# Patient Record
Sex: FEMALE | Race: WHITE | NOT HISPANIC OR LATINO | Employment: OTHER | URBAN - NONMETROPOLITAN AREA
[De-identification: names, ages, dates, MRNs, and addresses within clinical notes are randomized per-mention and may not be internally consistent; named-entity substitution may affect disease eponyms.]

---

## 2017-01-02 ENCOUNTER — HISTORY (OUTPATIENT)
Dept: INTERNAL MEDICINE | Facility: OTHER | Age: 42
End: 2017-01-02

## 2017-01-02 ENCOUNTER — COMMUNICATION - GICH (OUTPATIENT)
Dept: INTERNAL MEDICINE | Facility: OTHER | Age: 42
End: 2017-01-02

## 2017-01-02 ENCOUNTER — OFFICE VISIT - GICH (OUTPATIENT)
Dept: INTERNAL MEDICINE | Facility: OTHER | Age: 42
End: 2017-01-02

## 2017-01-02 DIAGNOSIS — M79.671 PAIN OF RIGHT FOOT: ICD-10-CM

## 2017-01-02 DIAGNOSIS — G62.9 POLYNEUROPATHY: ICD-10-CM

## 2017-01-02 DIAGNOSIS — F34.1 DYSTHYMIC DISORDER: ICD-10-CM

## 2017-01-02 DIAGNOSIS — E53.8 DEFICIENCY OF OTHER SPECIFIED B GROUP VITAMINS (CODE): ICD-10-CM

## 2017-01-02 DIAGNOSIS — M79.672 PAIN OF LEFT FOOT: ICD-10-CM

## 2017-01-02 DIAGNOSIS — R20.2 PARESTHESIA OF SKIN: ICD-10-CM

## 2017-01-02 ASSESSMENT — PATIENT HEALTH QUESTIONNAIRE - PHQ9: SUM OF ALL RESPONSES TO PHQ QUESTIONS 1-9: 2

## 2017-01-05 ENCOUNTER — AMBULATORY - GICH (OUTPATIENT)
Dept: SCHEDULING | Facility: OTHER | Age: 42
End: 2017-01-05

## 2017-01-09 ENCOUNTER — AMBULATORY - GICH (OUTPATIENT)
Dept: INTERNAL MEDICINE | Facility: OTHER | Age: 42
End: 2017-01-09

## 2017-01-09 DIAGNOSIS — F60.3 BORDERLINE PERSONALITY DISORDER (H): ICD-10-CM

## 2017-01-09 DIAGNOSIS — F31.60 BIPOLAR DISORDER, CURRENT EPISODE MIXED (H): ICD-10-CM

## 2017-01-11 ENCOUNTER — HISTORY (OUTPATIENT)
Dept: OBGYN | Facility: OTHER | Age: 42
End: 2017-01-11

## 2017-01-11 ENCOUNTER — OFFICE VISIT - GICH (OUTPATIENT)
Dept: OBGYN | Facility: OTHER | Age: 42
End: 2017-01-11

## 2017-01-11 DIAGNOSIS — Z01.419 ENCOUNTER FOR GYNECOLOGICAL EXAMINATION WITHOUT ABNORMAL FINDING: ICD-10-CM

## 2017-01-11 DIAGNOSIS — N87.9 DYSPLASIA OF CERVIX UTERI: ICD-10-CM

## 2017-01-11 DIAGNOSIS — N92.6 IRREGULAR MENSTRUATION: ICD-10-CM

## 2017-01-11 LAB
ABSOLUTE BASOPHILS - HISTORICAL: 0.1 THOU/CU MM
ABSOLUTE EOSINOPHILS - HISTORICAL: 0.1 THOU/CU MM
ABSOLUTE LYMPHOCYTES - HISTORICAL: 2.5 THOU/CU MM (ref 0.9–2.9)
ABSOLUTE MONOCYTES - HISTORICAL: 0.7 THOU/CU MM
ABSOLUTE NEUTROPHILS - HISTORICAL: 5.5 THOU/CU MM (ref 1.7–7)
BASOPHILS # BLD AUTO: 0.8 %
EOSINOPHIL NFR BLD AUTO: 1.5 %
ERYTHROCYTE [DISTWIDTH] IN BLOOD BY AUTOMATED COUNT: 12.7 % (ref 11.5–15.5)
FSH - HISTORICAL: 8.9 MIU/ML
HCT VFR BLD AUTO: 41.9 % (ref 33–51)
HEMOGLOBIN: 13.8 G/DL (ref 12–16)
LYMPHOCYTES NFR BLD AUTO: 28.6 % (ref 20–44)
MCH RBC QN AUTO: 30.2 PG (ref 26–34)
MCHC RBC AUTO-ENTMCNC: 32.9 G/DL (ref 32–36)
MCV RBC AUTO: 92 FL (ref 80–100)
MONOCYTES NFR BLD AUTO: 7.4 %
NEUTROPHILS NFR BLD AUTO: 61.7 % (ref 42–72)
PLATELET # BLD AUTO: 317 THOU/CU MM (ref 140–440)
PMV BLD: 8.3 FL (ref 6.5–11)
RED BLOOD COUNT - HISTORICAL: 4.56 MIL/CU MM (ref 4–5.2)
WHITE BLOOD COUNT - HISTORICAL: 8.9 THOU/CU MM (ref 4.5–11)

## 2017-01-19 ENCOUNTER — HOSPITAL ENCOUNTER (OUTPATIENT)
Dept: RADIOLOGY | Facility: OTHER | Age: 42
End: 2017-01-19
Attending: OBSTETRICS & GYNECOLOGY

## 2017-01-19 DIAGNOSIS — N92.6 IRREGULAR MENSTRUATION: ICD-10-CM

## 2017-01-23 LAB — HPV RESULTS - HISTORICAL: NEGATIVE

## 2017-01-24 ENCOUNTER — OFFICE VISIT - GICH (OUTPATIENT)
Dept: OBGYN | Facility: OTHER | Age: 42
End: 2017-01-24

## 2017-01-24 ENCOUNTER — HISTORY (OUTPATIENT)
Dept: OBGYN | Facility: OTHER | Age: 42
End: 2017-01-24

## 2017-01-24 ENCOUNTER — AMBULATORY - GICH (OUTPATIENT)
Dept: OBGYN | Facility: OTHER | Age: 42
End: 2017-01-24

## 2017-01-24 DIAGNOSIS — N92.1 EXCESSIVE AND FREQUENT MENSTRUATION WITH IRREGULAR CYCLE: ICD-10-CM

## 2017-01-24 DIAGNOSIS — Z01.818 ENCOUNTER FOR OTHER PREPROCEDURAL EXAMINATION: ICD-10-CM

## 2017-02-16 ENCOUNTER — AMBULATORY - GICH (OUTPATIENT)
Dept: INTERNAL MEDICINE | Facility: OTHER | Age: 42
End: 2017-02-16

## 2017-02-16 ENCOUNTER — HISTORY (OUTPATIENT)
Dept: INTERNAL MEDICINE | Facility: OTHER | Age: 42
End: 2017-02-16

## 2017-02-16 DIAGNOSIS — E55.9 VITAMIN D DEFICIENCY: ICD-10-CM

## 2017-02-16 DIAGNOSIS — J45.40 MODERATE PERSISTENT ASTHMA, UNCOMPLICATED: ICD-10-CM

## 2017-02-16 DIAGNOSIS — E53.8 DEFICIENCY OF OTHER SPECIFIED B GROUP VITAMINS (CODE): ICD-10-CM

## 2017-02-16 DIAGNOSIS — I83.11 VARICOSE VEINS OF RIGHT LOWER EXTREMITY WITH INFLAMMATION: ICD-10-CM

## 2017-02-16 DIAGNOSIS — I83.12 VARICOSE VEINS OF LEFT LOWER EXTREMITY WITH INFLAMMATION: ICD-10-CM

## 2017-02-16 DIAGNOSIS — G62.9 POLYNEUROPATHY: ICD-10-CM

## 2017-02-16 DIAGNOSIS — F31.60 BIPOLAR DISORDER, CURRENT EPISODE MIXED (H): ICD-10-CM

## 2017-02-16 DIAGNOSIS — Z01.818 ENCOUNTER FOR OTHER PREPROCEDURAL EXAMINATION: ICD-10-CM

## 2017-02-16 DIAGNOSIS — N92.1 EXCESSIVE AND FREQUENT MENSTRUATION WITH IRREGULAR CYCLE: ICD-10-CM

## 2017-02-16 LAB
ANION GAP - HISTORICAL: 9 (ref 5–18)
BUN SERPL-MCNC: 11 MG/DL (ref 7–25)
BUN/CREAT RATIO - HISTORICAL: 11
CALCIUM SERPL-MCNC: 9.9 MG/DL (ref 8.6–10.3)
CHLORIDE SERPLBLD-SCNC: 104 MMOL/L (ref 98–107)
CO2 SERPL-SCNC: 24 MMOL/L (ref 21–31)
CREAT SERPL-MCNC: 1 MG/DL (ref 0.7–1.3)
GFR IF NOT AFRICAN AMERICAN - HISTORICAL: >60 ML/MIN/1.73M2
GLUCOSE SERPL-MCNC: 116 MG/DL (ref 70–105)
POTASSIUM SERPL-SCNC: 4.1 MMOL/L (ref 3.5–5.1)
SODIUM SERPL-SCNC: 137 MMOL/L (ref 133–143)
VIT B12 SERPL-MCNC: 232 PG/ML (ref 180–914)
VITAMIN D TOTAL - HISTORICAL: 25.2 NG/ML

## 2017-02-16 ASSESSMENT — PATIENT HEALTH QUESTIONNAIRE - PHQ9: SUM OF ALL RESPONSES TO PHQ QUESTIONS 1-9: 0

## 2017-02-23 ENCOUNTER — SURGERY (OUTPATIENT)
Dept: SURGERY | Facility: OTHER | Age: 42
End: 2017-02-23

## 2017-02-23 ENCOUNTER — HISTORY (OUTPATIENT)
Dept: SURGERY | Facility: OTHER | Age: 42
End: 2017-02-23

## 2017-02-23 ENCOUNTER — HOSPITAL ENCOUNTER (OUTPATIENT)
Dept: SURGERY | Facility: OTHER | Age: 42
Discharge: HOME | End: 2017-02-23
Attending: OBSTETRICS & GYNECOLOGY | Admitting: OBSTETRICS & GYNECOLOGY

## 2017-02-23 DIAGNOSIS — N92.1 EXCESSIVE AND FREQUENT MENSTRUATION WITH IRREGULAR CYCLE: ICD-10-CM

## 2017-03-01 ENCOUNTER — AMBULATORY - GICH (OUTPATIENT)
Dept: INTERNAL MEDICINE | Facility: OTHER | Age: 42
End: 2017-03-01

## 2017-03-01 DIAGNOSIS — F60.3 BORDERLINE PERSONALITY DISORDER (H): ICD-10-CM

## 2017-03-01 DIAGNOSIS — F31.60 BIPOLAR DISORDER, CURRENT EPISODE MIXED (H): ICD-10-CM

## 2017-03-06 ENCOUNTER — AMBULATORY - GICH (OUTPATIENT)
Dept: SCHEDULING | Facility: OTHER | Age: 42
End: 2017-03-06

## 2017-04-20 ENCOUNTER — HOSPITAL ENCOUNTER (OUTPATIENT)
Dept: RADIOLOGY | Facility: OTHER | Age: 42
End: 2017-04-20
Attending: INTERNAL MEDICINE

## 2017-04-20 ENCOUNTER — OFFICE VISIT - GICH (OUTPATIENT)
Dept: INTERNAL MEDICINE | Facility: OTHER | Age: 42
End: 2017-04-20

## 2017-04-20 ENCOUNTER — AMBULATORY - GICH (OUTPATIENT)
Dept: INTERNAL MEDICINE | Facility: OTHER | Age: 42
End: 2017-04-20

## 2017-04-20 DIAGNOSIS — I89.0 LYMPHEDEMA, NOT ELSEWHERE CLASSIFIED: ICD-10-CM

## 2017-04-20 DIAGNOSIS — G62.9 POLYNEUROPATHY: ICD-10-CM

## 2017-04-20 DIAGNOSIS — M25.561 PAIN IN RIGHT KNEE: ICD-10-CM

## 2017-04-20 DIAGNOSIS — E53.8 DEFICIENCY OF OTHER SPECIFIED B GROUP VITAMINS (CODE): ICD-10-CM

## 2017-04-20 DIAGNOSIS — E55.9 VITAMIN D DEFICIENCY: ICD-10-CM

## 2017-04-20 DIAGNOSIS — G89.29 OTHER CHRONIC PAIN: ICD-10-CM

## 2017-04-20 DIAGNOSIS — M25.562 PAIN IN LEFT KNEE: ICD-10-CM

## 2017-04-20 DIAGNOSIS — M23.8X1 OTHER INTERNAL DERANGEMENTS OF RIGHT KNEE: ICD-10-CM

## 2017-04-20 DIAGNOSIS — E66.01 MORBID (SEVERE) OBESITY DUE TO EXCESS CALORIES (H): ICD-10-CM

## 2017-04-20 ASSESSMENT — ANXIETY QUESTIONNAIRES
7. FEELING AFRAID AS IF SOMETHING AWFUL MIGHT HAPPEN: NOT AT ALL
1. FEELING NERVOUS, ANXIOUS, OR ON EDGE: NOT AT ALL
GAD7 TOTAL SCORE: 0
5. BEING SO RESTLESS THAT IT IS HARD TO SIT STILL: NOT AT ALL
6. BECOMING EASILY ANNOYED OR IRRITABLE: NOT AT ALL
3. WORRYING TOO MUCH ABOUT DIFFERENT THINGS: NOT AT ALL
4. TROUBLE RELAXING: NOT AT ALL
2. NOT BEING ABLE TO STOP OR CONTROL WORRYING: NOT AT ALL

## 2017-04-20 ASSESSMENT — PATIENT HEALTH QUESTIONNAIRE - PHQ9: SUM OF ALL RESPONSES TO PHQ QUESTIONS 1-9: 0

## 2017-04-24 ENCOUNTER — AMBULATORY - GICH (OUTPATIENT)
Dept: INTERNAL MEDICINE | Facility: OTHER | Age: 42
End: 2017-04-24

## 2017-04-24 DIAGNOSIS — M25.561 PAIN IN RIGHT KNEE: ICD-10-CM

## 2017-04-24 DIAGNOSIS — M25.562 PAIN IN LEFT KNEE: ICD-10-CM

## 2017-04-24 DIAGNOSIS — M23.8X1 OTHER INTERNAL DERANGEMENTS OF RIGHT KNEE: ICD-10-CM

## 2017-04-24 DIAGNOSIS — G89.29 OTHER CHRONIC PAIN: ICD-10-CM

## 2017-05-03 ENCOUNTER — HISTORY (OUTPATIENT)
Dept: ORTHOPEDICS | Facility: OTHER | Age: 42
End: 2017-05-03

## 2017-05-03 ENCOUNTER — OFFICE VISIT - GICH (OUTPATIENT)
Dept: ORTHOPEDICS | Facility: OTHER | Age: 42
End: 2017-05-03

## 2017-05-03 DIAGNOSIS — M25.562 PAIN IN LEFT KNEE: ICD-10-CM

## 2017-05-03 DIAGNOSIS — M17.0 PRIMARY OSTEOARTHRITIS OF BOTH KNEES: ICD-10-CM

## 2017-05-03 DIAGNOSIS — M25.561 PAIN IN RIGHT KNEE: ICD-10-CM

## 2017-05-03 DIAGNOSIS — M23.8X1 OTHER INTERNAL DERANGEMENTS OF RIGHT KNEE: ICD-10-CM

## 2017-05-03 DIAGNOSIS — E66.01 MORBID (SEVERE) OBESITY DUE TO EXCESS CALORIES (H): ICD-10-CM

## 2017-05-03 DIAGNOSIS — G89.29 OTHER CHRONIC PAIN: ICD-10-CM

## 2017-05-05 ENCOUNTER — COMMUNICATION - GICH (OUTPATIENT)
Dept: INTERNAL MEDICINE | Facility: OTHER | Age: 42
End: 2017-05-05

## 2017-05-05 ENCOUNTER — OFFICE VISIT - GICH (OUTPATIENT)
Dept: INTERNAL MEDICINE | Facility: OTHER | Age: 42
End: 2017-05-05

## 2017-05-05 ENCOUNTER — HISTORY (OUTPATIENT)
Dept: INTERNAL MEDICINE | Facility: OTHER | Age: 42
End: 2017-05-05

## 2017-05-05 ENCOUNTER — AMBULATORY - GICH (OUTPATIENT)
Dept: SCHEDULING | Facility: OTHER | Age: 42
End: 2017-05-05

## 2017-05-05 DIAGNOSIS — H66.001 ACUTE SUPPURATIVE OTITIS MEDIA OF RIGHT EAR WITHOUT SPONTANEOUS RUPTURE OF TYMPANIC MEMBRANE: ICD-10-CM

## 2017-05-05 DIAGNOSIS — F31.60 BIPOLAR DISORDER, CURRENT EPISODE MIXED (H): ICD-10-CM

## 2017-05-05 DIAGNOSIS — F60.3 BORDERLINE PERSONALITY DISORDER (H): ICD-10-CM

## 2017-05-05 DIAGNOSIS — H92.01 OTALGIA OF RIGHT EAR: ICD-10-CM

## 2017-05-05 ASSESSMENT — PATIENT HEALTH QUESTIONNAIRE - PHQ9: SUM OF ALL RESPONSES TO PHQ QUESTIONS 1-9: 0

## 2017-05-11 ENCOUNTER — HISTORY (OUTPATIENT)
Dept: FAMILY MEDICINE | Facility: OTHER | Age: 42
End: 2017-05-11

## 2017-05-11 ENCOUNTER — OFFICE VISIT - GICH (OUTPATIENT)
Dept: FAMILY MEDICINE | Facility: OTHER | Age: 42
End: 2017-05-11

## 2017-05-11 DIAGNOSIS — G89.29 OTHER CHRONIC PAIN: ICD-10-CM

## 2017-05-11 DIAGNOSIS — M25.562 PAIN IN LEFT KNEE: ICD-10-CM

## 2017-05-11 DIAGNOSIS — Z20.2 CONTACT WITH AND (SUSPECTED) EXPOSURE TO INFECTIONS WITH A PREDOMINANTLY SEXUAL MODE OF TRANSMISSION: ICD-10-CM

## 2017-05-11 DIAGNOSIS — M25.561 PAIN IN RIGHT KNEE: ICD-10-CM

## 2017-05-12 LAB
HEPATITIS C ANTIBODY CATEGORY - HISTORICAL: NORMAL
HIV-1/HIV-2 ANTIBODY CATEGORY - HISTORICAL: NORMAL

## 2017-06-05 ENCOUNTER — OFFICE VISIT - GICH (OUTPATIENT)
Dept: FAMILY MEDICINE | Facility: OTHER | Age: 42
End: 2017-06-05

## 2017-06-05 ENCOUNTER — HISTORY (OUTPATIENT)
Dept: FAMILY MEDICINE | Facility: OTHER | Age: 42
End: 2017-06-05

## 2017-06-05 DIAGNOSIS — R30.0 DYSURIA: ICD-10-CM

## 2017-06-05 DIAGNOSIS — N30.00 ACUTE CYSTITIS WITHOUT HEMATURIA: ICD-10-CM

## 2017-06-05 LAB
BACTERIA URINE: ABNORMAL BACTERIA/HPF
BILIRUB UR QL: NEGATIVE
CLARITY, URINE: CLEAR CLARITY
COLOR UR: YELLOW COLOR
EPITHELIAL CELLS: ABNORMAL EPI/HPF
GLUCOSE URINE: NEGATIVE MG/DL
KETONES UR QL: NEGATIVE MG/DL
LEUKOCYTE ESTERASE URINE: ABNORMAL
NITRITE UR QL STRIP: NEGATIVE
OCCULT BLOOD,URINE - HISTORICAL: ABNORMAL
PH UR: 5.5 [PH]
PROTEIN QUALITATIVE,URINE - HISTORICAL: NEGATIVE MG/DL
RBC - HISTORICAL: ABNORMAL /HPF
SP GR UR STRIP: >=1.03
UROBILINOGEN,QUALITATIVE - HISTORICAL: NORMAL EU/DL
WBC - HISTORICAL: ABNORMAL /HPF

## 2017-06-08 ENCOUNTER — HISTORY (OUTPATIENT)
Dept: FAMILY MEDICINE | Facility: OTHER | Age: 42
End: 2017-06-08

## 2017-06-08 ENCOUNTER — OFFICE VISIT - GICH (OUTPATIENT)
Dept: FAMILY MEDICINE | Facility: OTHER | Age: 42
End: 2017-06-08

## 2017-06-08 DIAGNOSIS — I87.2 VENOUS INSUFFICIENCY (CHRONIC) (PERIPHERAL): ICD-10-CM

## 2017-06-14 ENCOUNTER — COMMUNICATION - GICH (OUTPATIENT)
Dept: INTERNAL MEDICINE | Facility: OTHER | Age: 42
End: 2017-06-14

## 2017-06-14 DIAGNOSIS — E53.8 DEFICIENCY OF OTHER SPECIFIED B GROUP VITAMINS (CODE): ICD-10-CM

## 2017-06-30 ENCOUNTER — COMMUNICATION - GICH (OUTPATIENT)
Dept: INTERNAL MEDICINE | Facility: OTHER | Age: 42
End: 2017-06-30

## 2017-06-30 DIAGNOSIS — F60.3 BORDERLINE PERSONALITY DISORDER (H): ICD-10-CM

## 2017-06-30 DIAGNOSIS — F31.0 BIPOLAR DISORDER, CURRENT EPISODE HYPOMANIC (H): ICD-10-CM

## 2017-07-04 ENCOUNTER — AMBULATORY - GICH (OUTPATIENT)
Dept: SCHEDULING | Facility: OTHER | Age: 42
End: 2017-07-04

## 2017-07-19 ENCOUNTER — COMMUNICATION - GICH (OUTPATIENT)
Dept: INTERNAL MEDICINE | Facility: OTHER | Age: 42
End: 2017-07-19

## 2017-07-19 DIAGNOSIS — E53.8 DEFICIENCY OF OTHER SPECIFIED B GROUP VITAMINS (CODE): ICD-10-CM

## 2017-07-27 ENCOUNTER — HISTORY (OUTPATIENT)
Dept: INTERNAL MEDICINE | Facility: OTHER | Age: 42
End: 2017-07-27

## 2017-07-27 ENCOUNTER — OFFICE VISIT - GICH (OUTPATIENT)
Dept: INTERNAL MEDICINE | Facility: OTHER | Age: 42
End: 2017-07-27

## 2017-07-27 DIAGNOSIS — E53.8 DEFICIENCY OF OTHER SPECIFIED B GROUP VITAMINS (CODE): ICD-10-CM

## 2017-07-27 DIAGNOSIS — E66.01 MORBID (SEVERE) OBESITY DUE TO EXCESS CALORIES (H): ICD-10-CM

## 2017-07-27 DIAGNOSIS — E55.9 VITAMIN D DEFICIENCY: ICD-10-CM

## 2017-07-27 ASSESSMENT — ANXIETY QUESTIONNAIRES
4. TROUBLE RELAXING: NOT AT ALL
GAD7 TOTAL SCORE: 0
1. FEELING NERVOUS, ANXIOUS, OR ON EDGE: NOT AT ALL
3. WORRYING TOO MUCH ABOUT DIFFERENT THINGS: NOT AT ALL
5. BEING SO RESTLESS THAT IT IS HARD TO SIT STILL: NOT AT ALL
6. BECOMING EASILY ANNOYED OR IRRITABLE: NOT AT ALL
2. NOT BEING ABLE TO STOP OR CONTROL WORRYING: NOT AT ALL
7. FEELING AFRAID AS IF SOMETHING AWFUL MIGHT HAPPEN: NOT AT ALL

## 2017-07-27 ASSESSMENT — PATIENT HEALTH QUESTIONNAIRE - PHQ9: SUM OF ALL RESPONSES TO PHQ QUESTIONS 1-9: 0

## 2017-08-27 ENCOUNTER — HISTORY (OUTPATIENT)
Dept: EMERGENCY MEDICINE | Facility: OTHER | Age: 42
End: 2017-08-27

## 2017-09-02 ENCOUNTER — AMBULATORY - GICH (OUTPATIENT)
Dept: SCHEDULING | Facility: OTHER | Age: 42
End: 2017-09-02

## 2017-09-08 ENCOUNTER — COMMUNICATION - GICH (OUTPATIENT)
Dept: INTERNAL MEDICINE | Facility: OTHER | Age: 42
End: 2017-09-08

## 2017-09-08 DIAGNOSIS — F31.0 BIPOLAR DISORDER, CURRENT EPISODE HYPOMANIC (H): ICD-10-CM

## 2017-09-08 DIAGNOSIS — F60.3 BORDERLINE PERSONALITY DISORDER (H): ICD-10-CM

## 2017-10-06 ENCOUNTER — AMBULATORY - GICH (OUTPATIENT)
Dept: SCHEDULING | Facility: OTHER | Age: 42
End: 2017-10-06

## 2017-10-11 ENCOUNTER — HISTORY (OUTPATIENT)
Dept: EMERGENCY MEDICINE | Facility: OTHER | Age: 42
End: 2017-10-11

## 2017-10-12 ENCOUNTER — COMMUNICATION - GICH (OUTPATIENT)
Dept: INTERNAL MEDICINE | Facility: OTHER | Age: 42
End: 2017-10-12

## 2017-10-13 ENCOUNTER — COMMUNICATION - GICH (OUTPATIENT)
Dept: INTERNAL MEDICINE | Facility: OTHER | Age: 42
End: 2017-10-13

## 2017-10-26 ENCOUNTER — AMBULATORY - GICH (OUTPATIENT)
Dept: SCHEDULING | Facility: OTHER | Age: 42
End: 2017-10-26

## 2017-10-27 ENCOUNTER — HISTORY (OUTPATIENT)
Dept: INTERNAL MEDICINE | Facility: OTHER | Age: 42
End: 2017-10-27

## 2017-10-27 ENCOUNTER — OFFICE VISIT - GICH (OUTPATIENT)
Dept: INTERNAL MEDICINE | Facility: OTHER | Age: 42
End: 2017-10-27

## 2017-10-27 DIAGNOSIS — E66.01 MORBID (SEVERE) OBESITY DUE TO EXCESS CALORIES (H): ICD-10-CM

## 2017-10-27 DIAGNOSIS — I89.0 LYMPHEDEMA, NOT ELSEWHERE CLASSIFIED: ICD-10-CM

## 2017-10-27 DIAGNOSIS — F31.0 BIPOLAR DISORDER, CURRENT EPISODE HYPOMANIC (H): ICD-10-CM

## 2017-10-27 DIAGNOSIS — Z23 ENCOUNTER FOR IMMUNIZATION: ICD-10-CM

## 2017-10-27 DIAGNOSIS — F60.3 BORDERLINE PERSONALITY DISORDER (H): ICD-10-CM

## 2017-10-27 DIAGNOSIS — Z12.31 ENCOUNTER FOR SCREENING MAMMOGRAM FOR MALIGNANT NEOPLASM OF BREAST: ICD-10-CM

## 2017-10-27 DIAGNOSIS — F34.0 CYCLOTHYMIC DISORDER: ICD-10-CM

## 2017-10-27 DIAGNOSIS — E53.8 DEFICIENCY OF OTHER SPECIFIED B GROUP VITAMINS (CODE): ICD-10-CM

## 2017-10-27 ASSESSMENT — ANXIETY QUESTIONNAIRES
1. FEELING NERVOUS, ANXIOUS, OR ON EDGE: NOT AT ALL
4. TROUBLE RELAXING: NOT AT ALL
3. WORRYING TOO MUCH ABOUT DIFFERENT THINGS: SEVERAL DAYS
6. BECOMING EASILY ANNOYED OR IRRITABLE: NOT AT ALL
5. BEING SO RESTLESS THAT IT IS HARD TO SIT STILL: NOT AT ALL
GAD7 TOTAL SCORE: 2
7. FEELING AFRAID AS IF SOMETHING AWFUL MIGHT HAPPEN: NOT AT ALL
2. NOT BEING ABLE TO STOP OR CONTROL WORRYING: SEVERAL DAYS

## 2017-10-27 ASSESSMENT — PATIENT HEALTH QUESTIONNAIRE - PHQ9: SUM OF ALL RESPONSES TO PHQ QUESTIONS 1-9: 1

## 2017-11-01 ENCOUNTER — AMBULATORY - GICH (OUTPATIENT)
Dept: SCHEDULING | Facility: OTHER | Age: 42
End: 2017-11-01

## 2017-11-03 ENCOUNTER — COMMUNICATION - GICH (OUTPATIENT)
Dept: INTERNAL MEDICINE | Facility: OTHER | Age: 42
End: 2017-11-03

## 2017-11-03 DIAGNOSIS — F31.0 BIPOLAR DISORDER, CURRENT EPISODE HYPOMANIC (H): ICD-10-CM

## 2017-11-21 ENCOUNTER — HOSPITAL ENCOUNTER (OUTPATIENT)
Dept: PHYSICAL THERAPY | Facility: OTHER | Age: 42
Setting detail: THERAPIES SERIES
End: 2017-11-21
Attending: INTERNAL MEDICINE

## 2017-11-21 ENCOUNTER — AMBULATORY - GICH (OUTPATIENT)
Dept: PHYSICAL THERAPY | Facility: OTHER | Age: 42
End: 2017-11-21

## 2017-11-21 DIAGNOSIS — I89.0 LYMPHEDEMA, NOT ELSEWHERE CLASSIFIED: ICD-10-CM

## 2017-11-28 ENCOUNTER — COMMUNICATION - GICH (OUTPATIENT)
Dept: INTERNAL MEDICINE | Facility: OTHER | Age: 42
End: 2017-11-28

## 2017-12-05 ENCOUNTER — HOSPITAL ENCOUNTER (OUTPATIENT)
Dept: PHYSICAL THERAPY | Facility: OTHER | Age: 42
Setting detail: THERAPIES SERIES
End: 2017-12-05
Attending: INTERNAL MEDICINE

## 2017-12-06 ENCOUNTER — COMMUNICATION - GICH (OUTPATIENT)
Dept: INTERNAL MEDICINE | Facility: OTHER | Age: 42
End: 2017-12-06

## 2017-12-06 DIAGNOSIS — J45.40 MODERATE PERSISTENT ASTHMA, UNCOMPLICATED: ICD-10-CM

## 2017-12-14 ENCOUNTER — HISTORY (OUTPATIENT)
Dept: INTERNAL MEDICINE | Facility: OTHER | Age: 42
End: 2017-12-14

## 2017-12-14 ENCOUNTER — OFFICE VISIT - GICH (OUTPATIENT)
Dept: INTERNAL MEDICINE | Facility: OTHER | Age: 42
End: 2017-12-14

## 2017-12-14 DIAGNOSIS — M79.671 PAIN OF RIGHT FOOT: ICD-10-CM

## 2017-12-14 DIAGNOSIS — I89.0 LYMPHEDEMA, NOT ELSEWHERE CLASSIFIED: ICD-10-CM

## 2017-12-14 DIAGNOSIS — R20.2 PARESTHESIA OF SKIN: ICD-10-CM

## 2017-12-14 DIAGNOSIS — G62.9 POLYNEUROPATHY: ICD-10-CM

## 2017-12-14 DIAGNOSIS — I87.2 VENOUS INSUFFICIENCY (CHRONIC) (PERIPHERAL): ICD-10-CM

## 2017-12-14 DIAGNOSIS — M79.672 PAIN OF LEFT FOOT: ICD-10-CM

## 2017-12-14 DIAGNOSIS — E66.01 MORBID (SEVERE) OBESITY DUE TO EXCESS CALORIES (H): ICD-10-CM

## 2017-12-14 DIAGNOSIS — E53.8 DEFICIENCY OF OTHER SPECIFIED B GROUP VITAMINS (CODE): ICD-10-CM

## 2017-12-14 DIAGNOSIS — J45.40 MODERATE PERSISTENT ASTHMA, UNCOMPLICATED: ICD-10-CM

## 2017-12-14 DIAGNOSIS — R23.2 FLUSHING: ICD-10-CM

## 2017-12-14 ASSESSMENT — ANXIETY QUESTIONNAIRES
1. FEELING NERVOUS, ANXIOUS, OR ON EDGE: NOT AT ALL
2. NOT BEING ABLE TO STOP OR CONTROL WORRYING: NOT AT ALL
5. BEING SO RESTLESS THAT IT IS HARD TO SIT STILL: NOT AT ALL
3. WORRYING TOO MUCH ABOUT DIFFERENT THINGS: NOT AT ALL
6. BECOMING EASILY ANNOYED OR IRRITABLE: NOT AT ALL
4. TROUBLE RELAXING: NOT AT ALL
GAD7 TOTAL SCORE: 0
7. FEELING AFRAID AS IF SOMETHING AWFUL MIGHT HAPPEN: NOT AT ALL

## 2017-12-19 ENCOUNTER — OFFICE VISIT - GICH (OUTPATIENT)
Dept: OBGYN | Facility: OTHER | Age: 42
End: 2017-12-19

## 2017-12-19 ENCOUNTER — HISTORY (OUTPATIENT)
Dept: OBGYN | Facility: OTHER | Age: 42
End: 2017-12-19

## 2017-12-19 DIAGNOSIS — R23.2 FLUSHING: ICD-10-CM

## 2017-12-19 DIAGNOSIS — N91.2 AMENORRHEA: ICD-10-CM

## 2017-12-19 DIAGNOSIS — N95.1 FEMALE CLIMACTERIC STATE: ICD-10-CM

## 2017-12-19 LAB
FSH - HISTORICAL: 4.9 MIU/ML
TSH - HISTORICAL: 3.06 UIU/ML (ref 0.34–5.6)

## 2017-12-27 NOTE — PROGRESS NOTES
Patient Information     Patient Name MRN Sex Rosi Mendez [id number] Female 1975      Progress Notes by Lucio Curiel MD at 10/27/2017  1:40 PM     Author:  Lucio Curiel MD Service:  (none) Author Type:  Physician     Filed:  10/27/2017  4:55 PM Encounter Date:  10/27/2017 Status:  Signed     :  Lucio Curiel MD (Physician)            Nursing Notes:   Isa Wilcox  10/27/2017  1:45 PM  Signed  Patient is here today for a 3 month follow up. She has a few things she would like discuss today. Her  is also with today. She would like her flu shot today.  Isa Wilcox LPN.......................... 10/27/2017  1:41 PM    Rosi Baires presents to clinic today for:   Chief Complaint     Patient presents with       Follow Up      3 month     HPI: Ms. Baires is a 42 y.o. female who presents today for evaluation of above.     (F31.0) Bipolar affective disorder, current episode hypomanic (HC)  (primary encounter diagnosis)  (F34.0) Affective personality disorder  (Z23) Needs flu shot  (F60.3) Borderline personality disorder - Following with [facility] - Chiquis LA[facility]  (E66.01,  Z68.43) Morbid obesity with BMI of 50.0-59.9, adult (HC)  (E53.8) Vitamin B12 deficiency  (Z12.31) Screening mammogram, encounter for  (I89.0) Lymphedema     Patient has chronic mental health issues.  She follows closely with her psychiatrist.  She is on disability due to mental health problems.  She has not worked for many years.    Flu shot due today, orders placed.    Obesity, discussed need for reduced oral caloric intake.  Regular exercise.  Reduce carbohydrate intake.  Information printed and reviewed.    Vitamin B12 deficiency, taking B12 shots, has been quite helpful for her.  Really has helped her energy levels, she feels like her mood is stable and improved.    Mammogram due, orders placed.  Patient also is due for female health screening exams.  Orders  placed.    Lymphedema, has had chronic leg swelling problems.  Recommend lymphedema treatment with physical therapy.  Orders placed.    Ms. Mahan Body mass index is 54.16 kg/(m^2). This is out of the normal range for a 42 y.o. Normal range for ages 18+ is between 18.5 and 24.9. To lose weight we reviewed risks and benefits of appropriate options such as diet, exercise, and medications. Patient's strategy will be  self-directed nutrition plan and self-directed exercise program   BP Readings from Last 1 Encounters:10/27/17 : 122/78  Ms. Mahan blood pressure is out of the normal range for adults. Per JNC-8 guidelines normal adult blood pressure is < 120/80, pre-hypertensive is between 120/80 and 139/89, and hypertension is 140/90 or greater. Risks of hypertension were discussed. Patient's strategy will be weight loss, increased activity and reduced salt intake    Functional Capacity: about 4 METS.   Patient reports no current symptoms of fevers, chills, nausea/vomiting.   No cough. No shortness of breath.   No change in bowel/bladder habits. No melena, hematochezia. No Hematuria.   No rashes. No palpitations.  No orthopnea/paroxysmal nocturnal dyspnea   No vision or hearing issues.   No bruising.     AGNIESZKA:  AGNIESZKA-7 ANXIETY SCREENING 7/27/2017 10/27/2017   AGNIESZKA date (doc flow) 7/27/2017 10/27/2017   Nervous, anxious 0 0   Cannot stop worrying 0 1   Worry about different things 0 1   Cannot relax 0 0   Feeling restless 0 0   Easily annoyed/irritated 0 0   Afraid of awful event 0 0   Score 0 2   Severity none none   Some recent data might be hidden         PHQ9:  PHQ Depression Screening 7/27/2017 10/27/2017   Date of PHQ exam (doc flow) 7/27/2017 10/27/2017   1. Lack of interest/pleasure 0 - Not at all 0 - Not at all   2. Feeling down/depressed 0 - Not at all 0 - Not at all   PHQ-2 TOTAL SCORE 0 0   3. Trouble sleeping 0 - Not at all 0 - Not at all   4. Decreased energy 0 - Not at all 0 - Not at all   5. Appetite  change 0 - Not at all 1 - Several days   6. Feelings of failure 0 - Not at all 0 - Not at all   7. Trouble concentrating 0 - Not at all 0 - Not at all   8. Activity level 0 - Not at all 0 - Not at all   9. Hurting yourself 0 - Not at all 0 - Not at all   PHQ-9 TOTAL SCORE 0 1   PHQ-9 Severity Level none none   Functional Impairment not difficult at all not difficult at all   Some recent data might be hidden          I have personally reviewed the past medical history, past surgical history, medications, allergies, family and social history as listed below, on 10/27/2017.    Patient Active Problem List       Diagnosis  Date Noted     DEPRESSION/ANXIETY       Priority: High      Bilateral chronic knee pain  04/20/2017     Crepitus of joint of right knee  04/20/2017     Preop examination - Hysterectomy 2/23/2017 - Dr. Fatima  02/16/2017     Menorrhagia with irregular cycle  02/16/2017     Bipolar disorder, current episode mixed, unspecified (HC)  01/09/2017     Peripheral polyneuropathy (HC)  12/18/2016     Elevated random blood glucose level  12/18/2016     Vitamin D deficiency  12/18/2016     Vitamin B12 deficiency  12/18/2016     Bilateral foot pain  12/18/2016     Lymphedema of both lower extremities  11/22/2016     Venous stasis dermatitis of both lower extremities  08/17/2016     Alcohol dependence (HC)  11/28/2015     Cannabis dependence in remission (HC)  11/28/2015     Anxiety, generalized  11/28/2015     Morbid obesity with BMI of 50.0-59.9, adult (HC)  03/23/2015     Moderate persistent asthma  08/19/2013     Overview:   AAP completed on 08/19/13  Triggers: pollen, fumes, exercise, URI, warm weather. Peak flow today is 250. Symptomatic: moderate  Overview:   AAP completed on 08/19/13  Triggers: pollen, fumes, exercise, URI, warm weather. Peak flow today is 250. Symptomatic: moderate        Urinary incontinence  03/15/2013     Closed dislocation of tarsal joint  02/04/2011     GERD       EDEMA LEG        ALLERGIC RHINITIS, SEASONAL       BIPOLAR AFFECTIVE DISORDER       AFFECTIVE PERSONALITY DISORDER       SUBSTANCE ABUSE       Atopic rhinitis  10/02/2003     Overview:   [facility] - Seasonal Allergies        Borderline personality disorder - Following with [facility] - Chiquis LAWRENCE  2003     Overview:   [facility].        Disorder of female genital organ  2001     Overview:   DUB, dysmenorrhea        Past Medical History:     Diagnosis  Date     Asthma      Depression      Edema      Encounter for removal and reinsertion of IUD 05    IUD placement, Removed      History of pregnancy     G3, P2-0-1-2 with history of spontaneous       LSIL (low grade squamous intraepithelial lesion) on Pap smear      Past Surgical History:      Procedure  Laterality Date     LEEP PROCEDURE      Underwent a loop       LEG/ANKLE SURGERY      fracture, repair with screws       TUBAL LIGATION      tubal ligation       Current Outpatient Prescriptions       Medication  Sig Dispense Refill     albuterol HFA 90 mcg/actuation inhaler Inhale 1-2 Puffs by mouth every 4 hours if needed. 1 Inhaler 11     ARIPiprazole (ABILIFY) 15 mg tablet Take 7 mg by mouth once daily.       busPIRone (BUSPAR) 10 mg tablet Take 10 mg by mouth 2 times daily.       cholecalciferol (VITAMIN D3) 5,000 unit capsule Take 1 capsule by mouth once daily. For Vitamin D Deficiency - Dose Increase 2017 100 capsule 3     clotrimazole (LOTRIMIN) 1 % cream Apply  topically to affected area(s) 2 times daily. 1 Tube 0     cyanocobalamin (VITAMIN B12) 1,000 mcg/mL injection Inject 1 mL intramuscular every 2 weeks. - Dose Increase Frequency 2017 4 mL 11     gabapentin (NEURONTIN) 100 mg capsule Take 1-2 capsules by mouth 3 times daily. as needed for burning/shooting neuropathy pain 180 capsule 11     hydrOXYzine pamoate (VISTARIL) 50 mg capsule Take 1 capsule in AM and 2 capsule in PM - Managed per Psychiatry  2     ibuprofen  "(ADVIL; MOTRIN) 200 mg tablet Take 3 tablets by mouth every 6 hours if needed for Pain. 20 tablet 1     lamoTRIgine (LAMICTAL) 150 mg tablet Take 150 mg by mouth 2 times daily.       MISCELLANEOUS MEDICAL SUPPLY (GRADUATED COMPRESSION STOCKINGS) For personal use. Length: thigh Strength: 16-20 mmHg.  Please measure patient in Pharmacy. 1 Packet 0     montelukast (SINGULAIR) 10 mg tablet Take 1 tablet by mouth at bedtime. 90 tablet 3     oxybutynin XL (DITROPAN XL) 5 mg CR tablet TAKE 1 TABLET BY MOUTH DAILY (LOT [id number]) 28 tablet 6     potassium chloride (KLOR-CON M20) 20 mEq Extended-Release tablet Take 20 mEq by mouth.       SAFETY-ELBERT TB SYR 1CC/25GX5/8 1 mL 25 gauge x 5/8\" syrg SAFETY GLIDE-FOR B-12 SHOTS 3 Syringe 3     Syringe with Needle, Disp, 1 mL 25 gauge x 5/8\" Safety glide - for B12 Shots 1 box 0     vilazodone (VIIBRYD) 40 mg tablet Take 40 mg by mouth once daily with a meal.       vitamin B complex (B-COMPLEX) tablet Take 1 tablet by mouth once daily. -- for Low B12 - Start 2/16/2017 100 tablet 3     Walker Rolling Walker for home use.  2 wheels 1 Device 0     Allergies      Allergen   Reactions     Clonazepam  Other - Describe In Comment Field     Causes Violence and aggresssion      Hydrocodone-Acetaminophen  Seizures     Can take Percocet without difficulty.      Lorazepam  Other - Describe In Comment Field     Causes Violence and aggresssion      Zoloft [Sertraline]  Other - Describe In Comment Field     Caused suicidally       Bupropion  Other - Describe In Comment Field     Caused Major Depression      Divalproex Sodium  *Unknown - Pt Doesn't Remember     Lithium  *Unknown - Pt Doesn't Remember     Risperidone Analogues  *Unknown - Pt Doesn't Remember     Sulfa (Sulfonamide Antibiotics)  *Unknown - Pt Doesn't Remember     Family History       Problem   Relation Age of Onset     No Known Problems  Mother      Asthma  Father      + Leg edema, knee, hip replacement. + Lymphedema       Osteoporosis  " "Brother      No Known Problems  Brother      Other  Paternal Grandmother      Lymphedema       Family Status     Relation  Status     Mother Alive     Father Alive     Brother Alive     Brother Alive     Paternal Grandmother      Social History     Social History        Marital status:       Spouse name: N/A     Number of children:  N/A     Years of education:  N/A     Social History Main Topics         Smoking status:   Former Smoker     Packs/day:  1.00     Years:  3.00     Types:  Cigarettes     Quit date:  2003     Smokeless tobacco:   Never Used     Alcohol use   No      Comment: History of abuse - sober as of 2010.       Drug use:   No      Comment: History of use      Sexual activity:   No     Other Topics  Concern     Not on file      Social History Narrative     No longer in adult foster care lived with Charley Godwin.      .     2 sons - age 12 and 7 - as of 2016.     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      Pertinent ROS was performed and was negative as noted in HPI above.     EXAM:   Vitals:     10/27/17 1343   BP: 122/78   Pulse: 80   Weight: (!) 156.9 kg (345 lb 12.8 oz)     BP Readings from Last 3 Encounters:    10/27/17 122/78   10/11/17 135/83   17 130/69     Wt Readings from Last 3 Encounters:    10/27/17 (!) 156.9 kg (345 lb 12.8 oz)   10/11/17 131.5 kg (290 lb)   17 (!) 149.7 kg (330 lb)     Estimated body mass index is 54.16 kg/(m^2) as calculated from the following:    Height as of 10/11/17: 1.702 m (5' 7\").    Weight as of this encounter: 156.9 kg (345 lb 12.8 oz).     EXAM:  Constitutional: well groomed / good hygiene, casual dress  ENT: Normocephalic, Atraumatic   Nose/Mouth: Oral pharynx without erythema or exudates, Nose is patent bilaterally, no rhinorrhea and Dental hygeine adequate   Eyes:  Extraocular muscles intact, Sclera non-icteric, Conjunctiva without erythema  Lymphatic Exam: Non-palpable nodes in neck, " clavicular regions  Pulmonary: Lungs are clear to auscultation bilaterally, without wheezes or crackles  Cardiovascular Exam: regular rate and rhythm, 3+ edema present / lymphedema noted.   Gastrointestinal Exam: Obese  Soft, non-tender, non-distended, positive bowel sounds  Integument: No abnormal rashes, sores, or ulcerations noted  Neurologic Exam: CN 3-12 grossly intact   Musculoskeletal Exam: + lymphedema noted of bilateral legs  Moves upper and lower extremities symmetrically, No focal weakness  Gait and station appear grossly normal  Psychiatric Exam: Awake and Alert, Affect and mood appropriate  Speech is fluent, Thought process is normal    INVESTIGATIONS:  Results for orders placed or performed during the hospital encounter of 08/27/17      COMP METABOLIC PANEL      Result  Value Ref Range    SODIUM 141 133 - 143 mmol/L    POTASSIUM 4.3 3.5 - 5.1 mmol/L    CHLORIDE 104 98 - 107 mmol/L    CO2,TOTAL 25 21 - 31 mmol/L    ANION GAP 12 5 - 18                    GLUCOSE 99 70 - 105 mg/dL    CALCIUM 9.3 8.6 - 10.3 mg/dL    BUN 11 7 - 25 mg/dL    CREATININE 0.95 0.70 - 1.30 mg/dL    BUN/CREAT RATIO           12                    GFR if African American >60 >60 ml/min/1.73m2    GFR if not African American >60 >60 ml/min/1.73m2    ALBUMIN 3.8 3.5 - 5.7 g/dL    PROTEIN,TOTAL 7.1 6.4 - 8.9 g/dL    GLOBULIN                  3.3 2.0 - 3.7 g/dL    A/G RATIO 1.2 1.0 - 2.0                    BILIRUBIN,TOTAL 0.8 0.3 - 1.0 mg/dL    ALK PHOSPHATASE 47 34 - 104 IU/L    ALT (SGPT) 16 7 - 52 IU/L    AST (SGOT) 18 13 - 39 IU/L   D-DIMER,QUANTITATIVE      Result  Value Ref Range    D-DIMER, QUANTITATIVE  301 (H) >199 - 230 ng/mL   CBC WITH AUTO DIFFERENTIAL      Result  Value Ref Range    WHITE BLOOD COUNT         7.5 4.5 - 11.0 thou/cu mm    RED BLOOD COUNT           4.27 4.00 - 5.20 mil/cu mm    HEMOGLOBIN                12.9 12.0 - 16.0 g/dL    HEMATOCRIT                39.1 33.0 - 51.0 %    MCV                       92 80 -  100 fL    MCH                       30.2 26.0 - 34.0 pg    MCHC                      33.0 32.0 - 36.0 g/dL    RDW                       12.8 11.5 - 15.5 %    PLATELET COUNT            205 140 - 440 thou/cu mm    MPV                       10.1 6.5 - 11.0 fL    NEUTROPHILS               59.1 42.0 - 72.0 %    LYMPHOCYTES               28.1 20.0 - 44.0 %    MONOCYTES                 10.0 <12.0 %    EOSINOPHILS               2.1 <8.0 %    BASOPHILS                 0.4 <3.0 %    IMMATURE GRANULOCYTES(METAS,MYELOS,PROS) 0.3 %    ABSOLUTE NEUTROPHILS      4.4 1.7 - 7.0thou/cu mm    ABSOLUTE LYMPHOCYTES      2.1 0.9 - 2.9 thou/cu mm    ABSOLUTE MONOCYTES        0.8 <0.9 thou/cu mm    ABSOLUTE EOSINOPHILS      0.2 <0.5 thou/cu mm    ABSOLUTE BASOPHILS        0.0 <0.3 thou/cu mm    ABSOLUTE IMMATURE GRANULOCYTES(METAS,MYELOS,PROS) 0.0 <=0.3 thou/cu mm       ASSESSMENT AND PLAN:  Rosi was seen today for follow up.    Diagnoses and all orders for this visit:    Bipolar affective disorder, current episode hypomanic (HC)    Affective personality disorder    Needs flu shot  -     FLU VACCINE => 3 YRS PF QUADRIVALENT IIV4 IM  -     [place] ADMIN VACC INITIAL SEASONAL AFFILIATE ONLY    Borderline personality disorder - Following with [facility] - Chiquis LAWRENCE    Morbid obesity with BMI of 50.0-59.9, adult (HC)    Vitamin B12 deficiency    Screening mammogram, encounter for  -     XR MAMMO BILAT SCREENING; Future    Lymphedema  -     PT LYMPHEDEMA TREATMENT; Future    lab results and schedule of future lab studies reviewed with patient, reviewed diet, exercise and weight control, recommended sodium restriction, cardiovascular risk and specific lipid/LDL goals reviewed    -- Expected clinical course discussed   -- Medications and their side effects discussed    Rosi is also recommended to eat a heart-healthy diet, do regular aerobic exercises, maintain a desirable body weight, and avoid tobacco products. These recommendations  "are from the American Heart Association (AHA) which stresses the importance of lifestyle changes to lower cardiovascular disease risk.     Return in about 6 months (around 4/27/2018) for -- Follow-up B12.    Patient Instructions   Glad the B12 shots have been helping.     Flu shot today.    Schedule appointment with one of the Nurse Practitioners --- for your female health screening exams.     Schedule mammogram at your convenience.    Physical therapy referral sent  - they will call with date/time of appointment.    -- Start lymphedema treatment      Your Body mass index (BMI) is:  Estimated body mass index is 54.16 kg/(m^2) as calculated from the following:    Height as of 10/11/17: 1.702 m (5' 7\").    Weight as of this encounter: 156.9 kg (345 lb 12.8 oz).   (BMI ranges: Normal 18.5 - 25, Overweight 25 - 30, Obesity greater than 30)     Facts about losing weight:   -- Overweight and Obesity increase your risk for developing diabetes, high blood pressure and stroke, and shorten your life.   -- 90% of weight loss comes from dietary changes, only 10% from exercise   -- For every 1 pound of of weight loss -- this is equal to about 8 to 10 pounds of weight off of your knees.   -- there are about 3500 calories in 1 pound of body weight.     -- Goal Caloric intake -- 1200 to 1500 daily.       --> Get out and walk for 10 to 15 minutes after each meal -- this can significantly lower the spike in blood sugar after eating.       What have successful people done to lose large amounts of weight, and keep it off?   -- Used both diet and exercise to lose weight   -- Ate a healthy breakfast every day   -- Exercised an hour per day   -- Watched less than 10 hours of TV per week   -- Weighed themselves weekly   -- Drink a large glass of water 10-15 minutes before your meals.   -- Use smaller dinner plates and don't go back for seconds.     What should I do?   -- Work on 5-10% weight loss   -- Focus on a few healthy dietary changes "   -- Eat more fresh fruits and vegetables, and fewer carbohydrates (http://myplate.gov/)   -- Exercise every day   -- Weigh yourself once a week    Weight Management   Weight Watchers     Meets Mondays 9:30, 11:45, or 5:30    Justin James, [address], [place], [place]     Contact Sabrina [phone] [email or web address]  Weight Watchers [email or web address]    -- Consider My Fitness Pal on your smart phone  [email or web address]      I would recommend a focus on weight loss and increased exercise with a goal of 30 minutes of exercise at least 5 times per week in order to help prevent worsening of your blood sugar control.     Keep working to try obtain/maintain healthy weight, weight loss, healthy diet and regular exercise.        -- Try to avoid Carbohydrates as much as possible -- breads, pasta, baked goods, cakes.   These are turned to sugar in one metabolic conversion, cause insulin secretion and increased fat deposition / weight gain.          Return in approximately 6 month(s), or sooner as needed for follow-up with Dr. Curiel.  -- Follow-up B12    Clinic : [phone]  Appointment line: [phone]      Lucio Curiel MD

## 2017-12-28 NOTE — TELEPHONE ENCOUNTER
Patient Information     Patient Name MRN Sex Rosi Mendez [id number] Female 1975      Telephone Encounter by Chika Archer at 2017  1:41 PM     Author:  Chika Archer Service:  (none) Author Type:  (none)     Filed:  2017  1:45 PM Encounter Date:  2017 Status:  Signed     :  Chika Archer            Patient says she would like to get the B12 shot at home every 3 weeks.  She has a nurse that comes every week from [facility] and she told her she could give it to her.  Chika Archer LPN ....................  2017   1:45 PM

## 2017-12-28 NOTE — TELEPHONE ENCOUNTER
Patient Information     Patient Name MRN Sex Rosi Mendez [id number] Female 1975      Telephone Encounter by Lucio Curiel MD at 2017 11:18 AM     Author:  Lucio Curiel MD Service:  (none) Author Type:  Physician     Filed:  2017 11:19 AM Encounter Date:  2017 Status:  Signed     :  Lucio Curiel MD (Physician)            Needs appointment -- see if available for this afternoon today    Lucio Curiel MD

## 2017-12-28 NOTE — TELEPHONE ENCOUNTER
Patient Information     Patient Name MRN Sex Rosi Mendez [id number] Female 1975      Telephone Encounter by Anjali Loja at 2017 11:09 AM     Author:  Anjali Loja Service:  (none) Author Type:  (none)     Filed:  2017 11:10 AM Encounter Date:  2017 Status:  Signed     :  Anjali Loja            Pt states she made an appointment for this already.  Anjali Loja

## 2017-12-28 NOTE — PROGRESS NOTES
Patient Information     Patient Name MRN Sex Rosi Mendez [id number] Female 1975      Progress Notes by Romelia Brandt NP at 2017 11:00 AM     Author:  Romelia Brandt NP Service:  (none) Author Type:  PHYS- Nurse Practitioner     Filed:  2017  1:37 PM Encounter Date:  2017 Status:  Signed     :  Romelia Brandt NP (PHYS- Nurse Practitioner)            Nursing Notes:   Yareli Vallejo  2017 11:38 AM  Signed  Patient presents to clinic with concerns about possible infection on right lower leg. There is no open wound.  Yareli VallejoLPN ....................  2017   11:02 AM    SUBJECTIVE:    Rosi Baires is a 42 y.o. female who presents for leg concern    HPI  Rosi Baires is here thinking her leg has an infection. She noted discoloration that has changed some over the last week. She has not used any otc on it.   Rash noted the last week. No fevers chills. No itching, no pain.   Hx of cellulitis in the past.     Current Outpatient Prescriptions on File Prior to Visit       Medication  Sig Dispense Refill     albuterol HFA 90 mcg/actuation inhaler Inhale 1-2 Puffs by mouth every 4 hours if needed. 1 Inhaler 11     ARIPiprazole (ABILIFY) 15 mg tablet Take 7 mg by mouth once daily.       busPIRone (BUSPAR) 10 mg tablet Take 10 mg by mouth 2 times daily.       cholecalciferol (VITAMIN D3) 5,000 unit capsule Take 1 capsule by mouth once daily. For Vitamin D Deficiency - Dose Increase 2017 100 capsule 3     cyanocobalamin (VITAMIN B12) 1,000 mcg/mL injection Inject 1 mL intramuscular every 3 weeks. 1000 mcg 15     gabapentin (NEURONTIN) 100 mg capsule Take 1-2 capsules by mouth 3 times daily. as needed for burning/shooting neuropathy pain 180 capsule 11     hydrOXYzine pamoate (VISTARIL) 50 mg capsule Take 1 capsule in AM and 2 capsule in PM - Managed per Psychiatry  2     ibuprofen (ADVIL; MOTRIN) 200 mg tablet Take 3 tablets by mouth every 6 hours  "if needed for Pain. 20 tablet 1     lamoTRIgine (LAMICTAL) 150 mg tablet Take 150 mg by mouth 2 times daily.       MISCELLANEOUS MEDICAL SUPPLY (GRADUATED COMPRESSION STOCKINGS) For personal use. Length: thigh Strength: 16-20 mmHg.  Please measure patient in Pharmacy. 1 Packet 0     montelukast (SINGULAIR) 10 mg tablet Take 1 tablet by mouth at bedtime. 90 tablet 3     nitrofurantoin macrocrystals/monohydrate (MACROBID) 100 mg capsule Take 1 capsule by mouth 2 times daily for 5 days. 10 capsule 0     oxybutynin XL (DITROPAN XL) 5 mg CR tablet TAKE 1 TABLET BY MOUTH DAILY (LOT [id number]) 28 tablet 6     potassium chloride (KLOR-CON M20) 20 mEq Extended-Release tablet Take 20 mEq by mouth.       vilazodone (VIIBRYD) 40 mg tablet Take 40 mg by mouth once daily with a meal.       vitamin B complex (B-COMPLEX) tablet Take 1 tablet by mouth once daily. -- for Low B12 - Start 2/16/2017 100 tablet 3     Walker Rolling Walker for home use.  2 wheels 1 Device 0     No current facility-administered medications on file prior to visit.        REVIEW OF SYSTEMS:  ROS    OBJECTIVE:  /78  Pulse 92  Temp 98  F (36.7  C) (Tympanic)  Resp 20  Ht 1.702 m (5' 7\")  Wt (!) 144.2 kg (317 lb 12.8 oz)  BMI 49.77 kg/m2    EXAM:   Physical Exam   Constitutional: She is well-developed, well-nourished, and in no distress.   HENT:   Head: Normocephalic and atraumatic.   Eyes: Conjunctivae are normal.   Cardiovascular: Normal rate.    Pulmonary/Chest: Effort normal. No respiratory distress.   Skin: Skin is warm and dry.   RT lower leg she has a skin discoloration on the lower anterior leg. Not red, not warm, no pain. No weeping, or open areas.   Nursing note and vitals reviewed.      ASSESSMENT/PLAN:    ICD-10-CM    1. Venous stasis dermatitis of both lower extremities I87.2         Plan:  Reassurance provided that this is not an infection. Appears to be d/t chronic venous stasis. F/U if worsens. I explained my diagnostic considerations " and recommendations to the patient, who voiced understanding and agreement with the treatment plan. All questions were answered. We discussed potential side effects of any prescribed or recommended therapies, as well as expectations for response to treatments. She was advised to contact our office if there is no improvement or worsening of conditions or symptoms.  If s/s worsen or persist, patient will either come back or follow up with PCP.         MARILYN FARRIS NP ....................  6/8/2017   1:37 PM

## 2017-12-28 NOTE — TELEPHONE ENCOUNTER
Patient Information     Patient Name MRN Sex Rosi Mendez [id number] Female 1975      Telephone Encounter by Zoya Jordan at 10/12/2017 11:58 AM     Author:  Zoya Jordan Service:  (none) Author Type:  (none)     Filed:  10/12/2017 11:59 AM Encounter Date:  10/12/2017 Status:  Signed     :  Zoya Jordan            Please call patient regarding work in for ER follow up.

## 2017-12-28 NOTE — TELEPHONE ENCOUNTER
Patient Information     Patient Name MRN Rosi Gimenez [id number] Female 1975      Telephone Encounter by Anjali Loja at 2017  8:37 AM     Author:  Anjali Loja Service:  (none) Author Type:  (none)     Filed:  2017  8:37 AM Encounter Date:  2017 Status:  Signed     :  Anjali Loja            Please advise.

## 2017-12-28 NOTE — TELEPHONE ENCOUNTER
Patient Information     Patient Name MRRosi Mancilla [id number] Female 1975      Telephone Encounter by Corinne Martinez RN at 2017  9:19 AM     Author:  Corinne Martinez RN Service:  (none) Author Type:  NURS- Registered Nurse     Filed:  2017  9:21 AM Encounter Date:  2017 Status:  Signed     :  Corinne Martinez RN (NURS- Registered Nurse)              Office visit in the past 12 months.    Last visit with ROYCE MONTERROSO was on: 2017 in [facility]  Next visit with ROYCE MONTERROSO is on: 2017 in [facility]  Next visit with Internal Medicine is on: 2017 in [facility]    Max refill for 12 months from last office visit.  Always add ICD-9 code.    B12 injections ordered for 1 yr supply to [facility] in . Syringes re-order. Prescription refilled per RN Medication Refill Policy.................... CORINNE MARTINEZ RN ....................  2017   9:20 AM

## 2017-12-28 NOTE — TELEPHONE ENCOUNTER
Patient Information     Patient Name MRN Rosi Gimenez [id number] Female 1975      Telephone Encounter by Deirdre Bhatti at 2017  9:47 AM     Author:  Deirdre Bhatti Service:  (none) Author Type:  (none)     Filed:  2017  9:48 AM Encounter Date:  2017 Status:  Signed     :  Deirdre Bhatti            Left message to call back  ...................Deirdre Bhatti LPN  2017   9:47 AM

## 2017-12-28 NOTE — TELEPHONE ENCOUNTER
Patient Information     Patient Name MRN Sex Rosi Mendez [id number] Female 1975      Telephone Encounter by Lucio Curiel MD at 10/13/2017 11:11 AM     Author:  Lucio Curiel MD Service:  (none) Author Type:  Physician     Filed:  10/13/2017 11:11 AM Encounter Date:  10/13/2017 Status:  Signed     :  Lucio Curiel MD (Physician)            Could take Keflex ---- just 250 mg --- 4 times daily (instead of the 500 mg per dose).       Lucio Curiel MD

## 2017-12-28 NOTE — PROGRESS NOTES
Patient Information     Patient Name MRN Rosi Gimenez [id number] Female 1975      Progress Notes by Lucio Curiel MD at 2017  2:40 PM     Author:  Lucio Curiel MD Service:  (none) Author Type:  Physician     Filed:  2017  2:19 AM Encounter Date:  2017 Status:  Signed     :  Lucio Curiel MD (Physician)            Nursing Notes:   Deirdre Bhatti  2017  3:01 PM  Signed  Patient presents to the clinic for discussion for weight loss.    Deirdre Bhatti LPN        2017 2:45 PM    Rosi Baires presents to clinic today for:   Chief Complaint     Patient presents with       Consult      discussion of weight loss     HPI: Ms. Baires is a 42 y.o. female who presents today for evaluation of above.     (E66.01,  Z68.43) Morbid obesity with BMI of 50.0-59.9, adult (HC)  (primary encounter diagnosis)  (E53.8) Vitamin B12 deficiency  (E55.9) Vitamin D deficiency     Patient presents with some focus questions today.  She had a lot of questions about obesity, obesity treatment, surgical options.  She would like a referral.  We talked at length today about the typical protocol preoperatively as well as some often typical things that may be a problem more long-term such as chronic B12 shots, iron deficiency, etc.  She would like a referral.  Orders placed.    B12 deficiency, elected to increase the frequency of her injections up to twice monthly.  Has follow B12 shots very helpful. New prescription sent to pharmacy.    Vitamin D deficiency, has not been taking her oral vitamin D replacement lately.  Advised that she needs to get this restarted.  Prescription sent to pharmacy.    Ms. Baires's Body mass index is 51.69 kg/(m^2). This is out of the normal range for a 42 y.o. Normal range for ages 18+ is between 18.5 and 24.9. To lose weight we reviewed risks and benefits of appropriate options such as diet, exercise, and medications. Patient's strategy will be   formal nutrition program and formal exercise program   BP Readings from Last 1 Encounters:07/27/17 : 134/72  Ms. Raos blood pressure is out of the normal range for adults. Per JNC-8 guidelines normal adult blood pressure is < 120/80, pre-hypertensive is between 120/80 and 139/89, and hypertension is 140/90 or greater. Risks of hypertension were discussed. Patient's strategy will be reduced salt intake    Functional Capacity: > or about 4 METS.   Reports that she can climb a flight of stairs without any chest pain/heaviness or shortness of breath.   Patient reports no current symptoms of fevers, chills, nausea/vomiting.   No cough. No shortness of breath.   No change in bowel/bladder habits. No melena, hematochezia. No Hematuria.   No rashes. No palpitations.  No orthopnea/paroxysmal nocturnal dyspnea   No vision or hearing issues.   + chronic mood issues.   No bruising.     AGNIESZKA:  AGNIESZKA-7 ANXIETY SCREENING 4/20/2017 7/27/2017   AGNIESZKA date (doc flow) 4/20/2017 7/27/2017   Nervous, anxious 0 0   Cannot stop worrying 0 0   Worry about different things 0 0   Cannot relax 0 0   Feeling restless 0 0   Easily annoyed/irritated 0 0   Afraid of awful event 0 0   Score 0 0   Severity none none   Some recent data might be hidden         PHQ9:  PHQ Depression Screening 5/11/2017 7/27/2017   Date of PHQ exam (doc flow) 5/11/2017 7/27/2017   1. Lack of interest/pleasure 0 - Not at all 0 - Not at all   2. Feeling down/depressed 0 - Not at all 0 - Not at all   PHQ-2 TOTAL SCORE 0 0   3. Trouble sleeping - 0 - Not at all   4. Decreased energy - 0 - Not at all   5. Appetite change - 0 - Not at all   6. Feelings of failure - 0 - Not at all   7. Trouble concentrating - 0 - Not at all   8. Activity level - 0 - Not at all   9. Hurting yourself - 0 - Not at all   PHQ-9 TOTAL SCORE - 0   PHQ-9 Severity Level - none   Functional Impairment - not difficult at all   Some recent data might be hidden          I have personally reviewed the  past medical history, past surgical history, medications, allergies, family and social history as listed below, on 7/27/2017.    Patient Active Problem List       Diagnosis  Date Noted     DEPRESSION/ANXIETY       Priority: High      Bilateral chronic knee pain  04/20/2017     Crepitus of joint of right knee  04/20/2017     Preop examination - Hysterectomy 2/23/2017 - Dr. Fatima  02/16/2017     Menorrhagia with irregular cycle  02/16/2017     Bipolar disorder, current episode mixed, unspecified (HC)  01/09/2017     Peripheral polyneuropathy (HC)  12/18/2016     Elevated random blood glucose level  12/18/2016     Vitamin D deficiency  12/18/2016     Vitamin B12 deficiency  12/18/2016     Bilateral foot pain  12/18/2016     Lymphedema of both lower extremities  11/22/2016     Venous stasis dermatitis of both lower extremities  08/17/2016     Alcohol dependence (HC)  11/28/2015     Cannabis dependence in remission (HC)  11/28/2015     Anxiety, generalized  11/28/2015     Morbid obesity with BMI of 50.0-59.9, adult (HC)  03/23/2015     Moderate persistent asthma  08/19/2013     Overview:   AAP completed on 08/19/13  Triggers: pollen, fumes, exercise, URI, warm weather. Peak flow today is 250. Symptomatic: moderate  Overview:   AAP completed on 08/19/13  Triggers: pollen, fumes, exercise, URI, warm weather. Peak flow today is 250. Symptomatic: moderate        Urinary incontinence  03/15/2013     Closed dislocation of tarsal joint  02/04/2011     GERD       EDEMA LEG       ALLERGIC RHINITIS, SEASONAL       BIPOLAR AFFECTIVE DISORDER       AFFECTIVE PERSONALITY DISORDER       SUBSTANCE ABUSE       Atopic rhinitis  10/02/2003     Overview:   [facility] - Seasonal Allergies        Borderline personality disorder - Following with [facility] Miguelina LAWRENCE  07/07/2003     Overview:   [facility].        Disorder of female genital organ  07/05/2001     Overview:   DUB, dysmenorrhea        Past Medical History:      Diagnosis  Date     Asthma      Depression      Edema      Encounter for removal and reinsertion of IUD 05    IUD placement, Removed      History of pregnancy     G3, P2-0-1-2 with history of spontaneous       LSIL (low grade squamous intraepithelial lesion) on Pap smear      Past Surgical History:      Procedure  Laterality Date     LEEP PROCEDURE      Underwent a loop       LEG/ANKLE SURGERY      fracture, repair with screws       TUBAL LIGATION      tubal ligation       Current Outpatient Prescriptions       Medication  Sig Dispense Refill     albuterol HFA 90 mcg/actuation inhaler Inhale 1-2 Puffs by mouth every 4 hours if needed. 1 Inhaler 11     ARIPiprazole (ABILIFY) 15 mg tablet Take 7 mg by mouth once daily.       busPIRone (BUSPAR) 10 mg tablet Take 10 mg by mouth 2 times daily.       cholecalciferol (VITAMIN D3) 5,000 unit capsule Take 1 capsule by mouth once daily. For Vitamin D Deficiency - Dose Increase 2017 100 capsule 3     cyanocobalamin (VITAMIN B12) 1,000 mcg/mL injection Inject 1 mL intramuscular every 2 weeks. - Dose Increase Frequency 2017 4 mL 11     gabapentin (NEURONTIN) 100 mg capsule Take 1-2 capsules by mouth 3 times daily. as needed for burning/shooting neuropathy pain 180 capsule 11     hydrOXYzine pamoate (VISTARIL) 50 mg capsule Take 1 capsule in AM and 2 capsule in PM - Managed per Psychiatry  2     ibuprofen (ADVIL; MOTRIN) 200 mg tablet Take 3 tablets by mouth every 6 hours if needed for Pain. 20 tablet 1     lamoTRIgine (LAMICTAL) 150 mg tablet Take 150 mg by mouth 2 times daily.       MISCELLANEOUS MEDICAL SUPPLY (GRADUATED COMPRESSION STOCKINGS) For personal use. Length: thigh Strength: 16-20 mmHg.  Please measure patient in Pharmacy. 1 Packet 0     montelukast (SINGULAIR) 10 mg tablet Take 1 tablet by mouth at bedtime. 90 tablet 3     oxybutynin XL (DITROPAN XL) 5 mg CR tablet TAKE 1 TABLET BY MOUTH DAILY (LOT [id number]) 28 tablet 6      "potassium chloride (KLOR-CON M20) 20 mEq Extended-Release tablet Take 20 mEq by mouth.       SAFETY-ELBERT TB SYR 1CC/25GX5/8 1 mL 25 gauge x 5/8\" syrg SAFETY GLIDE-FOR B-12 SHOTS 3 Syringe 3     Syringe with Needle, Disp, 1 mL 25 gauge x 5/8\" Safety glide - for B12 Shots 1 box 0     vilazodone (VIIBRYD) 40 mg tablet Take 40 mg by mouth once daily with a meal.       vitamin B complex (B-COMPLEX) tablet Take 1 tablet by mouth once daily. -- for Low B12 - Start 2017 100 tablet 3     Walker Rolling Walker for home use.  2 wheels 1 Device 0     Allergies      Allergen   Reactions     Clonazepam  Other - Describe In Comment Field     Causes Violence and aggresssion      Hydrocodone-Acetaminophen  Seizures     Can take Percocet without difficulty.      Lorazepam  Other - Describe In Comment Field     Causes Violence and aggresssion      Zoloft [Sertraline]  Other - Describe In Comment Field     Caused suicidally       Bupropion  Other - Describe In Comment Field     Caused Major Depression      Divalproex Sodium  *Unknown - Pt Doesn't Remember     Lithium  *Unknown - Pt Doesn't Remember     Risperidone Analogues  *Unknown - Pt Doesn't Remember     Sulfa (Sulfonamide Antibiotics)  *Unknown - Pt Doesn't Remember     Family History       Problem   Relation Age of Onset     No Known Problems  Mother      Asthma  Father      + Leg edema, knee, hip replacement. + Lymphedema       Osteoporosis  Brother      No Known Problems  Brother      Other  Paternal Grandmother      Lymphedema       Family Status     Relation  Status     Mother Alive     Father Alive     Brother Alive     Brother Alive     Paternal Grandmother      Social History     Social History        Marital status:       Spouse name: N/A     Number of children:  N/A     Years of education:  N/A     Social History Main Topics         Smoking status:   Former Smoker     Packs/day:  1.00     Years:  3.00     Types:  Cigarettes     Quit date:  2003 " "    Smokeless tobacco:   Never Used     Alcohol use   No      Comment: History of abuse - sober as of July 2010.       Drug use:   No      Comment: History of use      Sexual activity:   No     Other Topics  Concern     Not on file      Social History Narrative     No longer in adult foster care lived with Charley Godwin.      .     2 sons - age 12 and 7 - as of 11/2016.     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      Pertinent ROS was performed and was negative as noted in HPI above.     EXAM:   Vitals:     07/27/17 1448   BP: 134/72   Pulse: 88   Weight: (!) 149.7 kg (330 lb)     BP Readings from Last 3 Encounters:    07/27/17 134/72   06/08/17 128/78   06/05/17 128/68     Wt Readings from Last 3 Encounters:    07/27/17 (!) 149.7 kg (330 lb)   06/08/17 (!) 144.2 kg (317 lb 12.8 oz)   06/05/17 (!) 142.7 kg (314 lb 9.6 oz)     Estimated body mass index is 51.69 kg/(m^2) as calculated from the following:    Height as of 6/8/17: 1.702 m (5' 7\").    Weight as of this encounter: 149.7 kg (330 lb).     EXAM:  Constitutional: Pleasant, alert, appropriate appearance for age. No acute distress  Lymphatic Exam: Non-palpable nodes in neck, clavicular regions  Pulmonary: Lungs are clear to auscultation bilaterally, without wheezes or crackles  Cardiovascular Exam: regular rate and rhythm, no pedal edema   Gastrointestinal Exam: morbid obesity.   Integument: No abnormal rashes, sores, or ulcerations noted  Neurologic Exam: CN 3-12 grossly intact   Musculoskeletal Exam: Moves upper and lower extremities symmetrically, No focal weakness  Gait and station appear grossly normal  Psychiatric Exam: Awake and Alert, Affect and mood appropriate  Speech is fluent, Thought process is normal    INVESTIGATIONS:  HEMOGLOBIN A1C MONITORING (POCT) (% NGSP)    Date Value   03/15/2013 4.9      TSH (uIU/mL)    Date Value   11/09/2016 1.93        Results for orders placed or performed in visit on 06/05/17    "   URINALYSIS W REFLEX MICROSCOPIC IF POSITIVE      Result  Value Ref Range    COLOR                     Yellow Yellow Color    CLARITY                   Clear Clear Clarity    SPECIFIC GRAVITY,URINE    >=1.030 (A) 1.010, 1.015, 1.020, 1.025                    PH,URINE                  5.5 6.0, 7.0, 8.0, 5.5, 6.5, 7.5, 8.5                    UROBILINOGEN,QUALITATIVE  Normal Normal EU/dl    PROTEIN, URINE Negative Negative mg/dL    GLUCOSE, URINE Negative Negative mg/dL    KETONES,URINE             Negative Negative mg/dL    BILIRUBIN,URINE           Negative Negative                    OCCULT BLOOD,URINE        Trace (A) Negative                    NITRITE                   Negative Negative                    LEUKOCYTE ESTERASE        Small (A) Negative                   URINALYSIS MICROSCOPIC      Result  Value Ref Range    RBC 0-2 0-2, None Seen /HPF    WBC 11-25 (A) 0-2, 3-5, None Seen /HPF    BACTERIA                  Many (A) None Seen, Rare, Occasional, Few Bacteria/HPF    EPITHELIAL CELLS          Many (A) None Seen, Few Epi/HPF       ASSESSMENT AND PLAN:  Rosi was seen today for consult.    Diagnoses and all orders for this visit:    Morbid obesity with BMI of 50.0-59.9, adult (HC)  -     AMB CONSULT TO BARIATRIC PROGRAM-AFFILIATE ONLY; Future    Vitamin B12 deficiency  -     cyanocobalamin (VITAMIN B12) 1,000 mcg/mL injection; Inject 1 mL intramuscular every 2 weeks. - Dose Increase Frequency 7/27/2017    Vitamin D deficiency  -     cholecalciferol (VITAMIN D3) 5,000 unit capsule; Take 1 capsule by mouth once daily. For Vitamin D Deficiency - Dose Increase 2/16/2017      lab results and schedule of future lab studies reviewed with patient, reviewed diet, exercise and weight control    -- Expected clinical course discussed   -- Medications and their side effects discussed    Rosi is also recommended to eat a heart-healthy diet, do regular aerobic exercises, maintain a desirable body weight, and avoid  tobacco products. These recommendations are from the American Heart Association (AHA) which stresses the importance of lifestyle changes to lower cardiovascular disease risk.     Return for -- follow-up B12, Obesity.    Patient Instructions   1. Morbid obesity with BMI of 50.0-59.9, adult (HC)  - AMB CONSULT TO BARIATRIC PROGRAM  - they will call with date/time of appointment.      Gastric Bypass surgery referral sent  - they will call with date/time of appointment.        2. Vitamin B12 deficiency    Dose Increase / frequency increase:    - cyanocobalamin (VITAMIN B12) 1,000 mcg/mL injection; Inject 1 mL intramuscular every 2 weeks. - Dose Increase Frequency 7/27/2017  Dispense: 4 mL; Refill: 11    3. Vitamin D deficiency  -- refilled medications. Restart.     Return in approximately 3 month(s), or sooner as needed for follow-up with Dr. Curiel.  -- follow-up B12, Obesity    Clinic : [phone]  Appointment line: [phone]        Lucio Curiel MD

## 2017-12-28 NOTE — PROGRESS NOTES
Patient Information     Patient Name MRN Sex Rosi Mendez [id number] Female 1975      Progress Notes by Romelia Brandt NP at 2017  3:15 PM     Author:  Romelia Brandt NP Service:  (none) Author Type:  PHYS- Nurse Practitioner     Filed:  2017  4:29 PM Encounter Date:  2017 Status:  Signed     :  Romelia Brandt NP (PHYS- Nurse Practitioner)            Nursing Notes:   Yareli Vallejo  2017  3:24 PM  Signed  Patient presents to clinic with burning and painful urination.  Yareli VallejoLPN ....................  2017   3:24 PM    SUBJECTIVE:    Rosi Baires is a 42 y.o. female who presents for Dysuria since yesterday    Dysuria    This is a new problem. The current episode started yesterday. The problem occurs every urination. The problem has been unchanged. The quality of the pain is described as aching and burning. The pain is at a severity of 4/10. There has been no fever. She is sexually active. There is no history of pyelonephritis. Associated symptoms include frequency and urgency. Pertinent negatives include no chills, discharge, flank pain, hematuria, hesitancy, nausea, possible pregnancy, sweats or vomiting. She has tried increased fluids for the symptoms. The treatment provided mild relief. There is no history of catheterization, kidney stones, recurrent UTIs, a single kidney, urinary stasis or a urological procedure.       Current Outpatient Prescriptions on File Prior to Visit       Medication  Sig Dispense Refill     albuterol HFA 90 mcg/actuation inhaler Inhale 1-2 Puffs by mouth every 4 hours if needed. 1 Inhaler 11     ARIPiprazole (ABILIFY) 15 mg tablet Take 7 mg by mouth once daily.       busPIRone (BUSPAR) 10 mg tablet Take 10 mg by mouth 2 times daily.       cholecalciferol (VITAMIN D3) 5,000 unit capsule Take 1 capsule by mouth once daily. For Vitamin D Deficiency - Dose Increase 2017 100 capsule 3     cyanocobalamin (VITAMIN  B12) 1,000 mcg/mL injection Inject 1 mL intramuscular every 3 weeks. 1000 mcg 15     gabapentin (NEURONTIN) 100 mg capsule Take 1-2 capsules by mouth 3 times daily. as needed for burning/shooting neuropathy pain 180 capsule 11     hydrOXYzine pamoate (VISTARIL) 50 mg capsule Take 1 capsule in AM and 2 capsule in PM - Managed per Psychiatry  2     ibuprofen (ADVIL; MOTRIN) 200 mg tablet Take 3 tablets by mouth every 6 hours if needed for Pain. 20 tablet 1     lamoTRIgine (LAMICTAL) 150 mg tablet Take 150 mg by mouth 2 times daily.       MISCELLANEOUS MEDICAL SUPPLY (GRADUATED COMPRESSION STOCKINGS) For personal use. Length: thigh Strength: 16-20 mmHg.  Please measure patient in Pharmacy. 1 Packet 0     montelukast (SINGULAIR) 10 mg tablet Take 1 tablet by mouth at bedtime. 90 tablet 3     naproxen (NAPROSYN) 500 mg tablet Take 1 tablet by mouth 2 times daily with meals. Take for 5-7 days 15 tablet 0     oxybutynin XL (DITROPAN XL) 5 mg CR tablet TAKE 1 TABLET BY MOUTH DAILY (LOT [id number]) 28 tablet 6     oxyCODONE-acetaminophen, 5-325 mg, (PERCOCET) 5-325 mg per tablet Take 1-2 tablets by mouth every 4 hours if needed  for Pain Max acetaminophen dose: 4000mg in 24 hrs. 10 tablet 0     potassium chloride (KLOR-CON M20) 20 mEq Extended-Release tablet Take 20 mEq by mouth.       vilazodone (VIIBRYD) 40 mg tablet Take 40 mg by mouth once daily with a meal.       vitamin B complex (B-COMPLEX) tablet Take 1 tablet by mouth once daily. -- for Low B12 - Start 2/16/2017 100 tablet 3     Walker Rolling Walker for home use.  2 wheels 1 Device 0     No current facility-administered medications on file prior to visit.        REVIEW OF SYSTEMS:  Review of Systems   Constitutional: Negative for chills.   Gastrointestinal: Negative for nausea and vomiting.   Genitourinary: Positive for dysuria, frequency and urgency. Negative for flank pain, hematuria and hesitancy.       OBJECTIVE:  /68  Pulse 88  Temp 98.9  F (37.2  C)  "(Tympanic)  Resp 20  Ht 1.702 m (5' 7\")  Wt (!) 142.7 kg (314 lb 9.6 oz)  BMI 49.27 kg/m2    EXAM:   Physical Exam   Constitutional: She is well-developed, well-nourished, and in no distress.   HENT:   Head: Normocephalic and atraumatic.   Eyes: Conjunctivae are normal.   Cardiovascular: Normal rate.    Pulmonary/Chest: Effort normal. No respiratory distress.   Abdominal: Soft. Bowel sounds are normal. There is no hepatosplenomegaly. There is tenderness in the suprapubic area. There is no rigidity, no rebound, no guarding and no CVA tenderness.   Nursing note and vitals reviewed.    Results for orders placed or performed in visit on 06/05/17      URINALYSIS W REFLEX MICROSCOPIC IF POSITIVE      Result  Value Ref Range    COLOR                     Yellow Yellow Color    CLARITY                   Clear Clear Clarity    SPECIFIC GRAVITY,URINE    >=1.030 (A) 1.010, 1.015, 1.020, 1.025                    PH,URINE                  5.5 6.0, 7.0, 8.0, 5.5, 6.5, 7.5, 8.5                    UROBILINOGEN,QUALITATIVE  Normal Normal EU/dl    PROTEIN, URINE Negative Negative mg/dL    GLUCOSE, URINE Negative Negative mg/dL    KETONES,URINE             Negative Negative mg/dL    BILIRUBIN,URINE           Negative Negative                    OCCULT BLOOD,URINE        Trace (A) Negative                    NITRITE                   Negative Negative                    LEUKOCYTE ESTERASE        Small (A) Negative                   URINALYSIS MICROSCOPIC      Result  Value Ref Range    RBC 0-2 0-2, None Seen /HPF    WBC 11-25 (A) 0-2, 3-5, None Seen /HPF    BACTERIA                  Many (A) None Seen, Rare, Occasional, Few Bacteria/HPF    EPITHELIAL CELLS          Many (A) None Seen, Few Epi/HPF       ASSESSMENT/PLAN:    ICD-10-CM    1. Burning with urination R30.0 URINALYSIS W REFLEX MICROSCOPIC IF POSITIVE      URINALYSIS W REFLEX MICROSCOPIC IF POSITIVE      URINALYSIS MICROSCOPIC      URINALYSIS MICROSCOPIC   2. Acute cystitis " without hematuria N30.00 nitrofurantoin macrocrystals/monohydrate (MACROBID) 100 mg capsule        Plan:  Completed labs at today's visit U/A.  I personally reviewed the labs with the patient/parent at the visit. Abnormalities include Contaminated sample, concentrated, + wbcs, many epi, many bacteria    Based on hx and exam will treat for a presumptive UTI. U/A is contaminated and unable to ctx. She is to f/u with pcp if abx does not take care of the s/s.     MARILYN FARRIS NP ....................  6/5/2017   4:27 PM

## 2017-12-28 NOTE — TELEPHONE ENCOUNTER
Patient Information     Patient Name MRN Rosi Gimenez [id number] Female 1975      Telephone Encounter by Deirdre Bhatti at 10/13/2017 10:29 AM     Author:  Deirdre Bhatti Service:  (none) Author Type:  (none)     Filed:  10/13/2017 10:31 AM Encounter Date:  10/13/2017 Status:  Signed     :  Deirdre Bhatti            Patient is currently take Keflex QID and Lotrimin over the past 2 days.  Patient reports taking Keflex with meals but is still getting an upset stomach and diarrhea.  Patient is wondering if there is anything she can do or if it is possible to reduce antibiotic intake?  Patient indicates that cellulitis is improving at this time.      Deirdre Bhatti LPN        10/13/2017 10:31 AM

## 2017-12-28 NOTE — PATIENT INSTRUCTIONS
Patient Information     Patient Name MRN Rosi Gimenez [id number] Female 1975      Patient Instructions by Lucio Curiel MD at 2017  2:40 PM     Author:  Lucio Curiel MD Service:  (none) Author Type:  Physician     Filed:  2017  3:10 PM Encounter Date:  2017 Status:  Signed     :  Lucio Curiel MD (Physician)            1. Morbid obesity with BMI of 50.0-59.9, adult (HC)  - AMB CONSULT TO BARIATRIC PROGRAM  - they will call with date/time of appointment.      Gastric Bypass surgery referral sent  - they will call with date/time of appointment.        2. Vitamin B12 deficiency    Dose Increase / frequency increase:    - cyanocobalamin (VITAMIN B12) 1,000 mcg/mL injection; Inject 1 mL intramuscular every 2 weeks. - Dose Increase Frequency 2017  Dispense: 4 mL; Refill: 11    3. Vitamin D deficiency  -- refilled medications. Restart.     Return in approximately 3 month(s), or sooner as needed for follow-up with Dr. Curiel.  -- follow-up B12, Obesity    Clinic : [phone]  Appointment line: [phone]

## 2017-12-28 NOTE — TELEPHONE ENCOUNTER
Patient Information     Patient Name MRN Sex Rosi Mendez [id number] Female 1975      Telephone Encounter by Olivier Coleman at 10/13/2017 10:15 AM     Author:  Olivier Coleman Service:  (none) Author Type:  (none)     Filed:  10/13/2017 10:16 AM Encounter Date:  10/13/2017 Status:  Signed     :  Olivier Coleman            DJS-Pt called and stated that she thinks she needs to reduce her prescription. Didn't want to make an appt. Until talking to nurse. Please call pt and advise.  Thank you,  Olivier Coleman ....................  10/13/2017   10:16 AM

## 2017-12-28 NOTE — TELEPHONE ENCOUNTER
Patient Information     Patient Name MRN Rosi Gimenez [id number] Female 1975      Telephone Encounter by Jodi Stahl at 10/13/2017 11:33 AM     Author:  Jodi Stahl Service:  (none) Author Type:  (none)     Filed:  10/13/2017 11:35 AM Encounter Date:  10/13/2017 Status:  Signed     :  Jodi Stahl            After birth date was verified, patient given the change in her medication directions.  She understands this means that she will be taking 1 capsule 4 times a day.  Jodi Stahl CMA (AAMA)................ 10/13/2017 11:35 AM

## 2017-12-28 NOTE — TELEPHONE ENCOUNTER
Patient Information     Patient Name MRN Sex     Rosi Baires [id number] Female 1975      Telephone Encounter by Renée Llamas at 10/12/2017  1:03 PM     Author:  Renée Llamas Service:  (none) Author Type:  (none)     Filed:  10/12/2017  1:06 PM Encounter Date:  10/12/2017 Status:  Signed     :  Renée Llamas            Patient calling for a sooner follow up with Dr. Curiel for ED follow up external yeast infection with cellulitis.  Per Dr. Curiel,  She can keep her appointment for 10/27 and take her keflex and get some monistat over the counter cream to use as directed.  Patient is ok with this.  Will call if things get worse.  Renée Llamas LPN..........10/12/2017  1:06 PM

## 2017-12-28 NOTE — TELEPHONE ENCOUNTER
Patient Information     Patient Name MRN Rosi Gimenez [id number] Female 1975      Telephone Encounter by Deirdre Bhatti at 2017  5:03 PM     Author:  Deirdre Bhatti Service:  (none) Author Type:  (none)     Filed:  2017  5:03 PM Encounter Date:  2017 Status:  Signed     :  Deirdre Bhatti            Left message to call back  ...................Deirdre Bhatti LPN  2017   5:03 PM

## 2017-12-29 NOTE — PATIENT INSTRUCTIONS
Patient Information     Patient Name MRN Rosi Gimenez [id number] Female 1975      Patient Instructions by Romelia Brandt NP at 2017  3:15 PM     Author:  Romelia Brandt NP Service:  (none) Author Type:  PHYS- Nurse Practitioner     Filed:  2017  3:55 PM Encounter Date:  2017 Status:  Signed     :  Romelia Brandt NP (PHYS- Nurse Practitioner)            Bladder Infection   ________________________________________________________________________  KEY POINTS    A bladder infection, also called cystitis, is a type of urinary tract infection. Your urinary tract includes your kidneys, ureters, bladder, and urethra.    Your healthcare provider will likely prescribe an antibiotic medicine and medicine to help relieve burning and discomfort.    Follow the full course of treatment prescribed by your healthcare provider. If you are taking an antibiotic medicine, take all of it as prescribed, even if your symptoms are gone.  ________________________________________________________________________  What is a bladder infection?  A bladder infection, also called cystitis, is a type of urinary tract infection. The urinary tract includes your:    Kidneys, which make urine    Ureters, which are the tubes that carry urine from the kidneys to the bladder    Bladder, which stores urine    Urethra, which is the tube that drains urine from the bladder and out of the body  What is the cause?  Bladder infections are usually caused by bacteria. Normally there should be no bacteria in your urinary tract. Bacteria that cause infections in the urinary tract often spread from the rectum or vagina to the urethra and up into the bladder.  Bladder infections are more common in women because the urethra is short. The short urethra makes it easier for bacteria from the rectum or the genital area to reach the bladder. This can happen during sex. Young women often have bladder infections when they  have just started having sex. In older women, irritation and dryness of the vagina after menopause may increase the risk for bladder infections.  Bacteria may grow in the bladder if the flow of urine is blocked. For example, when a woman is pregnant, pressure from the baby can cause this problem. In men, an enlarged prostate may cause a blockage. Stones in the kidney or bladder can also cause blockage and infections.  If you have recently had a urinary catheter (for example, during surgery) or if you need to use a catheter every day, you are more likely to get bladder infections.  What are the symptoms?  The symptoms range from mild to severe. They may include:    Urinating more often    Feeling an urgent need to urinate or feeling that your bladder is always full    Pain or burning when you urinate    Pain in your lower belly, low back, or your side    Urine that smells bad    Urine that looks cloudy, reddish, or bloody    Leaking of urine  Fever and chills are more common with kidney infections, but can also happen with bladder infections.  How is it diagnosed?  Your healthcare provider will ask about your symptoms and medical history and examine you. You may have urine and blood tests.    If you have many bladder infections, you may need to have additional tests to see if there is a problem in the kidneys or urinary tract:    An intravenous pyelogram (IVP), which is a series of X-rays taken after your healthcare provider injects dye into your blood vessels to look for blockages in your kidneys and urinary tract    An ultrasound, which uses sound waves to show pictures of the kidneys and urinary tract  Men may have tests after just one infection because bladder infections are less common in men and may mean there are other problems.  How is it treated?  Your healthcare provider will likely prescribe an antibiotic and medicine to help relieve burning and discomfort. Prompt treatment of a bladder infection with  antibiotics usually relieves the symptoms in 1 to 2 days. If your infection has been causing symptoms for several days before treatment or if you have a fever, it may take longer to feel better.  It s important to get prompt treatment for bladder infections. If the infection is not treated, it can damage your kidneys and make you very sick. If the infection spreads to your blood, it can be life-threatening. If you are very sick, you may need to be in the hospital and get antibiotic medicine IV.  How can I take care of myself?    Drink plenty of water each day to flush your bladder and urinary tract unless your healthcare provider has told you to limit how much liquid you drink.    If you have a fever, ask your healthcare provider if you should take an NSAID or acetaminophen. Read the label and take as directed. Unless recommended by your healthcare provider, you should not take these medicines for more than 10 days.    Nonsteroidal anti-inflammatory medicines (NSAIDs), such as ibuprofen, naproxen, and aspirin, may cause stomach bleeding and other problems. These risks increase with age.    Acetaminophen may cause liver damage or other problems. Unless recommended by your provider, don't take more than 3000 milligrams (mg) in 24 hours. To make sure you don t take too much, check other medicines you take to see if they also contain acetaminophen. Ask your provider if you need to avoid drinking alcohol while taking this medicine.    Follow the full course of treatment prescribed by your healthcare provider. If you were prescribed an antibiotic medicine, take the antibiotics for as long as your healthcare provider prescribes, even if you feel better. If you stop taking the medicine too soon, you may not kill all of the bacteria and you may get sick again. If you have side effects from your medicine, talk to your healthcare provider.    Ask your provider:    How and when you will get your test results    How long it will  take to recover    If there are activities you should avoid and when you can return to your normal activities    How to take care of yourself at home    What symptoms or problems you should watch for and what to do if you have them    Make sure you know when you should come back for a checkup.  How can I help prevent bladder infection?  You may help prevent bladder infection if you:    Drink enough liquids to keep urine light yellow in color.    Drink a glass of cranberry juice each day. The juice should be real cranberry juice, not a cranberry-flavored drink.    Don t wait to go to the bathroom if you feel the need to urinate.    Practice safe sex:    Ask your healthcare provider which type of condom, diaphragm, or other birth control is right for you.    Urinate soon after sex.    Keep your genital area clean. If you want to have vaginal sex after anal sex, both partners should wash their genitals first.    Empty your bladder completely when you urinate.    Don t wear a wet bathing suit for long periods of time.  Also, if you are a woman:    If you often have bladder infections, keep a journal to see if they are related to sex. If they tend to happen after sex, your provider may prescribe medicine for you to take to help prevent infection.    Don t use irritating cosmetics or chemicals in your genital area. This includes, for example, strong soaps, feminine hygiene sprays, douches, scented tampons, sanitary napkins, or panty liners.    Keep your vaginal area clean. Wiping from front to back after using the toilet may help prevent infections. Use mild, unscented soap to wash your genital area gently each time you bathe or shower.    Wear underwear that is all cotton or has a cotton crotch. Pantyhose should also have a cotton crotch. Cotton absorbs moisture better than nylon. Change underwear and pantyhose every day.    During pregnancy, tell your healthcare provider if you have had urinary tract problems in the  past and let your provider know if you are having symptoms.    If you have reached menopause and are not taking estrogen, prescription estrogen vaginal cream may help prevent bladder infections.

## 2017-12-29 NOTE — PATIENT INSTRUCTIONS
"Patient Information     Patient Name MRRosi Mancilla [id number] Female 1975      Patient Instructions by Lucio Curiel MD at 10/27/2017  1:40 PM     Author:  Lucio Curiel MD  Service:  (none) Author Type:  Physician     Filed:  10/27/2017  1:56 PM  Encounter Date:  10/27/2017 Status:  Addendum     :  Lucio Curiel MD (Physician)        Related Notes: Original Note by Lucio Curiel MD (Physician) filed at 10/27/2017  1:56 PM            Glad the B12 shots have been helping.     Flu shot today.    Schedule appointment with one of the Nurse Practitioners --- for your female health screening exams.     Schedule mammogram at your convenience.    Physical therapy referral sent  - they will call with date/time of appointment.    -- Start lymphedema treatment      Your Body mass index (BMI) is:  Estimated body mass index is 54.16 kg/(m^2) as calculated from the following:    Height as of 10/11/17: 1.702 m (5' 7\").    Weight as of this encounter: 156.9 kg (345 lb 12.8 oz).   (BMI ranges: Normal 18.5 - 25, Overweight 25 - 30, Obesity greater than 30)     Facts about losing weight:   -- Overweight and Obesity increase your risk for developing diabetes, high blood pressure and stroke, and shorten your life.   -- 90% of weight loss comes from dietary changes, only 10% from exercise   -- For every 1 pound of of weight loss -- this is equal to about 8 to 10 pounds of weight off of your knees.   -- there are about 3500 calories in 1 pound of body weight.     -- Goal Caloric intake -- 1200 to 1500 daily.       --> Get out and walk for 10 to 15 minutes after each meal -- this can significantly lower the spike in blood sugar after eating.       What have successful people done to lose large amounts of weight, and keep it off?   -- Used both diet and exercise to lose weight   -- Ate a healthy breakfast every day   -- Exercised an hour per day   -- Watched less than 10 hours of TV per week "   -- Weighed themselves weekly   -- Drink a large glass of water 10-15 minutes before your meals.   -- Use smaller dinner plates and don't go back for seconds.     What should I do?   -- Work on 5-10% weight loss   -- Focus on a few healthy dietary changes   -- Eat more fresh fruits and vegetables, and fewer carbohydrates (http://myplate.gov/)   -- Exercise every day   -- Weigh yourself once a week    Weight Management   Weight Watchers     Meets Mondays 9:30, 11:45, or 5:30    Justin James, [address], [place], [place]     Contact Sabrina [phone] [email or web address]  Weight Watchers [email or web address]    -- Consider My Fitness Pal on your smart phone  [email or web address]      I would recommend a focus on weight loss and increased exercise with a goal of 30 minutes of exercise at least 5 times per week in order to help prevent worsening of your blood sugar control.     Keep working to try obtain/maintain healthy weight, weight loss, healthy diet and regular exercise.        -- Try to avoid Carbohydrates as much as possible -- breads, pasta, baked goods, cakes.   These are turned to sugar in one metabolic conversion, cause insulin secretion and increased fat deposition / weight gain.          Return in approximately 6 month(s), or sooner as needed for follow-up with Dr. Curiel.  -- Follow-up B12    Clinic : [phone]  Appointment line: [phone]

## 2017-12-30 NOTE — NURSING NOTE
Patient Information     Patient Name MRN Rosi Gimenez [id number] Female 1975      Nursing Note by Deirdre Bhatti at 2017  2:40 PM     Author:  Deirdre Bhatti Service:  (none) Author Type:  (none)     Filed:  2017  3:01 PM Encounter Date:  2017 Status:  Signed     :  Deirdre Bhatti            Patient presents to the clinic for discussion for weight loss.    Deirdre Bhatti LPN        2017 2:45 PM

## 2017-12-30 NOTE — NURSING NOTE
Patient Information     Patient Name MRN Sex Rosi Mendez [id number] Female 1975      Nursing Note by Yareli Vallejo at 2017  3:15 PM     Author:  Yareli Vallejo Service:  (none) Author Type:  (none)     Filed:  2017  3:24 PM Encounter Date:  2017 Status:  Signed     :  Yareli Vallejo            Patient presents to clinic with burning and painful urination.  Yareli Mayfield ....................  2017   3:24 PM

## 2017-12-30 NOTE — INITIAL ASSESSMENTS
Patient Information     Patient Name MRN Sex     Rosi Baires [id number] Female 1975      Initial Assessments by Joanie Jean Baptiste PT at 2017  9:16 AM     Author:  Joanie Jean Baptiste PT Service:  (none) Author Type:  PT- Physical Therapist     Filed:  2017  1:04 PM Date of Service:  2017  9:16 AM Status:  Signed     :  Joanie Jean Baptiste PT (PT- Physical Therapist)            [facility]  Outpatient PT - Initial Evaluation  Lymphedema Lower Extremity Eval        Date of Service: 2017     Patient Name: Rosi Baires   YOB: 1975   Referring MD/Provider: Lucio Curiel MD  Diagnosis: lymphedema  Treatment Diagnosis: bilateral lower extremity edema, lower extremity pain  Insurance: Medicare/[facility]  Start of Service: 17  Certification Dates: Start of Service: 17   Medicare/[place] Re-Cert Due: 01/15/18    Living Situations:  Independent in Living Situation     Preadmission Functional Mobility: Independent  Precautions:  none  Cognition:  Oriented to Person, Place, and Time.     Were cultural / age or other special adaptations needed? No      Patient is a vulnerable adult: No      Patient is aware of diagnosis: Yes      Risks and benefits explained: Yes    Subjective      History of Onset: at least 10 years ago, always have had larger legs. Worsening with swelling, discomfort with touch. Ha sa history of cellulitis infections mostly in right leg, one time in left leg after foot fracture and surgery. Tries to lose weight, not successful. Is taking 2 diuretics. HCTZ and lasix.      Initial Pain Ratin = Moderate Pain, (Aggravating, Grueling, Upsetting, Frustrating) / Location:  Bilateral legs  Acceptable Level of Pain:   2 = Mild Pain, (Bothersome, Annoying, Irritating, Nagging)  Aggrevating Factors: pain increases with touch. Leg swelling always present.  Easing Factors: none  Functional Limitations: standing in one position,  balance issues- no recent falls.    Patient is free of the following contraindications to complete decongestive therapy:  Acute infection-not on antibiotics  Uncompensated congestive heart failure  Active malignancy-non palliative  Acute DVT prior to medical management  Obstructive Peripheral Arterial Disease    Current Medications:  Reviewed (see chart)    Drug Allergies:  Reviewed (see chart)  ?   Latex Allergy:  none    Significant PMHX:    Past Medical History:     Diagnosis  Date     Asthma      Depression      Edema      Encounter for removal and reinsertion of IUD 05    IUD placement, Removed      History of pregnancy     G3, P2-0-1-2 with history of spontaneous       LSIL (low grade squamous intraepithelial lesion) on Pap smear      Past Surgical History:      Procedure  Laterality Date     LEEP PROCEDURE      Underwent a loop       LEG/ANKLE SURGERY      fracture, repair with screws       TUBAL LIGATION      tubal ligation         Previous Lymphedema Therapy: no prior PT. [facility] tried to teach her some sort of wrap in the past, but it would not stay in place.   No prior garments  Patient's goal; reduced swelling and soreness with legs    EXAMINATION  Items left blank indicate that the test was inappropriate or not meaningful at the time of evaluation and therefore not performed.    Fall Risk Screening:   History of a fall in the last year. Found to be due to neuropathy related to her edema. Takes gabapentin and this helps. No recent falls.    Test and Measures  Posture:  Normal  Location and Description of Edema:   Bilateral lower extrmieites, iliac creat to ankles, right greater then left  Edema (Tone & Texture):       Soft, non pitting    Observation:  Appearance of Scar:     right lower leg- scarring from cellutlits infection, left foot scars from foot surgery due to fracture    Skin Integrity:      Condition:  Intact      Skin Temperature:  Warm    Sensation:  General  Sensation Intact    Stemmer's Sign:  Positive (not able to pinch skin on 2nd digit). left and right    Capillary Refill:  normal     Pulses:    Location Symmetrical Diminished Absent   Femoral x     Dorsal Pedal x       Measurements Performed:    Circumferential Measurements:  In supine  Left Right   11/21/2017     MTP 23.7 cm  23.7    5cm+ 24.4  24.3    ankle 31.6    32.9    30cm distal SPB 61.2  61.9    20cm distal SPB 67.6  70.2    10cm distal SPB 71.7  74.2    SPB 79.7  75.6    10cm+ SPB 82.2  84.0   Total 442.1cm 446.8 cm       Today's Intervention:   Discussion regarding diagnosis and long term management potential. Patient given written information regarding lipedema.     Compression wraps applied to lower leg only to test patient's tolerance to compression and effect on edema. Ankle to knee. materials used- tg size K, artiflex, comprilan. Eucerin lotion applied prior to wrap. Patient instructed to keep wraps on until next morning unless she has discomfort; then she is to remove them.       ASSESSMENT      Therapist Assessment / Clinical Impression:  Patient presents with long term chronic and worsening edema, from iliac crest to ankles, bilateral. 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 Treatment approach will be the same and consist of Complete Decongestive Therapy involving manual lymph drainage, compression wraps, skin care, remedial exercise and home management with goals of reducing edema and leg discomfort and infection risk.     Functional Impairment(s): bilateral lower extremity edema:  pain and swelling with activity      Prior Function:   Not Limited    Physical Impairment(s):       MUSCULOSKELETAL:  Edema, Obesity and Pain    G codes and Modifier based on patient's presentation and clinical judgement:     Current Primary G Code and  Modifier:    Per the Patient's intake and/or assessment the Primary G Code is: Other PT/OT Primary .   The Patient's Impairment, Limitation or Restriction Modifier would be best described as: CK - 40% - 60% Impairment.     Goal Primary G Code and Modifier:    The Patient's G Code Goal would be: Other PT/OT Primary    The Patient's Impairment, Limitation or Restriction Modifier goal would be best described as: CJ - 20% - 40% Impairment.         Goals:  Patient goal (time reference required): reduce edema and leg pain .  Long term goal:Lipo- Lymphedema is to remain stable for 12 months following the completion of treatment.      Functional goals: Patient is to be independent in their Home Exercise Program in 16 weeks.  Decrease volume excess by 15 percent to improve cosmesis, fit of clothing and/or shoes, and to reduce the risk of infection in 16 weeks.  Patient or caregiver will be independent with self bandaging in 8 weeks.  Patient or caregiver will be independent with donning and doffing compression garments in 16.  Patient or caregiver will be independent with self manual lymph drainage in 16 weeks.         Patient participated in goal selection and understand(s) the plan of care: Yes   Patient Potential for Achieving Desired Outcome:  Excellent    Response to Intervention: understanding of plan of care and diagnosis, tolerated wraps    PLAN    Treatment Plan / Targeted Outcomes:     Frequency:   16 visits     Duration of Treatment: 8 weeks     Planned Interventions:  Possible physical therapy interventions include but are not limited to:   x Therapeutic Exercise (ROM & strengthening)  Gait Training   x Manual Therapy  Medical Equipment    Self Care/Home Management Training  Vasopneumatic Device   x Therapeutic Activity x Neuro re-education     Plan for next visit:  MLD, compression wraps, skin care    Student or PTA has been instructed in and demonstrates skills necessary to carry out above stated  treatment plan: Yes    Thank you for your referral to [facility].  Please call with any questions, concerns or comments.  [phone]    The signature, of the referring medical provider, on this document indicates certification of the above prescribed plan of care and is medically necessary.

## 2017-12-30 NOTE — NURSING NOTE
Patient Information     Patient Name MRN Sex Rosi Mendez [id number] Female 1975      Nursing Note by Isa Wilcox at 10/27/2017  1:40 PM     Author:  Isa Wilcox Service:  (none) Author Type:  (none)     Filed:  10/27/2017  1:45 PM Encounter Date:  10/27/2017 Status:  Signed     :  Isa Wilcox            Patient is here today for a 3 month follow up. She has a few things she would like discuss today. Her  is also with today. She would like her flu shot today.  Isa Wilcox LPN.......................... 10/27/2017  1:41 PM

## 2017-12-30 NOTE — NURSING NOTE
Patient Information     Patient Name MRN Sex Rosi Mendez [id number] Female 1975      Nursing Note by Yareli Vallejo at 2017 11:00 AM     Author:  Yareli Vallejo Service:  (none) Author Type:  (none)     Filed:  2017 11:38 AM Encounter Date:  2017 Status:  Signed     :  Yareli Vallejo            Patient presents to clinic with concerns about possible infection on right lower leg. There is no open wound.  Yareli VallejoLPN ....................  2017   11:02 AM

## 2017-12-31 ENCOUNTER — AMBULATORY - GICH (OUTPATIENT)
Dept: SCHEDULING | Facility: OTHER | Age: 42
End: 2017-12-31

## 2018-01-02 ENCOUNTER — COMMUNICATION - GICH (OUTPATIENT)
Dept: INTERNAL MEDICINE | Facility: OTHER | Age: 43
End: 2018-01-02

## 2018-01-02 DIAGNOSIS — F31.0 BIPOLAR DISORDER, CURRENT EPISODE HYPOMANIC (H): ICD-10-CM

## 2018-01-02 NOTE — TELEPHONE ENCOUNTER
Patient Information     Patient Name MRN Rosi Gimenez [id number] Female 1975      Telephone Encounter by Fani Hawthorne at 2017  7:39 AM     Author:  Fani Hawthorne Service:  (none) Author Type:  (none)     Filed:  2017  7:39 AM Encounter Date:  2017 Status:  Signed     :  Fani Hawthorne            Patient requesting same day appointment for refill on gabapentin.    Fani Hawthorne ....................  2017   7:39 AM

## 2018-01-02 NOTE — PATIENT INSTRUCTIONS
Patient Information     Patient Name MRN Sex Rosi Mendez [id number] Female 1975      Patient Instructions by Lucio Curiel MD at 2017 11:30 AM     Author:  Lucio Curiel MD  Service:  (none) Author Type:  Physician     Filed:  2017 12:15 PM  Encounter Date:  2017 Status:  Addendum     :  Lucio Curiel MD (Physician)        Related Notes: Original Note by Lucio Curiel MD (Physician) filed at 2017 12:14 PM            Glad your relationship issues are getting worked out - hopefully the separation will help improve your mental health stability.     -- Follow with Jeff Wren regularly for med management.     Medication list updated.     Increase Neurontin for your burning foot pain...    - gabapentin (NEURONTIN) 100 mg capsule; Take 1-2 capsules by mouth 3 times daily. as needed for burning/shooting neuropathy pain      -- up to 6 capsule daily.     Return as scheduled on 2017 -- or sooner as needed for follow-up with Dr. Curiel.    Clinic : [phone]  Appointment line: [phone]

## 2018-01-02 NOTE — NURSING NOTE
Patient Information     Patient Name MRN Rosi Gimenez [id number] Female 1975      Nursing Note by Deirdre Bhatti at 2017 11:30 AM     Author:  Deirdre Bhatti Service:  (none) Author Type:  (none)     Filed:  2017 12:09 PM Encounter Date:  2017 Status:  Signed     :  Deirdre Bhatti            Patient presents to the clinic for medication refill of Gabapentin, patient reports that Gabapentin has been effective for neuropathy pain.    Deirdre Bhatti LPN        2017 11:39 AM

## 2018-01-02 NOTE — TELEPHONE ENCOUNTER
Patient Information     Patient Name MRN Rosi Gimenez [id number] Female 1975      Telephone Encounter by Fani Hawthorne at 2017  7:36 AM     Author:  Fani Hawthorne Service:  (none) Author Type:  (none)     Filed:  2017  7:38 AM Encounter Date:  2017 Status:  Signed     :  Fani Hawthorne            Patient requesting same day appointment for refill on gabapentin.    Fani Hawthorne ....................  2017   7:38 AM

## 2018-01-02 NOTE — PROGRESS NOTES
Patient Information     Patient Name MRN Rosi Gimenez [id number] Female 1975      Progress Notes by Lucio Curiel MD at 2017 11:30 AM     Author:  Lucio Curiel MD Service:  (none) Author Type:  Physician     Filed:  2017 12:35 PM Encounter Date:  2017 Status:  Signed     :  Lucio Curiel MD (Physician)            Nursing Notes:   Deirdre Bhatti  2017 12:09 PM  Signed  Patient presents to the clinic for medication refill of Gabapentin, patient reports that Gabapentin has been effective for neuropathy pain.    Deirdre Bhatti LPN        2017 11:39 AM    Rosi Baires presents to clinic today for:   Chief Complaint    Patient presents with      Medication Management     HPI: Ms. Baires is a 41 y.o. female who presents today for evaluation of above.     (G62.9) Peripheral polyneuropathy (HC)  (primary encounter diagnosis)  (R20.2) Pins and needles sensation  (M79.671,  M79.672) Bilateral foot pain  (E53.8) Vitamin B12 deficiency  (F34.1) DEPRESSION/ANXIETY     Peripheral neuropathy with bilateral burning foot syndrome.  Doing well with gabapentin.  Reports that most the time 100 mg takes away majority of the burning and pain.  Has never tried taking 2 capsules at once.  Does report sometimes the burning pain does not go away all the way and she is wondering about other options.    We discussed dosing options.  She typically only takes 2 or 3 capsules daily.  Advised she could take up to 6 capsules daily.    Prescription printed.  Dosing instructions change.    Patient was recently emergency room with suicidal thoughts.  She has history of bipolar disorder.  She follows closely with Jeff Wren.  He recently adjusted her medication rather than as needed hydroxyzine she is not taking 3 capsules daily on a scheduled basis and does report this is helped substantially.  She also has had some relationship issues and she is just ended that relationship today.   States that she started feeling a lot better about things.  - Reports the burning foot pain will sometimes get up to 5 or 6 out of 10.  Fairly localized in her feet.  Left and right are fairly symmetric.  Being on her feet too long does seem to worsen the pain.  Gabapentin has helped.      Ms. Raos Body mass index is 48.26 kg/(m^2). This is out of the normal range for a 41 y.o. Normal range for ages 18-64 is between 18.5 and 24.9; normal range for ages 65+ is 23-30. To lose weight we reviewed risks and benefits of appropriate options such as diet, exercise, and medications. Patient's strategy will be  none; patient is not ready to act   BP Readings from Last 1 Encounters:01/02/17 : 130/76  Ms. Mahan blood pressure is out of the normal range for adults. Per JNC-8 guidelines normal adult blood pressure is < 120/80, pre-hypertensive is between 120/80 and 139/89, and hypertension is 140/90 or greater. Risks of hypertension were discussed. Patient's strategy will be weight loss, increased activity and reduced salt intake    Functional Capacity: > 4 METS.   Reports that she can climb a flight of stairs without any chest pain/heaviness or shortness of breath.   Patient reports no current symptoms of fevers, chills, nausea/vomiting.   No cough. No shortness of breath.   No change in bowel/bladder habits. No melena, hematochezia. No Hematuria.   No rashes. No palpitations.  No orthopnea/paroxysmal nocturnal dyspnea   No vision or hearing issues.   + Anxiety issues have improved with medication changes.  No bruising.     AGNIESZKA:  AGNIESZKA-7 ANXIETY SCREENING 9/28/2016   AGNIESZKA date (doc flow) 9/28/2016   Nervous, anxious 0   Cannot stop worrying 0   Worry about different things 0   Cannot relax 0   Feeling restless 0   Easily annoyed/irritated 0   Afraid of awful event 0   Score 0   Severity none       PHQ9:  PHQ Depression Screening 11/22/2016 1/2/2017   Date of PHQ exam (doc flow) 11/22/2016 1/2/2017   1. Lack of  interest/pleasure 0 - Not at all 0 - Not at all   2. Feeling down/depressed 0 - Not at all 0 - Not at all   PHQ-2 TOTAL SCORE 0 0   3. Trouble sleeping - 1 - Several days   4. Decreased energy - 0 - Not at all   5. Appetite change - 1 - Several days   6. Feelings of failure - 0 - Not at all   7. Trouble concentrating - 0 - Not at all   8. Activity level - 0 - Not at all   9. Hurting yourself - 0 - Not at all   PHQ-9 TOTAL SCORE - 2   PHQ-9 Severity Level - none   Functional Impairment - somewhat difficult        I have personally reviewed the past medical history, past surgical history, medications, allergies, family and social history as listed below, on 1/2/2017.    Patient Active Problem List       Diagnosis  Date Noted     DEPRESSION/ANXIETY       Priority: High      Peripheral polyneuropathy (HC)  12/18/2016     Elevated random blood glucose level  12/18/2016     Vitamin D deficiency  12/18/2016     Vitamin B12 deficiency  12/18/2016     Bilateral foot pain  12/18/2016     Lymphedema of both lower extremities  11/22/2016     Venous stasis dermatitis of both lower extremities  08/17/2016     Alcohol dependence (HC)  11/28/2015     Cannabis dependence in remission (HC)  11/28/2015     Anxiety, generalized  11/28/2015     Body mass index (BMI) of 45.0-49.9 in adult (HC)  03/23/2015     Moderate persistent asthma  08/19/2013     Overview:   AAP completed on 08/19/13  Triggers: pollen, fumes, exercise, URI, warm weather. Peak flow today is 250. Symptomatic: moderate  Overview:   AAP completed on 08/19/13  Triggers: pollen, fumes, exercise, URI, warm weather. Peak flow today is 250. Symptomatic: moderate        Urinary incontinence  03/15/2013     Closed dislocation of tarsal joint  02/04/2011     GERD       EDEMA LEG       ALLERGIC RHINITIS, SEASONAL       BIPOLAR AFFECTIVE DISORDER       AFFECTIVE PERSONALITY DISORDER       SUBSTANCE ABUSE       OBESITY       Atopic rhinitis  10/02/2003     Overview:   [facility] -  Seasonal Allergies        Borderline personality disorder - Following with [facility] - Chiquis LAWRENCE  2003     Overview:   [facility].        Disorder of female genital organ  2001     Overview:   DUB, dysmenorrhea        Past Medical History      Diagnosis   Date     Asthma       Depression       Edema       Encounter for removal and reinsertion of IUD  05     IUD placement, Removed      History of pregnancy       G3, P2-0-1-2 with history of spontaneous       LSIL (low grade squamous intraepithelial lesion) on Pap smear       Past Surgical History       Procedure   Laterality Date     Leep procedure        Underwent a loop       Tubal ligation        tubal ligation       Leg/ankle surgery        fracture, repair with screws       Current Outpatient Prescriptions       Medication  Sig Dispense Refill     albuterol HFA 90 mcg/actuation inhaler Inhale 1-2 Puffs by mouth every 4 hours if needed. 1 Inhaler 11     ARIPiprazole (ABILIFY) 15 mg tablet Take 7 mg by mouth once daily.       busPIRone (BUSPAR) 10 mg tablet Take 10 mg by mouth 2 times daily.       cholecalciferol (VITAMIN D) 1,000 unit tablet TAKE 1 TABLET BY MOUTH JENNY Y       clindamycin (CLEOCIN) 150 mg capsule 3 tabs every 8 hours x 7 days       gabapentin (NEURONTIN) 100 mg capsule Take 1-2 capsules by mouth 3 times daily. as needed for burning/shooting neuropathy pain 180 capsule 11     hydrOXYzine pamoate (VISTARIL) 50 mg capsule Take 1 capsule in AM and 2 capsule in PM - Managed per Psychiatry  2     ibuprofen (ADVIL; MOTRIN) 600 mg tablet Take 1 tablet by mouth 4 times daily if needed. Maximum of 3200 mg in 24 hours. 30 tablet 1     lamoTRIgine (LAMICTAL) 150 mg tablet Take 150 mg by mouth 2 times daily.       MISCELLANEOUS MEDICAL SUPPLY (GRADUATED COMPRESSION STOCKINGS) For personal use. Length: thigh Strength: 16-20 mmHg.  Please measure patient in Pharmacy. 1 Packet 0     montelukast (SINGULAIR)  10 mg tablet Take 1 tablet by mouth at bedtime. 90 tablet 3     mupirocin 2% topical (BACTROBAN OINTMENT) ointment Apply  topically to affected area(s) 3 times daily. 60 g 0     oxybutynin XL (DITROPAN XL) 5 mg CR tablet TAKE 1 TABLET BY MOUTH DAILY (LOT [id number]) 28 tablet 6     potassium chloride (KLOR-CON M20) 20 mEq Extended-Release tablet Take 20 mEq by mouth.       triamterene-hydrochlorothiazide, 37.5-25 mg, (DYAZIDE) 37.5-25 mg capsule Take 1 capsule by mouth every morning. -- Start when you run out of regular HCTZ. Dont refill HCTZ 90 capsule 3     vilazodone (VIIBRYD) 40 mg tablet Take 40 mg by mouth once daily with a meal.       Walker Rolling Walker for home use.  2 wheels 1 Device 0     Allergies      Allergen   Reactions     Clonazepam  Other - Describe In Comment Field     Causes Violence and aggresssion      Hydrocodone-Acetaminophen  Seizures     Lorazepam  Other - Describe In Comment Field     Causes Violence and aggresssion      Zoloft [Sertraline]  Other - Describe In Comment Field     Caused suicidally       Bupropion  *Unknown     Caused Major Depression      Divalproex Sodium  *Unknown     Lithium  *Unknown     Risperidone Analogues  *Unknown     Sulfa (Sulfonamide Antibiotics)  *Unknown     Family History       Problem   Relation Age of Onset     No Known Problems  Mother      Asthma  Father      + Leg edema, knee, hip replacement. + Lymphedema       Osteoporosis  Brother      No Known Problems  Brother      Other  Paternal Grandmother      Lymphedema       Family Status     Relation  Status     Mother Alive     Father Alive     Brother Alive     Brother Alive     Paternal Grandmother      Social History     Social History        Marital status:       Spouse name: N/A     Number of children:  N/A     Years of education:  N/A     Social History Main Topics         Smoking status:   Former Smoker     Packs/day:  1.00     Years:  3.00     Types:  Cigarettes     Quit date:   "1/1/2003     Smokeless tobacco:   Never Used     Alcohol use   No      Comment: History of abuse - sober as of July 2010.       Drug use:   No      Comment: History of use      Sexual activity:   No     Other Topics  Concern     Not on file      Social History Narrative     No longer in adult foster care lived with Charley Godwin.      .     2 sons - age 12 and 7 - as of 11/2016.     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      Pertinent ROS was performed and was negative as noted in HPI above.     EXAM:   Vitals:     01/02/17 1141   BP: 130/76   Pulse: 88   Temp: 98  F (36.7  C)   TempSrc: Tympanic   Weight: (!) 139.8 kg (308 lb 2 oz)   Height: 1.702 m (5' 7\")     BP Readings from Last 3 Encounters:    01/02/17 130/76   12/18/16 123/67   12/10/16 132/71     Wt Readings from Last 3 Encounters:    01/02/17 (!) 139.8 kg (308 lb 2 oz)   12/18/16 136.1 kg (300 lb)   12/10/16 136.1 kg (300 lb)     Estimated body mass index is 48.26 kg/(m^2) as calculated from the following:    Height as of this encounter: 1.702 m (5' 7\").    Weight as of this encounter: 139.8 kg (308 lb 2 oz).     EXAM:  Constitutional: morbidly obese, no apparent distress.  BMI is 48.  Lymphatic Exam: Non-palpable nodes in neck, clavicular regions  Pulmonary: Lungs are clear to auscultation bilaterally, without wheezes or crackles  Cardiovascular Exam: regular rate and rhythm  Gastrointestinal Exam: Obese  Integument: No abnormal rashes, sores, or ulcerations noted  Neurologic Exam: CN 3-12 grossly intact   Musculoskeletal Exam: Gait and station appear grossly normal  Psychiatric Exam: Pleasant, alert, oriented, Awake and Alert, Affect and mood appropriate, Speech is fluent, Thought process is normal -- reports she is doing well today.    INVESTIGATIONS:  Results for orders placed or performed during the hospital encounter of 12/25/16      Comprehensive Metabolic Panel      Result  Value Ref Range    SODIUM 134 133 - 143 " mmol/L    POTASSIUM 3.8 3.5 - 5.1 mmol/L    CHLORIDE 107 98 - 107 mmol/L    CO2,TOTAL 26 21 - 31 mmol/L    ANION GAP 1 (L) 5 - 18                    GLUCOSE 95 70 - 105 mg/dL    CALCIUM 9.5 8.6 - 10.3 mg/dL    BUN 12 7 - 25 mg/dL    CREATININE 0.91 0.70 - 1.30 mg/dL    BUN/CREAT RATIO           13                    GFR if African American >60 >60 ml/min/1.73m2    GFR if not African American >60 >60 ml/min/1.73m2    ALBUMIN 4.2 3.5 - 5.7 g/dL    PROTEIN,TOTAL 7.6 6.4 - 8.9 g/dL    GLOBULIN                  3.4 2.0 - 3.7 g/dL    A/G RATIO 1.2 1.0 - 2.0                    BILIRUBIN,TOTAL 0.7 0.3 - 1.0 mg/dL    ALK PHOSPHATASE 52 34 - 104 IU/L    ALT (SGPT) 20 7 - 52 IU/L    AST (SGOT) 23 13 - 39 IU/L   Acetaminophen      Result  Value Ref Range    ACETAMINOPHEN <0.2 (L) 10.0 - 30.0 ug/mL   Ethanol Serum or Plasma      Result  Value Ref Range    ETHANOL                   <0.010 <0.010 g/dL   Salicylate      Result  Value Ref Range    SALICYLATE <0.2 (L) 15.0 - 30.0 mg/dL   Magnesium      Result  Value Ref Range    MAGNESIUM 2.1 1.9 - 2.7 mg/dL   Drug Abuse Screen Rapid Urine In House ([facility])      Result  Value Ref Range    THC METABOLITES,QUAL      Not Detected Not Detected    PCP,QUAL                  Not Detected Not Detected    COCAINE,QUAL              Not Detected Not Detected    METHAMPHETAMINE, QUALITATIVE Not Detected Not Detected    OPIATES,QUAL              Not Detected Not Detected    AMPHETAMINE, QUALITATIVE Not Detected Not Detected    BENZODIAZEPINES,QUAL      Not Detected Not Detected    TRICYCLICS,QUAL           Not Detected Not Detected    METHADONE, QUALITATIVE Not Detected Not Detected    BARBITURATES,QUAL         Not Detected Not Detected    OXYCODONE, QUALITATIVE Not Detected Not Detected    BUPRENORPHINE, QUALITATIVE Not Detected Not Detected    PROPOXYPHENE,QUAL Not Detected Not Detected   Urinalysis with Reflex Microscopic if Positive      Result  Value Ref Range    COLOR                      Yellow Yellow Color    CLARITY                   Clear Clear Clarity    SPECIFIC GRAVITY,URINE    1.010 1.010, 1.015, 1.020, 1.025                    PH,URINE                  6.0 6.0, 7.0, 8.0, 5.5, 6.5, 7.5, 8.5                    UROBILINOGEN,QUALITATIVE  Normal Normal EU/dl    PROTEIN, URINE Negative Negative mg/dL    GLUCOSE, URINE Negative Negative mg/dL    KETONES,URINE             Negative Negative mg/dL    BILIRUBIN,URINE           Negative Negative                    OCCULT BLOOD,URINE        Negative Negative                    NITRITE                   Negative Negative                    LEUKOCYTE ESTERASE        Negative Negative                   CBC WITH AUTO DIFFERENTIAL      Result  Value Ref Range    WHITE BLOOD COUNT         7.6 4.5 - 11.0 thou/cu mm    RED BLOOD COUNT           4.19 4.00 - 5.20 mil/cu mm    HEMOGLOBIN                12.6 12.0 - 16.0 g/dL    HEMATOCRIT                37.5 33.0 - 51.0 %    MCV                       89 80 - 100 fL    MCH                       30.1 26.0 - 34.0 pg    MCHC                      33.6 32.0 - 36.0 g/dL    RDW                       12.5 11.5 - 15.5 %    PLATELET COUNT            262 140 - 440 thou/cu mm    MPV                       7.6 6.5 - 11.0 fL    NEUTROPHILS               64.8 42.0 - 72.0 %    LYMPHOCYTES               24.4 20.0 - 44.0 %    MONOCYTES                 8.3 <12.0 %    EOSINOPHILS               1.7 <8.0 %    BASOPHILS                 0.7 <3.0 %    ABSOLUTE NEUTROPHILS      4.9 1.7 - 7.0 thou/cu mm    ABSOLUTE LYMPHOCYTES      1.9 0.9 - 2.9 thou/cu mm    ABSOLUTE MONOCYTES        0.6 <0.9 thou/cu mm    ABSOLUTE EOSINOPHILS      0.1 <0.5 thou/cu mm    ABSOLUTE BASOPHILS        0.1 <0.3 thou/cu mm       ASSESSMENT AND PLAN:  Rosi was seen today for medication management.    Diagnoses and all orders for this visit:    Peripheral polyneuropathy (HC)  -     gabapentin (NEURONTIN) 100 mg capsule; Take 1-2 capsules by mouth 3 times daily. as  needed for burning/shooting neuropathy pain    Pins and needles sensation  -     gabapentin (NEURONTIN) 100 mg capsule; Take 1-2 capsules by mouth 3 times daily. as needed for burning/shooting neuropathy pain    Bilateral foot pain  -     gabapentin (NEURONTIN) 100 mg capsule; Take 1-2 capsules by mouth 3 times daily. as needed for burning/shooting neuropathy pain    Vitamin B12 deficiency  -     gabapentin (NEURONTIN) 100 mg capsule; Take 1-2 capsules by mouth 3 times daily. as needed for burning/shooting neuropathy pain    DEPRESSION/ANXIETY    lab results and schedule of future lab studies reviewed with patient, reviewed diet, exercise and weight control    -- Expected clinical course discussed   -- Medications and their side effects discussed    The ASCVD Risk score (Shar DC Jr., et al., 2013) failed to calculate for the following reasons:    The 2013 ASCVD risk score is only valid for ages 40 to 79    Rosi is also recommended to eat a heart-healthy diet, do regular aerobic exercises, maintain a desirable body weight, and avoid tobacco products. These recommendations are from the American Heart Association (AHA) which stresses the importance of lifestyle changes to lower cardiovascular disease risk.     Return in about 7 weeks (around 2/22/2017).    Patient Instructions   Glad your relationship issues are getting worked out - hopefully the separation will help improve your mental health stability.     -- Follow with Jeff Wren regularly for med management.     Medication list updated.     Increase Neurontin for your burning foot pain...    - gabapentin (NEURONTIN) 100 mg capsule; Take 1-2 capsules by mouth 3 times daily. as needed for burning/shooting neuropathy pain      -- up to 6 capsule daily.     Return as scheduled on 2/22/2017 -- or sooner as needed for follow-up with Dr. Curiel.    Clinic : [phone]  Appointment line: [phone]     Lucio Curiel MD

## 2018-01-02 NOTE — TELEPHONE ENCOUNTER
Patient Information     Patient Name MRN Rosi Gimenez [id number] Female 1975      Telephone Encounter by Deirdre Bhatti at 2017  8:58 AM     Author:  Deirdre Bhatti Service:  (none) Author Type:  (none)     Filed:  2017  9:00 AM Encounter Date:  2017 Status:  Signed     :  Deirdre Bhatti            Patient placed with Lucio Curiel MD today at 1130.    Deirdre Bhatti LPN        2017 8:59 AM

## 2018-01-02 NOTE — NURSING NOTE
Patient Information     Patient Name MRN Sex Rosi Mendez [id number] Female 1975      Nursing Note by Berenice Moreira at 2017 10:30 AM     Author:  Berenice Moreira Service:  (none) Author Type:  (none)     Filed:  2017 10:38 AM Encounter Date:  2017 Status:  Signed     :  Berenice Moreira            Patient presents today for a consult. Patient is having irregular periods.  Berenice Moreira LPN...............2017   10:34 AM

## 2018-01-02 NOTE — TELEPHONE ENCOUNTER
Patient Information     Patient Name MRN Rosi Gimenez [id number] Female 1975      Telephone Encounter by Deirdre Bhatti at 2017  8:53 AM     Author:  Deirdre Bhatti Service:  (none) Author Type:  (none)     Filed:  2017  8:54 AM Encounter Date:  2017 Status:  Signed     :  Deirdre Bhatti            Left message to call back  ...................Deirdre Bhatti LPN  2017   8:53 AM

## 2018-01-03 NOTE — OR NURSING
Patient Information     Patient Name MRN Sex Rosi Mendez [id number] Female 1975      OR Nursing by Jewell Davila RN at 2017  9:05 AM     Author:  Jewell Davila RN Service:  (none) Author Type:  NURS- Registered Nurse     Filed:  2017  9:07 AM Date of Service:  2017  9:05 AM Status:  Signed     :  Jewell Davila RN (NURS- Registered Nurse)            Hands off report received from Carolyn Bruce RN before transfer to Operating Room.

## 2018-01-03 NOTE — NURSING NOTE
Patient Information     Patient Name MRN Sex Rosi Mendez [id number] Female 1975      Nursing Note by Berenice Moreira at 2017 10:00 AM     Author:  Berenice Moreira Service:  (none) Author Type:  (none)     Filed:  2017 10:19 AM Encounter Date:  2017 Status:  Signed     :  Berenice Moreira            Patient presents today for a follow up to go over results.  Berenice Moreira LPN...............2017   10:19 AM

## 2018-01-03 NOTE — OR ANESTHESIA
Patient Information     Patient Name MRN Sex     Rosi Baires [id number] Female 1975      OR Anesthesia by Nenita Spann MD at 2017 10:10 AM     Author:  Nenita Spann MD Service:  (none) Author Type:  PHYS- Anesthesiologist     Filed:  2017 10:11 AM Date of Service:  2017 10:10 AM Status:  Signed     :  Nenita Spann MD (PHYS- Anesthesiologist)            Anesthesia Post Operative Care Note    Name: Rosi Baires  MRN:   [id number]  :    1975       Procedure Done:  See Surgeon Note   Case Cancelled for Anesthetic Reason:  No     Post Op Considerations:  Other       Other Recommendations:  Some post-operative pruritus treated with benadryl IV in the PACU.    Anesthesia Technique    Anesthetic Type:  General     Airway Management:  LMA     Oral Trauma:  No    Intraoperative Course   Hemodynamics:  Stable    Ventilation Normal:  Yes Lung Sounds:  Normal      PACU Course      Nondepolarizer Used: No    Reintubation:  No   Hemodynamics:  Stable      Hydration: Euvolemic   Temperature:  36.1 - 38.3      Mental Status:  Awake, alert, follows commands   Pain Management:  Adequate   Regional Block:  No   Anesthesia Complications:  None      Vital Signs:  Temp: 97.4  F (36.3  C)  Pulse: 66  BP: 122/76  Resp: 18  SpO2: 100 %    O2 Device: Room Air    NON-VERBAL PAIN SCALE  Face: No particular expression or smile  Activity (Movement): Lying quietly, normal position  Guarding: Lying quietly  Physiologic I (Vital Signs): Stable vital signs/no change 4 hrs  Physiologic II: Warm, dry, skin  Total Score: 0    Level of Nausea: None        Active Lines:  Patient Lines/Drains/Airways Status    Active Line     Name: Placement date: Placement time: Site: Days:    PERIPHERAL VAD Right Hand 20 17   0827   Hand   less than 1                Intake & Output:  Date  17 - 17 0659(Not Admitted)    17 - 17 0659      Shift  4454-7451  6752-6358 2533-3175 24 Hour Total 8392-3010 9838-8940 2804-6138 24 Hour Total   I  N  T  A  K  E   Shift Total           O  U  T  P  U  T   Urine     35   35      + I/O +   Straight Cath Urine     35   35    Shift Total     35   35   NET      -35   -35   Weight (kg)  133.4 133.4 133.4 133.4 133.4 133.4 133.4 133.4         Labs:  No results for input(s): JS0TJDZSNQL, ROM4OQWUEULM, PHARTERIAL, RHC2ETGAMJSF, Q3LHPVHETMBB in the last 24 hours.    No results for input(s): MAGNESIUM in the last 24 hours.    No results for input(s): GLUCOSEMETER in the last 720 hours.        FLASH VASQUEZ MD ....................  2/23/2017   10:10 AM

## 2018-01-03 NOTE — INTERVAL H&P NOTE
Patient Information     Patient Name MRN Sex     Rosi Baires [id number] Female 1975      Interval H&P Note by Michael Fatima MD at 2017  8:35 AM     Author:  Michael Fatima MD Service:  (none) Author Type:  Physician     Filed:  2017  8:36 AM Date of Service:  2017  8:35 AM Status:  Signed     :  Michael Fatima MD (Physician)            I have seen and examined the patient and there are no changes to her history and physical exam.    Michael Fatima MD        Source Note     Author:  Lucio Curiel MD Service:  (none) Author Type:  Physician    Filed:  2017  4:52 PM Date of Service:  2017  1:30 PM Status:  Signed    :  Lucio Curiel MD (Physician)              Nursing Notes:   Deirdre Bhatti  2017  1:30 PM  Signed  This patient presents today for a Preoperative exam for this procedure: Ablation   Date of Surgery: 2017   Surgeon:  Dr. Fatima  Facility:  [facility]        2017 1:13 PM        Rosi Baires presents to clinic today for:   Chief Complaint    Patient presents with      Preoperative Exam     Referring Provider: Dr. Fatima   HPI: Ms. Baires is a 42 y.o. female who presents today for Consultative evaluation requested by Dr. Fatima for preoperative cardiopulmonary clearance.     (Z01.818) Preop examination - Hysterectomy 2017 - Dr. Fatima  (primary encounter diagnosis)  (N92.1) Menorrhagia with irregular cycle  (G62.9) Peripheral polyneuropathy (HC)  (F31.60) Bipolar disorder, current episode mixed, unspecified  (J45.40) Moderate persistent asthma without complication  (I83.11,  I83.12) Venous stasis dermatitis of both lower extremities  (Z68.42) Body mass index (BMI) of 45.0-49.9 in adult (HC)  (E53.8) Vitamin B12 deficiency  (E55.9) Vitamin D deficiency     Patient has been having excessively heavy, irregular menstrual cycles.  She has had tubal ligation in the past.  Due to your uterine  issues, hysterectomy was advised.    Patient reports her peripheral neuropathy has improved with gabapentin.  -- Chart lists vitamin D and B12 deficiency, check levels today.  She is on vitamin D replacement.  Is not currently taking vitamin B replacement.  -- Trial of B12 shot today.  This may further help her neuropathy symptoms.    Patient has bipolar disorder, recent episode was mixed.  She follows with psychiatry closely.  She is on Lamictal and Abilify and BuSpar.    Asthma, doing well.  Uses albuterol intermittently.  It was quite cold out earlier this winter she did have some issues and needed to use her albuterol more often however at this point she is doing well.    Venous stasis dermatitis, doing much better with her triamterene-Hydrochlorothiazide (HCTZ).     - Patient has history of low potassium levels, Recheck electrolytes today.  + Leg swelling is down significantly.    Patient is morbidly obese.  BMI is greater than 45.    + walking more regularly, eating less, eating better - has lost about 8 lbs in past 2.5 weeks.     Ms. Raos Body mass index is 47.14 kg/(m^2). This is out of the normal range for a 42 y.o. Normal range for ages 18-64 is between 18.5 and 24.9; normal range for ages 65+ is 23-30. To lose weight we reviewed risks and benefits of appropriate options such as diet, exercise, and medications. Patient's strategy will be  self-directed nutrition plan and self-directed exercise program   BP Readings from Last 1 Encounters:02/16/17 : 132/76  Ms. Mahan blood pressure is out of the normal range for adults. Per JNC-8 guidelines normal adult blood pressure is < 120/80, pre-hypertensive is between 120/80 and 139/89, and hypertension is 140/90 or greater. Risks of hypertension were discussed. Patient's strategy will be weight loss, increased activity and reduced salt intake    Functional Capacity: > 4 METS.   Reports that she can climb a flight of stairs without any chest pain/heaviness  or shortness of breath.   Patient reports no current symptoms of fevers, chills, nausea/vomiting.   No cough. No shortness of breath.   No change in bowel/bladder habits. No melena, hematochezia. No Hematuria.   No rashes. No palpitations.  No orthopnea/paroxysmal nocturnal dyspnea   No vision or hearing issues.   No significant mood issues   No bruising.     I have personally reviewed the past medical history, past surgical history, medications, allergies, family and social history as listed below, on 2/16/2017.    Patient Active Problem List       Diagnosis  Date Noted     DEPRESSION/ANXIETY       Priority: High      Preop examination - Hysterectomy 2/23/2017 - Dr. Fatima  02/16/2017     Menorrhagia with irregular cycle  02/16/2017     Bipolar disorder, current episode mixed, unspecified  01/09/2017     Peripheral polyneuropathy (HC)  12/18/2016     Elevated random blood glucose level  12/18/2016     Vitamin D deficiency  12/18/2016     Vitamin B12 deficiency  12/18/2016     Bilateral foot pain  12/18/2016     Lymphedema of both lower extremities  11/22/2016     Venous stasis dermatitis of both lower extremities  08/17/2016     Alcohol dependence (HC)  11/28/2015     Cannabis dependence in remission (HC)  11/28/2015     Anxiety, generalized  11/28/2015     Body mass index (BMI) of 45.0-49.9 in adult (HC)  03/23/2015     Moderate persistent asthma  08/19/2013     Overview:   AAP completed on 08/19/13  Triggers: pollen, fumes, exercise, URI, warm weather. Peak flow today is 250. Symptomatic: moderate  Overview:   AAP completed on 08/19/13  Triggers: pollen, fumes, exercise, URI, warm weather. Peak flow today is 250. Symptomatic: moderate        Urinary incontinence  03/15/2013     Closed dislocation of tarsal joint  02/04/2011     GERD       EDEMA LEG       ALLERGIC RHINITIS, SEASONAL       BIPOLAR AFFECTIVE DISORDER       AFFECTIVE PERSONALITY DISORDER       SUBSTANCE ABUSE       OBESITY       Atopic rhinitis   10/02/2003     Overview:   [facility] - Seasonal Allergies        Borderline personality disorder - Following with [facility] - Chiquis LAWRENCE  2003     Overview:   [facility].        Disorder of female genital organ  2001     Overview:   DUB, dysmenorrhea        Past Medical History      Diagnosis   Date     Asthma       Depression       Edema       Encounter for removal and reinsertion of IUD  05     IUD placement, Removed      History of pregnancy       G3, P2-0-1-2 with history of spontaneous       LSIL (low grade squamous intraepithelial lesion) on Pap smear       Past Surgical History       Procedure   Laterality Date     Leep procedure        Underwent a loop       Tubal ligation        tubal ligation       Leg/ankle surgery        fracture, repair with screws       Current Outpatient Prescriptions       Medication  Sig Dispense Refill     albuterol HFA 90 mcg/actuation inhaler Inhale 1-2 Puffs by mouth every 4 hours if needed. 1 Inhaler 11     ARIPiprazole (ABILIFY) 15 mg tablet Take 7 mg by mouth once daily.       busPIRone (BUSPAR) 10 mg tablet Take 10 mg by mouth 2 times daily.       cholecalciferol (VITAMIN D3) 5,000 unit capsule Take 1 capsule by mouth once daily. For Vitamin D Deficiency - Dose Increase 2017 100 capsule 3     gabapentin (NEURONTIN) 100 mg capsule Take 1-2 capsules by mouth 3 times daily. as needed for burning/shooting neuropathy pain 180 capsule 11     hydrOXYzine pamoate (VISTARIL) 50 mg capsule Take 1 capsule in AM and 2 capsule in PM - Managed per Psychiatry  2     lamoTRIgine (LAMICTAL) 150 mg tablet Take 150 mg by mouth 2 times daily.       MISCELLANEOUS MEDICAL SUPPLY (GRADUATED COMPRESSION STOCKINGS) For personal use. Length: thigh Strength: 16-20 mmHg.  Please measure patient in Pharmacy. 1 Packet 0     montelukast (SINGULAIR) 10 mg tablet Take 1 tablet by mouth at bedtime. 90 tablet 3     mupirocin 2% topical (BACTROBAN OINTMENT)  ointment Apply  topically to affected area(s) 3 times daily. 60 g 0     oxybutynin XL (DITROPAN XL) 5 mg CR tablet TAKE 1 TABLET BY MOUTH DAILY (LOT [id number]) 28 tablet 6     potassium chloride (KLOR-CON M20) 20 mEq Extended-Release tablet Take 20 mEq by mouth.       triamterene-hydrochlorothiazide, 37.5-25 mg, (DYAZIDE) 37.5-25 mg capsule Take 1 capsule by mouth every morning. -- Start when you run out of regular HCTZ. Dont refill HCTZ 90 capsule 3     vilazodone (VIIBRYD) 40 mg tablet Take 40 mg by mouth once daily with a meal.       vitamin B complex (B-COMPLEX) tablet Take 1 tablet by mouth once daily. -- for Low B12 - Start 2/16/2017 100 tablet 3     Walker Rolling Walker for home use.  2 wheels 1 Device 0     Recent use of: no recent use of aspirin (ASA), NSAIDS or steroids     Fever/Chills or other infectious symptoms in past month: no  >10lb weight loss in past two months: no    1. Clinic Code Status:Full Code  2. Date Discussed: 2/16/2017   3. Documentation:Discussed with patient who is competent to make decision    Hx of blood transfusions:   (NO)   Tetanus status:    Immunization History     Administered  Date(s) Administered     Hepatitis B (Adult) 09/09/1997, 10/07/1997, 09/30/2006     Influenza Virus, Unspecified 10/15/2009, 10/04/2012, 11/25/2013, 10/04/2016     MMR, Unspecified 09/09/1997     Pneumococcal Poly,23-Valent (Pneumovax) 01/09/2007, 03/12/2010     Pneumococcal conj 13-Valent (Prevnar 13) 01/09/2007     Tdap 09/09/1997, 08/16/2007, 05/15/2013, 01/05/2016     Tuberculin (PPD) 03/12/2010     Varicella Vaccine 09/09/1997, 10/07/1997      History of VRE/MRSA:  (NO)   Latex allergy  no   Allergies      Allergen   Reactions     Clonazepam  Other - Describe In Comment Field     Causes Violence and aggresssion      Hydrocodone-Acetaminophen  Seizures     Lorazepam  Other - Describe In Comment Field     Causes Violence and aggresssion      Zoloft [Sertraline]  Other - Describe In Comment Field      Caused suicidally       Bupropion  *Unknown     Caused Major Depression      Divalproex Sodium  *Unknown     Lithium  *Unknown     Risperidone Analogues  *Unknown     Sulfa (Sulfonamide Antibiotics)  *Unknown      Family History       Problem   Relation Age of Onset     No Known Problems  Mother      Asthma  Father      + Leg edema, knee, hip replacement. + Lymphedema       Osteoporosis  Brother      No Known Problems  Brother      Other  Paternal Grandmother      Lymphedema       Family Status     Relation  Status     Mother Alive     Father Alive     Brother Alive     Brother Alive     Paternal Grandmother      Denies family hx of bleeding tendencies, anesthesia complications, or other problems with surgery.     Social History     Social History        Marital status:       Spouse name: N/A     Number of children:  N/A     Years of education:  N/A     Social History Main Topics         Smoking status:   Former Smoker     Packs/day:  1.00     Years:  3.00     Types:  Cigarettes     Quit date:  2003     Smokeless tobacco:   Never Used     Alcohol use   No      Comment: History of abuse - sober as of 2010.       Drug use:   No      Comment: History of use      Sexual activity:   No     Other Topics  Concern     Not on file      Social History Narrative     No longer in adult foster care lived with Charley Godwin.      .     2 sons - age 12 and 7 - as of 2016.     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        Complete ROS was performed and was negative unless otherwise noted in HPI above.    Surgical:  Patient denies previous complications from prior surgeries including but not limited to prolonged bleeding, anesthesia complications, dysrhythmias, surgical wound infections, or prolonged hospital stay.     + Nausea and vomiting after getting Tubal Ligation.    EXAM:   Vitals:     17 1315   BP: 132/76   Pulse: 92   Temp: 96.8  F (36  C)   TempSrc: Tympanic  "  SpO2: 98%   Weight: 133.5 kg (294 lb 4 oz)   Height: 1.683 m (5' 6.25\")     BP Readings from Last 3 Encounters:    17 132/76   17 124/80   17 124/80     Wt Readings from Last 3 Encounters:    17 133.5 kg (294 lb 4 oz)   17 (!) 137 kg (302 lb)   17 (!) 137 kg (302 lb)     Estimated body mass index is 47.14 kg/(m^2) as calculated from the following:    Height as of this encounter: 1.683 m (5' 6.25\").    Weight as of this encounter: 133.5 kg (294 lb 4 oz).     Mallampati Airway Classification:  Class 2: Visibility of hard and soft palate, upper portion of tonsils and uvula  EXAM:  Constitutional: Pleasant, alert, appropriate appearance for age. No acute distress  ENT: Normocephalic, Atraumatic, Thyroid without nodules or tenderness   Nose/Mouth: Oral pharynx without erythema or exudates, Mucous membranes are moist, Nose is patent bilaterally, no rhinorrhea and Dental hygeine adequate   Eyes:  Extraocular muscles intact, Sclera non-icteric, Conjunctiva without erythema  Lymphatic Exam: Non-palpable nodes in neck, clavicular regions  Pulmonary: Lungs are clear to auscultation bilaterally, without wheezes or crackles  Cardiovascular Exam: S1, S2 normal, regular rate and rhythm, brisk carotid upstroke without bruits, peripheral pulses very brisk, no pedal edema, no murmur, click, rub or gallop appreciated  Gastrointestinal Exam: Obese  Soft, non-tender, non-distended, positive bowel sounds  Integument: No abnormal rashes, sores, or ulcerations noted  Neurologic Exam: CN 3-12 grossly intact   Musculoskeletal Exam: Moves upper and lower extremities symmetrically, No focal weakness  Gait and station appear grossly normal  Psychiatric Exam: Awake and Alert, Affect and mood appropriate  Speech is fluent, Thought process is normal    INVESTIGATIONS;  CXR:  not indicated     EK2016 -Personally reviewed - normal sinus rhythm with a rate of 92.  Compared to previous 2015, " no significant change.  Pre-op urine for pregnancy (for 12 yrs and older or menstruating): not applicable -- s/p Tubal ligation.     LABS:     BMP is pending.     Results for orders placed or performed in visit on 01/11/17      FSH      Result  Value Ref Range    FSH 8.9 mIU/mL   CBC WITH AUTO DIFFERENTIAL      Result  Value Ref Range    WHITE BLOOD COUNT         8.9 4.5 - 11.0 thou/cu mm    RED BLOOD COUNT           4.56 4.00 - 5.20 mil/cu mm    HEMOGLOBIN                13.8 12.0 - 16.0 g/dL    HEMATOCRIT                41.9 33.0 - 51.0 %    MCV                       92 80 - 100 fL    MCH                       30.2 26.0 - 34.0 pg    MCHC                      32.9 32.0 - 36.0 g/dL    RDW                       12.7 11.5 - 15.5 %    PLATELET COUNT            317 140 - 440 thou/cu mm    MPV                       8.3 6.5 - 11.0 fL    NEUTROPHILS               61.7 42.0 - 72.0 %    LYMPHOCYTES               28.6 20.0 - 44.0 %    MONOCYTES                 7.4 <12.0 %    EOSINOPHILS               1.5 <8.0 %    BASOPHILS                 0.8 <3.0 %    ABSOLUTE NEUTROPHILS      5.5 1.7 - 7.0 thou/cu mm    ABSOLUTE LYMPHOCYTES      2.5 0.9 - 2.9 thou/cu mm    ABSOLUTE MONOCYTES        0.7 <0.9 thou/cu mm    ABSOLUTE EOSINOPHILS      0.1 <0.5 thou/cu mm    ABSOLUTE BASOPHILS        0.1 <0.3 thou/cu mm   HPV HIGH RISK      Result  Value Ref Range    HPV RESULTS Negative Negative   GYN THIN PREP PAP SCREEN IMAGED      Result  Value Ref Range    CYTOLOGY          ASSESSMENT AND PLAN:    1.  Preoperative history and physical   Rosi was seen today for preoperative exam.    Diagnoses and all orders for this visit:    Preop examination - Hysterectomy 2/23/2017 - Dr. Fatima    Menorrhagia with irregular cycle    Peripheral polyneuropathy (HC)    Bipolar disorder, current episode mixed, unspecified    Moderate persistent asthma without complication    Venous stasis dermatitis of both lower extremities  -     BASIC METABOLIC PANEL;  Future  -     BASIC METABOLIC PANEL    Body mass index (BMI) of 45.0-49.9 in adult (HC)    Vitamin B12 deficiency  -     VITAMIN B12; Future  -     cyanocobalamin 1,000 mcg injection (VITAMIN B12); Inject 1 mL intramuscular one time.  -     VITAMIN B12  -     vitamin B complex (B-COMPLEX) tablet; Take 1 tablet by mouth once daily. -- for Low B12 - Start 2/16/2017    Vitamin D deficiency  -     VITAMIN D 25 (DEFICIENCY); Future  -     VITAMIN D 25 (DEFICIENCY)  -     cholecalciferol (VITAMIN D3) 5,000 unit capsule; Take 1 capsule by mouth once daily. For Vitamin D Deficiency - Dose Increase 2/16/2017        Revised Cardiac Risk Index   1. History of ischemic heart disease   2. History of Systolic congestive heart failure (EF less than or equal to 40%)    3. History of cerebrovascular disease (stroke or transient ischemic attack)   4. History of diabetes requiring preoperative insulin use   5. Chronic kidney disease (creatinine > 2 mg/dL)   6. Undergoing suprainguinal vascular, intraperitoneal, or intrathoracic surgery     Risk for cardiac death, nonfatal myocardial infarction, and nonfatal cardiac arrest:   0 predictors = 0.4%   1 predictor = 0.9%  2 predictors = 6.6%  ?3 predictors = >11%       Preoperative asseessment (as of 2/16/2017)      INTERMEDIATE RISK - risk factors including: HTN     Recommendations as follow:    - Proceed with surgery: As Scheduled.     For above listed surgery and anesthesia:     - Patient is moderate  risk for perioperative complications and IS medically optimized at this time.    ASA Classification:  Class 2 - MILD SYSTEMIC DISEASE, NO ACUTE PROBLEMS, NO FUNCTIONAL LIMITATIONS.    Is BMI over 40 or A1c over 7.5% -- No  HEMOGLOBIN A1C MONITORING (POCT) (% NGSP)    Date Value   03/15/2013 4.9        Other:  Proceed without further diagnostic evaluation.    Preoperative Evaluation: Obstructive Sleep Apnea screening (STOP_BANG)    S: Snore -  Do you snore loudly? (louder than talking or  "loud enough to be heard through closed doors)(NO)  T: Tired - Do you often feel tired, fatigued, or sleepy during the daytime?(NO)  O: Observed - Has anyone ever observed you stop breathing during your sleep?(NO)  P: Pressure - Do you have or are you being treated for high blood pressure?(YES)  B: BMI - BMI greater than 35kg/m2?(YES)  A: Age - Age over 50 years old?(NO)  N: Neck - Neck circumference greater than 40 cm?(NO)  G: Gender - Gender: Male?(NO)    Total number of \"YES\" responses: ____2____    Scoring: Low risk of TAWNYA 0-2  At Risk of TAWNYA: >3 High Risk of TAWNYA: 5-8     lab results and schedule of future lab studies reviewed with patient, reviewed diet, exercise and weight control, recommended sodium restriction, cardiovascular risk and specific lipid/LDL goals reviewed    The ASCVD Risk score (Beaumontendy VANCE Jr., et al., 2013) failed to calculate for the following reasons:    Cannot find a previous HDL lab    Cannot find a previous total cholesterol lab    Rosi is also recommended to eat a heart-healthy diet, do regular aerobic exercises, maintain a desirable body weight, and avoid tobacco products. These recommendations are from the American Heart Association (AHA) which stresses the importance of lifestyle changes to lower cardiovascular disease risk.       Thank you for allowing me to participate in the care of your patient.   A copy of this evaluation report is provided to referring provider.     Return in about 3 months (around 5/16/2017) for -- f/u B12 and fatigue. .  Patient Instructions   Plan for surgery as scheduled with Dr. Fatima on 2/23/2017.    Lab today.      Patient was reminded of being NPO (without food) after midnight the night before surgery.   -- Hold aspirin, Advil/ibuprofen, Aleve/naproxen, Vitamin E for 7 days before surgery   -- Hold vitamins and herbal remedies for 7 days before surgery     -- Okay to use Tylenol (Acetaminophen)    Okay to take other usual medications with a sip of water " on the day of the procedure and resume their medications after the procedure.   Contact the surgery Center for specific instructions prior to procedure.    PRE OP RECOMMENDATIONS:  Patient is on chronic pain medications (NO)   Patient is on anti-platelet/anticoagulation (NO)   Other medications that need adjustment perioperatively (NO)     Other:    Patient was advised to call our office and the surgical services with any change in condition or new symptoms if they were to develop between today and their surgical date.  Especially any cardiopulmonary symptoms or symptoms concerning for an infection.          Vitamin B12 deficiency:    Vitamin B12 deficiency can cause numerous symptoms.  Fatigue and malaise, low energy levels, low blood counts or anemia, poor mood or depression, neuropathy or pins and needles/burning sensation of the hands and feet.    -- trial of B12 shot today.    -- If B12 shot improves your energy and your blood counts, we can do B12 shots every 3-4 weeks up to every 2 or 3 months if needed.    -- Consider B12 tablet or B-complex tablet -- once daily.     -- Some people are able to take and absorb oral B12 or B complex tablets.   -- Some people are NOT able to absorb oral B12 very well.    If you decide to proceed with oral B12 replacement, but your B12 levels do not improve, you likely will need to use B12 shots instead.     Return in approximately 3 month(s), or sooner as needed for follow-up with Dr. Curiel.    Clinic : [phone]  Appointment line: [phone]    -- B12 level came back rather low.  Start B complex tablet 1 by mouth daily.  Prescription sent to pharmacy.    -- Vitamin D level came back rather low.  Start vitamin D3 5000 units daily.  Prescription sent to pharmacy.      Lucio Curiel MD

## 2018-01-03 NOTE — OR PREOP
Patient Information     Patient Name MRN Rosi Gimenez [id number] Female 1975      OR PreOp by Willi Bruce, RN at 2017  8:36 AM     Author:  Willi Bruce, RN Service:  (none) Author Type:  NURS- Registered Nurse     Filed:  2017  8:36 AM Date of Service:  2017  8:36 AM Status:  Signed     :  Willi Bruce RN (NURS- Registered Nurse)            Handoff report given to ROSY Davila RN upon patient transfer to OR.

## 2018-01-03 NOTE — OR ANESTHESIA
Patient Information     Patient Name MRN Sex     Rosi Baires [id number] Female 1975      OR Anesthesia by Nenita Spann MD at 2017 11:05 AM     Author:  Nenita Spann MD  Service:  (none) Author Type:  PHYS- Anesthesiologist     Filed:  2017  8:10 AM  Date of Service:  2017 11:05 AM Status:  Addendum     :  Nenita Spann MD (PHYS- Anesthesiologist)        Related Notes: Original Note by Nenita Spann MD (PHYS- Anesthesiologist) filed at 2017  8:09 AM                                                           ANESTHESIA ASSESSMENT    Date: 17 Time: 11:05 AM      Patient:  Rosi Baires    Procedure(s) (LRB):  Hysteroscopy Dilation & Curettage & Endometrial Ablation & Polypectomy (N/A)    Past Medical History      Diagnosis   Date     Asthma       Depression       Edema       Encounter for removal and reinsertion of IUD  05     IUD placement, Removed      History of pregnancy       G3, P2-0-1-2 with history of spontaneous       LSIL (low grade squamous intraepithelial lesion) on Pap smear         Past Surgical History       Procedure   Laterality Date     Leep procedure        Underwent a loop       Tubal ligation        tubal ligation       Leg/ankle surgery        fracture, repair with screws         Family History       Problem   Relation Age of Onset     No Known Problems  Mother      Asthma  Father      + Leg edema, knee, hip replacement. + Lymphedema       Osteoporosis  Brother      No Known Problems  Brother      Other  Paternal Grandmother      Lymphedema         Patient Active Problem List     Diagnosis  Code     GERD K21.9     EDEMA LEG R60.9     ALLERGIC RHINITIS, SEASONAL J30.1     BIPOLAR AFFECTIVE DISORDER F31.9     AFFECTIVE PERSONALITY DISORDER F34.0     SUBSTANCE ABUSE F19.10     OBESITY E66.9     DEPRESSION/ANXIETY F34.1     Urinary incontinence R32     Moderate persistent asthma J45.40     Closed  dislocation of tarsal joint S93.316A     Borderline personality disorder - Following with [facility] - Chiquis LAWRENCE F60.3     Venous stasis dermatitis of both lower extremities I83.11, I83.12     Alcohol dependence (HC) F10.20     Atopic rhinitis J30.9     Body mass index (BMI) of 45.0-49.9 in adult (HC) Z68.42     Cannabis dependence in remission (HC) F12.21     Anxiety, generalized F41.1     Disorder of female genital organ N94.9     Lymphedema of both lower extremities I89.0     Peripheral polyneuropathy (HC) G62.9     Elevated random blood glucose level R73.09     Vitamin D deficiency E55.9     Vitamin B12 deficiency E53.8     Bilateral foot pain M79.671, M79.672     Bipolar disorder, current episode mixed, unspecified F31.60     Preop examination - Hysterectomy 2/23/2017 - Dr. Fatima Z01.818     Menorrhagia with irregular cycle N92.1       No prescriptions prior to admission.       Allergies:  Allergies      Allergen   Reactions     Clonazepam  Other - Describe In Comment Field     Causes Violence and aggresssion      Hydrocodone-Acetaminophen  Seizures     Can take Percocet without difficulty.      Lorazepam  Other - Describe In Comment Field     Causes Violence and aggresssion      Zoloft [Sertraline]  Other - Describe In Comment Field     Caused suicidally       Bupropion  *Unknown     Caused Major Depression      Divalproex Sodium  *Unknown     Lithium  *Unknown     Risperidone Analogues  *Unknown     Sulfa (Sulfonamide Antibiotics)  *Unknown       Review of Systems:  GERD: No  Chest pain: No  Shortness of breath: No  Recent fever: No  Poor exercise tolerance: No  Bleeding tendency: No (Easy bruising.)  Pregnant: No  Anesthesia Complications: PONV (Nauseated after PPTL; will use scopolamine patch.)      History     Smoking Status       Former Smoker      Packs/day: 1.00     Years: 3.00     Types: Cigarettes     Quit date: 1/1/2003   Smokeless Tobacco       Never Used      Social History     Social  "History        Marital status:       Spouse name: N/A     Number of children:  N/A     Years of education:  N/A     Social History Main Topics         Smoking status:   Former Smoker     Packs/day:  1.00     Years:  3.00     Types:  Cigarettes     Quit date:  1/1/2003     Smokeless tobacco:   Never Used     Alcohol use   No      Comment: History of abuse - sober as of July 2010.       Drug use:   No      Comment: History of use      Sexual activity:   No     Other Topics  Concern     Not on file      Social History Narrative     No longer in adult foster care lived with Charley Godwin.      .     2 sons - age 12 and 7 - as of 11/2016.     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        Physical Examination:  Visit Vitals       Ht 1.683 m (5' 6.25\")     Wt 133.4 kg (294 lb)     BMI 47.1 kg/m2    Body mass index is 47.1 kg/(m^2). Body surface area is 2.5 meters squared.  Dental Condition: Fair (Left upper central chipped; scab on left lower lip from cold sore.)     Mallampati Score (Airway): I  Cardiovascular: Abnormal  (Tachycardic; lots of anxiety today.)  Pulmonary: Normal  Other: Abnormal  (BMI = 47.1.)    Recent Labs in [facility]:    No results for input(s): SODIUM, POTASSIUM, CHLORIDE, [id number], ANIONGAP, BUN, CREATININE, BUNCREARATIO, CALCIUM, GLUCOSE, GLUCOSEMETER, KETONES, MAGNESIUM, WBC, HGB, HCT, PLT, ABORH, RHTYPE, PREGURINE, BHCGQL, HCGBETAQUANT, INR in the last 72 hours.          Assessment/Plan:  ASA Class: II  Risk of dental injury discussed: Yes  NPO status confirmed: Yes  Anesthetic Plan:  General (LMA okay.)  No midazolam due to problems with benzodiazepines - okay to use vistaril or benadryl for sedation. Will use scopolamine patch, multi-modal analgesia & propofol infusion for nausea prevention.  Risk/Benefit/Alt discussed: Yes  Questions answered: Yes  Emergency Case?: No  Labs/ECG/Radiology Reviewed?: Yes      H&P Reviewed.  Patient Examined.      Provider " Electronic Signature:  FLASH VASQUEZ MD

## 2018-01-03 NOTE — OR NURSING
Patient Information     Patient Name MRN Sex Rosi Mendez [id number] Female 1975      OR Nursing by Jewell Davila RN at 2017  9:13 AM     Author:  Jewell Davila RN Service:  (none) Author Type:  NURS- Registered Nurse     Filed:  2017  9:13 AM Date of Service:  2017  9:13 AM Status:  Signed     :  Jewell Davila RN (NURS- Registered Nurse)            Faustino Clear Machine Used

## 2018-01-03 NOTE — NURSING NOTE
Patient Information     Patient Name MRN Sex     Rosi Baires [id number] Female 1975      Nursing Note by Berenice Moreira at 2017  2:00 PM     Author:  Beernice Moreira Service:  (none) Author Type:  (none)     Filed:  2017  2:17 PM Encounter Date:  2017 Status:  Signed     :  Berenice Moreira            Patient presents today for a hysteroscopy.  [place] Protocol    A. Pre-procedure verification complete yes  1-relevant information / documentation available, reviewed and properly matched to the patient; 2-consent accurate and complete, 3-equipment and supplies available    B. Site marking complete Yes  Site marked if not in continuous attendance with patient    C. TIME OUT completed yes  Time Out was conducted just prior to starting procedure to verify the eight required elements: 1-patient identity, 2-consent accurate and complete, 3-position, 4-correct side/site marked (if applicable), 5-procedure, 6-relevant images / results properly labeled and displayed (if applicable), 7-antibiotics / irrigation fluids (if applicable), 8-safety precautions.      Berenice Moreira LPN...............2017   2:17 PM

## 2018-01-03 NOTE — PROGRESS NOTES
Patient Information     Patient Name MRN Sex Rosi Mendez [id number] Female 1975      Progress Notes by Michael Fatima MD at 2017 10:00 AM     Author:  Michael Fatima MD Service:  (none) Author Type:  Physician     Filed:  2017  8:23 AM Encounter Date:  2017 Status:  Signed     :  Michael Fatima MD (Physician)            See hysteroscopy note later this same day.  Discussed the procedure this am.  All questions answered.

## 2018-01-03 NOTE — PATIENT INSTRUCTIONS
Patient Information     Patient Name MRN Rosi Gimenez [id number] Female 1975      Patient Instructions by Lucio Curiel MD at 2017  1:30 PM     Author:  Lucio Curiel MD  Service:  (none) Author Type:  Physician     Filed:  2017  1:40 PM  Encounter Date:  2017 Status:  Addendum     :  Lucio Curiel MD (Physician)        Related Notes: Original Note by Lucio Curiel MD (Physician) filed at 2017  1:37 PM            Plan for surgery as scheduled with Dr. Fatima on 2017.    Lab today.      Patient was reminded of being NPO (without food) after midnight the night before surgery.   -- Hold aspirin, Advil/ibuprofen, Aleve/naproxen, Vitamin E for 7 days before surgery   -- Hold vitamins and herbal remedies for 7 days before surgery     -- Okay to use Tylenol (Acetaminophen)    Okay to take other usual medications with a sip of water on the day of the procedure and resume their medications after the procedure.   Contact the surgery Center for specific instructions prior to procedure.    PRE OP RECOMMENDATIONS:  Patient is on chronic pain medications (NO)   Patient is on anti-platelet/anticoagulation (NO)   Other medications that need adjustment perioperatively (NO)     Other:    Patient was advised to call our office and the surgical services with any change in condition or new symptoms if they were to develop between today and their surgical date.  Especially any cardiopulmonary symptoms or symptoms concerning for an infection.          Vitamin B12 deficiency:    Vitamin B12 deficiency can cause numerous symptoms.  Fatigue and malaise, low energy levels, low blood counts or anemia, poor mood or depression, neuropathy or pins and needles/burning sensation of the hands and feet.    -- trial of B12 shot today.    -- If B12 shot improves your energy and your blood counts, we can do B12 shots every 3-4 weeks up to every 2 or 3 months if needed.    -- Consider  B12 tablet or B-complex tablet -- once daily.     -- Some people are able to take and absorb oral B12 or B complex tablets.   -- Some people are NOT able to absorb oral B12 very well.    If you decide to proceed with oral B12 replacement, but your B12 levels do not improve, you likely will need to use B12 shots instead.     Return in approximately 3 month(s), or sooner as needed for follow-up with Dr. Curiel.    Clinic : [phone]  Appointment line: [phone]

## 2018-01-03 NOTE — H&P
Patient Information     Patient Name MRN Sex     Rosi Baires [id number] Female 1975      H&P (View-Only) by Lucio Curiel MD at 2017  1:30 PM     Author:  Lucio Curiel MD Service:  (none) Author Type:  Physician     Filed:  2017  4:52 PM Date of Service:  2017  1:30 PM Status:  Signed     :  Lucio Curiel MD (Physician)            Nursing Notes:   Deirdre Bhatti  2017  1:30 PM  Signed  This patient presents today for a Preoperative exam for this procedure: Ablation   Date of Surgery: 2017   Surgeon:  Dr. Fatima  Fac[facility]        2017 1:13 PM        Rosi Baires presents to clinic today for:   Chief Complaint    Patient presents with      Preoperative Exam     Referring Provider: Dr. Fatima   HPI: Ms. Baires is a 42 y.o. female who presents today for Consultative evaluation requested by Dr. Fatima for preoperative cardiopulmonary clearance.     (Z01.818) Preop examination - Hysterectomy 2017 - Dr. Fatima  (primary encounter diagnosis)  (N92.1) Menorrhagia with irregular cycle  (G62.9) Peripheral polyneuropathy (HC)  (F31.60) Bipolar disorder, current episode mixed, unspecified  (J45.40) Moderate persistent asthma without complication  (I83.11,  I83.12) Venous stasis dermatitis of both lower extremities  (Z68.42) Body mass index (BMI) of 45.0-49.9 in adult (HC)  (E53.8) Vitamin B12 deficiency  (E55.9) Vitamin D deficiency     Patient has been having excessively heavy, irregular menstrual cycles.  She has had tubal ligation in the past.  Due to your uterine issues, hysterectomy was advised.    Patient reports her peripheral neuropathy has improved with gabapentin.  -- Chart lists vitamin D and B12 deficiency, check levels today.  She is on vitamin D replacement.  Is not currently taking vitamin B replacement.  -- Trial of B12 shot today.  This may further help her neuropathy symptoms.    Patient has bipolar  disorder, recent episode was mixed.  She follows with psychiatry closely.  She is on Lamictal and Abilify and BuSpar.    Asthma, doing well.  Uses albuterol intermittently.  It was quite cold out earlier this winter she did have some issues and needed to use her albuterol more often however at this point she is doing well.    Venous stasis dermatitis, doing much better with her triamterene-Hydrochlorothiazide (HCTZ).     - Patient has history of low potassium levels, Recheck electrolytes today.  + Leg swelling is down significantly.    Patient is morbidly obese.  BMI is greater than 45.    + walking more regularly, eating less, eating better - has lost about 8 lbs in past 2.5 weeks.     Ms. Baires's Body mass index is 47.14 kg/(m^2). This is out of the normal range for a 42 y.o. Normal range for ages 18-64 is between 18.5 and 24.9; normal range for ages 65+ is 23-30. To lose weight we reviewed risks and benefits of appropriate options such as diet, exercise, and medications. Patient's strategy will be  self-directed nutrition plan and self-directed exercise program   BP Readings from Last 1 Encounters:02/16/17 : 132/76  Ms. Mahan blood pressure is out of the normal range for adults. Per JNC-8 guidelines normal adult blood pressure is < 120/80, pre-hypertensive is between 120/80 and 139/89, and hypertension is 140/90 or greater. Risks of hypertension were discussed. Patient's strategy will be weight loss, increased activity and reduced salt intake    Functional Capacity: > 4 METS.   Reports that she can climb a flight of stairs without any chest pain/heaviness or shortness of breath.   Patient reports no current symptoms of fevers, chills, nausea/vomiting.   No cough. No shortness of breath.   No change in bowel/bladder habits. No melena, hematochezia. No Hematuria.   No rashes. No palpitations.  No orthopnea/paroxysmal nocturnal dyspnea   No vision or hearing issues.   No significant mood issues   No  bruising.     I have personally reviewed the past medical history, past surgical history, medications, allergies, family and social history as listed below, on 2/16/2017.    Patient Active Problem List       Diagnosis  Date Noted     DEPRESSION/ANXIETY       Priority: High      Preop examination - Hysterectomy 2/23/2017 - Dr. Fatima  02/16/2017     Menorrhagia with irregular cycle  02/16/2017     Bipolar disorder, current episode mixed, unspecified  01/09/2017     Peripheral polyneuropathy (HC)  12/18/2016     Elevated random blood glucose level  12/18/2016     Vitamin D deficiency  12/18/2016     Vitamin B12 deficiency  12/18/2016     Bilateral foot pain  12/18/2016     Lymphedema of both lower extremities  11/22/2016     Venous stasis dermatitis of both lower extremities  08/17/2016     Alcohol dependence (HC)  11/28/2015     Cannabis dependence in remission (HC)  11/28/2015     Anxiety, generalized  11/28/2015     Body mass index (BMI) of 45.0-49.9 in adult (HC)  03/23/2015     Moderate persistent asthma  08/19/2013     Overview:   AAP completed on 08/19/13  Triggers: pollen, fumes, exercise, URI, warm weather. Peak flow today is 250. Symptomatic: moderate  Overview:   AAP completed on 08/19/13  Triggers: pollen, fumes, exercise, URI, warm weather. Peak flow today is 250. Symptomatic: moderate        Urinary incontinence  03/15/2013     Closed dislocation of tarsal joint  02/04/2011     GERD       EDEMA LEG       ALLERGIC RHINITIS, SEASONAL       BIPOLAR AFFECTIVE DISORDER       AFFECTIVE PERSONALITY DISORDER       SUBSTANCE ABUSE       OBESITY       Atopic rhinitis  10/02/2003     Overview:   [facility] - Seasonal Allergies        Borderline personality disorder - Following with [facility] - Chiquis LAWRENCE  07/07/2003     Overview:   [facility].        Disorder of female genital organ  07/05/2001     Overview:   DUB, dysmenorrhea        Past Medical History      Diagnosis   Date     Asthma        Depression       Edema       Encounter for removal and reinsertion of IUD  05     IUD placement, Removed      History of pregnancy       G3, P2-0-1-2 with history of spontaneous       LSIL (low grade squamous intraepithelial lesion) on Pap smear       Past Surgical History       Procedure   Laterality Date     Leep procedure        Underwent a loop       Tubal ligation        tubal ligation       Leg/ankle surgery        fracture, repair with screws       Current Outpatient Prescriptions       Medication  Sig Dispense Refill     albuterol HFA 90 mcg/actuation inhaler Inhale 1-2 Puffs by mouth every 4 hours if needed. 1 Inhaler 11     ARIPiprazole (ABILIFY) 15 mg tablet Take 7 mg by mouth once daily.       busPIRone (BUSPAR) 10 mg tablet Take 10 mg by mouth 2 times daily.       cholecalciferol (VITAMIN D3) 5,000 unit capsule Take 1 capsule by mouth once daily. For Vitamin D Deficiency - Dose Increase 2017 100 capsule 3     gabapentin (NEURONTIN) 100 mg capsule Take 1-2 capsules by mouth 3 times daily. as needed for burning/shooting neuropathy pain 180 capsule 11     hydrOXYzine pamoate (VISTARIL) 50 mg capsule Take 1 capsule in AM and 2 capsule in PM - Managed per Psychiatry  2     lamoTRIgine (LAMICTAL) 150 mg tablet Take 150 mg by mouth 2 times daily.       MISCELLANEOUS MEDICAL SUPPLY (GRADUATED COMPRESSION STOCKINGS) For personal use. Length: thigh Strength: 16-20 mmHg.  Please measure patient in Pharmacy. 1 Packet 0     montelukast (SINGULAIR) 10 mg tablet Take 1 tablet by mouth at bedtime. 90 tablet 3     mupirocin 2% topical (BACTROBAN OINTMENT) ointment Apply  topically to affected area(s) 3 times daily. 60 g 0     oxybutynin XL (DITROPAN XL) 5 mg CR tablet TAKE 1 TABLET BY MOUTH DAILY (LOT [id number]) 28 tablet 6     potassium chloride (KLOR-CON M20) 20 mEq Extended-Release tablet Take 20 mEq by mouth.       triamterene-hydrochlorothiazide, 37.5-25 mg, (DYAZIDE) 37.5-25 mg capsule  Take 1 capsule by mouth every morning. -- Start when you run out of regular HCTZ. Dont refill HCTZ 90 capsule 3     vilazodone (VIIBRYD) 40 mg tablet Take 40 mg by mouth once daily with a meal.       vitamin B complex (B-COMPLEX) tablet Take 1 tablet by mouth once daily. -- for Low B12 - Start 2/16/2017 100 tablet 3     Walker Rolling Walker for home use.  2 wheels 1 Device 0     Recent use of: no recent use of aspirin (ASA), NSAIDS or steroids     Fever/Chills or other infectious symptoms in past month: no  >10lb weight loss in past two months: no    1. Clinic Code Status:Full Code  2. Date Discussed: 2/16/2017   3. Documentation:Discussed with patient who is competent to make decision    Hx of blood transfusions:   (NO)   Tetanus status:    Immunization History     Administered  Date(s) Administered     Hepatitis B (Adult) 09/09/1997, 10/07/1997, 09/30/2006     Influenza Virus, Unspecified 10/15/2009, 10/04/2012, 11/25/2013, 10/04/2016     MMR, Unspecified 09/09/1997     Pneumococcal Poly,23-Valent (Pneumovax) 01/09/2007, 03/12/2010     Pneumococcal conj 13-Valent (Prevnar 13) 01/09/2007     Tdap 09/09/1997, 08/16/2007, 05/15/2013, 01/05/2016     Tuberculin (PPD) 03/12/2010     Varicella Vaccine 09/09/1997, 10/07/1997      History of VRE/MRSA:  (NO)   Latex allergy  no   Allergies      Allergen   Reactions     Clonazepam  Other - Describe In Comment Field     Causes Violence and aggresssion      Hydrocodone-Acetaminophen  Seizures     Lorazepam  Other - Describe In Comment Field     Causes Violence and aggresssion      Zoloft [Sertraline]  Other - Describe In Comment Field     Caused suicidally       Bupropion  *Unknown     Caused Major Depression      Divalproex Sodium  *Unknown     Lithium  *Unknown     Risperidone Analogues  *Unknown     Sulfa (Sulfonamide Antibiotics)  *Unknown      Family History       Problem   Relation Age of Onset     No Known Problems  Mother      Asthma  Father      + Leg edema, knee,  "hip replacement. + Lymphedema       Osteoporosis  Brother      No Known Problems  Brother      Other  Paternal Grandmother      Lymphedema       Family Status     Relation  Status     Mother Alive     Father Alive     Brother Alive     Brother Alive     Paternal Grandmother      Denies family hx of bleeding tendencies, anesthesia complications, or other problems with surgery.     Social History     Social History        Marital status:       Spouse name: N/A     Number of children:  N/A     Years of education:  N/A     Social History Main Topics         Smoking status:   Former Smoker     Packs/day:  1.00     Years:  3.00     Types:  Cigarettes     Quit date:  2003     Smokeless tobacco:   Never Used     Alcohol use   No      Comment: History of abuse - sober as of 2010.       Drug use:   No      Comment: History of use      Sexual activity:   No     Other Topics  Concern     Not on file      Social History Narrative     No longer in adult foster care lived with Charley Godwin.      .     2 sons - age 12 and 7 - as of 2016.     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        Complete ROS was performed and was negative unless otherwise noted in HPI above.    Surgical:  Patient denies previous complications from prior surgeries including but not limited to prolonged bleeding, anesthesia complications, dysrhythmias, surgical wound infections, or prolonged hospital stay.     + Nausea and vomiting after getting Tubal Ligation.    EXAM:   Vitals:     17 1315   BP: 132/76   Pulse: 92   Temp: 96.8  F (36  C)   TempSrc: Tympanic   SpO2: 98%   Weight: 133.5 kg (294 lb 4 oz)   Height: 1.683 m (5' 6.25\")     BP Readings from Last 3 Encounters:    17 132/76   17 124/80   17 124/80     Wt Readings from Last 3 Encounters:    17 133.5 kg (294 lb 4 oz)   17 (!) 137 kg (302 lb)   17 (!) 137 kg (302 lb)     Estimated body mass index is " "47.14 kg/(m^2) as calculated from the following:    Height as of this encounter: 1.683 m (5' 6.25\").    Weight as of this encounter: 133.5 kg (294 lb 4 oz).     Mallampati Airway Classification:  Class 2: Visibility of hard and soft palate, upper portion of tonsils and uvula  EXAM:  Constitutional: Pleasant, alert, appropriate appearance for age. No acute distress  ENT: Normocephalic, Atraumatic, Thyroid without nodules or tenderness   Nose/Mouth: Oral pharynx without erythema or exudates, Mucous membranes are moist, Nose is patent bilaterally, no rhinorrhea and Dental hygeine adequate   Eyes:  Extraocular muscles intact, Sclera non-icteric, Conjunctiva without erythema  Lymphatic Exam: Non-palpable nodes in neck, clavicular regions  Pulmonary: Lungs are clear to auscultation bilaterally, without wheezes or crackles  Cardiovascular Exam: S1, S2 normal, regular rate and rhythm, brisk carotid upstroke without bruits, peripheral pulses very brisk, no pedal edema, no murmur, click, rub or gallop appreciated  Gastrointestinal Exam: Obese  Soft, non-tender, non-distended, positive bowel sounds  Integument: No abnormal rashes, sores, or ulcerations noted  Neurologic Exam: CN 3-12 grossly intact   Musculoskeletal Exam: Moves upper and lower extremities symmetrically, No focal weakness  Gait and station appear grossly normal  Psychiatric Exam: Awake and Alert, Affect and mood appropriate  Speech is fluent, Thought process is normal    INVESTIGATIONS;  CXR:  not indicated     EK2016 -Personally reviewed - normal sinus rhythm with a rate of 92.  Compared to previous 2015, no significant change.  Pre-op urine for pregnancy (for 12 yrs and older or menstruating): not applicable -- s/p Tubal ligation.     LABS:     BMP is pending.     Results for orders placed or performed in visit on 17      FSH      Result  Value Ref Range    FSH 8.9 mIU/mL   CBC WITH AUTO DIFFERENTIAL      Result  Value Ref Range    " WHITE BLOOD COUNT         8.9 4.5 - 11.0 thou/cu mm    RED BLOOD COUNT           4.56 4.00 - 5.20 mil/cu mm    HEMOGLOBIN                13.8 12.0 - 16.0 g/dL    HEMATOCRIT                41.9 33.0 - 51.0 %    MCV                       92 80 - 100 fL    MCH                       30.2 26.0 - 34.0 pg    MCHC                      32.9 32.0 - 36.0 g/dL    RDW                       12.7 11.5 - 15.5 %    PLATELET COUNT            317 140 - 440 thou/cu mm    MPV                       8.3 6.5 - 11.0 fL    NEUTROPHILS               61.7 42.0 - 72.0 %    LYMPHOCYTES               28.6 20.0 - 44.0 %    MONOCYTES                 7.4 <12.0 %    EOSINOPHILS               1.5 <8.0 %    BASOPHILS                 0.8 <3.0 %    ABSOLUTE NEUTROPHILS      5.5 1.7 - 7.0 thou/cu mm    ABSOLUTE LYMPHOCYTES      2.5 0.9 - 2.9 thou/cu mm    ABSOLUTE MONOCYTES        0.7 <0.9 thou/cu mm    ABSOLUTE EOSINOPHILS      0.1 <0.5 thou/cu mm    ABSOLUTE BASOPHILS        0.1 <0.3 thou/cu mm   HPV HIGH RISK      Result  Value Ref Range    HPV RESULTS Negative Negative   GYN THIN PREP PAP SCREEN IMAGED      Result  Value Ref Range    CYTOLOGY          ASSESSMENT AND PLAN:    1.  Preoperative history and physical   Rosi was seen today for preoperative exam.    Diagnoses and all orders for this visit:    Preop examination - Hysterectomy 2/23/2017 - Dr. Fatima    Menorrhagia with irregular cycle    Peripheral polyneuropathy (HC)    Bipolar disorder, current episode mixed, unspecified    Moderate persistent asthma without complication    Venous stasis dermatitis of both lower extremities  -     BASIC METABOLIC PANEL; Future  -     BASIC METABOLIC PANEL    Body mass index (BMI) of 45.0-49.9 in adult (HC)    Vitamin B12 deficiency  -     VITAMIN B12; Future  -     cyanocobalamin 1,000 mcg injection (VITAMIN B12); Inject 1 mL intramuscular one time.  -     VITAMIN B12  -     vitamin B complex (B-COMPLEX) tablet; Take 1 tablet by mouth once daily. -- for  Low B12 - Start 2/16/2017    Vitamin D deficiency  -     VITAMIN D 25 (DEFICIENCY); Future  -     VITAMIN D 25 (DEFICIENCY)  -     cholecalciferol (VITAMIN D3) 5,000 unit capsule; Take 1 capsule by mouth once daily. For Vitamin D Deficiency - Dose Increase 2/16/2017        Revised Cardiac Risk Index   1. History of ischemic heart disease   2. History of Systolic congestive heart failure (EF less than or equal to 40%)    3. History of cerebrovascular disease (stroke or transient ischemic attack)   4. History of diabetes requiring preoperative insulin use   5. Chronic kidney disease (creatinine > 2 mg/dL)   6. Undergoing suprainguinal vascular, intraperitoneal, or intrathoracic surgery     Risk for cardiac death, nonfatal myocardial infarction, and nonfatal cardiac arrest:   0 predictors = 0.4%   1 predictor = 0.9%  2 predictors = 6.6%  ?3 predictors = >11%       Preoperative asseessment (as of 2/16/2017)      INTERMEDIATE RISK - risk factors including: HTN     Recommendations as follow:    - Proceed with surgery: As Scheduled.     For above listed surgery and anesthesia:     - Patient is moderate  risk for perioperative complications and IS medically optimized at this time.    ASA Classification:  Class 2 - MILD SYSTEMIC DISEASE, NO ACUTE PROBLEMS, NO FUNCTIONAL LIMITATIONS.    Is BMI over 40 or A1c over 7.5% -- No  HEMOGLOBIN A1C MONITORING (POCT) (% NGSP)    Date Value   03/15/2013 4.9        Other:  Proceed without further diagnostic evaluation.    Preoperative Evaluation: Obstructive Sleep Apnea screening (STOP_BANG)    S: Snore -  Do you snore loudly? (louder than talking or loud enough to be heard through closed doors)(NO)  T: Tired - Do you often feel tired, fatigued, or sleepy during the daytime?(NO)  O: Observed - Has anyone ever observed you stop breathing during your sleep?(NO)  P: Pressure - Do you have or are you being treated for high blood pressure?(YES)  B: BMI - BMI greater than 35kg/m2?(YES)  A: Age  "- Age over 50 years old?(NO)  N: Neck - Neck circumference greater than 40 cm?(NO)  G: Gender - Gender: Male?(NO)    Total number of \"YES\" responses: ____2____    Scoring: Low risk of TAWNYA 0-2  At Risk of TAWNYA: >3 High Risk of TAWNYA: 5-8     lab results and schedule of future lab studies reviewed with patient, reviewed diet, exercise and weight control, recommended sodium restriction, cardiovascular risk and specific lipid/LDL goals reviewed    The ASCVD Risk score (Shar PINKY Marie, et al., 2013) failed to calculate for the following reasons:    Cannot find a previous HDL lab    Cannot find a previous total cholesterol lab    Rosi is also recommended to eat a heart-healthy diet, do regular aerobic exercises, maintain a desirable body weight, and avoid tobacco products. These recommendations are from the American Heart Association (AHA) which stresses the importance of lifestyle changes to lower cardiovascular disease risk.       Thank you for allowing me to participate in the care of your patient.   A copy of this evaluation report is provided to referring provider.     Return in about 3 months (around 5/16/2017) for -- f/u B12 and fatigue. .  Patient Instructions   Plan for surgery as scheduled with Dr. Fatima on 2/23/2017.    Lab today.      Patient was reminded of being NPO (without food) after midnight the night before surgery.   -- Hold aspirin, Advil/ibuprofen, Aleve/naproxen, Vitamin E for 7 days before surgery   -- Hold vitamins and herbal remedies for 7 days before surgery     -- Okay to use Tylenol (Acetaminophen)    Okay to take other usual medications with a sip of water on the day of the procedure and resume their medications after the procedure.   Contact the surgery Center for specific instructions prior to procedure.    PRE OP RECOMMENDATIONS:  Patient is on chronic pain medications (NO)   Patient is on anti-platelet/anticoagulation (NO)   Other medications that need adjustment perioperatively (NO) "     Other:    Patient was advised to call our office and the surgical services with any change in condition or new symptoms if they were to develop between today and their surgical date.  Especially any cardiopulmonary symptoms or symptoms concerning for an infection.          Vitamin B12 deficiency:    Vitamin B12 deficiency can cause numerous symptoms.  Fatigue and malaise, low energy levels, low blood counts or anemia, poor mood or depression, neuropathy or pins and needles/burning sensation of the hands and feet.    -- trial of B12 shot today.    -- If B12 shot improves your energy and your blood counts, we can do B12 shots every 3-4 weeks up to every 2 or 3 months if needed.    -- Consider B12 tablet or B-complex tablet -- once daily.     -- Some people are able to take and absorb oral B12 or B complex tablets.   -- Some people are NOT able to absorb oral B12 very well.    If you decide to proceed with oral B12 replacement, but your B12 levels do not improve, you likely will need to use B12 shots instead.     Return in approximately 3 month(s), or sooner as needed for follow-up with Dr. Curiel.    Clinic : [phone]  Appointment line: [phone]    -- B12 level came back rather low.  Start B complex tablet 1 by mouth daily.  Prescription sent to pharmacy.    -- Vitamin D level came back rather low.  Start vitamin D3 5000 units daily.  Prescription sent to pharmacy.      Lucio Curiel MD

## 2018-01-03 NOTE — H&P
Patient Information     Patient Name MRN Sex Rosi Mendez [id number] Female 1975      H&P by Lucio Curiel MD at 2017  1:30 PM     Author:  Lucio Curiel MD  Service:  (none) Author Type:  Physician     Filed:  2017  4:52 PM  Encounter Date:  2017 Status:  Addendum     :  Lucio Curiel MD (Physician)        Related Notes: Original Note by Lucio Curiel MD (Physician) filed at 2017  2:04 PM            Nursing Notes:   Deirdre Bhatti  2017  1:30 PM  Signed  This patient presents today for a Preoperative exam for this procedure: Ablation   Date of Surgery: 2017   Surgeon:  Dr. Fatima  Facility:  [facility]        2017 1:13 PM        Rosi Baires presents to clinic today for:   Chief Complaint    Patient presents with      Preoperative Exam     Referring Provider: Dr. Fatima   HPI: Ms. Baires is a 42 y.o. female who presents today for Consultative evaluation requested by Dr. Fatima for preoperative cardiopulmonary clearance.     (Z01.818) Preop examination - Hysterectomy 2017 - Dr. Fatima  (primary encounter diagnosis)  (N92.1) Menorrhagia with irregular cycle  (G62.9) Peripheral polyneuropathy (HC)  (F31.60) Bipolar disorder, current episode mixed, unspecified  (J45.40) Moderate persistent asthma without complication  (I83.11,  I83.12) Venous stasis dermatitis of both lower extremities  (Z68.42) Body mass index (BMI) of 45.0-49.9 in adult (HC)  (E53.8) Vitamin B12 deficiency  (E55.9) Vitamin D deficiency     Patient has been having excessively heavy, irregular menstrual cycles.  She has had tubal ligation in the past.  Due to your uterine issues, hysterectomy was advised.    Patient reports her peripheral neuropathy has improved with gabapentin.  -- Chart lists vitamin D and B12 deficiency, check levels today.  She is on vitamin D replacement.  Is not currently taking vitamin B replacement.  -- Trial of B12  shot today.  This may further help her neuropathy symptoms.    Patient has bipolar disorder, recent episode was mixed.  She follows with psychiatry closely.  She is on Lamictal and Abilify and BuSpar.    Asthma, doing well.  Uses albuterol intermittently.  It was quite cold out earlier this winter she did have some issues and needed to use her albuterol more often however at this point she is doing well.    Venous stasis dermatitis, doing much better with her triamterene-Hydrochlorothiazide (HCTZ).     - Patient has history of low potassium levels, Recheck electrolytes today.  + Leg swelling is down significantly.    Patient is morbidly obese.  BMI is greater than 45.    + walking more regularly, eating less, eating better - has lost about 8 lbs in past 2.5 weeks.     Ms. Baires's Body mass index is 47.14 kg/(m^2). This is out of the normal range for a 42 y.o. Normal range for ages 18-64 is between 18.5 and 24.9; normal range for ages 65+ is 23-30. To lose weight we reviewed risks and benefits of appropriate options such as diet, exercise, and medications. Patient's strategy will be  self-directed nutrition plan and self-directed exercise program   BP Readings from Last 1 Encounters:02/16/17 : 132/76  Ms. Mahan blood pressure is out of the normal range for adults. Per JNC-8 guidelines normal adult blood pressure is < 120/80, pre-hypertensive is between 120/80 and 139/89, and hypertension is 140/90 or greater. Risks of hypertension were discussed. Patient's strategy will be weight loss, increased activity and reduced salt intake    Functional Capacity: > 4 METS.   Reports that she can climb a flight of stairs without any chest pain/heaviness or shortness of breath.   Patient reports no current symptoms of fevers, chills, nausea/vomiting.   No cough. No shortness of breath.   No change in bowel/bladder habits. No melena, hematochezia. No Hematuria.   No rashes. No palpitations.  No orthopnea/paroxysmal  nocturnal dyspnea   No vision or hearing issues.   No significant mood issues   No bruising.     I have personally reviewed the past medical history, past surgical history, medications, allergies, family and social history as listed below, on 2/16/2017.    Patient Active Problem List       Diagnosis  Date Noted     DEPRESSION/ANXIETY       Priority: High      Preop examination - Hysterectomy 2/23/2017 - Dr. Fatima  02/16/2017     Menorrhagia with irregular cycle  02/16/2017     Bipolar disorder, current episode mixed, unspecified  01/09/2017     Peripheral polyneuropathy (HC)  12/18/2016     Elevated random blood glucose level  12/18/2016     Vitamin D deficiency  12/18/2016     Vitamin B12 deficiency  12/18/2016     Bilateral foot pain  12/18/2016     Lymphedema of both lower extremities  11/22/2016     Venous stasis dermatitis of both lower extremities  08/17/2016     Alcohol dependence (HC)  11/28/2015     Cannabis dependence in remission (HC)  11/28/2015     Anxiety, generalized  11/28/2015     Body mass index (BMI) of 45.0-49.9 in adult (HC)  03/23/2015     Moderate persistent asthma  08/19/2013     Overview:   AAP completed on 08/19/13  Triggers: pollen, fumes, exercise, URI, warm weather. Peak flow today is 250. Symptomatic: moderate  Overview:   AAP completed on 08/19/13  Triggers: pollen, fumes, exercise, URI, warm weather. Peak flow today is 250. Symptomatic: moderate        Urinary incontinence  03/15/2013     Closed dislocation of tarsal joint  02/04/2011     GERD       EDEMA LEG       ALLERGIC RHINITIS, SEASONAL       BIPOLAR AFFECTIVE DISORDER       AFFECTIVE PERSONALITY DISORDER       SUBSTANCE ABUSE       OBESITY       Atopic rhinitis  10/02/2003     Overview:   [facility] - Seasonal Allergies        Borderline personality disorder - Following with [facility] Miguelina LAWRENCE  07/07/2003     Overview:   [facility].        Disorder of female genital organ  07/05/2001     Overview:   VENANCIO  dysmenorrhea        Past Medical History      Diagnosis   Date     Asthma       Depression       Edema       Encounter for removal and reinsertion of IUD  05     IUD placement, Removed      History of pregnancy       G3, P2-0-1-2 with history of spontaneous       LSIL (low grade squamous intraepithelial lesion) on Pap smear       Past Surgical History       Procedure   Laterality Date     Leep procedure        Underwent a loop       Tubal ligation        tubal ligation       Leg/ankle surgery        fracture, repair with screws       Current Outpatient Prescriptions       Medication  Sig Dispense Refill     albuterol HFA 90 mcg/actuation inhaler Inhale 1-2 Puffs by mouth every 4 hours if needed. 1 Inhaler 11     ARIPiprazole (ABILIFY) 15 mg tablet Take 7 mg by mouth once daily.       busPIRone (BUSPAR) 10 mg tablet Take 10 mg by mouth 2 times daily.       cholecalciferol (VITAMIN D3) 5,000 unit capsule Take 1 capsule by mouth once daily. For Vitamin D Deficiency - Dose Increase 2017 100 capsule 3     gabapentin (NEURONTIN) 100 mg capsule Take 1-2 capsules by mouth 3 times daily. as needed for burning/shooting neuropathy pain 180 capsule 11     hydrOXYzine pamoate (VISTARIL) 50 mg capsule Take 1 capsule in AM and 2 capsule in PM - Managed per Psychiatry  2     lamoTRIgine (LAMICTAL) 150 mg tablet Take 150 mg by mouth 2 times daily.       MISCELLANEOUS MEDICAL SUPPLY (GRADUATED COMPRESSION STOCKINGS) For personal use. Length: thigh Strength: 16-20 mmHg.  Please measure patient in Pharmacy. 1 Packet 0     montelukast (SINGULAIR) 10 mg tablet Take 1 tablet by mouth at bedtime. 90 tablet 3     mupirocin 2% topical (BACTROBAN OINTMENT) ointment Apply  topically to affected area(s) 3 times daily. 60 g 0     oxybutynin XL (DITROPAN XL) 5 mg CR tablet TAKE 1 TABLET BY MOUTH DAILY (LOT [id number]) 28 tablet 6     potassium chloride (KLOR-CON M20) 20 mEq Extended-Release tablet Take 20 mEq by mouth.        triamterene-hydrochlorothiazide, 37.5-25 mg, (DYAZIDE) 37.5-25 mg capsule Take 1 capsule by mouth every morning. -- Start when you run out of regular HCTZ. Dont refill HCTZ 90 capsule 3     vilazodone (VIIBRYD) 40 mg tablet Take 40 mg by mouth once daily with a meal.       vitamin B complex (B-COMPLEX) tablet Take 1 tablet by mouth once daily. -- for Low B12 - Start 2/16/2017 100 tablet 3     Walker Rolling Walker for home use.  2 wheels 1 Device 0     Recent use of: no recent use of aspirin (ASA), NSAIDS or steroids     Fever/Chills or other infectious symptoms in past month: no  >10lb weight loss in past two months: no    1. Clinic Code Status:Full Code  2. Date Discussed: 2/16/2017   3. Documentation:Discussed with patient who is competent to make decision    Hx of blood transfusions:   (NO)   Tetanus status:    Immunization History     Administered  Date(s) Administered     Hepatitis B (Adult) 09/09/1997, 10/07/1997, 09/30/2006     Influenza Virus, Unspecified 10/15/2009, 10/04/2012, 11/25/2013, 10/04/2016     MMR, Unspecified 09/09/1997     Pneumococcal Poly,23-Valent (Pneumovax) 01/09/2007, 03/12/2010     Pneumococcal conj 13-Valent (Prevnar 13) 01/09/2007     Tdap 09/09/1997, 08/16/2007, 05/15/2013, 01/05/2016     Tuberculin (PPD) 03/12/2010     Varicella Vaccine 09/09/1997, 10/07/1997      History of VRE/MRSA:  (NO)   Latex allergy  no   Allergies      Allergen   Reactions     Clonazepam  Other - Describe In Comment Field     Causes Violence and aggresssion      Hydrocodone-Acetaminophen  Seizures     Lorazepam  Other - Describe In Comment Field     Causes Violence and aggresssion      Zoloft [Sertraline]  Other - Describe In Comment Field     Caused suicidally       Bupropion  *Unknown     Caused Major Depression      Divalproex Sodium  *Unknown     Lithium  *Unknown     Risperidone Analogues  *Unknown     Sulfa (Sulfonamide Antibiotics)  *Unknown      Family History       Problem   Relation Age of  "Onset     No Known Problems  Mother      Asthma  Father      + Leg edema, knee, hip replacement. + Lymphedema       Osteoporosis  Brother      No Known Problems  Brother      Other  Paternal Grandmother      Lymphedema       Family Status     Relation  Status     Mother Alive     Father Alive     Brother Alive     Brother Alive     Paternal Grandmother      Denies family hx of bleeding tendencies, anesthesia complications, or other problems with surgery.     Social History     Social History        Marital status:       Spouse name: N/A     Number of children:  N/A     Years of education:  N/A     Social History Main Topics         Smoking status:   Former Smoker     Packs/day:  1.00     Years:  3.00     Types:  Cigarettes     Quit date:  2003     Smokeless tobacco:   Never Used     Alcohol use   No      Comment: History of abuse - sober as of 2010.       Drug use:   No      Comment: History of use      Sexual activity:   No     Other Topics  Concern     Not on file      Social History Narrative     No longer in adult foster care lived with Charley Godwin.      .     2 sons - age 12 and 7 - as of 2016.     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        Complete ROS was performed and was negative unless otherwise noted in HPI above.    Surgical:  Patient denies previous complications from prior surgeries including but not limited to prolonged bleeding, anesthesia complications, dysrhythmias, surgical wound infections, or prolonged hospital stay.     + Nausea and vomiting after getting Tubal Ligation.    EXAM:   Vitals:     17 1315   BP: 132/76   Pulse: 92   Temp: 96.8  F (36  C)   TempSrc: Tympanic   SpO2: 98%   Weight: 133.5 kg (294 lb 4 oz)   Height: 1.683 m (5' 6.25\")     BP Readings from Last 3 Encounters:    17 132/76   17 124/80   17 124/80     Wt Readings from Last 3 Encounters:    17 133.5 kg (294 lb 4 oz)   17 (!) " "137 kg (302 lb)   17 (!) 137 kg (302 lb)     Estimated body mass index is 47.14 kg/(m^2) as calculated from the following:    Height as of this encounter: 1.683 m (5' 6.25\").    Weight as of this encounter: 133.5 kg (294 lb 4 oz).     Mallampati Airway Classification:  Class 2: Visibility of hard and soft palate, upper portion of tonsils and uvula  EXAM:  Constitutional: Pleasant, alert, appropriate appearance for age. No acute distress  ENT: Normocephalic, Atraumatic, Thyroid without nodules or tenderness   Nose/Mouth: Oral pharynx without erythema or exudates, Mucous membranes are moist, Nose is patent bilaterally, no rhinorrhea and Dental hygeine adequate   Eyes:  Extraocular muscles intact, Sclera non-icteric, Conjunctiva without erythema  Lymphatic Exam: Non-palpable nodes in neck, clavicular regions  Pulmonary: Lungs are clear to auscultation bilaterally, without wheezes or crackles  Cardiovascular Exam: S1, S2 normal, regular rate and rhythm, brisk carotid upstroke without bruits, peripheral pulses very brisk, no pedal edema, no murmur, click, rub or gallop appreciated  Gastrointestinal Exam: Obese  Soft, non-tender, non-distended, positive bowel sounds  Integument: No abnormal rashes, sores, or ulcerations noted  Neurologic Exam: CN 3-12 grossly intact   Musculoskeletal Exam: Moves upper and lower extremities symmetrically, No focal weakness  Gait and station appear grossly normal  Psychiatric Exam: Awake and Alert, Affect and mood appropriate  Speech is fluent, Thought process is normal    INVESTIGATIONS;  CXR:  not indicated     EK2016 -Personally reviewed - normal sinus rhythm with a rate of 92.  Compared to previous 2015, no significant change.  Pre-op urine for pregnancy (for 12 yrs and older or menstruating): not applicable -- s/p Tubal ligation.     LABS:     BMP is pending.     Results for orders placed or performed in visit on 17      FSH      Result  Value Ref Range "    FSH 8.9 mIU/mL   CBC WITH AUTO DIFFERENTIAL      Result  Value Ref Range    WHITE BLOOD COUNT         8.9 4.5 - 11.0 thou/cu mm    RED BLOOD COUNT           4.56 4.00 - 5.20 mil/cu mm    HEMOGLOBIN                13.8 12.0 - 16.0 g/dL    HEMATOCRIT                41.9 33.0 - 51.0 %    MCV                       92 80 - 100 fL    MCH                       30.2 26.0 - 34.0 pg    MCHC                      32.9 32.0 - 36.0 g/dL    RDW                       12.7 11.5 - 15.5 %    PLATELET COUNT            317 140 - 440 thou/cu mm    MPV                       8.3 6.5 - 11.0 fL    NEUTROPHILS               61.7 42.0 - 72.0 %    LYMPHOCYTES               28.6 20.0 - 44.0 %    MONOCYTES                 7.4 <12.0 %    EOSINOPHILS               1.5 <8.0 %    BASOPHILS                 0.8 <3.0 %    ABSOLUTE NEUTROPHILS      5.5 1.7 - 7.0 thou/cu mm    ABSOLUTE LYMPHOCYTES      2.5 0.9 - 2.9 thou/cu mm    ABSOLUTE MONOCYTES        0.7 <0.9 thou/cu mm    ABSOLUTE EOSINOPHILS      0.1 <0.5 thou/cu mm    ABSOLUTE BASOPHILS        0.1 <0.3 thou/cu mm   HPV HIGH RISK      Result  Value Ref Range    HPV RESULTS Negative Negative   GYN THIN PREP PAP SCREEN IMAGED      Result  Value Ref Range    CYTOLOGY          ASSESSMENT AND PLAN:    1.  Preoperative history and physical   Rosi was seen today for preoperative exam.    Diagnoses and all orders for this visit:    Preop examination - Hysterectomy 2/23/2017 - Dr. Fatima    Menorrhagia with irregular cycle    Peripheral polyneuropathy (HC)    Bipolar disorder, current episode mixed, unspecified    Moderate persistent asthma without complication    Venous stasis dermatitis of both lower extremities  -     BASIC METABOLIC PANEL; Future  -     BASIC METABOLIC PANEL    Body mass index (BMI) of 45.0-49.9 in adult (HC)    Vitamin B12 deficiency  -     VITAMIN B12; Future  -     cyanocobalamin 1,000 mcg injection (VITAMIN B12); Inject 1 mL intramuscular one time.  -     VITAMIN B12  -      vitamin B complex (B-COMPLEX) tablet; Take 1 tablet by mouth once daily. -- for Low B12 - Start 2/16/2017    Vitamin D deficiency  -     VITAMIN D 25 (DEFICIENCY); Future  -     VITAMIN D 25 (DEFICIENCY)  -     cholecalciferol (VITAMIN D3) 5,000 unit capsule; Take 1 capsule by mouth once daily. For Vitamin D Deficiency - Dose Increase 2/16/2017        Revised Cardiac Risk Index   1. History of ischemic heart disease   2. History of Systolic congestive heart failure (EF less than or equal to 40%)    3. History of cerebrovascular disease (stroke or transient ischemic attack)   4. History of diabetes requiring preoperative insulin use   5. Chronic kidney disease (creatinine > 2 mg/dL)   6. Undergoing suprainguinal vascular, intraperitoneal, or intrathoracic surgery     Risk for cardiac death, nonfatal myocardial infarction, and nonfatal cardiac arrest:   0 predictors = 0.4%   1 predictor = 0.9%  2 predictors = 6.6%  ?3 predictors = >11%       Preoperative asseessment (as of 2/16/2017)      INTERMEDIATE RISK - risk factors including: HTN     Recommendations as follow:    - Proceed with surgery: As Scheduled.     For above listed surgery and anesthesia:     - Patient is moderate  risk for perioperative complications and IS medically optimized at this time.    ASA Classification:  Class 2 - MILD SYSTEMIC DISEASE, NO ACUTE PROBLEMS, NO FUNCTIONAL LIMITATIONS.    Is BMI over 40 or A1c over 7.5% -- No  HEMOGLOBIN A1C MONITORING (POCT) (% NGSP)    Date Value   03/15/2013 4.9        Other:  Proceed without further diagnostic evaluation.    Preoperative Evaluation: Obstructive Sleep Apnea screening (STOP_BANG)    S: Snore -  Do you snore loudly? (louder than talking or loud enough to be heard through closed doors)(NO)  T: Tired - Do you often feel tired, fatigued, or sleepy during the daytime?(NO)  O: Observed - Has anyone ever observed you stop breathing during your sleep?(NO)  P: Pressure - Do you have or are you being  "treated for high blood pressure?(YES)  B: BMI - BMI greater than 35kg/m2?(YES)  A: Age - Age over 50 years old?(NO)  N: Neck - Neck circumference greater than 40 cm?(NO)  G: Gender - Gender: Male?(NO)    Total number of \"YES\" responses: ____2____    Scoring: Low risk of TAWNYA 0-2  At Risk of TAWNYA: >3 High Risk of TAWNYA: 5-8     lab results and schedule of future lab studies reviewed with patient, reviewed diet, exercise and weight control, recommended sodium restriction, cardiovascular risk and specific lipid/LDL goals reviewed    The ASCVD Risk score (Shar PINKY Jr., et al., 2013) failed to calculate for the following reasons:    Cannot find a previous HDL lab    Cannot find a previous total cholesterol lab    Rosi is also recommended to eat a heart-healthy diet, do regular aerobic exercises, maintain a desirable body weight, and avoid tobacco products. These recommendations are from the American Heart Association (AHA) which stresses the importance of lifestyle changes to lower cardiovascular disease risk.       Thank you for allowing me to participate in the care of your patient.   A copy of this evaluation report is provided to referring provider.     Return in about 3 months (around 5/16/2017) for -- f/u B12 and fatigue. .  Patient Instructions   Plan for surgery as scheduled with Dr. Fatima on 2/23/2017.    Lab today.      Patient was reminded of being NPO (without food) after midnight the night before surgery.   -- Hold aspirin, Advil/ibuprofen, Aleve/naproxen, Vitamin E for 7 days before surgery   -- Hold vitamins and herbal remedies for 7 days before surgery     -- Okay to use Tylenol (Acetaminophen)    Okay to take other usual medications with a sip of water on the day of the procedure and resume their medications after the procedure.   Contact the surgery Center for specific instructions prior to procedure.    PRE OP RECOMMENDATIONS:  Patient is on chronic pain medications (NO)   Patient is on " anti-platelet/anticoagulation (NO)   Other medications that need adjustment perioperatively (NO)     Other:    Patient was advised to call our office and the surgical services with any change in condition or new symptoms if they were to develop between today and their surgical date.  Especially any cardiopulmonary symptoms or symptoms concerning for an infection.          Vitamin B12 deficiency:    Vitamin B12 deficiency can cause numerous symptoms.  Fatigue and malaise, low energy levels, low blood counts or anemia, poor mood or depression, neuropathy or pins and needles/burning sensation of the hands and feet.    -- trial of B12 shot today.    -- If B12 shot improves your energy and your blood counts, we can do B12 shots every 3-4 weeks up to every 2 or 3 months if needed.    -- Consider B12 tablet or B-complex tablet -- once daily.     -- Some people are able to take and absorb oral B12 or B complex tablets.   -- Some people are NOT able to absorb oral B12 very well.    If you decide to proceed with oral B12 replacement, but your B12 levels do not improve, you likely will need to use B12 shots instead.     Return in approximately 3 month(s), or sooner as needed for follow-up with Dr. Curiel.    Clinic : [phone]  Appointment line: [phone]    -- B12 level came back rather low.  Start B complex tablet 1 by mouth daily.  Prescription sent to pharmacy.    -- Vitamin D level came back rather low.  Start vitamin D3 5000 units daily.  Prescription sent to pharmacy.      Lucio Curiel MD

## 2018-01-03 NOTE — ADDENDUM NOTE
Patient Information     Patient Name MRN Sex Rosi Mendez [id number] Female 1975      Addendum Note by Royce Monterroso MD at 2017  4:54 PM     Author:  Royce Monterroso MD Service:  (none) Author Type:  Physician     Filed:  2017  4:54 PM Encounter Date:  2017 Status:  Signed     :  Royce Monterroso MD (Physician)       Addended by: ROYCE MONTERROSO on: 2017 04:54 PM        Modules accepted: Orders

## 2018-01-03 NOTE — PROGRESS NOTES
Patient Information     Patient Name MRN Sex     Rosi Baires [id number] Female 1975      Progress Notes by Suzanna Gatica R.T. ([facility]) at 2017 10:22 AM     Author:  [facility] Service:  (none) Author Type:  [employer] - Registered Radiologic Technologist     Filed:  2017 10:22 AM Date of Service:  2017 10:22 AM Status:  Signed     :  Suzanna Gatica R.T. ([facility]) ([facility] - Registered Radiologic Technologist)            Falls Risk Criteria:    Age 65 and older or under age 4        Sensory deficits    Poor vision    Use of ambulatory aides    Impaired judgment    Unable to walk independently    Meets High Risk criteria for falls:  no

## 2018-01-03 NOTE — OR ANESTHESIA
Patient Information     Patient Name MRN Sex     Rosi Baires [id number] Female 1975      OR Anesthesia by Vy Colby RN at 2017 10:07 AM     Author:  Vy Colby RN Service:  (none) Author Type:  NURS- Registered Nurse     Filed:  2017 10:08 AM Date of Service:  2017 10:07 AM Status:  Signed     :  Vy Colby RN (NURS- Registered Nurse)            PACU Respiratory Event Documentation     1) Episodes of Apnea greater than or equal to 10 seconds: no  2) Bradypnea - less than 8 breaths per minute: no    3) Pain score on 0 to 10 scale: 5/10    4) Pain-sedation mismatch (yes or no): no    5) Repeated 02 desaturation less than 90% (yes or no): no  Anesthesia notified? (yes or no): no  Any of the above events occuring repeatedly in separate 30 minute intervals may be considered recurrent PACU respiratory events.    PACU Transfer Note    Rosi Baires transferred to dsu via cart.  Equipment used for transport:  None.  Accompanied by:  Registered Nurse voided SCDS removed due to itching  Patient stable and meets phase 1 discharge criteria for transport from PACU.

## 2018-01-03 NOTE — PROGRESS NOTES
Patient Information     Patient Name MRN Sex Rosi Mendez [id number] Female 1975      Progress Notes by Michael Fatima MD at 2017 10:30 AM     Author:  Michael Fatima MD Service:  (none) Author Type:  Physician     Filed:  2017 11:19 AM Encounter Date:  2017 Status:  Signed     :  Michael Fatima MD (Physician)            SUBJECTIVE:    Rosi Baires is a 41 y.o. female who presents for consultation & treatment of her menstrual irregularities at the request of Ms Erendira Vanessa.    Gyn Exam   The patient's primary symptoms include vaginal bleeding. The patient's pertinent negatives include no genital itching, genital lesions, genital odor, genital rash, missed menses or pelvic pain. This is a new problem. The current episode started more than 1 month ago. The problem occurs intermittently. The problem has been gradually worsening. The patient is experiencing no pain. Associated symptoms include frequency and urgency. Pertinent negatives include no chills, constipation, diarrhea, dysuria, fever, flank pain, hematuria, nausea, painful intercourse, rash or vomiting. The vaginal discharge was normal. The vaginal bleeding is heavier than menses. She has not been passing clots. She has not been passing tissue. She has tried nothing for the symptoms. She is not sexually active. No, her partner does not have an STD. She uses tubal ligation and vasectomy for contraception. Her menstrual history has been irregular. Her past medical history is significant for a gynecological surgery, menorrhagia, metrorrhagia, miscarriage and ovarian cysts. There is no history of an abdominal surgery, a  section, an ectopic pregnancy, endometriosis, herpes simplex, PID or an STD.       Allergies      Allergen   Reactions     Clonazepam  Other - Describe In Comment Field     Causes Violence and aggresssion      Hydrocodone-Acetaminophen  Seizures     Lorazepam  Other - Describe In  Comment Field     Causes Violence and aggresssion      Zoloft [Sertraline]  Other - Describe In Comment Field     Caused suicidally       Bupropion  *Unknown     Caused Major Depression      Divalproex Sodium  *Unknown     Lithium  *Unknown     Risperidone Analogues  *Unknown     Sulfa (Sulfonamide Antibiotics)  *Unknown   ,   Family History       Problem   Relation Age of Onset     No Known Problems  Mother      Asthma  Father      + Leg edema, knee, hip replacement. + Lymphedema       Osteoporosis  Brother      No Known Problems  Brother      Other  Paternal Grandmother      Lymphedema     ,   Current Outpatient Prescriptions:      albuterol HFA 90 mcg/actuation inhaler, Inhale 1-2 Puffs by mouth every 4 hours if needed., Disp: 1 Inhaler, Rfl: 11     ARIPiprazole (ABILIFY) 15 mg tablet, Take 7 mg by mouth once daily., Disp: , Rfl:      busPIRone (BUSPAR) 10 mg tablet, Take 10 mg by mouth 2 times daily., Disp: , Rfl:      cholecalciferol (VITAMIN D) 1,000 unit tablet, TAKE 1 TABLET BY MOUTH JENNY Y, Disp: , Rfl:      clindamycin (CLEOCIN) 150 mg capsule, 3 tabs every 8 hours x 7 days, Disp: , Rfl:      gabapentin (NEURONTIN) 100 mg capsule, Take 1-2 capsules by mouth 3 times daily. as needed for burning/shooting neuropathy pain, Disp: 180 capsule, Rfl: 11     hydrOXYzine pamoate (VISTARIL) 50 mg capsule, Take 1 capsule in AM and 2 capsule in PM - Managed per Psychiatry, Disp: , Rfl: 2     ibuprofen (ADVIL; MOTRIN) 600 mg tablet, Take 1 tablet by mouth 4 times daily if needed. Maximum of 3200 mg in 24 hours., Disp: 30 tablet, Rfl: 1     lamoTRIgine (LAMICTAL) 150 mg tablet, Take 150 mg by mouth 2 times daily., Disp: , Rfl:      MISCELLANEOUS MEDICAL SUPPLY (GRADUATED COMPRESSION STOCKINGS), For personal use. Length: thigh Strength: 16-20 mmHg.  Please measure patient in Pharmacy., Disp: 1 Packet, Rfl: 0     montelukast (SINGULAIR) 10 mg tablet, Take 1 tablet by mouth at bedtime., Disp: 90 tablet, Rfl: 3     mupirocin  2% topical (BACTROBAN OINTMENT) ointment, Apply  topically to affected area(s) 3 times daily., Disp: 60 g, Rfl: 0     oxybutynin XL (DITROPAN XL) 5 mg CR tablet, TAKE 1 TABLET BY MOUTH DAILY (LOT [id number]), Disp: 28 tablet, Rfl: 6     potassium chloride (KLOR-CON M20) 20 mEq Extended-Release tablet, Take 20 mEq by mouth., Disp: , Rfl:      triamterene-hydrochlorothiazide, 37.5-25 mg, (DYAZIDE) 37.5-25 mg capsule, Take 1 capsule by mouth every morning. -- Start when you run out of regular HCTZ. Dont refill HCTZ, Disp: 90 capsule, Rfl: 3     vilazodone (VIIBRYD) 40 mg tablet, Take 40 mg by mouth once daily with a meal., Disp: , Rfl:      Walker, Rolling Walker for home use.  2 wheels, Disp: 1 Device, Rfl: 0  Medications have been reviewed by me and are current to the best of my knowledge and ability.,   Past Medical History      Diagnosis   Date     Asthma       Depression       Edema       Encounter for removal and reinsertion of IUD  05     IUD placement, Removed      History of pregnancy       G3, P2-0-1-2 with history of spontaneous       LSIL (low grade squamous intraepithelial lesion) on Pap smear     ,   Patient Active Problem List       Diagnosis  Date Noted     Bipolar disorder, current episode mixed, unspecified  2017     Peripheral polyneuropathy (HC)  2016     Elevated random blood glucose level  2016     Vitamin D deficiency  2016     Vitamin B12 deficiency  2016     Bilateral foot pain  2016     Lymphedema of both lower extremities  2016     Venous stasis dermatitis of both lower extremities  2016     Alcohol dependence (HC)  2015     Cannabis dependence in remission (HC)  2015     Anxiety, generalized  2015     Body mass index (BMI) of 45.0-49.9 in adult (HC)  2015     Moderate persistent asthma  2013     Overview:   AAP completed on 13  Triggers: pollen, fumes, exercise, URI, warm weather. Peak flow today  "is 250. Symptomatic: moderate  Overview:   AAP completed on 08/19/13  Triggers: pollen, fumes, exercise, URI, warm weather. Peak flow today is 250. Symptomatic: moderate        Urinary incontinence  03/15/2013     DEPRESSION/ANXIETY       Closed dislocation of tarsal joint  02/04/2011     GERD       EDEMA LEG       ALLERGIC RHINITIS, SEASONAL       BIPOLAR AFFECTIVE DISORDER       AFFECTIVE PERSONALITY DISORDER       SUBSTANCE ABUSE       OBESITY       Atopic rhinitis  10/02/2003     Overview:   [facility] - Seasonal Allergies        Borderline personality disorder - Following with [facility]  07/07/2003     Overview:   [facility].        Disorder of female genital organ  07/05/2001     Overview:   DUB, dysmenorrhea      ,   Past Surgical History       Procedure   Laterality Date     Leep procedure   07/04     Underwent a loop       Tubal ligation   2009     tubal ligation       Leg/ankle surgery        fracture, repair with screws      and   Social History       Substance Use Topics         Smoking status:   Former Smoker     Packs/day:  1.00     Years:  3.00     Types:  Cigarettes     Quit date:  1/1/2003     Smokeless tobacco:   Never Used     Alcohol use   No      Comment: History of abuse - sober as of July 2010.         REVIEW OF SYSTEMS:  Review of Systems   Constitutional: Negative for chills and fever.   Respiratory: Negative for cough and wheezing.    Cardiovascular: Negative for chest pain, palpitations and orthopnea.   Gastrointestinal: Negative for constipation, diarrhea, nausea and vomiting.   Genitourinary: Positive for frequency, menorrhagia and urgency. Negative for dysuria, flank pain, hematuria, missed menses and pelvic pain.   Skin: Negative for itching and rash.   Neurological: Positive for tingling. Negative for dizziness and tremors.       OBJECTIVE:  /80  Ht 1.702 m (5' 7\")  Wt (!) 137.1 kg (302 lb 4 oz)  LMP 12/30/2016 (Exact Date)  Breastfeeding? No  BMI 47.34 " kg/m2    EXAM:   Physical Exam   Constitutional: She is oriented to person, place, and time and well-developed, well-nourished, and in no distress.   HENT:   Head: Normocephalic and atraumatic.   Eyes: Pupils are equal, round, and reactive to light.   Abdominal: Soft. She exhibits no distension. There is no tenderness.   Genitourinary: Vagina normal, uterus normal and cervix normal.   Genitourinary Comments: Bimanual exam deferred.  U/S pending      The speculum was then placed and the cervix was washed with Betadine solution. The uterus was sounded to 9 cm and then the endometrial cannula was inserted. Curettage was performed in all areas. There was a moderate amount of tissue obtained. A single pass was completed to ensure all areas were adequately sampled.         Neurological: She is alert and oriented to person, place, and time.   Skin: Skin is warm and dry.   Psychiatric: Mood, memory, affect and judgment normal.       ASSESSMENT/PLAN:  1. Menorrhagia  2. Dysmenorrhea     Plan:  Rosi will f/u 1 week after her pelvic u/s and review her results and plan care at that time.

## 2018-01-03 NOTE — PROGRESS NOTES
Patient Information     Patient Name MRN Sex Rosi Mendez [id number] Female 1975      Progress Notes by Michael Fatima MD at 2017  2:00 PM     Author:  Michael Fatima MD Service:  (none) Author Type:  Physician     Filed:  2017  8:20 AM Encounter Date:  2017 Status:  Signed     :  Michael Fatima MD (Physician)            Rosi presents for office hysteroscopy due to menorrhagia which has failed hormonal management.    REVIEW OF SYSTEMS:  Full review of systems negative other than those issues mentioned in HPI above.    Past Medical History      Diagnosis   Date     Asthma       Depression       Edema       Encounter for removal and reinsertion of IUD  05     IUD placement, Removed      History of pregnancy       G3, P2-0-1-2 with history of spontaneous       LSIL (low grade squamous intraepithelial lesion) on Pap smear         Current Outpatient Prescriptions on File Prior to Visit       Medication  Sig Dispense Refill     albuterol HFA 90 mcg/actuation inhaler Inhale 1-2 Puffs by mouth every 4 hours if needed. 1 Inhaler 11     ARIPiprazole (ABILIFY) 15 mg tablet Take 7 mg by mouth once daily.       busPIRone (BUSPAR) 10 mg tablet Take 10 mg by mouth 2 times daily.       cholecalciferol (VITAMIN D) 1,000 unit tablet TAKE 1 TABLET BY MOUTH JENNY Y       clindamycin (CLEOCIN) 150 mg capsule 3 tabs every 8 hours x 7 days       gabapentin (NEURONTIN) 100 mg capsule Take 1-2 capsules by mouth 3 times daily. as needed for burning/shooting neuropathy pain 180 capsule 11     hydrOXYzine pamoate (VISTARIL) 50 mg capsule Take 1 capsule in AM and 2 capsule in PM - Managed per Psychiatry  2     ibuprofen (ADVIL; MOTRIN) 600 mg tablet Take 1 tablet by mouth 4 times daily if needed. Maximum of 3200 mg in 24 hours. 30 tablet 1     lamoTRIgine (LAMICTAL) 150 mg tablet Take 150 mg by mouth 2 times daily.       MISCELLANEOUS MEDICAL SUPPLY (GRADUATED COMPRESSION  STOCKINGS) For personal use. Length: thigh Strength: 16-20 mmHg.  Please measure patient in Pharmacy. 1 Packet 0     montelukast (SINGULAIR) 10 mg tablet Take 1 tablet by mouth at bedtime. 90 tablet 3     mupirocin 2% topical (BACTROBAN OINTMENT) ointment Apply  topically to affected area(s) 3 times daily. 60 g 0     oxybutynin XL (DITROPAN XL) 5 mg CR tablet TAKE 1 TABLET BY MOUTH DAILY (LOT [id number]) 28 tablet 6     potassium chloride (KLOR-CON M20) 20 mEq Extended-Release tablet Take 20 mEq by mouth.       triamterene-hydrochlorothiazide, 37.5-25 mg, (DYAZIDE) 37.5-25 mg capsule Take 1 capsule by mouth every morning. -- Start when you run out of regular HCTZ. Dont refill HCTZ 90 capsule 3     vilazodone (VIIBRYD) 40 mg tablet Take 40 mg by mouth once daily with a meal.       Walker Rolling Walker for home use.  2 wheels 1 Device 0     No current facility-administered medications on file prior to visit.        Past Surgical History       Procedure   Laterality Date     Leep procedure   07/04     Underwent a loop       Tubal ligation   2009     tubal ligation       Leg/ankle surgery        fracture, repair with screws         Social History     Social History        Marital status:       Spouse name: N/A     Number of children:  N/A     Years of education:  N/A     Occupational History      Not on file.     Social History Main Topics         Smoking status:   Former Smoker     Packs/day:  1.00     Years:  3.00     Types:  Cigarettes     Quit date:  1/1/2003     Smokeless tobacco:   Never Used     Alcohol use   No      Comment: History of abuse - sober as of July 2010.       Drug use:   No      Comment: History of use      Sexual activity:   No     Other Topics  Concern     Not on file      Social History Narrative     No longer in adult foster care lived with Charley Godwin.      .     2 sons - age 12 and 7 - as of 11/2016.     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   "      Family History       Problem   Relation Age of Onset     No Known Problems  Mother      Asthma  Father      + Leg edema, knee, hip replacement. + Lymphedema       Osteoporosis  Brother      No Known Problems  Brother      Other  Paternal Grandmother      Lymphedema         Allergies      Allergen   Reactions     Clonazepam  Other - Describe In Comment Field     Causes Violence and aggresssion      Hydrocodone-Acetaminophen  Seizures     Lorazepam  Other - Describe In Comment Field     Causes Violence and aggresssion      Zoloft [Sertraline]  Other - Describe In Comment Field     Caused suicidally       Bupropion  *Unknown     Caused Major Depression      Divalproex Sodium  *Unknown     Lithium  *Unknown     Risperidone Analogues  *Unknown     Sulfa (Sulfonamide Antibiotics)  *Unknown       Vitals:     01/24/17 1417   BP: 124/80   Weight: (!) 137 kg (302 lb)   Height: 1.702 m (5' 7\")         HYSTEROSCOPY EXAM - GYN    PELVIC EXAM  Vulva:  grossly unremarkable  Vagina: normal size and caliber  Cervix: no lesions or masses     COLPOSCOPY   Consent was obtained prior to the procedure.  Patient was placed in dorsal lithotomy position, speculum inserted and the vagina and the cervix was cleansed twice with Betadine.  A paracervical block was completed with an injection of 1/4 % Lidocaine using 4 ml at the 5 & 7 o'clock positions.  The Endosee catheter was placed and the cavity was visualizedwhile injected normal saline.  The right fallopian tube ostia was visualized butnot the left.  The endometrium was relatively lush and an anterior pedunculated polyp was seen near the right ostial.  Photos were taken.  The hysteroscope was removed and excess fluid was removed with a sponge stick.  The patient tolerated this with minimal discomfort.    IMPRESSION  Endometrial polyp with secondary menometrorrhagia    PLAN  We discussed operative hysteroscopy with polypectomy but she'd like to think about this for now..  She'll need " an anesthesia consult to assess her airway due to extreme obesity.    Michael Fatima MD  7:59 AM 1/25/2017

## 2018-01-03 NOTE — OR POSTOP
Patient Information     Patient Name MRN Sex     Rosi Baires [id number] Female 1975      OR PostOp by China Rudd RN at 2017 11:10 AM     Author:  China Rudd RN Service:  (none) Author Type:  NURS- Registered Nurse     Filed:  2017 11:15 AM Date of Service:  2017 11:10 AM Status:  Signed     :  China Rudd RN (NURS- Registered Nurse)            Discharge Note    Data:  Rosi Baires has been discharged home at 1110 via ambulatory accompanied by Registered Nurse and .      Action:  Written discharge/follow-up instructions were provided to patient. Prescriptions were written and sent with patient.  Belongings sent with patient. Medications from home sent with patient/family: Not Applicable  Equipment none .     Response:  Patient verbalized understanding of discharge instructions, reason for discharge, and necessary follow-up appointments.     China Rudd RN

## 2018-01-03 NOTE — OR ANESTHESIA
Patient Information     Patient Name MRN Rosi Gimenez [id number] Female 1975      OR Anesthesia by Flash Vasquez MD at 2017  8:10 AM     Author:  Flash Vasquez MD  Service:  (none) Author Type:  PHYS- Anesthesiologist     Filed:  2017  8:23 AM  Date of Service:  2017  8:10 AM Status:  Addendum     :  Flash Vasquez MD (PHYS- Anesthesiologist)        Related Notes: Original Note by Flash Vasquez MD (PHYS- Anesthesiologist) filed at 2017  8:16 AM            ANESTHESIOLOGY:  ([id number])    Please see Pre-Anesthesia Evaluation note from 2017.  The only change in her health since I saw her on that day is that she is now having menstrual bleeding and her anxiety level is better today because the stressors in her life are improving.  The only changes in her physical examination are that her tachycardia has resolved and the scab on her left lower lip from the healing cold sore has fallen off.  She is NPO today and is agreeable to proceeding with general anesthesia as was previously discussed.    FLASH VASQUEZ MD ....................  2017   8:12 AM.

## 2018-01-03 NOTE — OR NURSING
Patient Information     Patient Name MRN Sex Rosi Mendez [id number] Female 1975      OR Nursing by Vy Colby RN at 2017  9:49 AM     Author:  Vy Colby RN Service:  (none) Author Type:  NURS- Registered Nurse     Filed:  2017  9:53 AM Date of Service:  2017  9:49 AM Status:  Signed     :  Vy Colby RN (NURS- Registered Nurse)            Patient has itchy skin no rash, message to anesthesia to see if re can repeat Benadryl

## 2018-01-03 NOTE — NURSING NOTE
Patient Information     Patient Name MRN Sex Rosi Mendez [id number] Female 1975      Nursing Note by Deirdre Bhatti at 2017  1:30 PM     Author:  Deirdre Bhatti Service:  (none) Author Type:  (none)     Filed:  2017  1:30 PM Encounter Date:  2017 Status:  Signed     :  Deirdre Bhatti            This patient presents today for a Preoperative exam for this procedure: Ablation   Date of Surgery: 2017   Surgeon:  Dr. Fatima  Facility:  [facility]        2017 1:13 PM

## 2018-01-03 NOTE — PROCEDURES
Patient Information     Patient Name MRN Sex Rosi Mendez [id number] Female 1975      Procedures by Michael Fatima MD at 2017  9:41 AM     Author:  Michael Fatima MD Service:  (none) Author Type:  Physician     Filed:  2017  9:43 AM Date of Service:  2017  9:41 AM Status:  Signed     :  Michael Fatima MD (Physician)            OPERATIVE NOTE     PREOPERATIVE DIAGNOSIS:  Menorrhagia    POSTOPERATIVE DIAGNOSIS:  Menorrhagia     PROCEDURE:  Hysteroscopy, D&C, Novasure endometrial thermal ablation     SURGEON:  Michael Fatima MD    Staff: Assistant: Maricarmen Guzmán CST; Sloane Shea  Circulator: Jewell Davila RN  Circulator Set Up: Loulou Shetty RN  PACU Nurse: Maria M Michael RN; Vy Colby RN  Pre Op Nurse: Willi Watson RN  Phase II Nurse: China Rudd RN    ANESTHESIA:  General    ESTIMATED BLOOD LOSS:  Less than 10 cc.     INDICATIONS: Heavy menstrual bleeding      PROCEDURE:  The patient was brought to the operating room and placed on the operating room table in dorsal lithotomy. General anesthesia was administered without  difficulty. She was prepped and draped in the usual manner. A speculum was placed and a tenaculum applied to the anterior cervix.  The cervix was dilated to allow advancement of the hysteroscope.  Hysteroscopy found no abnormalities.  Sharp currettage was performed and tissue submitted to pathology.  The Novasure device was then passed to the fundus.  A 1 minute 45 second cautery was performed without complication.  After automatic cooldown, the fluid was retrieved and the device removed.  Final hysteroscopy confirmed complete destruction of the endometrial cavity and photos were taken.  All instruments were removed.  Hemostasis was confirmed.  She was returned to supine, and transferred to recovery in stable condition.  All counts were correct at the conclusion of the case.  There were no complications.  A  routine post-operative course is anticipated.      Estimated blood loss was less than 10 cc.  A prescription for pain medication was given prior to discharge.    Michael Fatima MD ....................  2/23/2017   9:42 AM

## 2018-01-03 NOTE — ADDENDUM NOTE
Patient Information     Patient Name MRN Rosi Gimenez [id number] Female 1975      Addendum Note by Soraya Huff at 2017  2:02 PM     Author:  Soraya Huff Service:  (none) Author Type:  (none)     Filed:  2017  2:02 PM Encounter Date:  2017 Status:  Signed     :  Soraya Huff       Addended by: SORAYA HUFF on: 2017 02:02 PM        Modules accepted: Orders

## 2018-01-04 NOTE — PROGRESS NOTES
Patient Information     Patient Name MRN Sex Rosi Chapman [id number] Female 1975      Progress Notes by Hua Pastor DO at 5/3/2017  2:45 PM     Author:  Hua Pastor DO Service:  (none) Author Type:  PHYS- Osteopathic     Filed:  5/3/2017  3:04 PM Encounter Date:  5/3/2017 Status:  Signed     :  Hua Pastor DO (PHYS- Osteopathic)            Rosi Baires was seen in consultation for Lucio Curiel MD for a chief complaint of bilateral knee pain.    CHIEF COMPLAINT: Rosi Baires is a 42 y.o.  female  Chief Complaint     Patient presents with       Consult      bilateral knee pain       HISTORY OF PRESENTING INJURY:  This 42-year-old morbidly obese female relates she has right greater than left knee pain. Symptoms have gotten a bit worse over the past several months. She notices a grinding sensation of both knees which is been chronic. Also right greater than left. Usually worse with steps and stairs. No history of recent injury.  She is currently off of work and relates she is disabled at this time.  Activities include walking and pool therapy at the [facility].  She also takes Advil as needed.  No prior knee or back surgeries. No complaint of hip pain. Her right knee pain is generalized to the area.  She has used a cane in the past.  X-rays were taken on 17.    Family history includes a father with knee replacement surgery.    REVIEW OF SYSTEMS:  Constitutional:  Denies constitutional problems  Cardiovascular: normal  Respiratory: normal    The review of systems as documented in the HPI and on the intake questionnaire, completed by the patient 5/3/2017, have been reviewed by myself and the pertinent positives and negatives addressed.  The remainder of the complete review of systems was non-contributory.    (PFSH) PAST, FAMILY, and/or SOCIAL HISTORY:    PAST MEDICAL HISTORY:  Past Medical History:     Diagnosis  Date     Asthma      Depression      Edema      Encounter  for removal and reinsertion of IUD 05    IUD placement, Removed      History of pregnancy     G3, P2-0-1-2 with history of spontaneous       LSIL (low grade squamous intraepithelial lesion) on Pap smear        PAST SURGICAL HISTORY:  Past Surgical History:      Procedure  Laterality Date     LEEP PROCEDURE      Underwent a loop       LEG/ANKLE SURGERY      fracture, repair with screws       TUBAL LIGATION      tubal ligation         ALLERGIES:  Allergies      Allergen   Reactions     Clonazepam  Other - Describe In Comment Field     Causes Violence and aggresssion      Hydrocodone-Acetaminophen  Seizures     Can take Percocet without difficulty.      Lorazepam  Other - Describe In Comment Field     Causes Violence and aggresssion      Zoloft [Sertraline]  Other - Describe In Comment Field     Caused suicidally       Bupropion  Other - Describe In Comment Field     Caused Major Depression      Divalproex Sodium  *Unknown - Pt Doesn't Remember     Lithium  *Unknown - Pt Doesn't Remember     Risperidone Analogues  *Unknown - Pt Doesn't Remember     Sulfa (Sulfonamide Antibiotics)  *Unknown - Pt Doesn't Remember       CURRENT MEDICATIONS:  Current Outpatient Prescriptions       Medication  Sig Dispense Refill     albuterol HFA 90 mcg/actuation inhaler Inhale 1-2 Puffs by mouth every 4 hours if needed. 1 Inhaler 11     ARIPiprazole (ABILIFY) 15 mg tablet Take 7 mg by mouth once daily.       busPIRone (BUSPAR) 10 mg tablet Take 10 mg by mouth 2 times daily.       cholecalciferol (VITAMIN D3) 5,000 unit capsule Take 1 capsule by mouth once daily. For Vitamin D Deficiency - Dose Increase 2017 100 capsule 3     cyanocobalamin (VITAMIN B12) 1,000 mcg/mL injection Inject 1 mL intramuscular every 3 weeks. 1000 mcg 15     gabapentin (NEURONTIN) 100 mg capsule Take 1-2 capsules by mouth 3 times daily. as needed for burning/shooting neuropathy pain 180 capsule 11     hydrOXYzine pamoate (VISTARIL) 50 mg  "capsule Take 1 capsule in AM and 2 capsule in PM - Managed per Psychiatry  2     ibuprofen (ADVIL; MOTRIN) 200 mg tablet Take 3 tablets by mouth every 6 hours if needed for Pain. 20 tablet 1     lamoTRIgine (LAMICTAL) 150 mg tablet Take 150 mg by mouth 2 times daily.       MISCELLANEOUS MEDICAL SUPPLY (GRADUATED COMPRESSION STOCKINGS) For personal use. Length: thigh Strength: 16-20 mmHg.  Please measure patient in Pharmacy. 1 Packet 0     montelukast (SINGULAIR) 10 mg tablet Take 1 tablet by mouth at bedtime. 90 tablet 3     oxybutynin XL (DITROPAN XL) 5 mg CR tablet TAKE 1 TABLET BY MOUTH DAILY (LOT [id number]) 28 tablet 6     oxyCODONE-acetaminophen, 5-325 mg, (PERCOCET) 5-325 mg per tablet Take 1-2 tablets by mouth every 4 hours if needed  for Pain Max acetaminophen dose: 4000mg in 24 hrs. 10 tablet 0     potassium chloride (KLOR-CON M20) 20 mEq Extended-Release tablet Take 20 mEq by mouth.       vilazodone (VIIBRYD) 40 mg tablet Take 40 mg by mouth once daily with a meal.       vitamin B complex (B-COMPLEX) tablet Take 1 tablet by mouth once daily. -- for Low B12 - Start 2/16/2017 100 tablet 3     Walker Rolling Walker for home use.  2 wheels 1 Device 0     No current facility-administered medications for this visit.      Medications have been reviewed by me and are current to the best of my knowledge and ability.      FAMILY HISTORY:  Family History       Problem   Relation Age of Onset     No Known Problems  Mother      Asthma  Father      + Leg edema, knee, hip replacement. + Lymphedema       Osteoporosis  Brother      No Known Problems  Brother      Other  Paternal Grandmother      Lymphedema         Additional Critical access hospital information documented on the intake form completed by the patient 5/3/2017 was reviewed by myself.    PHYSICAL EXAM:   /80  Ht 1.683 m (5' 6.25\")  Wt (!) 139.3 kg (307 lb)  BMI 49.18 kg/m2 Body mass index is 49.18 kg/(m^2).    General Appearance: Pleasant female in good appearance, mood " and affect.  Alert and orientated times three ( time, date and location).    Examination of Bilateral knee:  The patient is obese. Her BMI is 49.   She is able to get on and off the examination table.   Considerable girth of the lower extremities and around both knees.   Lower extremity edema and some peripheral vascular changes above the ankles.     Sensation is Normal  Alignment: Neutral  Effusion: none  Passive extension: Full bilateral extension  passive  flexion: 120 bilaterally   Patella tracks:  without an inverted J sign, the patella is somewhat difficult to palpate in extension because of obesity of the leg.   Varus stress testing: is stable  Valgus stress testing: is stable  Anterior drawer test: is stable  Posterior drawer test: is stable  Lachman's test: is stable    Apley's grind test: Positive patellofemoral crepitus bilaterally with active, seated knee flexion and extension. Reproduces symptoms     Hip: Bilateral  comfortable passive motion of both hips     negative pain with log roll testing    Calf:  negative tenderness    Neurological / Vascular Examination:  Lower extremity edema present: Mild   Sensation: Normal  Distal pulses: present    Feet:  Pes planus positive     Xray/MRI/MRA:  Radiographic images where independently reviewed and discussed with the patient.   Attending Doctor: ROYCE MONTERROSO ([id number])  :       CAIT MATTHEWS ([id number])  : ([id number])  Report Date:       04/25/2017 14:47:10  Report Status:       Final  ======================= Begin of Report Content ======================    ** ADDENDUM **    ADDENDUM: CORRECTED CPT CODE/CHARGES AND TITLE; NO OTHER CHANGES TO REPORT  (04/24/2017 ANB)  XR KNEE WB 1 VIEW AP BILATERAL AND 2 VIEWS BILATERAL  HISTORY: 42 yearsFemale Bilateral chronic knee pain  TECHNIQUE: 6 views bilateral knees  COMPARISON: None  FINDINGS: Right knee: Joint spaces are congruent. There is no significant  joint effusion. There is no  evidence of fracture or dislocation. Articular  surfaces are smooth. There is mild medial compartment joint space  narrowing.  Left knee: There is mild medial compartment joint space narrowing. There is  no significant joint effusion. There is no evidence of fracture or  dislocation.  IMPRESSION: Mild bilateral medial compartment osteoarthritic changes.  Electronically signed by: CAIT MATTHEWS on Tue Apr 25, 2017 2:47:09 PM CDT    IMPRESSION:  42-year-old female with bilateral knee osteoarthritis, mild bilateral medial compartmental narrowing   patellofemoral knee pain and crepitus, suspect secondary to arthritis   obesity: BMI 49     PLAN:  Discussion included review of x-rays.   Discussed with the patient she does have underlying arthritis of the knee at a younger age.   Suspect her symptoms of crepitus and discomfort are related to the arthritis and irregular surfaces.   Her obesity also plays a role.   Recommendation is for weight loss.   Also recommend over-the-counter medications if she is able to take them.   She inquired about glucosamine and chondroitin. Feel it would be okay to try the medication. She will check with her primary provider first.   Discussed cortisone injections and future surgical options.   At this point she really needs to work on the weight loss.   Continue with exercise activities within comfort especially the pool therapy.   Questions answered.    Hua Pastor D.O., F.A.O.A.O.  Orthopedic Surgeon    [facility]  [address] [place] [address]  Phone [phone]  Fax [phone]    2:55 PM 5/3/2017

## 2018-01-04 NOTE — NURSING NOTE
Patient Information     Patient Name MRN Sex Rosi Mendez [id number] Female 1975      Nursing Note by Deirdre Bhatti at 2017  2:20 PM     Author:  Deirdre Bhatti Service:  (none) Author Type:  (none)     Filed:  2017  2:32 PM Encounter Date:  2017 Status:  Signed     :  Deirdre Bhatti            Patient presents to the clinic for right ear pain noted over the past week.    Deirdre Bhatti LPN        2017 2:26 PM

## 2018-01-04 NOTE — PATIENT INSTRUCTIONS
Patient Information     Patient Name MRN Sex Rosi Mendez [id number] Female 1975      Patient Instructions by Lucio Curiel MD at 2017  9:50 AM     Author:  Lucio Curiel MD  Service:  (none) Author Type:  Physician     Filed:  2017 10:06 AM  Encounter Date:  2017 Status:  Addendum     :  Lucio Curiel MD (Physician)        Related Notes: Original Note by Lucio Curiel MD (Physician) filed at 2017 10:05 AM            Knee xrays today.     Orthopedic referral sent  - they will call with date/time of appointment.      Call [phone] or [phone]  Otherwise -- [phone]  - or -  [phone]     -- To schedule an appointment with the orthopedic clinic:   -- Dr. Duke, Dr. Pastor    -- Consider Synvisc -- lubrication shot from Orthopedic clinic.     -- Schedule B12 shot with shot nurse -- about every 3 weeks.     -- Start the Vitamin D replacement.   Vitamin D3 is a safe and effective product for the treatment and prevention of osteoporosis, rickets and vitamin D deficiency. Most people living in this part of the [place] are vitamin D deficient.     Goal is to have the lab value resulted below be between 40 and 50.     Vitamin D was VERY LOW   -- Start taking 5000 IU vitamin D3 daily with food as it is a fat soluble vitamin.   -- We can recheck this level in about 3 to 4 months    Vitamin D3 is associated with improved immunity, improved bone health, improved mood and in some people a reduction in pain severity.       B12 shot today.     Return as needed for follow-up with Dr. Curiel.    Clinic : [phone]  Appointment line: [phone]

## 2018-01-04 NOTE — TELEPHONE ENCOUNTER
Patient Information     Patient Name MRN Rosi Gimenez [id number] Female 1975      Telephone Encounter by Jasmina Mata DO at 2017 11:42 AM     Author:  Jasmina Mata DO Service:  (none) Author Type:  PHYS- Osteopathic     Filed:  2017 11:44 AM Encounter Date:  2017 Status:  Signed     :  Jasmina Mata DO (PHYS- Osteopathic)            I could fit her in between appointments this afternoon although she may have to wait for short time.  If she is interested in doing this please have her come at 2:30.  If she would prefer to come at her convenience I would recommend [facility].

## 2018-01-04 NOTE — NURSING NOTE
Patient Information     Patient Name MRN Sex Rosi Mendez [id number] Female 1975      Nursing Note by Desire Brian at 5/3/2017  2:45 PM     Author:  Desire Brian  Service:  (none) Author Type:  (none)     Filed:  5/3/2017  2:25 PM  Encounter Date:  5/3/2017 Status:  Addendum     :  Desire Brian        Related Notes: Original Note by Desire Brian filed at 5/3/2017  2:12 PM            Pt presents for a consult of her bilateral knee pain. Referred by Dr. Curiel. Right worse than left.     Desire Brina CMA 5/3/2017 2:25 PM

## 2018-01-04 NOTE — TELEPHONE ENCOUNTER
Patient Information     Patient Name MRN Sex Rosi Mendez [id number] Female 1975      Telephone Encounter by Chiquis Stanley at 2017 11:32 AM     Author:  Chiquis Stanley Service:  (none) Author Type:  (none)     Filed:  2017 11:34 AM Encounter Date:  2017 Status:  Signed     :  Chiquis Stanley            PT HAS RT EAR PAIN AND WOULD LIKE TO BE SEEN TODAY IF POSSIBLE. PLEASE CALL PT BACK.  PRIMARY IS [facility], NOT HERE TODAY.

## 2018-01-04 NOTE — PROGRESS NOTES
Patient Information     Patient Name MRN Sex Rosi Mendez [id number] Female 1975      Progress Notes by Lucio Curiel MD at 2017  9:50 AM     Author:  Lucio Curiel MD Service:  (none) Author Type:  Physician     Filed:  2017 12:14 PM Encounter Date:  2017 Status:  Signed     :  Lucio Curiel MD (Physician)            Nursing Notes:   Deirdre Bhatti  2017  9:58 AM  Signed  Patient presents to the clinic for right knee pain on and off over the past several weeks.    Deirdre Bhatti LPN        2017 9:40 AM    Rosi Baires presents to clinic today for:   Chief Complaint     Patient presents with       Knee Pain/problem      bilateral knees     HPI: Ms. Baires is a 42 y.o. female who presents today for evaluation of above.     (M25.561,  M25.562,  G89.29) Bilateral chronic knee pain  (primary encounter diagnosis)  (M23.8X1) Crepitus of joint of right knee  (E53.8) Vitamin B12 deficiency  (G62.9) Peripheral polyneuropathy (HC)  (E66.01,  Z68.42) Morbid obesity with BMI of 45.0-49.9, adult (HC)  (E55.9) Vitamin D deficiency  (I89.0) Lymphedema of both lower extremities     Patient presents for evaluation of chronic bilateral knee pain.  States left knee is less than the right knee however they both hurt.  She has a lot of grinding and crepitus in her right knee.  Reports that once in a while her right knee will make a lot of grinding almost popping noise.  She feels like it might give out on her.  She has not noted that it is giving out on her, and has not caused her to fall but it does hurt at times.  She is wondering about treatment options.  She reports that she wants to wait at least until age 52 before getting a knee replacement and wants to try a more conservative measurements in the meantime.  Advised that it is best to wait as long as possible for a knee replacement, possibly even 60 or 65 years old.    Reports knee pain is 2 out of 10 at this  time.  With standing and walking can get up to 5 or 6 out of 10.  Somewhat of a grinding, achy pain.  Does not really radiate anywhere.  Right greater than left.  Gradually worsening over the last few months.  Has only tried some over-the-counter pain medication which does seem to help at times.    Patient's weight has gone up.  She has morbid obesity.  Reports that her mood is better however.    B12 deficiency, felt that her neuropathy as well as energy and mood got better after her B12 shot however she has not started the oral B12 and has not scheduled herself any B12 shots in the clinic.  These are all ordered today.  Due for repeat B12 shot today.  - Gabapentin has been helpful and she is actually been using less of it now that her neuropathy pain is better.    Reports that her father had bilateral knee replacements done sometime in his 50s.      Remedy deficiency, chronic issue.  Vitamin D ordered at her last appointment however she has not filled it yet.    Lymphedema, chronic bilateral lower extremities.  Likely contributing to weight gain.  This is been a struggle for her for a long time.    Ms. Baires's Body mass index is 49.24 kg/(m^2). This is out of the normal range for a 42 y.o. Normal range for ages 18-64 is between 18.5 and 24.9; normal range for ages 65+ is 23-30. To lose weight we reviewed risks and benefits of appropriate options such as diet, exercise, and medications. Patient's strategy will be  self-directed nutrition plan and self-directed exercise program   BP Readings from Last 1 Encounters:04/20/17 : 134/70  Ms. Mahan blood pressure is out of the normal range for adults. Per JNC-8 guidelines normal adult blood pressure is < 120/80, pre-hypertensive is between 120/80 and 139/89, and hypertension is 140/90 or greater. Risks of hypertension were discussed. Patient's strategy will be weight loss, increased activity and reduced salt intake    Functional Capacity: about 4 METS.   Reports  that she can climb a flight of stairs without any chest pain/heaviness or shortness of breath.   Patient reports no current symptoms of fevers, chills, nausea/vomiting.   No cough. No shortness of breath.   No change in bowel/bladder habits. No melena, hematochezia. No Hematuria.   No rashes. No palpitations.  No orthopnea/paroxysmal nocturnal dyspnea   No vision or hearing issues.   No significant mood issues -- getting better.   No bruising.     AGNIESZKA:  AGNIESZKA-7 ANXIETY SCREENING 9/28/2016 4/20/2017   AGNIESZKA date (doc flow) 9/28/2016 4/20/2017   Nervous, anxious 0 0   Cannot stop worrying 0 0   Worry about different things 0 0   Cannot relax 0 0   Feeling restless 0 0   Easily annoyed/irritated 0 0   Afraid of awful event 0 0   Score 0 0   Severity none none       PHQ9:  PHQ Depression Screening 2/16/2017 4/20/2017   Date of PHQ exam (doc flow) 2/16/2017 4/20/2017   1. Lack of interest/pleasure 0 - Not at all 0 - Not at all   2. Feeling down/depressed 0 - Not at all 0 - Not at all   PHQ-2 TOTAL SCORE 0 0   3. Trouble sleeping 0 - Not at all 0 - Not at all   4. Decreased energy 0 - Not at all 0 - Not at all   5. Appetite change 0 - Not at all 0 - Not at all   6. Feelings of failure 0 - Not at all 0 - Not at all   7. Trouble concentrating 0 - Not at all 0 - Not at all   8. Activity level 0 - Not at all 0 - Not at all   9. Hurting yourself 0 - Not at all 0 - Not at all   PHQ-9 TOTAL SCORE 0 0   PHQ-9 Severity Level none none   Functional Impairment not difficult at all not difficult at all        I have personally reviewed the past medical history, past surgical history, medications, allergies, family and social history as listed below, on 4/20/2017.    Patient Active Problem List       Diagnosis  Date Noted     DEPRESSION/ANXIETY       Priority: High      Bilateral chronic knee pain  04/20/2017     Crepitus of joint of right knee  04/20/2017     Preop examination - Hysterectomy 2/23/2017 - Dr. Fatima  02/16/2017      Menorrhagia with irregular cycle  2017     Bipolar disorder, current episode mixed, unspecified (HC)  2017     Peripheral polyneuropathy (HC)  2016     Elevated random blood glucose level  2016     Vitamin D deficiency  2016     Vitamin B12 deficiency  2016     Bilateral foot pain  2016     Lymphedema of both lower extremities  2016     Venous stasis dermatitis of both lower extremities  2016     Alcohol dependence (HC)  2015     Cannabis dependence in remission (HC)  2015     Anxiety, generalized  2015     Morbid obesity with BMI of 45.0-49.9, adult (HC)  2015     Moderate persistent asthma  2013     Overview:   AAP completed on 13  Triggers: pollen, fumes, exercise, URI, warm weather. Peak flow today is 250. Symptomatic: moderate  Overview:   AAP completed on 13  Triggers: pollen, fumes, exercise, URI, warm weather. Peak flow today is 250. Symptomatic: moderate        Urinary incontinence  03/15/2013     Closed dislocation of tarsal joint  2011     GERD       EDEMA LEG       ALLERGIC RHINITIS, SEASONAL       BIPOLAR AFFECTIVE DISORDER       AFFECTIVE PERSONALITY DISORDER       SUBSTANCE ABUSE       OBESITY       Atopic rhinitis  10/02/2003     Overview:   [facility] - Seasonal Allergies        Borderline personality disorder - Following with [facility] - Chiquis LAWRENCE  2003     Overview:   [facility].        Disorder of female genital organ  2001     Overview:   DUB, dysmenorrhea        Past Medical History:     Diagnosis  Date     Asthma      Depression      Edema      Encounter for removal and reinsertion of IUD 05    IUD placement, Removed      History of pregnancy     G3, P2-0-1-2 with history of spontaneous       LSIL (low grade squamous intraepithelial lesion) on Pap smear      Past Surgical History:      Procedure  Laterality Date     LEEP PROCEDURE      Underwent a  loop       LEG/ANKLE SURGERY      fracture, repair with screws       TUBAL LIGATION  2009    tubal ligation       Current Outpatient Prescriptions       Medication  Sig Dispense Refill     albuterol HFA 90 mcg/actuation inhaler Inhale 1-2 Puffs by mouth every 4 hours if needed. 1 Inhaler 11     ARIPiprazole (ABILIFY) 15 mg tablet Take 7 mg by mouth once daily.       busPIRone (BUSPAR) 10 mg tablet Take 10 mg by mouth 2 times daily.       cholecalciferol (VITAMIN D3) 5,000 unit capsule Take 1 capsule by mouth once daily. For Vitamin D Deficiency - Dose Increase 2/16/2017 100 capsule 3     cyanocobalamin (VITAMIN B12) 1,000 mcg/mL injection Inject 1 mL intramuscular every 3 weeks. 1000 mcg 15     gabapentin (NEURONTIN) 100 mg capsule Take 1-2 capsules by mouth 3 times daily. as needed for burning/shooting neuropathy pain 180 capsule 11     hydrOXYzine pamoate (VISTARIL) 50 mg capsule Take 1 capsule in AM and 2 capsule in PM - Managed per Psychiatry  2     ibuprofen (ADVIL; MOTRIN) 200 mg tablet Take 3 tablets by mouth every 6 hours if needed for Pain. 20 tablet 1     lamoTRIgine (LAMICTAL) 150 mg tablet Take 150 mg by mouth 2 times daily.       MISCELLANEOUS MEDICAL SUPPLY (GRADUATED COMPRESSION STOCKINGS) For personal use. Length: thigh Strength: 16-20 mmHg.  Please measure patient in Pharmacy. 1 Packet 0     montelukast (SINGULAIR) 10 mg tablet Take 1 tablet by mouth at bedtime. 90 tablet 3     oxybutynin XL (DITROPAN XL) 5 mg CR tablet TAKE 1 TABLET BY MOUTH DAILY (LOT [id number]) 28 tablet 6     oxyCODONE-acetaminophen, 5-325 mg, (PERCOCET) 5-325 mg per tablet Take 1-2 tablets by mouth every 4 hours if needed  for Pain Max acetaminophen dose: 4000mg in 24 hrs. 10 tablet 0     potassium chloride (KLOR-CON M20) 20 mEq Extended-Release tablet Take 20 mEq by mouth.       vilazodone (VIIBRYD) 40 mg tablet Take 40 mg by mouth once daily with a meal.       vitamin B complex (B-COMPLEX) tablet Take 1 tablet by mouth once  daily. -- for Low B12 - Start 2017 100 tablet 3     Walker Rolling Walker for home use.  2 wheels 1 Device 0     Allergies      Allergen   Reactions     Clonazepam  Other - Describe In Comment Field     Causes Violence and aggresssion      Hydrocodone-Acetaminophen  Seizures     Can take Percocet without difficulty.      Lorazepam  Other - Describe In Comment Field     Causes Violence and aggresssion      Zoloft [Sertraline]  Other - Describe In Comment Field     Caused suicidally       Bupropion  Other - Describe In Comment Field     Caused Major Depression      Divalproex Sodium  *Unknown - Pt Doesn't Remember     Lithium  *Unknown - Pt Doesn't Remember     Risperidone Analogues  *Unknown - Pt Doesn't Remember     Sulfa (Sulfonamide Antibiotics)  *Unknown - Pt Doesn't Remember     Family History       Problem   Relation Age of Onset     No Known Problems  Mother      Asthma  Father      + Leg edema, knee, hip replacement. + Lymphedema       Osteoporosis  Brother      No Known Problems  Brother      Other  Paternal Grandmother      Lymphedema       Family Status     Relation  Status     Mother Alive     Father Alive     Brother Alive     Brother Alive     Paternal Grandmother      Social History     Social History        Marital status:       Spouse name: N/A     Number of children:  N/A     Years of education:  N/A     Social History Main Topics         Smoking status:   Former Smoker     Packs/day:  1.00     Years:  3.00     Types:  Cigarettes     Quit date:  2003     Smokeless tobacco:   Never Used     Alcohol use   No      Comment: History of abuse - sober as of 2010.       Drug use:   No      Comment: History of use      Sexual activity:   No     Other Topics  Concern     Not on file      Social History Narrative     No longer in adult foster care lived with Charley Godwin.      .     2 sons - age 12 and 7 - as of 2016.     Lives independently - has own apartment - staff  "in the building.        Pertinent ROS was performed and was negative as noted in HPI above.     EXAM:   Vitals:     04/20/17 0941   BP: 134/70   Pulse: 88   Temp: 99.1  F (37.3  C)   Weight: (!) 139.4 kg (307 lb 6 oz)   Height: 1.683 m (5' 6.25\")     BP Readings from Last 3 Encounters:    04/20/17 134/70   02/23/17 122/76   02/16/17 132/76     Wt Readings from Last 3 Encounters:    04/20/17 (!) 139.4 kg (307 lb 6 oz)   02/16/17 133.4 kg (294 lb)   02/16/17 133.5 kg (294 lb 4 oz)     Estimated body mass index is 49.24 kg/(m^2) as calculated from the following:    Height as of this encounter: 1.683 m (5' 6.25\").    Weight as of this encounter: 139.4 kg (307 lb 6 oz).     EXAM:  Constitutional: Pleasant, alert, appropriate appearance for age. No acute distress  Lymphatic Exam: Non-palpable nodes in neck, clavicular regions  Pulmonary: Lungs are clear to auscultation bilaterally, without wheezes or crackles  Cardiovascular Exam: regular rate and rhythm, no pedal edema, no murmur, click, rub or gallop appreciated  Gastrointestinal Exam: Obese, Soft, non-tender, non-distended, positive bowel sounds  Integument: No abnormal rashes, sores, or ulcerations noted  Neurologic Exam: CN 3-12 grossly intact   Musculoskeletal Exam: Walks with slight limp. Right knee crepitus noted.   Psychiatric Exam: Awake and Alert, Affect and mood appropriate  Speech is fluent, Thought process is normal    INVESTIGATIONS:  Results for orders placed or performed in visit on 02/16/17      VITAMIN B12      Result  Value Ref Range    VITAMIN B12 232 180 - 914 pg/mL   BASIC METABOLIC PANEL      Result  Value Ref Range    SODIUM 137 133 - 143 mmol/L    POTASSIUM 4.1 3.5 - 5.1 mmol/L    CHLORIDE 104 98 - 107 mmol/L    CO2,TOTAL 24 21 - 31 mmol/L    ANION GAP 9 5 - 18                    GLUCOSE 116 (H) 70 - 105 mg/dL    CALCIUM 9.9 8.6 - 10.3 mg/dL    BUN 11 7 - 25 mg/dL    CREATININE 1.00 0.70 - 1.30 mg/dL    BUN/CREAT RATIO           11                "     GFR if African American >60 >60 ml/min/1.73m2    GFR if not African American >60 >60 ml/min/1.73m2   VITAMIN D 25 (DEFICIENCY)      Result  Value Ref Range    VITAMIN D TOTAL AFL 25.2   ng/mL     knee x-ray   4/20/2017 - XR KNEE 3 VIEWS AP LAT SUNRISE RIGHT     HISTORY: 42 yearsFemale Bilateral chronic knee pain     TECHNIQUE: 6 views bilateral knees     COMPARISON: None     FINDINGS: Right knee: Joint spaces are congruent. There is no significant joint effusion. There is no evidence of fracture or dislocation. Articular surfaces are smooth. There is mild medial compartment joint space narrowing.     Left knee: There is mild medial compartment joint space narrowing. There is no significant joint effusion. There is no evidence of fracture or dislocation.     IMPRESSION: Mild bilateral medial compartment osteoarthritic changes.     Electronically Signed By: Ji Kern M.D. on 4/20/2017 12:05 PM    ASSESSMENT AND PLAN:  Rosi was seen today for knee pain/problem.    Diagnoses and all orders for this visit:    Bilateral chronic knee pain  -     Cancel: XR KNEE WB 2 VIEWS BILATERAL AND 2 VIEWS BILATERAL; Future  -     AMB CONSULT TO ORTHOPEDICS - AFFILIATE ONLY; Future    Crepitus of joint of right knee  -     Cancel: XR KNEE WB 2 VIEWS BILATERAL AND 2 VIEWS BILATERAL; Future  -     AMB CONSULT TO ORTHOPEDICS - AFFILIATE ONLY; Future    Vitamin B12 deficiency  -     cyanocobalamin 1,000 mcg injection (VITAMIN B12); Inject 1 mL intramuscular one time.  -     cyanocobalamin (VITAMIN B12) 1,000 mcg/mL injection; Inject 1 mL intramuscular every 3 weeks.    Peripheral polyneuropathy (HC)    Morbid obesity with BMI of 45.0-49.9, adult (HC)    Vitamin D deficiency    Lymphedema of both lower extremities      lab results and schedule of future lab studies reviewed with patient, reviewed diet, exercise and weight control, recommended sodium restriction, cardiovascular risk and specific lipid/LDL goals  reviewed    -- Expected clinical course discussed   -- Medications and their side effects discussed    25 minutes spent in face-to-face interaction with patient (separate from separately billed procedures) with greater than 50% spent in counseling and care coordination of listed medical problems.      The ASCVD Risk score (Sharendy VANCE Jr, et al., 2013) failed to calculate for the following reasons:    Cannot find a previous HDL lab    Cannot find a previous total cholesterol lab    Rosi is also recommended to eat a heart-healthy diet, do regular aerobic exercises, maintain a desirable body weight, and avoid tobacco products. These recommendations are from the American Heart Association (AHA) which stresses the importance of lifestyle changes to lower cardiovascular disease risk.     Return if symptoms worsen or fail to improve.    Patient Instructions   Knee xrays today.     Orthopedic referral sent  - they will call with date/time of appointment.      Call [phone] or [phone]  Otherwise -- [phone]  - or -  [phone]     -- To schedule an appointment with the orthopedic clinic:   -- Dr. Duke, Dr. Pastor    -- Consider Synvisc -- lubrication shot from Orthopedic clinic.     -- Schedule B12 shot with shot nurse -- about every 3 weeks.     -- Start the Vitamin D replacement.   Vitamin D3 is a safe and effective product for the treatment and prevention of osteoporosis, rickets and vitamin D deficiency. Most people living in this part of the [place] are vitamin D deficient.     Goal is to have the lab value resulted below be between 40 and 50.     Vitamin D was VERY LOW   -- Start taking 5000 IU vitamin D3 daily with food as it is a fat soluble vitamin.   -- We can recheck this level in about 3 to 4 months    Vitamin D3 is associated with improved immunity, improved bone health, improved mood and in some people a reduction in pain severity.       B12 shot today.     Return as needed  for follow-up with Dr. Curiel.    Clinic : [phone]  Appointment line: [phone]      Lucio Curiel MD

## 2018-01-04 NOTE — PATIENT INSTRUCTIONS
Patient Information     Patient Name MRN Rosi Gimenez [id number] Female 1975      Patient Instructions by Jasmina Mata DO at 2017  2:20 PM     Author:  Jasmina Mata DO  Service:  (none) Author Type:  PHYS- Osteopathic     Filed:  2017  3:00 PM  Encounter Date:  2017 Status:  Addendum     :  Jasmina Mata DO (PHYS- Osteopathic)        Related Notes: Original Note by Jasmina Mata DO (PHYS- Osteopathic) filed at 2017  2:59 PM            Start Naproxen 500mg twice daily for 5-7 days.  Do not take with Ibuprofen.    Caution with NSAIDS (ibuprofen, aspirin, naproxen, aleve, advil) due to risk for increased blood pressure, stomach pain/nausea/ulcers and kidney damage; use minimal amount necessary    Okay to take Acetaminophen (Tylenol) 1000mg (2- extra strength 500mg tablets or 3 regular strength 325mg tablets) three times a day; Maximum of 4000mg daily    - Return/call as needed for follow-up should any new symptoms develop, for worsening of current symptoms or if symptoms do not resolve with above plan.    Additionally please try one of the following:  - Loratadine (Claritin) 10mg daily for 3-4 weeks and then as needed (least sedating, least effective)  - Fexofenadine (Allegra) 12 hour 60mg twice daily for 3-4 weeks and then as needed   - Cetirizine (Zyrtec) 10mg daily for 3-4 weeks and then as needed (most sedating,most effective)    Please note that these can take up to 3-4 weeks to see a difference.    If you need or want a prescription for Allegra just call and I'll send one in.        ---------------------------------------------------------    Take your antibiotics as prescribed. Increase water intake.    Recommend eating yogurt/kefir or taking probiotics 1-2 times daily while on antibiotics     Call if symptoms do not improve in a couple days or if you develop any medication reactions.      For symptomatic relief you may try:    Sore throat  - cepacol or other  throat lozenges as needed  - gargle salt water (1/2 teaspoon salt in 8oz warm water) every 1-2 hours    General  - get extra rest  - drink plenty of fluid  - avoid tobacco products    You will need to be evaluated if you start to experience:   Fever higher than 102.5 F (39.2 C)   Worsening of current symptoms  Sudden and severe pain in the face and head or trouble seeing or thinking clearly   Swelling or redness around 1 or both eyes   Trouble breathing, chest pain or a stiff neck    * If you are a smoker, try to quit *

## 2018-01-04 NOTE — NURSING NOTE
Patient Information     Patient Name MRN Rosi Gimenez [id number] Female 1975      Nursing Note by Deirdre Bhatti at 2017  9:50 AM     Author:  Deirdre Bhatti Service:  (none) Author Type:  (none)     Filed:  2017  9:58 AM Encounter Date:  2017 Status:  Signed     :  Deirdre Bhatti            Patient presents to the clinic for right knee pain on and off over the past several weeks.    Deirdre Bhatti LPN        2017 9:40 AM

## 2018-01-04 NOTE — NURSING NOTE
Patient Information     Patient Name MRN Sex Rosi Mendez [id number] Female 1975      Nursing Note by Deirdre Bhatti at 2017  2:20 PM     Author:  Deirdre Bhatti Service:  (none) Author Type:  (none)     Filed:  2017  2:51 PM Encounter Date:  2017 Status:  Signed     :  Deirdre Bhatti            The ear canal was irrigated with body-temperature tap water with the jet of water directed superiorly.  The ear canal was then re-examined and cleared of the impaction.  The patient tolerated the procedure well.  Deirdre Bhatti  LPN ....................  2017   2:51 PM

## 2018-01-04 NOTE — TELEPHONE ENCOUNTER
Patient Information     Patient Name MRN Sex Rosi Mendez [id number] Female 1975      Telephone Encounter by Maria M Gibson at 2017 11:49 AM     Author:  Maria M Gibson Service:  (none) Author Type:  (none)     Filed:  2017 11:49 AM Encounter Date:  2017 Status:  Signed     :  Maria M Gibson            Appointment made.  Maria M Gibson LPN....................2017 11:49 AM

## 2018-01-05 NOTE — PROGRESS NOTES
Patient Information     Patient Name MRN Sex Rosi Mendez [id number] Female 1975      Progress Notes by Jasmina Mata DO at 2017  2:20 PM     Author:  Jasmina Mata DO  Service:  (none) Author Type:  PHYS- Osteopathic     Filed:  5/10/2017  7:29 AM  Encounter Date:  2017 Status:  Addendum     :  Jasmina Mata DO (PHYS- Osteopathic)        Related Notes: Original Note by Jasmina Mata DO (PHYS- Osteopathic) filed at 2017  3:56 PM            Chief Complaint    Patient presents with      Ear Problem        Subjective:   Ms. Baires is a 42 y.o. female  seen for the acute concern today of right ear pain.  This is been going on about a week.  She states that hurts inside and out.  She denies any nasal congestion or sinus pressure.  She has had a mild sore throat on the right side.  She has not really taken any medication for this.  Any history of significant ear or sinus disease.  She does have seasonal allergies.    She  reports that she quit smoking about 14 years ago. Her smoking use included Cigarettes. She has a 3.00 pack-year smoking history. She has never used smokeless tobacco.    Past medical history reviewed as below:     Past Medical History:     Diagnosis  Date     Asthma      Depression      Edema      Encounter for removal and reinsertion of IUD 05    IUD placement, Removed      History of pregnancy     G3, P2-0-1-2 with history of spontaneous       LSIL (low grade squamous intraepithelial lesion) on Pap smear    .      ROS:   Pertinent ROS was performed and was negative, including for fevers, chills. No other concerns, with exception of HPI above.      Objective:    /76  Pulse 100  Temp 97.8  F (36.6  C) (Tympanic)  Wt (!) 139.3 kg (307 lb)  BMI 49.18 kg/m2  GEN: Vitals reviewed.  Patient is in no acute distress. Cooperative with exam.  HEENT: Normocephalic atraumatic.  EAC clear on the left with TM gray with normal landmarks.  Cerumen  present on the right initially.  After ear wash this did improve however full tympanic membrane unable to be visualized.  Of the area visualized there is some noted fluid and slight erythema.  Pupils equally round.  No scleral icterus, no conjunctival erythema. Oropharynx with no erythema or exudates. Dentition adequate.  SKIN: Warm and dry to touch.  No rash on face, arms and legs.  EXT: No clubbing or cyanosis.  No peripheral edema.     Assessment/Plan:   1. Ear pain, right  - at this time I suspect she has more ear pain and less of a bacterial infection.  She was encouraged to try taking naproxen to see if this makes her pain better overall.  She is to call she has any worsening symptoms.  - amoxicillin (AMOXIL) 500 mg capsule; Take 1 capsule by mouth 3 times daily for 10 days.  Dispense: 30 capsule; Refill: 0  - naproxen (NAPROSYN) 500 mg tablet; Take 1 tablet by mouth 2 times daily with meals. Take for 5-7 days  Dispense: 15 tablet; Refill: 0    2. Acute suppurative otitis media of right ear without spontaneous rupture of tympanic membrane, recurrence not specified  - at this time it is unclear whether she has full otitis media.  She was given a printed prescription and encouraged not to take it unless she absolutely needs to in the next few days.    - patient to treat symptoms as instructed below, call if she has any ADR's or worsening symptoms  - recommend eating yogurt/kefir 1-2 times daily while on antibiotics   - amoxicillin (AMOXIL) 500 mg capsule; Take 1 capsule by mouth 3 times daily for 10 days.  Dispense: 30 capsule; Refill: 0      Return if symptoms worsen or fail to improve.    Patient Instructions   Start Naproxen 500mg twice daily for 5-7 days.  Do not take with Ibuprofen.    Caution with NSAIDS (ibuprofen, aspirin, naproxen, aleve, advil) due to risk for increased blood pressure, stomach pain/nausea/ulcers and kidney damage; use minimal amount necessary    Okay to take Acetaminophen (Tylenol)  1000mg (2- extra strength 500mg tablets or 3 regular strength 325mg tablets) three times a day; Maximum of 4000mg daily    - Return/call as needed for follow-up should any new symptoms develop, for worsening of current symptoms or if symptoms do not resolve with above plan.    Additionally please try one of the following:  - Loratadine (Claritin) 10mg daily for 3-4 weeks and then as needed (least sedating, least effective)  - Fexofenadine (Allegra) 12 hour 60mg twice daily for 3-4 weeks and then as needed   - Cetirizine (Zyrtec) 10mg daily for 3-4 weeks and then as needed (most sedating,most effective)    Please note that these can take up to 3-4 weeks to see a difference.    If you need or want a prescription for Allegra just call and I'll send one in.        ---------------------------------------------------------    Take your antibiotics as prescribed. Increase water intake.    Recommend eating yogurt/kefir or taking probiotics 1-2 times daily while on antibiotics     Call if symptoms do not improve in a couple days or if you develop any medication reactions.      For symptomatic relief you may try:    Sore throat  - cepacol or other throat lozenges as needed  - gargle salt water (1/2 teaspoon salt in 8oz warm water) every 1-2 hours    General  - get extra rest  - drink plenty of fluid  - avoid tobacco products    You will need to be evaluated if you start to experience:   Fever higher than 102.5 F (39.2 C)   Worsening of current symptoms  Sudden and severe pain in the face and head or trouble seeing or thinking clearly   Swelling or redness around 1 or both eyes   Trouble breathing, chest pain or a stiff neck    * If you are a smoker, try to quit *                 BARBARA VEGA DO   5/5/2017 3:50 PM    This document was prepared using voice generated softwear. While every attempt was made for accuracy, grammatical errors may exist.

## 2018-01-05 NOTE — NURSING NOTE
Patient Information     Patient Name MRN Rosi Gimenez [id number] Female 1975      Nursing Note by Lisa Boone at 2017  3:45 PM     Author:  Lisa Boone Service:  (none) Author Type:  (none)     Filed:  2017  4:20 PM Encounter Date:  2017 Status:  Signed     :  Lisa Boone            Patient presents to the clinic today for her right knee.    Lisa Boone ....................  2017   3:56 PM

## 2018-01-05 NOTE — PROGRESS NOTES
Patient Information     Patient Name MRRosi Mancilla [id number] Female 1975      Progress Notes by Robert Stevens MD at 2017  3:45 PM     Author:  Robert Stevens MD Service:  (none) Author Type:  Physician     Filed:  2017  4:52 PM Encounter Date:  2017 Status:  Signed     :  Robert Stevens MD (Physician)            SUBJECTIVE:  42 y.o. female who presents for discussion of her knee pain. She is just wondering if a brace might be helpful. The pain is tolerated so far. She said that her father had both knee joints replaced. She is really not having too much trouble at this time.    She is also interested in STD testing. Her current partner was an IV drug user many years ago. She denies any symptoms at all. She is just interested in testing because of the risk.    Additional Review of Systems: see HPI:   No new symptoms otherwise.    Past Medical History:     Diagnosis  Date     Asthma      Depression      Edema      Encounter for removal and reinsertion of IUD 05    IUD placement, Removed      History of pregnancy     G3, P2-0-1-2 with history of spontaneous       LSIL (low grade squamous intraepithelial lesion) on Pap smear         Current Outpatient Prescriptions       Medication  Sig Dispense Refill     albuterol HFA 90 mcg/actuation inhaler Inhale 1-2 Puffs by mouth every 4 hours if needed. 1 Inhaler 11     amoxicillin (AMOXIL) 500 mg capsule Take 1 capsule by mouth 3 times daily for 10 days. 30 capsule 0     ARIPiprazole (ABILIFY) 15 mg tablet Take 7 mg by mouth once daily.       busPIRone (BUSPAR) 10 mg tablet Take 10 mg by mouth 2 times daily.       cholecalciferol (VITAMIN D3) 5,000 unit capsule Take 1 capsule by mouth once daily. For Vitamin D Deficiency - Dose Increase 2017 100 capsule 3     cyanocobalamin (VITAMIN B12) 1,000 mcg/mL injection Inject 1 mL intramuscular every 3 weeks. 1000 mcg 15     gabapentin (NEURONTIN) 100  mg capsule Take 1-2 capsules by mouth 3 times daily. as needed for burning/shooting neuropathy pain 180 capsule 11     hydrOXYzine pamoate (VISTARIL) 50 mg capsule Take 1 capsule in AM and 2 capsule in PM - Managed per Psychiatry  2     ibuprofen (ADVIL; MOTRIN) 200 mg tablet Take 3 tablets by mouth every 6 hours if needed for Pain. 20 tablet 1     lamoTRIgine (LAMICTAL) 150 mg tablet Take 150 mg by mouth 2 times daily.       MISCELLANEOUS MEDICAL SUPPLY (GRADUATED COMPRESSION STOCKINGS) For personal use. Length: thigh Strength: 16-20 mmHg.  Please measure patient in Pharmacy. 1 Packet 0     montelukast (SINGULAIR) 10 mg tablet Take 1 tablet by mouth at bedtime. 90 tablet 3     naproxen (NAPROSYN) 500 mg tablet Take 1 tablet by mouth 2 times daily with meals. Take for 5-7 days 15 tablet 0     oxybutynin XL (DITROPAN XL) 5 mg CR tablet TAKE 1 TABLET BY MOUTH DAILY (LOT [id number]) 28 tablet 6     oxyCODONE-acetaminophen, 5-325 mg, (PERCOCET) 5-325 mg per tablet Take 1-2 tablets by mouth every 4 hours if needed  for Pain Max acetaminophen dose: 4000mg in 24 hrs. 10 tablet 0     potassium chloride (KLOR-CON M20) 20 mEq Extended-Release tablet Take 20 mEq by mouth.       vilazodone (VIIBRYD) 40 mg tablet Take 40 mg by mouth once daily with a meal.       vitamin B complex (B-COMPLEX) tablet Take 1 tablet by mouth once daily. -- for Low B12 - Start 2/16/2017 100 tablet 3     Walker Rolling Walker for home use.  2 wheels 1 Device 0     No current facility-administered medications for this visit.      Medications have been reviewed by me and are current to the best of my knowledge and ability.      Allergies as of 05/11/2017 - Justin as Reviewed 05/11/2017      Allergen  Reaction Noted     Clonazepam Other - Describe In Comment Field 11/27/2015     Hydrocodone-acetaminophen Seizures 08/16/2016     Lorazepam Other - Describe In Comment Field 11/27/2015     Zoloft [sertraline] Other - Describe In Comment Field 11/22/2016      "Bupropion Other - Describe In Comment Field 09/21/2012     Divalproex sodium *Unknown - Pt Doesn't Remember 09/21/2012     Lithium *Unknown - Pt Doesn't Remember 09/21/2012     Risperidone analogues *Unknown - Pt Doesn't Remember 09/21/2012     Sulfa (sulfonamide antibiotics) *Unknown - Pt Doesn't Remember 09/21/2012        OBJECTIVE:  /74  Ht 1.683 m (5' 6.25\")  Wt (!) 141.1 kg (311 lb)  Breastfeeding? No  BMI 49.82 kg/m2  EXAM:  General Appearance: Pleasant, alert, appropriate appearance for age. No acute distress  Musculoskeletal Exam: Examination of the right knee was quite difficult because of the large size of the leg. There is some mild diffuse tenderness but nothing specific.  Psychiatric Exam: Alert and oriented, appropriate affect.  Knee x-ray was personally reviewed by me and I reviewed it with the patient.    ASSESSMENT/PLAN:  Mild DJD of the knee. I don't believe a brace would be indicated are helpful and I explained the reasons why which include the size of her knee and also the fact that there does not appear to be any instability. Recommended she just continue with her current medications and try to stay active on the knee and follow-up with orthopedics if needed.    At risk for STD because of possible exposure. Explained the tests that I felt would be appropriate and these will be done today. We will follow-up pending the results of the studies.  Robert Stevens MD ....................  5/11/2017   4:52 PM            "

## 2018-01-10 ENCOUNTER — HISTORY (OUTPATIENT)
Dept: OBGYN | Facility: OTHER | Age: 43
End: 2018-01-10

## 2018-01-10 ENCOUNTER — OFFICE VISIT - GICH (OUTPATIENT)
Dept: OBGYN | Facility: OTHER | Age: 43
End: 2018-01-10

## 2018-01-10 DIAGNOSIS — R23.2 FLUSHING: ICD-10-CM

## 2018-01-13 ENCOUNTER — HISTORY (OUTPATIENT)
Dept: EMERGENCY MEDICINE | Facility: OTHER | Age: 43
End: 2018-01-13

## 2018-01-17 ENCOUNTER — OFFICE VISIT - GICH (OUTPATIENT)
Dept: FAMILY MEDICINE | Facility: OTHER | Age: 43
End: 2018-01-17

## 2018-01-17 ENCOUNTER — HISTORY (OUTPATIENT)
Dept: FAMILY MEDICINE | Facility: OTHER | Age: 43
End: 2018-01-17

## 2018-01-17 DIAGNOSIS — Z01.419 ENCOUNTER FOR GYNECOLOGICAL EXAMINATION WITHOUT ABNORMAL FINDING: ICD-10-CM

## 2018-01-17 ASSESSMENT — PATIENT HEALTH QUESTIONNAIRE - PHQ9: SUM OF ALL RESPONSES TO PHQ QUESTIONS 1-9: 0

## 2018-01-22 ENCOUNTER — AMBULATORY - GICH (OUTPATIENT)
Dept: SCHEDULING | Facility: OTHER | Age: 43
End: 2018-01-22

## 2018-01-23 ENCOUNTER — TRANSFERRED RECORDS (OUTPATIENT)
Dept: HEALTH INFORMATION MANAGEMENT | Facility: CLINIC | Age: 43
End: 2018-01-23

## 2018-01-25 LAB — HPV RESULTS - HISTORICAL: NEGATIVE

## 2018-01-27 VITALS
DIASTOLIC BLOOD PRESSURE: 80 MMHG | HEIGHT: 66 IN | WEIGHT: 293 LBS | HEART RATE: 88 BPM | DIASTOLIC BLOOD PRESSURE: 72 MMHG | WEIGHT: 293 LBS | BODY MASS INDEX: 47.09 KG/M2 | BODY MASS INDEX: 51.69 KG/M2 | SYSTOLIC BLOOD PRESSURE: 134 MMHG | SYSTOLIC BLOOD PRESSURE: 140 MMHG

## 2018-01-27 VITALS
RESPIRATION RATE: 20 BRPM | HEIGHT: 67 IN | BODY MASS INDEX: 45.99 KG/M2 | HEIGHT: 66 IN | SYSTOLIC BLOOD PRESSURE: 128 MMHG | TEMPERATURE: 98 F | HEIGHT: 67 IN | DIASTOLIC BLOOD PRESSURE: 68 MMHG | BODY MASS INDEX: 45.99 KG/M2 | TEMPERATURE: 99.1 F | TEMPERATURE: 98.9 F | SYSTOLIC BLOOD PRESSURE: 130 MMHG | HEART RATE: 88 BPM | WEIGHT: 293 LBS | BODY MASS INDEX: 47.09 KG/M2 | DIASTOLIC BLOOD PRESSURE: 70 MMHG | HEART RATE: 88 BPM | SYSTOLIC BLOOD PRESSURE: 134 MMHG | WEIGHT: 293 LBS | HEART RATE: 88 BPM | WEIGHT: 293 LBS | DIASTOLIC BLOOD PRESSURE: 76 MMHG

## 2018-01-27 VITALS
HEART RATE: 92 BPM | SYSTOLIC BLOOD PRESSURE: 132 MMHG | OXYGEN SATURATION: 98 % | BODY MASS INDEX: 47.09 KG/M2 | HEIGHT: 66 IN | WEIGHT: 293 LBS | DIASTOLIC BLOOD PRESSURE: 76 MMHG | TEMPERATURE: 96.8 F

## 2018-01-27 VITALS
HEIGHT: 67 IN | DIASTOLIC BLOOD PRESSURE: 78 MMHG | HEART RATE: 92 BPM | SYSTOLIC BLOOD PRESSURE: 128 MMHG | BODY MASS INDEX: 45.99 KG/M2 | SYSTOLIC BLOOD PRESSURE: 122 MMHG | RESPIRATION RATE: 20 BRPM | DIASTOLIC BLOOD PRESSURE: 80 MMHG | BODY MASS INDEX: 45.99 KG/M2 | TEMPERATURE: 98 F | WEIGHT: 293 LBS | HEIGHT: 67 IN | WEIGHT: 293 LBS

## 2018-01-27 VITALS
DIASTOLIC BLOOD PRESSURE: 74 MMHG | DIASTOLIC BLOOD PRESSURE: 80 MMHG | SYSTOLIC BLOOD PRESSURE: 124 MMHG | DIASTOLIC BLOOD PRESSURE: 76 MMHG | BODY MASS INDEX: 47.09 KG/M2 | BODY MASS INDEX: 45.99 KG/M2 | SYSTOLIC BLOOD PRESSURE: 124 MMHG | SYSTOLIC BLOOD PRESSURE: 122 MMHG | BODY MASS INDEX: 45.99 KG/M2 | WEIGHT: 293 LBS | WEIGHT: 293 LBS | SYSTOLIC BLOOD PRESSURE: 136 MMHG | BODY MASS INDEX: 55.39 KG/M2 | HEIGHT: 66 IN | SYSTOLIC BLOOD PRESSURE: 114 MMHG | DIASTOLIC BLOOD PRESSURE: 78 MMHG | DIASTOLIC BLOOD PRESSURE: 80 MMHG | WEIGHT: 293 LBS | HEIGHT: 67 IN | WEIGHT: 293 LBS | HEART RATE: 100 BPM | TEMPERATURE: 97.8 F | WEIGHT: 293 LBS | HEIGHT: 67 IN | HEART RATE: 80 BPM

## 2018-01-28 ENCOUNTER — HEALTH MAINTENANCE LETTER (OUTPATIENT)
Age: 43
End: 2018-01-28

## 2018-01-29 ASSESSMENT — PATIENT HEALTH QUESTIONNAIRE - PHQ9
SUM OF ALL RESPONSES TO PHQ QUESTIONS 1-9: 1
SUM OF ALL RESPONSES TO PHQ QUESTIONS 1-9: 0
SUM OF ALL RESPONSES TO PHQ QUESTIONS 1-9: 2

## 2018-01-29 ASSESSMENT — ANXIETY QUESTIONNAIRES
GAD7 TOTAL SCORE: 0
GAD7 TOTAL SCORE: 0
GAD7 TOTAL SCORE: 2

## 2018-01-30 ENCOUNTER — COMMUNICATION - GICH (OUTPATIENT)
Dept: INTERNAL MEDICINE | Facility: OTHER | Age: 43
End: 2018-01-30

## 2018-02-08 ENCOUNTER — DOCUMENTATION ONLY (OUTPATIENT)
Dept: FAMILY MEDICINE | Facility: OTHER | Age: 43
End: 2018-02-08

## 2018-02-08 PROBLEM — M25.562 BILATERAL CHRONIC KNEE PAIN: Status: ACTIVE | Noted: 2017-04-20

## 2018-02-08 PROBLEM — R60.9 EDEMA: Status: ACTIVE | Noted: 2018-02-08

## 2018-02-08 PROBLEM — M25.561 BILATERAL CHRONIC KNEE PAIN: Status: ACTIVE | Noted: 2017-04-20

## 2018-02-08 PROBLEM — F31.9 BIPOLAR AFFECTIVE DISORDER (H): Status: ACTIVE | Noted: 2018-02-08

## 2018-02-08 PROBLEM — N92.1 MENORRHAGIA WITH IRREGULAR CYCLE: Status: ACTIVE | Noted: 2017-02-16

## 2018-02-08 PROBLEM — K21.9 ESOPHAGEAL REFLUX: Status: ACTIVE | Noted: 2018-02-08

## 2018-02-08 PROBLEM — F34.0 AFFECTIVE PERSONALITY DISORDER: Status: ACTIVE | Noted: 2018-02-08

## 2018-02-08 PROBLEM — F34.1 DYSTHYMIC DISORDER: Status: ACTIVE | Noted: 2018-02-08

## 2018-02-08 PROBLEM — F19.10 SUBSTANCE ABUSE (H): Status: ACTIVE | Noted: 2018-02-08

## 2018-02-08 PROBLEM — Z01.818 PREOP EXAMINATION: Status: ACTIVE | Noted: 2017-02-16

## 2018-02-08 PROBLEM — M23.8X1 CREPITUS OF JOINT OF RIGHT KNEE: Status: ACTIVE | Noted: 2017-04-20

## 2018-02-08 PROBLEM — J30.1 ALLERGIC RHINITIS DUE TO POLLEN: Status: ACTIVE | Noted: 2018-02-08

## 2018-02-08 PROBLEM — F31.60: Status: ACTIVE | Noted: 2017-01-09

## 2018-02-08 PROBLEM — G89.29 BILATERAL CHRONIC KNEE PAIN: Status: ACTIVE | Noted: 2017-04-20

## 2018-02-08 RX ORDER — ALBUTEROL SULFATE 90 UG/1
1-2 AEROSOL, METERED RESPIRATORY (INHALATION) EVERY 4 HOURS PRN
COMMUNITY
Start: 2017-12-08 | End: 2020-04-07

## 2018-02-08 RX ORDER — IBUPROFEN 200 MG
600 TABLET ORAL EVERY 6 HOURS PRN
Status: ON HOLD | COMMUNITY
Start: 2017-02-23 | End: 2022-04-21

## 2018-02-08 RX ORDER — HYDROXYZINE PAMOATE 50 MG/1
CAPSULE ORAL
COMMUNITY
Start: 2017-01-02 | End: 2018-06-26

## 2018-02-08 RX ORDER — MONTELUKAST SODIUM 10 MG/1
10 TABLET ORAL AT BEDTIME
COMMUNITY
Start: 2017-12-14 | End: 2018-12-28

## 2018-02-08 RX ORDER — GABAPENTIN 100 MG/1
100-200 CAPSULE ORAL 3 TIMES DAILY PRN
COMMUNITY
Start: 2017-12-14 | End: 2018-04-11 | Stop reason: DRUGHIGH

## 2018-02-08 RX ORDER — HYDROXYZINE HYDROCHLORIDE 50 MG/1
50 TABLET, FILM COATED ORAL AT BEDTIME
Status: ON HOLD | COMMUNITY
Start: 2017-11-20 | End: 2022-10-31

## 2018-02-08 RX ORDER — POTASSIUM CHLORIDE 1500 MG/1
20 TABLET, EXTENDED RELEASE ORAL
COMMUNITY
Start: 2016-10-20 | End: 2018-04-18

## 2018-02-08 RX ORDER — CLOTRIMAZOLE 1 %
CREAM (GRAM) TOPICAL
COMMUNITY
Start: 2017-10-11 | End: 2019-04-03

## 2018-02-08 RX ORDER — LAMOTRIGINE 100 MG/1
100 TABLET ORAL AT BEDTIME
COMMUNITY
Start: 2017-12-11 | End: 2018-03-07

## 2018-02-08 RX ORDER — OXYBUTYNIN CHLORIDE 5 MG/1
1 TABLET, EXTENDED RELEASE ORAL DAILY
COMMUNITY
Start: 2014-12-02 | End: 2018-03-15

## 2018-02-08 RX ORDER — ARIPIPRAZOLE 5 MG/1
5 TABLET ORAL DAILY
COMMUNITY
Start: 2017-12-06 | End: 2020-03-11

## 2018-02-08 RX ORDER — VILAZODONE HYDROCHLORIDE 40 MG/1
40 TABLET ORAL EVERY MORNING
COMMUNITY

## 2018-02-08 RX ORDER — VITAMIN B COMPLEX
1 TABLET ORAL DAILY
COMMUNITY
Start: 2017-02-16 | End: 2019-05-20 | Stop reason: ALTCHOICE

## 2018-02-08 RX ORDER — CYANOCOBALAMIN 1000 UG/ML
1000 INJECTION, SOLUTION INTRAMUSCULAR; SUBCUTANEOUS
COMMUNITY
Start: 2017-07-27 | End: 2018-08-17

## 2018-02-09 VITALS
BODY MASS INDEX: 45.99 KG/M2 | DIASTOLIC BLOOD PRESSURE: 100 MMHG | HEART RATE: 80 BPM | SYSTOLIC BLOOD PRESSURE: 120 MMHG | WEIGHT: 293 LBS | HEIGHT: 67 IN

## 2018-02-09 VITALS
SYSTOLIC BLOOD PRESSURE: 120 MMHG | BODY MASS INDEX: 45.99 KG/M2 | WEIGHT: 293 LBS | DIASTOLIC BLOOD PRESSURE: 82 MMHG | HEART RATE: 92 BPM | HEIGHT: 67 IN

## 2018-02-09 VITALS
BODY MASS INDEX: 55.26 KG/M2 | DIASTOLIC BLOOD PRESSURE: 68 MMHG | WEIGHT: 293 LBS | HEART RATE: 76 BPM | TEMPERATURE: 96.8 F | SYSTOLIC BLOOD PRESSURE: 126 MMHG

## 2018-02-09 VITALS
TEMPERATURE: 97.6 F | HEART RATE: 92 BPM | WEIGHT: 293 LBS | BODY MASS INDEX: 52.34 KG/M2 | SYSTOLIC BLOOD PRESSURE: 122 MMHG | DIASTOLIC BLOOD PRESSURE: 80 MMHG

## 2018-02-11 ASSESSMENT — ANXIETY QUESTIONNAIRES: GAD7 TOTAL SCORE: 0

## 2018-02-11 ASSESSMENT — PATIENT HEALTH QUESTIONNAIRE - PHQ9: SUM OF ALL RESPONSES TO PHQ QUESTIONS 1-9: 0

## 2018-02-12 NOTE — PATIENT INSTRUCTIONS
Patient Information     Patient Name MRN Sex Rosi Mendez [id number] Female 1975      Patient Instructions by Lucio Curiel MD at 2017  9:40 AM     Author:  Lucio uCriel MD  Service:  (none) Author Type:  Physician     Filed:  2017 10:36 AM  Encounter Date:  2017 Status:  Addendum     :  Lucio Curiel MD (Physician)        Related Notes: Original Note by Lucio Curiel MD (Physician) filed at 2017 10:31 AM            Congratulations on the weight loss!    Over 11 pounds so far....       Continue lymphedema treatment with Martine.     + hot flashes at times.   OBGYN referral sent  - they will call with date/time of appointment.        -- Try to avoid Carbohydrates as much as possible -- breads, pasta, baked goods, cakes, oatmeal, cold cereal, potatoes.   These are turned to sugar in one metabolic conversion, cause insulin secretion and increased fat deposition / weight gain.      -- Eat more lean meats, proteins, eggs, nuts.       -- consider 4 hour per day -- 20 hour per week, part time, at slower paced store (? [employer]).       Return in approximately 6 month(s), or sooner as needed for follow-up with Dr. Curiel.  -- follow-up lymphedema    Clinic : [phone]  Appointment line: [phone]

## 2018-02-12 NOTE — PROGRESS NOTES
"Patient Information     Patient Name MRN Rosi Gimenez [id number] Female 1975      Progress Notes by Michael Fatima MD at 1/10/2018  8:30 AM     Author:  Michael Fatima MD Service:  (none) Author Type:  Physician     Filed:  1/10/2018  8:30 AM Encounter Date:  1/10/2018 Status:  Signed     :  Michael Fatima MD (Physician)            PROGRESS NOTE    SUBJECTIVE:  Rosi returns to review her test results.  TSH & FSH were both in the normal range.  Her flushing continues to occur.  Today she admits some facial flushing and blurry vision on occasion.    OBJECTIVE:  /82 (Cuff Site: Right Arm, Position: Sitting, Cuff Size: Adult Large)  Pulse 92  Ht 1.702 m (5' 7\")  Wt (!) 160.2 kg (353 lb 3.2 oz)  BMI 55.32 kg/m2    PHYSICAL EXAM:  GENERAL APPEARANCE: Rosi Baires is a normal appearing and severely obese 42 y.o. female who is appropriate and cooperative.  Patient is neat and clean, and looks their stated age.  Patient is awake and alert, comfortable and in no acute distress while sitting up.    FSH = 4.9, TSH 3.06      ASSESSMENT & PLAN: Intermittent flushing, not in menopause and thyroid testing normal.  Possible rosacea or other non-gynecologic causes as etiology.    Michael Fatima MD ....................  1/10/2018   8:30 AM          "

## 2018-02-12 NOTE — TELEPHONE ENCOUNTER
Patient Information     Patient Name MRN Sex Rosi Mendez [id number] Female 1975      Telephone Encounter by Imelda Leyva RN at 2017  9:05 AM     Author:  Imelda Leyva RN Service:  (none) Author Type:  NURS- Registered Nurse     Filed:  2017  9:51 AM Encounter Date:  2017 Status:  Signed     :  Imelda Leyva RN (NURS- Registered Nurse)            Refill request came in today for Advair Hfa and Proventil Hfa. These are not on Patient's current medication list and  more than 4 years ago.       ADVAIR DISKUS 250-50 mcg/dose diskus inhaler (Discontinued) 60 Each 11 2012 No   Sig: INHALE 1 PUFF BY MOUTH EVERY 12 HOURS.        Class: SureScripts        Route: Inhalation        Reason for Discontinue: * Dose adjustment        Cosign for Ordering: Accepted by Charisse Jeter MD on 2012  1:28 PM          albuterol (PROVENTIL) 0.083 % neb solution (Discontinued) 1 box 3 2012 3/15/2013 --   Sig: Inhale 3 mL via a nebulizer every 6 hours if needed for Shortness Of Breath.        Class: SureScripts        Route: Neb          Attempted reaching patient to get more information regarding these requests. Left message to call back  ....................Imelda Leyva RN  2017   9:51 AM

## 2018-02-12 NOTE — TELEPHONE ENCOUNTER
Patient Information     Patient Name MRN Rosi Gimenez [id number] Female 1975      Telephone Encounter by Kassie Duke RN at 2017  8:02 AM     Author:  Kassie Duke RN Service:  (none) Author Type:  NURS- Registered Nurse     Filed:  2017  9:08 AM Encounter Date:  2017 Status:  Signed     :  Kassie Duke RN (NURS- Registered Nurse)            Refill request was for Albuterol HFA, not nebulizer solution. HFA is still on current medication list. Advair was D/C'd 16. Verified with pharmacy that patient has not been using this. Last and only fill that patient picked up from pharmacy was 16.    Bronchodilator Inhalers     Office visit in the past 12 months.    Last visit with ROYCE MONTERROSO was on: 10/27/2017 in [facility]  Next visit with ROYCE MONTERROSO is on: 2017 in [facility]  Next visit with Internal Medicine is on: 2017 in [facility]    Max refills 12 months from last office visit.    Asthma Data 2017   DATE COMPLETED (Timeframe for the following questions is the past 4 weeks) - 2017   1. Did you miss any work, school, or normal daily activities because of your asthma? - 0-No   2. Did you wake up at night because of your asthma? - 0-No   3. Did you believe that your asthma was well controlled? - 0-Yes   4. Do you use an inhaler for quick relief from asthma symptoms? - Yes        IF YES, what was the greatest number of puffs in 1 day you took with the inhaler? - 0- 1 to 4   ATAQ Score - 0   In the past 12 months, number of asthma-related emergency department visits not requiring hospitalization? - 0   In the past 12 months, number of hospitalizations requiring an overnight stay due to asthma? - 0   Date completed 2017 -   Missed daily activities no = 0 -   Wake at night no = 0 -   Believe asthma in control yes = 0 -   LISSY use yes -   Maximum LISSY use in 1 day 1-4 = 0  -   ATAQ score 0 = well controlled -   Asthma ED visits in past 12 mos 0 -   Asthma hospitalizations in past 12 mos 0 -   Some recent data might be hidden       Prescription refilled per RN Medication Refill Policy.................... Kassie Duke RN ....................  12/8/2017   9:07 AM        fluticasone-salmeterol (ADVAIR) 115-21 mcg/actuation inhaler (Discontinued) 12 g 11 11/22/2016 12/25/2016 --   Sig: Inhale 1 Puff by mouth every 12 hours. Rinse mouth well after use to prevent Thrush        Class: eRx        Route: Inhalation        Reason for Discontinue: * Patient states no longer taking med.        E-Prescribing Status: Receipt confirmed by pharmacy (11/22/2016 11:09 AM CST)          Unable to complete prescription refill per RN Medication Refill Policy.................... Kassie Duke RN ....................  12/8/2017   9:07 AM

## 2018-02-12 NOTE — NURSING NOTE
Patient Information     Patient Name MRN Sex Rosi Mendez [id number] Female 1975      Nursing Note by Karina Candelario at 2017  9:40 AM     Author:  Karina Candelario Service:  (none) Author Type:  (none)     Filed:  2017 10:28 AM Encounter Date:  2017 Status:  Signed     :  Karina Candelario            Patient is here today for a recheck Lymphedema in bilateral LE. Patient also would like to discuss hot flashes she's been having.Karina Candelario LPN......................2017 10:06 AM

## 2018-02-12 NOTE — PROGRESS NOTES
Patient Information     Patient Name MRN Sex Rosi Mendez [id number] Female 1975      Progress Notes by Michael Fatiam MD at 2017  2:15 PM     Author:  Michael Fatima MD Service:  (none) Author Type:  Physician     Filed:  2017  2:47 PM Encounter Date:  2017 Status:  Signed     :  Michael Fatima MD (Physician)            SUBJECTIVE:    Rosi Baires is a 42 y.o. female who presents for hot flashes    HPI Comments: Rosi describes night sweats, hot flashes and then cold spells.  This has been going on for several months.      Allergies      Allergen   Reactions     Clonazepam  Other - Describe In Comment Field     Causes Violence and aggresssion      Hydrocodone-Acetaminophen  Seizures     Can take Percocet without difficulty.      Lorazepam  Other - Describe In Comment Field     Causes Violence and aggresssion      Zoloft [Sertraline]  Other - Describe In Comment Field     Caused suicidally       Bupropion  Other - Describe In Comment Field     Caused Major Depression      Divalproex Sodium  *Unknown - Pt Doesn't Remember     Lithium  *Unknown - Pt Doesn't Remember     Risperidone Analogues  *Unknown - Pt Doesn't Remember     Sulfa (Sulfonamide Antibiotics)  *Unknown - Pt Doesn't Remember   ,   Family History       Problem   Relation Age of Onset     No Known Problems  Mother      Asthma  Father      + Leg edema, knee, hip replacement. + Lymphedema       Osteoporosis  Brother      No Known Problems  Brother      Other  Paternal Grandmother      Lymphedema     ,   Current Outpatient Prescriptions:      ARIPiprazole (ABILIFY) 5 mg tablet, Take 5 mg by mouth once daily., Disp: , Rfl: 3     cholecalciferol (VITAMIN D3) 5,000 unit capsule, Take 1 capsule by mouth once daily. For Vitamin D Deficiency - Dose Increase 2017, Disp: 100 capsule, Rfl: 3     clotrimazole (LOTRIMIN) 1 % cream, Apply  topically to affected area(s) 2 times daily., Disp: 1 Tube, Rfl:  "0     cyanocobalamin (VITAMIN B12) 1,000 mcg/mL injection, Inject 1 mL intramuscular every 2 weeks. - Dose Increase Frequency 7/27/2017, Disp: 4 mL, Rfl: 11     gabapentin (NEURONTIN) 100 mg capsule, Take 1-2 capsules by mouth 3 times daily. as needed for burning/shooting neuropathy pain, Disp: 180 capsule, Rfl: 11     hydrOXYzine HCl (ATARAX) 50 mg tablet, TAKE 2 TABLETS (100MG) BY MOUTH NIGHTLY AT BEDTIME AND 1 TABLET ONCE DAILY AS NEEDED, Disp: , Rfl: 2     hydrOXYzine pamoate (VISTARIL) 50 mg capsule, Take 1 capsule in AM and 2 capsule in PM - Managed per Psychiatry, Disp: , Rfl: 2     ibuprofen (ADVIL; MOTRIN) 200 mg tablet, Take 3 tablets by mouth every 6 hours if needed for Pain., Disp: 20 tablet, Rfl: 1     lamoTRIgine (LAMICTAL) 100 mg tablet, Take 100 mg by mouth at bedtime., Disp: , Rfl: 2     MISCELLANEOUS MEDICAL SUPPLY (GRADUATED COMPRESSION STOCKINGS), For personal use. Length: thigh Strength: 16-20 mmHg.  Please measure patient in Pharmacy., Disp: 1 Packet, Rfl: 0     montelukast (SINGULAIR) 10 mg tablet, Take 1 tablet by mouth at bedtime., Disp: 90 tablet, Rfl: 3     oxybutynin XL (DITROPAN XL) 5 mg CR tablet, TAKE 1 TABLET BY MOUTH DAILY (LOT [id number]), Disp: 28 tablet, Rfl: 6     potassium chloride (KLOR-CON M20) 20 mEq Extended-Release tablet, Take 20 mEq by mouth., Disp: , Rfl:      SAFETY-ELBERT TB SYR 1CC/25GX5/8 1 mL 25 gauge x 5/8\" syrg, SAFETY GLIDE-FOR B-12 SHOTS, Disp: 3 Syringe, Rfl: 3     Syringe with Needle, Disp, 1 mL 25 gauge x 5/8\", Safety glide - for B12 Shots, Disp: 1 box, Rfl: 0     VENTOLIN HFA 90 mcg/actuation inhaler, INHALE 1-2 PUFFS BY MOUTH EVERY 4 HOURS IF NEEDED., Disp: 54 g, Rfl: 0     vilazodone (VIIBRYD) 40 mg tablet, Take 40 mg by mouth once daily with a meal., Disp: , Rfl:      vitamin B complex (B-COMPLEX) tablet, Take 1 tablet by mouth once daily. -- for Low B12 - Start 2/16/2017, Disp: 100 tablet, Rfl: 3     Walker, Rolling Walker for home use.  2 wheels, Disp: 1 " Device, Rfl: 0  Medications have been reviewed by me and are current to the best of my knowledge and ability.,   Past Medical History:     Diagnosis  Date     Asthma      Depression      Edema      Encounter for removal and reinsertion of IUD 05    IUD placement, Removed      History of pregnancy     G3, P2-0-1-2 with history of spontaneous       LSIL (low grade squamous intraepithelial lesion) on Pap smear    ,   Patient Active Problem List       Diagnosis  Date Noted     Bilateral chronic knee pain  2017     Crepitus of joint of right knee  2017     Preop examination - Hysterectomy 2017 - Dr. Fatima  2017     Menorrhagia with irregular cycle  2017     Bipolar disorder, current episode mixed, unspecified (HC)  2017     Peripheral polyneuropathy (HC)  2016     Elevated random blood glucose level  2016     Vitamin D deficiency  2016     Vitamin B12 deficiency  2016     Bilateral foot pain  2016     Lymphedema of both lower extremities  2016     Venous stasis dermatitis of both lower extremities  2016     Alcohol dependence (HC)  2015     Cannabis dependence in remission (HC)  2015     Anxiety, generalized  2015     Morbid obesity with BMI of 50.0-59.9, adult (HC)  2015     Moderate persistent asthma  2013     Overview:   AAP completed on 13  Triggers: pollen, fumes, exercise, URI, warm weather. Peak flow today is 250. Symptomatic: moderate  Overview:   AAP completed on 13  Triggers: pollen, fumes, exercise, URI, warm weather. Peak flow today is 250. Symptomatic: moderate        Urinary incontinence  03/15/2013     DEPRESSION/ANXIETY       Closed dislocation of tarsal joint  2011     GERD       EDEMA LEG       ALLERGIC RHINITIS, SEASONAL       BIPOLAR AFFECTIVE DISORDER       AFFECTIVE PERSONALITY DISORDER       SUBSTANCE ABUSE       Atopic rhinitis  10/02/2003     Overview:  "  GICH - Seasonal Allergies        Borderline personality disorder - Following with [facility] - Chiquis MELINDA  07/07/2003     Overview:   [facility].        Disorder of female genital organ  07/05/2001     Overview:   DUB, dysmenorrhea      ,   Past Surgical History:      Procedure  Laterality Date     LEEP PROCEDURE  07/04    Underwent a loop       LEG/ANKLE SURGERY      fracture, repair with screws       TUBAL LIGATION  2009    tubal ligation      and   Social History       Substance Use Topics         Smoking status:   Former Smoker     Packs/day:  1.00     Years:  3.00     Types:  Cigarettes     Quit date:  1/1/2003     Smokeless tobacco:   Never Used     Alcohol use   No      Comment: History of abuse - sober as of July 2010.         REVIEW OF SYSTEMS:  Review of Systems   Constitutional: Negative for chills, fever, malaise/fatigue and weight loss.   Cardiovascular: Negative for chest pain, palpitations and orthopnea.   Gastrointestinal: Negative for abdominal pain, constipation, diarrhea, nausea and vomiting.   Genitourinary: Negative for dysuria, frequency, hematuria and urgency.   Skin: Negative for itching and rash.   Neurological: Negative for dizziness, tingling, tremors and headaches.       OBJECTIVE:  /100 (Cuff Site: Right Arm, Position: Sitting, Cuff Size: Adult Large)  Pulse 80  Ht 1.702 m (5' 7\")  Wt (!) 157.9 kg (348 lb 3.2 oz)  Breastfeeding? No  BMI 54.54 kg/m2    EXAM:   Physical Exam   Constitutional: She is oriented to person, place, and time and well-developed, well-nourished, and in no distress.   HENT:   Head: Normocephalic and atraumatic.   Eyes: Pupils are equal, round, and reactive to light.   Neurological: She is alert and oriented to person, place, and time.   Skin: Skin is warm and dry.   Psychiatric: Mood, memory, affect and judgment normal.       ASSESSMENT/PLAN:   Menopausal symptoms     Plan:  Check FSH & TSH.  Provided Today [employer] brochure on menopause.  F/U " in 2 weeks.

## 2018-02-12 NOTE — PROGRESS NOTES
Patient Information     Patient Name MRN Sex Rosi Mendez [id number] Female 1975      Progress Notes by Lucio Curiel MD at 2017  9:40 AM     Author:  Lucio Curiel MD Service:  (none) Author Type:  Physician     Filed:  2017  1:27 PM Encounter Date:  2017 Status:  Signed     :  Lucio Curiel MD (Physician)            Nursing Notes:   Karina Candelario  2017 10:28 AM  Signed  Patient is here today for a recheck Lymphedema in bilateral LE. Patient also would like to discuss hot flashes she's been having.Karina Candelario LPN......................2017 10:06 AM    Rosi Baires presents to clinic today for:   Chief Complaint     Patient presents with       Follow Up      Lymphedema in bilateral LE     HPI: Ms. Baires is a 42 y.o. female who presents today for evaluation of above.     (I89.0) Lymphedema of both lower extremities  (primary encounter diagnosis)  (I87.2) Venous stasis dermatitis of both lower extremities  (E66.01,  Z68.43) Morbid obesity with BMI of 50.0-59.9, adult (HC)  (R20.2) Pins and needles sensation  (M79.671,  M79.672) Bilateral foot pain  (E53.8) Vitamin B12 deficiency  (G62.9) Peripheral polyneuropathy (HC)  (J45.40) Moderate persistent asthma without complication  (R23.2) Hot flashes     Patient presents for follow-up of lymphedema.  She's been seeing Martine for lymphedema treatment.  Thinks it is been working for her.  Legs up and feeling better.  She's been losing weight.  She's been walking more and has been more active.    Advised to continue physical therapy/lymphedema treatments.  If needed we can reorder her referral.    Stasis dermatitis is doing better of the legs.  States that she is working to start doing treatments at home for her lymphedema.    Morbid obesity, ongoing -  discussed need for reduced oral caloric intake.  Regular exercise.  Reduce carbohydrate intake.  Information printed and reviewed.    Still having  some pain on occasion, pins and needle sensation.  States that this has gotten better.  She is occasionally using some gabapentin.  B complex pills seem to have also help this.    B12 deficiency, taking oral replacement -- and has been getting B12 shots.  States her energy levels have been better.  Her moods improved.  Psychiatry is also been adjusting, dose reduction of her psychiatric medications.  She is also no longer on buspirone.  Thinks that this is helping her mental fog and her mood is better.    Polyneuropathy, probably multifactorial.  States neuropathy pain has improved.    Moderate asthma, currently stable.  Albuterol inhaler used intermittently and reports helpful.   Asthma Data 2/16/2017 12/14/2017   DATE COMPLETED (Timeframe for the following questions is the past 4 weeks) 2/16/2017 12/14/2017   1. Did you miss any work, school, or normal daily activities because of your asthma? 0-No 0-No   2. Did you wake up at night because of your asthma? 0-No 0-No   3. Did you believe that your asthma was well controlled? 0-Yes 0-Yes   4. Do you use an inhaler for quick relief from asthma symptoms? Yes -        IF YES, what was the greatest number of puffs in 1 day you took with the inhaler? 0- 1 to 4 0- 0   ATAQ Score 0 -   In the past 12 months, number of asthma-related emergency department visits not requiring hospitalization? 0 0   In the past 12 months, number of hospitalizations requiring an overnight stay due to asthma? 0 0   Date completed - -   Missed daily activities - -   Wake at night - -   Believe asthma in control - -   LISSY use - -   Maximum LISSY use in 1 day - -   ATAQ score - -   Asthma ED visits in past 12 mos - -   Asthma hospitalizations in past 12 mos - -   Some recent data might be hidden       Hot flashes, she thinks she is perimenopausal.  She would like to see OB/GYN.  Referral sent.    Ms. Baires's Body mass index is 52.34 kg/(m^2). This is out of the normal range for a 42 y.o. Normal  range for ages 18+ is between 18.5 and 24.9. To lose weight we reviewed risks and benefits of appropriate options such as diet, exercise, and medications. Patient's strategy will be  self-directed nutrition plan and self-directed exercise program   BP Readings from Last 1 Encounters:12/14/17 : 122/80  Ms. Mahan blood pressure is out of the normal range for adults. Per JNC-8 guidelines normal adult blood pressure is < 120/80, pre-hypertensive is between 120/80 and 139/89, and hypertension is 140/90 or greater. Risks of hypertension were discussed. Patient's strategy will be reduced salt intake    Functional Capacity: > or about 4 METS.   Patient reports no current symptoms of fevers, chills, nausea/vomiting.   No cough. No shortness of breath.   No change in bowel/bladder habits. No melena, hematochezia. No Hematuria.   No rashes. No palpitations.  No orthopnea/paroxysmal nocturnal dyspnea   No vision or hearing issues.   No significant mood issues -- reports stable.   No bruising.     AGNIESZKA:  AGNIESZKA-7 ANXIETY SCREENING 10/27/2017 12/14/2017   AGNIESZKA date (doc flow) 10/27/2017 12/14/2017   Nervous, anxious 0 0   Cannot stop worrying 1 0   Worry about different things 1 0   Cannot relax 0 0   Feeling restless 0 0   Easily annoyed/irritated 0 0   Afraid of awful event 0 0   Score 2 0   Severity none none   Some recent data might be hidden         PHQ9:  PHQ Depression Screening 10/27/2017 12/14/2017   Date of PHQ exam (doc flow) 10/27/2017 12/14/2017   1. Lack of interest/pleasure 0 - Not at all 0 - Not at all   2. Feeling down/depressed 0 - Not at all 0 - Not at all   PHQ-2 TOTAL SCORE 0 0   3. Trouble sleeping 0 - Not at all -   4. Decreased energy 0 - Not at all -   5. Appetite change 1 - Several days -   6. Feelings of failure 0 - Not at all -   7. Trouble concentrating 0 - Not at all -   8. Activity level 0 - Not at all -   9. Hurting yourself 0 - Not at all -   PHQ-9 TOTAL SCORE 1 -   PHQ-9 Severity Level none -    Functional Impairment not difficult at all -   Some recent data might be hidden          I have personally reviewed the past medical history, past surgical history, medications, allergies, family and social history as listed below, on 12/14/2017.    Patient Active Problem List       Diagnosis  Date Noted     DEPRESSION/ANXIETY       Priority: High      Bilateral chronic knee pain  04/20/2017     Crepitus of joint of right knee  04/20/2017     Preop examination - Hysterectomy 2/23/2017 - Dr. Fatima  02/16/2017     Menorrhagia with irregular cycle  02/16/2017     Bipolar disorder, current episode mixed, unspecified (HC)  01/09/2017     Peripheral polyneuropathy (HC)  12/18/2016     Elevated random blood glucose level  12/18/2016     Vitamin D deficiency  12/18/2016     Vitamin B12 deficiency  12/18/2016     Bilateral foot pain  12/18/2016     Lymphedema of both lower extremities  11/22/2016     Venous stasis dermatitis of both lower extremities  08/17/2016     Alcohol dependence (HC)  11/28/2015     Cannabis dependence in remission (HC)  11/28/2015     Anxiety, generalized  11/28/2015     Morbid obesity with BMI of 50.0-59.9, adult (HC)  03/23/2015     Moderate persistent asthma  08/19/2013     Overview:   AAP completed on 08/19/13  Triggers: pollen, fumes, exercise, URI, warm weather. Peak flow today is 250. Symptomatic: moderate  Overview:   AAP completed on 08/19/13  Triggers: pollen, fumes, exercise, URI, warm weather. Peak flow today is 250. Symptomatic: moderate        Urinary incontinence  03/15/2013     Closed dislocation of tarsal joint  02/04/2011     GERD       EDEMA LEG       ALLERGIC RHINITIS, SEASONAL       BIPOLAR AFFECTIVE DISORDER       AFFECTIVE PERSONALITY DISORDER       SUBSTANCE ABUSE       Atopic rhinitis  10/02/2003     Overview:   [facility] - Seasonal Allergies        Borderline personality disorder - Following with [facility] Miguelina - Chiquis LAWRENCE  07/07/2003     Overview:   [facility]  Counseling.        Disorder of female genital organ  2001     Overview:   DUB, dysmenorrhea        Past Medical History:     Diagnosis  Date     Asthma      Depression      Edema      Encounter for removal and reinsertion of IUD 05    IUD placement, Removed      History of pregnancy     G3, P2-0-1-2 with history of spontaneous       LSIL (low grade squamous intraepithelial lesion) on Pap smear      Past Surgical History:      Procedure  Laterality Date     LEEP PROCEDURE      Underwent a loop       LEG/ANKLE SURGERY      fracture, repair with screws       TUBAL LIGATION      tubal ligation       Current Outpatient Prescriptions       Medication  Sig Dispense Refill     ARIPiprazole (ABILIFY) 5 mg tablet Take 5 mg by mouth once daily.  3     cholecalciferol (VITAMIN D3) 5,000 unit capsule Take 1 capsule by mouth once daily. For Vitamin D Deficiency - Dose Increase 2017 100 capsule 3     clotrimazole (LOTRIMIN) 1 % cream Apply  topically to affected area(s) 2 times daily. 1 Tube 0     cyanocobalamin (VITAMIN B12) 1,000 mcg/mL injection Inject 1 mL intramuscular every 2 weeks. - Dose Increase Frequency 2017 4 mL 11     gabapentin (NEURONTIN) 100 mg capsule Take 1-2 capsules by mouth 3 times daily. as needed for burning/shooting neuropathy pain 180 capsule 11     hydrOXYzine HCl (ATARAX) 50 mg tablet TAKE 2 TABLETS (100MG) BY MOUTH NIGHTLY AT BEDTIME AND 1 TABLET ONCE DAILY AS NEEDED  2     hydrOXYzine pamoate (VISTARIL) 50 mg capsule Take 1 capsule in AM and 2 capsule in PM - Managed per Psychiatry  2     ibuprofen (ADVIL; MOTRIN) 200 mg tablet Take 3 tablets by mouth every 6 hours if needed for Pain. 20 tablet 1     lamoTRIgine (LAMICTAL) 100 mg tablet Take 100 mg by mouth at bedtime.  2     MISCELLANEOUS MEDICAL SUPPLY (GRADUATED COMPRESSION STOCKINGS) For personal use. Length: thigh Strength: 16-20 mmHg.  Please measure patient in Pharmacy. 1 Packet 0     montelukast  "(SINGULAIR) 10 mg tablet Take 1 tablet by mouth at bedtime. 90 tablet 3     oxybutynin XL (DITROPAN XL) 5 mg CR tablet TAKE 1 TABLET BY MOUTH DAILY (LOT [id number]) 28 tablet 6     potassium chloride (KLOR-CON M20) 20 mEq Extended-Release tablet Take 20 mEq by mouth.       SAFETY-ELBERT TB SYR 1CC/25GX5/8 1 mL 25 gauge x 5/8\" syrg SAFETY GLIDE-FOR B-12 SHOTS 3 Syringe 3     Syringe with Needle, Disp, 1 mL 25 gauge x 5/8\" Safety glide - for B12 Shots 1 box 0     VENTOLIN HFA 90 mcg/actuation inhaler INHALE 1-2 PUFFS BY MOUTH EVERY 4 HOURS IF NEEDED. 54 g 0     vilazodone (VIIBRYD) 40 mg tablet Take 40 mg by mouth once daily with a meal.       vitamin B complex (B-COMPLEX) tablet Take 1 tablet by mouth once daily. -- for Low B12 - Start 2017 100 tablet 3     Walker Rolling Walker for home use.  2 wheels 1 Device 0     Allergies      Allergen   Reactions     Clonazepam  Other - Describe In Comment Field     Causes Violence and aggresssion      Hydrocodone-Acetaminophen  Seizures     Can take Percocet without difficulty.      Lorazepam  Other - Describe In Comment Field     Causes Violence and aggresssion      Zoloft [Sertraline]  Other - Describe In Comment Field     Caused suicidally       Bupropion  Other - Describe In Comment Field     Caused Major Depression      Divalproex Sodium  *Unknown - Pt Doesn't Remember     Lithium  *Unknown - Pt Doesn't Remember     Risperidone Analogues  *Unknown - Pt Doesn't Remember     Sulfa (Sulfonamide Antibiotics)  *Unknown - Pt Doesn't Remember     Family History       Problem   Relation Age of Onset     No Known Problems  Mother      Asthma  Father      + Leg edema, knee, hip replacement. + Lymphedema       Osteoporosis  Brother      No Known Problems  Brother      Other  Paternal Grandmother      Lymphedema       Family Status     Relation  Status     Mother Alive     Father Alive     Brother Alive     Brother Alive     Paternal Grandmother      Social History     Social " "History        Marital status:       Spouse name: N/A     Number of children:  N/A     Years of education:  N/A     Social History Main Topics         Smoking status:   Former Smoker     Packs/day:  1.00     Years:  3.00     Types:  Cigarettes     Quit date:  1/1/2003     Smokeless tobacco:   Never Used     Alcohol use   No      Comment: History of abuse - sober as of July 2010.       Drug use:   No      Comment: History of use      Sexual activity:   No     Other Topics  Concern     Not on file      Social History Narrative     No longer in adult foster care lived with Charley Godwin.      .     2 sons - age 12 and 7 - as of 11/2016.     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      Pertinent ROS was performed and was negative as noted in HPI above.     EXAM:   Vitals:     12/14/17 1010   BP: 122/80   Cuff Site: Right Arm   Position: Sitting   Cuff Size: Adult Large   Pulse: 92   Temp: 97.6  F (36.4  C)   TempSrc: Tympanic   Weight: (!) 151.6 kg (334 lb 3.2 oz)     BP Readings from Last 3 Encounters:    12/14/17 122/80   10/27/17 122/78   10/11/17 135/83     Wt Readings from Last 3 Encounters:    12/14/17 (!) 151.6 kg (334 lb 3.2 oz)   10/27/17 (!) 156.9 kg (345 lb 12.8 oz)   10/11/17 131.5 kg (290 lb)     Estimated body mass index is 52.34 kg/(m^2) as calculated from the following:    Height as of 10/11/17: 1.702 m (5' 7\").    Weight as of this encounter: 151.6 kg (334 lb 3.2 oz).     EXAM:  Constitutional: well groomed / good hygiene, casual dress  ENT: Normocephalic, Atraumatic, Thyroid without nodules or tenderness   Nose/Mouth: Oral pharynx without erythema or exudates, Nose is patent bilaterally, no rhinorrhea and Dental hygeine adequate   Eyes:  Extraocular muscles intact, Sclera non-icteric, Conjunctiva without erythema  Lymphatic Exam: Non-palpable nodes in neck, clavicular regions  Pulmonary: Lungs are clear to auscultation bilaterally, without wheezes or " crackles  Cardiovascular Exam: regular rate and rhythm, trace pedal edema present  Gastrointestinal Exam: Obese  Soft, non-tender, non-distended, positive bowel sounds  Integument: No abnormal rashes, sores, or ulcerations noted  Neurologic Exam: CN 3-12 grossly intact   Musculoskeletal Exam: + chronic lymphedema bilateral legs.   Psychiatric Exam: Awake and Alert, Affect and mood appropriate    INVESTIGATIONS:  Results for orders placed or performed during the hospital encounter of 08/27/17      COMP METABOLIC PANEL      Result  Value Ref Range    SODIUM 141 133 - 143 mmol/L    POTASSIUM 4.3 3.5 - 5.1 mmol/L    CHLORIDE 104 98 - 107 mmol/L    CO2,TOTAL 25 21 - 31 mmol/L    ANION GAP 12 5 - 18                    GLUCOSE 99 70 - 105 mg/dL    CALCIUM 9.3 8.6 - 10.3 mg/dL    BUN 11 7 - 25 mg/dL    CREATININE 0.95 0.70 - 1.30 mg/dL    BUN/CREAT RATIO           12                    GFR if African American >60 >60 ml/min/1.73m2    GFR if not African American >60 >60 ml/min/1.73m2    ALBUMIN 3.8 3.5 - 5.7 g/dL    PROTEIN,TOTAL 7.1 6.4 - 8.9 g/dL    GLOBULIN                  3.3 2.0 - 3.7 g/dL    A/G RATIO 1.2 1.0 - 2.0                    BILIRUBIN,TOTAL 0.8 0.3 - 1.0 mg/dL    ALK PHOSPHATASE 47 34 - 104 IU/L    ALT (SGPT) 16 7 - 52 IU/L    AST (SGOT) 18 13 - 39 IU/L   D-DIMER,QUANTITATIVE      Result  Value Ref Range    D-DIMER, QUANTITATIVE  301 (H) >199 - 230 ng/mL   CBC WITH AUTO DIFFERENTIAL      Result  Value Ref Range    WHITE BLOOD COUNT         7.5 4.5 - 11.0 thou/cu mm    RED BLOOD COUNT           4.27 4.00 - 5.20 mil/cu mm    HEMOGLOBIN                12.9 12.0 - 16.0 g/dL    HEMATOCRIT                39.1 33.0 - 51.0 %    MCV                       92 80 - 100 fL    MCH                       30.2 26.0 - 34.0 pg    MCHC                      33.0 32.0 - 36.0 g/dL    RDW                       12.8 11.5 - 15.5 %    PLATELET COUNT            205 140 - 440 thou/cu mm    MPV                       10.1 6.5 - 11.0 fL     NEUTROPHILS               59.1 42.0 - 72.0 %    LYMPHOCYTES               28.1 20.0 - 44.0 %    MONOCYTES                 10.0 <12.0 %    EOSINOPHILS               2.1 <8.0 %    BASOPHILS                 0.4 <3.0 %    IMMATURE GRANULOCYTES(METAS,MYELOS,PROS) 0.3 %    ABSOLUTE NEUTROPHILS      4.4 1.7 - 7.0 thou/cu mm    ABSOLUTE LYMPHOCYTES      2.1 0.9 - 2.9 thou/cu mm    ABSOLUTE MONOCYTES        0.8 <0.9 thou/cu mm    ABSOLUTE EOSINOPHILS      0.2 <0.5 thou/cu mm    ABSOLUTE BASOPHILS        0.0 <0.3 thou/cu mm    ABSOLUTE IMMATURE GRANULOCYTES(METAS,MYELOS,PROS) 0.0 <=0.3 thou/cu mm       ASSESSMENT AND PLAN:  Rosi was seen today for follow up.    Diagnoses and all orders for this visit:    Lymphedema of both lower extremities    Venous stasis dermatitis of both lower extremities    Morbid obesity with BMI of 50.0-59.9, adult (HC)    Pins and needles sensation  -     gabapentin (NEURONTIN) 100 mg capsule; Take 1-2 capsules by mouth 3 times daily. as needed for burning/shooting neuropathy pain    Bilateral foot pain  -     gabapentin (NEURONTIN) 100 mg capsule; Take 1-2 capsules by mouth 3 times daily. as needed for burning/shooting neuropathy pain    Vitamin B12 deficiency  -     gabapentin (NEURONTIN) 100 mg capsule; Take 1-2 capsules by mouth 3 times daily. as needed for burning/shooting neuropathy pain    Peripheral polyneuropathy (HC)  -     gabapentin (NEURONTIN) 100 mg capsule; Take 1-2 capsules by mouth 3 times daily. as needed for burning/shooting neuropathy pain    Moderate persistent asthma without complication  -     montelukast (SINGULAIR) 10 mg tablet; Take 1 tablet by mouth at bedtime.    Hot flashes  -     AMB CONSULT TO OB/GYN SURGEON; Future      reviewed diet, exercise and weight control, recommended sodium restriction    -- Expected clinical course discussed   -- Medications and their side effects discussed    The ASCVD Risk score (Shar PINKY Jr, et al., 2013) failed to calculate for the  following reasons:    Cannot find a previous HDL lab    Cannot find a previous total cholesterol lab    Rosi is also recommended to eat a heart-healthy diet, do regular aerobic exercises, maintain a desirable body weight, and avoid tobacco products. These recommendations are from the American Heart Association (AHA) which stresses the importance of lifestyle changes to lower cardiovascular disease risk.     Return in about 6 months (around 6/14/2018) for -- follow-up lymphedema.    Patient Instructions   Congratulations on the weight loss!    Over 11 pounds so far....       Continue lymphedema treatment with Martine.     + hot flashes at times.   OBGYN referral sent  - they will call with date/time of appointment.        -- Try to avoid Carbohydrates as much as possible -- breads, pasta, baked goods, cakes, oatmeal, cold cereal, potatoes.   These are turned to sugar in one metabolic conversion, cause insulin secretion and increased fat deposition / weight gain.      -- Eat more lean meats, proteins, eggs, nuts.       -- consider 4 hour per day -- 20 hour per week, part time, at slower paced store (? [employer]).       Return in approximately 6 month(s), or sooner as needed for follow-up with Dr. Curiel.  -- follow-up lymphedema    Clinic : [phone]  Appointment line: [phone]      Lucio Curiel MD

## 2018-02-12 NOTE — NURSING NOTE
Patient Information     Patient Name MRN Sex Rosi Mendez [id number] Female 1975      Nursing Note by Ruben Hollis LPN at 1/10/2018  8:30 AM     Author:  Ruben Hollis LPN  Service:  (none) Author Type:  NURS- Licensed Practical Nurse     Filed:  1/10/2018  8:18 AM  Encounter Date:  1/10/2018 Status:  Addendum     :  Ruben Hollis LPN (NURS- Licensed Practical Nurse)        Related Notes: Original Note by Ruben Hollis LPN (NURS- Licensed Practical Nurse) filed at 1/10/2018  8:15 AM            Patient presents to the clinic for a follow up regarding labs. Patient denies any concerns at this time.  Ruben Hollis LPN ..............1/10/2018 8:15 AM

## 2018-02-13 ENCOUNTER — DOCUMENTATION ONLY (OUTPATIENT)
Dept: FAMILY MEDICINE | Facility: OTHER | Age: 43
End: 2018-02-13

## 2018-02-13 NOTE — DISCHARGE SUMMARY
Patient Information     Patient Name MRN Sex     Rosi Baires [id number] Female 1975      Discharge Summaries by Joanie Jean Baptiste PT at 2018 11:43 AM     Author:  Joanie Jean Baptiste PT Service:  (none) Author Type:  PT- Physical Therapist     Filed:  2018 11:45 AM Date of Service:  2018 11:43 AM Status:  Signed     :  Joanie Jean Baptiste PT (PT- Physical Therapist)            [facility]  Outpatient PT - Discharge Summary    Date of discharge: 2018    Patient Name: Rosi Baires  YOB: 1975   Referring MD/Provider:  Lucio Curiel MD  Diagnosis: lymphedema  Treatment Diagnosis: bilateral lower extremity edema, lower extremity pain  Insurance:  Medicare/[employer]  Start of Care Date: 17    Dates of Service: 17    Thru:  17  Number of visits: 1           Reason for Discharge:  Patient has been discontinued from Therapy due to multiple missed/no show appointments  Patient failed to schedule further appointments  Unplanned discharge    Progress from Initial to Discharge (if applicable):  NA, see eval       Further Recommendations:    None      Patient is discharged from Physical Therapy    Thank you for your referral to [facility].  Please call with any questions, concerns or comments.  [phone]

## 2018-02-13 NOTE — PROGRESS NOTES
Patient Information     Patient Name MRN Sex     Rosi Baires [id number] Female 1975      Progress Notes by Liz Diaz PA-C at 2018  9:00 AM     Author:  Liz Diaz PA-C Service:  (none) Author Type:  PHYS- Physician Assistant     Filed:  2018 10:17 AM Encounter Date:  2018 Status:  Signed     :  Liz Diaz PA-C (PHYS- Physician Assistant)            Nursing Notes:   Aurora Shetty  2018  9:13 AM  Signed  Patient presents to the clinic for pap.  Aurora Shetty LPN........................2018  8:55 AM        HPI: Rosi Baires is a 43 y.o. female who presents for a papexam.  Concerns include: none    No LMP recorded. Patient has had an ablation.   Contraception: none  Risk for STI?: no  Last pap: 2017 - ASCUS and neg HPV, 17 - endometrial biopsy - simple glandular hyperplasia without atypia.   nl pap on 2012  Last mammo: scheduled 2018  Immunizations: UTD    Patient Active Problem List       Diagnosis  Date Noted     Bilateral chronic knee pain  2017     Crepitus of joint of right knee  2017     Bipolar disorder, current episode mixed, unspecified (HC)  2017     Peripheral polyneuropathy (HC)  2016     Elevated random blood glucose level  2016     Vitamin D deficiency  2016     Vitamin B12 deficiency  2016     Bilateral foot pain  2016     Lymphedema of both lower extremities  2016     Venous stasis dermatitis of both lower extremities  2016     Alcohol dependence (HC)  2015     Cannabis dependence in remission (HC)  2015     Anxiety, generalized  2015     Morbid obesity with BMI of 50.0-59.9, adult (HC)  2015     Moderate persistent asthma  2013     Overview:   AAP completed on 13  Triggers: pollen, fumes, exercise, URI, warm weather. Peak flow today is 250. Symptomatic: moderate  Overview:   AAP completed on 13  Triggers:  pollen, fumes, exercise, URI, warm weather. Peak flow today is 250. Symptomatic: moderate        Urinary incontinence  03/15/2013     DEPRESSION/ANXIETY       Closed dislocation of tarsal joint  2011     GERD       EDEMA LEG       ALLERGIC RHINITIS, SEASONAL       BIPOLAR AFFECTIVE DISORDER       AFFECTIVE PERSONALITY DISORDER       SUBSTANCE ABUSE       Atopic rhinitis  10/02/2003     Overview:   [facility] - Seasonal Allergies        Borderline personality disorder - Following with [facility] - Chiquis LAWRENCE  2003     Overview:   [facility].        Disorder of female genital organ  2001     Overview:   DUB, dysmenorrhea          Past Medical History:     Diagnosis  Date     Asthma      Depression      Edema      Encounter for removal and reinsertion of IUD 05    IUD placement, Removed      History of pregnancy     G3, P2-0-1-2 with history of spontaneous       LSIL (low grade squamous intraepithelial lesion) on Pap smear      Menorrhagia with irregular cycle 2017       Past Surgical History:      Procedure  Laterality Date     LEEP PROCEDURE      Underwent a loop       LEG/ANKLE SURGERY      fracture, repair with screws       TUBAL LIGATION      tubal ligation         Social History     Social History        Marital status:       Spouse name: N/A     Number of children:  N/A     Years of education:  N/A     Occupational History      Not on file.     Social History Main Topics         Smoking status:   Former Smoker     Packs/day:  1.00     Years:  3.00     Types:  Cigarettes     Quit date:  2003     Smokeless tobacco:   Never Used     Alcohol use   No      Comment: History of abuse - sober as of 2010.       Drug use:   No      Comment: History of use      Sexual activity:   No     Other Topics  Concern     Not on file      Social History Narrative     No longer in adult foster care lived with Charley Godwin.      .     2 sons - age 12 and  "7 - as of 11/2016.     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        Family History       Problem   Relation Age of Onset     No Known Problems  Mother      Asthma  Father      + Leg edema, knee, hip replacement. + Lymphedema       Osteoporosis  Brother      No Known Problems  Brother      Other  Paternal Grandmother      Lymphedema         Current Outpatient Prescriptions       Medication  Sig Dispense Refill     amoxicillin (AMOXIL) 500 mg capsule Take 1 capsule by mouth 3 times daily for 10 days. 30 capsule 0     ARIPiprazole (ABILIFY) 5 mg tablet Take 5 mg by mouth once daily.  3     cholecalciferol (VITAMIN D3) 5,000 unit capsule Take 1 capsule by mouth once daily. For Vitamin D Deficiency - Dose Increase 2/16/2017 100 capsule 3     cyanocobalamin (VITAMIN B12) 1,000 mcg/mL injection Inject 1 mL intramuscular every 2 weeks. - Dose Increase Frequency 7/27/2017 4 mL 11     gabapentin (NEURONTIN) 100 mg capsule Take 1-2 capsules by mouth 3 times daily. as needed for burning/shooting neuropathy pain 180 capsule 11     hydrOXYzine HCl (ATARAX) 50 mg tablet TAKE 2 TABLETS (100MG) BY MOUTH NIGHTLY AT BEDTIME AND 1 TABLET ONCE DAILY AS NEEDED  2     ibuprofen (ADVIL; MOTRIN) 200 mg tablet Take 3 tablets by mouth every 6 hours if needed for Pain. 20 tablet 1     lamoTRIgine (LAMICTAL) 25 mg tablet Take 2 tablets by mouth at bedtime.       MISCELLANEOUS MEDICAL SUPPLY (GRADUATED COMPRESSION STOCKINGS) For personal use. Length: thigh Strength: 16-20 mmHg.  Please measure patient in Pharmacy. 1 Packet 0     montelukast (SINGULAIR) 10 mg tablet Take 1 tablet by mouth at bedtime. 90 tablet 3     oxybutynin XL (DITROPAN XL) 5 mg CR tablet TAKE 1 TABLET BY MOUTH DAILY (LOT [id number]) 28 tablet 6     potassium chloride (KLOR-CON M20) 20 mEq Extended-Release tablet Take 20 mEq by mouth.       SAFETY-ELBERT TB SYR 1CC/25GX5/8 1 mL 25 gauge x 5/8\" syrg SAFETY GLIDE-FOR B-12 SHOTS 3 Syringe 3     Syringe with " "Needle, Disp, 1 mL 25 gauge x 5/8\" Safety glide - for B12 Shots 1 box 0     traMADol (ULTRAM) 50 mg tablet Take 1 tablet by mouth every 6 hours if needed for Pain for up to 20 doses. 20 tablet 0     VENTOLIN HFA 90 mcg/actuation inhaler INHALE 1-2 PUFFS BY MOUTH EVERY 4 HOURS IF NEEDED. 54 g 0     vilazodone (VIIBRYD) 40 mg tablet Take 40 mg by mouth once daily with a meal.       vitamin B complex (B-COMPLEX) tablet Take 1 tablet by mouth once daily. -- for Low B12 - Start 2/16/2017 100 tablet 3     Walker Rolling Walker for home use.  2 wheels 1 Device 0     No current facility-administered medications for this visit.      Medications have been reviewed by me and are current to the best of my knowledge and ability.       REVIEW OF SYSTEMS:  Refer to HPI.    PHYSICAL EXAM:  /68 (Cuff Site: Right Arm, Position: Sitting, Cuff Size: Adult Regular)  Pulse 76  Temp 96.8  F (36  C) (Tympanic)   Wt (!) 160 kg (352 lb 12.8 oz)  Breastfeeding? No  BMI 55.26 kg/m2  CONSTITUTIONAL:  Alert, cooperative, NAD.  BREASTS: No skin abnormalities, no erythema.  No discrete masses.  No nipple discharge, no axillary, supra- or infraclavicular LA.   CARDIOVASCULAR: Regular, S1, S2.  No S3 or S4.  No murmur/gallop/rub.  No peripheral edema.  RESPIRATORY: CTA bilaterally, no wheezes, rhonchi or rales.  GI: Bowel sounds wnl.  Soft, nontender, nondistended.  No masses or HSM.  No rebound or guarding.  : Vulva: normal, no lesions or discharge  Urethral meatus: normal size and location, no lesions or discharge  Urethra: no tenderness or masses  Bladder: no fullness or tenderness  Vagina: normal appearance, no abnormal discharge, no lesions.  No evidence of cystocele or rectocele.  Cervix: normal appearance, no lesions, no abnormal discharge, no cervical motion tenderness  Uterus: normal size and position, mobile, non-tender  Adnexa: no palpable masses bilaterally. No cervical motion tenderness.  Pap smear obtained: " yes  PSYCHIATRIC: Affect normal.  Speech fluent.      PHQ Depression Screen  Date of PHQ exam: 18  Over the last 2 weeks, how often have you been bothered by any of the following problems?  1. Little interest or pleasure in doing things: 0 - Not at all  2. Feeling down, depressed, or hopeless: 0 - Not at all  3. Trouble falling or staying asleep, or sleeping too much: 0 - Not at all  4. Feeling tired or having little energy: 0 - Not at all  5. Poor appetite or overeatin - Not at all  6. Feeling bad about yourself - or that you are a failure or have let yourself or your family down: 0 - Not at all  7. Trouble concentrating on things, such as reading the newspaper or watching television: 0 - Not at all  8. Moving or speaking so slowly that other people could have noticed. Or the opposite - being so fidgety or restless that you have been moving around a lot more than usual: 0 - Not at all  9. Thoughts that you would be better off dead, or of hurting yourself in some way: 0 - Not at all    PHQ-9 TOTAL SCORE: 0  Depression Severity Level: none  If any answers were positive, how difficult have these problems made it for you to do your work, take care of things at home, or get along with other people: not applicable      ASSESSMENT/PLAN:    ICD-10-CM    1. Pap test, as part of routine gynecological examination Z01.419 GYN THIN PREP PAP SCREEN IMAGED      GYN THIN PREP PAP SCREEN IMAGED       Completed pap test and HPV. Up-to-date on immunizations. Mammogram already scheduled. No new concerns at this time.     Counseled on healthy diet, Calcium and vitamin D intake, and exercise.    Liz Diaz PA-C ....................  2018   10:06 AM

## 2018-02-13 NOTE — TELEPHONE ENCOUNTER
Patient Information     Patient Name MRN Rosi Gimenez [id number] Female 1975      Telephone Encounter by Deirdre Bahtti at 2018  2:20 PM     Author:  Deirdre Bhatti Service:  (none) Author Type:  (none)     Filed:  2018  2:22 PM Encounter Date:  2018 Status:  Signed     :  Deirdre Bhatti            Only number provided has been changed or disconnected at this time.  Unable to contact patient.      Deirdre Bhatti LPN        2018 2:22 PM

## 2018-02-13 NOTE — TELEPHONE ENCOUNTER
Patient Information     Patient Name MRN Rosi Gimenez [id number] Female 1975      Telephone Encounter by Deirdre Bhatti at 2018  1:43 PM     Author:  Deirdre Bhatti Service:  (none) Author Type:  (none)     Filed:  2018  1:44 PM Encounter Date:  2018 Status:  Signed     :  Deirdre Bhatti            Form of Medical Opinion received at this time.    Deirdre Bhatti LPN        2018 1:44 PM

## 2018-02-13 NOTE — ADDENDUM NOTE
Patient Information     Patient Name MRN Rosi Gimenez [id number] Female 1975      Addendum Note by Soraya Huff at 2018  1:11 PM     Author:  Soraya Huff Service:  (none) Author Type:  (none)     Filed:  2018  1:11 PM Encounter Date:  2018 Status:  Signed     :  Soraya Huff       Addended by: SORAYA HUFF on: 2018 01:11 PM        Modules accepted: Orders

## 2018-02-13 NOTE — TELEPHONE ENCOUNTER
Patient Information     Patient Name MRN Sex Rosi Mendez [id number] Female 1975      Telephone Encounter by Lucio Curiel MD at 2018  4:03 PM     Author:  Lucio Curiel MD Service:  (none) Author Type:  Physician     Filed:  2018  4:03 PM Encounter Date:  2018 Status:  Signed     :  Lucio Curiel MD (Physician)            Noted.   Lucio Curiel MD

## 2018-02-13 NOTE — TELEPHONE ENCOUNTER
Patient Information     Patient Name MRN Rosi Gimenez [id number] Female 1975      Telephone Encounter by Lucio Curiel MD at 2018  1:49 PM     Author:  Lucio Curiel MD Service:  (none) Author Type:  Physician     Filed:  2018  1:49 PM Encounter Date:  2018 Status:  Signed     :  Lucio Curiel MD (Physician)            Need to defer form to her mental health provider. I am not sure what to put on it.     Lucio Curiel MD

## 2018-02-13 NOTE — NURSING NOTE
Patient Information     Patient Name MRN Rosi Gimenez [id number] Female 1975      Nursing Note by Aurora Shetty at 2018  9:00 AM     Author:  Aurora Shetty Service:  (none) Author Type:  (none)     Filed:  2018  9:13 AM Encounter Date:  2018 Status:  Signed     :  Aurora Shetty            Patient presents to the clinic for pap.  Aurora Shetty LPN........................2018  8:55 AM

## 2018-03-01 ENCOUNTER — MEDICAL CORRESPONDENCE (OUTPATIENT)
Dept: HEALTH INFORMATION MANAGEMENT | Facility: OTHER | Age: 43
End: 2018-03-01

## 2018-03-01 ENCOUNTER — TELEPHONE (OUTPATIENT)
Dept: INTERNAL MEDICINE | Facility: OTHER | Age: 43
End: 2018-03-01
Payer: MEDICARE

## 2018-03-01 DIAGNOSIS — F31.0 BIPOLAR I DISORDER, MOST RECENT EPISODE HYPOMANIC (H): Primary | ICD-10-CM

## 2018-03-07 ENCOUNTER — OFFICE VISIT (OUTPATIENT)
Dept: INTERNAL MEDICINE | Facility: OTHER | Age: 43
End: 2018-03-07
Attending: INTERNAL MEDICINE
Payer: COMMERCIAL

## 2018-03-07 VITALS
BODY MASS INDEX: 47.09 KG/M2 | HEIGHT: 66 IN | WEIGHT: 293 LBS | SYSTOLIC BLOOD PRESSURE: 124 MMHG | DIASTOLIC BLOOD PRESSURE: 82 MMHG | HEART RATE: 80 BPM

## 2018-03-07 DIAGNOSIS — F31.77 BIPOLAR 1 DISORDER, MIXED, PARTIAL REMISSION (H): ICD-10-CM

## 2018-03-07 DIAGNOSIS — M25.562 BILATERAL CHRONIC KNEE PAIN: ICD-10-CM

## 2018-03-07 DIAGNOSIS — G89.29 BILATERAL CHRONIC KNEE PAIN: ICD-10-CM

## 2018-03-07 DIAGNOSIS — M25.561 BILATERAL CHRONIC KNEE PAIN: ICD-10-CM

## 2018-03-07 DIAGNOSIS — E53.8 VITAMIN B12 DEFICIENCY: ICD-10-CM

## 2018-03-07 DIAGNOSIS — I89.0 LYMPHEDEMA OF BOTH LOWER EXTREMITIES: ICD-10-CM

## 2018-03-07 DIAGNOSIS — Z00.00 ROUTINE GENERAL MEDICAL EXAMINATION AT A HEALTH CARE FACILITY: Primary | ICD-10-CM

## 2018-03-07 DIAGNOSIS — F60.3 BORDERLINE PERSONALITY DISORDER (H): ICD-10-CM

## 2018-03-07 DIAGNOSIS — E55.9 VITAMIN D DEFICIENCY: ICD-10-CM

## 2018-03-07 DIAGNOSIS — E66.01 MORBID OBESITY WITH BMI OF 50.0-59.9, ADULT (H): ICD-10-CM

## 2018-03-07 DIAGNOSIS — Z13.220 LIPID SCREENING: ICD-10-CM

## 2018-03-07 PROBLEM — R60.9 EDEMA: Status: RESOLVED | Noted: 2018-02-08 | Resolved: 2018-03-07

## 2018-03-07 PROBLEM — F34.0 AFFECTIVE PERSONALITY DISORDER: Status: RESOLVED | Noted: 2018-02-08 | Resolved: 2018-03-07

## 2018-03-07 PROBLEM — F31.60: Status: RESOLVED | Noted: 2017-01-09 | Resolved: 2018-03-07

## 2018-03-07 PROBLEM — F34.1 DYSTHYMIC DISORDER: Status: RESOLVED | Noted: 2018-02-08 | Resolved: 2018-03-07

## 2018-03-07 PROBLEM — F19.11 HISTORY OF SUBSTANCE ABUSE (H): Status: ACTIVE | Noted: 2018-02-08

## 2018-03-07 LAB
ALBUMIN SERPL-MCNC: 4.5 G/DL (ref 3.5–5.7)
ALP SERPL-CCNC: 53 U/L (ref 34–104)
ALT SERPL W P-5'-P-CCNC: 17 U/L (ref 7–52)
ANION GAP SERPL CALCULATED.3IONS-SCNC: 6 MMOL/L (ref 3–14)
AST SERPL W P-5'-P-CCNC: 18 U/L (ref 13–39)
BASOPHILS # BLD AUTO: 0 10E9/L (ref 0–0.2)
BASOPHILS NFR BLD AUTO: 0.4 %
BILIRUB SERPL-MCNC: 0.7 MG/DL (ref 0.3–1)
BUN SERPL-MCNC: 11 MG/DL (ref 7–25)
CALCIUM SERPL-MCNC: 9.8 MG/DL (ref 8.6–10.3)
CHLORIDE SERPL-SCNC: 104 MMOL/L (ref 98–107)
CHOLEST SERPL-MCNC: 168 MG/DL
CO2 SERPL-SCNC: 31 MMOL/L (ref 21–31)
CREAT SERPL-MCNC: 1.01 MG/DL (ref 0.6–1.2)
DEPRECATED CALCIDIOL+CALCIFEROL SERPL-MC: 46.1 NG/ML
DIFFERENTIAL METHOD BLD: NORMAL
EOSINOPHIL # BLD AUTO: 0.2 10E9/L (ref 0–0.7)
EOSINOPHIL NFR BLD AUTO: 2.3 %
ERYTHROCYTE [DISTWIDTH] IN BLOOD BY AUTOMATED COUNT: 13.1 % (ref 10–15)
GFR SERPL CREATININE-BSD FRML MDRD: 60 ML/MIN/1.7M2
GLUCOSE SERPL-MCNC: 99 MG/DL (ref 70–105)
HCT VFR BLD AUTO: 41.8 % (ref 35–47)
HDLC SERPL-MCNC: 45 MG/DL (ref 23–92)
HGB BLD-MCNC: 13.9 G/DL (ref 11.7–15.7)
IMM GRANULOCYTES # BLD: 0 10E9/L (ref 0–0.4)
IMM GRANULOCYTES NFR BLD: 0.3 %
LDLC SERPL CALC-MCNC: 96 MG/DL
LYMPHOCYTES # BLD AUTO: 1.9 10E9/L (ref 0.8–5.3)
LYMPHOCYTES NFR BLD AUTO: 28.1 %
MCH RBC QN AUTO: 30.3 PG (ref 26.5–33)
MCHC RBC AUTO-ENTMCNC: 33.3 G/DL (ref 31.5–36.5)
MCV RBC AUTO: 91 FL (ref 78–100)
MONOCYTES # BLD AUTO: 0.6 10E9/L (ref 0–1.3)
MONOCYTES NFR BLD AUTO: 8.3 %
NEUTROPHILS # BLD AUTO: 4.1 10E9/L (ref 1.6–8.3)
NEUTROPHILS NFR BLD AUTO: 60.6 %
NONHDLC SERPL-MCNC: 123 MG/DL
PLATELET # BLD AUTO: 239 10E9/L (ref 150–450)
POTASSIUM SERPL-SCNC: 4.4 MMOL/L (ref 3.5–5.1)
PROT SERPL-MCNC: 8.5 G/DL (ref 6.4–8.9)
RBC # BLD AUTO: 4.59 10E12/L (ref 3.8–5.2)
SODIUM SERPL-SCNC: 141 MMOL/L (ref 134–144)
TRIGL SERPL-MCNC: 136 MG/DL
TSH SERPL DL<=0.05 MIU/L-ACNC: 1.83 IU/ML (ref 0.34–5.6)
VIT B12 SERPL-MCNC: 703 PG/ML (ref 180–914)
WBC # BLD AUTO: 6.8 10E9/L (ref 4–11)

## 2018-03-07 PROCEDURE — 80053 COMPREHEN METABOLIC PANEL: CPT | Performed by: INTERNAL MEDICINE

## 2018-03-07 PROCEDURE — 80061 LIPID PANEL: CPT | Performed by: INTERNAL MEDICINE

## 2018-03-07 PROCEDURE — 82306 VITAMIN D 25 HYDROXY: CPT | Performed by: INTERNAL MEDICINE

## 2018-03-07 PROCEDURE — 99396 PREV VISIT EST AGE 40-64: CPT | Performed by: INTERNAL MEDICINE

## 2018-03-07 PROCEDURE — 36415 COLL VENOUS BLD VENIPUNCTURE: CPT | Performed by: INTERNAL MEDICINE

## 2018-03-07 PROCEDURE — 84443 ASSAY THYROID STIM HORMONE: CPT | Performed by: INTERNAL MEDICINE

## 2018-03-07 PROCEDURE — 85025 COMPLETE CBC W/AUTO DIFF WBC: CPT | Performed by: INTERNAL MEDICINE

## 2018-03-07 PROCEDURE — 82607 VITAMIN B-12: CPT | Performed by: INTERNAL MEDICINE

## 2018-03-07 PROCEDURE — G0463 HOSPITAL OUTPT CLINIC VISIT: HCPCS

## 2018-03-07 RX ORDER — LAMOTRIGINE 200 MG/1
200 TABLET ORAL AT BEDTIME
Refills: 2 | Status: ON HOLD | COMMUNITY
Start: 2018-02-12 | End: 2022-11-06

## 2018-03-07 RX ORDER — GABAPENTIN 300 MG/1
CAPSULE ORAL
Refills: 1 | COMMUNITY
Start: 2018-02-14 | End: 2020-03-11

## 2018-03-07 ASSESSMENT — ENCOUNTER SYMPTOMS
EYE PAIN: 0
MYALGIAS: 0
NAUSEA: 0
WHEEZING: 0
COUGH: 0
VOMITING: 0
CONFUSION: 0
FATIGUE: 0
PALPITATIONS: 0
ABDOMINAL PAIN: 0
ROS GI COMMENTS: + OBESITY
FEVER: 0
HEMATURIA: 0
CHILLS: 0
LIGHT-HEADEDNESS: 0
ARTHRALGIAS: 0
DIARRHEA: 0
DYSURIA: 0
AGITATION: 0
BRUISES/BLEEDS EASILY: 0
DIZZINESS: 0
SHORTNESS OF BREATH: 0

## 2018-03-07 ASSESSMENT — PAIN SCALES - GENERAL: PAINLEVEL: SEVERE PAIN (6)

## 2018-03-07 NOTE — MR AVS SNAPSHOT
After Visit Summary   3/7/2018    Rosi Lamb    MRN: [id number]           Patient Information     Date Of Birth          1975        Visit Information        Provider Department      3/7/2018 8:40 AM Lucio Curiel MD [facility]        Today's Diagnoses     Routine general medical examination at a health care facility    -  1    Bipolar 1 disorder, mixed, partial remission (H)        Borderline personality disorder        Bilateral chronic knee pain        Vitamin B12 deficiency        Morbid obesity with BMI of 50.0-59.9, adult (H)        Lymphedema of both lower extremities        Vitamin D deficiency        Lipid screening          Care Instructions    Labs today.     Restart lymphedema treatment with Martine. Referral sent.     Glad Gabapentin is helping your anxiety.     Return in approximately 6 month(s), or sooner as needed for follow-up with Dr. Curiel.  - follow-up lymphedema    Clinic : [phone]  Appointment line: [phone]              Follow-ups after your visit        Additional Services     LYMPHEDEMA THERAPY REFERRAL       *This therapy referral will be filtered to a centralized scheduling office at [facility] and the patient will receive a call to schedule an appointment at a [place] location most convenient for them. *   If you have not heard from the scheduling office within 2 business days, please call [phone] for all locations, with the exception of [place], please call [phone].     Treatment: PT or OT Evaluation & Treatment  Special Instructions: [facility]  PT/OT Treatment Diagnosis: Lymphedema    Please be aware that coverage of these services is subject to the terms and limitations of your health insurance plan.  Call member services at your health plan with any benefit or coverage questions.      **Note to Provider:  If you are referring outside of [place] for the therapy appointment, please list  "the name of the location in the \"special instructions\" above, print the referral and give to the patient to schedule the appointment.                  Follow-up notes from your care team     Return in about 6 months (around 9/7/2018) for - follow-up lymphedema.      Your next 10 appointments already scheduled     Jun 14, 2018  9:40 AM CDT   PHYSICAL with Lucio Curiel MD   [facility]    [address]  [place] [address]   [phone]            Sep 12, 2018 10:40 AM CDT   SHORT with Lucio Curiel MD   [facility]    [address]  [place] [address] [phone]              Who to contact     If you have questions or need follow up information about today's clinic visit or your schedule please contact [facility] directly at [phone].  Normal or non-critical lab and imaging results will be communicated to you by Abigail Stewartt, letter or phone within 4 business days after the clinic has received the results. If you do not hear from us within 7 days, please contact the clinic through "[employer] or phone. If you have a critical or abnormal lab result, we will notify you by phone as soon as possible.  Submit refill requests through "[employer] or call your pharmacy and they will forward the refill request to us. Please allow 3 business days for your refill to be completed.          Additional Information About Your Visit        "[employer] Information     "[employer] lets you send messages to your doctor, view your test results, renew your prescriptions, schedule appointments and more. To sign up, go to [email or web address] . Click on \"Log in\" on the left side of the screen, which will take you to the Welcome page. Then click on \"Sign up Now\" on the right side of the page.     You will be asked to enter the access code listed below, as well as some personal " "information. Please follow the directions to create your username and password.     Your access code is: [email or web address]  Expires: 2018  9:18 AM     Your access code will  in 90 days. If you need help or a new code, please call your [place] clinic or [phone].        Care EveryWhere ID     This is your Care EveryWhere ID. This could be used by other organizations to access your [place] medical records  [id number]        Your Vitals Were     Pulse Height Breastfeeding? BMI (Body Mass Index)          80 1.68 m (5' 6.14\") No 55.54 kg/m2         Blood Pressure from Last 3 Encounters:   18 124/82   18 126/68   01/10/18 120/82    Weight from Last 3 Encounters:   18 (!) 156.8 kg (345 lb 9.6 oz)   18 (!) 160 kg (352 lb 12.8 oz)   01/10/18 (!) 160.2 kg (353 lb 3.2 oz)              We Performed the Following     CBC with platelets and differential     Comprehensive metabolic panel (BMP + Alb, Alk Phos, ALT, AST, Total. Bili, TP)     Lipid Profile (Chol, Trig, HDL, LDL calc) - FASTING     LYMPHEDEMA THERAPY REFERRAL     TSH GH     Vitamin B12     Vitamin D Total GH        Primary Care Provider Office Phone # Fax #    Lucio Curiel [phone]       [address] [facility] [address]        Equal Access to Services     [facility] Center[facility]ANTIAGO : Hadii tristan riverao Socate, waaxda luqadaha, qaybta kaalmaemerson velezmaritza bree dejesus . So [facility] [phone].    ATENCIÓN: Si habla rosy, tiene a mondragon disposición servicios gratuitos de asistencia lingüística. Llame al [phone].    We comply with applicable federal civil rights laws and [place] laws. We do not discriminate on the basis of race, color, national origin, age, disability, sex, sexual orientation, or gender identity.            Thank you!     Thank you for choosing [facility]  for your care. Our goal is always to provide you with excellent care. Hearing back from " our patients is one way we can continue to improve our services. Please take a few minutes to complete the written survey that you may receive in the mail after your visit with us. Thank you!             Your Updated Medication List - Protect others around you: Learn how to safely use, store and throw away your medicines at www.disposemymeds.org.          This list is accurate as of 3/7/18  9:18 AM.  Always use your most recent med list.                   Brand Name Dispense Instructions for use Diagnosis    ARIPiprazole 5 MG tablet    ABILIFY     Take 5 mg by mouth daily        clotrimazole 1 % cream    LOTRIMIN          cyanocobalamin 1000 MCG/ML injection    VITAMIN B12     Inject 1,000 mcg into the muscle every 14 days . Dose increase frequency 7/27/2017        D 5000 5000 UNITS Caps   Generic drug:  cholecalciferol      Take 5,000 Units by mouth daily for vitamin D deficiency. Dose increase 2/16/2017.        * gabapentin 100 MG capsule    NEURONTIN     Take 100-200 mg by mouth 3 times daily as needed for burning / shooting neuropathy pain.        * gabapentin 300 MG capsule    NEURONTIN     TAKE 1 CAPSULE BY MOUTH DAILY FOR 3 DAYS, THEN INCREASE TO TAKE 1 CAPSULE TWICE DAILY FOR 3 DAYS, THEN 1 CAPSULE 3 TIMES DAILY.        hydrOXYzine 50 MG capsule    VISTARIL     Take 1 capsule in AM and 2 capsule in PM - Managed per Psychiatry        hydrOXYzine 50 MG tablet    ATARAX     TAKE 2 TABLETS (100MG) BY MOUTH NIGHTLY AT BEDTIME AND 1 TABLET ONCE DAILY AS NEEDED        ibuprofen 200 MG tablet    ADVIL/MOTRIN     Take 600 mg by mouth every 6 hours as needed for pain        lamoTRIgine 200 MG tablet    LaMICtal     TAKE 1 TABLET BY MOUTH NIGHTLY AT BEDTIME        Medical Compression Stockings Misc      MISCELLANEOUS MEDICAL SUPPLY (GRADUATED COMPRESSION STOCKINGS). For personal use. Length: thigh Strength: 16-20 mmHg.  Please measure patient in Pharmacy.        montelukast 10 MG tablet    SINGULAIR     Take 10 mg by  "mouth At Bedtime        oxybutynin 5 MG 24 hr tablet    DITROPAN-XL     Take 1 tablet by mouth daily (lot [id number])        potassium chloride SA 20 MEQ CR tablet    K-DUR/KLOR-CON M     Take 20 mEq by mouth        roller walker Misc      Rolling Walker for home use.  2 wheels        SAFETY-ELBERT TB SYRINGE 25G X 5/8\" 1 ML Misc   Generic drug:  Tuberculin-Allergy Syringes      SAFETY GLIDE-FOR B-12 SHOTS        syringe/needle (sisp) 25G X 5/8\" 1 ML Misc      Safety glide - for B12 Shots        VENTOLIN  (90 BASE) MCG/ACT Inhaler   Generic drug:  albuterol      Inhale 1-2 puffs into the lungs every 4 hours as needed        vilazodone 40 MG Tabs tablet    VIIBRYD     Take 40 mg by mouth daily with food        Vitamin-B Complex Tabs      Take 1 tablet by mouth daily for low b12. Start 2/16/2017        * Notice:  This list has 2 medication(s) that are the same as other medications prescribed for you. Read the directions carefully, and ask your doctor or other care provider to review them with you.      "

## 2018-03-07 NOTE — LETTER
Rosi Lamb  [address]   [place] [address]    3/12/2018      Dear Rosi Lamb,    The result of your recent tests are included below:    Labs all look good.  Everything has improved.  Continue current medications.    Results for orders placed or performed in visit on 03/07/18   CBC with platelets and differential   Result Value Ref Range    WBC 6.8 4.0 - 11.0 10e9/L    RBC Count 4.59 3.8 - 5.2 10e12/L    Hemoglobin 13.9 11.7 - 15.7 g/dL    Hematocrit 41.8 35.0 - 47.0 %    MCV 91 78 - 100 fl    MCH 30.3 26.5 - 33.0 pg    MCHC 33.3 31.5 - 36.5 g/dL    RDW 13.1 10.0 - 15.0 %    Platelet Count 239 150 - 450 10e9/L    Diff Method Automated Method     % Neutrophils 60.6 %    % Lymphocytes 28.1 %    % Monocytes 8.3 %    % Eosinophils 2.3 %    % Basophils 0.4 %    % Immature Granulocytes 0.3 %    Absolute Neutrophil 4.1 1.6 - 8.3 10e9/L    Absolute Lymphocytes 1.9 0.8 - 5.3 10e9/L    Absolute Monocytes 0.6 0.0 - 1.3 10e9/L    Absolute Eosinophils 0.2 0.0 - 0.7 10e9/L    Absolute Basophils 0.0 0.0 - 0.2 10e9/L    Abs Immature Granulocytes 0.0 0 - 0.4 10e9/L   Comprehensive metabolic panel (BMP + Alb, Alk Phos, ALT, AST, Total. Bili, TP)   Result Value Ref Range    Sodium 141 134 - 144 mmol/L    Potassium 4.4 3.5 - 5.1 mmol/L    Chloride 104 98 - 107 mmol/L    Carbon Dioxide 31 21 - 31 mmol/L    Anion Gap 6 3 - 14 mmol/L    Glucose 99 70 - 105 mg/dL    Urea Nitrogen 11 7 - 25 mg/dL    Creatinine 1.01 0.60 - 1.20 mg/dL    GFR Estimate 60 (L) >60 mL/min/1.7m2    GFR Estimate If Black 73 >60 mL/min/1.7m2    Calcium 9.8 8.6 - 10.3 mg/dL    Bilirubin Total 0.7 0.3 - 1.0 mg/dL    Albumin 4.5 3.5 - 5.7 g/dL    Protein Total 8.5 6.4 - 8.9 g/dL    Alkaline Phosphatase 53 34 - 104 U/L    ALT 17 7 - 52 U/L    AST 18 13 - 39 U/L   Vitamin D Total GH   Result Value Ref Range    Vitamin D Total 46.1 ng/mL   Lipid Profile (Chol, Trig, HDL, LDL calc) - FASTING   Result Value Ref Range    Cholesterol 168  <200 mg/dL    Triglycerides 136 <150 mg/dL    HDL Cholesterol 45 23 - 92 mg/dL    LDL Cholesterol Calculated 96 <100 mg/dL    Non HDL Cholesterol 123 <130 mg/dL   Vitamin B12   Result Value Ref Range    Vitamin B12 703 180 - 914 pg/mL   TSH GH   Result Value Ref Range    Thyrotropin 1.83 0.34 - 5.60 IU/mL       If you have any further questions or problems, please contact my office at [phone] and schedule an appointment.    Clinic : [phone]  Appointment line: [phone]     Thank you,    Lucio Curiel MD    Internal Medicine  [facility]     Reviewed and electronically signed by provider.

## 2018-03-07 NOTE — PATIENT INSTRUCTIONS
Labs today.     Restart lymphedema treatment with [facility]ine. Referral sent.     Glad Gabapentin is helping your anxiety.     Return in approximately 6 month(s), or sooner as needed for follow-up with Dr. Curiel.  - follow-up lymphedema    Clinic : [phone]  Appointment line: [phone]

## 2018-03-07 NOTE — PROGRESS NOTES
Nursing Notes:   Maricarmen Gutierrez LPN  3/7/2018  8:53 AM  Signed  Patient presents today for annual physical.    Maricarmen Gutierrez LPN on 3/7/2018 at 8:13 AM    Nursing note reviewed with patient.  Accurracy and completeness verified.   Ms. Lamb is a 43 year old female who:  Patient presents with:  Physical    HPI     ICD-10-CM    1. Routine general medical examination at a health care facility Z00.00    2. Bipolar 1 disorder, mixed, partial remission (H) F31.77    3. Borderline personality disorder F60.3    4. Bilateral chronic knee pain M25.561     M25.562     G89.29    5. Vitamin B12 deficiency E53.8 CBC with platelets and differential     Comprehensive metabolic panel (BMP + Alb, Alk Phos, ALT, AST, Total. Bili, TP)     Vitamin B12   6. Morbid obesity with BMI of 50.0-59.9, adult (H) E66.01 TSH GH    Z68.43    7. Lymphedema of both lower extremities I89.0 LYMPHEDEMA THERAPY REFERRAL   8. Vitamin D deficiency E55.9 Vitamin D Total GH   9. Lipid screening Z13.220 Lipid Profile (Chol, Trig, HDL, LDL calc) - FASTING     Patient presents for routine physical examination.    She has paperwork with her today that needs to be filled out.  She has multiple, mostly psychiatric medical comorbidities but also does have some medical morbidities.  She has a morbid obesity.  Lymphedema.  Chronic knee pain.  She needs to reposition frequently.  She cannot stand for very long without needing to change physician.  She is looking to possibly do some very specific type of work that would try to limit her exposure to large crowds and allow her the ability to change position frequently.  She would be willing to work 1-4 hours a day 2-3 days a week with some stipulations that would allow her to do above.  Forms are completed today.    Bipolar type I disorder.  Follow with Jeff Wren.  Recently started on gabapentin for her anxiety which has been helping.  Med list updated.    Chronic knee pain, ongoing.  Encouraged her to  continue to work on weight loss.    Obesity, discussed need for reduced oral caloric intake.  Regular exercise.  Reduce carbohydrate intake.  Information printed and reviewed.  --Check thyroid levels.    B12 deficiency, check labs, B12 shots have been quite helpful.  She has been getting them about every 2 weeks.    Lateral lower extremity lymphedema.  Chronic.  Needs to see the lymphedema clinic again.  Referral sent.    Vitamin D deficiency, taking oral replacement.  Recheck labs.    lipid screening, she would like cholesterol levels checked today since she is getting some labs drawn.    Functional Capacity: about 4 METS.   Reports that she can climb a flight of stairs without any chest pain/heaviness   No orthopnea/paroxysmal nocturnal dyspnea  Review of Systems   Constitutional: Negative for chills, fatigue and fever.   HENT: Negative for congestion and hearing loss.    Eyes: Negative for pain and visual disturbance.   Respiratory: Negative for cough, shortness of breath and wheezing.    Cardiovascular: Positive for leg swelling. Negative for chest pain and palpitations.   Gastrointestinal: Negative for abdominal pain, diarrhea, nausea and vomiting.        + obesity   Endocrine: Negative for cold intolerance and heat intolerance.   Genitourinary: Negative for dysuria and hematuria.   Musculoskeletal: Negative for arthralgias and myalgias.        + much less leg pain after starting water aerobics.    Skin: Negative for pallor.   Allergic/Immunologic: Negative for immunocompromised state.   Neurological: Negative for dizziness and light-headedness.   Hematological: Does not bruise/bleed easily.   Psychiatric/Behavioral: Negative for agitation and confusion.        AGNIESZKA:   AGNIESZKA-7 SCORE 7/27/2017 10/27/2017 12/14/2017   Total Score 0 2 0     PHQ9:  PHQ-9 SCORE 10/27/2017 1/17/2018 3/7/2018   Total Score 1 0 0       I have personally reviewed the past medical history, past surgical history, medications, allergies,  family and social history as listed below, on 3/7/2018.    Allergies   Allergen Reactions     Clonazepam Other (See Comments)     Causes Violence and aggresssion     Hydrocodone-Acetaminophen      Other reaction(s): Seizures  Can take Percocet without difficulty.     Lorazepam Other (See Comments)     Causes Violence and aggresssion     Sertraline Other (See Comments)     Caused suicidally      Bupropion Other (See Comments)     Caused Major Depression     Lithium Unknown     Risperidone Unknown     Sulfa Drugs Unknown     Valproic Acid Unknown       Current Outpatient Prescriptions   Medication Sig Dispense Refill     gabapentin (NEURONTIN) 300 MG capsule TAKE 1 CAPSULE BY MOUTH DAILY FOR 3 DAYS, THEN INCREASE TO TAKE 1 CAPSULE TWICE DAILY FOR 3 DAYS, THEN 1 CAPSULE 3 TIMES DAILY.  1     lamoTRIgine (LAMICTAL) 200 MG tablet TAKE 1 TABLET BY MOUTH NIGHTLY AT BEDTIME  2     ARIPiprazole (ABILIFY) 5 MG tablet Take 5 mg by mouth daily       cholecalciferol (D 5000) 5000 UNITS CAPS Take 5,000 Units by mouth daily for vitamin D deficiency. Dose increase 2/16/2017.       clotrimazole (LOTRIMIN) 1 % cream        cyanocobalamin (VITAMIN B12) 1000 MCG/ML injection Inject 1,000 mcg into the muscle every 14 days . Dose increase frequency 7/27/2017       gabapentin (NEURONTIN) 100 MG capsule Take 100-200 mg by mouth 3 times daily as needed for burning / shooting neuropathy pain.       hydrOXYzine (ATARAX) 50 MG tablet TAKE 2 TABLETS (100MG) BY MOUTH NIGHTLY AT BEDTIME AND 1 TABLET ONCE DAILY AS NEEDED       hydrOXYzine (VISTARIL) 50 MG capsule Take 1 capsule in AM and 2 capsule in PM - Managed per Psychiatry       ibuprofen (ADVIL/MOTRIN) 200 MG tablet Take 600 mg by mouth every 6 hours as needed for pain       montelukast (SINGULAIR) 10 MG tablet Take 10 mg by mouth At Bedtime       oxybutynin (DITROPAN-XL) 5 MG 24 hr tablet Take 1 tablet by mouth daily (lot [id number])       potassium chloride SA (K-DUR/KLOR-CON M) 20 MEQ CR  "tablet Take 20 mEq by mouth       Tuberculin-Allergy Syringes (SAFETY-ELBERT TB SYRINGE) 25G X 5/8\" 1 ML MISC SAFETY GLIDE-FOR B-12 SHOTS       albuterol (VENTOLIN HFA) 108 (90 BASE) MCG/ACT Inhaler Inhale 1-2 puffs into the lungs every 4 hours as needed       vilazodone (VIIBRYD) 40 MG TABS tablet Take 40 mg by mouth daily with food       B Complex Vitamins (VITAMIN-B COMPLEX) TABS Take 1 tablet by mouth daily for low b12. Start 2/16/2017       Misc. Devices (ROLLER WALKER) MISC Rolling Walker for home use.  2 wheels       syringe/needle, sisp, 25G X 5/8\" 1 ML MISC Safety glide - for B12 Shots       Elastic Bandages & Supports (MEDICAL COMPRESSION STOCKINGS) [facility] [place] MISCELLANEOUS MEDICAL SUPPLY (GRADUATED COMPRESSION STOCKINGS). For personal use. Length: thigh Strength: 16-20 mmHg.  Please measure patient in Pharmacy.       [DISCONTINUED] lamoTRIgine (LAMICTAL) 100 MG tablet Take 100 mg by mouth At Bedtime          Patient Active Problem List    Diagnosis Date Noted     Allergic rhinitis due to pollen 02/08/2018     Priority: Medium     Bipolar 1 disorder, mixed, partial remission (H) 02/08/2018     Priority: Medium     Esophageal reflux 02/08/2018     Priority: Medium     History of substance abuse 02/08/2018     Priority: Medium     Bilateral chronic knee pain 04/20/2017     Priority: Medium     Crepitus of joint of right knee 04/20/2017     Priority: Medium     Menorrhagia with irregular cycle 02/16/2017     Priority: Medium     Preop examination 02/16/2017     Priority: Medium     Bilateral foot pain 12/18/2016     Priority: Medium     Elevated random blood glucose level 12/18/2016     Priority: Medium     Peripheral polyneuropathy 12/18/2016     Priority: Medium     Vitamin B12 deficiency 12/18/2016     Priority: Medium     Vitamin D deficiency 12/18/2016     Priority: Medium     Lymphedema of both lower extremities 11/22/2016     Priority: Medium     Venous stasis dermatitis of both lower extremities 08/17/2016     " Priority: Medium     Alcohol dependence (H) 2015     Priority: Medium     Anxiety, generalized 2015     Priority: Medium     Cannabis dependence in remission (H) 2015     Priority: Medium     Morbid obesity with BMI of 50.0-59.9, adult (H) 2015     Priority: Medium     Moderate persistent asthma 2013     Priority: Medium     Overview:   Overview:   AAP completed on 13  Triggers: pollen, fumes, exercise, URI, warm weather. Peak flow today is 250. Symptomatic: moderate  Overview:   AAP completed on 13  Triggers: pollen, fumes, exercise, URI, warm weather. Peak flow today is 250. Symptomatic: moderate       Urinary incontinence 03/15/2013     Priority: Medium     Closed dislocation of tarsal joint 2011     Priority: Medium     Atopic rhinitis 10/02/2003     Priority: Medium     Overview:   Overview:   [facility] - Seasonal Allergies       Borderline personality disorder 2003     Priority: Medium     Overview:   Overview:   [facility].       Disorder of female genital organ 2001     Priority: Medium     Overview:   Overview:   DUB, dysmenorrhea       Past Medical History:   Diagnosis Date     Edema     No Comments Provided     Encounter for removal and reinsertion of intrauterine contraceptive device     05,IUD placement, Removed     Excessive and frequent menstruation with irregular cycle     2017     Major depressive disorder, single episode     No Comments Provided     Personal history of other medical treatment (CODE)     G3, P2-0-1-2 with history of spontaneous      Personal history of other medical treatment (CODE)     No Comments Provided     Uncomplicated asthma     No Comments Provided     Past Surgical History:   Procedure Laterality Date     ANKLE SURGERY      fracture, repair with screws     CONIZATION LEEP      ,Underwent a loop     LAPAROSCOPIC TUBAL LIGATION      ,tubal ligation     Social History     Social  "History     Marital status:      Spouse name: N/A     Number of children: N/A     Years of education: N/A     Social History Main Topics     Smoking status: Former Smoker     Packs/day: 1.00     Years: 3.00     Types: Cigarettes     Quit date: 1/1/2003     Smokeless tobacco: Never Used     Alcohol use No      Comment: Alcoholic Drinks/day: History of abuse - sober as of July 2010.     Drug use: None      Comment: Drug use: NoHistory of use     Sexual activity: No     Other Topics Concern     None     Social History Narrative    No longer in adult foster care lived with Charley Godwin.    .   2 sons - age 12 and 7 - as of 11/2016.   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     Family History   Problem Relation Age of Onset     Other - See Comments Mother      No Known Problems     Asthma Father      Asthma,+ Leg edema, knee, hip replacement. + Lymphedema     Other - See Comments Paternal Grandmother      Lymphedema     OSTEOPOROSIS Brother      Osteoporosis     Other - See Comments Brother      No Known Problems       EXAM:   Vitals:    03/07/18 0817   BP: 124/82   BP Location: Right arm   Patient Position: Sitting   Cuff Size: Adult Large   Pulse: 80   Weight: (!) 345 lb 9.6 oz (156.8 kg)   Height: 5' 6.14\" (1.68 m)       Current Pain Score: Severe Pain (6)     BP Readings from Last 3 Encounters:   03/07/18 124/82   01/17/18 126/68   01/10/18 120/82    Wt Readings from Last 3 Encounters:   03/07/18 (!) 345 lb 9.6 oz (156.8 kg)   01/17/18 (!) 352 lb 12.8 oz (160 kg)   01/10/18 (!) 353 lb 3.2 oz (160.2 kg)      Estimated body mass index is 55.54 kg/(m^2) as calculated from the following:    Height as of this encounter: 5' 6.14\" (1.68 m).    Weight as of this encounter: 345 lb 9.6 oz (156.8 kg).     Physical Exam   Constitutional: She is oriented to person, place, and time. She appears well-developed and well-nourished. No distress.   HENT:   Head: Normocephalic and atraumatic. "   Eyes: Conjunctivae are normal. No scleral icterus.   Neck: No thyromegaly present.   Cardiovascular: Normal rate and regular rhythm.    Pulmonary/Chest: Effort normal. No respiratory distress. She has no wheezes.   Abdominal: There is no tenderness.   + obesity   Musculoskeletal: She exhibits no tenderness or deformity.   + bilateral leg lymphedema   Lymphadenopathy:     She has no cervical adenopathy.   Neurological: She is alert and oriented to person, place, and time. No cranial nerve deficit.   Skin: Skin is warm and dry.   Psychiatric: She has a normal mood and affect. Her behavior is normal. Judgment and thought content normal.       INVESTIGATIONS:  Results for orders placed or performed in visit on 03/07/18   CBC with platelets and differential   Result Value Ref Range    WBC 6.8 4.0 - 11.0 10e9/L    RBC Count 4.59 3.8 - 5.2 10e12/L    Hemoglobin 13.9 11.7 - 15.7 g/dL    Hematocrit 41.8 35.0 - 47.0 %    MCV 91 78 - 100 fl    MCH 30.3 26.5 - 33.0 pg    MCHC 33.3 31.5 - 36.5 g/dL    RDW 13.1 10.0 - 15.0 %    Platelet Count 239 150 - 450 10e9/L    Diff Method Automated Method     % Neutrophils 60.6 %    % Lymphocytes 28.1 %    % Monocytes 8.3 %    % Eosinophils 2.3 %    % Basophils 0.4 %    % Immature Granulocytes 0.3 %    Absolute Neutrophil 4.1 1.6 - 8.3 10e9/L    Absolute Lymphocytes 1.9 0.8 - 5.3 10e9/L    Absolute Monocytes 0.6 0.0 - 1.3 10e9/L    Absolute Eosinophils 0.2 0.0 - 0.7 10e9/L    Absolute Basophils 0.0 0.0 - 0.2 10e9/L    Abs Immature Granulocytes 0.0 0 - 0.4 10e9/L   Comprehensive metabolic panel (BMP + Alb, Alk Phos, ALT, AST, Total. Bili, TP)   Result Value Ref Range    Sodium 141 134 - 144 mmol/L    Potassium 4.4 3.5 - 5.1 mmol/L    Chloride 104 98 - 107 mmol/L    Carbon Dioxide 31 21 - 31 mmol/L    Anion Gap 6 3 - 14 mmol/L    Glucose 99 70 - 105 mg/dL    Urea Nitrogen 11 7 - 25 mg/dL    Creatinine 1.01 0.60 - 1.20 mg/dL    GFR Estimate 60 (L) >60 mL/min/1.7m2    GFR Estimate If Black 73  >60 mL/min/1.7m2    Calcium 9.8 8.6 - 10.3 mg/dL    Bilirubin Total 0.7 0.3 - 1.0 mg/dL    Albumin 4.5 3.5 - 5.7 g/dL    Protein Total 8.5 6.4 - 8.9 g/dL    Alkaline Phosphatase 53 34 - 104 U/L    ALT 17 7 - 52 U/L    AST 18 13 - 39 U/L   Vitamin D Total GH   Result Value Ref Range    Vitamin D Total 46.1 ng/mL   Lipid Profile (Chol, Trig, HDL, LDL calc) - FASTING   Result Value Ref Range    Cholesterol 168 <200 mg/dL    Triglycerides 136 <150 mg/dL    HDL Cholesterol 45 23 - 92 mg/dL    LDL Cholesterol Calculated 96 <100 mg/dL    Non HDL Cholesterol 123 <130 mg/dL   Vitamin B12   Result Value Ref Range    Vitamin B12 703 180 - 914 pg/mL   TSH GH   Result Value Ref Range    Thyrotropin 1.83 0.34 - 5.60 IU/mL       ASSESSMENT AND PLAN:  Problem List Items Addressed This Visit        Nervous and Auditory    Bilateral chronic knee pain       Digestive    Morbid obesity with BMI of 50.0-59.9, adult (H)    Relevant Orders    TSH GH (Completed)    Vitamin B12 deficiency    Relevant Orders    CBC with platelets and differential (Completed)    Comprehensive metabolic panel (BMP + Alb, Alk Phos, ALT, AST, Total. Bili, TP) (Completed)    Vitamin B12 (Completed)    Vitamin D deficiency    Relevant Orders    Vitamin D Total GH (Completed)       Musculoskeletal and Integumentary    Lymphedema of both lower extremities    Relevant Orders    LYMPHEDEMA THERAPY REFERRAL       Behavioral    Bipolar 1 disorder, mixed, partial remission (H)    Borderline personality disorder      Other Visit Diagnoses     Routine general medical examination at a health care facility    -  Primary    Lipid screening        Relevant Orders    Lipid Profile (Chol, Trig, HDL, LDL calc) - FASTING (Completed)        reviewed diet, exercise and weight control, recommended sodium restriction  -- Expected clinical course discussed    -- Medications and their side effects discussed    Patient Instructions   Labs today.     Restart lymphedema treatment with  Martine. Referral sent.     Glad Gabapentin is helping your anxiety.     Return in approximately 6 month(s), or sooner as needed for follow-up with Dr. Curiel.  - follow-up lymphedema    Clinic : [phone]  Appointment line: [phone]        Lucio Curiel MD  Internal Medicine  [facility]

## 2018-03-08 ASSESSMENT — PATIENT HEALTH QUESTIONNAIRE - PHQ9: SUM OF ALL RESPONSES TO PHQ QUESTIONS 1-9: 0

## 2018-03-15 DIAGNOSIS — R35.0 URINARY FREQUENCY: Primary | ICD-10-CM

## 2018-03-15 RX ORDER — OXYBUTYNIN CHLORIDE 5 MG/1
5 TABLET, EXTENDED RELEASE ORAL DAILY
Qty: 30 TABLET | Refills: 11 | Status: SHIPPED | OUTPATIENT
Start: 2018-03-15 | End: 2019-02-26

## 2018-04-06 ENCOUNTER — HOSPITAL ENCOUNTER (OUTPATIENT)
Dept: PHYSICAL THERAPY | Facility: OTHER | Age: 43
Setting detail: THERAPIES SERIES
End: 2018-04-06
Attending: INTERNAL MEDICINE
Payer: COMMERCIAL

## 2018-04-06 PROCEDURE — 97161 PT EVAL LOW COMPLEX 20 MIN: CPT | Mod: GP

## 2018-04-06 PROCEDURE — 97530 THERAPEUTIC ACTIVITIES: CPT | Mod: GP

## 2018-04-06 PROCEDURE — 40000185 ZZHC STATISTIC PT OUTPT VISIT

## 2018-04-06 NOTE — PROGRESS NOTES
[facility]          OUTPATIENT PHYSICAL THERAPY ORTHOPEDIC EVALUATION  PLAN OF TREATMENT FOR OUTPATIENT REHABILITATION  (COMPLETE FOR INITIAL CLAIMS ONLY)  Patient's Last Name, First Name, M.I.  YOB: 1975  ZainabRosi  ODALYS    Provider s Name:  [facility]   Medical Record No.  [id number]   Start of Care Date:   04/06/2018   Onset Date:   01/01/2008   Type:     _X__PT   ___OT   ___SLP Medical Diagnosis:   Lymphedema of both lower extremities     PT Diagnosis:   Bilateral lower extremity edema   Visits from SOC:  1      _________________________________________________________________________________  Plan of Treatment/Functional Goals:              Goals      1. Patient to be independent with self MLD, garment use and compression wrapping for long term management of edema.  Target Date 06/29/2018    2. Patient to have 15% reduction in volume of lower extremities in order to improve ease of mobility and daily tasks.   Target date: 06/29/2018            Therapy Frequency:   2x per week  Predicted Duration of Therapy Intervention:   12 weeks    Joanie Jean Baptiste, PT                 I CERTIFY THE NEED FOR THESE SERVICES FURNISHED UNDER        THIS PLAN OF TREATMENT AND WHILE UNDER MY CARE     (Physician co-signature of this document indicates review and certification of the therapy plan).                       Certification Date From:    04/06/2018  Certification Date To:   06/29/2018    Referring Provider:   Lucio Curiel MD    Initial Assessment        See Epic Evaluation

## 2018-04-06 NOTE — PROGRESS NOTES
04/06/18 0800   Quick Adds   Quick Adds Certification   Rehab Discipline   Discipline PT   Type of Visit   Type of visit Initial Edema Evaluation   General Information   Start of care 04/06/18   Referring physician Lucio Curiel MD   Orders Evaluate and treat as indicated   Order date 03/07/18   Medical diagnosis Lymphedema of both lower extremities   Onset of illness / date of surgery 03/07/18   Edema onset 01/01/08   Affected body parts LLE;RLE   Edema etiology Unknown   Edema etiology comments Father and paternal grand mother also deal with lipedema   Pertinent history of current problem (PT: include personal factors and/or comorbidities that impact the POC; OT: include additional occupational profile info) Dealing with swelling legs for at least 10 years, no known cause. Four bouts of cellulitis. Been at least one year since last infection, right leg only.    Surgical / medical history reviewed Yes   Prior treatment Other  (Started PT in 11/2017, no follow up appointments)   Living environment Apartment / condo   Fall Risk Screen   Fall screen completed by PT   Have you fallen 2 or more times in the past year? No   Have you fallen and had an injury in the past year? No   Is patient a fall risk? No   System Outcome Measures   Lymphedema Life Impact Scale (score range 0-72). A higher score indicates greater impairment. 29   Subjective Report   Patient report of symptoms Began over 10 years ago. No related injury or surgery. Currently going to the [facility]  for water aerobics, bike and walking.   Patient / Family Goals   Patient / family goals statement reduce swelling in legs   Pain   Patient currently in pain Yes   Pain location right greater than left leg   Pain rating 5/10 at most   Pain description Heaviness;Ache;Discomfort   Edema Exam / Assessment   Skin condition Intact   Scar Yes   Location right anterior lower leg   Mobility mobile   Scar comments hit leg on bed frame, resulting in cellutlitis   Stembrennen  sign Positive   Stemmer sign comments left and right   Ulceration No   Girth Measurements   Girth Measurements Refer to separate girth measurement flowsheet   Volume LE   Right LE (mL) 93194.64   Left LE (mL) 70530.47   Range of Motion   ROM No deficits were identified   Strength   Strength No deficits were identified   Planned Edema Interventions   Planned edema interventions Manual lymph drainage;Gradient compression bandaging;Fit for compression garment;Exercises;Precautions to prevent infection / exacerbation;Education;Manual therapy;Home management program development   Clinical Impression   Criteria for skilled therapeutic intervention met Yes   Therapy diagnosis bilateral lower extremity edema   Influenced by the following impairments / conditions Edema;Lipedema   Functional limitations due to impairments / conditions heaviness of limbs, fit of clothing, more difficulty with house hold tasks   Clinical Presentation Stable/Uncomplicated   Clinical Presentation Rationale symptoms on constant   Clinical Decision Making (Complexity) Low complexity   Treatment frequency 2 times / week   Treatment duration 12 weeks   Patient / family and/or staff in agreement with plan of care Yes   Risks and benefits of therapy have been explained Yes   Clinical impression comments Patient presents with unmanaged bilateal lower extremity lipedema, distributed from iliac crest to ankles, right leg more severe than left.    Goals   Edema Eval Goals 1;2;3   Goal 1   Goal identifier HEP   Goal description Patient to be independent with self MLD, garment use, compression wraps for long tern management of edema.   Target date 06/29/18   Goal 2   Goal identifier Volume reduction   Goal description Patient to have 15% reduction in volume with lower extremities to ease in mobility and daily tasks.   Target date 06/29/18   Total Evaluation Time   Total evaluation time 20   Certification   Certification date from 04/06/18   Certification  date to 06/29/18   Medical Diagnosis lymphedema of both lower extremities

## 2018-04-11 ENCOUNTER — OFFICE VISIT (OUTPATIENT)
Dept: INTERNAL MEDICINE | Facility: OTHER | Age: 43
End: 2018-04-11
Attending: INTERNAL MEDICINE
Payer: COMMERCIAL

## 2018-04-11 VITALS
SYSTOLIC BLOOD PRESSURE: 132 MMHG | WEIGHT: 293 LBS | BODY MASS INDEX: 56.43 KG/M2 | HEART RATE: 88 BPM | DIASTOLIC BLOOD PRESSURE: 78 MMHG | TEMPERATURE: 98.4 F

## 2018-04-11 DIAGNOSIS — R30.0 DYSURIA: ICD-10-CM

## 2018-04-11 DIAGNOSIS — R10.31 ABDOMINAL PAIN, RIGHT LOWER QUADRANT: Primary | ICD-10-CM

## 2018-04-11 PROBLEM — M79.2: Status: ACTIVE | Noted: 2017-01-09

## 2018-04-11 LAB
ALBUMIN SERPL-MCNC: 4.2 G/DL (ref 3.5–5.7)
ALBUMIN UR-MCNC: NEGATIVE MG/DL
ALP SERPL-CCNC: 63 U/L (ref 34–104)
ALT SERPL W P-5'-P-CCNC: 20 U/L (ref 7–52)
ANION GAP SERPL CALCULATED.3IONS-SCNC: 7 MMOL/L (ref 3–14)
APPEARANCE UR: CLEAR
AST SERPL W P-5'-P-CCNC: 19 U/L (ref 13–39)
BASOPHILS # BLD AUTO: 0 10E9/L (ref 0–0.2)
BASOPHILS NFR BLD AUTO: 0.4 %
BILIRUB SERPL-MCNC: 0.6 MG/DL (ref 0.3–1)
BILIRUB UR QL STRIP: NEGATIVE
BUN SERPL-MCNC: 11 MG/DL (ref 7–25)
CALCIUM SERPL-MCNC: 10 MG/DL (ref 8.6–10.3)
CHLORIDE SERPL-SCNC: 103 MMOL/L (ref 98–107)
CO2 SERPL-SCNC: 30 MMOL/L (ref 21–31)
COLOR UR AUTO: YELLOW
CREAT SERPL-MCNC: 0.99 MG/DL (ref 0.6–1.2)
DIFFERENTIAL METHOD BLD: NORMAL
EOSINOPHIL # BLD AUTO: 0.2 10E9/L (ref 0–0.7)
EOSINOPHIL NFR BLD AUTO: 2.7 %
ERYTHROCYTE [DISTWIDTH] IN BLOOD BY AUTOMATED COUNT: 13 % (ref 10–15)
GFR SERPL CREATININE-BSD FRML MDRD: 62 ML/MIN/1.7M2
GLUCOSE SERPL-MCNC: 80 MG/DL (ref 70–105)
GLUCOSE UR STRIP-MCNC: NEGATIVE MG/DL
HCT VFR BLD AUTO: 40.6 % (ref 35–47)
HGB BLD-MCNC: 13.4 G/DL (ref 11.7–15.7)
HGB UR QL STRIP: NEGATIVE
IMM GRANULOCYTES # BLD: 0 10E9/L (ref 0–0.4)
IMM GRANULOCYTES NFR BLD: 0.3 %
KETONES UR STRIP-MCNC: NEGATIVE MG/DL
LEUKOCYTE ESTERASE UR QL STRIP: NEGATIVE
LYMPHOCYTES # BLD AUTO: 2.4 10E9/L (ref 0.8–5.3)
LYMPHOCYTES NFR BLD AUTO: 31.5 %
MCH RBC QN AUTO: 29.8 PG (ref 26.5–33)
MCHC RBC AUTO-ENTMCNC: 33 G/DL (ref 31.5–36.5)
MCV RBC AUTO: 90 FL (ref 78–100)
MONOCYTES # BLD AUTO: 0.6 10E9/L (ref 0–1.3)
MONOCYTES NFR BLD AUTO: 8 %
NEUTROPHILS # BLD AUTO: 4.4 10E9/L (ref 1.6–8.3)
NEUTROPHILS NFR BLD AUTO: 57.1 %
NITRATE UR QL: NEGATIVE
PH UR STRIP: 5.5 PH (ref 5–7)
PLATELET # BLD AUTO: 247 10E9/L (ref 150–450)
POTASSIUM SERPL-SCNC: 4.3 MMOL/L (ref 3.5–5.1)
PROT SERPL-MCNC: 7.1 G/DL (ref 6.4–8.9)
RBC # BLD AUTO: 4.49 10E12/L (ref 3.8–5.2)
SODIUM SERPL-SCNC: 140 MMOL/L (ref 134–144)
SOURCE: NORMAL
SP GR UR STRIP: 1.02 (ref 1–1.03)
UROBILINOGEN UR STRIP-ACNC: 0.2 EU/DL (ref 0.2–1)
WBC # BLD AUTO: 7.7 10E9/L (ref 4–11)

## 2018-04-11 PROCEDURE — 80053 COMPREHEN METABOLIC PANEL: CPT | Performed by: INTERNAL MEDICINE

## 2018-04-11 PROCEDURE — 36415 COLL VENOUS BLD VENIPUNCTURE: CPT | Performed by: INTERNAL MEDICINE

## 2018-04-11 PROCEDURE — 81003 URINALYSIS AUTO W/O SCOPE: CPT | Performed by: INTERNAL MEDICINE

## 2018-04-11 PROCEDURE — G0463 HOSPITAL OUTPT CLINIC VISIT: HCPCS

## 2018-04-11 PROCEDURE — 85025 COMPLETE CBC W/AUTO DIFF WBC: CPT | Performed by: INTERNAL MEDICINE

## 2018-04-11 PROCEDURE — 99214 OFFICE O/P EST MOD 30 MIN: CPT | Performed by: INTERNAL MEDICINE

## 2018-04-11 ASSESSMENT — ANXIETY QUESTIONNAIRES
3. WORRYING TOO MUCH ABOUT DIFFERENT THINGS: NOT AT ALL
IF YOU CHECKED OFF ANY PROBLEMS ON THIS QUESTIONNAIRE, HOW DIFFICULT HAVE THESE PROBLEMS MADE IT FOR YOU TO DO YOUR WORK, TAKE CARE OF THINGS AT HOME, OR GET ALONG WITH OTHER PEOPLE: NOT DIFFICULT AT ALL
1. FEELING NERVOUS, ANXIOUS, OR ON EDGE: NOT AT ALL
7. FEELING AFRAID AS IF SOMETHING AWFUL MIGHT HAPPEN: NOT AT ALL
5. BEING SO RESTLESS THAT IT IS HARD TO SIT STILL: NOT AT ALL
6. BECOMING EASILY ANNOYED OR IRRITABLE: NOT AT ALL
GAD7 TOTAL SCORE: 0
2. NOT BEING ABLE TO STOP OR CONTROL WORRYING: NOT AT ALL

## 2018-04-11 ASSESSMENT — PAIN SCALES - GENERAL: PAINLEVEL: MILD PAIN (3)

## 2018-04-11 ASSESSMENT — PATIENT HEALTH QUESTIONNAIRE - PHQ9: 5. POOR APPETITE OR OVEREATING: NOT AT ALL

## 2018-04-11 NOTE — LETTER
Rosi Lamb  [address] [place] [address]   [facility] [place] [address]    4/12/2018      Dear Rosi Lamb,    The result of your recent tests are included below:    Labs look good.  Continue current medications.  No bladder infection noted.    Results for orders placed or performed in visit on 04/11/18   *UA reflex to Microscopic   Result Value Ref Range    Color Urine Yellow     Appearance Urine Clear     Glucose Urine Negative NEG^Negative mg/dL    Bilirubin Urine Negative NEG^Negative    Ketones Urine Negative NEG^Negative mg/dL    Specific Gravity Urine 1.025 1.003 - 1.035    Blood Urine Negative NEG^Negative    pH Urine 5.5 5.0 - 7.0 pH    Protein Albumin Urine Negative NEG^Negative mg/dL    Urobilinogen Urine 0.2 0.2 - 1.0 EU/dL    Nitrite Urine Negative NEG^Negative    Leukocyte Esterase Urine Negative NEG^Negative    Source Midstream Urine    CBC with platelets and differential   Result Value Ref Range    WBC 7.7 4.0 - 11.0 10e9/L    RBC Count 4.49 3.8 - 5.2 10e12/L    Hemoglobin 13.4 11.7 - 15.7 g/dL    Hematocrit 40.6 35.0 - 47.0 %    MCV 90 78 - 100 fl    MCH 29.8 26.5 - 33.0 pg    MCHC 33.0 31.5 - 36.5 g/dL    RDW 13.0 10.0 - 15.0 %    Platelet Count 247 150 - 450 10e9/L    Diff Method Automated Method     % Neutrophils 57.1 %    % Lymphocytes 31.5 %    % Monocytes 8.0 %    % Eosinophils 2.7 %    % Basophils 0.4 %    % Immature Granulocytes 0.3 %    Absolute Neutrophil 4.4 1.6 - 8.3 10e9/L    Absolute Lymphocytes 2.4 0.8 - 5.3 10e9/L    Absolute Monocytes 0.6 0.0 - 1.3 10e9/L    Absolute Eosinophils 0.2 0.0 - 0.7 10e9/L    Absolute Basophils 0.0 0.0 - 0.2 10e9/L    Abs Immature Granulocytes 0.0 0 - 0.4 10e9/L   Comprehensive metabolic panel (BMP + Alb, Alk Phos, ALT, AST, Total. Bili, TP)   Result Value Ref Range    Sodium 140 134 - 144 mmol/L    Potassium 4.3 3.5 - 5.1 mmol/L    Chloride 103 98 - 107 mmol/L    Carbon Dioxide 30 21 - 31 mmol/L    Anion Gap 7 3 - 14 mmol/L    Glucose 80 70 -  105 mg/dL    Urea Nitrogen 11 7 - 25 mg/dL    Creatinine 0.99 0.60 - 1.20 mg/dL    GFR Estimate 62 >60 mL/min/1.7m2    GFR Estimate If Black 75 >60 mL/min/1.7m2    Calcium 10.0 8.6 - 10.3 mg/dL    Bilirubin Total 0.6 0.3 - 1.0 mg/dL    Albumin 4.2 3.5 - 5.7 g/dL    Protein Total 7.1 6.4 - 8.9 g/dL    Alkaline Phosphatase 63 34 - 104 U/L    ALT 20 7 - 52 U/L    AST 19 13 - 39 U/L       If you have any further questions or problems, please contact my office at [phone] and schedule an appointment.    Clinic : [phone]  Appointment line: [phone]     Thank you,    Lucio Curiel MD    Internal Medicine  [facility]     Reviewed and electronically signed by provider.

## 2018-04-11 NOTE — PROGRESS NOTES
Nursing Notes:   Deirdre Bhatti, LPN  4/11/2018  2:39 PM  Signed  Patient presents to the clinic for concerns about possible UTI.  Dysuria and right pelvic pain present over the past 3 days.    Deirdre Davy INFANTE 4/11/2018 2:22 PM      Nursing note reviewed with patient.  Accurracy and completeness verified.   Ms. Lamb is a 43 year old female who:  Patient presents with:  Urinary Problem    HPI     ICD-10-CM    1. Abdominal pain, right lower quadrant R10.31 CBC with platelets and differential     Comprehensive metabolic panel (BMP + Alb, Alk Phos, ALT, AST, Total. Bili, TP)     CT Abdomen Pelvis w Contrast   2. Dysuria R30.0 *UA reflex to Microscopic     Patient presents with gradually worsening pain over the past 3 days.  States it was little worse last night and again little worse this morning.  She was worried about possible bladder infection.  Has not had hematuria.  No measured fevers or chills.  Occasional nausea.  Has had some mild right lower back pain as well.    Reports pain is fairly localized.  Currently 3 out of 10.  Has been gradually worsening.  Has not tried anything for the pain.  Palpation over the area does worsen the pain.    Thinks her urine has been a little darker than normal the last couple days.  Thinks maybe she has had a bit of foul urine odor as well.  Has had some urgency.  States that she has some pain in her lower abdomen after urinating as well as near her urethra for even a while after she is done urinating.    Denies hematuria.  No radiating flank pain to the groin.    She still has her appendix.  Reports history of ovarian cysts in the past.    Pelvic ultrasound done in 2017 showed uterine abnormality and they recommended repeat ultrasound.  Patient never got scheduled for this.  Given that her urinalysis today is totally normal.  Check labs and CT of the abdomen and pelvis.    Functional Capacity: > or about 4 METS.  No rashes or sores.  No chest pain or heaviness.  No shortness  of breath.  Mood is stable.  No head congestion.  No bruising or bleeding.  No adenopathy reported.  Reports that she can climb a flight of stairs without any chest pain/heaviness   No orthopnea/paroxysmal nocturnal dyspnea  Review of Systems     AGNIESZKA:   AGNIESZKA-7 SCORE 10/27/2017 12/14/2017 4/11/2018   Total Score 2 0 0     PHQ9:  PHQ-9 SCORE 1/17/2018 3/7/2018 4/11/2018   Total Score 0 0 0       I have personally reviewed the past medical history, past surgical history, medications, allergies, family and social history as listed below, on 4/11/2018.    Allergies   Allergen Reactions     Clonazepam Other (See Comments)     Causes Violence and aggresssion     Hydrocodone-Acetaminophen      Other reaction(s): Seizures  Can take Percocet without difficulty.     Lorazepam Other (See Comments)     Causes Violence and aggresssion     Sertraline Other (See Comments)     Caused suicidally      Bupropion Other (See Comments)     Caused Major Depression     Lithium Unknown     Risperidone Unknown     Sulfa Drugs Unknown     Valproic Acid Unknown       Current Outpatient Prescriptions   Medication Sig Dispense Refill     oxybutynin (DITROPAN-XL) 5 MG 24 hr tablet Take 1 tablet (5 mg) by mouth daily (lot [id number]) 30 tablet 11     gabapentin (NEURONTIN) 300 MG capsule TAKE 1 CAPSULE BY MOUTH DAILY FOR 3 DAYS, THEN INCREASE TO TAKE 1 CAPSULE TWICE DAILY FOR 3 DAYS, THEN 1 CAPSULE 3 TIMES DAILY.  1     lamoTRIgine (LAMICTAL) 200 MG tablet TAKE 1 TABLET BY MOUTH NIGHTLY AT BEDTIME  2     ARIPiprazole (ABILIFY) 5 MG tablet Take 5 mg by mouth daily       cholecalciferol (D 5000) 5000 UNITS CAPS Take 5,000 Units by mouth daily for vitamin D deficiency. Dose increase 2/16/2017.       clotrimazole (LOTRIMIN) 1 % cream        cyanocobalamin (VITAMIN B12) 1000 MCG/ML injection Inject 1,000 mcg into the muscle every 14 days . Dose increase frequency 7/27/2017       hydrOXYzine (ATARAX) 50 MG tablet TAKE 2 TABLETS (100MG) BY MOUTH NIGHTLY AT  "BEDTIME AND 1 TABLET ONCE DAILY AS NEEDED       hydrOXYzine (VISTARIL) 50 MG capsule Take 1 capsule in AM and 2 capsule in PM - Managed per Psychiatry       ibuprofen (ADVIL/MOTRIN) 200 MG tablet Take 600 mg by mouth every 6 hours as needed for pain       montelukast (SINGULAIR) 10 MG tablet Take 10 mg by mouth At Bedtime       potassium chloride SA (K-DUR/KLOR-CON M) 20 MEQ CR tablet Take 20 mEq by mouth       Tuberculin-Allergy Syringes (SAFETY-ELBERT TB SYRINGE) 25G X 5/8\" 1 ML MISC SAFETY GLIDE-FOR B-12 SHOTS       albuterol (VENTOLIN HFA) 108 (90 BASE) MCG/ACT Inhaler Inhale 1-2 puffs into the lungs every 4 hours as needed       vilazodone (VIIBRYD) 40 MG TABS tablet Take 40 mg by mouth daily with food       B Complex Vitamins (VITAMIN-B COMPLEX) TABS Take 1 tablet by mouth daily for low b12. Start 2/16/2017       Misc. Devices (ROLLER WALKER) MISC Rolling Walker for home use.  2 wheels       syringe/needle, sisp, 25G X 5/8\" 1 ML MISC Safety glide - for B12 Shots       Elastic Bandages & Supports (MEDICAL COMPRESSION STOCKINGS) [facility] (GRADUATED COMPRESSION STOCKINGS). For personal use. Length: thigh Strength: 16-20 mmHg.  Please measure patient in Pharmacy.       [DISCONTINUED] gabapentin (NEURONTIN) 100 MG capsule Take 100-200 mg by mouth 3 times daily as needed for burning / shooting neuropathy pain.          Patient Active Problem List    Diagnosis Date Noted     Abnormal Pap smear of cervix 04/11/2018     Priority: Medium     Overview:   had scraping of cervix       Allergic rhinitis due to pollen 02/08/2018     Priority: Medium     Bipolar 1 disorder, mixed, partial remission (H) 02/08/2018     Priority: Medium     Esophageal reflux 02/08/2018     Priority: Medium     History of substance abuse 02/08/2018     Priority: Medium     Bilateral chronic knee pain 04/20/2017     Priority: Medium     Crepitus of joint of right knee 04/20/2017     Priority: Medium     Menorrhagia with " irregular cycle 02/16/2017     Priority: Medium     Preop examination 02/16/2017     Priority: Medium     Neuropathic pain of foot, unspecified laterality 01/09/2017     Priority: Medium     Bilateral foot pain 12/18/2016     Priority: Medium     Elevated random blood glucose level 12/18/2016     Priority: Medium     Peripheral polyneuropathy 12/18/2016     Priority: Medium     Vitamin B12 deficiency 12/18/2016     Priority: Medium     Vitamin D deficiency 12/18/2016     Priority: Medium     Cellulitis of right lower extremity 12/12/2016     Priority: Medium     Lymphedema of both lower extremities 11/22/2016     Priority: Medium     Stasis dermatitis of both legs 08/17/2016     Priority: Medium     Overview:   Updated per 10/1/17 IMO import       Leg wound, left, subsequent encounter 12/17/2015     Priority: Medium     Alcohol use disorder, moderate, in sustained remission, dependence (H) 11/28/2015     Priority: Medium     Generalized anxiety disorder 11/28/2015     Priority: Medium     Cannabis use disorder, moderate, in sustained remission, dependence (H) 11/28/2015     Priority: Medium     Bipolar I disorder, most recent episode depressed, moderate (H) 11/28/2015     Priority: Medium     Body mass index 45.0-49.9, adult (H) 03/23/2015     Priority: Medium     Edema of extremity of unknown cause 01/15/2014     Priority: Medium     Overview:   1/15/2014: both legs, well controlled on prn lasix       Moderate persistent asthma 08/19/2013     Priority: Medium     Overview:   AAP completed on 08/19/13  Triggers: pollen, fumes, exercise, URI, warm weather. Peak flow today is 250. Symptomatic: moderate  Overview:   Overview:   AAP completed on 08/19/13  Triggers: pollen, fumes, exercise, URI, warm weather. Peak flow today is 250. Symptomatic: moderate  Overview:   AAP completed on 08/19/13  Triggers: pollen, fumes, exercise, URI, warm weather. Peak flow today is 250. Symptomatic: moderate       Urinary incontinence  03/15/2013     Priority: Medium     Closed dislocation of tarsal joint 2011     Priority: Medium     Allergic rhinitis due to other allergen 10/02/2003     Priority: Medium     Overview:   [facility] - Seasonal Allergies  Overview:   Overview:   [facility] - Seasonal Allergies       Borderline personality disorder 2003     Priority: Medium     Overview:   [facility].  Overview:   Overview:   [facility].       Obesity 2003     Priority: Medium     Overview:   [facility]  Updated per 10/1/17 IMO import       Other disorder of menstruation and other abnormal bleeding from female genital tract 2001     Priority: Medium     Overview:   DUB, dysmenorrhea  Overview:   Overview:   DUB, dysmenorrhea       Past Medical History:   Diagnosis Date     Edema     No Comments Provided     Encounter for removal and reinsertion of intrauterine contraceptive device     05,IUD placement, Removed     Excessive and frequent menstruation with irregular cycle     2017     Major depressive disorder, single episode     No Comments Provided     Personal history of other medical treatment (CODE)     G3, P2-0-1-2 with history of spontaneous      Personal history of other medical treatment (CODE)     No Comments Provided     Uncomplicated asthma     No Comments Provided     Past Surgical History:   Procedure Laterality Date     ANKLE SURGERY      fracture, repair with screws     CONIZATION LEEP      ,Underwent a loop     LAPAROSCOPIC TUBAL LIGATION      ,tubal ligation     Social History     Social History     Marital status:      Spouse name: N/A     Number of children: N/A     Years of education: N/A     Social History Main Topics     Smoking status: Former Smoker     Packs/day: 1.00     Years: 3.00     Types: Cigarettes     Quit date: 2003     Smokeless tobacco: Never Used     Alcohol use No      Comment: Alcoholic Drinks/day: History of abuse - sober as of July  "2010.     Drug use: None      Comment: Drug use: NoHistory of use     Sexual activity: No     Other Topics Concern     None     Social History Narrative    No longer in adult foster care lived with Charley Godwin.    .   2 sons - age 12 and 7 - as of 11/2016.   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     Family History   Problem Relation Age of Onset     Other - See Comments Mother      No Known Problems     Asthma Father      Asthma,+ Leg edema, knee, hip replacement. + Lymphedema     Other - See Comments Paternal Grandmother      Lymphedema     OSTEOPOROSIS Brother      Osteoporosis     Other - See Comments Brother      No Known Problems       EXAM:   Vitals:    04/11/18 1430   BP: 132/78   BP Location: Right arm   Patient Position: Sitting   Cuff Size: Adult Large   Pulse: 88   Temp: 98.4  F (36.9  C)   TempSrc: Tympanic   Weight: (!) 351 lb 2 oz (159.3 kg)       Current Pain Score: Mild Pain (3)     BP Readings from Last 3 Encounters:   04/11/18 132/78   03/07/18 124/82   01/17/18 126/68    Wt Readings from Last 3 Encounters:   04/11/18 (!) 351 lb 2 oz (159.3 kg)   03/07/18 (!) 345 lb 9.6 oz (156.8 kg)   01/17/18 (!) 352 lb 12.8 oz (160 kg)      Estimated body mass index is 56.43 kg/(m^2) as calculated from the following:    Height as of 3/7/18: 5' 6.14\" (1.68 m).    Weight as of this encounter: 351 lb 2 oz (159.3 kg).     Physical Exam   Constitutional: She appears well-developed and well-nourished.   Morbid obesity   Eyes: Conjunctivae are normal. No scleral icterus.   Cardiovascular: Normal rate and regular rhythm.    Pulmonary/Chest: Effort normal. No respiratory distress.   Abdominal: Soft. There is tenderness.   Mild tenderness to palpation in right lower quadrant.  Morbidly obese abdomen.  No obvious masses.    No guarding or rebound noted.   Musculoskeletal: Normal range of motion. She exhibits no deformity.   Neurological: She is alert.   Skin: Skin is warm and dry. "   Psychiatric: She has a normal mood and affect.       INVESTIGATIONS:  Results for orders placed or performed in visit on 04/11/18   *UA reflex to Microscopic   Result Value Ref Range    Color Urine Yellow     Appearance Urine Clear     Glucose Urine Negative NEG^Negative mg/dL    Bilirubin Urine Negative NEG^Negative    Ketones Urine Negative NEG^Negative mg/dL    Specific Gravity Urine 1.025 1.003 - 1.035    Blood Urine Negative NEG^Negative    pH Urine 5.5 5.0 - 7.0 pH    Protein Albumin Urine Negative NEG^Negative mg/dL    Urobilinogen Urine 0.2 0.2 - 1.0 EU/dL    Nitrite Urine Negative NEG^Negative    Leukocyte Esterase Urine Negative NEG^Negative    Source Midstream Urine        ASSESSMENT AND PLAN:  Problem List Items Addressed This Visit     None      Visit Diagnoses     Abdominal pain, right lower quadrant    -  Primary    Relevant Orders    CBC with platelets and differential    Comprehensive metabolic panel (BMP + Alb, Alk Phos, ALT, AST, Total. Bili, TP)    CT Abdomen Pelvis w Contrast    Dysuria        Relevant Orders    *UA reflex to Microscopic (Completed)        -- Expected clinical course discussed    -- Medications and their side effects discussed    The 10-year ASCVD risk score ([place] PINKY Jr, et al., 2013) is: 0.7%    Values used to calculate the score:      Age: 43 years      Sex: Female      Is Non- : No      Diabetic: No      Tobacco smoker: No      Systolic Blood Pressure: 132 mmHg      Is BP treated: No      HDL Cholesterol: 45 mg/dL      Total Cholesterol: 168 mg/dL    Patient Instructions     Checked for UTI today - looks good.     Results for orders placed or performed in visit on 04/11/18   *UA reflex to Microscopic   Result Value Ref Range    Color Urine Yellow     Appearance Urine Clear     Glucose Urine Negative NEG^Negative mg/dL    Bilirubin Urine Negative NEG^Negative    Ketones Urine Negative NEG^Negative mg/dL    Specific Gravity Urine 1.025 1.003 - 1.035     Blood Urine Negative NEG^Negative    pH Urine 5.5 5.0 - 7.0 pH    Protein Albumin Urine Negative NEG^Negative mg/dL    Urobilinogen Urine 0.2 0.2 - 1.0 EU/dL    Nitrite Urine Negative NEG^Negative    Leukocyte Esterase Urine Negative NEG^Negative    Source Midstream Urine       Labs and CT of abdomen/pelvis recommended.  - they will call with date/time of appointment.      Return as needed for follow-up with Dr. Curiel.    Clinic : [phone]  Appointment line: [phone]     Lucio Curiel MD  Internal Medicine  [facility]

## 2018-04-11 NOTE — MR AVS SNAPSHOT
After Visit Summary   4/11/2018    Rosi Lamb    MRN: [id number]           Patient Information     Date Of Birth          1975        Visit Information        Provider Department      4/11/2018 2:20 PM Lucio Curiel MD [facility]        Today's Diagnoses     Abdominal pain, right lower quadrant    -  1    Dysuria          Care Instructions    Checked for UTI today - looks good.     Results for orders placed or performed in visit on 04/11/18   *UA reflex to Microscopic   Result Value Ref Range    Color Urine Yellow     Appearance Urine Clear     Glucose Urine Negative NEG^Negative mg/dL    Bilirubin Urine Negative NEG^Negative    Ketones Urine Negative NEG^Negative mg/dL    Specific Gravity Urine 1.025 1.003 - 1.035    Blood Urine Negative NEG^Negative    pH Urine 5.5 5.0 - 7.0 pH    Protein Albumin Urine Negative NEG^Negative mg/dL    Urobilinogen Urine 0.2 0.2 - 1.0 EU/dL    Nitrite Urine Negative NEG^Negative    Leukocyte Esterase Urine Negative NEG^Negative    Source Midstream Urine       Labs and CT of abdomen/pelvis recommended.  - they will call with date/time of appointment.      Return as needed for follow-up with Dr. Curiel.    Clinic : [phone]  Appointment line: [phone]           Follow-ups after your visit        Follow-up notes from your care team     Return if symptoms worsen or fail to improve.      Your next 10 appointments already scheduled     Apr 16, 2018  1:00 PM CDT   Treatment with Joanie Jean Baptiste PT   [facility] ([facility])    [address]  [place] [address] [phone]            Apr 19, 2018 11:30 AM CDT   (Arrive by 11:15 AM)   CT ABDOMEN PELVIS W CONTRAST with GHCT1   [facility] ([facility])    [address]  [facility] [address] [phone]           Please bring any scans or X-rays taken at other  [facility], if similar tests were done. Also bring a list of your medicines, including vitamins, minerals and over-the-counter drugs. It is safest to leave personal items at home.  Be sure to tell your doctor:   If you have any allergies.   If there s any chance you are pregnant.   If you are breastfeeding.  You may have contrast for this exam. To prepare:   Do not eat or drink for 2 hours before your exam. If you need to take medicine, you may take it with small sips of water. (We may ask you to take liquid medicine as well.)   The day before your exam, drink extra fluids at least six 8-ounce glasses (unless your doctor tells you to restrict your fluids).   You will be given instructions on how to drink the contrast.  Patients over 70 or patients with diabetes or kidney problems:   If you haven t had a blood test (creatinine test) within the last 30 days, the Cardiologist/Radiologist may require you to get this test prior to your exam.  If you have diabetes:   Continue to take your metformin medication on the day of your exam  Please wear loose clothing, such as a sweat suit or jogging clothes. Avoid snaps, zippers and other metal. We may ask you to undress and put on a hospital gown.  If you have any questions, please call the Imaging Department where you will have your exam.            Apr 24, 2018  8:30 AM CDT   Treatment with Joanie L Orstad, PT   [facility] ([facility])    [address] [address]   [phone]            Apr 30, 2018  1:00 PM CDT   Treatment with Joanie L Orstad, PT   [facility] ([facility])    [address] [address]   [phone]            May 03, 2018  2:30 PM CDT   Treatment with Joanie L Orstad, PT   [facility] ([facility])    [address] [address]   [phone]            May 07, 2018  1:45  PM CDT   Treatment with Joanie L Orstad, PT   [facility] ([facility])    [address] [place] [address]   [phone]            May 10, 2018  1:45 PM CDT   Treatment with Joanie L Orstad, PT   [facility] ([facility])    [address] [facility] [address]   [phone]            May 14, 2018  2:00 PM CDT   Treatment with Joanie L Orstad, PT   [facility] ([facility])    [address] [place] [address]   [phone]            May 17, 2018  2:00 PM CDT   Treatment with Joanie L Orstad, PT   [facility] ([facility])    [address] [place] [address]   [phone]            May 21, 2018  2:00 PM CDT   Treatment with Joanie L Orstad, PT   [facility] ([facility])    [address] [place] [address]   [phone]              Future tests that were ordered for you today     Open Future Orders        Priority Expected Expires Ordered    CT Abdomen Pelvis w Contrast Routine  4/11/2019 4/11/2018            Who to contact     If you have questions or need follow up information about today's clinic visit or your schedule please contact [facility] directly at [phone].  Normal or non-critical lab and imaging results will be communicated to you by DediServehart, letter or phone within 4 business days after the clinic has received the results. If you do not hear from us within 7 days, please contact the clinic through DediServehart or phone. If you have a critical or abnormal lab result, we will notify you by phone as soon as possible.  Submit refill requests through impok or call your pharmacy and they will forward the refill request to us. Please allow 3 business days for your refill to be completed.          Additional  "Information About Your Visit        MyChart Information     Re[employer]el lets you send messages to your doctor, view your test results, renew your prescriptions, schedule appointments and more. To sign up, go to [email or web address] . Click on \"Log in\" on the left side of the screen, which will take you to the Welcome page. Then click on \"Sign up Now\" on the right side of the page.     You will be asked to enter the access code listed below, as well as some personal information. Please follow the directions to create your username and password.     Your access code is: [email or web address]  Expires: 2018 10:18 AM     Your access code will  in 90 days. If you need help or a new code, please call your [place] clinic or [phone].        Care EveryWhere ID     This is your Care EveryWhere ID. This could be used by other organizations to access your [facility] medical records  [id number]        Your Vitals Were     Pulse Temperature BMI (Body Mass Index)             88 98.4  F (36.9  C) (Tympanic) 56.43 kg/m2          Blood Pressure from Last 3 Encounters:   18 132/78   18 124/82   18 126/68    Weight from Last 3 Encounters:   18 (!) 159.3 kg (351 lb 2 oz)   18 (!) 156.8 kg (345 lb 9.6 oz)   18 (!) 160 kg (352 lb 12.8 oz)              We Performed the Following     *UA reflex to Microscopic     CBC with platelets and differential     Comprehensive metabolic panel (BMP + Alb, Alk Phos, ALT, AST, Total. Bili, TP)          Today's Medication Changes          These changes are accurate as of 18  3:11 PM.  If you have any questions, ask your nurse or doctor.               These medicines have changed or have updated prescriptions.        Dose/Directions    gabapentin 300 MG capsule   Commonly known as:  NEURONTIN   This may have changed:  Another medication with the same name was removed. Continue taking this medication, and follow the directions you see here.   Changed by:  " Lucio Curiel MD        TAKE 1 CAPSULE BY MOUTH DAILY FOR 3 DAYS, THEN INCREASE TO TAKE 1 CAPSULE TWICE DAILY FOR 3 DAYS, THEN 1 CAPSULE 3 TIMES DAILY.   Refills:  1                Primary Care Provider Office Phone # Fax #    Lucio Curiel [phone] [address] [place] [address]        Equal Access to Services     [facility]: Hadii aad ku hadasho Soomaali, waaxda luqadaha, qaybta kaalmada adeegyada, waxay bree carrn adepavithra shanejcarlosbrendan lavirgie . So [facility] [phone].    ATENCIÓN: Si lakeshia gallegos, tiene a mondragon disposición servicios gratuitos de asistencia lingüística. Llame al [phone].    We comply with applicable federal civil rights laws and [place] laws. We do not discriminate on the basis of race, color, national origin, age, disability, sex, sexual orientation, or gender identity.            Thank you!     Thank you for choosing [facility] AND [facility]  for your care. Our goal is always to provide you with excellent care. Hearing back from our patients is one way we can continue to improve our services. Please take a few minutes to complete the written survey that you may receive in the mail after your visit with us. Thank you!             Your Updated Medication List - Protect others around you: Learn how to safely use, store and throw away your medicines at www.disposemymeds.org.          This list is accurate as of 4/11/18  3:11 PM.  Always use your most recent med list.                   Brand Name Dispense Instructions for use Diagnosis    ARIPiprazole 5 MG tablet    ABILIFY     Take 5 mg by mouth daily        clotrimazole 1 % cream    LOTRIMIN          cyanocobalamin 1000 MCG/ML injection    VITAMIN B12     Inject 1,000 mcg into the muscle every 14 days . Dose increase frequency 7/27/2017        D 5000 5000 units Caps   Generic drug:  cholecalciferol      Take 5,000 Units by mouth daily for vitamin D deficiency. Dose increase 2/16/2017.         "gabapentin 300 MG capsule    NEURONTIN     TAKE 1 CAPSULE BY MOUTH DAILY FOR 3 DAYS, THEN INCREASE TO TAKE 1 CAPSULE TWICE DAILY FOR 3 DAYS, THEN 1 CAPSULE 3 TIMES DAILY.        hydrOXYzine 50 MG capsule    VISTARIL     Take 1 capsule in AM and 2 capsule in PM - Managed per Psychiatry        hydrOXYzine 50 MG tablet    ATARAX     TAKE 2 TABLETS (100MG) BY MOUTH NIGHTLY AT BEDTIME AND 1 TABLET ONCE DAILY AS NEEDED        ibuprofen 200 MG tablet    ADVIL/MOTRIN     Take 600 mg by mouth every 6 hours as needed for pain        lamoTRIgine 200 MG tablet    LaMICtal     TAKE 1 TABLET BY MOUTH NIGHTLY AT BEDTIME        Medical Compression Stockings [facility] (GRADUATED COMPRESSION STOCKINGS). For personal use. Length: thigh Strength: 16-20 mmHg.  Please measure patient in Pharmacy.        montelukast 10 MG tablet    SINGULAIR     Take 10 mg by mouth At Bedtime        oxybutynin 5 MG 24 hr tablet    DITROPAN-XL    30 tablet    Take 1 tablet (5 mg) by mouth daily (lot [id number])    Urinary frequency       potassium chloride SA 20 MEQ CR tablet    K-DUR/KLOR-CON M     Take 20 mEq by mouth        roller walker Misc      Rolling Walker for home use.  2 wheels        SAFETY-ELBERT TB SYRINGE 25G X 5/8\" 1 ML Misc   Generic drug:  Tuberculin-Allergy Syringes      SAFETY GLIDE-FOR B-12 SHOTS        syringe/needle (sisp) 25G X 5/8\" 1 ML Misc      Safety glide - for B12 Shots        VENTOLIN  (90 Base) MCG/ACT Inhaler   Generic drug:  albuterol      Inhale 1-2 puffs into the lungs every 4 hours as needed        vilazodone 40 MG Tabs tablet    VIIBRYD     Take 40 mg by mouth daily with food        Vitamin-B Complex Tabs      Take 1 tablet by mouth daily for low b12. Start 2/16/2017          "

## 2018-04-11 NOTE — PATIENT INSTRUCTIONS
Checked for UTI today - looks good.     Results for orders placed or performed in visit on 04/11/18   *UA reflex to Microscopic   Result Value Ref Range    Color Urine Yellow     Appearance Urine Clear     Glucose Urine Negative NEG^Negative mg/dL    Bilirubin Urine Negative NEG^Negative    Ketones Urine Negative NEG^Negative mg/dL    Specific Gravity Urine 1.025 1.003 - 1.035    Blood Urine Negative NEG^Negative    pH Urine 5.5 5.0 - 7.0 pH    Protein Albumin Urine Negative NEG^Negative mg/dL    Urobilinogen Urine 0.2 0.2 - 1.0 EU/dL    Nitrite Urine Negative NEG^Negative    Leukocyte Esterase Urine Negative NEG^Negative    Source Midstream Urine       Labs and CT of abdomen/pelvis recommended.  - they will call with date/time of appointment.      Return as needed for follow-up with Dr. Curiel.    Clinic : [phone]  Appointment line: [phone]

## 2018-04-11 NOTE — NURSING NOTE
Patient presents to the clinic for concerns about possible UTI.  Dysuria and right pelvic pain present over the past 3 days.    Deirdre Bhatti LPN 4/11/2018 2:22 PM

## 2018-04-12 ASSESSMENT — PATIENT HEALTH QUESTIONNAIRE - PHQ9: SUM OF ALL RESPONSES TO PHQ QUESTIONS 1-9: 0

## 2018-04-12 ASSESSMENT — ANXIETY QUESTIONNAIRES: GAD7 TOTAL SCORE: 0

## 2018-04-17 ENCOUNTER — HOSPITAL ENCOUNTER (OUTPATIENT)
Dept: CT IMAGING | Facility: OTHER | Age: 43
Discharge: HOME | End: 2018-04-17
Attending: INTERNAL MEDICINE | Admitting: INTERNAL MEDICINE
Payer: COMMERCIAL

## 2018-04-17 DIAGNOSIS — R10.31 ABDOMINAL PAIN, RIGHT LOWER QUADRANT: ICD-10-CM

## 2018-04-17 PROCEDURE — 25000125 ZZHC RX 250: Performed by: INTERNAL MEDICINE

## 2018-04-17 PROCEDURE — 74177 CT ABD & PELVIS W/CONTRAST: CPT

## 2018-04-17 RX ADMIN — IOHEXOL 100 ML: 350 INJECTION, SOLUTION INTRAVENOUS at 12:51

## 2018-04-18 ENCOUNTER — HOSPITAL ENCOUNTER (EMERGENCY)
Facility: OTHER | Age: 43
Discharge: HOME | End: 2018-04-19
Attending: FAMILY MEDICINE | Admitting: FAMILY MEDICINE
Payer: COMMERCIAL

## 2018-04-18 ENCOUNTER — TELEPHONE (OUTPATIENT)
Dept: INTERNAL MEDICINE | Facility: OTHER | Age: 43
End: 2018-04-18

## 2018-04-18 DIAGNOSIS — R10.84 ABDOMINAL PAIN, GENERALIZED: ICD-10-CM

## 2018-04-18 LAB
ALBUMIN UR-MCNC: ABNORMAL MG/DL
APPEARANCE UR: CLEAR
BACTERIA #/AREA URNS HPF: ABNORMAL /HPF
BILIRUB UR QL STRIP: NEGATIVE
COLOR UR AUTO: YELLOW
GLUCOSE UR STRIP-MCNC: NEGATIVE MG/DL
HCG UR QL: NEGATIVE
HGB UR QL STRIP: NEGATIVE
KETONES UR STRIP-MCNC: ABNORMAL MG/DL
LEUKOCYTE ESTERASE UR QL STRIP: ABNORMAL
NITRATE UR QL: NEGATIVE
PH UR STRIP: 6 PH (ref 5–7)
RBC #/AREA URNS AUTO: ABNORMAL /HPF
SOURCE: ABNORMAL
SP GR UR STRIP: 1.02 (ref 1–1.03)
UROBILINOGEN UR STRIP-ACNC: 0.2 EU/DL (ref 0.2–1)
WBC #/AREA URNS AUTO: ABNORMAL /HPF

## 2018-04-18 PROCEDURE — 81025 URINE PREGNANCY TEST: CPT | Performed by: FAMILY MEDICINE

## 2018-04-18 PROCEDURE — 99285 EMERGENCY DEPT VISIT HI MDM: CPT | Mod: 25 | Performed by: FAMILY MEDICINE

## 2018-04-18 PROCEDURE — 99283 EMERGENCY DEPT VISIT LOW MDM: CPT | Performed by: FAMILY MEDICINE

## 2018-04-18 PROCEDURE — 81001 URINALYSIS AUTO W/SCOPE: CPT | Performed by: FAMILY MEDICINE

## 2018-04-18 PROCEDURE — 99283 EMERGENCY DEPT VISIT LOW MDM: CPT | Mod: Z6 | Performed by: FAMILY MEDICINE

## 2018-04-18 PROCEDURE — 87086 URINE CULTURE/COLONY COUNT: CPT | Performed by: FAMILY MEDICINE

## 2018-04-18 NOTE — ED AVS SNAPSHOT
[facility]    [address]    [facility] [address]    Phone:  [phone]    Fax:  [phone]                                       Rosi Lamb   MRN: [id number]    Department:  [facility]   Date of Visit:  4/18/2018           Patient Information     Date Of Birth          1975        Your diagnoses for this visit were:     Abdominal pain, generalized uncertain cause.       You were seen by Riki Loja MD.      Follow-up Information     Schedule an appointment as soon as possible for a visit with Lucio Curiel MD.    Specialty:  Internal Medicine    Contact information:    [address]  [place] [address] [phone]          Discharge Instructions       Dear Ms. Lamb,  It was nice to see you.  As we talked, your exam and recent CT scan are reassuring.  Your urine doesn't show an obvious infection but I am culturing it to check further.  Your pregnancy test is negative which we expect after tubal ligation and endometrial ablation.  Drink 6-8 glasses of water per day to keep your kidneys and bladder flushed.    Follow up in clinic as needed for follow up of your symptoms as needed.    I hope you are back to normal soon,  Sincerely,  Dr. Fox Loja        Discharge References/Attachments     ABDOMINAL PAIN, ADULT (ENGLISH)      Your next 10 appointments already scheduled     Apr 24, 2018  8:30 AM CDT   Treatment with Joanie L Orstad, PT   [facility] ([facility])    [address] [facility] [id number] [phone]            Apr 30, 2018  1:00 PM CDT   Treatment with Joanie L Orstad, PT   [facility] ([facility])    [address] [facility] [address]   [phone]            May 03, 2018  2:30 PM CDT   Treatment with Joanie L Orstad, PT   [facility] ([facility])     [address] [address]   [phone]            May 07, 2018  1:45 PM CDT   Treatment with Joanie L Orstad, PT   [facility] ([facility])    [address] [address]   [phone]            May 10, 2018  1:45 PM CDT   Treatment with Joanie L Orstad, PT   [facility] ([facility])    [address] [address]   [phone]            May 14, 2018  2:00 PM CDT   Treatment with Joanie L Orstad, PT   [facility] ([facility])    [address] [address]   [phone]            May 17, 2018  2:00 PM CDT   Treatment with Joanie L Orstad, PT   [facility] ([facility])    [address] [address]   [phone]            May 21, 2018  2:00 PM CDT   Treatment with Joanie L Orstad, PT   [facility] ([facility])    [address] [address]   [phone]            May 24, 2018  2:30 PM CDT   Treatment with Joanie L Orstad, PT   [facility] ([facility])    [address] [address]   [phone]            May 29, 2018  1:00 PM CDT   Treatment with Joanie L Orstad, PT   [facility] ([facility])    [address] [address]   [phone]              24 Hour Appointment Hotline       To make an appointment at any [facility], call [phone] ([phone]). If you don't have a family doctor or clinic, we will help you find one. [facility] clinics are conveniently located to serve the needs of you and your family.             Review of your medicines      Our records show that you are taking the medicines listed below. If these are incorrect, please call your family  doctor or clinic.        Dose / Directions Last dose taken    ARIPiprazole 5 MG tablet   Commonly known as:  ABILIFY   Dose:  5 mg        Take 5 mg by mouth daily   Refills:  0        clotrimazole 1 % cream   Commonly known as:  LOTRIMIN        Refills:  0        cyanocobalamin 1000 MCG/ML injection   Commonly known as:  VITAMIN B12   Dose:  1000 mcg        Inject 1,000 mcg into the muscle every 14 days . Dose increase frequency 7/27/2017   Refills:  0        D 5000 5000 units Caps   Dose:  5000 Units   Generic drug:  cholecalciferol        Take 5,000 Units by mouth daily for vitamin D deficiency. Dose increase 2/16/2017.   Refills:  0        gabapentin 300 MG capsule   Commonly known as:  NEURONTIN        TAKE 1 CAPSULE BY MOUTH DAILY FOR 3 DAYS, THEN INCREASE TO TAKE 1 CAPSULE TWICE DAILY FOR 3 DAYS, THEN 1 CAPSULE 3 TIMES DAILY.   Refills:  1        hydrOXYzine 50 MG capsule   Commonly known as:  VISTARIL        Take 1 capsule in AM and 2 capsule in PM - Managed per Psychiatry   Refills:  0        hydrOXYzine 50 MG tablet   Commonly known as:  ATARAX        TAKE 2 TABLETS (100MG) BY MOUTH NIGHTLY AT BEDTIME AND 1 TABLET ONCE DAILY AS NEEDED   Refills:  0        ibuprofen 200 MG tablet   Commonly known as:  ADVIL/MOTRIN   Dose:  600 mg        Take 600 mg by mouth every 6 hours as needed for pain   Refills:  0        lamoTRIgine 200 MG tablet   Commonly known as:  LaMICtal        TAKE 1 TABLET BY MOUTH NIGHTLY AT BEDTIME   Refills:  2        Medical Compression Stockings [facility] MED[facility]PPLY (GRADUATED COMPRESSION STOCKINGS). For personal use. Length: thigh Strength: 16-20 mmHg.  Please measure patient in Pharmacy.   Refills:  0        montelukast 10 MG tablet   Commonly known as:  SINGULAIR   Dose:  10 mg        Take 10 mg by mouth At Bedtime   Refills:  0        oxybutynin 5 MG 24 hr tablet   Commonly known as:  DITROPAN-XL   Dose:  5 mg   Quantity:  30 tablet        Take 1 tablet (5 mg) by  "mouth daily (lot [id number])   Refills:  11        roller walker Misc        Rolling Walker for home use.  2 wheels   Refills:  0        SAFETY-ELBERT TB SYRINGE 25G X 5/8\" 1 ML Misc   Generic drug:  Tuberculin-Allergy Syringes        SAFETY GLIDE-FOR B-12 SHOTS   Refills:  0        syringe/needle (sisp) 25G X 5/8\" 1 ML Misc        Safety glide - for B12 Shots   Refills:  0        VENTOLIN  (90 Base) MCG/ACT Inhaler   Dose:  1-2 puff   Generic drug:  albuterol        Inhale 1-2 puffs into the lungs every 4 hours as needed   Refills:  0        vilazodone 40 MG Tabs tablet   Commonly known as:  VIIBRYD   Dose:  40 mg        Take 40 mg by mouth daily with food   Refills:  0        Vitamin-B Complex Tabs   Dose:  1 tablet        Take 1 tablet by mouth daily for low b12. Start 2/16/2017   Refills:  0                Procedures and tests performed during your visit     *UA reflex to Microscopic    HCG qualitative urine    Urine Culture Aerobic Bacterial    Urine Microscopic      Orders Needing Specimen Collection     None      Pending Results     Date and Time Order Name Status Description    4/18/2018 2314 Urine Culture Aerobic Bacterial In process             Pending Culture Results     Date and Time Order Name Status Description    4/18/2018 2314 Urine Culture Aerobic Bacterial In process             Pending Results Instructions     If you had any lab results that were not finalized at the time of your Discharge, you can call the ED Lab Result RN at [phone]. You will be contacted by this team for any positive Lab results or changes in treatment. The nurses are available 7 days a week from 10A to 6:30P.  You can leave a message 24 hours per day and they will return your call.        Thank you for choosing [facility]       Thank you for choosing [facility] for your care. Our goal is always to provide you with excellent care. Hearing back from our patients is one way we can continue to improve our services. Please take " "a few minutes to complete the written survey that you may receive in the mail after you visit with us. Thank you!        Roomish[facility]ant Information     [facility] lets you send messages to your doctor, view your test results, renew your prescriptions, schedule appointments and more. To sign up, go to [email or web address] . Click on \"Log in\" on the left side of the screen, which will take you to the Welcome page. Then click on \"Sign up Now\" on the right side of the page.     You will be asked to enter the access code listed below, as well as some personal information. Please follow the directions to create your username and password.     Your access code is: [email or web address]  Expires: 2018 10:18 AM     Your access code will  in 90 days. If you need help or a new code, please call your [place] clinic or [phone].        Care EveryWhere ID     This is your Care EveryWhere ID. This could be used by other organizations to access your [facility] medical records  [id number]        Equal Access to Services     [facility]: Hadii tristan Krause, waaxda lualisia, qaybta kaaldanilo ricks, zenobia dejesus . So [facility] [phone].    ATENCIÓN: Si habla español, tiene a mondragon disposición servicios gratuitos de asistencia lingüística. Llame al [phone].    We comply with applicable federal civil rights laws and [place] laws. We do not discriminate on the basis of race, color, national origin, age, disability, sex, sexual orientation, or gender identity.            After Visit Summary       This is your record. Keep this with you and show to your community pharmacist(s) and doctor(s) at your next visit.                  "

## 2018-04-18 NOTE — TELEPHONE ENCOUNTER
Patient missed a phone call from the nurse yesterday regarding test results. She would just like a call back regarding those.

## 2018-04-18 NOTE — ED AVS SNAPSHOT
[facility]    [address]    [place] [address]    Phone:  [phone]    Fax:  [phone]                                       Rosi Lamb   MRN: [id number]    Department:  [facility]   Date of Visit:  4/18/2018           After Visit Summary Signature Page     I have received my discharge instructions, and my questions have been answered. I have discussed any challenges I see with this plan with the nurse or doctor.    ..........................................................................................................................................  Patient/Patient Representative Signature      ..........................................................................................................................................  Patient Representative Print Name and Relationship to Patient    ..................................................               ................................................  Date                                            Time    ..........................................................................................................................................  Reviewed by Signature/Title    ...................................................              ..............................................  Date                                                            Time

## 2018-04-19 VITALS
HEIGHT: 67 IN | TEMPERATURE: 99.7 F | OXYGEN SATURATION: 100 % | SYSTOLIC BLOOD PRESSURE: 118 MMHG | DIASTOLIC BLOOD PRESSURE: 73 MMHG | BODY MASS INDEX: 45.99 KG/M2 | RESPIRATION RATE: 20 BRPM | WEIGHT: 293 LBS | HEART RATE: 96 BPM

## 2018-04-19 ASSESSMENT — ENCOUNTER SYMPTOMS
FEVER: 0
DIARRHEA: 0
MUSCULOSKELETAL NEGATIVE: 1
DYSURIA: 0
NAUSEA: 0
EYES NEGATIVE: 1
ABDOMINAL PAIN: 1
BLOOD IN STOOL: 0
CHILLS: 0
ABDOMINAL DISTENTION: 0
CARDIOVASCULAR NEGATIVE: 1
ANAL BLEEDING: 0
RESPIRATORY NEGATIVE: 1
DIAPHORESIS: 0
VOMITING: 0
CONSTIPATION: 0

## 2018-04-19 NOTE — ED TRIAGE NOTES
Pt reports having lower abd pain for two weeks. Pt had CT scan on Tuesday. Pt reports the pain keeps getting worse. Pt c/o nausea. Denies vomiting and diarrhea.    Kelsy Dias RN on 4/18/2018 at 10:14 PM

## 2018-04-19 NOTE — DISCHARGE INSTRUCTIONS
Dear Ms. Lamb,  It was nice to see you.  As we talked, your exam and recent CT scan are reassuring.  Your urine doesn't show an obvious infection but I am culturing it to check further.  Your pregnancy test is negative which we expect after tubal ligation and endometrial ablation.  Drink 6-8 glasses of water per day to keep your kidneys and bladder flushed.    Follow up in clinic as needed for follow up of your symptoms as needed.    I hope you are back to normal soon,  Sincerely,  Dr. Fox Loja

## 2018-04-19 NOTE — ED PROVIDER NOTES
History   No chief complaint on file.    HPI  Rosi Lamb is a 43 year old female who comes to the ED with concerns about her persistent abdominal pains.  She has been having migratory abdominal pains that wax and wane.  They had been in the RLQ prompting a CT scan yesterday that was negative, but today are sometimes in the upper abdomen and sometimes on the left side.  She feels some mild increased urgency to urinate but no prominent dysuria.  She is worried about pregnancy and wants a pregnancy test.  She is s/p TL and endometrial ablation.      Problem List:    Patient Active Problem List    Diagnosis Date Noted     Abnormal Pap smear of cervix 04/11/2018     Priority: Medium     Overview:   had scraping of cervix       Allergic rhinitis due to pollen 02/08/2018     Priority: Medium     Bipolar 1 disorder, mixed, partial remission (H) 02/08/2018     Priority: Medium     Esophageal reflux 02/08/2018     Priority: Medium     History of substance abuse 02/08/2018     Priority: Medium     Bilateral chronic knee pain 04/20/2017     Priority: Medium     Crepitus of joint of right knee 04/20/2017     Priority: Medium     Menorrhagia with irregular cycle 02/16/2017     Priority: Medium     Preop examination 02/16/2017     Priority: Medium     Neuropathic pain of foot, unspecified laterality 01/09/2017     Priority: Medium     Bilateral foot pain 12/18/2016     Priority: Medium     Elevated random blood glucose level 12/18/2016     Priority: Medium     Peripheral polyneuropathy 12/18/2016     Priority: Medium     Vitamin B12 deficiency 12/18/2016     Priority: Medium     Vitamin D deficiency 12/18/2016     Priority: Medium     Cellulitis of right lower extremity 12/12/2016     Priority: Medium     Lymphedema of both lower extremities 11/22/2016     Priority: Medium     Stasis dermatitis of both legs 08/17/2016     Priority: Medium     Overview:   Updated per 10/1/17 IMO import       Leg wound, left, subsequent  encounter 12/17/2015     Priority: Medium     Alcohol use disorder, moderate, in sustained remission, dependence (H) 11/28/2015     Priority: Medium     Generalized anxiety disorder 11/28/2015     Priority: Medium     Cannabis use disorder, moderate, in sustained remission, dependence (H) 11/28/2015     Priority: Medium     Bipolar I disorder, most recent episode depressed, moderate (H) 11/28/2015     Priority: Medium     Body mass index 45.0-49.9, adult (H) 03/23/2015     Priority: Medium     Edema of extremity of unknown cause 01/15/2014     Priority: Medium     Overview:   1/15/2014: both legs, well controlled on prn lasix       Moderate persistent asthma 08/19/2013     Priority: Medium     Overview:   AAP completed on 08/19/13  Triggers: pollen, fumes, exercise, URI, warm weather. Peak flow today is 250. Symptomatic: moderate  Overview:   Overview:   AAP completed on 08/19/13  Triggers: pollen, fumes, exercise, URI, warm weather. Peak flow today is 250. Symptomatic: moderate  Overview:   AAP completed on 08/19/13  Triggers: pollen, fumes, exercise, URI, warm weather. Peak flow today is 250. Symptomatic: moderate       Urinary incontinence 03/15/2013     Priority: Medium     Closed dislocation of tarsal joint 02/04/2011     Priority: Medium     Allergic rhinitis due to other allergen 10/02/2003     Priority: Medium     Overview:   [facility] - Seasonal Allergies  Overview:   Overview:   [facility] - Seasonal Allergies       Borderline personality disorder 07/07/2003     Priority: Medium     Overview:   [facility].  Overview:   Overview:   [facility].       Obesity 06/16/2003     Priority: Medium     Overview:   [facility]  Updated per 10/1/17 IMO import       Other disorder of menstruation and other abnormal bleeding from female genital tract 07/05/2001     Priority: Medium     Overview:   DUB, dysmenorrhea  Overview:   Overview:   DUB, dysmenorrhea          Past Medical History:    Past Medical  History:   Diagnosis Date     Edema      Encounter for removal and reinsertion of intrauterine contraceptive device      Excessive and frequent menstruation with irregular cycle      Major depressive disorder, single episode      Personal history of other medical treatment (CODE)      Personal history of other medical treatment (CODE)      Uncomplicated asthma        Past Surgical History:    Past Surgical History:   Procedure Laterality Date     ANKLE SURGERY      fracture, repair with screws     CONIZATION LEEP      07/04,Underwent a loop     LAPAROSCOPIC TUBAL LIGATION      2009,tubal ligation       Family History:    Family History   Problem Relation Age of Onset     Other - See Comments Mother      No Known Problems     Asthma Father      Asthma,+ Leg edema, knee, hip replacement. + Lymphedema     Other - See Comments Paternal Grandmother      Lymphedema     OSTEOPOROSIS Brother      Osteoporosis     Other - See Comments Brother      No Known Problems       Social History:  Marital Status:   [4]  Social History   Substance Use Topics     Smoking status: Former Smoker     Packs/day: 1.00     Years: 3.00     Types: Cigarettes     Quit date: 1/1/2003     Smokeless tobacco: Never Used     Alcohol use No      Comment: Alcoholic Drinks/day: History of abuse - sober as of July 2010.        Medications:      albuterol (VENTOLIN HFA) 108 (90 BASE) MCG/ACT Inhaler   ARIPiprazole (ABILIFY) 5 MG tablet   gabapentin (NEURONTIN) 300 MG capsule   hydrOXYzine (ATARAX) 50 MG tablet   hydrOXYzine (VISTARIL) 50 MG capsule   ibuprofen (ADVIL/MOTRIN) 200 MG tablet   lamoTRIgine (LAMICTAL) 200 MG tablet   montelukast (SINGULAIR) 10 MG tablet   oxybutynin (DITROPAN-XL) 5 MG 24 hr tablet   vilazodone (VIIBRYD) 40 MG TABS tablet   B Complex Vitamins (VITAMIN-B COMPLEX) TABS   cholecalciferol (D 5000) 5000 UNITS CAPS   clotrimazole (LOTRIMIN) 1 % cream   cyanocobalamin (VITAMIN B12) 1000 MCG/ML injection   Elastic Bandages &  "Supports (MEDICAL COMPRESSION STOCKINGS) MISC   Misc. Devices (ROLLER WALKER) MISC   syringe/needle, sisp, 25G X 5/8\" 1 ML MISC   Tuberculin-Allergy Syringes (SAFETY-ELBERT TB SYRINGE) 25G X 5/8\" 1 ML MISC         Review of Systems   Constitutional: Negative for chills, diaphoresis and fever.   HENT: Negative.    Eyes: Negative.    Respiratory: Negative.    Cardiovascular: Negative.    Gastrointestinal: Positive for abdominal pain. Negative for abdominal distention, anal bleeding, blood in stool, constipation, diarrhea, nausea and vomiting.        Normal daily BM.   Genitourinary: Positive for urgency. Negative for dysuria.   Musculoskeletal: Negative.        Physical Exam   BP: 138/86  Pulse: 96  Temp: 99.7  F (37.6  C)  Resp: 20  Height: 170.2 cm (5' 7\")  Weight: 145.2 kg (320 lb)  SpO2: 96 %      Physical Exam   Constitutional: She appears well-developed and well-nourished. No distress.   Mid-aged female in NAD.  Appears very comfortable.   HENT:   Head: Normocephalic and atraumatic.   Mouth/Throat: Oropharynx is clear and moist.   Eyes: Conjunctivae and EOM are normal. Pupils are equal, round, and reactive to light. No scleral icterus.   Neck: Normal range of motion. Neck supple. No tracheal deviation present. No thyromegaly present.   Cardiovascular: Normal rate, regular rhythm and normal heart sounds.    Pulmonary/Chest: Effort normal and breath sounds normal. No respiratory distress.   Abdominal: Soft. Bowel sounds are normal. She exhibits no distension. There is no tenderness.   Musculoskeletal: Normal range of motion.   Chronic lymphedema.   Lymphadenopathy:     She has no cervical adenopathy.   Neurological: She is alert. She exhibits normal muscle tone.   Skin: Skin is warm and dry. No rash noted. She is not diaphoretic.   Psychiatric: She has a normal mood and affect.   Nursing note and vitals reviewed.      ED Course     ED Course     Procedures               Critical Care time:  none      "     4/17/18  PROCEDURE:  CT ABDOMEN PELVIS W CONTRAST     HISTORY:  Hx of ovarian cyst, hx of endometrial 10 cm changes, 3 days  of RLQ tenderness with some pain in right lower back.; Abdominal pain,  right lower quadrant      TECHNIQUE:  Helical CT of the abdomen and pelvis was performed  following injection of intravenous contrast.     Sagittal and coronal reformatted images were reviewed.     COMPARISON:  Pelvic ultrasound 1/19/2017     FINDINGS:       The lung bases are clear.     The liver, spleen, pancreas and adrenal glands are unremarkable. The  gallbladder is present.     There is a subcentimeter right renal cyst. The kidneys are otherwise  intact.     The bowel is normal in caliber. The appendix is unremarkable. There is  moderate stool in the colon.      There is no abdominal aortic aneurysm.     No free fluid, free air or adenopathy is present.       No suspicious osseous lesions are identified. There is a hemangioma at  T11.         IMPRESSION:  No acute findings in the abdomen or pelvis.     JOSEPHINE GREGORY MD        Results for orders placed or performed during the hospital encounter of 04/18/18 (from the past 24 hour(s))   *UA reflex to Microscopic   Result Value Ref Range    Color Urine Yellow     Appearance Urine Clear     Glucose Urine Negative NEG^Negative mg/dL    Bilirubin Urine Negative NEG^Negative    Ketones Urine Trace (A) NEG^Negative mg/dL    Specific Gravity Urine 1.025 1.003 - 1.035    Blood Urine Negative NEG^Negative    pH Urine 6.0 5.0 - 7.0 pH    Protein Albumin Urine Trace (A) NEG^Negative mg/dL    Urobilinogen Urine 0.2 0.2 - 1.0 EU/dL    Nitrite Urine Negative NEG^Negative    Leukocyte Esterase Urine Trace (A) NEG^Negative    Source Midstream Urine    Urine Microscopic   Result Value Ref Range    WBC Urine 5-10 (A) OTO5^0 - 5 /HPF    RBC Urine O - 2 OTO2^O - 2 /HPF    Bacteria Urine Few (A) NEG^Negative /HPF   HCG qualitative urine   Result Value Ref Range    HCG Qual Urine  Negative NEG^Negative       Medications - No data to display    I reassured patient regarding her benign exam and CT scan results from yesterday.  We reviewed she has no peritoneal signs and no fever and normal bowel movements and no need to take any blood for testing today.  Her urine shows just a few white cells but no obvious infection and urine culture is pending.  She is concerned about pregnancy and her urine pregnancy test is negative as expected.    Assessments & Plan (with Medical Decision Making)   43-year-old female with chronic abdominal pains without symptoms of illness and likely irritable bowel syndrome type pains.  Health status complicated by morbid obesity, lymphedema, history of depression and mental health concerns.  She is reassured regarding her benign exam and recent testing.  She has no obvious urinary tract infection at this point but urine culture is pending.      I have reviewed the nursing notes.    I have reviewed the findings, diagnosis, plan and need for follow up with the patient.       Discharge Medication List as of 4/19/2018 12:00 AM          Final diagnoses:   Abdominal pain, generalized - uncertain cause.       4/18/2018   [facility] [place]     Riki Loja MD  04/19/18 [id number]

## 2018-04-20 LAB
BACTERIA SPEC CULT: NORMAL
SPECIMEN SOURCE: NORMAL

## 2018-04-24 ENCOUNTER — HOSPITAL ENCOUNTER (OUTPATIENT)
Dept: PHYSICAL THERAPY | Facility: OTHER | Age: 43
Setting detail: THERAPIES SERIES
End: 2018-04-24
Attending: INTERNAL MEDICINE
Payer: COMMERCIAL

## 2018-04-24 PROCEDURE — 40000185 ZZHC STATISTIC PT OUTPT VISIT

## 2018-04-24 PROCEDURE — 97530 THERAPEUTIC ACTIVITIES: CPT | Mod: GP

## 2018-04-26 ENCOUNTER — TELEPHONE (OUTPATIENT)
Dept: INTERNAL MEDICINE | Facility: OTHER | Age: 43
End: 2018-04-26
Payer: COMMERCIAL

## 2018-04-26 DIAGNOSIS — F31.0 BIPOLAR I DISORDER, MOST RECENT EPISODE HYPOMANIC (H): Primary | ICD-10-CM

## 2018-04-27 NOTE — TELEPHONE ENCOUNTER
I have reviewed and completed the following home care or hospice form(s) for Rosijennifer Lamb  on April 27, 2018 .  This covers the certification period effective for 30 2018 until 6/28/2018.    [facility]    Lucio Curiel MD

## 2018-04-30 ENCOUNTER — MEDICAL CORRESPONDENCE (OUTPATIENT)
Dept: HEALTH INFORMATION MANAGEMENT | Facility: OTHER | Age: 43
End: 2018-04-30

## 2018-04-30 PROCEDURE — G0179 MD RECERTIFICATION HHA PT: HCPCS | Performed by: INTERNAL MEDICINE

## 2018-05-03 ENCOUNTER — HOSPITAL ENCOUNTER (OUTPATIENT)
Dept: PHYSICAL THERAPY | Facility: OTHER | Age: 43
Setting detail: THERAPIES SERIES
End: 2018-05-03
Attending: INTERNAL MEDICINE
Payer: MEDICARE

## 2018-05-03 PROCEDURE — 97530 THERAPEUTIC ACTIVITIES: CPT | Mod: GP

## 2018-05-03 PROCEDURE — 40000185 ZZHC STATISTIC PT OUTPT VISIT

## 2018-05-07 ENCOUNTER — HOSPITAL ENCOUNTER (OUTPATIENT)
Dept: PHYSICAL THERAPY | Facility: OTHER | Age: 43
Setting detail: THERAPIES SERIES
End: 2018-05-07
Attending: INTERNAL MEDICINE
Payer: MEDICARE

## 2018-05-07 PROCEDURE — 97530 THERAPEUTIC ACTIVITIES: CPT | Mod: GP

## 2018-05-07 PROCEDURE — 40000185 ZZHC STATISTIC PT OUTPT VISIT

## 2018-05-10 ENCOUNTER — HOSPITAL ENCOUNTER (OUTPATIENT)
Dept: PHYSICAL THERAPY | Facility: OTHER | Age: 43
Setting detail: THERAPIES SERIES
End: 2018-05-10
Attending: INTERNAL MEDICINE
Payer: MEDICARE

## 2018-05-10 PROCEDURE — 97530 THERAPEUTIC ACTIVITIES: CPT | Mod: GP

## 2018-05-10 PROCEDURE — 40000185 ZZHC STATISTIC PT OUTPT VISIT

## 2018-05-17 ENCOUNTER — HOSPITAL ENCOUNTER (OUTPATIENT)
Dept: PHYSICAL THERAPY | Facility: OTHER | Age: 43
Setting detail: THERAPIES SERIES
End: 2018-05-17
Attending: INTERNAL MEDICINE
Payer: MEDICARE

## 2018-05-17 PROCEDURE — 40000185 ZZHC STATISTIC PT OUTPT VISIT

## 2018-05-17 PROCEDURE — 97530 THERAPEUTIC ACTIVITIES: CPT | Mod: GP

## 2018-05-22 ENCOUNTER — HOSPITAL ENCOUNTER (OUTPATIENT)
Dept: PHYSICAL THERAPY | Facility: OTHER | Age: 43
Setting detail: THERAPIES SERIES
End: 2018-05-22
Attending: INTERNAL MEDICINE
Payer: MEDICARE

## 2018-05-22 PROCEDURE — 40000185 ZZHC STATISTIC PT OUTPT VISIT

## 2018-05-22 PROCEDURE — 97530 THERAPEUTIC ACTIVITIES: CPT | Mod: GP

## 2018-05-24 ENCOUNTER — TELEPHONE (OUTPATIENT)
Dept: FAMILY MEDICINE | Facility: OTHER | Age: 43
End: 2018-05-24

## 2018-05-24 DIAGNOSIS — I89.0 LYMPHEDEMA OF BOTH LOWER EXTREMITIES: Primary | ICD-10-CM

## 2018-05-24 DIAGNOSIS — R60.9 EDEMA: ICD-10-CM

## 2018-05-24 NOTE — TELEPHONE ENCOUNTER
Dr. Curiel,    Can you please send a script to [facility] for the following:    Dx: lipedema/lymphedema    Custom compression garments, bilateral lower extremties      Let me know if you have questions. Will be working to get her custom devices of self management.

## 2018-05-24 NOTE — TELEPHONE ENCOUNTER
Noted. Okay for prescription. Please see if you can find or make a prescription that will work for this.  Lucio Curiel MD

## 2018-05-24 NOTE — TELEPHONE ENCOUNTER
Signed prescription was faxed to [facility].  Deirdre Bhatti LPN        5/24/2018 4:29 PM

## 2018-05-29 ENCOUNTER — HOSPITAL ENCOUNTER (OUTPATIENT)
Dept: PHYSICAL THERAPY | Facility: OTHER | Age: 43
Setting detail: THERAPIES SERIES
End: 2018-05-29
Attending: INTERNAL MEDICINE
Payer: MEDICARE

## 2018-05-29 PROCEDURE — 40000185 ZZHC STATISTIC PT OUTPT VISIT

## 2018-05-29 PROCEDURE — 97530 THERAPEUTIC ACTIVITIES: CPT | Mod: GP

## 2018-05-31 NOTE — ADDENDUM NOTE
Encounter addended by: Joanie Jean Baptiste, PT on: 5/31/2018  8:33 AM<BR>     Actions taken: Flowsheet accepted

## 2018-06-04 ENCOUNTER — HOSPITAL ENCOUNTER (OUTPATIENT)
Dept: PHYSICAL THERAPY | Facility: OTHER | Age: 43
Setting detail: THERAPIES SERIES
End: 2018-06-04
Attending: INTERNAL MEDICINE
Payer: MEDICARE

## 2018-06-04 PROCEDURE — 40000185 ZZHC STATISTIC PT OUTPT VISIT

## 2018-06-04 PROCEDURE — 97530 THERAPEUTIC ACTIVITIES: CPT | Mod: GP

## 2018-06-26 ENCOUNTER — HOSPITAL ENCOUNTER (EMERGENCY)
Facility: OTHER | Age: 43
Discharge: HOME | End: 2018-06-26
Attending: EMERGENCY MEDICINE | Admitting: EMERGENCY MEDICINE
Payer: COMMERCIAL

## 2018-06-26 VITALS
RESPIRATION RATE: 19 BRPM | SYSTOLIC BLOOD PRESSURE: 118 MMHG | HEIGHT: 67 IN | TEMPERATURE: 98.3 F | DIASTOLIC BLOOD PRESSURE: 71 MMHG | OXYGEN SATURATION: 98 % | HEART RATE: 87 BPM

## 2018-06-26 DIAGNOSIS — R00.2 PALPITATIONS: ICD-10-CM

## 2018-06-26 DIAGNOSIS — R07.89 ATYPICAL CHEST PAIN: ICD-10-CM

## 2018-06-26 LAB
ALBUMIN SERPL-MCNC: 3.7 G/DL (ref 3.5–5.7)
ALP SERPL-CCNC: 54 U/L (ref 34–104)
ALT SERPL W P-5'-P-CCNC: 13 U/L (ref 7–52)
ANION GAP SERPL CALCULATED.3IONS-SCNC: 6 MMOL/L (ref 3–14)
AST SERPL W P-5'-P-CCNC: 16 U/L (ref 13–39)
BASOPHILS # BLD AUTO: 0 10E9/L (ref 0–0.2)
BASOPHILS NFR BLD AUTO: 0.4 %
BILIRUB SERPL-MCNC: 0.6 MG/DL (ref 0.3–1)
BUN SERPL-MCNC: 11 MG/DL (ref 7–25)
CALCIUM SERPL-MCNC: 9.3 MG/DL (ref 8.6–10.3)
CHLORIDE SERPL-SCNC: 105 MMOL/L (ref 98–107)
CO2 SERPL-SCNC: 30 MMOL/L (ref 21–31)
CREAT SERPL-MCNC: 0.93 MG/DL (ref 0.6–1.2)
DIFFERENTIAL METHOD BLD: NORMAL
EOSINOPHIL # BLD AUTO: 0.2 10E9/L (ref 0–0.7)
EOSINOPHIL NFR BLD AUTO: 1.8 %
ERYTHROCYTE [DISTWIDTH] IN BLOOD BY AUTOMATED COUNT: 12.9 % (ref 10–15)
GFR SERPL CREATININE-BSD FRML MDRD: 66 ML/MIN/1.7M2
GLUCOSE SERPL-MCNC: 94 MG/DL (ref 70–105)
HCT VFR BLD AUTO: 39.8 % (ref 35–47)
HGB BLD-MCNC: 12.9 G/DL (ref 11.7–15.7)
IMM GRANULOCYTES # BLD: 0 10E9/L (ref 0–0.4)
IMM GRANULOCYTES NFR BLD: 0.1 %
LYMPHOCYTES # BLD AUTO: 2.1 10E9/L (ref 0.8–5.3)
LYMPHOCYTES NFR BLD AUTO: 25.7 %
MCH RBC QN AUTO: 29.9 PG (ref 26.5–33)
MCHC RBC AUTO-ENTMCNC: 32.4 G/DL (ref 31.5–36.5)
MCV RBC AUTO: 92 FL (ref 78–100)
MONOCYTES # BLD AUTO: 0.8 10E9/L (ref 0–1.3)
MONOCYTES NFR BLD AUTO: 9.3 %
NEUTROPHILS # BLD AUTO: 5.2 10E9/L (ref 1.6–8.3)
NEUTROPHILS NFR BLD AUTO: 62.7 %
PLATELET # BLD AUTO: 212 10E9/L (ref 150–450)
POTASSIUM SERPL-SCNC: 3.8 MMOL/L (ref 3.5–5.1)
PROT SERPL-MCNC: 6.6 G/DL (ref 6.4–8.9)
RBC # BLD AUTO: 4.32 10E12/L (ref 3.8–5.2)
SODIUM SERPL-SCNC: 141 MMOL/L (ref 134–144)
TROPONIN I SERPL-MCNC: <0.03 UG/L (ref 0–0.03)
WBC # BLD AUTO: 8.3 10E9/L (ref 4–11)

## 2018-06-26 PROCEDURE — 96372 THER/PROPH/DIAG INJ SC/IM: CPT | Performed by: EMERGENCY MEDICINE

## 2018-06-26 PROCEDURE — 93005 ELECTROCARDIOGRAM TRACING: CPT | Mod: XU | Performed by: EMERGENCY MEDICINE

## 2018-06-26 PROCEDURE — 25000128 H RX IP 250 OP 636: Performed by: EMERGENCY MEDICINE

## 2018-06-26 PROCEDURE — 0298T ZZC EXT ECG > 48HR TO 21 DAY REVIEW AND INTERPRETATN: CPT | Performed by: INTERNAL MEDICINE

## 2018-06-26 PROCEDURE — 84484 ASSAY OF TROPONIN QUANT: CPT | Performed by: EMERGENCY MEDICINE

## 2018-06-26 PROCEDURE — 80053 COMPREHEN METABOLIC PANEL: CPT | Performed by: EMERGENCY MEDICINE

## 2018-06-26 PROCEDURE — 99284 EMERGENCY DEPT VISIT MOD MDM: CPT | Mod: Z6 | Performed by: EMERGENCY MEDICINE

## 2018-06-26 PROCEDURE — 0296T ZZHC  EXT ECG > 48HR TO 21 DAY RCRD W/CONECT INTL RCRD: CPT

## 2018-06-26 PROCEDURE — 93010 ELECTROCARDIOGRAM REPORT: CPT | Performed by: INTERNAL MEDICINE

## 2018-06-26 PROCEDURE — 85025 COMPLETE CBC W/AUTO DIFF WBC: CPT | Performed by: EMERGENCY MEDICINE

## 2018-06-26 PROCEDURE — 99284 EMERGENCY DEPT VISIT MOD MDM: CPT | Mod: 25 | Performed by: EMERGENCY MEDICINE

## 2018-06-26 RX ORDER — KETOROLAC TROMETHAMINE 30 MG/ML
60 INJECTION, SOLUTION INTRAMUSCULAR; INTRAVENOUS ONCE
Status: COMPLETED | OUTPATIENT
Start: 2018-06-26 | End: 2018-06-26

## 2018-06-26 RX ADMIN — KETOROLAC TROMETHAMINE 60 MG: 30 INJECTION, SOLUTION INTRAMUSCULAR at 15:44

## 2018-06-26 ASSESSMENT — ENCOUNTER SYMPTOMS
CHEST TIGHTNESS: 0
FEVER: 0
VOMITING: 0
LIGHT-HEADEDNESS: 0
CHILLS: 0
AGITATION: 0
DYSURIA: 0
NAUSEA: 0
ARTHRALGIAS: 0
PALPITATIONS: 1
SHORTNESS OF BREATH: 0

## 2018-06-26 NOTE — ED PROVIDER NOTES
History     Chief Complaint   Patient presents with     Chest Pain     Patient is a 43 year old female presenting with chest pain. The history is provided by the patient.   Chest Pain   Associated symptoms: palpitations    Associated symptoms: no fever, no nausea, no shortness of breath and no vomiting      Rosi Lamb is a 43 year old female who comes in via ambulance for an episode of chest pain. She said she is feeling fine and her normal self until about 2-1/2 hours ago when she had a sudden onset of a sharp chest pain. It is more right sided than left. She denies any other symptoms such as lightheadedness diaphoresis shortness of breath nausea vomiting. She did say that she felt her heart beating fast prior to this episode. She described her heart going fast and then slow. This happened a few times and then short while after that she developed the chest pain. Ambulance was called and when they arrived she was still having the pain. EKG was reported as normal. They gave her aspirin nitroglycerin and fentanyl. The fentanyl helped with the pain she is pain-free at this time. Not feeling ill otherwise.    Problem List:    Patient Active Problem List    Diagnosis Date Noted     Abnormal Pap smear of cervix 04/11/2018     Priority: Medium     Overview:   had scraping of cervix       Allergic rhinitis due to pollen 02/08/2018     Priority: Medium     Bipolar 1 disorder, mixed, partial remission (H) 02/08/2018     Priority: Medium     Esophageal reflux 02/08/2018     Priority: Medium     History of substance abuse 02/08/2018     Priority: Medium     Bilateral chronic knee pain 04/20/2017     Priority: Medium     Crepitus of joint of right knee 04/20/2017     Priority: Medium     Menorrhagia with irregular cycle 02/16/2017     Priority: Medium     Preop examination 02/16/2017     Priority: Medium     Neuropathic pain of foot, unspecified laterality 01/09/2017     Priority: Medium     Bilateral foot pain  12/18/2016     Priority: Medium     Elevated random blood glucose level 12/18/2016     Priority: Medium     Peripheral polyneuropathy 12/18/2016     Priority: Medium     Vitamin B12 deficiency 12/18/2016     Priority: Medium     Vitamin D deficiency 12/18/2016     Priority: Medium     Cellulitis of right lower extremity 12/12/2016     Priority: Medium     Lymphedema of both lower extremities 11/22/2016     Priority: Medium     Stasis dermatitis of both legs 08/17/2016     Priority: Medium     Overview:   Updated per 10/1/17 IMO import       Leg wound, left, subsequent encounter 12/17/2015     Priority: Medium     Alcohol use disorder, moderate, in sustained remission, dependence (H) 11/28/2015     Priority: Medium     Generalized anxiety disorder 11/28/2015     Priority: Medium     Cannabis use disorder, moderate, in sustained remission, dependence (H) 11/28/2015     Priority: Medium     Bipolar I disorder, most recent episode depressed, moderate (H) 11/28/2015     Priority: Medium     Body mass index 45.0-49.9, adult (H) 03/23/2015     Priority: Medium     Edema of extremity of unknown cause 01/15/2014     Priority: Medium     Overview:   1/15/2014: both legs, well controlled on prn lasix       Moderate persistent asthma 08/19/2013     Priority: Medium     Overview:   AAP completed on 08/19/13  Triggers: pollen, fumes, exercise, URI, warm weather. Peak flow today is 250. Symptomatic: moderate  Overview:   Overview:   AAP completed on 08/19/13  Triggers: pollen, fumes, exercise, URI, warm weather. Peak flow today is 250. Symptomatic: moderate  Overview:   AAP completed on 08/19/13  Triggers: pollen, fumes, exercise, URI, warm weather. Peak flow today is 250. Symptomatic: moderate       Urinary incontinence 03/15/2013     Priority: Medium     Closed dislocation of tarsal joint 02/04/2011     Priority: Medium     Allergic rhinitis due to other allergen 10/02/2003     Priority: Medium     Overview:   [facility] - Seasonal  Allergies  Overview:   Overview:   [facility] - Seasonal Allergies       Borderline personality disorder 07/07/2003     Priority: Medium     Overview:   [facility].  Overview:   Overview:   [facility].       Obesity 06/16/2003     Priority: Medium     Overview:   [facility]  Updated per 10/1/17 IMO import       Other disorder of menstruation and other abnormal bleeding from female genital tract 07/05/2001     Priority: Medium     Overview:   DUB, dysmenorrhea  Overview:   Overview:   DUB, dysmenorrhea          Past Medical History:    Past Medical History:   Diagnosis Date     Edema      Encounter for removal and reinsertion of intrauterine contraceptive device      Excessive and frequent menstruation with irregular cycle      Major depressive disorder, single episode      Personal history of other medical treatment (CODE)      Personal history of other medical treatment (CODE)      Uncomplicated asthma        Past Surgical History:    Past Surgical History:   Procedure Laterality Date     ANKLE SURGERY      fracture, repair with screws     CONIZATION LEEP      07/04,Underwent a loop     LAPAROSCOPIC TUBAL LIGATION      2009,tubal ligation       Family History:    Family History   Problem Relation Age of Onset     Other - See Comments Mother      No Known Problems     Asthma Father      Asthma,+ Leg edema, knee, hip replacement. + Lymphedema     Other - See Comments Paternal Grandmother      Lymphedema     Osteoperosis Brother      Osteoporosis     Other - See Comments Brother      No Known Problems       Social History:  Marital Status:   [4]  Social History   Substance Use Topics     Smoking status: Former Smoker     Packs/day: 1.00     Years: 3.00     Types: Cigarettes     Quit date: 1/1/2003     Smokeless tobacco: Never Used     Alcohol use No      Comment: Alcoholic Drinks/day: History of abuse - sober as of July 2010.        Medications:      albuterol (VENTOLIN HFA) 108 (90  "BASE) MCG/ACT Inhaler   ARIPiprazole (ABILIFY) 5 MG tablet   B Complex Vitamins (VITAMIN-B COMPLEX) TABS   cholecalciferol (D 5000) 5000 UNITS CAPS   cyanocobalamin (VITAMIN B12) 1000 MCG/ML injection   gabapentin (NEURONTIN) 300 MG capsule   hydrOXYzine (ATARAX) 50 MG tablet   ibuprofen (ADVIL/MOTRIN) 200 MG tablet   lamoTRIgine (LAMICTAL) 200 MG tablet   montelukast (SINGULAIR) 10 MG tablet   oxybutynin (DITROPAN-XL) 5 MG 24 hr tablet   vilazodone (VIIBRYD) 40 MG TABS tablet   clotrimazole (LOTRIMIN) 1 % cream   Elastic Bandages & Supports (MEDICAL COMPRESSION STOCKINGS) MISC   Misc. Devices (ROLLER WALKER) MISC   order for DME   syringe/needle, sisp, 25G X 5/8\" 1 ML MISC   Tuberculin-Allergy Syringes (SAFETY-ELBERT TB SYRINGE) 25G X 5/8\" 1 ML MISC         Review of Systems   Constitutional: Negative for chills and fever.   HENT: Negative for congestion.    Eyes: Negative for visual disturbance.   Respiratory: Negative for chest tightness and shortness of breath.    Cardiovascular: Positive for chest pain and palpitations.   Gastrointestinal: Negative for nausea and vomiting.   Genitourinary: Negative for dysuria.   Musculoskeletal: Negative for arthralgias.   Skin: Negative for rash.   Neurological: Negative for light-headedness.   Psychiatric/Behavioral: Negative for agitation.       Physical Exam   BP: 118/72  Pulse: 87  Heart Rate: 85  Temp: 98.3  F (36.8  C)  Resp: 14  Height: 170.2 cm (5' 7\")  SpO2: 99 %      Physical Exam   Constitutional: She is oriented to person, place, and time. She appears well-developed and well-nourished. No distress.   HENT:   Head: Normocephalic and atraumatic.   Eyes: Conjunctivae are normal.   Neck: Neck supple.   Cardiovascular: Normal rate, regular rhythm and normal heart sounds.    Pulmonary/Chest: Effort normal and breath sounds normal. She exhibits tenderness.   She has reproducible right sided anterior chest wall tenderness at the costochondral junction area.   Abdominal: " Soft. Bowel sounds are normal.   Neurological: She is alert and oriented to person, place, and time.   Skin: Skin is warm and dry. She is not diaphoretic.   Psychiatric: She has a normal mood and affect. Her behavior is normal.   Nursing note and vitals reviewed.      ED Course     ED Course     Procedures               EKG Interpretation:      Interpreted by Juvenal Ruffin  Time reviewed: 1445  Symptoms at time of EKG: chest pain   Rhythm: normal sinus   Rate: normal  Axis: normal  Ectopy: none  Conduction: normal  ST Segments/ T Waves: No ST-T wave changes  Q Waves: inferior      Clinical Impression: normal EKG      Results for orders placed or performed during the hospital encounter of 06/26/18 (from the past 24 hour(s))   CBC with platelets differential   Result Value Ref Range    WBC 8.3 4.0 - 11.0 10e9/L    RBC Count 4.32 3.8 - 5.2 10e12/L    Hemoglobin 12.9 11.7 - 15.7 g/dL    Hematocrit 39.8 35.0 - 47.0 %    MCV 92 78 - 100 fl    MCH 29.9 26.5 - 33.0 pg    MCHC 32.4 31.5 - 36.5 g/dL    RDW 12.9 10.0 - 15.0 %    Platelet Count 212 150 - 450 10e9/L    Diff Method Automated Method     % Neutrophils 62.7 %    % Lymphocytes 25.7 %    % Monocytes 9.3 %    % Eosinophils 1.8 %    % Basophils 0.4 %    % Immature Granulocytes 0.1 %    Absolute Neutrophil 5.2 1.6 - 8.3 10e9/L    Absolute Lymphocytes 2.1 0.8 - 5.3 10e9/L    Absolute Monocytes 0.8 0.0 - 1.3 10e9/L    Absolute Eosinophils 0.2 0.0 - 0.7 10e9/L    Absolute Basophils 0.0 0.0 - 0.2 10e9/L    Abs Immature Granulocytes 0.0 0 - 0.4 10e9/L   Comprehensive metabolic panel   Result Value Ref Range    Sodium 141 134 - 144 mmol/L    Potassium 3.8 3.5 - 5.1 mmol/L    Chloride 105 98 - 107 mmol/L    Carbon Dioxide 30 21 - 31 mmol/L    Anion Gap 6 3 - 14 mmol/L    Glucose 94 70 - 105 mg/dL    Urea Nitrogen 11 7 - 25 mg/dL    Creatinine 0.93 0.60 - 1.20 mg/dL    GFR Estimate 66 >60 mL/min/1.7m2    GFR Estimate If Black 80 >60 mL/min/1.7m2    Calcium 9.3 8.6 - 10.3 mg/dL     Bilirubin Total 0.6 0.3 - 1.0 mg/dL    Albumin 3.7 3.5 - 5.7 g/dL    Protein Total 6.6 6.4 - 8.9 g/dL    Alkaline Phosphatase 54 34 - 104 U/L    ALT 13 7 - 52 U/L    AST 16 13 - 39 U/L   Troponin I   Result Value Ref Range    Troponin I ES <0.030 0.000 - 0.034 ug/L       Medications   ketorolac (TORADOL) injection 60 mg (not administered)       Assessments & Plan (with Medical Decision Making)     I have reviewed the nursing notes.    I have reviewed the findings, diagnosis, plan and need for follow up with the patient.  Labs and EKG are all reassuring. Her troponin is negative. She does have reproducible chest wall tenderness or believe this is noncardiac in nature. She does report feeling that her heart was racing at the time, but did not take her pulse that she does not know how. Since she has been here on the cardiac monitor I have not noticed any arrhythmia or tachycardia, however I do not know what her heart was doing at the time. I'm going to give her some Toradol for chest wall pain right now. We will send her home with a Zio [atc.  Return if worse.    New Prescriptions    No medications on file       Final diagnoses:   Atypical chest pain   Palpitations       6/26/2018   [facility]     Juvenal Ruffin MD  06/26/18 0465

## 2018-06-26 NOTE — ED AVS SNAPSHOT
[facility]    [address]    [place] [address]    Phone:  [phone]    Fax:  [phone]                                       Rosi Lamb   MRN: [id number]    Department:  [facility]   Date of Visit:  6/26/2018           Patient Information     Date Of Birth          1975        Your diagnoses for this visit were:     Atypical chest pain     Palpitations        You were seen by Juvenal Ruffin MD.        Discharge Instructions          *CHEST PAIN, UNCERTAIN CAUSE    Based on your exam today, the exact cause of your chest pain is not certain. Your condition does not seem serious at this time, and your pain does not appear to be coming from your heart. However, sometimes the signs of a serious problem take more time to appear. Therefore, watch for the warning signs listed below.  HOME CARE:    1. Rest today and avoid strenuous activity.  2. Take any prescribed medicine as directed.  FOLLOW UP with your doctor in 1-3 days.   GET PROMPT MEDICAL ATTENTION if any of the following occur:    A change in the type of pain: if it feels different, becomes more severe, lasts longer, or begins to spread into your shoulder, arm, neck, jaw or back    Shortness of breath or increased pain with breathing    Weakness, dizziness, or fainting    Cough with blood or dark colored sputum (phlegm)    Fever over 101  F (38.3  C)    Swelling, pain or redness in one leg    6623-7124 [employer]. [address] [place] [address]. All rights reserved. This information is not intended as a substitute for professional medical care. Always follow your healthcare professional's instructions.  This information has been modified by your health care provider with permission from the publisher.      Understanding Heart Palpitations    Heart palpitations are a symptom. It s the feeling you have when your heartbeat seems to be racing, pounding, skipping,  or fluttering. Heart palpitations are most often felt in the chest. Sometimes, they may also be felt in the neck.  What causes heart palpitations?  In most cases, heart palpitations are caused by:    Stress or anxiety    Exercise    Pregnancy    Some medicines    Caffeine    Nicotine    Alcohol    Illegal drugs, such as cocaine    Health problems, such as anemia or overactive thyroid  In some cases, heart palpitations may be caused by a problem with the heart. Abnormal heart rhythms (arrhythmias) are the main concern. They may need to be managed by you and your healthcare provider or treated right away.  How are heart palpitations treated?  Treatments for heart palpitations depend on the cause. Options may include:    Managing the things that trigger your heart palpitations. This could mean:  ? Learning ways to reduce stress and anxiety  ? Avoiding caffeine, nicotine, alcohol, or illegal drugs  ? Stopping the use of certain medicines, under your doctor s guidance    Medicines, procedures, or surgery to treat an arrhythmia or other health problem that is causing your symptoms  What are the complications of heart palpitations?  Complications of heart palpitations are rare unless they are caused by a problem such as an arrhythmia. In such cases, complications can include:    Fainting    Heart failure. This problem occurs when the heart is so weak it no longer pumps blood well.    Blood clots and stroke    Sudden cardiac arrest. This problem occurs when the heart suddenly stops beating.  When should I call my healthcare provider?  Call your healthcare provider right away if you have any of these:    Fever of 100.4 F (38 C) or higher, or as directed    Symptoms that don t get better with treatment, or symptoms that get worse    New symptoms, such as chest pain, shortness of breath, dizziness, or fainting   Date Last Reviewed: 5/1/2016 2000-2017 [employer]. [address] [place] [id number]. All  rights reserved. This information is not intended as a substitute for professional medical care. Always follow your healthcare professional's instructions.          Your next 10 appointments already scheduled     Jun 27, 2018  3:30 PM CDT   SHORT with Kassandra Bautista CNP   [facility]    [address]  [place] [address]   [phone]            Sep 12, 2018 10:40 AM CDT   SHORT with Lucio Curiel MD   [facility]    [address]  [place] [address]   [phone]              24 Hour Appointment Hotline     To schedule an appointment at [facility], please call [phone]. If you don't have a family doctor or clinic, we will help you find one. [facility] are conveniently located to serve the needs of you and your family.        ED Discharge Orders     Zio Patch Holter                    Review of your medicines      Our records show that you are taking the medicines listed below. If these are incorrect, please call your family doctor or clinic.        Dose / Directions Last dose taken    ARIPiprazole 5 MG tablet   Commonly known as:  ABILIFY   Dose:  5 mg        Take 5 mg by mouth daily   Refills:  0        clotrimazole 1 % cream   Commonly known as:  LOTRIMIN        Refills:  0        cyanocobalamin 1000 MCG/ML injection   Commonly known as:  VITAMIN B12   Dose:  1000 mcg        Inject 1,000 mcg into the muscle every 14 days . Dose increase frequency 7/27/2017   Refills:  0        D 5000 5000 units Caps   Dose:  5000 Units   Generic drug:  cholecalciferol        Take 5,000 Units by mouth daily for vitamin D deficiency. Dose increase 2/16/2017.   Refills:  0        gabapentin 300 MG capsule   Commonly known as:  NEURONTIN        TAKE 1 CAPSULE BY MOUTH DAILY FOR 3 DAYS, THEN INCREASE TO TAKE 1 CAPSULE TWICE DAILY FOR 3 DAYS, THEN 1 CAPSULE 3 TIMES DAILY.  "  Refills:  1        hydrOXYzine 50 MG tablet   Commonly known as:  ATARAX        TAKE 2 TABLETS (100MG) BY MOUTH NIGHTLY AT BEDTIME AND 1 TABLET ONCE DAILY AS NEEDED   Refills:  0        ibuprofen 200 MG tablet   Commonly known as:  ADVIL/MOTRIN   Dose:  600 mg        Take 600 mg by mouth every 6 hours as needed for pain   Refills:  0        lamoTRIgine 200 MG tablet   Commonly known as:  LaMICtal        TAKE 1 TABLET BY MOUTH NIGHTLY AT BEDTIME   Refills:  2        Medical Compression Stockings [facility] MEDICAL SUPPLY (GRADUATED COMPRESSION STOCKINGS). For personal use. Length: thigh Strength: 16-20 mmHg.  Please measure patient in Pharmacy.   Refills:  0        montelukast 10 MG tablet   Commonly known as:  SINGULAIR   Dose:  10 mg        Take 10 mg by mouth At Bedtime   Refills:  0        order for DME   Quantity:  2 Application        Equipment being ordered: Custom Compression Garments for Bilateral Lower Extremities   Refills:  11        oxybutynin 5 MG 24 hr tablet   Commonly known as:  DITROPAN-XL   Dose:  5 mg   Quantity:  30 tablet        Take 1 tablet (5 mg) by mouth daily (lot [id number])   Refills:  11        roller walker Misc        Rolling Walker for home use.  2 wheels   Refills:  0        SAFETY-ELBERT TB SYRINGE 25G X 5/8\" 1 ML Misc   Generic drug:  Tuberculin-Allergy Syringes        SAFETY GLIDE-FOR B-12 SHOTS   Refills:  0        syringe/needle (sisp) 25G X 5/8\" 1 ML Misc        Safety glide - for B12 Shots   Refills:  0        VENTOLIN  (90 Base) MCG/ACT Inhaler   Dose:  1-2 puff   Generic drug:  albuterol        Inhale 1-2 puffs into the lungs every 4 hours as needed   Refills:  0        vilazodone 40 MG Tabs tablet   Commonly known as:  VIIBRYD   Dose:  40 mg        Take 40 mg by mouth daily with food   Refills:  0        Vitamin-B Complex Tabs   Dose:  1 tablet        Take 1 tablet by mouth daily for low b12. Start 2/16/2017   Refills:  0                Procedures and " tests performed during your visit     CBC with platelets differential    Comprehensive metabolic panel    EKG 12-lead, tracing only    Troponin I      Orders Needing Specimen Collection     None      Pending Results     No orders found from 6/24/2018 to 6/27/2018.            Pending Culture Results     No orders found from 6/24/2018 to 6/27/2018.            Pending Results Instructions     If you had any lab results that were not finalized at the time of your Discharge, you can call the ED Lab Result RN at [phone]. You will be contacted by this team for any positive Lab results or changes in treatment. The nurses are available 7 days a week from 10A to 6:30P.  You can leave a message 24 hours per day and they will return your call.        Thank you for choosing [facility]       Thank you for choosing [facility] for your care. Our goal is always to provide you with excellent care. Hearing back from our patients is one way we can continue to improve our services. Please take a few minutes to complete the written survey that you may receive in the mail after you visit with us. Thank you!        Care EveryWhere ID     This is your Care EveryWhere ID. This could be used by other organizations to access your [facility] medical records  [id number]        Equal Access to Services     VANDANA NAYLOR : Mak Krause, uriel duggan, zenobia garcia . [facility] [phone].    ATENCIÓN: Si habla español, tiene a mondragon disposición servicios gratuitos de asistencia lingüística. Llame al [phone].    We comply with applicable federal civil rights laws and [place] laws. We do not discriminate on the basis of race, color, national origin, age, disability, sex, sexual orientation, or gender identity.            After Visit Summary       This is your record. Keep this with you and show to your community pharmacist(s) and doctor(s) at your next visit.

## 2018-06-26 NOTE — DISCHARGE INSTRUCTIONS
*CHEST PAIN, UNCERTAIN CAUSE    Based on your exam today, the exact cause of your chest pain is not certain. Your condition does not seem serious at this time, and your pain does not appear to be coming from your heart. However, sometimes the signs of a serious problem take more time to appear. Therefore, watch for the warning signs listed below.  HOME CARE:    1. Rest today and avoid strenuous activity.  2. Take any prescribed medicine as directed.  FOLLOW UP with your doctor in 1-3 days.   GET PROMPT MEDICAL ATTENTION if any of the following occur:    A change in the type of pain: if it feels different, becomes more severe, lasts longer, or begins to spread into your shoulder, arm, neck, jaw or back    Shortness of breath or increased pain with breathing    Weakness, dizziness, or fainting    Cough with blood or dark colored sputum (phlegm)    Fever over 101  F (38.3  C)    Swelling, pain or redness in one leg    8596-8700 [employer]. [address] [place] [address]. All rights reserved. This information is not intended as a substitute for professional medical care. Always follow your healthcare professional's instructions.  This information has been modified by your health care provider with permission from the publisher.      Understanding Heart Palpitations    Heart palpitations are a symptom. It s the feeling you have when your heartbeat seems to be racing, pounding, skipping, or fluttering. Heart palpitations are most often felt in the chest. Sometimes, they may also be felt in the neck.  What causes heart palpitations?  In most cases, heart palpitations are caused by:    Stress or anxiety    Exercise    Pregnancy    Some medicines    Caffeine    Nicotine    Alcohol    Illegal drugs, such as cocaine    Health problems, such as anemia or overactive thyroid  In some cases, heart palpitations may be caused by a problem with the heart. Abnormal heart rhythms (arrhythmias) are the main  concern. They may need to be managed by you and your healthcare provider or treated right away.  How are heart palpitations treated?  Treatments for heart palpitations depend on the cause. Options may include:    Managing the things that trigger your heart palpitations. This could mean:  ? Learning ways to reduce stress and anxiety  ? Avoiding caffeine, nicotine, alcohol, or illegal drugs  ? Stopping the use of certain medicines, under your doctor s guidance    Medicines, procedures, or surgery to treat an arrhythmia or other health problem that is causing your symptoms  What are the complications of heart palpitations?  Complications of heart palpitations are rare unless they are caused by a problem such as an arrhythmia. In such cases, complications can include:    Fainting    Heart failure. This problem occurs when the heart is so weak it no longer pumps blood well.    Blood clots and stroke    Sudden cardiac arrest. This problem occurs when the heart suddenly stops beating.  When should I call my healthcare provider?  Call your healthcare provider right away if you have any of these:    Fever of 100.4 F (38 C) or higher, or as directed    Symptoms that don t get better with treatment, or symptoms that get worse    New symptoms, such as chest pain, shortness of breath, dizziness, or fainting   Date Last Reviewed: 5/1/2016 2000-2017 [employer]. [address] [place] [address], [place] [address]. All rights reserved. This information is not intended as a substitute for professional medical care. Always follow your healthcare professional's instructions.

## 2018-06-26 NOTE — ED AVS SNAPSHOT
"    [facility]: [phone]                                              INTERAGENCY TRANSFER FORM - LAB / IMAGING / EKG / EMG RESULTS   2018                    Hospital Admission Date: 2018  BEATRICE BOYCE   : 1975  Sex: Female        Attending Provider: Juvenal Ruffin MD     Allergies:  Clonazepam, Hydrocodone-acetaminophen, Lorazepam, Sertraline, Bupropion, Lithium, Risperidone, Sulfa Drugs, Valproic Acid    Infection:  None   Service:  EMERGENCY ME    Ht:  1.702 m (5' 7\")   Wt:  145.2 kg (320 lb)   Admission Wt:  --    BMI:  50.12 kg/m 2   BSA:  2.62 m 2            Patient PCP Information     Provider PCP Type    Lucio Curiel MD General         Lab Results - 3 Days      Comprehensive metabolic panel [[id number]]  Resulted: 18 1517, Result status: Final result    Ordering provider: Juvenal Ruffin MD  18 1428 Resulting lab: [facility]    Specimen Information    Type Source Collected On   Blood  18 1429          Components       Value Reference Range Flag Lab   Sodium 141 134 - 144 mmol/L  [facility]   Potassium 3.8 3.5 - 5.1 mmol/L  [facility]   Chloride 105 98 - 107 mmol/L  [facility]   Carbon Dioxide 30 21 - 31 mmol/L  [facility]   Anion Gap 6 3 - 14 mmol/L  [facility]   Glucose 94 70 - 105 mg/dL  [facility]   Urea Nitrogen 11 7 - 25 mg/dL  [facility]   Creatinine 0.93 0.60 - 1.20 mg/dL  [facility]   GFR Estimate 66 >60 mL/min/1.7m2  [facility]   GFR Estimate If Black 80 >60 mL/min/1.7m2  [facility]   Calcium 9.3 8.6 - 10.3 mg/dL  [facility]   Bilirubin Total 0.6 0.3 - 1.0 mg/dL  [facility]   Albumin 3.7 3.5 - 5.7 g/dL  [facility]   Protein Total 6.6 6.4 - 8.9 g/dL  [facility]   Alkaline Phosphatase 54 34 - 104 U/L  [facility]   ALT 13 7 - 52 U/L  [facility]   AST 16 13 - 39 U/L  [facility]            Troponin I [[id number]]  Resulted: 18 1510, Result status: Final result    Ordering provider: Juvenal Ruffin MD  18 " 1428 Resulting lab: [facility]    Specimen Information    Type Source Collected On   Blood  06/26/18 1429          Components       Value Reference Range Flag Lab   Troponin I ES <0.030 0.000 - 0.034 ug/L  [facility]            CBC with platelets differential [[id number]]  Resulted: 06/26/18 1453, Result status: Final result    Ordering provider: Juvenal Ruffin MD  06/26/18 1428 Resulting lab: [facility]    Specimen Information    Type Source Collected On   Blood  06/26/18 1429          Components       Value Reference Range Flag Lab   WBC 8.3 4.0 - 11.0 10e9/L  [facility]   RBC Count 4.32 3.8 - 5.2 10e12/L  [facility]   Hemoglobin 12.9 11.7 - 15.7 g/dL  [facility]   Hematocrit 39.8 35.0 - 47.0 %  [facility]   MCV 92 78 - 100 fl  [facility]   MCH 29.9 26.5 - 33.0 pg  [facility]   MCHC 32.4 31.5 - 36.5 g/dL  [facility]   RDW 12.9 10.0 - 15.0 %  [facility]   Platelet Count 212 150 - 450 10e9/L  [facility]   Diff Method Automated Method   [facility]   % Neutrophils 62.7 %  [facility]   % Lymphocytes 25.7 %  [facility]   % Monocytes 9.3 %  [facility]   % Eosinophils 1.8 %  [facility]   % Basophils 0.4 %  [facility]   % Immature Granulocytes 0.1 %  [facility]   Absolute Neutrophil 5.2 1.6 - 8.3 10e9/L  [facility]   Absolute Lymphocytes 2.1 0.8 - 5.3 10e9/L  [facility]   Absolute Monocytes 0.8 0.0 - 1.3 10e9/L  [facility]   Absolute Eosinophils 0.2 0.0 - 0.7 10e9/L  [facility]   Absolute Basophils 0.0 0.0 - 0.2 10e9/L  [facility]   Abs Immature Granulocytes 0.0 0 - 0.4 10e9/L  [facility]            Testing Performed By     Lab - Abbreviation Name Director Address Valid Date Range    5070 - [facility] [address]  [facility] [address] 08/07/15 0948 - Present            Unresulted Labs     None      Encounter-Level Documents:     There are no encounter-level documents.      Order-Level Documents:     There are no order-level  documents.

## 2018-06-26 NOTE — ED NOTES
ZIO patch placed by RT.  Pt called bus for ride home, reports she'll make a follow up appt with her PCP for ZIO patch results.

## 2018-06-26 NOTE — ED AVS SNAPSHOT
[facility]    [address]    [place] [address]    Phone:  [phone]    Fax:  [phone]                                       Rosi Lamb   MRN: [id number]    Department:  [facility]   Date of Visit:  6/26/2018           After Visit Summary Signature Page     I have received my discharge instructions, and my questions have been answered. I have discussed any challenges I see with this plan with the nurse or doctor.    ..........................................................................................................................................  Patient/Patient Representative Signature      ..........................................................................................................................................  Patient Representative Print Name and Relationship to Patient    ..................................................               ................................................  Date                                            Time    ..........................................................................................................................................  Reviewed by Signature/Title    ...................................................              ..............................................  Date                                                            Time

## 2018-06-27 ENCOUNTER — OFFICE VISIT (OUTPATIENT)
Dept: FAMILY MEDICINE | Facility: OTHER | Age: 43
End: 2018-06-27
Attending: NURSE PRACTITIONER
Payer: COMMERCIAL

## 2018-06-27 VITALS
WEIGHT: 293 LBS | BODY MASS INDEX: 45.99 KG/M2 | SYSTOLIC BLOOD PRESSURE: 120 MMHG | HEIGHT: 67 IN | HEART RATE: 92 BPM | DIASTOLIC BLOOD PRESSURE: 82 MMHG

## 2018-06-27 DIAGNOSIS — R52 BODY ACHES: ICD-10-CM

## 2018-06-27 DIAGNOSIS — R53.83 FATIGUE, UNSPECIFIED TYPE: Primary | ICD-10-CM

## 2018-06-27 DIAGNOSIS — M62.838 MUSCLE SPASM: ICD-10-CM

## 2018-06-27 LAB
MAGNESIUM SERPL-MCNC: 2 MG/DL (ref 1.9–2.7)
TSH SERPL DL<=0.05 MIU/L-ACNC: 2.89 IU/ML (ref 0.34–5.6)

## 2018-06-27 PROCEDURE — 84443 ASSAY THYROID STIM HORMONE: CPT | Performed by: NURSE PRACTITIONER

## 2018-06-27 PROCEDURE — 99214 OFFICE O/P EST MOD 30 MIN: CPT | Performed by: NURSE PRACTITIONER

## 2018-06-27 PROCEDURE — G0463 HOSPITAL OUTPT CLINIC VISIT: HCPCS | Mod: 25

## 2018-06-27 PROCEDURE — 83735 ASSAY OF MAGNESIUM: CPT | Performed by: NURSE PRACTITIONER

## 2018-06-27 PROCEDURE — 36415 COLL VENOUS BLD VENIPUNCTURE: CPT | Performed by: NURSE PRACTITIONER

## 2018-06-27 RX ORDER — LAMOTRIGINE 25 MG/1
1 TABLET ORAL AT BEDTIME
Refills: 2 | Status: ON HOLD | COMMUNITY
Start: 2018-05-03 | End: 2022-11-06

## 2018-06-27 RX ORDER — CYCLOBENZAPRINE HCL 10 MG
5-10 TABLET ORAL 3 TIMES DAILY PRN
Qty: 30 TABLET | Refills: 1 | Status: SHIPPED | OUTPATIENT
Start: 2018-06-27 | End: 2019-04-03

## 2018-06-27 ASSESSMENT — PAIN SCALES - GENERAL: PAINLEVEL: NO PAIN (0)

## 2018-06-27 ASSESSMENT — ANXIETY QUESTIONNAIRES
2. NOT BEING ABLE TO STOP OR CONTROL WORRYING: NOT AT ALL
1. FEELING NERVOUS, ANXIOUS, OR ON EDGE: NOT AT ALL

## 2018-06-27 NOTE — PATIENT INSTRUCTIONS
ASSESSMENT/PLAN:     1. Fatigue, unspecified type  - TSH    2. Body aches  - TSH  - Magnesium    3. Muscle spasm  Suspecting overuse muscle spasms. Discussed differentials of MS and ALS of which I think are most likely not but if symptoms worsen or do not improve after trial of muscle relaxants could be considered.  - Magnesium  - cyclobenzaprine (FLEXERIL) 10 MG tablet; Take 0.5-1 tablets (5-10 mg) by mouth 3 times daily as needed for muscle spasms  Dispense: 30 tablet; Refill: 1        FUTURE APPOINTMENTS:       - Follow-up visit: If no improvement or worsening symptoms.        Kassandra Bautista, CNP  [facility]

## 2018-06-27 NOTE — PROGRESS NOTES
SUBJECTIVE:   Rosi Lamb is a 43 year old female who presents to clinic today for the following health issues:    Muscle aches, muscle spasms, and fatigue  This started about 1.5-2 weeks ago  Has not had these symptoms previously  Muscle aches and spasms are in her arms and legs mostly  Symptoms are intermittent. She cannot pinpoint any triggers for the muscle aches and spasms. She has not tried anything for symptoms as they end up resolving but can last for 2-3 hours.  She also has the sensation of feeling like she is going to tip over.  Denies syncope, near- syncope, dizziness, light headedness, recent URI, ear discomfort.  She was seen in the ED yesterday for chest pain and was sent home with a Zio patch. She has had a few episodes of heart racing and chest discomfort today that causes some shortness of breath.  Lives in town, has not had any known tick exposure.     She has a cousin who has MS and is concerned her symptoms being related to ALS.     Problem list and histories reviewed & adjusted, as indicated.  Additional history: as documented    Patient Active Problem List   Diagnosis     Alcohol use disorder, moderate, in sustained remission, dependence (H)     Allergic rhinitis due to pollen     Generalized anxiety disorder     Allergic rhinitis due to other allergen     Bilateral chronic knee pain     Bilateral foot pain     Bipolar 1 disorder, mixed, partial remission (H)     Borderline personality disorder     Cannabis use disorder, moderate, in sustained remission, dependence (H)     Closed dislocation of tarsal joint     Crepitus of joint of right knee     Other disorder of menstruation and other abnormal bleeding from female genital tract     Elevated random blood glucose level     Esophageal reflux     Lymphedema of both lower extremities     Menorrhagia with irregular cycle     Moderate persistent asthma     Body mass index 45.0-49.9, adult (H)     Peripheral polyneuropathy     Preop  examination     Urinary incontinence     History of substance abuse     Stasis dermatitis of both legs     Vitamin B12 deficiency     Vitamin D deficiency     Abnormal Pap smear of cervix     Bipolar I disorder, most recent episode depressed, moderate (H)     Cellulitis of right lower extremity     Edema of extremity of unknown cause     Leg wound, left, subsequent encounter     Neuropathic pain of foot, unspecified laterality     Obesity     Past Surgical History:   Procedure Laterality Date     ANKLE SURGERY      fracture, repair with screws     CONIZATION LEEP      07/04,Underwent a loop     LAPAROSCOPIC TUBAL LIGATION      2009,tubal ligation       Social History   Substance Use Topics     Smoking status: Former Smoker     Packs/day: 1.00     Years: 3.00     Types: Cigarettes     Quit date: 1/1/2003     Smokeless tobacco: Never Used     Alcohol use No      Comment: Alcoholic Drinks/day: History of abuse - sober as of July 2010.     Family History   Problem Relation Age of Onset     Other - See Comments Mother      No Known Problems     Asthma Father      Asthma,+ Leg edema, knee, hip replacement. + Lymphedema     Other - See Comments Paternal Grandmother      Lymphedema     Osteoperosis Brother      Osteoporosis     Other - See Comments Brother      No Known Problems         Current Outpatient Prescriptions   Medication Sig Dispense Refill     albuterol (VENTOLIN HFA) 108 (90 BASE) MCG/ACT Inhaler Inhale 1-2 puffs into the lungs every 4 hours as needed       ARIPiprazole (ABILIFY) 5 MG tablet Take 5 mg by mouth daily       B Complex Vitamins (VITAMIN-B COMPLEX) TABS Take 1 tablet by mouth daily for low b12. Start 2/16/2017       cholecalciferol (D 5000) 5000 UNITS CAPS Take 5,000 Units by mouth daily for vitamin D deficiency. Dose increase 2/16/2017.       clotrimazole (LOTRIMIN) 1 % cream        cyanocobalamin (VITAMIN B12) 1000 MCG/ML injection Inject 1,000 mcg into the muscle every 14 days . Dose increase  "frequency 7/27/2017       Elastic Bandages & Supports (MEDICAL COMPRESSION STOCKINGS) [facility] MISCELLANEOUS MEDICAL SUPPLY (GRADUATED COMPRESSION STOCKINGS). For personal use. Length: thigh Strength: 16-20 mmHg.  Please measure patient in Pharmacy.       gabapentin (NEURONTIN) 300 MG capsule TAKE 1 CAPSULE BY MOUTH DAILY FOR 3 DAYS, THEN INCREASE TO TAKE 1 CAPSULE TWICE DAILY FOR 3 DAYS, THEN 1 CAPSULE 3 TIMES DAILY.  1     hydrOXYzine (ATARAX) 50 MG tablet TAKE 2 TABLETS (100MG) BY MOUTH NIGHTLY AT BEDTIME AND 1 TABLET ONCE DAILY AS NEEDED       ibuprofen (ADVIL/MOTRIN) 200 MG tablet Take 600 mg by mouth every 6 hours as needed for pain       lamoTRIgine (LAMICTAL) 200 MG tablet TAKE 1 TABLET BY MOUTH NIGHTLY AT BEDTIME  2     lamoTRIgine (LAMICTAL) 25 MG tablet TAKE 2 TABLETS BY MOUTH DAILY  2     Misc. Devices (ROLLER WALKER) MISC Rolling Walker for home use.  2 wheels       montelukast (SINGULAIR) 10 MG tablet Take 10 mg by mouth At Bedtime       order for DME Equipment being ordered: Custom Compression Garments for Bilateral Lower Extremities 2 Application 11     oxybutynin (DITROPAN-XL) 5 MG 24 hr tablet Take 1 tablet (5 mg) by mouth daily (lot [id number]) 30 tablet 11     syringe/needle, sisp, 25G X 5/8\" 1 ML MISC Safety glide - for B12 Shots       Tuberculin-Allergy Syringes (SAFETY-ELBERT TB SYRINGE) 25G X 5/8\" 1 ML MISC SAFETY GLIDE-FOR B-12 SHOTS       vilazodone (VIIBRYD) 40 MG TABS tablet Take 40 mg by mouth daily with food       Allergies   Allergen Reactions     Clonazepam Other (See Comments)     Causes Violence and aggresssion     Hydrocodone-Acetaminophen      Other reaction(s): Seizures  Can take Percocet without difficulty.     Lorazepam Other (See Comments)     Causes Violence and aggresssion     Sertraline Other (See Comments)     Caused suicidally      Bupropion Other (See Comments)     Caused Major Depression     Lithium Unknown     Risperidone Unknown     Sulfa Drugs Unknown     Valproic Acid " "Unknown     Recent Labs   Lab Test  06/26/18   1429  04/11/18   1502  03/07/18   0906   LDL   --    --   96   HDL   --    --   45   TRIG   --    --   136   ALT  13  20  17   CR  0.93  0.99  1.01   GFRESTIMATED  66  62  60*   GFRESTBLACK  80  75  73   POTASSIUM  3.8  4.3  4.4      BP Readings from Last 3 Encounters:   06/27/18 120/82   06/26/18 118/71   04/18/18 118/73    Wt Readings from Last 3 Encounters:   06/27/18 (!) 356 lb 12.8 oz (161.8 kg)   04/18/18 320 lb (145.2 kg)   04/11/18 (!) 351 lb 2 oz (159.3 kg)              Reviewed and updated as needed this visit by clinical staff  Tobacco  Allergies  Meds  Med Hx  Surg Hx  Fam Hx  Soc Hx      Reviewed and updated as needed this visit by Provider         ROS:  Constitutional, HEENT, cardiovascular, pulmonary, gi and gu systems are negative, except as otherwise noted.    OBJECTIVE:     /82 (BP Location: Right arm, Patient Position: Sitting, Cuff Size: Adult Large)  Pulse 92  Ht 5' 7\" (1.702 m)  Wt (!) 356 lb 12.8 oz (161.8 kg)  BMI 55.88 kg/m2  Body mass index is 55.88 kg/(m^2).     GENERAL: healthy, alert and no distress  EYES: Eyes grossly normal to inspection, PERRLA and conjunctivae and sclerae normal  HENT: BOTH EARS: canals normal, TMs normal: Translucent, no erythema or exudate  RESP: lungs clear to auscultation - no rales, rhonchi or wheezes  CV: regular rate and rhythm, normal S1 S2, no S3 or S4, no murmur, click or rub  ABDOMEN: soft, obese  MS: no gross musculoskeletal defects noted, lypmphaedema to lower extremities  SKIN: no suspicious lesions or rashes  NEURO: Normal strength and tone, mentation intact and speech normal  PSYCH: mentation appears normal, affect normal    Diagnostic Test Results:  Reviewed labs done in ED yesterday. All normal.     Results for orders placed or performed in visit on 06/27/18 (from the past 24 hour(s))   TSH   Result Value Ref Range    Thyrotropin 2.89 0.34 - 5.60 IU/mL   Magnesium   Result Value Ref " Range    Magnesium 2.0 1.9 - 2.7 mg/dL       ASSESSMENT/PLAN:     1. Fatigue, unspecified type  - TSH    2. Body aches  - TSH  - Magnesium    3. Muscle spasm  Suspecting overuse muscle spasms since she does state she is getting out and going for short walks. Discussed differentials of MS and ALS of which I think are most likely not the cause of her symptoms but if symptoms worsen or do not improve after trial of muscle relaxants these could be considered.  - Magnesium  - cyclobenzaprine (FLEXERIL) 10 MG tablet; Take 0.5-1 tablets (5-10 mg) by mouth 3 times daily as needed for muscle spasms  Dispense: 30 tablet; Refill: 1        FUTURE APPOINTMENTS:       - Follow-up visit: If no improvement or worsening symptoms.        Kassandra Bautista CNP  [facility] [place]

## 2018-06-27 NOTE — MR AVS SNAPSHOT
After Visit Summary   6/27/2018    Rosi Lamb    MRN: [id number]           Patient Information     Date Of Birth          1975        Visit Information        Provider Department      6/27/2018 3:30 PM Kassandra Bautista CNP [facility]        Today's Diagnoses     Fatigue, unspecified type    -  1    Body aches        Muscle spasm          Care Instructions    ASSESSMENT/PLAN:     1. Fatigue, unspecified type  - TSH    2. Body aches  - TSH  - Magnesium    3. Muscle spasm  Suspecting overuse muscle spasms. Discussed differentials of MS and ALS of which I think are most likely not but if symptoms worsen or do not improve after trial of muscle relaxants could be considered.  - Magnesium  - cyclobenzaprine (FLEXERIL) 10 MG tablet; Take 0.5-1 tablets (5-10 mg) by mouth 3 times daily as needed for muscle spasms  Dispense: 30 tablet; Refill: 1        FUTURE APPOINTMENTS:       - Follow-up visit: If no improvement or worsening symptoms.        Kassandra Bautista CNP  [facility]          Follow-ups after your visit        Your next 10 appointments already scheduled     Sep 12, 2018 10:40 AM CDT   SHORT with Lucio Curiel MD   [facility] ([facility])    [address]  [facility] [id number] [phone]              Future tests that were ordered for you today     Open Future Orders        Priority Expected Expires Ordered    Zio Patch Holter Routine  8/10/2018 6/26/2018            Who to contact     If you have questions or need follow up information about today's clinic visit or your schedule please contact [facility] directly at [phone].  Normal or non-critical lab and imaging results will be communicated to you by MyChart, letter or phone within 4 business days after the clinic has received the results. If you do not hear from us within 7 days, please contact the clinic  "through [employer] or phone. If you have a critical or abnormal lab result, we will notify you by phone as soon as possible.  Submit refill requests through [employer] or call your pharmacy and they will forward the refill request to us. Please allow 3 business days for your refill to be completed.          Additional Information About Your Visit        Care EveryWhere ID     This is your Care EveryWhere ID. This could be used by other organizations to access your [place] medical records  [id number]        Your Vitals Were     Pulse Height BMI (Body Mass Index)             92 5' 7\" (1.702 m) 55.88 kg/m2          Blood Pressure from Last 3 Encounters:   06/27/18 120/82   06/26/18 118/71   04/18/18 118/73    Weight from Last 3 Encounters:   06/27/18 (!) 356 lb 12.8 oz (161.8 kg)   04/18/18 320 lb (145.2 kg)   04/11/18 (!) 351 lb 2 oz (159.3 kg)              We Performed the Following     Magnesium     TSH          Today's Medication Changes          These changes are accurate as of 6/27/18  3:59 PM.  If you have any questions, ask your nurse or doctor.               Start taking these medicines.        Dose/Directions    cyclobenzaprine 10 MG tablet   Commonly known as:  FLEXERIL   Used for:  Muscle spasm   Started by:  Kassandra Bautista CNP        Dose:  5-10 mg   Take 0.5-1 tablets (5-10 mg) by mouth 3 times daily as needed for muscle spasms   Quantity:  30 tablet   Refills:  1            Where to get your medicines      These medications were sent to [id number] - [place], [place] - [address]  [address], [facility] [address]     Phone:  [phone]     cyclobenzaprine 10 MG tablet                Primary Care Provider Office Phone # Fax #    Lucio Curiel [phone] [address] [facility] [address]        Equal Access to Services     [facility] CYNDY AH: Hadii tristan dhillon hadasho Soomaali, waaxda luqadaha, qaybta kaalmada millicent, zenobia giron " asif richards acostabon dejesus ah. So [facility] [phone].    ATENCIÓN: Si hectorla rosy, tiene a mondragon disposición servicios gratuitos de asistencia lingüística. Shaun al [phone].    We comply with applicable federal civil rights laws and [place] laws. We do not discriminate on the basis of race, color, national origin, age, disability, sex, sexual orientation, or gender identity.            Thank you!     Thank you for choosing [facility]  for your care. Our goal is always to provide you with excellent care. Hearing back from our patients is one way we can continue to improve our services. Please take a few minutes to complete the written survey that you may receive in the mail after your visit with us. Thank you!             Your Updated Medication List - Protect others around you: Learn how to safely use, store and throw away your medicines at www.disposemymeds.org.          This list is accurate as of 6/27/18  3:59 PM.  Always use your most recent med list.                   Brand Name Dispense Instructions for use Diagnosis    ARIPiprazole 5 MG tablet    ABILIFY     Take 5 mg by mouth daily        clotrimazole 1 % cream    LOTRIMIN          cyanocobalamin 1000 MCG/ML injection    VITAMIN B12     Inject 1,000 mcg into the muscle every 14 days . Dose increase frequency 7/27/2017        cyclobenzaprine 10 MG tablet    FLEXERIL    30 tablet    Take 0.5-1 tablets (5-10 mg) by mouth 3 times daily as needed for muscle spasms    Muscle spasm       D 5000 5000 units Caps   Generic drug:  cholecalciferol      Take 5,000 Units by mouth daily for vitamin D deficiency. Dose increase 2/16/2017.        gabapentin 300 MG capsule    NEURONTIN     TAKE 1 CAPSULE BY MOUTH DAILY FOR 3 DAYS, THEN INCREASE TO TAKE 1 CAPSULE TWICE DAILY FOR 3 DAYS, THEN 1 CAPSULE 3 TIMES DAILY.        hydrOXYzine 50 MG tablet    ATARAX     TAKE 2 TABLETS (100MG) BY MOUTH NIGHTLY AT BEDTIME AND 1 TABLET ONCE DAILY AS NEEDED         "ibuprofen 200 MG tablet    ADVIL/MOTRIN     Take 600 mg by mouth every 6 hours as needed for pain        * lamoTRIgine 200 MG tablet    LaMICtal     TAKE 1 TABLET BY MOUTH NIGHTLY AT BEDTIME        * lamoTRIgine 25 MG tablet    LaMICtal     TAKE 2 TABLETS BY MOUTH DAILY        Medical Compression Stockings Mis      MISCELLANEOUS MEDICAL SUPPLY (GRADUATED COMPRESSION STOCKINGS). For personal use. Length: thigh Strength: 16-20 mmHg.  Please measure patient in Pharmacy.        montelukast 10 MG tablet    SINGULAIR     Take 10 mg by mouth At Bedtime        order for DME     2 Application    Equipment being ordered: Custom Compression Garments for Bilateral Lower Extremities    Lymphedema of both lower extremities       oxybutynin 5 MG 24 hr tablet    DITROPAN-XL    30 tablet    Take 1 tablet (5 mg) by mouth daily (lot [id number])    Urinary frequency       roller walker Misc      Rolling Walker for home use.  2 wheels        SAFETY-ELBERT TB SYRINGE 25G X 5/8\" 1 ML Misc   Generic drug:  Tuberculin-Allergy Syringes      SAFETY GLIDE-FOR B-12 SHOTS        syringe/needle (sisp) 25G X 5/8\" 1 ML Misc      Safety glide - for B12 Shots        VENTOLIN  (90 Base) MCG/ACT Inhaler   Generic drug:  albuterol      Inhale 1-2 puffs into the lungs every 4 hours as needed        vilazodone 40 MG Tabs tablet    VIIBRYD     Take 40 mg by mouth daily with food        Vitamin-B Complex Tabs      Take 1 tablet by mouth daily for low b12. Start 2/16/2017        * Notice:  This list has 2 medication(s) that are the same as other medications prescribed for you. Read the directions carefully, and ask your doctor or other care provider to review them with you.      "

## 2018-06-27 NOTE — NURSING NOTE
Patient presents to the clinic for muscle aches, spasms in the legs, arms, stomach and back. Patient states it started about 2 weeks ago.  Ruben Hollis ..............6/27/2018 3:14 PM

## 2018-06-29 PROCEDURE — G0179 MD RECERTIFICATION HHA PT: HCPCS | Performed by: INTERNAL MEDICINE

## 2018-07-03 ENCOUNTER — TELEPHONE (OUTPATIENT)
Dept: INTERNAL MEDICINE | Facility: OTHER | Age: 43
End: 2018-07-03
Payer: COMMERCIAL

## 2018-07-03 DIAGNOSIS — F31.0 BIPOLAR I DISORDER, MOST RECENT EPISODE HYPOMANIC (H): Primary | ICD-10-CM

## 2018-07-03 NOTE — TELEPHONE ENCOUNTER
I have reviewed and completed the following home care or hospice form(s) for Rosi Lamb  on July 3, 2018 .  This covers the certification period effective 6/29/18-8/27/18.    [facility]    Lucio Curiel MD

## 2018-07-19 ENCOUNTER — OFFICE VISIT (OUTPATIENT)
Dept: FAMILY MEDICINE | Facility: OTHER | Age: 43
End: 2018-07-19
Attending: FAMILY MEDICINE
Payer: MEDICARE

## 2018-07-19 VITALS
TEMPERATURE: 99.1 F | SYSTOLIC BLOOD PRESSURE: 120 MMHG | DIASTOLIC BLOOD PRESSURE: 78 MMHG | WEIGHT: 293 LBS | BODY MASS INDEX: 55.6 KG/M2 | HEART RATE: 88 BPM

## 2018-07-19 DIAGNOSIS — B86 SCABIES: Primary | ICD-10-CM

## 2018-07-19 PROCEDURE — G0463 HOSPITAL OUTPT CLINIC VISIT: HCPCS

## 2018-07-19 PROCEDURE — 99213 OFFICE O/P EST LOW 20 MIN: CPT | Performed by: FAMILY MEDICINE

## 2018-07-19 RX ORDER — PERMETHRIN 50 MG/G
CREAM TOPICAL
Qty: 60 G | Refills: 1 | Status: SHIPPED | OUTPATIENT
Start: 2018-07-19 | End: 2019-05-20

## 2018-07-19 RX ORDER — TRIAMCINOLONE ACETONIDE 1 MG/G
CREAM TOPICAL
Qty: 80 G | Refills: 2 | Status: SHIPPED | OUTPATIENT
Start: 2018-07-19 | End: 2020-07-10

## 2018-07-19 ASSESSMENT — ANXIETY QUESTIONNAIRES
1. FEELING NERVOUS, ANXIOUS, OR ON EDGE: NOT AT ALL
3. WORRYING TOO MUCH ABOUT DIFFERENT THINGS: NOT AT ALL
5. BEING SO RESTLESS THAT IT IS HARD TO SIT STILL: NOT AT ALL
6. BECOMING EASILY ANNOYED OR IRRITABLE: NOT AT ALL
2. NOT BEING ABLE TO STOP OR CONTROL WORRYING: NOT AT ALL
GAD7 TOTAL SCORE: 0
7. FEELING AFRAID AS IF SOMETHING AWFUL MIGHT HAPPEN: NOT AT ALL

## 2018-07-19 ASSESSMENT — PAIN SCALES - GENERAL: PAINLEVEL: NO PAIN (0)

## 2018-07-19 ASSESSMENT — PATIENT HEALTH QUESTIONNAIRE - PHQ9: 5. POOR APPETITE OR OVEREATING: NOT AT ALL

## 2018-07-19 NOTE — MR AVS SNAPSHOT
After Visit Summary   7/19/2018    Rosi Lamb    MRN: [id number]           Patient Information     Date Of Birth          1975        Visit Information        Provider Department      7/19/2018 4:15 PM Gustabo Roberts MD [facility]        Today's Diagnoses     Scabies    -  1       Follow-ups after your visit        Your next 10 appointments already scheduled     Sep 12, 2018 10:40 AM CDT   SHORT with Lucio Curiel MD   [facility] ([facility])    [address]  [place] [id number] [phone]              Who to contact     If you have questions or need follow up information about today's clinic visit or your schedule please contact [facility] directly at [phone].  Normal or non-critical lab and imaging results will be communicated to you by MyChart, letter or phone within 4 business days after the clinic has received the results. If you do not hear from us within 7 days, please contact the clinic through MyChart or phone. If you have a critical or abnormal lab result, we will notify you by phone as soon as possible.  Submit refill requests through [employer] or call your pharmacy and they will forward the refill request to us. Please allow 3 business days for your refill to be completed.          Additional Information About Your Visit        Care EveryWhere ID     This is your Care EveryWhere ID. This could be used by other organizations to access your [place] medical records  [id number]        Your Vitals Were     Pulse Temperature Breastfeeding? BMI (Body Mass Index)          88 99.1  F (37.3  C) (Tympanic) No 55.6 kg/m2         Blood Pressure from Last 3 Encounters:   07/19/18 120/78   06/27/18 120/82   06/26/18 118/71    Weight from Last 3 Encounters:   07/19/18 (!) 355 lb (161 kg)   06/27/18 (!) 356 lb 12.8 oz (161.8 kg)   04/18/18 320 lb (145.2 kg)              Today, you  had the following     No orders found for display         Today's Medication Changes          These changes are accurate as of 7/19/18  5:28 PM.  If you have any questions, ask your nurse or doctor.               Start taking these medicines.        Dose/Directions    permethrin 5 % cream   Commonly known as:  ELIMITE   Used for:  Scabies   Started by:  Gustabo Roberts MD        Apply cream from neck down; leave on for 8-14 hours then wash off with water; reapply in 1 week if rash is not improved or is spreading.   Quantity:  60 g   Refills:  1       triamcinolone 0.1 % cream   Commonly known as:  KENALOG   Used for:  Scabies   Started by:  Gustabo Roberts MD        Apply sparingly to affected area three times daily as needed   Quantity:  80 g   Refills:  2            Where to get your medicines      These medications were sent to [id number] - [place], [place] - [address]  [address], [facility] [address]     Phone:  [phone]     permethrin 5 % cream    triamcinolone 0.1 % cream                Primary Care Provider Office Phone # Fax #    Lucio Curiel [phone]       [address]  [facility] [address]        Equal Access to Services     VANDANA NAYLOR AH: Hadii tristan dhillon hadasho Soomaali, waaxda luqadaha, qaybta kaalmada adeegyada, zenobia taylor. So [facility] [phone].    ATENCIÓN: Si habla español, tiene a mondragon disposición servicios gratuitos de asistencia lingüística. Llame al [phone].    We comply with applicable federal civil rights laws and [place] laws. We do not discriminate on the basis of race, color, national origin, age, disability, sex, sexual orientation, or gender identity.            Thank you!     Thank you for choosing [facility]  for your care. Our goal is always to provide you with excellent care. Hearing back from our patients is one way we can continue to improve our  services. Please take a few minutes to complete the written survey that you may receive in the mail after your visit with us. Thank you!             Your Updated Medication List - Protect others around you: Learn how to safely use, store and throw away your medicines at www.disposemymeds.org.          This list is accurate as of 7/19/18  5:28 PM.  Always use your most recent med list.                   Brand Name Dispense Instructions for use Diagnosis    ARIPiprazole 5 MG tablet    ABILIFY     Take 5 mg by mouth daily        clotrimazole 1 % cream    LOTRIMIN          cyanocobalamin 1000 MCG/ML injection    VITAMIN B12     Inject 1,000 mcg into the muscle every 14 days . Dose increase frequency 7/27/2017        cyclobenzaprine 10 MG tablet    FLEXERIL    30 tablet    Take 0.5-1 tablets (5-10 mg) by mouth 3 times daily as needed for muscle spasms    Muscle spasm       D 5000 5000 units Caps   Generic drug:  cholecalciferol      Take 5,000 Units by mouth daily for vitamin D deficiency. Dose increase 2/16/2017.        gabapentin 300 MG capsule    NEURONTIN     TAKE 1 CAPSULE BY MOUTH DAILY FOR 3 DAYS, THEN INCREASE TO TAKE 1 CAPSULE TWICE DAILY FOR 3 DAYS, THEN 1 CAPSULE 3 TIMES DAILY.        hydrOXYzine 50 MG tablet    ATARAX     TAKE 2 TABLETS (100MG) BY MOUTH NIGHTLY AT BEDTIME AND 1 TABLET ONCE DAILY AS NEEDED        ibuprofen 200 MG tablet    ADVIL/MOTRIN     Take 600 mg by mouth every 6 hours as needed for pain        * lamoTRIgine 200 MG tablet    LaMICtal     TAKE 1 TABLET BY MOUTH NIGHTLY AT BEDTIME        * lamoTRIgine 25 MG tablet    LaMICtal     TAKE 2 TABLETS BY MOUTH DAILY        Medical Compression Stockings Misc      MISCELLANEOUS MEDICAL SUPPLY (GRADUATED COMPRESSION STOCKINGS). For personal use. Length: thigh Strength: 16-20 mmHg.  Please measure patient in Pharmacy.        montelukast 10 MG tablet    SINGULAIR     Take 10 mg by mouth At Bedtime        order for DME     2 Application    Equipment  "being ordered: Custom Compression Garments for Bilateral Lower Extremities    Lymphedema of both lower extremities       oxybutynin 5 MG 24 hr tablet    DITROPAN-XL    30 tablet    Take 1 tablet (5 mg) by mouth daily (lot [id number])    Urinary frequency       permethrin 5 % cream    ELIMITE    60 g    Apply cream from neck down; leave on for 8-14 hours then wash off with water; reapply in 1 week if rash is not improved or is spreading.    Scabies       roller walker Misc      Rolling Walker for home use.  2 wheels        SAFETY-ELBERT TB SYRINGE 25G X 5/8\" 1 ML Misc   Generic drug:  Tuberculin-Allergy Syringes      SAFETY GLIDE-FOR B-12 SHOTS        syringe/needle (sisp) 25G X 5/8\" 1 ML Misc      Safety glide - for B12 Shots        triamcinolone 0.1 % cream    KENALOG    80 g    Apply sparingly to affected area three times daily as needed    Scabies       VENTOLIN  (90 Base) MCG/ACT Inhaler   Generic drug:  albuterol      Inhale 1-2 puffs into the lungs every 4 hours as needed        vilazodone 40 MG Tabs tablet    VIIBRYD     Take 40 mg by mouth daily with food        Vitamin-B Complex Tabs      Take 1 tablet by mouth daily for low b12. Start 2/16/2017        * Notice:  This list has 2 medication(s) that are the same as other medications prescribed for you. Read the directions carefully, and ask your doctor or other care provider to review them with you.      "

## 2018-07-19 NOTE — NURSING NOTE
Patient is here today with a rash on her right leg, abd. And left arm. She said it itches and started about 3 days ago. Karina Candelario LPN......................7/19/2018 4:12 PM

## 2018-07-19 NOTE — PROGRESS NOTES
Nursing Notes:   ArunKarina ospina N., LPN  7/19/2018  4:27 PM  Signed  Patient is here today with a rash on her right leg, abd. And left arm. She said it itches and started about 3 days ago. Karina Arunon NARESH......................7/19/2018 4:12 PM          SUBJECTIVE:  Rosi Lamb  is a 43 year old female Who comes in today with a rash on her right leg abdomen and left arm.  It itches and started about 3 days ago.  She did state a friend's house last week.    Past Medical, Family, and Social History reviewed and updated as noted below.   ROS is negative except as noted above       Allergies   Allergen Reactions     Clonazepam Other (See Comments)     Causes Violence and aggresssion     Hydrocodone-Acetaminophen      Other reaction(s): Seizures  Can take Percocet without difficulty.     Lorazepam Other (See Comments)     Causes Violence and aggresssion     Sertraline Other (See Comments)     Caused suicidally      Bupropion Other (See Comments)     Caused Major Depression     Lithium Unknown     Risperidone Unknown     Sulfa Drugs Unknown     Valproic Acid Unknown   ,   Family History   Problem Relation Age of Onset     Other - See Comments Mother      No Known Problems     Asthma Father      Asthma,+ Leg edema, knee, hip replacement. + Lymphedema     Other - See Comments Paternal Grandmother      Lymphedema     Osteoperosis Brother      Osteoporosis     Other - See Comments Brother      No Known Problems   ,   Current Outpatient Prescriptions   Medication     albuterol (VENTOLIN HFA) 108 (90 BASE) MCG/ACT Inhaler     ARIPiprazole (ABILIFY) 5 MG tablet     B Complex Vitamins (VITAMIN-B COMPLEX) TABS     cholecalciferol (D 5000) 5000 UNITS CAPS     clotrimazole (LOTRIMIN) 1 % cream     cyanocobalamin (VITAMIN B12) 1000 MCG/ML injection     cyclobenzaprine (FLEXERIL) 10 MG tablet     Elastic Bandages & Supports (MEDICAL COMPRESSION STOCKINGS) MISC     gabapentin (NEURONTIN) 300 MG capsule     hydrOXYzine (ATARAX) 50  "MG tablet     ibuprofen (ADVIL/MOTRIN) 200 MG tablet     lamoTRIgine (LAMICTAL) 200 MG tablet     lamoTRIgine (LAMICTAL) 25 MG tablet     Misc. Devices (ROLLER WALKER) MISC     montelukast (SINGULAIR) 10 MG tablet     order for DME     oxybutynin (DITROPAN-XL) 5 MG 24 hr tablet     permethrin (ELIMITE) 5 % cream     syringe/needle, sisp, 25G X 5/8\" 1 ML MISC     triamcinolone (KENALOG) 0.1 % cream     Tuberculin-Allergy Syringes (SAFETY-ELBERT TB SYRINGE) 25G X 5/8\" 1 ML MISC     vilazodone (VIIBRYD) 40 MG TABS tablet     No current facility-administered medications for this visit.    ,   Past Medical History:   Diagnosis Date     Edema     No Comments Provided     Encounter for removal and reinsertion of intrauterine contraceptive device     05,IUD placement, Removed     Excessive and frequent menstruation with irregular cycle     2017     Major depressive disorder, single episode     No Comments Provided     Personal history of other medical treatment (CODE)     G3, P2-0-1-2 with history of spontaneous      Personal history of other medical treatment (CODE)     No Comments Provided     Uncomplicated asthma     No Comments Provided   ,   Patient Active Problem List    Diagnosis Date Noted     Abnormal Pap smear of cervix 2018     Priority: Medium     Overview:   had scraping of cervix       Allergic rhinitis due to pollen 2018     Priority: Medium     Bipolar 1 disorder, mixed, partial remission (H) 2018     Priority: Medium     Esophageal reflux 2018     Priority: Medium     History of substance abuse 2018     Priority: Medium     Bilateral chronic knee pain 2017     Priority: Medium     Crepitus of joint of right knee 2017     Priority: Medium     Menorrhagia with irregular cycle 2017     Priority: Medium     Preop examination 2017     Priority: Medium     Neuropathic pain of foot, unspecified laterality 2017     Priority: Medium     " Bilateral foot pain 12/18/2016     Priority: Medium     Elevated random blood glucose level 12/18/2016     Priority: Medium     Peripheral polyneuropathy 12/18/2016     Priority: Medium     Vitamin B12 deficiency 12/18/2016     Priority: Medium     Vitamin D deficiency 12/18/2016     Priority: Medium     Cellulitis of right lower extremity 12/12/2016     Priority: Medium     Lymphedema of both lower extremities 11/22/2016     Priority: Medium     Stasis dermatitis of both legs 08/17/2016     Priority: Medium     Overview:   Updated per 10/1/17 IMO import       Leg wound, left, subsequent encounter 12/17/2015     Priority: Medium     Alcohol use disorder, moderate, in sustained remission, dependence (H) 11/28/2015     Priority: Medium     Generalized anxiety disorder 11/28/2015     Priority: Medium     Cannabis use disorder, moderate, in sustained remission, dependence (H) 11/28/2015     Priority: Medium     Bipolar I disorder, most recent episode depressed, moderate (H) 11/28/2015     Priority: Medium     Body mass index 45.0-49.9, adult (H) 03/23/2015     Priority: Medium     Edema of extremity of unknown cause 01/15/2014     Priority: Medium     Overview:   1/15/2014: both legs, well controlled on prn lasix       Moderate persistent asthma 08/19/2013     Priority: Medium     Overview:   AAP completed on 08/19/13  Triggers: pollen, fumes, exercise, URI, warm weather. Peak flow today is 250. Symptomatic: moderate  Overview:   Overview:   AAP completed on 08/19/13  Triggers: pollen, fumes, exercise, URI, warm weather. Peak flow today is 250. Symptomatic: moderate  Overview:   AAP completed on 08/19/13  Triggers: pollen, fumes, exercise, URI, warm weather. Peak flow today is 250. Symptomatic: moderate       Urinary incontinence 03/15/2013     Priority: Medium     Closed dislocation of tarsal joint 02/04/2011     Priority: Medium     Allergic rhinitis due to other allergen 10/02/2003     Priority: Medium     Overview:    [facility] - Seasonal Allergies  Overview:   Overview:   [facility] - Seasonal Allergies       Borderline personality disorder 07/07/2003     Priority: Medium     Overview:   [facility].  Overview:   Overview:   [facility].       Obesity 06/16/2003     Priority: Medium     Overview:   [facility]  Updated per 10/1/17 IMO import       Other disorder of menstruation and other abnormal bleeding from female genital tract 07/05/2001     Priority: Medium     Overview:   DUB, dysmenorrhea  Overview:   Overview:   DUB, dysmenorrhea     ,   Past Surgical History:   Procedure Laterality Date     ANKLE SURGERY      fracture, repair with screws     CONIZATION LEEP      07/04,Underwent a loop     LAPAROSCOPIC TUBAL LIGATION      2009,tubal ligation    and   Social History   Substance Use Topics     Smoking status: Former Smoker     Packs/day: 1.00     Years: 3.00     Types: Cigarettes     Quit date: 1/1/2003     Smokeless tobacco: Never Used     Alcohol use No      Comment: Alcoholic Drinks/day: History of abuse - sober as of July 2010.     OBJECTIVE:  /78 (BP Location: Right arm, Patient Position: Sitting, Cuff Size: Adult Large)  Pulse 88  Temp 99.1  F (37.3  C) (Tympanic)  Wt (!) 355 lb (161 kg)  Breastfeeding? No  BMI 55.6 kg/m2   EXAM:  Alert cooperative morbidly obese white female, no distress.  She has typical scabiform dermatitis on her lower abdomen on the right and the right thigh under skin folds.  Some on her legs some on her arms.  ASSESSMENT/Plan :    Rosi was seen today for derm problem.    Diagnoses and all orders for this visit:    Scabies  -     permethrin (ELIMITE) 5 % cream; Apply cream from neck down; leave on for 8-14 hours then wash off with water; reapply in 1 week if rash is not improved or is spreading.  -     triamcinolone (KENALOG) 0.1 % cream; Apply sparingly to affected area three times daily as needed    Discussed the pathophysiology of the condition in detail and  treatment options.  Will use Elamite cream and instructed on use.  Discussed reapplication if needed oral hydroxyzine for itch which she already has at home.  Triamcinolone cream to help with symptomatic itching.  Advised that this may take a week or 2 to resolve.  Follow-up if worsening or not improving.    Gustabo Roberts MD

## 2018-07-20 ASSESSMENT — PATIENT HEALTH QUESTIONNAIRE - PHQ9: SUM OF ALL RESPONSES TO PHQ QUESTIONS 1-9: 0

## 2018-07-20 ASSESSMENT — ASTHMA QUESTIONNAIRES: ACT_TOTALSCORE: 22

## 2018-07-20 ASSESSMENT — ANXIETY QUESTIONNAIRES: GAD7 TOTAL SCORE: 0

## 2018-07-23 NOTE — PROGRESS NOTES
Patient Information     Patient Name  Rosi Baires MRN  [id number] Sex  Female   1975      Letter by Liz Diaz PA-C at      Author:  Liz Diaz PA-C Service:  (none) Author Type:  (none)    Filed:   Encounter Date:  2018 Status:  (Other)         Rosi Baires  [address]  [address] [address]    2018      Dear Ms. Baires,      We've received the results back from the laboratory for the samples you gave in clinic.  Your pap test came back showing low-grade squamous intraepithelial lesion. Your HPV test was negative. Your body can sometimes take care of abnormal cells. I would recommend having her Pap test plus HPV repeated in one year to ensure that the abnormal cells are gone.    Please contact us at [phone] with any questions or concerns that you have.      Take Care,         Liz Diaz PA-C

## 2018-07-23 NOTE — PROGRESS NOTES
Patient Information     Patient Name  Rosi Baires MRN  [id number] Sex  Female   1975      Letter by Lucio Curiel MD at      Author:  Lucio Curiel MD Service:  (none) Author Type:  (none)    Filed:   Encounter Date:  2017 Status:  (Other)           Rosi Baires  [address]  [facility] [address]          2017    Dear Ms. Baires:    Following are the tests completed during your last clinic visit.  The results of these tests are included below.      Start B12 shots every 3 weeks with the shot nurse.    Start your vitamin D3, 5000 international units capsule or tablet daily.    2017 -- XR KNEE 3 VIEWS AP LAT SUNRISE RIGHT     HISTORY: 42 yearsFemale Bilateral chronic knee pain     TECHNIQUE: 6 views bilateral knees     COMPARISON: None     FINDINGS: Right knee: Joint spaces are congruent. There is no significant joint effusion. There is no evidence of fracture or dislocation. Articular surfaces are smooth. There is mild medial compartment joint space narrowing.     Left knee: There is mild medial compartment joint space narrowing. There is no significant joint effusion. There is no evidence of fracture or dislocation.     IMPRESSION: Mild bilateral medial compartment osteoarthritic changes.     Electronically Signed By: Ji Kern M.D. on 2017 12:05 PM    Results for orders placed or performed in visit on 17      VITAMIN B12      Result  Value Ref Range    VITAMIN B12 232 180 - 914 pg/mL   BASIC METABOLIC PANEL      Result  Value Ref Range    SODIUM 137 133 - 143 mmol/L    POTASSIUM 4.1 3.5 - 5.1 mmol/L    CHLORIDE 104 98 - 107 mmol/L    CO2,TOTAL 24 21 - 31 mmol/L    ANION GAP 9 5 - 18                    GLUCOSE 116 (H) 70 - 105 mg/dL    CALCIUM 9.9 8.6 - 10.3 mg/dL    BUN 11 7 - 25 mg/dL    CREATININE 1.00 0.70 - 1.30 mg/dL    BUN/CREAT RATIO           11                    GFR if African American >60 >60 ml/min/1.73m2    GFR if  not African American >60 >60 ml/min/1.73m2   VITAMIN D 25 (DEFICIENCY)      Result  Value Ref Range    VITAMIN D TOTAL AFL 25.2   ng/mL         If you have any further questions or problems contact my office at  [phone]   --- or send [employer] message --- otherwise schedule an appointment.    Clinic : [phone]  Appointment line: [phone]     Thank you,    Lucio Curiel MD    Internal Medicine  [facility]     Reviewed and electronically signed by provider.

## 2018-07-24 NOTE — PROGRESS NOTES
Patient Information     Patient Name  Rosi Baires MRN  [id number] Sex  Female   1975      Letter by Lucio Curiel MD at      Author:  Lucio Curiel MD Service:  (none) Author Type:  (none)    Filed:   Date of Service:   Status:  (Other)           Rosi Baires  [address]  [facility] [address]          2017    Dear Ms. Baires:    Following are the tests completed during your last clinic visit.  The results of these tests are included below.      2017 -- XR KNEE WB 1 VIEW AP BILATERAL AND 2 VIEWS BILATERAL     HISTORY: 42 yearsFemale Bilateral chronic knee pain     TECHNIQUE: 6 views bilateral knees     COMPARISON: None     FINDINGS: Right knee: Joint spaces are congruent. There is no significant joint effusion. There is no evidence of fracture or dislocation. Articular surfaces are smooth. There is mild medial compartment joint space narrowing.     Left knee: There is mild medial compartment joint space narrowing. There is no significant joint effusion. There is no evidence of fracture or dislocation.     IMPRESSION: Mild bilateral medial compartment osteoarthritic changes.    If you have any further questions or problems contact my office at  [phone]   --- or send [employer] message --- otherwise schedule an appointment.    Clinic : [phone]  Appointment line: [phone]     Thank you,    Lucio Curiel MD    Internal Medicine  [facility]     Reviewed and electronically signed by provider.

## 2018-07-24 NOTE — PROGRESS NOTES
Patient Information     Patient Name  Rosi Baires MRN  [id number] Sex  Female   1975      Letter by Lucio Curiel MD at      Author:  Lucio Curiel MD Service:  (none) Author Type:  (none)    Filed:   Encounter Date:  2017 Status:  (Other)           Rosi Baires  [address]  [facility] [address]          2017    Dear Ms. Baires:    Following are the tests completed during your last clinic visit.  The results of these tests are included below.      -- B12 level came back rather low.  Start B complex tablet 1 by mouth daily.   -- Prescription sent to pharmacy.        Vitamin D3 is a safe and effective product for the treatment and prevention of osteoporosis, rickets and vitamin D deficiency. Most people living in this part of the [place] are vitamin D deficient.     Goal is to have the lab value resulted below be between 40 and 50.     Vitamin D was VERY LOW   -- Start taking 5000 IU vitamin D3 daily with food as it is a fat soluble vitamin.   -- We can recheck this level in about 3 to 4 months    Vitamin D3 is associated with improved immunity, improved bone health, improved mood and in some people a reduction in pain severity.       Results for orders placed or performed in visit on 17      VITAMIN B12      Result  Value Ref Range    VITAMIN B12 232 180 - 914 pg/mL   BASIC METABOLIC PANEL      Result  Value Ref Range    SODIUM 137 133 - 143 mmol/L    POTASSIUM 4.1 3.5 - 5.1 mmol/L    CHLORIDE 104 98 - 107 mmol/L    CO2,TOTAL 24 21 - 31 mmol/L    ANION GAP 9 5 - 18                    GLUCOSE 116 (H) 70 - 105 mg/dL    CALCIUM 9.9 8.6 - 10.3 mg/dL    BUN 11 7 - 25 mg/dL    CREATININE 1.00 0.70 - 1.30 mg/dL    BUN/CREAT RATIO           11                    GFR if African American >60 >60 ml/min/1.73m2    GFR if not African American >60 >60 ml/min/1.73m2   VITAMIN D 25 (DEFICIENCY)      Result  Value Ref Range    VITAMIN D TOTAL AFL 25.2    ng/mL        If you have any further questions or problems contact my office at  [phone]   --- or send [employer] message --- otherwise schedule an appointment.    Clinic : [phone]  Appointment line: [phone]     Thank you,    Lucio Curiel MD    Internal Medicine  [facility]     Reviewed and electronically signed by provider.

## 2018-08-01 NOTE — PROGRESS NOTES
Outpatient Physical Therapy Discharge Note     Patient: Rosi Lamb  : 1975    Beginning/End Dates of Reporting Period:  18 to 2018    Referring Provider: Lucio Curiel MD    Therapy Diagnosis: bilateral lower extremity edema     Client Self Report: Patient waiting to hear from orthotist to get fitting for custom garments.    Objective Measurements:  Objective Measure: girth measurements  Details: see girth flow sheet, increased likely due to heat and limited compression       Outcome Measures (most recent score):  Lymphedema Life Impact Scale (score range 0-72). A higher score indicates greater impairment.: 29, no change in score from eval    Goals:  Goal Identifier HEP   Goal Description Patient to be independent with self MLD, garment use, compression wraps for long tern management of edema.   Target Date 18   Date Met   18   Progress:     Goal Identifier Volume reduction   Goal Description Patient to have 15% reduction in volume with lower extremities to ease in mobility and daily tasks.   Target Date 18   Date Met      Progress: not met       Progress Toward Goals:   Progress this reporting period: Patient was able to receive circaid garments for lower extremities. These helped provide compression and comfort for the patient. No significant reduce in edema as patient is likely dealing with lipedema, not lymphedema.      Plan:  Discharge from therapy.    Discharge:    Reason for Discharge: No further expectation of progress.    Equipment Issued: NA    Discharge Plan: Patient to continue home program.

## 2018-08-01 NOTE — ADDENDUM NOTE
Encounter addended by: Joanie Jean Baptiste, PT on: 8/1/2018  9:05 AM<BR>     Actions taken: Sign clinical note, Flowsheet accepted, Charge Capture section accepted, Episode resolved

## 2018-08-13 DIAGNOSIS — E55.9 VITAMIN D DEFICIENCY: Primary | ICD-10-CM

## 2018-08-15 NOTE — TELEPHONE ENCOUNTER
Prescription approved per [facility] Refill Protocol.  LOV: 4/11/18  Per Care everywhere  cholecalciferol (VITAMIN D3) 5,000 unit capsule    Indications: Vitamin D deficiency Take 1 capsule by mouth once daily. For Vitamin D Deficiency - Dose Increase 2/16/2017 100 capsule   3     hSirley Wren RN on 8/15/2018 at 2:20 PM

## 2018-08-17 DIAGNOSIS — E53.8 VITAMIN B12 DEFICIENCY: Primary | ICD-10-CM

## 2018-08-20 DIAGNOSIS — E53.8 VITAMIN B12 DEFICIENCY: Primary | ICD-10-CM

## 2018-08-20 RX ORDER — CYANOCOBALAMIN 1000 UG/ML
INJECTION, SOLUTION INTRAMUSCULAR; SUBCUTANEOUS
Qty: 4 ML | Refills: 3 | Status: SHIPPED | OUTPATIENT
Start: 2018-08-20 | End: 2019-05-08

## 2018-08-20 NOTE — TELEPHONE ENCOUNTER
Prescription approved per [facility] Refill Protocol.  Imelda Busby RN on 8/20/2018 at 2:36 PM

## 2018-08-22 ENCOUNTER — HOSPITAL ENCOUNTER (OUTPATIENT)
Dept: BEHAVIORAL HEALTH | Facility: HOSPITAL | Age: 43
Discharge: HOME | End: 2018-08-22
Attending: PSYCHIATRY & NEUROLOGY | Admitting: PSYCHIATRY & NEUROLOGY
Payer: MEDICARE

## 2018-08-22 PROCEDURE — 90791 PSYCH DIAGNOSTIC EVALUATION: CPT | Performed by: SOCIAL WORKER

## 2018-08-22 PROCEDURE — 90791 PSYCH DIAGNOSTIC EVALUATION: CPT

## 2018-08-22 ASSESSMENT — ANXIETY QUESTIONNAIRES
1. FEELING NERVOUS, ANXIOUS, OR ON EDGE: SEVERAL DAYS
GAD7 TOTAL SCORE: 2
3. WORRYING TOO MUCH ABOUT DIFFERENT THINGS: SEVERAL DAYS
2. NOT BEING ABLE TO STOP OR CONTROL WORRYING: NOT AT ALL
IF YOU CHECKED OFF ANY PROBLEMS ON THIS QUESTIONNAIRE, HOW DIFFICULT HAVE THESE PROBLEMS MADE IT FOR YOU TO DO YOUR WORK, TAKE CARE OF THINGS AT HOME, OR GET ALONG WITH OTHER PEOPLE: NOT DIFFICULT AT ALL
7. FEELING AFRAID AS IF SOMETHING AWFUL MIGHT HAPPEN: NOT AT ALL
6. BECOMING EASILY ANNOYED OR IRRITABLE: NOT AT ALL
5. BEING SO RESTLESS THAT IT IS HARD TO SIT STILL: NOT AT ALL

## 2018-08-22 ASSESSMENT — PATIENT HEALTH QUESTIONNAIRE - PHQ9: 5. POOR APPETITE OR OVEREATING: NOT AT ALL

## 2018-08-23 NOTE — TELEPHONE ENCOUNTER
"[facility] #728 sent Rx request for the following:    BD S-L 1ML 25G 5/8IN SYRINGE  Will file in chart as: SAFETY-ELBERT TB SYRINGE 25G X 5/8\" 1 ML MISC  SAFETY GLIDE-FOR B-12 SHOTS    To be used along with:  cyanocobalamin (VITAMIN B12) 1000 MCG/ML injection 4 mL 3 8/20/2018  No   Sig: Inject 1 ML intramuscularly every 2 weeks.     Last OV 7/19/18. Prescription approved per [facility] Refill Protocol. Refill authorized for #4, R-3 per above order.    Imelda Leyva RN .............. 8/23/2018  8:15 AM      "

## 2018-08-24 ASSESSMENT — ANXIETY QUESTIONNAIRES: GAD7 TOTAL SCORE: 2

## 2018-08-24 ASSESSMENT — PATIENT HEALTH QUESTIONNAIRE - PHQ9: SUM OF ALL RESPONSES TO PHQ QUESTIONS 1-9: 5

## 2018-08-27 ENCOUNTER — TELEPHONE (OUTPATIENT)
Dept: INTERNAL MEDICINE | Facility: OTHER | Age: 43
End: 2018-08-27
Payer: MEDICARE

## 2018-08-27 DIAGNOSIS — F31.0 BIPOLAR I DISORDER, MOST RECENT EPISODE HYPOMANIC (H): Primary | ICD-10-CM

## 2018-08-27 NOTE — PLAN OF CARE
"Diagnostic Assessment     Partial Hospitalization Program    Patient: Rosi Lamb MRN: [id number] ACCOUNT NUMBER: [id number]  : 1975   Age: 43 year old   Sex: female     Phone:  Home number on file: [phone] (home)    Work number on file:  [phone] (work)    Cell number on file:       Telephone Information:   Mobile [phone]        Other phone number:      May leave program related message?  Yes  Preferred Phone: Home    Interview Date: 18 Start Time:  10:00am  End Time:  11:00am    Yes, the patient has been informed that any other mental health professional providing mental health services to me will need access to this Diagnostic Assessment in order to develop a treatment plan and receive payment.     Identifying Information:  Rosi Lamb is a 43 year old, White, single female. Rosi attended the   alone.   Patient presented as anxious but very cooperative and open regarding current concerns.    Reason for Referral: Rosi was referred to [facility] (PHP)  by  and therapist at [facility] . Rosi reports the reason for referral at this time is clarify behavioral health diagnosis, determine behavioral health treatment options, assess client readiness and motivation to change and assess client social situation.    Patient's Statement of Presenting Concern & Functional impairments:  Rosi verbalizes the following treatment/discharge goals: \"to improve where I am currently at with my life\".  Patient stated that her symptoms have resulted in the following functional impairments: management of the household and or completion of tasks, money management, organization, relationship(s), self-care, social interactions and use of public transportation.    Rosi states that she has been struggling with mental health symptoms for many years. She states that she was an adolescent and had been struggling with anxiety and mood difficulties. " Rosi was vague regarding historic information but was focused on present symptoms and states that has been struggling with increased anxiety and mood difficulties which do impact her ability to function in all life domains. She states that she is presently participating in [facility], therapy, case management and services provided at [facility]. She has history of diagnosis including Bipolar, Generalized Anxiety Disorder, alcohol dependence in remission and borderline features.     Current Stressors/Losses/Disappointments: family stressors; socially isolative; several adult placements; divorce in 2013; change in Synagogue-raised as Church and has decided to leave that Mormonism format and become Yazidi.     Mental Health History:  Rosi reports first onset of mental health symptoms late adolescence.   Rosi was first diagnosed as an adult with bipolar disorder.  Rosi  is currently receiving the following services: [facility], case management, counseling, physician / PCP and psychiatry.  Current providers are: Jeff Wren at [facility]; Miladis Santos [facility]; case management and [facility] through [facility].   Psychiatric Hospitalizations: [facility] and [facility]-several years ago.  Rosi denies a history of civil commitment.      Onset/Duration/Pattern of Symptoms noted above: Rosi states that she is able to identify when her symptoms are becoming problematic, she states that she has had significant difficulties with anxiety and notes that she will isolate and stay in her apartment for days at a time. She states that this has improved somewhat but that she still struggles with being in large groups and in stores such as [employer] and grocery stores.  Rosi states that she has been hospitalized several years ago for suicidal ideation and has not experienced this for at least one year.       Personal Safety:    Are  you depressed or being treated for depression? yes   Have you ever thought about hurting yourself (SIB) now or in the past? yes     Have you ever thought about suicide now or in the past? What were your thoughts about suicide? didn't want to be here anymore  Are you having thoughts of suicide now? No  Do / Did you have a plan? No     Do you have a gun, weapons or other means (including medications) to harm yourself available to you? No   Have any of your family members or friends attempted or completed suicide? (If yes, Who, When, How) no     Do you take chances with your safety?   no   Have you currently or in the past had trouble with physical aggression (If yes, describe)? no     Have you ever thought about killing someone else? No   Have you ever heard voices? No       Supports:   From whom do you receive support? (family/friends/agency) family, friend(s) and therapist     How often do you have contact with them? daily     Do your support people want/need education/resources? no        Is there anything in your life (current or history) that is satisfying to you (include leisure interests/hobbies)?   yes      Hope/Belief System:  Do you think things can get better? yes     Rate how strongly you believe things can get better:   (Scale 1-5; 1=no belief; 5=Very Strong Belief)    4   What would make it better?  Feeling less depressed and anxious   What gives you hope?    My friends and family       Personal Safety Summary:  After gathering the above information, Rosi  presents the following high risk factors for suicide: poor sleep, panic,extreme anxiety and hopelessness, worthlessness.  Rosi denies current fears or concerns for personal safety.    Rosi has the following Protective Factors: Sense of responsibility to family, Religiosity, Life Satisfaction, Reality testing ability, Positive coping skills, Positive problem-solving skills, Positive social support and Positive therapeutic releationships       Upon review of the patient interview and identification of high risk factors determine individualized safety strategies alternatives and treatment plan interventions. Client voluntarily presented to ED for evaluation.       Substance Use History:   Social History     Social History     Marital status:      Spouse name: N/A     Number of children: N/A     Years of education: N/A     Occupational History     Not on file.     Social History Main Topics     Smoking status: Former Smoker     Packs/day: 1.00     Years: 3.00     Types: Cigarettes     Quit date: 1/1/2003     Smokeless tobacco: Never Used     Alcohol use No      Comment: Alcoholic Drinks/day: History of abuse - sober as of July 2010.     Drug use: No      Comment: Drug use: NoHistory of use     Sexual activity: No     Other Topics Concern     Not on file     Social History Narrative    No longer in adult foster care lived with Charley Godwin.    .   2 sons - age 12 and 7 - as of 11/2016.   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       Substance: Hx of Use/Abuse: Last Use: Pattern of Use:   Alcohol yes 7 years ago Has been sober for 7 years   Cannabis no     Street Drugs no     Prescription Drugs no     Other no       Substance Use Disorder Treatment: Rosi is currently receiving the following services: No indications of CD issues.       CAGE-AID:  Have you ever felt you ought to cut down on your drinking or drug use?   No    Have people annoyed you by criticizing your drinking or drug use?   No    Have you ever felt bad or guilty about your drinking or drug use?   No    Have you ever had a drink or used drugs first thing in the morning to steady your nerves or to get rid of a hangover?  No    Do you feel these issues have been adequately addressed?   Yes. How? Was in treatment for alcoholism 7 years ago and has maintained sobriety    Chemical Dependency Assessment Recommended?  No          Legal History:     Rosi reports that she has not been involved with the legal system.     Life Situation (Employment/School/Finances/Basic Needs):  Rosi  is currently living alone in a apartment in [facility]  The safety/stability of this environment is described as: Stable    Rosi is currently disabled.  Rosi describes a work Hx of some part time work, has been on disability due to mental health and medical for several years.    Rosi reports finances are obtained through [facility].  Rosi does not identify her finances as a current stressor.  Rosi denies a history of gambling and denies a history of gambling treatment.     Rosi reports her highest level of education is high school graduate and some college,  Rosi states that she was home schooled until 8th grade and then went to public school. She states she took 21 credits at [employer].  Rosi did not identify any learning problems   Rosi describes academic performance as: good  Rosi describes school social experience as: experienced some bullying    Rosi denies concerns regarding her current ability to meet basic needs.     Social/Family History:  Rosi  reports she grew up in [place], [place]. She states that her family was Mosque so she did have some differences compared to other children as related to belief system, family traditions and upbringing.   Rosi was the first of 3 children. Rosi states she has 2 younger brothers.  Rosi describes her childhood as good.  Rosi describes her current relationships with her family of origin as fair.    Rosi identifies her relationship status as: .    Rosi denies sexual health concerns.     Rosi reports giving birth to two children, she states that she voluntarily gave up both children for adoption due to her mental health and substance issues. She states that her children were ages 1 yr old and 6 yr old when they were adopted. She states that she  does receive pictures of her oldest child (now age 14) but does not have contact with either child.    Rosi describes the quantity/quality of her social relationships as positive, she states that she does enjoy being social and getting out when she feels well.   Rosi denies personal  experience.     Rosi reported no family history of mental health issues-she states she is not sure about her parent's mental health history.    Significant Losses / Trauma / Abuse / Neglect Issues / Developmental Incidents:  Rosi denies significant loss/trauma/abuse/neglect issues/developmental incidents.  Rosi reports changes in child custody rights children being taken and adopted and divorce / relational changes in 2013 divorce from .   Rosi has addressed the above concerns in previous therapy/treatment     Strengths/Vulnerabilities:   Rosi identifies her personal strengths as: caring, committed to sobriety, creative, empathetic, goal-focused, good listener, has a previous history of therapy, insightful, intelligent, motivated, open to learning, open to suggestions / feedback and support of family, friends and providers .   Things that may interfere with the clients success in treatment include: does not identify any interferences.   Other identified areas of vulnerability include: Suicidal Ideation  Anxiety with/without panic attacks  Depressive symptoms  Physical/medical.     Medical History / Physical Health Screen:     Primary Care Physician: Rosi has a [facility] Provider, who is named Lucio Curiel..   Last Physical Exam: within the past year. Client was encouraged to follow up with PCP if symptoms were to develop.    Mental Health Medication Management Provider / Psychiatrist: Rosi has a psychiatrist whose name and location are: Jeff Wren NP-[facility].            Current medications including prescription, non-prescription, herbals, dietary aids and  vitamins:  Per client report:     Current Outpatient Prescriptions:      albuterol (VENTOLIN HFA) 108 (90 BASE) MCG/ACT Inhaler, Inhale 1-2 puffs into the lungs every 4 hours as needed, Disp: , Rfl:      ARIPiprazole (ABILIFY) 5 MG tablet, Take 5 mg by mouth daily, Disp: , Rfl:      B Complex Vitamins (VITAMIN-B COMPLEX) TABS, Take 1 tablet by mouth daily for low b12. Start 2/16/2017, Disp: , Rfl:      cholecalciferol (VITAMIN D3) 5000 units CAPS capsule, TAKE 1 CAPSULE BY MOUTH ONCE DAILY. FOR VITAMIN D DEFICIENCY - DOSEINCREASE 2/16/2017, Disp: 100 capsule, Rfl: 2     clotrimazole (LOTRIMIN) 1 % cream, , Disp: , Rfl:      cyanocobalamin (VITAMIN B12) 1000 MCG/ML injection, Inject 1 ML intramuscularly every 2 weeks., Disp: 4 mL, Rfl: 3     cyclobenzaprine (FLEXERIL) 10 MG tablet, Take 0.5-1 tablets (5-10 mg) by mouth 3 times daily as needed for muscle spasms, Disp: 30 tablet, Rfl: 1     Elastic Bandages & Supports (MEDICAL COMPRESSION STOCKINGS) MIS, MISCELLANEOUS MEDICAL SUPPLY (GRADUATED COMPRESSION STOCKINGS). For personal use. Length: thigh Strength: 16-20 mmHg.  Please measure patient in Pharmacy., Disp: , Rfl:      gabapentin (NEURONTIN) 300 MG capsule, TAKE 1 CAPSULE BY MOUTH DAILY FOR 3 DAYS, THEN INCREASE TO TAKE 1 CAPSULE TWICE DAILY FOR 3 DAYS, THEN 1 CAPSULE 3 TIMES DAILY., Disp: , Rfl: 1     hydrOXYzine (ATARAX) 50 MG tablet, TAKE 2 TABLETS (100MG) BY MOUTH NIGHTLY AT BEDTIME AND 1 TABLET ONCE DAILY AS NEEDED, Disp: , Rfl:      ibuprofen (ADVIL/MOTRIN) 200 MG tablet, Take 600 mg by mouth every 6 hours as needed for pain, Disp: , Rfl:      lamoTRIgine (LAMICTAL) 200 MG tablet, TAKE 1 TABLET BY MOUTH NIGHTLY AT BEDTIME, Disp: , Rfl: 2     lamoTRIgine (LAMICTAL) 25 MG tablet, TAKE 2 TABLETS BY MOUTH DAILY, Disp: , Rfl: 2     Misc. Devices (ROLLER WALKER) MISC, Rolling Walker for home use.  2 wheels, Disp: , Rfl:      montelukast (SINGULAIR) 10 MG tablet, Take 10 mg by mouth At Bedtime, Disp: , Rfl:       "order for DME, Equipment being ordered: Custom Compression Garments for Bilateral Lower Extremities, Disp: 2 Application, Rfl: 11     oxybutynin (DITROPAN-XL) 5 MG 24 hr tablet, Take 1 tablet (5 mg) by mouth daily (lot [id number]), Disp: 30 tablet, Rfl: 11     permethrin (ELIMITE) 5 % cream, Apply cream from neck down; leave on for 8-14 hours then wash off with water; reapply in 1 week if rash is not improved or is spreading., Disp: 60 g, Rfl: 1     SAFETY-ELBERT TB SYRINGE 25G X 5/8\" 1 ML MISC, SAFETY GLIDE-FOR B-12 SHOTS, Disp: 4 each, Rfl: 3     syringe/needle, sisp, 25G X 5/8\" 1 ML MISC, Safety glide - for B12 Shots, Disp: , Rfl:      triamcinolone (KENALOG) 0.1 % cream, Apply sparingly to affected area three times daily as needed, Disp: 80 g, Rfl: 2     vilazodone (VIIBRYD) 40 MG TABS tablet, Take 40 mg by mouth daily with food, Disp: , Rfl:     Rosi reports current medications are: Effective.   Rosi describes taking her medications as: Independent.  Rosi reports taking prescribed medications as prescribed.        Medical:  Past Medical History:   Diagnosis Date     Edema     No Comments Provided     Encounter for removal and reinsertion of intrauterine contraceptive device     05,IUD placement, Removed     Excessive and frequent menstruation with irregular cycle     2017     Major depressive disorder, single episode     No Comments Provided     Personal history of other medical treatment (CODE)     G3, P2-0-1-2 with history of spontaneous      Personal history of other medical treatment (CODE)     No Comments Provided     Uncomplicated asthma     No Comments Provided       Surgical:  Past Surgical History:   Procedure Laterality Date     ANKLE SURGERY      fracture, repair with screws     CONIZATION LEEP      ,Underwent a loop     LAPAROSCOPIC TUBAL LIGATION      ,tubal ligation     Allergy:   Rosi reports   Allergies   Allergen Reactions     Clonazepam Other (See Comments) "     Causes Violence and aggresssion     Hydrocodone-Acetaminophen      Other reaction(s): Seizures  Can take Percocet without difficulty.     Lorazepam Other (See Comments)     Causes Violence and aggresssion     Sertraline Other (See Comments)     Caused suicidally      Bupropion Other (See Comments)     Caused Major Depression     Lithium Unknown     Risperidone Unknown     Sulfa Drugs Unknown     Valproic Acid Unknown        Family History of Medical, Mental Health and/or Substance Use problems:  Per client report:   Family History   Problem Relation Age of Onset     Other - See Comments Mother      No Known Problems     Asthma Father      Asthma,+ Leg edema, knee, hip replacement. + Lymphedema     Other - See Comments Paternal Grandmother      Lymphedema     Osteoperosis Brother      Osteoporosis     Other - See Comments Brother      No Known Problems       Rosi reports no current medical concerns.      General Health:  Have you had any exposure to any communicable disease in the past 2-3 weeks? no     Are you aware of safe sex practices? yes     Is there a possibility of pregnancy?  no       Nutrition:    Are you on a special diet? If yes, please explain:  no   Do you have any concerns regarding your nutritional status? If yes, please explain:  no   Have you had any appetite changes in the last 3 months?  No     Have you had any weight loss or weight gain in the last 3 months?  No     Do you have a history of an eating disorder? Yes--Binge eating   Do you have a history of being in an eating disorder program? no     Fall Risk:   Have you had any falls in the past 3 months? no     Do you currently useany assistive devices for mobility?   no     Per completion of the Medical History / Physical Health Screen, is there a recommendation to see / follow up with a primary care physician/clinic?    No.    Clinical Findings      Mental Status Assessment:    Appearance:   Appropriate   Eye Contact:   Good    Psychomotor Behavior: Normal   Attitude:   Cooperative   Orientation:   Person Place Time Situation All  Speech   Rate / Production: Normal    Volume:  Normal   Mood:    Anxious  Depressed  Sad   Affect:    Constricted   Thought Content:  Clear  Referential Thinking   Thought Form:  Coherent  Circumstantial  Insight:    Fair       Review of Symptoms:  Depression: Sleep Interest Guilt Energy Concentration Appetite Psychomotor slowing or agitation Suicide Hopeless Helpless Worthless Ruminations Irritability  Shannan:  Distractibility Impulsiveness Racing Thoughts Sleepless Pressured Speech  Psychosis: No symptoms  Anxiety: Worries Nervousness Usual Unusual  Panic:  Tingling Sense of Impending Doom  Post Traumatic Stress Disorder: No symptoms  Obsessive Compulsive Disorder: No symptoms  Eating Disorder: No symptoms Bingeing    Safety Issues and Plan for Safety and Risk Management:  Patient has had a history of suicidal ideation: approximately 1 yr ago  Patient denies current fears or concerns for personal safety.  Patient denies current or recent suicidal ideation or behaviors.  Patient denies current or recent homicidal ideation or behaviors.  Patient denies current or recent self injurious behavior or ideation.  Patient denies other safety concerns.  Patient reports there are no firearms in the house  A safety and risk management plan has not been developed at this time, however client was given the after-hours number / 911 should there be a change in any of these risk factors.      Diagnostic Criteria:  A. A distinct period of abnormally and persistently elevated, expansive, or irritable mood and abnormally and persistently increased activity or energy lasting at least 4 consecutive days and presentmost of the day, nearly every day.  B. During the period of mood disturbance and increased energy and activity, three (or more) of the following symptoms have persisted (four if the mood is only irritable), represent a  nonticeable change from usual behaivor, and have been present to a degree:   - inflated self-esteem or grandiosity    - decreased need for sleep (e.g., feels rested after only 3 hours of sleep)    - more talkative than usual or pressure to keep talking    - flight of ideas or subjective experience that thoughts are racing   - distractibility   - increase in goal-directed activity   - excessive involvement in activities that have a high potential for painful consequences  C. The episode is associated with an unequivocal change in functioning that is uncharacteristic of the person when not symptomatic  D. The disturbance in mood and the change in functioning are observable by others  E. The episode is not severe enough to cause marked impairment in social or occupational functioning or to necessitate hospitalization, and does not have psychotic features.  F. The symptoms are not attributable to the direct physiological effects of a substance or a general medical condition    DSM5 Diagnoses: (Sustained by DSM5 Criteria Listed Above)  Behavioral and Medical Diagnosis: 296.89 Bipolar II Disorder With mixed features;  Mild Per history; Generalized Anxiety Disorder-per history; Alcohol Abuse-In Remission per history  Psychosocial & Contextual Factors: family of origin issues, health issues, limited social support, mental health symptoms and relationship stress    Medical Concerns that may Impact Treatment:     WHODAS 2.0 SCORE:   WHODAS 2.0 Total Score 8/22/2018   Total Score 30       LOCUS: 21    PHQ-9:   PHQ-9 SCORE 4/11/2018 7/19/2018 8/22/2018   Total Score 0 0 5         Client's Treatment Goals/Discharge Goals :   Learning increase coping skills to decrease the interference of her MH issues in multiple life areas noted above.      Clinical Findings/Summary: (include recommendations, prioritization of needs, ancillary services, client and family participation in preferences, and referrals made)     Rosi was referred  "to [facility] (PHP)  by  and therapist at [facility] . Rosi reports the reason for referral at this time is clarify behavioral health diagnosis, determine behavioral health treatment options, assess client readiness and motivation to change and assess client social situation.     Rosi verbalizes the following treatment/discharge goals: \"to improve where I am currently at with my life\".  Patient stated that her symptoms have resulted in the following functional impairments: management of the household and or completion of tasks, money management, organization, relationship(s), self-care, social interactions and use of public transportation.    Rosi states that she has been struggling with mental health symptoms for many years. She states that she was an adolescent and had been struggling with anxiety and mood difficulties. Rosi was vague regarding historic information but was focused on present symptoms and states that has been struggling with increased anxiety and mood difficulties which do impact her ability to function in all life domains. She states that she is presently participating in [facility], therapy, case management and services provided at [facility]. She has history of diagnosis including Bipolar, Generalized Anxiety Disorder, alcohol dependence in remission and borderline features.     Current Stressors/Losses/Disappointments: family stressors; socially isolative; several adult placements; divorce in 2013; change in Yazidism-raised as Oriental orthodox and has decided to leave that Religion format and become Mosque.     Rosi reports first onset of mental health symptoms late adolescence.   Rosi was first diagnosed as an adult with bipolar disorder.  Rosi  is currently receiving the following services: ARM, case management, counseling, physician / PCP and psychiatry.  Current providers are: Jeff Wren at [facility]; Miladis Santos [facility]; case management and [facility] through [facility].   Psychiatric Hospitalizations: [facility] and [facility]-several years ago.  Rosi denies a history of civil commitment.      Rosi states that she is able to identify when her symptoms are becoming problematic, she states that she has had significant difficulties with anxiety and notes that she will isolate and stay in her apartment for days at a time. She states that this has improved somewhat but that she still struggles with being in large groups and in stores such as [employer] and grocery stores.  Rosi states that she has been hospitalized several years ago for suicidal ideation and has not experienced this for at least one year.     Referral to another professional/service is not indicated at this time..  [facility]) to rehabilitate the areas of functional impairments.    It is my professional opinion that patient:     1. Has symptoms of mental illness that impair function in the following areas:       Mental health symptoms, Mental health service needs, Interpersonal skills, Community integration, Medical health and Self-care / Independent living     2. Rehabilitative mental health services would reduce symptoms to allow regulated, restored or improved functioning.  Maintain stability, function, preventing risk of significant functional decompensation or more restrictive service setting.      3. Has the cognitive capacity to benefit from rehabilitative mental health techniques and methods.    A Release of Information has been obtained for the following: [facility].    I certify that Partial Hospitalization (PHP) services are medically necessary to improve or maintain the client's condition and functional level and to prevent relapse or hospitalization.  PHP services will be provided in lieu of psychiatric  hospitalization, no less intensive level of care would be sufficient to provide the medically necessary treatment the client requires.  These services will include group therapy and OT group therapy daily and individual therapy and medications management as needed.    Due to the clinical severity of the symptoms reported by the client, functional impairments exist that significantly disrupt functioning.  The client reports these symptoms negatively impact their quality of life.  Without the recommended medically necessary treatments listed, the client's symptoms are likely to increase in severity and functioning may further decline.  If the client participates and complies with recommended treatment, the prognosis if fair.      Initial Individualized Treatment Plan:      I. The areas of treatment focus identified are:  Stabilize mental health status/personal safety      II.  Goals and treatment strategies include:         A. Client will report on their:  Ability to maintain safety and/or destructive impulses by talking to staff as needed and actively engaging in safety planning.  Need for more information, support and/or assistance.  Efforts to improve reality orientation by discussing these needs in groups  Increase or change in symptoms          B. Client will identify skills to:  Improve interpersonal relationships by learning adaptive communication and coping skills  Promote constructive management of emotions (examples: depression, anxiety, anger) by developing effective cognitive/behavioral coping skills  Begin to interrupt negative and ruminating thought patterns.          C. Client will agree to:  Be responsible to prioritize and work goals, including regular attendance as scheduled, and ask for assistance as needed.  Abstinence from alcohol, illicit drugs ( incl. synthetics), gambling; i.e. behaviors that will inhibit their ability to utilize and benefit from the program.  Process with the group attempts to  interrupt thoughts and use of coping skills.                D. This client will report on cultural, racial, health or spiritual issues that impact their wellbeing     Assessment of this client's strengths includes:  Willingness and openness to additional supports and programming     Specific Issues(s):  Learn to interrupt negative thought patterns and replace with coping skills to reduce interference in multiple life areas.     Intervention:   Develop increased coping skills by practicing the skills in a group setting.   These services will include group therapy and OT group therapy daily and  individual therapy and medications  management as needed.           E. Client agrees to:  Partner with the treatment team to identify, prioritize and work on goals.  Be responsible for active participation in the treatment plan, work with assigned treatment team, regularly attend groups, and ask for help as needed.      Date Plan Started: 8/28/18      Date Plan Stopped: 9/6/18      Admit Date/Time: 9:00am          Yes, the patient has been informed that any other mental health professional providing mental health services to me will need access to this Diagnostic Assessment in order to develop a treatment plan and receive payment.    Ainsley Webber, [facility]

## 2018-08-27 NOTE — TELEPHONE ENCOUNTER
Lucio Curiel MD reviewed and completed the following home care or hospice form(s) for Home Care on 8-27-18.   This covers the certification period effective 08-28-18 to 10-26-18.  Deirdre Bhatti LPN on 8/27/2018 at 11:42 AM

## 2018-08-28 ENCOUNTER — HOSPITAL ENCOUNTER (OUTPATIENT)
Dept: BEHAVIORAL HEALTH | Facility: HOSPITAL | Age: 43
Discharge: HOME | End: 2018-08-28
Attending: PSYCHIATRY & NEUROLOGY
Payer: MEDICARE

## 2018-08-28 PROCEDURE — H0035 MH PARTIAL HOSP TX UNDER 24H: HCPCS | Performed by: SOCIAL WORKER

## 2018-08-28 PROCEDURE — H0035 MH PARTIAL HOSP TX UNDER 24H: HCPCS | Mod: GO | Performed by: SOCIAL WORKER

## 2018-08-28 NOTE — PROGRESS NOTES
Group Therapy Progress Notes     Client Initial Individualized Goals for Treatment: Improve interpersonal relationships by learning adaptive communication and coping skills  Promote constructive management of emotions (examples: depression, anxiety, anger) by developing effective cognitive/behavioral coping skills  Begin to interrupt negative and ruminating thought patterns.    Area of Treatment Focus:  Develop / Improve Independent Living / Socialization Skills    Therapeutic Interventions/Treatment Strategies:  Facilitate increased self awareness  Provide education regarding time mgmt    Response to Treatment Strategies:  Accepted Feedback, Gave Feedback and Listened     Name of Groups:  Time Management - Time: 11:10-12:00    Description and Therapeutic Outcome:   Self development group - OT - Time Management  Time management group focuses on assisting clients to define self and increase positive self -regard.  Psychoeducation provided related to developing strategies/skills to support effective time management.  Clients will increase knowledge and awareness of time management, increase knowledge/awareness of skills/techniques/behaviors that support time management and when/how to utilize new time management strategies.  Clients will recognize that other peers share similar feelings, thoughts, and problems, and will expand their knowledge/skills through observing others self exploration and working through issues and personal development.  The goal is to help clients learn strategies to have success with time management ie. prioritizing, task analysis, limiting distractions, giving yourself enough time, and use of lists.  Pt feels she does a good job managing time at home.       Is this a Weekly Review of the Progress on the Treatment Plan?  NO    Are Treatment Plan Goals being addressed?  YES      Are Treatment Plan Strategies to Address Goals Effective?  YES      Are there any current contracts in  place?  NO

## 2018-08-28 NOTE — PROGRESS NOTES
"Group Therapy Progress Notes     Client Initial Individualized Goals for Treatment:     Improve interpersonal relationships by learning adaptive communication and coping skills  Promote constructive management of emotions (examples: depression, anxiety, anger) by developing effective cognitive/behavioral coping skills  Begin to interrupt negative and ruminating thought patterns.    Area of Treatment Focus:  Personal Safety   Stabilize mental health status    Therapeutic Interventions/Treatment Strategies:  Assist clients in establishing / strengthening support network  Assist to identify treatment goals  Assess / reassess for appropriate therapy program involvement, encourage participation in therapies  Engage in safety planning when indicated  Facilitate increased self awareness  Teach adaptive coping skills and communication skills  Use reality based supportive approach    Response to Treatment Strategies:  Accepted Feedback, Gave Feedback, Listened  and Alert    Name of Groups:  Grounding - Time: 10-10:50;1-1:50    Description and Therapeutic Outcome:     During Grounding group, Rsoi presented as bright, engaged and responsive to the group process. Pt practiced   her homework last night - Mindfulness, Awareness, 5 to 1, Deep Breathing, SEGUNDO- Gratitude, Excited about, Enjoyed, Hello Emotion,  and Present Moment.    During the follow up Grounding group, Rosi was easily engaged and elaborated on skills she has begun working on - 3 C's -\"I didn't Cause it, I Can't Cure it. I Can't Control it\"; Be Metric which is \"Just Do It\" referring to applying the skill rather than ruminating on the issue then eventually applying or considering application.Pt presents as pleasant and comfortable with peers and the group process    Is this a Weekly Review of the Progress on the Treatment Plan?  NO    Are Treatment Plan Goals being addressed?  YES      Are Treatment Plan Strategies to Address Goals Effective?  YES      Are " there any current contracts in place?  NO

## 2018-08-29 ENCOUNTER — HOSPITAL ENCOUNTER (OUTPATIENT)
Dept: BEHAVIORAL HEALTH | Facility: HOSPITAL | Age: 43
End: 2018-08-29
Attending: PSYCHIATRY & NEUROLOGY
Payer: MEDICARE

## 2018-08-29 PROCEDURE — H0035 MH PARTIAL HOSP TX UNDER 24H: HCPCS | Performed by: SOCIAL WORKER

## 2018-08-29 NOTE — PROGRESS NOTES
"Group Therapy Progress Notes     Client Initial Individualized Goals for Treatment: Improve interpersonal relationships by learning adaptive communication and coping skills  Promote constructive management of emotions (examples: depression, anxiety, anger) by developing effective cognitive/behavioral coping skills  Begin to interrupt negative and ruminating thought patterns.    Area of Treatment Focus:  Symptom Stabilization and Management    Therapeutic Interventions/Treatment Strategies:  Provide education regarding healthy goal setting    Response to Treatment Strategies:  Accepted Feedback, Gave Feedback, Listened , Attentive and Alert    Name of Groups:  Healthy Goals - Time: 11:10-12:00    Description and Therapeutic Outcome:   Healthy Goals  OT life skills group with focus on identification of appropriate goals for overall health and wellbeing.  Clients will create individualized goals, identify barriers to meeting those goals, and problem solve to make goals achievable.  \"Setting healthy goals\" handout provided as well as individualized goal setting worksheet.  Rosi set healthy goals to start her , and work on portion control.  Encouarged Rosi to get education on proper portion she should be eating.  Encouraged small steps and discussed who can be her support to succeed in her goals.         Is this a Weekly Review of the Progress on the Treatment Plan?  NO    Are Treatment Plan Goals being addressed?  YES      Are Treatment Plan Strategies to Address Goals Effective?  YES      Are there any current contracts in place?  NO             "

## 2018-08-29 NOTE — PROGRESS NOTES
Group Therapy Progress Notes     Client Initial Individualized Goals for Treatment:     Improve interpersonal relationships by learning adaptive communication and coping skills  Promote constructive management of emotions (examples: depression, anxiety, anger) by developing effective cognitive/behavioral coping skills  Begin to interrupt negative and ruminating thought patterns.     Area of Treatment Focus:  Personal Safety  Stabilize mental health status    Therapeutic Interventions/Treatment Strategies:  Assist clients in establishing / strengthening support network  Assist to identify treatment goals  Assess / reassess for appropriate therapy program involvement, encourage participation in therapies  Engage in safety planning when indicated  Facilitate increased self awareness  Teach adaptive coping skills and communication skills  Use reality based supportive approach    Response to Treatment Strategies:  Accepted Feedback, Gave Feedback, Listened , Focused on Goals and Alert    Name of Groups:  Mindfulness - Time: 10-10:50 Skills; 1-1:50- Pain Body    Description and Therapeutic Outcome:     During Skills group, Rosi presented as bright, pleasant and easily participatory. Pt is applying skill at home: playing card, Present Moment, 3 Good Things, Hot bath, for self soothe and 3 C's. Pt responds and reads from handout though has not yet opened up about personal issues.    During Pain Body group, Rosi engaged with listening,presented as empathetic to a peer's adoption story. Pt grasps 5 to 1 concept; 5 -3-2 concept, and being Dialectical.       Is this a Weekly Review of the Progress on the Treatment Plan?  NO    Are Treatment Plan Goals being addressed?  YES      Are Treatment Plan Strategies to Address Goals Effective?  YES      Are there any current contracts in place?  NO

## 2018-08-30 ENCOUNTER — HOSPITAL ENCOUNTER (OUTPATIENT)
Dept: BEHAVIORAL HEALTH | Facility: HOSPITAL | Age: 43
End: 2018-08-30
Attending: PSYCHIATRY & NEUROLOGY
Payer: MEDICARE

## 2018-08-30 PROCEDURE — 99213 OFFICE O/P EST LOW 20 MIN: CPT

## 2018-08-30 PROCEDURE — H0035 MH PARTIAL HOSP TX UNDER 24H: HCPCS | Performed by: SOCIAL WORKER

## 2018-08-30 NOTE — H&P
Psychiatric Eval/H&P  Patient Name: Rosi Lamb   YOB: 1975  Age: 43 year old  [id number]    Primary Physician: Lucio Curiel   Completed By: Cornell Jones NP     Chief Complaint   Rosi Lamb is a 43 year old   female who presents on his/her own regarding evaluation and treatment of Bipolar and Depression required for Partial Hospitalization Programming. Referred to [facility] (PHP)  by  and therapist at [facility] . Rosi reports the reason for referral at this time is clarify behavioral health diagnosis, determine behavioral health treatment options, assess client readiness and motivation to change and assess client social situation. Stated she wants to improve her coping skills and help with anxiety and depression.  Visit took place onsite [facility]. History obtained by face to face interview of client, collaborative information provided by Ainsley MCALLSITER, and review of available records.   Today stated she likes where she is living and has a lot of services and family is supportive. Stated she feels mostly good for the most part and symptoms of depression and anxiety under control. Depression and anxiety 2/10 and feels he meds are  Working. Has set goals in PHP and wants to learn the skills effectively.Stated she likes PHP and it is beneficial and it is worth it.   Rosi Lamb  presents reporting ongoing/ symptoms of /moderate depression and anxiety. for last 4 weeks .with unchanged progression. Symptoms appear to be a pattern of symptoms been triggered by increase stress. Have been relieved by medications and DBT and starting PHP,  Recent symptoms have resulted in isolation decreased socialization and binge eating. Denies S/I and is sleeping and eating good.  Previous treatments have included (medications and alternatives): hospitalizations and ER visits for S/I  And she has [facility] worker and   give a lot of support.    Patient has demonstrated evidence of failed treatment response with less intensive outpatient programs, including adult foster care and did not work and living independent that did not work, and is living in an assisted living complex and it has been working great, and services are incorporated into facility.   SPECIFIC SYMPTOM HISTORY  Sleep:no issues .  Recent appetite change: No.  Recent weight change: No.  Special diet: No.  Other nutritional concerns:no  Psychotic symptoms (subjective): No hallucinations..nonedenies symptoms of psychosis    PMFSPH    Past Psychiatric History:   Rosi reports first onset of mental health symptoms late adolescence.   Rosi was first diagnosed as an adult with bipolar disorder.  Rosi  is currently receiving the following services: [facility], case management, counseling, physician / PCP and psychiatry.  Current providers are: Jeff Wren at [facility]; Miladis Santos MA [facility] [place]; case management and [facility] through [facility].   Psychiatric Hospitalizations: [facility] and [facility]-several years ago.  Rosi denies a history of civil commitment.       Social History:   Social/Family History:  Rosi  reports she grew up in [place], [place]. She states that her family was Judaism so she did have some differences compared to other children as related to belief system, family traditions and upbringing.   Rosi was the first of 3 children. Rosi states she has 2 younger brothers.  Rosi describes her childhood as good.  Rosi describes her current relationships with her family of origin as fair.     Rosi identifies her relationship status as: .    Rosi denies sexual health concerns.      Rosi reports giving birth to two children, she states that she voluntarily gave up both children for adoption due to her mental health and substance issues.  She states that her children were ages 1 yr old and 6 yr old when they were adopted. She states that she does receive pictures of her oldest child (now age 14) but does not have contact with either child.     Rosi describes the quantity/quality of her social relationships as positive, she states that she does enjoy being social and getting out when she feels well.   Rosi denies personal  experience.      Rosi reported no family history of mental health issues-she states she is not sure about her parent's mental health history.     Significant Losses / Trauma / Abuse / Neglect Issues / Developmental Incidents:  Rosi denies significant loss/trauma/abuse/neglect issues/developmental incidents.  Rosi reports changes in child custody rights children being taken and adopted and divorce / relational changes in 2013 divorce from .   Rosi has addressed the above concerns in previous therapy/treatment     Chemical Use History:   Rosi is currently receiving the following services: No indications of CD issues. Has been sober about 7 years from alcohol        CAGE-AID:  Have you ever felt you ought to cut down on your drinking or drug use?   No     Have people annoyed you by criticizing your drinking or drug use?   No     Have you ever felt bad or guilty about your drinking or drug use?   No     Have you ever had a drink or used drugs first thing in the morning to steady your nerves or to get rid of a hangover?  No     Do you feel these issues have been adequately addressed?   Yes. How? Was in treatment for alcoholism 7 years ago and has maintained sobriety     Chemical Dependency Assessment Recommended?  No        Medical History and ROS  Prior to Admission medications    Medication Sig Start Date End Date Taking? Authorizing Provider   albuterol (VENTOLIN HFA) 108 (90 BASE) MCG/ACT Inhaler Inhale 1-2 puffs into the lungs every 4 hours as needed 12/8/17   Reported, Patient   ARIPiprazole  (ABILIFY) 5 MG tablet Take 5 mg by mouth daily 12/6/17   Reported, Patient   B Complex Vitamins (VITAMIN-B COMPLEX) TABS Take 1 tablet by mouth daily for low b12. Start 2/16/2017 2/16/17   Reported, Patient   cholecalciferol (VITAMIN D3) 5000 units CAPS capsule TAKE 1 CAPSULE BY MOUTH ONCE DAILY. FOR VITAMIN D DEFICIENCY - DOSEINCREASE 2/16/2017 8/15/18   Lucio Curiel MD   clotrimazole (LOTRIMIN) 1 % cream  10/11/17   Reported, Patient   cyanocobalamin (VITAMIN B12) 1000 MCG/ML injection Inject 1 ML intramuscularly every 2 weeks. 8/20/18   Lucio Curiel MD   cyclobenzaprine (FLEXERIL) 10 MG tablet Take 0.5-1 tablets (5-10 mg) by mouth 3 times daily as needed for muscle spasms 6/27/18   Kassandra Bautista, CNP   Elastic Bandages & Supports (MEDICAL COMPRESSION STOCKINGS) [facility] MISCELLANEOUS MEDICAL SUPPLY (GRADUATED COMPRESSION STOCKINGS). For personal use. Length: thigh Strength: 16-20 mmHg.  Please measure patient in Pharmacy. 11/4/16   Reported, Patient   gabapentin (NEURONTIN) 300 MG capsule TAKE 1 CAPSULE BY MOUTH DAILY FOR 3 DAYS, THEN INCREASE TO TAKE 1 CAPSULE TWICE DAILY FOR 3 DAYS, THEN 1 CAPSULE 3 TIMES DAILY. 2/14/18   Reported, Patient   hydrOXYzine (ATARAX) 50 MG tablet TAKE 2 TABLETS (100MG) BY MOUTH NIGHTLY AT BEDTIME AND 1 TABLET ONCE DAILY AS NEEDED 11/20/17   Reported, Patient   ibuprofen (ADVIL/MOTRIN) 200 MG tablet Take 600 mg by mouth every 6 hours as needed for pain 2/23/17   Reported, Patient   lamoTRIgine (LAMICTAL) 200 MG tablet TAKE 1 TABLET BY MOUTH NIGHTLY AT BEDTIME 2/12/18   Reported, Patient   lamoTRIgine (LAMICTAL) 25 MG tablet TAKE 2 TABLETS BY MOUTH DAILY 5/3/18   Reported, Patient   Misc. Devices (ROLLER WALKER) MISC Rolling Walker for home use.  2 wheels 11/4/16   Reported, Patient   montelukast (SINGULAIR) 10 MG tablet Take 10 mg by mouth At Bedtime 12/14/17   Reported, Patient   order for DME Equipment being ordered: Custom Compression Garments for Bilateral Lower Extremities  "18   Lucio Curiel MD   oxybutynin (DITROPAN-XL) 5 MG 24 hr tablet Take 1 tablet (5 mg) by mouth daily (lot [id number]) 3/15/18   Lucio Curiel MD   permethrin (ELIMITE) 5 % cream Apply cream from neck down; leave on for 8-14 hours then wash off with water; reapply in 1 week if rash is not improved or is spreading. 18   Gustabo Roberts MD   SAFETY-ELBERT TB SYRINGE 25G X 5/8\" 1 ML MISC SAFETY GLIDE-FOR B-12 SHOTS 18   Lucio Curiel MD   syringe/needle, sisp, 25G X 5/8\" 1 ML MISC Safety glide - for B12 Shots 17   Reported, Patient   triamcinolone (KENALOG) 0.1 % cream Apply sparingly to affected area three times daily as needed 18   Gustabo Roberts MD   vilazodone (VIIBRYD) 40 MG TABS tablet Take 40 mg by mouth daily with food    Reported, Patient     Allergies   Allergen Reactions     Clonazepam Other (See Comments)     Causes Violence and aggresssion     Hydrocodone-Acetaminophen      Other reaction(s): Seizures  Can take Percocet without difficulty.     Lorazepam Other (See Comments)     Causes Violence and aggresssion     Sertraline Other (See Comments)     Caused suicidally      Bupropion Other (See Comments)     Caused Major Depression     Lithium Unknown     Risperidone Unknown     Sulfa Drugs Unknown     Valproic Acid Unknown     Past Medical History:   Diagnosis Date     Edema     No Comments Provided     Encounter for removal and reinsertion of intrauterine contraceptive device     05,IUD placement, Removed     Excessive and frequent menstruation with irregular cycle     2017     Major depressive disorder, single episode     No Comments Provided     Personal history of other medical treatment (CODE)     G3, P2-0-1-2 with history of spontaneous      Personal history of other medical treatment (CODE)     No Comments Provided     Uncomplicated asthma     No Comments Provided     Past Surgical History:   Procedure Laterality Date     ANKLE SURGERY      fracture, " repair with screws     CONIZATION [facility]      07/04,Underwent a loop     LAPAROSCOPIC TUBAL LIGATION      2009,tubal ligation       Physical Exam - to be completed if not done in the last 30 days and available for inclusion in the medical record.    Constitutional: oriented to person, place, and time.  appears well-developed and well-nourished.   HENT:   Head: Normocephalic and atraumatic.   Right Ear: External ear normal.   Left Ear: External ear normal.   Nose: Nose normal.   Mouth/Throat: Oropharynx is clear and moist. No oropharyngeal exudate.   Eyes: Conjunctivae and EOM are normal. Pupils are equal, round, and reactive to light. Right eye exhibits no discharge. Left eye exhibits no discharge. No scleral icterus.   Neck: Normal range of motion. Neck supple. No JVD present. No tracheal deviation present. No thyromegaly present.   Cardiovascular: Normal rate, regular rhythm, normal heart sounds and intact distal pulses. Exam reveals no gallop and no friction rub.   No murmur heard.  Pulmonary/Chest: Effort normal and breath sounds normal. No stridor. No respiratory distress.  no wheezes. no rales. no tenderness.   Abdominal: Soft. Bowel sounds are normal.  no distension and no mass. There is no tenderness. There is no rebound and no guarding.  Skin: Dry, intact, no open areas, rashes, moles of concern    Review of Systems:  Constitution: No weight loss, fever, night sweats  Skin: No rashes, pruritus or open wounds  Neuro: No headaches or seizure activity.  Psych:  See HPI  Eyes: No vision changes.  ENT: No problems chewing or swallowing.   Musculoskeletal: No muscle pain, joint pain or swelling   Respiratory: No cough or dyspnea  Cardiovascular:  No chest pain,  palpitations or fainting  Gastrointestinal:  No abdominal pain, nausea, vomiting or change in bowel habits    MSE/PSYCH  PSYCHIATRIC EXAM  There were no vitals taken for this visit.  -Appearance/Behavior: No apparent distress    -Attitude:pleasant and  cooperative  -Motor: normal or unremarkable.  -Gait: Normal.    -Abnormal involuntary movements: none.  -Mood: depressed and anxious.  -Affect: Appropriate/mood-congruent, Bright and Reactive/Full range.  -Speech: Normal normal rate tone and rhythm.                 -Thought process/associations: Logical, Linear and Goal directed.  -Thought content: no psyhoses or S/I ir H/Inormal .  -Perceptual disturbances: No hallucinations..              -Suicidal/Homicidal Ideation: none  Capacity for Self-harm and harm to others: none  -Judgment: Good, Adequate for safety and Appropriate for age.  -Insight: Good.  *Orientation: time, place and person.  *Memory: good  *Attention: Good  *Language: fluent, no aphasias, able to repeat phrases and name objects. Vocab intact.  *Fund of information: appropriate for education  *Cognitive functioning estimate: 0 - independent.    Labs:   Asessment/Impression and patient's current status and how the patient will benefit from PHP services: This is a 43 year old yo female with Bipolar and Depression and Anxiety Alcohol use in remission   Educated regarding medication indications, risks, benefits, side effects, contraindications and possible interactions. Verbally expressed understanding.     60 minutes total face to face time with greater than 50% counseling/coordination of care.    DX supporting need for PHP services:    I: Clinical Psychiatric Disorders     DSM5 Diagnoses: (Sustained by DSM5 Criteria Listed Above)  Behavioral and Medical Diagnosis: 296.89 Bipolar II Disorder With mixed features;  Mild Per history; Generalized Anxiety Disorder-per history; Alcohol Abuse-In Remission per history  Psychosocial & Contextual Factors: family of origin issues, health issues, limited social   II:Personality Disorders and Intellectual disabilities   Borderline features    Plan of Care:  Continue medications and current treatment plan    Partial Hospitalization Programming. This patient will  benefit most from a partial program to further target mental health symptoms through clinically recognized therapeutic interventions pertinent to this patient's individual symptoms and illness. Without the intensive, structured combination of active treatment services provided in this programming, this patient is at increased risk for inpatient (re)hospitalization. It is not believed that this patient would benefit from a less intensive or restrictive outpatient setting at this time.   Cornell Jones, NP

## 2018-08-30 NOTE — PROGRESS NOTES
Group Therapy Progress Notes     Client Initial Individualized Goals for Treatment:  Improve interpersonal relationships by learning adaptive communication and coping skills  Promote constructive management of emotions (examples: depression, anxiety, anger) by developing effective cognitive/behavioral coping skills  Begin to interrupt negative and ruminating thought patterns.    Area of Treatment Focus:  Symptom Stabilization and Management    Therapeutic Interventions/Treatment Strategies:  Provide education regarding money managment    Response to Treatment Strategies:  Accepted Feedback, Gave Feedback, Listened , Focused on Goals, Attentive, Accepted Support and Alert    Name of Groups:  Money Management - Time: 11:10-12:00    Description and Therapeutic Outcome:   OT - Life Skills - Money Management  In this group, clients learn the basics of banking and money management.  Education and discussion provided around wants vs needs when creating a feasible budget.  Clients are provided with a budget form to complete and assistance is provided as needed to complete the budgeting task.  Clients will leave group with increased knowledge of banking accounts and how to properly fill out banking forms, acquire an appropriate budget, increased knowledge of general money management skills, and what tools can be used to assist with money management if having continued difficulty (ie. Rep payee.)  Rosi is bright and particpatory in OT group today.  Discussed appropriate money management skills she is currently using and ones that she plans to implement.         Is this a Weekly Review of the Progress on the Treatment Plan?  NO    Are Treatment Plan Goals being addressed?  YES      Are Treatment Plan Strategies to Address Goals Effective?  YES      Are there any current contracts in place?  NO

## 2018-08-30 NOTE — PROGRESS NOTES
"Group Therapy Progress Notes     Client Initial Individualized Goals for Treatment:     Improve interpersonal relationships by learning adaptive communication and coping skills  Promote constructive management of emotions (examples: depression, anxiety, anger) by developing effective cognitive/behavioral coping skills  Begin to interrupt negative and ruminating thought patterns.    Area of Treatment Focus:  Personal Safety  Stabilize mental health status    Therapeutic Interventions/Treatment Strategies:  Assist clients in establishing / strengthening support network  Assist to identify treatment goals  Assess / reassess for appropriate therapy program involvement, encourage participation in therapies  Engage in safety planning when indicated  Facilitate increased self awareness  Teach adaptive coping skills and communication skills  Use reality based supportive approach    Response to Treatment Strategies:  Accepted Feedback, Gave Feedback, Listened , Focused on Goals and Alert    Name of Groups:  Bipolar - Time: 10-10:50; 1-1:50    Description and Therapeutic Outcome:     During Bipolar group, Rosi presented as focused, and easily participatory. Pt is diligently applying skills at home: One Thing, 3 Good Things, SEGUNDO- Grateful, Enjoyed and Excited; Deep Breathing\" which comes very easily as I do that one often\", Rewiring, Self Care and Present Moment.    During the follow up Bipolar group, Rosi identified that when she is in [place], she becomes \"irritable\", hardly eats and has minimal sleep as well as incomplete projects. Discussed supports \"But I only talk to someone who I feel is trustworthy as it's hard to trust just anyone\". Discussed \"It's worth it\". Pt grasped the concept of Dialectical today.     Is this a Weekly Review of the Progress on the Treatment Plan?  NO    Are Treatment Plan Goals being addressed?  YES      Are Treatment Plan Strategies to Address Goals Effective?  YES      Are there any " current contracts in place?  NO

## 2018-08-31 ENCOUNTER — HOSPITAL ENCOUNTER (OUTPATIENT)
Dept: BEHAVIORAL HEALTH | Facility: HOSPITAL | Age: 43
End: 2018-08-31
Attending: PSYCHIATRY & NEUROLOGY
Payer: MEDICARE

## 2018-08-31 PROCEDURE — H0035 MH PARTIAL HOSP TX UNDER 24H: HCPCS | Performed by: SOCIAL WORKER

## 2018-08-31 NOTE — PROGRESS NOTES
"Group Therapy Progress Notes     Client Initial Individualized Goals for Treatment:     Improve interpersonal relationships by learning adaptive communication and coping skills  Promote constructive management of emotions (examples: depression, anxiety, anger) by developing effective cognitive/behavioral coping skills  Begin to interrupt negative and ruminating thought patterns.    Area of Treatment Focus:  Personal Safety  Stabilize mental health status    Therapeutic Interventions/Treatment Strategies:  Assist clients in establishing / strengthening support network  Assist to identify treatment goals  Assess / reassess for appropriate therapy program involvement, encourage participation in therapies  Engage in safety planning when indicated  Facilitate increased self awareness  Teach adaptive coping skills and communication skills  Use reality based supportive approach    Response to Treatment Strategies:  Accepted Feedback, Gave Feedback, Listened , Focused on Goals and Alert    Name of Groups:  Psychotherapy Wrap up group - 2:00 - 3:00    Description and Therapeutic Outcome:     In psychotherapy wrap up group, Rosi reviewed her take away from today - she started on Monday - reviewed some back ground.  Really liked Dialectic concept today - Feels it down loaded today - \"I can respect they way you feel\" - hopes to use the skills more effectively - has done DBT in the past but feels like she needs support in building her confidence in the skills.  Supports include [facility] worker, CM,  and friends.  Comes with a volunteer  and this is working well.  Provided nice feedback to peers today - also engaged well.  Rosi remains appropriate for PHP.    Is this a Weekly Review of the Progress on the Treatment Plan?  NO    Are Treatment Plan Goals being addressed?  YES      Are Treatment Plan Strategies to Address Goals Effective?  YES      Are there any current contracts in place?  NO       "

## 2018-08-31 NOTE — PROGRESS NOTES
"Group Therapy Progress Notes      Client Initial Individualized Goals for Treatment:      Improve interpersonal relationships by learning adaptive communication and coping skills  Promote constructive management of emotions (examples: depression, anxiety, anger) by developing effective cognitive/behavioral coping skills  Begin to interrupt negative and ruminating thought patterns.     Area of Treatment Focus:  Personal Safety  Stabilize mental health status     Therapeutic Interventions/Treatment Strategies:  Assist clients in establishing / strengthening support network  Assist to identify treatment goals  Assess / reassess for appropriate therapy program involvement, encourage participation in therapies  Engage in safety planning when indicated  Facilitate increased self awareness  Teach adaptive coping skills and communication skills  Use reality based supportive approach     Response to Treatment Strategies:  Accepted Feedback, Gave Feedback, Listened , Focused on Goals and Alert     Name of Groups:  Psychotherapy Wrap up group - 2:00 - 3:00     Description and Therapeutic Outcome:      In psychotherapy wrap up group,Rosi stated that she enjoyed the progressive muscle relaxation that they did during OT group today. Additionally she did well with the \"focused mind\" concept, Rosi plans on tending to household chores this evening and practicing her skills over the weekend.  Rosi remains appropriate for PHP.     Is this a Weekly Review of the Progress on the Treatment Plan?  NO     Are Treatment Plan Goals being addressed?  YES        Are Treatment Plan Strategies to Address Goals Effective?  YES        Are there any current contracts in place?  NO       "

## 2018-08-31 NOTE — PROGRESS NOTES
"Group Therapy Progress Notes     Client Initial Individualized Goals for Treatment:     Improve interpersonal relationships by learning adaptive communication and coping skills  Promote constructive management of emotions (examples: depression, anxiety, anger) by developing effective cognitive/behavioral coping skills  Begin to interrupt negative and ruminating thought patterns.    Area of Treatment Focus:  Personal Safety  Stabilize mental health status    Therapeutic Interventions/Treatment Strategies:  Assist clients in establishing / strengthening support network  Assist to identify treatment goals  Assess / reassess for appropriate therapy program involvement, encourage participation in therapies  Engage in safety planning when indicated  Facilitate increased self awareness  Teach adaptive coping skills and communication skills  Use reality based supportive approach    Response to Treatment Strategies:  Accepted Feedback, Gave Feedback, Listened , Focused on Goals, Attentive and Alert    Name of Groups:  Mindfulness - Time: 10-10:50; Listening Skills 1-1:50    Description and Therapeutic Outcome:     During Mindfulness: Taking the Mind for a Walk exercise, Rosi presented as bright and volunteered to practice the exercise. Pt responded that , \"although it was challenging, and I wanted to respond to the voice of my \"mind\" behind me, I activated my \"One Thing\" skill to keep focus and to remain in the Present Moment. Pt acknowledged that the exercise was beneficial for when her mind gets distracted.    During Listening Skills group, Pt presented as attentive, with good eye contact, empathetic reflections of what the listener was saying and not talking over her \"client\". Pt is working on \"Letting Go\" as I often dwell on the past\". Highlighted Present Moment which pt has begun practicing, 3 Good Things to apply when losing at [facility] as the losing was \"Hard to take\"; and knowing \"I''m worth it\" for practicing " skills. Pt checks her skills sheet off every evening to remind and bring awareness and Mindfulness of the skills she is using. Discussed pt going for short walks to which pt is amenable due to edema issues and has now been walking in the hallway during breaks.      Is this a Weekly Review of the Progress on the Treatment Plan?  NO    Are Treatment Plan Goals being addressed?  YES      Are Treatment Plan Strategies to Address Goals Effective?  YES      Are there any current contracts in place?  NO

## 2018-08-31 NOTE — PROGRESS NOTES
Group Therapy Progress Notes     Client Initial Individualized Goals for Treatment:   Improve interpersonal relationships by learning adaptive communication and coping skills  Promote constructive management of emotions (examples: depression, anxiety, anger) by developing effective cognitive/behavioral coping skills  Begin to interrupt negative and ruminating thought patterns.       Area of Treatment Focus:  Symptom Stabilization and Management and Abstinence / Relapse Prevention    Therapeutic Interventions/Treatment Strategies:  Facilitate increased self awareness  Teach adaptive coping skills and communication skills    Response to Treatment Strategies:  Accepted Feedback, Gave Feedback, Listened , Focused on Goals, Attentive, Accepted Support and Alert    Name of Groups:  Stress Management - Time: 11:10-12:00    Description and Therapeutic Outcome:   Pt. Attended a group on stress management.  Pt. Participated in a discussion on the effects of stress and stress management techniques. Discussed biofeedback, deep breathing and progressive muscle relaxation. Had group participate in heart rate and hand temperature biofeedback techniques while practicing relaxation techniques. Discussed why deep breathing assists in the relaxation response and provided 3 ways in which to tell that you are completing deep breathing or not. Discussed how and why progressive muscle relaxation works and how to utilize this as a stress management technique.      Is this a Weekly Review of the Progress on the Treatment Plan?  NO    Are Treatment Plan Goals being addressed?  YES      Are Treatment Plan Strategies to Address Goals Effective?  YES      Are there any current contracts in place?  NO

## 2018-10-26 ENCOUNTER — TELEPHONE (OUTPATIENT)
Dept: INTERNAL MEDICINE | Facility: OTHER | Age: 43
End: 2018-10-26
Payer: MEDICARE

## 2018-10-26 DIAGNOSIS — F31.0 BIPOLAR DISORDER, CURRENT EPISODE HYPOMANIC (H): Primary | ICD-10-CM

## 2018-10-26 NOTE — TELEPHONE ENCOUNTER
Lucio Curiel MD reviewed and completed the following home care or hospice form(s) for Home Care on 10-26-18.   This covers the certification period effective 10-27-18 to 12-25-18.  Deirdre Bhatti LPN on 10/26/2018 at 9:15 AM

## 2018-10-27 PROCEDURE — G0179 MD RECERTIFICATION HHA PT: HCPCS | Performed by: INTERNAL MEDICINE

## 2018-10-30 ENCOUNTER — TRANSFERRED RECORDS (OUTPATIENT)
Dept: HEALTH INFORMATION MANAGEMENT | Facility: OTHER | Age: 43
End: 2018-10-30

## 2018-11-13 ENCOUNTER — OFFICE VISIT (OUTPATIENT)
Dept: FAMILY MEDICINE | Facility: OTHER | Age: 43
End: 2018-11-13
Attending: NURSE PRACTITIONER
Payer: MEDICARE

## 2018-11-13 ENCOUNTER — HOSPITAL ENCOUNTER (OUTPATIENT)
Dept: GENERAL RADIOLOGY | Facility: OTHER | Age: 43
Discharge: HOME | End: 2018-11-13
Attending: PHYSICIAN ASSISTANT | Admitting: PHYSICIAN ASSISTANT
Payer: MEDICARE

## 2018-11-13 VITALS
WEIGHT: 293 LBS | TEMPERATURE: 98.9 F | BODY MASS INDEX: 45.99 KG/M2 | SYSTOLIC BLOOD PRESSURE: 118 MMHG | HEIGHT: 67 IN | DIASTOLIC BLOOD PRESSURE: 72 MMHG | HEART RATE: 98 BPM

## 2018-11-13 DIAGNOSIS — M25.561 ACUTE PAIN OF RIGHT KNEE: ICD-10-CM

## 2018-11-13 DIAGNOSIS — M25.561 ACUTE PAIN OF RIGHT KNEE: Primary | ICD-10-CM

## 2018-11-13 PROCEDURE — G0463 HOSPITAL OUTPT CLINIC VISIT: HCPCS | Mod: 25

## 2018-11-13 PROCEDURE — G0463 HOSPITAL OUTPT CLINIC VISIT: HCPCS

## 2018-11-13 PROCEDURE — 73562 X-RAY EXAM OF KNEE 3: CPT | Mod: RT

## 2018-11-13 PROCEDURE — 99213 OFFICE O/P EST LOW 20 MIN: CPT | Performed by: PHYSICIAN ASSISTANT

## 2018-11-13 ASSESSMENT — PAIN SCALES - GENERAL: PAINLEVEL: SEVERE PAIN (6)

## 2018-11-13 NOTE — PROGRESS NOTES
"SUBJECTIVE:  Rosi Lamb is a 43 year old female who sustained a right knee injury 1 one week ago. She was out walking and slipped on the ice. She landed on her right knee cap. She now noted pain and swelling. Course is worseing.     Location: prepatellar and knee cap  Quality: sharp pain without radiation  Severity: 1-3/10 at rest, up to 6/10 with walking  Aggravated walking  Alleviated rest  Associated symptoms: catching when she walks. no erythema, ecchymosis, warmth  No prior injuries or fracture    Past Medical History:   Diagnosis Date     Edema     No Comments Provided     Encounter for removal and reinsertion of intrauterine contraceptive device     05,IUD placement, Removed     Excessive and frequent menstruation with irregular cycle     2017     Major depressive disorder, single episode     No Comments Provided     Personal history of other medical treatment (CODE)     G3, P2-0-1-2 with history of spontaneous      Personal history of other medical treatment (CODE)     No Comments Provided     Uncomplicated asthma     No Comments Provided     Current Outpatient Prescriptions   Medication     albuterol (VENTOLIN HFA) 108 (90 BASE) MCG/ACT Inhaler     ARIPiprazole (ABILIFY) 5 MG tablet     B Complex Vitamins (VITAMIN-B COMPLEX) TABS     cholecalciferol (VITAMIN D3) 5000 units CAPS capsule     clotrimazole (LOTRIMIN) 1 % cream     cyanocobalamin (VITAMIN B12) 1000 MCG/ML injection     cyclobenzaprine (FLEXERIL) 10 MG tablet     gabapentin (NEURONTIN) 300 MG capsule     hydrOXYzine (ATARAX) 50 MG tablet     ibuprofen (ADVIL/MOTRIN) 200 MG tablet     lamoTRIgine (LAMICTAL) 200 MG tablet     lamoTRIgine (LAMICTAL) 25 MG tablet     montelukast (SINGULAIR) 10 MG tablet     order for DME     oxybutynin (DITROPAN-XL) 5 MG 24 hr tablet     permethrin (ELIMITE) 5 % cream     SAFETY-ELBERT TB SYRINGE 25G X 5/8\" 1 ML MISC     syringe/needle, sisp, 25G X 5/8\" 1 ML MISC     triamcinolone (KENALOG) " "0.1 % cream     vilazodone (VIIBRYD) 40 MG TABS tablet     Elastic Bandages & Supports (MEDICAL COMPRESSION STOCKINGS) MISC     Misc. Devices (ROLLER WALKER) MISC     No current facility-administered medications for this visit.         Allergies   Allergen Reactions     Clonazepam Other (See Comments)     Causes Violence and aggresssion     Hydrocodone-Acetaminophen      Other reaction(s): Seizures  Can take Percocet without difficulty.     Lorazepam Other (See Comments)     Causes Violence and aggresssion     Sertraline Other (See Comments)     Caused suicidally      Bupropion Other (See Comments)     Caused Major Depression     Dust Mite Extract      Other reaction(s): Asthma symptoms     Lithium Unknown     Pollen Extract      Other reaction(s): Asthma symptoms     Risperidone Unknown     Sulfa Drugs Unknown     Trichophyton      Other reaction(s): Asthma symptoms     Valproic Acid Unknown     ROS  General: feels well, no fever  Musculoskeletal: injury       OBJECTIVE:  Vitals:    11/13/18 1525   BP: 118/72   BP Location: Left arm   Patient Position: Sitting   Cuff Size: Adult Large   Pulse: 98   Temp: 98.9  F (37.2  C)   TempSrc: Tympanic   Weight: (!) 355 lb 3.2 oz (161.1 kg)   Height: 5' 7\" (1.702 m)     Vital signs as noted above.  Appearance: in no apparent distress.  Knee exam: Right  Inspection: swelling over knee cap and prepatellar  Palpation: TTP prepatellar and over patella, mildly TTP posterior knee  Neurovascular: normal pulses, cap refill, sensation to soft touch, temperature  ROM: limited exam due to pain    PROCEDURE:  XR KNEE RT 3 VW  HISTORY: she fell a week ago onto her R knee. Pain prepatellar and  over patella.; Acute pain of right knee   COMPARISON:  4/20/2017.  TECHNIQUE:  3 views right knee.  FINDINGS:  No fracture or dislocation is identified. The joint spaces are grossly preserved. No foreign body is seen.   IMPRESSION: No acute fracture.    JOSE TEE, " MD    ASSESSMENT:  (M25.561) Acute pain of right knee  (primary encounter diagnosis)    Plan: XR Knee Right 3 Views, ORTHOPEDICS ADULT         REFERRAL, order for DME    Knee pain on right following injury  Rest - elevate apply ice 10-20 minutes every 1-2 hours  Ibuprofen 600-800 mg every 6-8 hours, max 2400 mg/day. Take with food. Schedule ibuprofen 3 times/day for 3-5 days.   Tylenol 650-1000 mg every 4-6 hours, max 4000 mg/day.   ACE wrap in clinic, provided an order for a knee brace to pick this up  Non weight bearing, use your walker until see by orthopedics   Referral to orthopedics, they will call you to schedule an appointment.   Follow up with PCP as needed  Patient received verbal and written instruction including review of warning signs    Catherine Morales PA-C on 11/13/2018 at 4:37 PM

## 2018-11-13 NOTE — PATIENT INSTRUCTIONS
Knee pain on right following injury  Rest - elevate apply ice 10-20 minutes every 1-2 hours  Ibuprofen 600-800 mg every 6-8 hours, max 2400 mg/day. Take with food. Schedule ibuprofen 3 times/day for 3-5 days.   Tylenol 650-1000 mg every 4-6 hours, max 4000 mg/day.   ACE wrap in clinic, provided an order for a knee brace to pick this up  Non weight bearing, use your walker until see by orthopedics   Referral to orthopedics, they will call you to schedule an appointment.   Follow up with PCP as needed  Seek immediate care for    Toes or foot becomes swollen, cold, blue, numb, or tingly    Pain or swelling spreads over the knee or calf    Warmth or redness appears over the knee or calf    Other joints become painful    Rash appears    Fever of 100.4 F (38 C) or higher, or as directed by your healthcare provider    Knee Pain  Knee pain is very common. It s especially common in active people who put a lot of pressure on their knees, like runners. It affects women more often than men.  Your kneecap (patella) is a thick, round bone. It covers and protects the front portion of your knee joint. It moves along a groove in your thighbone (femur) as part of the patellofemoral joint. A layer of cartilage surrounds the underside of your kneecap. This layer protects it from grinding against your femur.  When this cartilage softens and breaks down, it can cause knee pain. This is partly because of repetitive stress. The stress irritates the lining of the joint. This causes pain in the underlying bone.  What causes knee pain?  Many things can cause knee pain. You may have more than one cause. Some of these include:    Overuse of the knee joint    The kneecap doesn t line up with the tissue around it    Damage to small nerves in the area    Damage to the ligament-like structure that holds the kneecap in place (retinaculum)    Breakdown of the bone under the cartilage    Swelling in the soft tissues around the kneecap    Injury  You  might be more likely to have knee pain if you:    Exercise a lot    Recently increased the intensity of your workouts    Have a body mass index (BMI) greater than 25    Have poor alignment of your kneecap    Walk with your feet turned overly outward or inward    Have weakness in surrounding muscle groups (inner quad or hip adductor muscles)    Have too much tightness in surrounding muscle groups (hamstrings or iliotibial band)    Have a recent history of injury to the area    Are female  Symptoms of knee pain  This type of knee pain is a dull, aching pain in the front of the knee in the area under and around the kneecap. This pain may start quickly or slowly. Your pain might be worse when you squat, run, or sit for a long time. You might also sometimes feel like your knee is giving out. You may have symptoms in one or both of your knees.  Diagnosing knee pain  Your healthcare provider will ask about your medical history and your symptoms. Be sure to describe any activities that make your knee pain worse. He or she will look at your knee. This will include tests of your range of motion, strength, and areas of pain of your knee. Your knee alignment will be checked.  Your healthcare provider will need to rule out other causes of your knee pain, such as arthritis. You may need an imaging test, such as an X-ray or MRI.  Treatment for knee pain  Treatments that can help ease your symptoms may include:    Avoiding activities for a while that make your pain worse, returning to activity over time    Icing the outside of your knee when it causes you pain    Taking over-the-counter pain medicine    Wearing a knee brace or taping your knee to support it    Wearing special shoe inserts to help keep your feet in the proper alignment    Doing special exercises to stretch and strengthen the muscles around your hip and your knee  These steps help most people manage knee pain. But some cases of knee pain need to be treated with  surgery. You may need surgery right away. Or you may need it later if other treatments don t work. Your healthcare provider may refer you to an orthopedic surgeon. He or she will talk with you about your choices.  Preventing knee pain  Losing weight and correcting excess muscle tightness or muscle weakness may help lower your risk.  In some cases, you can prevent knee pain. To help prevent a flare-up of knee pain, you do these things:    Regularly do all the exercises your doctor or physical therapist advises    Support your knee as advised by your doctor or physical therapist    Increase training gradually, and ease up on training when needed    Have an expert check your gait for running or other sporting activities    Stretch properly before and after exercise    Replace your running shoes regularly    Lose excess weight     When to call your healthcare provider  Call your healthcare provider right away if:    Your symptoms don t get better after a few weeks of treatment    You have any new symptoms   Date Last Reviewed: 4/1/2017 2000-2018 [employer]. [address] [place] [address], [place] [address]. All rights reserved. This information is not intended as a substitute for professional medical care. Always follow your healthcare professional's instructions.

## 2018-11-13 NOTE — NURSING NOTE
"Patient in clinic for R knee injury. Fell on the ice on side of road last Wed 11/7/18. Been treating with warm packs, pain persists.  Aspen Brewer LPN....................  11/13/2018   3:20 PM    Chief Complaint   Patient presents with     Musculoskeletal Problem     fell 11/7/18       Initial There were no vitals taken for this visit. Estimated body mass index is 55.6 kg/(m^2) as calculated from the following:    Height as of 6/27/18: 5' 7\" (1.702 m).    Weight as of 7/19/18: 355 lb (161 kg).  Medication Reconciliation: complete    Aspen Brewer LPN    "

## 2018-11-13 NOTE — MR AVS SNAPSHOT
After Visit Summary   11/13/2018    Rosi Lamb    MRN: [id number]           Patient Information     Date Of Birth          1975        Visit Information        Provider Department      11/13/2018 3:15 PM Catherine Morales PA-C [facility]        Today's Diagnoses     Acute pain of right knee    -  1      Care Instructions    Knee pain on right following injury  Rest - elevate apply ice 10-20 minutes every 1-2 hours  Ibuprofen 600-800 mg every 6-8 hours, max 2400 mg/day. Take with food. Schedule ibuprofen 3 times/day for 3-5 days.   Tylenol 650-1000 mg every 4-6 hours, max 4000 mg/day.   Provided knee immobilizer  Non weight bearing, use your walker until see by orthopedics   Referral to orthopedics, they will call you to schedule an appointment.   Follow up with PCP as needed  Seek immediate care for    Toes or foot becomes swollen, cold, blue, numb, or tingly    Pain or swelling spreads over the knee or calf    Warmth or redness appears over the knee or calf    Other joints become painful    Rash appears    Fever of 100.4 F (38 C) or higher, or as directed by your healthcare provider    Knee Pain  Knee pain is very common. It s especially common in active people who put a lot of pressure on their knees, like runners. It affects women more often than men.  Your kneecap (patella) is a thick, round bone. It covers and protects the front portion of your knee joint. It moves along a groove in your thighbone (femur) as part of the patellofemoral joint. A layer of cartilage surrounds the underside of your kneecap. This layer protects it from grinding against your femur.  When this cartilage softens and breaks down, it can cause knee pain. This is partly because of repetitive stress. The stress irritates the lining of the joint. This causes pain in the underlying bone.  What causes knee pain?  Many things can cause knee pain. You may have more than one cause. Some of these  include:    Overuse of the knee joint    The kneecap doesn t line up with the tissue around it    Damage to small nerves in the area    Damage to the ligament-like structure that holds the kneecap in place (retinaculum)    Breakdown of the bone under the cartilage    Swelling in the soft tissues around the kneecap    Injury  You might be more likely to have knee pain if you:    Exercise a lot    Recently increased the intensity of your workouts    Have a body mass index (BMI) greater than 25    Have poor alignment of your kneecap    Walk with your feet turned overly outward or inward    Have weakness in surrounding muscle groups (inner quad or hip adductor muscles)    Have too much tightness in surrounding muscle groups (hamstrings or iliotibial band)    Have a recent history of injury to the area    Are female  Symptoms of knee pain  This type of knee pain is a dull, aching pain in the front of the knee in the area under and around the kneecap. This pain may start quickly or slowly. Your pain might be worse when you squat, run, or sit for a long time. You might also sometimes feel like your knee is giving out. You may have symptoms in one or both of your knees.  Diagnosing knee pain  Your healthcare provider will ask about your medical history and your symptoms. Be sure to describe any activities that make your knee pain worse. He or she will look at your knee. This will include tests of your range of motion, strength, and areas of pain of your knee. Your knee alignment will be checked.  Your healthcare provider will need to rule out other causes of your knee pain, such as arthritis. You may need an imaging test, such as an X-ray or MRI.  Treatment for knee pain  Treatments that can help ease your symptoms may include:    Avoiding activities for a while that make your pain worse, returning to activity over time    Icing the outside of your knee when it causes you pain    Taking over-the-counter pain  medicine    Wearing a knee brace or taping your knee to support it    Wearing special shoe inserts to help keep your feet in the proper alignment    Doing special exercises to stretch and strengthen the muscles around your hip and your knee  These steps help most people manage knee pain. But some cases of knee pain need to be treated with surgery. You may need surgery right away. Or you may need it later if other treatments don t work. Your healthcare provider may refer you to an orthopedic surgeon. He or she will talk with you about your choices.  Preventing knee pain  Losing weight and correcting excess muscle tightness or muscle weakness may help lower your risk.  In some cases, you can prevent knee pain. To help prevent a flare-up of knee pain, you do these things:    Regularly do all the exercises your doctor or physical therapist advises    Support your knee as advised by your doctor or physical therapist    Increase training gradually, and ease up on training when needed    Have an expert check your gait for running or other sporting activities    Stretch properly before and after exercise    Replace your running shoes regularly    Lose excess weight     When to call your healthcare provider  Call your healthcare provider right away if:    Your symptoms don t get better after a few weeks of treatment    You have any new symptoms   Date Last Reviewed: 4/1/2017 2000-2018 [email or web address]. [address] [place] [address], [place] [address]. All rights reserved. This information is not intended as a substitute for professional medical care. Always follow your healthcare professional's instructions.                Follow-ups after your visit        Additional Services     ORTHOPEDICS ADULT REFERRAL       Your provider has referred you to: orthopedic associates    Please be aware that coverage of these services is subject to the terms and limitations of your health insurance plan.  Call member services at your  health plan with any benefit or coverage questions.      Please bring the following to your appointment:    >>   Any x-rays, CTs or MRIs which have been performed.  Contact the facility where they were done to arrange for  prior to your scheduled appointment.    >>   List of current medications   >>   This referral request   >>   Any documents/labs given to you for this referral                  Follow-up notes from your care team     Return if symptoms worsen or fail to improve.      Your next 10 appointments already scheduled     Nov 21, 2018  2:00 PM CST   [facility]YSICAL with Lucio Curiel [facility]   [facility]    [address]  [place] [id number] [phone]              Future tests that were ordered for you today     Open Future Orders        Priority Expected Expires Ordered    XR Knee Right 3 Views Routine 11/13/2018 11/13/2019 11/13/2018            Who to contact     If you have questions or need follow up information about today's clinic visit or your schedule please contact [facility] directly at [phone].  Normal or non-critical lab and imaging results will be communicated to you by MyChart, letter or phone within 4 business days after the clinic has received the results. If you do not hear from us within 7 days, please contact the clinic through MyChart or phone. If you have a critical or abnormal lab result, we will notify you by phone as soon as possible.  Submit refill requests through [employer] or call your pharmacy and they will forward the refill request to us. Please allow 3 business days for your refill to be completed.          Additional Information About Your Visit        Care EveryWhere ID     This is your Care EveryWhere ID. This could be used by other organizations to access your [place] medical records  [id number]        Your Vitals Were     Pulse Temperature Height Breastfeeding? BMI (Body  "Mass Index)       98 98.9  F (37.2  C) (Tympanic) 5' 7\" (1.702 m) No 55.63 kg/m2        Blood Pressure from Last 3 Encounters:   11/13/18 118/72   07/19/18 120/78   06/27/18 120/82    Weight from Last 3 Encounters:   11/13/18 (!) 355 lb 3.2 oz (161.1 kg)   07/19/18 (!) 355 lb (161 kg)   06/27/18 (!) 356 lb 12.8 oz (161.8 kg)              We Performed the Following     ORTHOPEDICS ADULT REFERRAL          Today's Medication Changes          These changes are accurate as of 11/13/18  4:20 PM.  If you have any questions, ask your nurse or doctor.               Start taking these medicines.        Dose/Directions    order for DME   Used for:  Acute pain of right knee   Started by:  Catherine Morales PA-C        Equipment being ordered: knee immobilizer right   Quantity:  1 each   Refills:  0            Where to get your medicines      Some of these will need a paper prescription and others can be bought over the counter.  Ask your nurse if you have questions.     Bring a paper prescription for each of these medications     order for DME                Primary Care Provider Office Phone # Fax #    Lucio Curiel [phone] [address] [facility] [address]        Equal Access to Services     VANDANA NAYLOR AH: Hadii aad ku hadasho Soomaali, waaxda luqadaha, qaybta kaalmada adeegyada, zenobia taylor. So [facility] [phone].    ATENCIÓN: Si habla español, tiene a mondragon disposición servicios gratuitos de asistencia lingüística. Llame al [phone].    We comply with applicable federal civil rights laws and [place] laws. We do not discriminate on the basis of race, color, national origin, age, disability, sex, sexual orientation, or gender identity.            Thank you!     Thank you for choosing [facility] AND [facility]  for your care. Our goal is always to provide you with excellent care. Hearing back from our patients is one way we can continue to improve our " services. Please take a few minutes to complete the written survey that you may receive in the mail after your visit with us. Thank you!             Your Updated Medication List - Protect others around you: Learn how to safely use, store and throw away your medicines at www.disposemymeds.org.          This list is accurate as of 11/13/18  4:20 PM.  Always use your most recent med list.                   Brand Name Dispense Instructions for use Diagnosis    ARIPiprazole 5 MG tablet    ABILIFY     Take 5 mg by mouth daily        cholecalciferol 5000 units (125 mcg) Caps capsule    vitamin D3    100 capsule    TAKE 1 CAPSULE BY MOUTH ONCE DAILY. FOR VITAMIN D DEFICIENCY - DOSEINCREASE 2/16/2017    Vitamin D deficiency       clotrimazole 1 % cream    LOTRIMIN          cyanocobalamin 1000 MCG/ML injection    VITAMIN B12    4 mL    Inject 1 ML intramuscularly every 2 weeks.    Vitamin B12 deficiency       cyclobenzaprine 10 MG tablet    FLEXERIL    30 tablet    Take 0.5-1 tablets (5-10 mg) by mouth 3 times daily as needed for muscle spasms    Muscle spasm       gabapentin 300 MG capsule    NEURONTIN     TAKE 1 CAPSULE BY MOUTH DAILY FOR 3 DAYS, THEN INCREASE TO TAKE 1 CAPSULE TWICE DAILY FOR 3 DAYS, THEN 1 CAPSULE 3 TIMES DAILY.        hydrOXYzine 50 MG tablet    ATARAX     TAKE 2 TABLETS (100MG) BY MOUTH NIGHTLY AT BEDTIME AND 1 TABLET ONCE DAILY AS NEEDED        ibuprofen 200 MG tablet    ADVIL/MOTRIN     Take 600 mg by mouth every 6 hours as needed for pain        * lamoTRIgine 200 MG tablet    LaMICtal     TAKE 1 TABLET BY MOUTH NIGHTLY AT BEDTIME        * lamoTRIgine 25 MG tablet    LaMICtal     TAKE 2 TABLETS BY MOUTH DAILY        Medical Compression Stockings Mis      MISCELLANEOUS MEDICAL SUPPLY (GRADUATED COMPRESSION STOCKINGS). For personal use. Length: thigh Strength: 16-20 mmHg.  Please measure patient in Pharmacy.        montelukast 10 MG tablet    SINGULAIR     Take 10 mg by mouth At Bedtime        order  "for DME     2 Application    Equipment being ordered: Custom Compression Garments for Bilateral Lower Extremities    Lymphedema of both lower extremities       order for DME     1 each    Equipment being ordered: knee immobilizer right    Acute pain of right knee       oxybutynin 5 MG 24 hr tablet    DITROPAN-XL    30 tablet    Take 1 tablet (5 mg) by mouth daily (lot [id number])    Urinary frequency       permethrin 5 % cream    ELIMITE    60 g    Apply cream from neck down; leave on for 8-14 hours then wash off with water; reapply in 1 week if rash is not improved or is spreading.    Scabies       roller walker Misc      Rolling Walker for home use.  2 wheels        SAFETY-ELBERT TB SYRINGE 25G X 5/8\" 1 ML Misc   Generic drug:  Tuberculin-Allergy Syringes     4 each    SAFETY GLIDE-FOR B-12 SHOTS    Vitamin B12 deficiency       syringe/needle (sisp) 25G X 5/8\" 1 ML Misc      Safety glide - for B12 Shots        triamcinolone 0.1 % cream    KENALOG    80 g    Apply sparingly to affected area three times daily as needed    Scabies       VENTOLIN  (90 Base) MCG/ACT inhaler   Generic drug:  albuterol      Inhale 1-2 puffs into the lungs every 4 hours as needed        vilazodone 40 MG Tabs tablet    VIIBRYD     Take 40 mg by mouth daily with food        Vitamin-B Complex Tabs      Take 1 tablet by mouth daily for low b12. Start 2/16/2017        * Notice:  This list has 2 medication(s) that are the same as other medications prescribed for you. Read the directions carefully, and ask your doctor or other care provider to review them with you.      "

## 2018-11-15 ENCOUNTER — TRANSFERRED RECORDS (OUTPATIENT)
Dept: HEALTH INFORMATION MANAGEMENT | Facility: OTHER | Age: 43
End: 2018-11-15

## 2018-12-26 ENCOUNTER — TELEPHONE (OUTPATIENT)
Dept: INTERNAL MEDICINE | Facility: OTHER | Age: 43
End: 2018-12-26
Payer: MEDICARE

## 2018-12-26 DIAGNOSIS — G89.29 BILATERAL CHRONIC KNEE PAIN: ICD-10-CM

## 2018-12-26 DIAGNOSIS — M79.672 BILATERAL FOOT PAIN: ICD-10-CM

## 2018-12-26 DIAGNOSIS — F60.3 BORDERLINE PERSONALITY DISORDER (H): ICD-10-CM

## 2018-12-26 DIAGNOSIS — G62.9 PERIPHERAL POLYNEUROPATHY: ICD-10-CM

## 2018-12-26 DIAGNOSIS — F31.77 BIPOLAR 1 DISORDER, MIXED, PARTIAL REMISSION (H): Primary | ICD-10-CM

## 2018-12-26 DIAGNOSIS — M79.671 BILATERAL FOOT PAIN: ICD-10-CM

## 2018-12-26 DIAGNOSIS — M25.561 BILATERAL CHRONIC KNEE PAIN: ICD-10-CM

## 2018-12-26 DIAGNOSIS — M25.562 BILATERAL CHRONIC KNEE PAIN: ICD-10-CM

## 2018-12-26 DIAGNOSIS — M79.2 NEUROPATHIC PAIN OF FOOT, UNSPECIFIED LATERALITY: ICD-10-CM

## 2018-12-26 DIAGNOSIS — I89.0 LYMPHEDEMA OF BOTH LOWER EXTREMITIES: ICD-10-CM

## 2018-12-26 DIAGNOSIS — J45.40 MODERATE PERSISTENT ASTHMA, UNSPECIFIED WHETHER COMPLICATED: ICD-10-CM

## 2018-12-26 DIAGNOSIS — F10.21 ALCOHOL USE DISORDER, MODERATE, IN SUSTAINED REMISSION, DEPENDENCE (H): ICD-10-CM

## 2018-12-26 PROCEDURE — G0179 MD RECERTIFICATION HHA PT: HCPCS | Performed by: INTERNAL MEDICINE

## 2018-12-26 NOTE — TELEPHONE ENCOUNTER
Lucio Curiel MD reviewed and completed the following home care or hospice form(s) for Home Care on 12-26-18.   This covers the certification period effective 12-26-18 to 2-23-19.  Deirdre Bhatti LPN on 12/26/2018 at 3:21 PM

## 2019-01-03 ENCOUNTER — TELEPHONE (OUTPATIENT)
Dept: INTERNAL MEDICINE | Facility: OTHER | Age: 44
End: 2019-01-03
Payer: MEDICARE

## 2019-01-03 DIAGNOSIS — I89.0 LYMPHEDEMA: Primary | ICD-10-CM

## 2019-01-03 DIAGNOSIS — F31.0 BIPOLAR I DISORDER, MOST RECENT EPISODE HYPOMANIC (H): ICD-10-CM

## 2019-01-03 NOTE — TELEPHONE ENCOUNTER
Lucio Curiel MD reviewed and completed the following home care or hospice form(s) for Home Care on 1-3-19.   This covers the certification period effective 12-26-18 to 2-23-19.  Deirdre Bhatti LPN on 1/3/2019 at 4:48 PM

## 2019-01-04 ENCOUNTER — OFFICE VISIT (OUTPATIENT)
Dept: INTERNAL MEDICINE | Facility: OTHER | Age: 44
End: 2019-01-04
Attending: INTERNAL MEDICINE
Payer: MEDICARE

## 2019-01-04 VITALS
HEART RATE: 88 BPM | TEMPERATURE: 99.3 F | DIASTOLIC BLOOD PRESSURE: 70 MMHG | SYSTOLIC BLOOD PRESSURE: 120 MMHG | BODY MASS INDEX: 55.91 KG/M2 | WEIGHT: 293 LBS | RESPIRATION RATE: 20 BRPM

## 2019-01-04 DIAGNOSIS — R73.9 ELEVATED RANDOM BLOOD GLUCOSE LEVEL: ICD-10-CM

## 2019-01-04 DIAGNOSIS — F41.1 GENERALIZED ANXIETY DISORDER: ICD-10-CM

## 2019-01-04 DIAGNOSIS — E78.2 MIXED HYPERLIPIDEMIA: ICD-10-CM

## 2019-01-04 DIAGNOSIS — G62.9 PERIPHERAL POLYNEUROPATHY: ICD-10-CM

## 2019-01-04 DIAGNOSIS — I89.0 LYMPHEDEMA OF BOTH LOWER EXTREMITIES: ICD-10-CM

## 2019-01-04 DIAGNOSIS — E53.8 VITAMIN B12 DEFICIENCY: ICD-10-CM

## 2019-01-04 DIAGNOSIS — H66.002 ACUTE SUPPURATIVE OTITIS MEDIA OF LEFT EAR WITHOUT SPONTANEOUS RUPTURE OF TYMPANIC MEMBRANE, RECURRENCE NOT SPECIFIED: Primary | ICD-10-CM

## 2019-01-04 PROBLEM — H66.42 SUPPURATIVE OTITIS MEDIA OF LEFT EAR: Status: ACTIVE | Noted: 2019-01-04

## 2019-01-04 LAB
ALBUMIN SERPL-MCNC: 4.4 G/DL (ref 3.5–5.7)
ALP SERPL-CCNC: 59 U/L (ref 34–104)
ALT SERPL W P-5'-P-CCNC: 15 U/L (ref 7–52)
ANION GAP SERPL CALCULATED.3IONS-SCNC: 7 MMOL/L (ref 3–14)
AST SERPL W P-5'-P-CCNC: 15 U/L (ref 13–39)
BASOPHILS # BLD AUTO: 0 10E9/L (ref 0–0.2)
BASOPHILS NFR BLD AUTO: 0.3 %
BILIRUB SERPL-MCNC: 0.7 MG/DL (ref 0.3–1)
BUN SERPL-MCNC: 16 MG/DL (ref 7–25)
CALCIUM SERPL-MCNC: 9.4 MG/DL (ref 8.6–10.3)
CHLORIDE SERPL-SCNC: 105 MMOL/L (ref 98–107)
CHOLEST SERPL-MCNC: 176 MG/DL
CO2 SERPL-SCNC: 28 MMOL/L (ref 21–31)
CREAT SERPL-MCNC: 0.98 MG/DL (ref 0.6–1.2)
DIFFERENTIAL METHOD BLD: NORMAL
EOSINOPHIL # BLD AUTO: 0.1 10E9/L (ref 0–0.7)
EOSINOPHIL NFR BLD AUTO: 1.1 %
ERYTHROCYTE [DISTWIDTH] IN BLOOD BY AUTOMATED COUNT: 12.8 % (ref 10–15)
GFR SERPL CREATININE-BSD FRML MDRD: 63 ML/MIN/{1.73_M2}
GLUCOSE SERPL-MCNC: 94 MG/DL (ref 70–105)
HBA1C MFR BLD: 5 % (ref 4–6)
HCT VFR BLD AUTO: 43.8 % (ref 35–47)
HDLC SERPL-MCNC: 47 MG/DL (ref 23–92)
HGB BLD-MCNC: 13.9 G/DL (ref 11.7–15.7)
IMM GRANULOCYTES # BLD: 0 10E9/L (ref 0–0.4)
IMM GRANULOCYTES NFR BLD: 0.2 %
LDLC SERPL CALC-MCNC: 99 MG/DL
LYMPHOCYTES # BLD AUTO: 2.3 10E9/L (ref 0.8–5.3)
LYMPHOCYTES NFR BLD AUTO: 23.8 %
MCH RBC QN AUTO: 28.9 PG (ref 26.5–33)
MCHC RBC AUTO-ENTMCNC: 31.7 G/DL (ref 31.5–36.5)
MCV RBC AUTO: 91 FL (ref 78–100)
MONOCYTES # BLD AUTO: 0.8 10E9/L (ref 0–1.3)
MONOCYTES NFR BLD AUTO: 8.8 %
NEUTROPHILS # BLD AUTO: 6.3 10E9/L (ref 1.6–8.3)
NEUTROPHILS NFR BLD AUTO: 65.8 %
NONHDLC SERPL-MCNC: 129 MG/DL
PLATELET # BLD AUTO: 231 10E9/L (ref 150–450)
POTASSIUM SERPL-SCNC: 4.6 MMOL/L (ref 3.5–5.1)
PROT SERPL-MCNC: 7.7 G/DL (ref 6.4–8.9)
RBC # BLD AUTO: 4.81 10E12/L (ref 3.8–5.2)
SODIUM SERPL-SCNC: 140 MMOL/L (ref 134–144)
TRIGL SERPL-MCNC: 151 MG/DL
WBC # BLD AUTO: 9.6 10E9/L (ref 4–11)

## 2019-01-04 PROCEDURE — 80053 COMPREHEN METABOLIC PANEL: CPT | Performed by: INTERNAL MEDICINE

## 2019-01-04 PROCEDURE — 36415 COLL VENOUS BLD VENIPUNCTURE: CPT | Performed by: INTERNAL MEDICINE

## 2019-01-04 PROCEDURE — 85025 COMPLETE CBC W/AUTO DIFF WBC: CPT | Performed by: INTERNAL MEDICINE

## 2019-01-04 PROCEDURE — G0463 HOSPITAL OUTPT CLINIC VISIT: HCPCS | Mod: 25

## 2019-01-04 PROCEDURE — 96372 THER/PROPH/DIAG INJ SC/IM: CPT

## 2019-01-04 PROCEDURE — 83036 HEMOGLOBIN GLYCOSYLATED A1C: CPT | Performed by: INTERNAL MEDICINE

## 2019-01-04 PROCEDURE — 99214 OFFICE O/P EST MOD 30 MIN: CPT | Performed by: INTERNAL MEDICINE

## 2019-01-04 PROCEDURE — 25000128 H RX IP 250 OP 636: Performed by: INTERNAL MEDICINE

## 2019-01-04 PROCEDURE — G0463 HOSPITAL OUTPT CLINIC VISIT: HCPCS

## 2019-01-04 PROCEDURE — 80061 LIPID PANEL: CPT | Performed by: INTERNAL MEDICINE

## 2019-01-04 RX ORDER — CYANOCOBALAMIN 1000 UG/ML
1000 INJECTION, SOLUTION INTRAMUSCULAR; SUBCUTANEOUS ONCE
Status: COMPLETED | OUTPATIENT
Start: 2019-01-04 | End: 2019-01-04

## 2019-01-04 RX ORDER — AMOXICILLIN 875 MG
875 TABLET ORAL 2 TIMES DAILY
Qty: 14 TABLET | Refills: 0 | Status: SHIPPED | OUTPATIENT
Start: 2019-01-04 | End: 2019-04-03

## 2019-01-04 RX ADMIN — CYANOCOBALAMIN 1000 MCG: 1000 INJECTION, SOLUTION INTRAMUSCULAR at 13:59

## 2019-01-04 ASSESSMENT — ENCOUNTER SYMPTOMS
ARTHRALGIAS: 1
ADENOPATHY: 0
BRUISES/BLEEDS EASILY: 0
SHORTNESS OF BREATH: 0
ABDOMINAL PAIN: 0
FATIGUE: 0
CONFUSION: 0
MYALGIAS: 1
NECK STIFFNESS: 1
BACK PAIN: 0
NECK PAIN: 1
FEVER: 1
HEMATURIA: 0
CHEST TIGHTNESS: 0
CHILLS: 1
WOUND: 0

## 2019-01-04 ASSESSMENT — ANXIETY QUESTIONNAIRES
2. NOT BEING ABLE TO STOP OR CONTROL WORRYING: NOT AT ALL
1. FEELING NERVOUS, ANXIOUS, OR ON EDGE: NOT AT ALL
5. BEING SO RESTLESS THAT IT IS HARD TO SIT STILL: NOT AT ALL
GAD7 TOTAL SCORE: 0
3. WORRYING TOO MUCH ABOUT DIFFERENT THINGS: NOT AT ALL
7. FEELING AFRAID AS IF SOMETHING AWFUL MIGHT HAPPEN: NOT AT ALL
6. BECOMING EASILY ANNOYED OR IRRITABLE: NOT AT ALL

## 2019-01-04 ASSESSMENT — PATIENT HEALTH QUESTIONNAIRE - PHQ9
SUM OF ALL RESPONSES TO PHQ QUESTIONS 1-9: 2
5. POOR APPETITE OR OVEREATING: NOT AT ALL

## 2019-01-04 ASSESSMENT — PAIN SCALES - GENERAL: PAINLEVEL: MODERATE PAIN (5)

## 2019-01-04 NOTE — PROGRESS NOTES
"Nursing Notes:   Justyna Concepcion LPN  1/4/2019  1:40 PM  Signed  Patient presents in the clinic for her annual check up, her concerns of pain in her left ear that began several days ago.She also has concerns of swelling in bilateral ankles that began two days ago.  Miriam Jamey INFANTE 1/4/2019 1:25 PM  Chief Complaint   Patient presents with     Physical       Initial /70 (BP Location: Right arm, Patient Position: Sitting, Cuff Size: Adult Large)   Pulse 88   Temp 99.3  F (37.4  C) (Tympanic)   Resp 20   Wt (!) 161.9 kg (357 lb)   BMI 55.91 kg/m    Estimated body mass index is 55.91 kg/m  as calculated from the following:    Height as of 11/13/18: 1.702 m (5' 7\").    Weight as of this encounter: 161.9 kg (357 lb).  Medication Reconciliation: complete    Justyna Concepcion LPN    Nursing note reviewed with patient.  Accuracy and completeness verified.   Ms. Lamb is a 43 year old female who:  Patient presents with:  Physical      ICD-10-CM    1. Acute suppurative otitis media of left ear without spontaneous rupture of tympanic membrane, recurrence not specified H66.002 amoxicillin (AMOXIL) 875 MG tablet   2. Vitamin B12 deficiency E53.8 vitamin B-12 (CYANOCOBALAMIN) injection 1,000 mcg   3. Elevated random blood glucose level R73.09 CBC and Differential     Comprehensive metabolic panel     Hemoglobin A1c   4. Mixed hyperlipidemia E78.2 Lipid Panel   5. Peripheral polyneuropathy G62.9    6. Lymphedema of both lower extremities I89.0    7. Generalized anxiety disorder F41.1      HPI  Patient comes in for annual follow-up.  Overall has been doing quite well except for the last couple of days.    Left ear pain, started about a day and a half ago.  States that deep in her ear she has some pain and almost feeling like an itch.  She has had some fevers and chills type symptoms today.  Examination is consistent with acute otitis media.  Treatment options reviewed and discussed.  She has done well with " amoxicillin previously.  Prescription sent to pharmacy.    Vitamin B12 deficiency, has been having some issues getting her B12 shots.  She would like one today.  She has a nurse that visits her out at the home every week on Wednesday.    Elevated blood sugar, she would like labs today.  Evaluate for diabetes/prediabetes.    Mixed hyperlipidemia, currently diet controlled.  Check lipid panel.    Peripheral neuropathy with some generalized anxiety.  Improved with gabapentin.  She is following with [facility] for her psychiatric medications.    Chronic lymphedema of the legs, had a bit more swelling on Wednesday this week, when her nurse came out to evaluate her.  States that her legs are doing better today.  She is not really having any concerns about this at this time.  Advised to try to limit her sodium intake.    Functional Capacity: about 4 METS.   Reports that she can climb a flight of stairs without any chest pain/heaviness or shortness of breath.   No orthopnea/paroxysmal nocturnal dyspnea  Review of Systems   Constitutional: Positive for chills and fever. Negative for fatigue.   HENT: Positive for ear pain. Negative for congestion and ear discharge.    Eyes: Negative for visual disturbance.   Respiratory: Negative for chest tightness and shortness of breath.    Cardiovascular: Positive for leg swelling. Negative for chest pain.   Gastrointestinal: Negative for abdominal pain.   Genitourinary: Negative for hematuria.   Musculoskeletal: Positive for arthralgias (+ some feet pain), myalgias (+ improved with Vit D. ), neck pain and neck stiffness. Negative for back pain.   Skin: Negative for rash and wound.   Neurological: Negative for syncope.   Hematological: Negative for adenopathy. Does not bruise/bleed easily.   Psychiatric/Behavioral: Negative for confusion.      AGNIESZKA:   AGNIESZKA-7 SCORE 7/19/2018 8/22/2018 1/4/2019   Total Score 0 2 0     PHQ9:  PHQ-9 SCORE 7/19/2018 8/22/2018 1/4/2019   PHQ-9 Total Score 0 5 2        I have personally reviewed the past medical history, past surgical history, medications, allergies, family and social history as listed below, on 1/4/2019.    Allergies   Allergen Reactions     Clonazepam Other (See Comments)     Causes Violence and aggresssion     Hydrocodone-Acetaminophen      Other reaction(s): Seizures  Can take Percocet without difficulty.     Lorazepam Other (See Comments)     Causes Violence and aggresssion     Sertraline Other (See Comments)     Caused suicidally      Bupropion Other (See Comments)     Caused Major Depression     Dust Mite Extract      Other reaction(s): Asthma symptoms     Lithium Unknown     Pollen Extract      Other reaction(s): Asthma symptoms     Risperidone Unknown     Sulfa Drugs Unknown     Trichophyton      Other reaction(s): Asthma symptoms     Valproic Acid Unknown       Current Outpatient Medications   Medication Sig Dispense Refill     albuterol (VENTOLIN HFA) 108 (90 BASE) MCG/ACT Inhaler Inhale 1-2 puffs into the lungs every 4 hours as needed       amoxicillin (AMOXIL) 875 MG tablet Take 1 tablet (875 mg) by mouth 2 times daily for 7 days 14 tablet 0     ARIPiprazole (ABILIFY) 5 MG tablet Take 5 mg by mouth daily       B Complex Vitamins (VITAMIN-B COMPLEX) TABS Take 1 tablet by mouth daily for low b12. Start 2/16/2017       cholecalciferol (VITAMIN D3) 5000 units CAPS capsule TAKE 1 CAPSULE BY MOUTH ONCE DAILY. FOR VITAMIN D DEFICIENCY - DOSEINCREASE 2/16/2017 100 capsule 2     clotrimazole (LOTRIMIN) 1 % cream        cyanocobalamin (VITAMIN B12) 1000 MCG/ML injection Inject 1 ML intramuscularly every 2 weeks. 4 mL 3     cyclobenzaprine (FLEXERIL) 10 MG tablet Take 0.5-1 tablets (5-10 mg) by mouth 3 times daily as needed for muscle spasms 30 tablet 1     Elastic Bandages & Supports (MEDICAL COMPRESSION STOCKINGS) MISC MISCELLANEOUS MEDICAL SUPPLY (GRADUATED COMPRESSION STOCKINGS). For personal use. Length: thigh Strength: 16-20 mmHg.  Please measure  "patient in Pharmacy.       gabapentin (NEURONTIN) 300 MG capsule TAKE 1 CAPSULE BY MOUTH DAILY FOR 3 DAYS, THEN INCREASE TO TAKE 1 CAPSULE TWICE DAILY FOR 3 DAYS, THEN 1 CAPSULE 3 TIMES DAILY.  1     hydrOXYzine (ATARAX) 50 MG tablet TAKE 2 TABLETS (100MG) BY MOUTH NIGHTLY AT BEDTIME AND 1 TABLET ONCE DAILY AS NEEDED       ibuprofen (ADVIL/MOTRIN) 200 MG tablet Take 600 mg by mouth every 6 hours as needed for pain       lamoTRIgine (LAMICTAL) 200 MG tablet TAKE 1 TABLET BY MOUTH NIGHTLY AT BEDTIME  2     lamoTRIgine (LAMICTAL) 25 MG tablet TAKE 2 TABLETS BY MOUTH DAILY  2     Misc. Devices (ROLLER WALKER) MISC Rolling Walker for home use.  2 wheels       montelukast (SINGULAIR) 10 MG tablet TAKE 1 TABLET BY MOUTH AT BEDTIME. 90 tablet 0     order for DME Equipment being ordered: right knee brace 1 each 0     order for DME Equipment being ordered: Custom Compression Garments for Bilateral Lower Extremities 2 Application 11     oxybutynin (DITROPAN-XL) 5 MG 24 hr tablet Take 1 tablet (5 mg) by mouth daily (lot [id number]) 30 tablet 11     permethrin (ELIMITE) 5 % cream Apply cream from neck down; leave on for 8-14 hours then wash off with water; reapply in 1 week if rash is not improved or is spreading. 60 g 1     SAFETY-ELBERT TB SYRINGE 25G X 5/8\" 1 ML MISC SAFETY GLIDE-FOR B-12 SHOTS 4 each 3     syringe/needle, sisp, 25G X 5/8\" 1 ML MISC Safety glide - for B12 Shots       triamcinolone (KENALOG) 0.1 % cream Apply sparingly to affected area three times daily as needed 80 g 2     vilazodone (VIIBRYD) 40 MG TABS tablet Take 40 mg by mouth daily with food          Patient Active Problem List    Diagnosis Date Noted     Suppurative otitis media of left ear 01/04/2019     Priority: Medium     Abnormal Pap smear of cervix 04/11/2018     Priority: Medium     Overview:   had scraping of cervix       Allergic rhinitis due to pollen 02/08/2018     Priority: Medium     Esophageal reflux 02/08/2018     Priority: Medium     History " of substance abuse 02/08/2018     Priority: Medium     Bilateral chronic knee pain 04/20/2017     Priority: Medium     Crepitus of joint of right knee 04/20/2017     Priority: Medium     Menorrhagia with irregular cycle 02/16/2017     Priority: Medium     Preop examination 02/16/2017     Priority: Medium     Bilateral foot pain 12/18/2016     Priority: Medium     Elevated random blood glucose level 12/18/2016     Priority: Medium     Peripheral polyneuropathy 12/18/2016     Priority: Medium     Vitamin B12 deficiency 12/18/2016     Priority: Medium     Vitamin D deficiency 12/18/2016     Priority: Medium     Lymphedema of both lower extremities 11/22/2016     Priority: Medium     Stasis dermatitis of both legs 08/17/2016     Priority: Medium     Overview:   Updated per 10/1/17 IMO import       Alcohol use disorder, moderate, in sustained remission, dependence (H) 11/28/2015     Priority: Medium     Generalized anxiety disorder 11/28/2015     Priority: Medium     Cannabis use disorder, moderate, in sustained remission, dependence (H) 11/28/2015     Priority: Medium     Bipolar I disorder, most recent episode depressed, moderate (H) 11/28/2015     Priority: Medium     Body mass index 45.0-49.9, adult (H) 03/23/2015     Priority: Medium     Moderate persistent asthma 08/19/2013     Priority: Medium     Overview:   AAP completed on 08/19/13  Triggers: pollen, fumes, exercise, URI, warm weather. Peak flow today is 250. Symptomatic: moderate  Overview:   Overview:   AAP completed on 08/19/13  Triggers: pollen, fumes, exercise, URI, warm weather. Peak flow today is 250. Symptomatic: moderate  Overview:   AAP completed on 08/19/13  Triggers: pollen, fumes, exercise, URI, warm weather. Peak flow today is 250. Symptomatic: moderate       Urinary incontinence 03/15/2013     Priority: Medium     Closed dislocation of tarsal joint 02/04/2011     Priority: Medium     Borderline personality disorder (H) 07/07/2003     Priority:  Medium     Overview:   [facility].  Overview:   Overview:   [facility].       Other disorder of menstruation and other abnormal bleeding from female genital tract 2001     Priority: Medium     Overview:   DUB, dysmenorrhea  Overview:   Overview:   DUB, dysmenorrhea       Past Medical History:   Diagnosis Date     Edema     No Comments Provided     Encounter for removal and reinsertion of intrauterine contraceptive device     05,IUD placement, Removed     Excessive and frequent menstruation with irregular cycle     2017     Major depressive disorder, single episode     No Comments Provided     Personal history of other medical treatment (CODE)     G3, P2-0-1-2 with history of spontaneous      Personal history of other medical treatment (CODE)     No Comments Provided     Uncomplicated asthma     No Comments Provided     Past Surgical History:   Procedure Laterality Date     ANKLE SURGERY      fracture, repair with screws     CONIZATION LEEP      ,Underwent a loop     LAPAROSCOPIC TUBAL LIGATION      ,tubal ligation     Social History     Socioeconomic History     Marital status:      Spouse name: None     Number of children: None     Years of education: None     Highest education level: None   Social Needs     Financial resource strain: None     Food insecurity - worry: None     Food insecurity - inability: None     Transportation needs - medical: None     Transportation needs - non-medical: None   Occupational History     None   Tobacco Use     Smoking status: Former Smoker     Packs/day: 1.00     Years: 3.00     Pack years: 3.00     Types: Cigarettes     Last attempt to quit: 2003     Years since quittin.0     Smokeless tobacco: Never Used   Substance and Sexual Activity     Alcohol use: No     Alcohol/week: 0.0 oz     Comment: Alcoholic Drinks/day: History of abuse - sober as of 2010.     Drug use: No     Comment: Drug use: NoHistory of  "use     Sexual activity: No   Other Topics Concern     Parent/sibling w/ CABG, MI or angioplasty before 65F 55M? Not Asked   Social History Narrative    No longer in adult foster care lived with Charley Godwin.    .   2 sons - age 12 and 7 - as of 11/2016.   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     Family History   Problem Relation Age of Onset     Other - See Comments Mother         No Known Problems     Asthma Father         Asthma,+ Leg edema, knee, hip replacement. + Lymphedema     Other - See Comments Paternal Grandmother         Lymphedema     Osteoporosis Brother         Osteoporosis     Other - See Comments Brother         No Known Problems       EXAM:   Vitals:    01/04/19 1327   BP: 120/70   BP Location: Right arm   Patient Position: Sitting   Cuff Size: Adult Large   Pulse: 88   Resp: 20   Temp: 99.3  F (37.4  C)   TempSrc: Tympanic   Weight: (!) 161.9 kg (357 lb)       Current Pain Score: Moderate Pain (5)     BP Readings from Last 3 Encounters:   01/04/19 120/70   11/13/18 118/72   07/19/18 120/78      Wt Readings from Last 3 Encounters:   01/04/19 (!) 161.9 kg (357 lb)   11/13/18 (!) 161.1 kg (355 lb 3.2 oz)   07/19/18 (!) 161 kg (355 lb)      Estimated body mass index is 55.91 kg/m  as calculated from the following:    Height as of 11/13/18: 1.702 m (5' 7\").    Weight as of this encounter: 161.9 kg (357 lb).     Physical Exam   Constitutional: She appears well-developed and well-nourished. No distress.   HENT:   Head: Normocephalic and atraumatic.   Nose: Nose normal.   Mouth/Throat: Oropharynx is clear and moist. No oropharyngeal exudate.   Left TM with erythema and bulging. + mild canal erythema    Right TM is normal.    Eyes: Conjunctivae are normal. No scleral icterus.   Neck: Neck supple.   Cardiovascular: Normal rate and regular rhythm.   Pulmonary/Chest: Effort normal.   Abdominal: Soft. There is no tenderness.   Musculoskeletal: She exhibits edema. She exhibits " no tenderness or deformity.   Lymphadenopathy:     She has no cervical adenopathy.   Neurological: She is alert.   Skin: Skin is warm and dry. No rash noted. She is not diaphoretic.   Psychiatric: She has a normal mood and affect.      Procedures   INVESTIGATIONS:  Results for orders placed or performed during the hospital encounter of 11/13/18   XR Knee Right 3 Views    Narrative    PROCEDURE:  XR KNEE RT 3 VW    HISTORY: she fell a week ago onto her R knee. Pain prepatellar and  over patella.; Acute pain of right knee.    COMPARISON:  4/20/2017.    TECHNIQUE:  3 views right knee.    FINDINGS:  No fracture or dislocation is identified. The joint spaces  are grossly preserved. No foreign body is seen.       Impression    IMPRESSION: No acute fracture.      JOSE TEE MD       ASSESSMENT AND PLAN:  Problem List Items Addressed This Visit        Nervous and Auditory    Peripheral polyneuropathy    Suppurative otitis media of left ear - Primary    Relevant Medications    amoxicillin (AMOXIL) 875 MG tablet       Digestive    Vitamin B12 deficiency    Relevant Medications    vitamin B-12 (CYANOCOBALAMIN) injection 1,000 mcg       Musculoskeletal and Integumentary    Lymphedema of both lower extremities       Behavioral    Generalized anxiety disorder       Other    Elevated random blood glucose level    Relevant Orders    CBC and Differential    Comprehensive metabolic panel    Hemoglobin A1c      Other Visit Diagnoses     Mixed hyperlipidemia        Relevant Orders    Lipid Panel        reviewed diet, exercise and weight control, recommended sodium restriction  -- Expected clinical course discussed    -- Medications and their side effects discussed    The 10-year ASCVD risk score (Elmoreendy VANCE Jr., et al., 2013) is: 0.6%    Values used to calculate the score:      Age: 43 years      Sex: Female      Is Non- : No      Diabetic: No      Tobacco smoker: No      Systolic Blood Pressure: 120  mmHg      Is BP treated: No      HDL Cholesterol: 45 mg/dL      Total Cholesterol: 168 mg/dL    Patient Instructions   1. Acute suppurative otitis media of left ear without spontaneous rupture of tympanic membrane, recurrence not specified  START:   - amoxicillin (AMOXIL) 875 MG tablet; Take 1 tablet (875 mg) by mouth 2 times daily for 7 days  Dispense: 14 tablet; Refill: 0    2. Vitamin B12 deficiency  - vitamin B-12 (CYANOCOBALAMIN) injection 1,000 mcg; Inject 1 mL (1,000 mcg) into the muscle once    3. Elevated random blood glucose level  - CBC and Differential; Future  - Comprehensive metabolic panel; Future  - Hemoglobin A1c; Future    4. Mixed hyperlipidemia  - Lipid Panel; Future    5. Peripheral polyneuropathy  6. Lymphedema of both lower extremities  7. Generalized anxiety disorder    - Continue Gabapentin for foot / nerve pain.       Return as needed for follow-up with Dr. Curiel.    Clinic : [phone]  Appointment line: [phone]    Lucio Curiel MD  Internal Medicine  [facility]     Portions of this note were dictated using speech recognition software. The note has been proofread but errors in the text may have been overlooked. Please contact me if there are any concerns regarding the accuracy of the dictation.

## 2019-01-04 NOTE — NURSING NOTE
"Patient presents in the clinic for her annual check up, her concerns of pain in her left ear that began several days ago.She also has concerns of swelling in bilateral ankles that began two days ago.  Miriam Concepcion LPN 1/4/2019 1:25 PM  Chief Complaint   Patient presents with     Physical       Initial /70 (BP Location: Right arm, Patient Position: Sitting, Cuff Size: Adult Large)   Pulse 88   Temp 99.3  F (37.4  C) (Tympanic)   Resp 20   Wt (!) 161.9 kg (357 lb)   BMI 55.91 kg/m   Estimated body mass index is 55.91 kg/m  as calculated from the following:    Height as of 11/13/18: 1.702 m (5' 7\").    Weight as of this encounter: 161.9 kg (357 lb).  Medication Reconciliation: complete    Justyna Concepcion LPN    "

## 2019-01-04 NOTE — LETTER
January 11, 2019    Rosi Lamb  [address]  [place] [address]      Dear Rosi Lamb,    The result of your recent tests are included below:    Your labs look very good.  Continue current medications.    Hemoglobin A1c is normal.  No diabetes.    Results for orders placed or performed in visit on 01/04/19   Lipid Panel   Result Value Ref Range    Cholesterol 176 <200 mg/dL    Triglycerides 151 (H) <150 mg/dL    HDL Cholesterol 47 23 - 92 mg/dL    LDL Cholesterol Calculated 99 <100 mg/dL    Non HDL Cholesterol 129 <130 mg/dL   Hemoglobin A1c   Result Value Ref Range    Hemoglobin A1C 5.0 4.0 - 6.0 %   Comprehensive metabolic panel   Result Value Ref Range    Sodium 140 134 - 144 mmol/L    Potassium 4.6 3.5 - 5.1 mmol/L    Chloride 105 98 - 107 mmol/L    Carbon Dioxide 28 21 - 31 mmol/L    Anion Gap 7 3 - 14 mmol/L    Glucose 94 70 - 105 mg/dL    Urea Nitrogen 16 7 - 25 mg/dL    Creatinine 0.98 0.60 - 1.20 mg/dL    GFR Estimate 63 >60 mL/min/[1.73_m2]    GFR Estimate If Black 76 >60 mL/min/[1.73_m2]    Calcium 9.4 8.6 - 10.3 mg/dL    Bilirubin Total 0.7 0.3 - 1.0 mg/dL    Albumin 4.4 3.5 - 5.7 g/dL    Protein Total 7.7 6.4 - 8.9 g/dL    Alkaline Phosphatase 59 34 - 104 U/L    ALT 15 7 - 52 U/L    AST 15 13 - 39 U/L   CBC and Differential   Result Value Ref Range    WBC 9.6 4.0 - 11.0 10e9/L    RBC Count 4.81 3.8 - 5.2 10e12/L    Hemoglobin 13.9 11.7 - 15.7 g/dL    Hematocrit 43.8 35.0 - 47.0 %    MCV 91 78 - 100 fl    MCH 28.9 26.5 - 33.0 pg    MCHC 31.7 31.5 - 36.5 g/dL    RDW 12.8 10.0 - 15.0 %    Platelet Count 231 150 - 450 10e9/L    Diff Method Automated Method     % Neutrophils 65.8 %    % Lymphocytes 23.8 %    % Monocytes 8.8 %    % Eosinophils 1.1 %    % Basophils 0.3 %    % Immature Granulocytes 0.2 %    Absolute Neutrophil 6.3 1.6 - 8.3 10e9/L    Absolute Lymphocytes 2.3 0.8 - 5.3 10e9/L    Absolute Monocytes 0.8 0.0 - 1.3 10e9/L    Absolute Eosinophils 0.1 0.0 - 0.7 10e9/L     Absolute Basophils 0.0 0.0 - 0.2 10e9/L    Abs Immature Granulocytes 0.0 0 - 0.4 10e9/L       If you have any further questions or problems, please contact my office at [phone] and schedule an appointment.    Clinic : [phone]  Appointment line: [phone]     Thank you,    Lucio Curiel MD    Internal Medicine  [facility]     Reviewed and electronically signed by provider.

## 2019-01-04 NOTE — PATIENT INSTRUCTIONS
1. Acute suppurative otitis media of left ear without spontaneous rupture of tympanic membrane, recurrence not specified  START:   - amoxicillin (AMOXIL) 875 MG tablet; Take 1 tablet (875 mg) by mouth 2 times daily for 7 days  Dispense: 14 tablet; Refill: 0    2. Vitamin B12 deficiency  - vitamin B-12 (CYANOCOBALAMIN) injection 1,000 mcg; Inject 1 mL (1,000 mcg) into the muscle once    3. Elevated random blood glucose level  - CBC and Differential; Future  - Comprehensive metabolic panel; Future  - Hemoglobin A1c; Future    4. Mixed hyperlipidemia  - Lipid Panel; Future    5. Peripheral polyneuropathy  6. Lymphedema of both lower extremities  7. Generalized anxiety disorder    - Continue Gabapentin for foot / nerve pain.       Return as needed for follow-up with Dr. Curiel.    Clinic : [phone]  Appointment line: [phone]

## 2019-01-05 ASSESSMENT — ANXIETY QUESTIONNAIRES: GAD7 TOTAL SCORE: 0

## 2019-01-15 ENCOUNTER — OFFICE VISIT (OUTPATIENT)
Dept: FAMILY MEDICINE | Facility: OTHER | Age: 44
End: 2019-01-15
Attending: PHYSICIAN ASSISTANT
Payer: MEDICARE

## 2019-01-15 VITALS
HEART RATE: 86 BPM | TEMPERATURE: 98.2 F | WEIGHT: 293 LBS | BODY MASS INDEX: 56.13 KG/M2 | SYSTOLIC BLOOD PRESSURE: 118 MMHG | DIASTOLIC BLOOD PRESSURE: 82 MMHG | OXYGEN SATURATION: 100 %

## 2019-01-15 DIAGNOSIS — L03.116 CELLULITIS OF LEFT LOWER EXTREMITY: ICD-10-CM

## 2019-01-15 DIAGNOSIS — B37.2 CANDIDAL INTERTRIGO: Primary | ICD-10-CM

## 2019-01-15 PROCEDURE — G0463 HOSPITAL OUTPT CLINIC VISIT: HCPCS

## 2019-01-15 PROCEDURE — 99214 OFFICE O/P EST MOD 30 MIN: CPT | Performed by: PHYSICIAN ASSISTANT

## 2019-01-15 RX ORDER — CEPHALEXIN 500 MG/1
1000 CAPSULE ORAL 2 TIMES DAILY
Qty: 28 CAPSULE | Refills: 0 | Status: SHIPPED | OUTPATIENT
Start: 2019-01-15 | End: 2019-01-15

## 2019-01-15 RX ORDER — CEPHALEXIN 500 MG/1
1000 CAPSULE ORAL 2 TIMES DAILY
Qty: 28 CAPSULE | Refills: 0 | Status: SHIPPED | OUTPATIENT
Start: 2019-01-15 | End: 2019-04-03

## 2019-01-15 ASSESSMENT — PAIN SCALES - GENERAL: PAINLEVEL: SEVERE PAIN (7)

## 2019-01-16 ENCOUNTER — TELEPHONE (OUTPATIENT)
Dept: FAMILY MEDICINE | Facility: OTHER | Age: 44
End: 2019-01-16

## 2019-01-16 NOTE — PROGRESS NOTES
HPI:    Rosi Lamb is a 44 year old female who presents to clinic today for evaluation of a red area in a skin fold on her left thigh. Onset 3 days ago, course is worsening.     Associated symptoms: redness, odor. 7/10 from pain, warmth    Past Medical History:   Diagnosis Date     Edema     No Comments Provided     Encounter for removal and reinsertion of intrauterine contraceptive device     05,IUD placement, Removed     Excessive and frequent menstruation with irregular cycle     2017     Major depressive disorder, single episode     No Comments Provided     Personal history of other medical treatment (CODE)     G3, P2-0-1-2 with history of spontaneous      Personal history of other medical treatment (CODE)     No Comments Provided     Uncomplicated asthma     No Comments Provided         Current Outpatient Medications   Medication Sig Dispense Refill     albuterol (VENTOLIN HFA) 108 (90 BASE) MCG/ACT Inhaler Inhale 1-2 puffs into the lungs every 4 hours as needed       ARIPiprazole (ABILIFY) 5 MG tablet Take 5 mg by mouth daily       B Complex Vitamins (VITAMIN-B COMPLEX) TABS Take 1 tablet by mouth daily for low b12. Start 2017       cholecalciferol (VITAMIN D3) 5000 units CAPS capsule TAKE 1 CAPSULE BY MOUTH ONCE DAILY. FOR VITAMIN D DEFICIENCY - DOSEINCREASE 2017 100 capsule 2     clotrimazole (LOTRIMIN) 1 % cream        cyanocobalamin (VITAMIN B12) 1000 MCG/ML injection Inject 1 ML intramuscularly every 2 weeks. 4 mL 3     cyclobenzaprine (FLEXERIL) 10 MG tablet Take 0.5-1 tablets (5-10 mg) by mouth 3 times daily as needed for muscle spasms 30 tablet 1     Elastic Bandages & Supports (MEDICAL COMPRESSION STOCKINGS) MISC MISCELLANEOUS MEDICAL SUPPLY (GRADUATED COMPRESSION STOCKINGS). For personal use. Length: thigh Strength: 16-20 mmHg.  Please measure patient in Pharmacy.       gabapentin (NEURONTIN) 300 MG capsule TAKE 1 CAPSULE BY MOUTH DAILY FOR 3 DAYS, THEN INCREASE  "TO TAKE 1 CAPSULE TWICE DAILY FOR 3 DAYS, THEN 1 CAPSULE 3 TIMES DAILY.  1     hydrOXYzine (ATARAX) 50 MG tablet TAKE 2 TABLETS (100MG) BY MOUTH NIGHTLY AT BEDTIME AND 1 TABLET ONCE DAILY AS NEEDED       ibuprofen (ADVIL/MOTRIN) 200 MG tablet Take 600 mg by mouth every 6 hours as needed for pain       lamoTRIgine (LAMICTAL) 200 MG tablet TAKE 1 TABLET BY MOUTH NIGHTLY AT BEDTIME  2     lamoTRIgine (LAMICTAL) 25 MG tablet TAKE 2 TABLETS BY MOUTH DAILY  2     Misc. Devices (ROLLER WALKER) MISC Rolling Walker for home use.  2 wheels       montelukast (SINGULAIR) 10 MG tablet TAKE 1 TABLET BY MOUTH AT BEDTIME. 90 tablet 0     order for DME Equipment being ordered: right knee brace 1 each 0     order for DME Equipment being ordered: Custom Compression Garments for Bilateral Lower Extremities 2 Application 11     oxybutynin (DITROPAN-XL) 5 MG 24 hr tablet Take 1 tablet (5 mg) by mouth daily (lot [id number]) 30 tablet 11     permethrin (ELIMITE) 5 % cream Apply cream from neck down; leave on for 8-14 hours then wash off with water; reapply in 1 week if rash is not improved or is spreading. 60 g 1     SAFETY-ELBERT TB SYRINGE 25G X 5/8\" 1 ML MISC SAFETY GLIDE-FOR B-12 SHOTS 4 each 3     syringe/needle, sisp, 25G X 5/8\" 1 ML MISC Safety glide - for B12 Shots       triamcinolone (KENALOG) 0.1 % cream Apply sparingly to affected area three times daily as needed 80 g 2     vilazodone (VIIBRYD) 40 MG TABS tablet Take 40 mg by mouth daily with food         Allergies   Allergen Reactions     Clonazepam Other (See Comments)     Causes Violence and aggresssion     Hydrocodone-Acetaminophen      Other reaction(s): Seizures  Can take Percocet without difficulty.     Lorazepam Other (See Comments)     Causes Violence and aggresssion     Sertraline Other (See Comments)     Caused suicidally      Bupropion Other (See Comments)     Caused Major Depression     Dust Mite Extract      Other reaction(s): Asthma symptoms     Lithium Unknown     " Pollen Extract      Other reaction(s): Asthma symptoms     Risperidone Unknown     Sulfa Drugs Unknown     Trichophyton      Other reaction(s): Asthma symptoms     Valproic Acid Unknown       ROS:  General: feels well, no fever  Skin: area of erythema in skin fold per HPI    EXAM:  General appearance: well appearing female, in no acute distress  Dermatological: skin fold on left thigh has a 16 cm horizontal area of marked erythema with 6 cm of erythema on each side of the skin fold. Patient has been apply nystatin powder which is noted on exam. Skin is warm. No drainage noted.   Psychological: normal affect, alert and pleasant      ASSESSMENT AND PLAN:    1. Candidal intertrigo    2. Cellulitis of left lower extremity      Justin infection with secondary cellulitis  Start cephalexin 500 mg oral tablet, take 2 tablets twice daily x 7 days  Wash skin area with mild soapy water, dry completely  Apply topical ketoconazole lotion apply twice daily x 14 days. Ketoconazole gel was initially ordered but this was not covered by insurance. Verbal order given to switch this to the lotion.   Follow up with PCP for a recheck in 3 days   Patient received verbal and written instruction including review of warning signs    Catherine Morales PA-C on 1/16/2019 at 5:41 PM

## 2019-01-16 NOTE — TELEPHONE ENCOUNTER
Spoke with pharmacy. Ketoconazole cream was covered. Gave a verbal order for pharmacy to switch to this. DEVAN Larson

## 2019-01-16 NOTE — TELEPHONE ENCOUNTER
Xolegel not covered by insurance. Per Denise you may use ketoconazole Andria Stanley LPN .............1/16/2019  11:09 AM

## 2019-01-16 NOTE — NURSING NOTE
Chief Complaint   Patient presents with     Infection     bilateral upper outer thighs     Medication Reconciliation: complete     Patient is here today for sores on both her upper outer thighs. Left worse than right. She has had these sores for about 4 days and keeps getting worse. The area on the left is red, warm, some swelling, and has an odor.    Rubia Pharmer, CMA

## 2019-01-16 NOTE — PATIENT INSTRUCTIONS
Justin infection with secondary cellulitis  Start cephalexin 500 mg oral tablet, take 2 tablets twice daily x 7 days  Wash skin area with mild soapy water, dry completely  Apply topical ketoconazole lotion apply twice daily x 14 days  Follow up with PCP for a recheck in 3 days   Seek immediate care for    Pain or Red areas that spread    Swelling or pain that gets worse    Fluid leaking from the skin (pus)    Fever higher of 100.4  F (38.0  C) or higher after 2 days on antibiotics      Patient Education     Candida Skin Infection (Adult)  Candida is type of yeast. It grows naturally on the skin and in the mouth. If it grows out of control, it can cause an infection. Candida can cause infections in the genital area, skin folds, in the mouth, and under the breasts. Anyone can get this infection. It is more common in a person with a weak immune system, such as from diabetes, HIV, or cancer. It s also more common in someone who has been on antibiotic therapy. And it s more common people who are overweight or who have incontinence. Wearing tight-fitting clothing and taking part in activities with lots of skin-to-skin contact can also put you at risk.  Candida causes the skin to become bright red and inflamed. The border of the infected part of the skin is often raised. The infection causes pain and itching. Sometimes the skin peels and bleeds. In the mouth, candida is called thrush, and may cause white thickened areas.  A Candida rash is most often treated with an antifungal cream or ointment. The rash will clear a few days after starting the medicine. Infections that don t go away may need a prescription medicine. In rare cases, a bacterial infection can also occur.  Home care  Your healthcare provider will recommend an antifungal cream or ointment for the rash. He or she may also prescribe a medicine for the itch. Follow all instructions for using these medicines. Don t use cornstarch powder. Cornstarch can cause the  Candida infection to get worse.  General care:    Keep your skin clean by washing the area twice a day.    Use the cream as directed until your rash is gone. Once the skin has healed, keep it dry to prevent another infection.     If you are overweight, talk with your healthcare provider about a plan to lose excess weight.    Avoid clothes that fit tightly.  Follow-up care  Follow up with your healthcare provider, or as advised. Your rash will clear in 7 to 14 days. Call your healthcare provider if the rash is not gone after 14 days.  When to seek medical advice  Call your healthcare provider right away if any of these occur:    Pain or redness that gets worse or spreads    Fluid coming from the skin    Yellow crusts on the skin    Fever of 100.4 F (38 C) or higher, or as directed by your healthcare provider  Date Last Reviewed: 9/1/2016 2000-2018 [employer]. [address] [place] [address]. All rights reserved. This information is not intended as a substitute for professional medical care. Always follow your healthcare professional's instructions.           Patient Education     Cellulitis  Cellulitis is an infection of the deep layers of skin. A break in the skin, such as a cut or scratch, can let bacteria under the skin. If the bacteria get to deep layers of the skin, it can be serious. If not treated, cellulitis can get into the bloodstream and lymph nodes. The infection can then spread throughout the body. This causes serious illness.  Cellulitis causes the affected skin to become red, swollen, warm, and sore. The reddened areas have a visible border. An open sore may leak fluid (pus). You may have a fever, chills, and pain.  Cellulitis is treated with antibiotics taken for 7 to 10 days. An open sore may be cleaned and covered with cool wet gauze. Symptoms should get better 1 to 2 days after treatment is started. Make sure to take all the antibiotics for the full number of days until  they are gone. Keep taking the medicine even if your symptoms go away.  Home care  Follow these tips:    Limit the use of the part of your body with cellulitis.     If the infection is on your leg, keep your leg raised while sitting. This will help to reduce swelling.    Take all of the antibiotic medicine exactly as directed until it is gone. Do not miss any doses, especially during the first 7 days. Don t stop taking the medicine when your symptoms get better.    Keep the affected area clean and dry.    Wash your hands with soap and warm water before and after touching your skin. Anyone else who touches your skin should also wash his or her hands. Don't share towels.  Follow-up care  Follow up with your healthcare provider, or as advised. If your infection does not go away on the first antibiotic, your healthcare provider will prescribe a different one.  When to seek medical advice  Call your healthcare provider right away if any of these occur:    Red areas that spread    Swelling or pain that gets worse    Fluid leaking from the skin (pus)    Fever higher of 100.4  F (38.0  C) or higher after 2 days on antibiotics  Date Last Reviewed: 9/1/2016 2000-2018 [employer]. [address] [place] [address], [place] [address]. All rights reserved. This information is not intended as a substitute for professional medical care. Always follow your healthcare professional's instructions.

## 2019-01-17 ENCOUNTER — TELEPHONE (OUTPATIENT)
Dept: INTERNAL MEDICINE | Facility: OTHER | Age: 44
End: 2019-01-17

## 2019-01-17 NOTE — TELEPHONE ENCOUNTER
This is ketoconazole gel.  Any OTC ketoconazole should be fine for her candidal intertrigo rash.    Okay to substitute OTC version cream or lotion of ketoconazole.    Lucio Curiel MD

## 2019-01-17 NOTE — TELEPHONE ENCOUNTER
RX FOR  XOLEGEL    IS NOT A COVERED RX FOR MEDICAE PART D  AND IT IS NOT OFFERED AS A SUPPLEMENTAL BENIFIT EITHER.   I FAXED THE LETTER TO THE NURSES [id number] ON 01/17/2019.

## 2019-01-17 NOTE — TELEPHONE ENCOUNTER
No answer at [facility], on hold for 5 minutes.      Deirdre Bhatti LPN 1/17/2019 5:04 PM

## 2019-01-17 NOTE — TELEPHONE ENCOUNTER
Patient received this prescription in [facility] yesterday.      Deirdre Bhatti LPN 1/17/2019 2:45 PM

## 2019-01-18 NOTE — TELEPHONE ENCOUNTER
Pharmacy was contacted 1/16/19 and they changed the order to ketoconazole cream. This should have been taken care of. Thank you, DEVAN Larson

## 2019-02-26 ENCOUNTER — TELEPHONE (OUTPATIENT)
Dept: INTERNAL MEDICINE | Facility: OTHER | Age: 44
End: 2019-02-26
Payer: MEDICARE

## 2019-02-26 DIAGNOSIS — R35.0 URINARY FREQUENCY: ICD-10-CM

## 2019-02-26 DIAGNOSIS — F31.0 BIPOLAR DISORDER, MOST RECENT EPISODE HYPOMANIC (H): Primary | ICD-10-CM

## 2019-02-26 PROCEDURE — G0179 MD RECERTIFICATION HHA PT: HCPCS | Performed by: INTERNAL MEDICINE

## 2019-02-26 NOTE — TELEPHONE ENCOUNTER
Lucio Curiel MD reviewed and completed the following home care or hospice form(s) for Home Care on 2-24-19.   This covers the certification period effective 2-24-19 to 4-24-19.  Deirdre Bhatti LPN on 2/26/2019 at 11:12 AM

## 2019-02-27 RX ORDER — OXYBUTYNIN CHLORIDE 5 MG/1
TABLET, EXTENDED RELEASE ORAL
Qty: 90 TABLET | Refills: 2 | Status: SHIPPED | OUTPATIENT
Start: 2019-02-27 | End: 2020-03-11

## 2019-02-27 NOTE — TELEPHONE ENCOUNTER
Prescription approved per [facility].  LOV: 1/4/19  Shirley Wren RN on 2/27/2019 at 1:13 PM

## 2019-03-12 ENCOUNTER — OFFICE VISIT (OUTPATIENT)
Dept: FAMILY MEDICINE | Facility: OTHER | Age: 44
End: 2019-03-12
Attending: PHYSICIAN ASSISTANT
Payer: MEDICARE

## 2019-03-12 VITALS
SYSTOLIC BLOOD PRESSURE: 128 MMHG | DIASTOLIC BLOOD PRESSURE: 82 MMHG | WEIGHT: 293 LBS | RESPIRATION RATE: 20 BRPM | HEIGHT: 67 IN | BODY MASS INDEX: 45.99 KG/M2 | TEMPERATURE: 98.8 F | HEART RATE: 100 BPM

## 2019-03-12 DIAGNOSIS — B34.9 VIRAL ILLNESS: Primary | ICD-10-CM

## 2019-03-12 PROCEDURE — G0463 HOSPITAL OUTPT CLINIC VISIT: HCPCS

## 2019-03-12 PROCEDURE — 99213 OFFICE O/P EST LOW 20 MIN: CPT | Performed by: NURSE PRACTITIONER

## 2019-03-12 ASSESSMENT — MIFFLIN-ST. JEOR: SCORE: 2295.64

## 2019-03-12 ASSESSMENT — ENCOUNTER SYMPTOMS
GASTROINTESTINAL NEGATIVE: 1
SORE THROAT: 0
FATIGUE: 1
MUSCULOSKELETAL NEGATIVE: 1
HEADACHES: 0
FEVER: 1
COUGH: 0
RHINORRHEA: 1

## 2019-03-12 ASSESSMENT — PAIN SCALES - GENERAL: PAINLEVEL: MILD PAIN (3)

## 2019-03-12 NOTE — NURSING NOTE
Patient in clinic with nausea, feverish, and nasal congestion x 1.5 weeks, worsening today.  Aspen Brewer LPN....................  3/12/2019   1:42 PM    Chief Complaint   Patient presents with     Nausea     Fever     Nasal Congestion       Medication Reconciliation: complete    Aspen Brewer LPN

## 2019-03-12 NOTE — PROGRESS NOTES
SUBJECTIVE:   Rosi Lamb is a 44 year old female who presents to clinic today for the following health issues:    HPI   Intermittent nausea, light headed, hot and cold, fatigue and nasal congestion for a few weeks. Will be fine one day then ill feeling the next day. Doesn't take anything for symptoms. Not sure if anything makes it better or worse.   Eating and drinking fine. No pain with urination, no frequency. BMs daily and normal. No diarrhea, no vomiting. No new medications lately.       Patient Active Problem List    Diagnosis Date Noted     Suppurative otitis media of left ear 01/04/2019     Priority: Medium     Abnormal Pap smear of cervix 04/11/2018     Priority: Medium     Overview:   had scraping of cervix       Allergic rhinitis due to pollen 02/08/2018     Priority: Medium     Esophageal reflux 02/08/2018     Priority: Medium     History of substance abuse 02/08/2018     Priority: Medium     Bilateral chronic knee pain 04/20/2017     Priority: Medium     Crepitus of joint of right knee 04/20/2017     Priority: Medium     Menorrhagia with irregular cycle 02/16/2017     Priority: Medium     Preop examination 02/16/2017     Priority: Medium     Bilateral foot pain 12/18/2016     Priority: Medium     Elevated random blood glucose level 12/18/2016     Priority: Medium     Peripheral polyneuropathy 12/18/2016     Priority: Medium     Vitamin B12 deficiency 12/18/2016     Priority: Medium     Vitamin D deficiency 12/18/2016     Priority: Medium     Lymphedema of both lower extremities 11/22/2016     Priority: Medium     Stasis dermatitis of both legs 08/17/2016     Priority: Medium     Overview:   Updated per 10/1/17 IMO import       Alcohol use disorder, moderate, in sustained remission, dependence (H) 11/28/2015     Priority: Medium     Generalized anxiety disorder 11/28/2015     Priority: Medium     Cannabis use disorder, moderate, in sustained remission, dependence (H) 11/28/2015     Priority:  Medium     Bipolar I disorder, most recent episode depressed, moderate (H) 2015     Priority: Medium     Body mass index 45.0-49.9, adult (H) 2015     Priority: Medium     Moderate persistent asthma 2013     Priority: Medium     Overview:   AAP completed on 13  Triggers: pollen, fumes, exercise, URI, warm weather. Peak flow today is 250. Symptomatic: moderate  Overview:   Overview:   AAP completed on 13  Triggers: pollen, fumes, exercise, URI, warm weather. Peak flow today is 250. Symptomatic: moderate  Overview:   AAP completed on 13  Triggers: pollen, fumes, exercise, URI, warm weather. Peak flow today is 250. Symptomatic: moderate       Urinary incontinence 03/15/2013     Priority: Medium     Closed dislocation of tarsal joint 2011     Priority: Medium     Borderline personality disorder (H) 2003     Priority: Medium     Overview:   [facility].  Overview:   Overview:   [facility].       Other disorder of menstruation and other abnormal bleeding from female genital tract 2001     Priority: Medium     Overview:   DUB, dysmenorrhea  Overview:   Overview:   DUB, dysmenorrhea       Past Medical History:   Diagnosis Date     Edema     No Comments Provided     Encounter for removal and reinsertion of intrauterine contraceptive device     05,IUD placement, Removed     Excessive and frequent menstruation with irregular cycle     2017     Major depressive disorder, single episode     No Comments Provided     Personal history of other medical treatment (CODE)     G3, P2-0-1-2 with history of spontaneous      Personal history of other medical treatment (CODE)     No Comments Provided     Uncomplicated asthma     No Comments Provided      Past Surgical History:   Procedure Laterality Date     ANKLE SURGERY      fracture, repair with screws     CONIZATION LEEP      ,Underwent a loop     LAPAROSCOPIC TUBAL LIGATION      ,tubal  ligation     Family History   Problem Relation Age of Onset     Other - See Comments Mother         No Known Problems     Asthma Father         Asthma,+ Leg edema, knee, hip replacement. + Lymphedema     Other - See Comments Paternal Grandmother         Lymphedema     Osteoporosis Brother         Osteoporosis     Other - See Comments Brother         No Known Problems     Social History     Tobacco Use     Smoking status: Former Smoker     Packs/day: 1.00     Years: 3.00     Pack years: 3.00     Types: Cigarettes     Last attempt to quit: 2003     Years since quittin.2     Smokeless tobacco: Never Used   Substance Use Topics     Alcohol use: No     Alcohol/week: 0.0 oz     Comment: Alcoholic Drinks/day: History of abuse - sober as of 2010.     Social History     Social History Narrative    No longer in adult foster care lived with Charley Godwin.    .   2 sons - age 12 and 7 - as of 2016.   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     Current Outpatient Medications   Medication Sig Dispense Refill     albuterol (VENTOLIN HFA) 108 (90 BASE) MCG/ACT Inhaler Inhale 1-2 puffs into the lungs every 4 hours as needed       ARIPiprazole (ABILIFY) 5 MG tablet Take 5 mg by mouth daily       B Complex Vitamins (VITAMIN-B COMPLEX) TABS Take 1 tablet by mouth daily for low b12. Start 2017       cholecalciferol (VITAMIN D3) 5000 units CAPS capsule TAKE 1 CAPSULE BY MOUTH ONCE DAILY. FOR VITAMIN D DEFICIENCY - DOSEINCREASE 2017 100 capsule 2     cyanocobalamin (VITAMIN B12) 1000 MCG/ML injection Inject 1 ML intramuscularly every 2 weeks. 4 mL 3     gabapentin (NEURONTIN) 300 MG capsule TAKE 1 CAPSULE BY MOUTH DAILY FOR 3 DAYS, THEN INCREASE TO TAKE 1 CAPSULE TWICE DAILY FOR 3 DAYS, THEN 1 CAPSULE 3 TIMES DAILY.  1     hydrOXYzine (ATARAX) 50 MG tablet TAKE 2 TABLETS (100MG) BY MOUTH NIGHTLY AT BEDTIME AND 1 TABLET ONCE DAILY AS NEEDED       ibuprofen (ADVIL/MOTRIN) 200 MG  "tablet Take 600 mg by mouth every 6 hours as needed for pain       lamoTRIgine (LAMICTAL) 200 MG tablet TAKE 1 TABLET BY MOUTH NIGHTLY AT BEDTIME  2     lamoTRIgine (LAMICTAL) 25 MG tablet TAKE 2 TABLETS BY MOUTH DAILY  2     Mis. Devices (ROLLER WALKER) MISC Rolling Walker for home use.  2 wheels       montelukast (SINGULAIR) 10 MG tablet TAKE 1 TABLET BY MOUTH AT BEDTIME. 90 tablet 0     order for DME Equipment being ordered: right knee brace 1 each 0     order for DME Equipment being ordered: Custom Compression Garments for Bilateral Lower Extremities 2 Application 11     oxybutynin ER (DITROPAN-XL) 5 MG 24 hr tablet TAKE 1 TAB BY MOUTH ONCE DAILY 90 tablet 2     SAFETY-ELBERT TB SYRINGE 25G X 5/8\" 1 ML MISC SAFETY GLIDE-FOR B-12 SHOTS 4 each 3     syringe/needle, sisp, 25G X 5/8\" 1 ML MISC Safety glide - for B12 Shots       triamcinolone (KENALOG) 0.1 % cream Apply sparingly to affected area three times daily as needed 80 g 2     vilazodone (VIIBRYD) 40 MG TABS tablet Take 40 mg by mouth daily with food       cephALEXin (KEFLEX) 500 MG capsule Take 2 capsules (1,000 mg) by mouth 2 times daily (Patient not taking: Reported on 3/12/2019) 28 capsule 0     clotrimazole (LOTRIMIN) 1 % cream        cyclobenzaprine (FLEXERIL) 10 MG tablet Take 0.5-1 tablets (5-10 mg) by mouth 3 times daily as needed for muscle spasms (Patient not taking: Reported on 3/12/2019) 30 tablet 1     Elastic Bandages & Supports (MEDICAL COMPRESSION STOCKINGS) [facility] (GRADUATED COMPRESSION STOCKINGS). For personal use. Length: thigh Strength: 16-20 mmHg.  Please measure patient in Pharmacy.       ketoconazole (XOLEGEL) 2 % external gel Apply once daily up to 14 days (Patient not taking: Reported on 3/12/2019) 45 g 0     permethrin (ELIMITE) 5 % cream Apply cream from neck down; leave on for 8-14 hours then wash off with water; reapply in 1 week if rash is not improved or is spreading. (Patient not taking: Reported on " "3/12/2019) 60 g 1     Allergies   Allergen Reactions     Clonazepam Other (See Comments)     Causes Violence and aggresssion     Hydrocodone-Acetaminophen      Other reaction(s): Seizures  Can take Percocet without difficulty.     Lorazepam Other (See Comments)     Causes Violence and aggresssion     Sertraline Other (See Comments)     Caused suicidally      Bupropion Other (See Comments)     Caused Major Depression     Dust Mite Extract      Other reaction(s): Asthma symptoms     Lithium Unknown     Pollen Extract      Other reaction(s): Asthma symptoms     Risperidone Unknown     Sulfa Drugs Unknown     Trichophyton      Other reaction(s): Asthma symptoms     Valproic Acid Unknown       Review of Systems   Constitutional: Positive for fatigue and fever.   HENT: Positive for congestion and rhinorrhea. Negative for ear pain and sore throat.    Respiratory: Negative for cough.    Gastrointestinal: Negative.    Genitourinary: Negative.    Musculoskeletal: Negative.    Neurological: Negative for headaches.        OBJECTIVE:     /82 (BP Location: Right arm, Patient Position: Sitting, Cuff Size: Adult Large)   Pulse 100   Temp 98.8  F (37.1  C) (Tympanic)   Resp 20   Ht 1.702 m (5' 7\")   Wt (!) 160.3 kg (353 lb 6.4 oz)   Breastfeeding? No   BMI 55.35 kg/m    Body mass index is 55.35 kg/m .  Physical Exam   Constitutional: She appears well-developed and well-nourished.   HENT:   Head: Normocephalic and atraumatic.   Right Ear: Tympanic membrane and external ear normal.   Left Ear: Tympanic membrane and external ear normal.   Nose: Nose normal.   Mouth/Throat: Uvula is midline, oropharynx is clear and moist and mucous membranes are normal. No oropharyngeal exudate.   Eyes: Conjunctivae and lids are normal. No scleral icterus.   Neck: Normal range of motion. Neck supple.   Cardiovascular: Normal rate, regular rhythm and normal heart sounds.   Pulmonary/Chest: Effort normal and breath sounds normal.   Abdominal: " Soft. Bowel sounds are normal. She exhibits no distension. There is no tenderness.   Musculoskeletal: Normal range of motion.   Lymphadenopathy:     She has no cervical adenopathy.   Neurological: She is alert.   Skin: Skin is warm and dry. No rash noted.   Nursing note and vitals reviewed.      Diagnostic Test Results:  No results found for this or any previous visit (from the past 24 hour(s)).    ASSESSMENT/PLAN:         ICD-10-CM    1. Viral illness B34.9      Patient is afebrile, her exam is benign.   She is currently not having any of the symptoms she was describing.  Advised she is probably having a viral illness.  Advised rest, fluids and Tylenol as needed.  Offered to schedule follow-up with primary care in 1 week, patient declined at this time.  States she will call for follow-up appointment if needed.  Given epic educational materials related to viral syndrome.      Jewell Garcia NP  [facility]

## 2019-03-12 NOTE — PATIENT INSTRUCTIONS
"Patient Education     Viral Syndrome (Adult)  A viral illness may cause a number of symptoms such as fever. Other symptoms depend on the part of the body that the virus affects. If it settles in your nose, throat, and lungs, it may cause cough, sore throat, congestion, runny nose, headache, earache and other ear symptoms, or shortness of breath. If it settles in your stomach and intestinal tract, it may cause nausea, vomiting, cramping, and diarrhea. Sometimes it causes generalized symptoms like \"aching all over,\" feeling tired, loss of energy, or loss of appetite.  A viral illness usually lasts anywhere from several days to several weeks, but sometimes it lasts longer. In some cases, a more serious infection can look like a viral syndrome in the first few days of the illness. You may need another exam and additional tests to know the difference. Watch for the warning signs listed below for when to seek medical advice.  Home care  Follow these guidelines for taking care of yourself at home:    If symptoms are severe, rest at home for the first 2 to 3 days.    Stay away from cigarette smoke - both your smoke and the smoke from others.    You may use over-the-counter acetaminophen or ibuprofen for fever, muscle aching, and headache, unless another medicine was prescribed for this. If you have chronic liver or kidney disease or ever had a stomach ulcer or gastrointestinal bleeding, talk with your healthcare provider before using these medicines. No one who is younger than 18 and ill with a fever should take aspirin. It may cause severe disease or death.    Your appetite may be poor, so a light diet is fine. Avoid dehydration by drinking 8 to 12, 8-ounce glasses of fluids each day. This may include water; orange juice; lemonade; apple, grape, and cranberry juice; clear fruit drinks; electrolyte replacement and sports drinks; and decaffeinated teas and coffee. If you have been diagnosed with a kidney disease, ask your " healthcare provider how much and what types of fluids you should drink to prevent dehydration. If you have kidney disease, drinking too much fluid can cause it build up in the your body and be dangerous to your health.    Over-the-counter remedies won't shorten the length of the illness but may be helpful for symptoms such as cough, sore throat, nasal and sinus congestion, or diarrhea. Don't use decongestants if you have high blood pressure.  Follow-up care  Follow up with your healthcare provider if you do not improve over the next week.  Call 911  Call 911 if any of the following occur:    Convulsion    Feeling weak, dizzy, or like you are going to faint    Chest pain, or more than mild shortness of breath   When to seek medical advice  Call your healthcare provider right away if any of these occur:    Cough with lots of colored sputum (mucus) or blood in your sputum    Chest pain, shortness of breath, wheezing, or trouble breathing    Severe headache; face, neck, or ear pain    Severe, constant pain in the lower right side of your belly (abdominal)    Continued vomiting (can t keep liquids down)    Frequent diarrhea (more than 5 times a day); blood (red or black color) or mucus in diarrhea    Feeling weak, dizzy, or like you are going to faint    Extreme thirst    Fever of 100.4 F (38 C) or higher, or as directed by your healthcare provider  Date Last Reviewed: 4/1/2018 2000-2018 [employer]. [address] [place] [id number]. All rights reserved. This information is not intended as a substitute for professional medical care. Always follow your healthcare professional's instructions.

## 2019-03-27 ENCOUNTER — TRANSFERRED RECORDS (OUTPATIENT)
Dept: HEALTH INFORMATION MANAGEMENT | Facility: OTHER | Age: 44
End: 2019-03-27

## 2019-03-28 ENCOUNTER — TELEPHONE (OUTPATIENT)
Dept: INTERNAL MEDICINE | Facility: OTHER | Age: 44
End: 2019-03-28

## 2019-03-28 NOTE — TELEPHONE ENCOUNTER
Confirmed Home Care orders were completed on 2-24-19.      Deirdre Bhatti LPN 3/28/2019 12:11 PM

## 2019-04-02 NOTE — PROGRESS NOTES
"Nursing Notes:   Deirdre Bhatti LPN  4/3/2019 11:15 AM  Signed  Patient presents to the clinic for medication management for foster care placement.    Chief Complaint   Patient presents with     Recheck Medication       Initial /80 (BP Location: Right arm, Patient Position: Sitting, Cuff Size: Adult Regular)   Pulse 72   Temp 97.1  F (36.2  C) (Tympanic)   Resp 16   Ht 1.702 m (5' 7\")   Wt (!) 164.8 kg (363 lb 4 oz)   BMI 56.89 kg/m    Estimated body mass index is 56.89 kg/m  as calculated from the following:    Height as of this encounter: 1.702 m (5' 7\").    Weight as of this encounter: 164.8 kg (363 lb 4 oz).  Medication Reconciliation: complete    Deirdre Bhatti LPN    Nursing note reviewed with patient.  Accuracy and completeness verified.   Ms. Lamb is a 44 year old female who:  Patient presents with:  Recheck Medication      ICD-10-CM    1. Generalized anxiety disorder F41.1    2. Bipolar I disorder, most recent episode depressed, moderate (H) F31.32    3. Borderline personality disorder (H) F60.3    4. Lymphedema of both lower extremities I89.0 acetaminophen (TYLENOL) 500 MG tablet   5. Vitamin B12 deficiency E53.8 cyanocobalamin injection 1,000 mcg   6. Vitamin D deficiency E55.9    7. Morbid obesity with BMI of 50.0-59.9, adult (H) E66.01 NUTRITION REFERRAL    Z68.43      HPI  Patient comes in for evaluation.  She just recently moved into a foster home and needs a physical exam completed.    She is [place] feeling like things workup much better there.  She feels like her anxiety and stress are improved.    + living in new housing. [facility] - [facility] by [facility]. Has her own bedroom. Lives with 3 other ladies.   -- just recently moved in, on Monday 4/1.     Patient has bipolar disorder, most recently depressed.  Also shows history of borderline personality disorder based on review of her chart.  Reports her mental health is been quite stable at this time.    She has " chronic bilateral lower extremity lymphedema.  She was doing Velcro wraps previously as well as some Ace wraps.  She thinks she can probably do some of these at home on her own using Ace wraps.  Instructions provided.  She declines need for physical therapy/lymphedema consultation at this time.    Vitamin B12 deficiency, due for B12 shot, orders placed for clinic administration today.    Vitamin D deficiency, energy levels have improved she is having less myalgias since starting vitamin D replacement.    Needs dosing on Tylenol --[facility] to [facility] to use 500 mg every 6 hours as needed for pain or fever.    Due to morbid obesity, nutrition referral sent.  Recommend low-carb diet.    Functional Capacity: > or about 4 METS.  Walking up multiple flights of stairs per day now.  Review of Systems   Constitutional: Positive for fatigue (+ noted when B12 is running out. ). Negative for chills and fever.   HENT: Negative for congestion, ear discharge and ear pain.    Eyes: Negative for visual disturbance.   Respiratory: Negative for chest tightness and shortness of breath.    Cardiovascular: Positive for leg swelling. Negative for chest pain.   Gastrointestinal: Negative for abdominal pain.   Genitourinary: Negative for hematuria.   Musculoskeletal: Positive for arthralgias (+ some feet pain), myalgias (+ improved with Vit D. ), neck pain and neck stiffness. Negative for back pain.   Skin: Negative for rash and wound.   Neurological: Negative for syncope.   Hematological: Negative for adenopathy. Does not bruise/bleed easily.   Psychiatric/Behavioral: Negative for confusion.        AGNIESZKA:   AGNIESZKA-7 SCORE 8/22/2018 1/4/2019 4/3/2019   Total Score 2 0 0     PHQ9:  PHQ-9 SCORE 8/22/2018 1/4/2019 4/3/2019   PHQ-9 Total Score 5 2 0       I have personally reviewed the past medical history, past surgical history, medications, allergies, family and social history as listed below.     Allergies   Allergen Reactions      Clonazepam Other (See Comments)     Causes Violence and aggresssion     Hydrocodone-Acetaminophen      Other reaction(s): Seizures  Can take Percocet without difficulty.     Lorazepam Other (See Comments)     Causes Violence and aggresssion     Sertraline Other (See Comments)     Caused suicidally      Bupropion Other (See Comments)     Caused Major Depression     Dust Mite Extract      Other reaction(s): Asthma symptoms     Lithium Unknown     Pollen Extract      Other reaction(s): Asthma symptoms     Risperidone Unknown     Sulfa Drugs Unknown     Trichophyton      Other reaction(s): Asthma symptoms     Valproic Acid Unknown       Current Outpatient Medications   Medication Sig Dispense Refill     acetaminophen (TYLENOL) 500 MG tablet Take 1 tablet (500 mg) by mouth every 6 hours as needed for fever or pain 100 tablet 5     albuterol (VENTOLIN HFA) 108 (90 BASE) MCG/ACT Inhaler Inhale 1-2 puffs into the lungs every 4 hours as needed       ARIPiprazole (ABILIFY) 5 MG tablet Take 5 mg by mouth daily       B Complex Vitamins (VITAMIN-B COMPLEX) TABS Take 1 tablet by mouth daily for low b12. Start 2/16/2017       cholecalciferol (VITAMIN D3) 5000 units CAPS capsule TAKE 1 CAPSULE BY MOUTH ONCE DAILY. FOR VITAMIN D DEFICIENCY - DOSEINCREASE 2/16/2017 100 capsule 2     cyanocobalamin (VITAMIN B12) 1000 MCG/ML injection Inject 1 ML intramuscularly every 2 weeks. 4 mL 3     gabapentin (NEURONTIN) 300 MG capsule TAKE 1 CAPSULE BY MOUTH DAILY FOR 3 DAYS, THEN INCREASE TO TAKE 1 CAPSULE TWICE DAILY FOR 3 DAYS, THEN 1 CAPSULE 3 TIMES DAILY.  1     hydrOXYzine (ATARAX) 50 MG tablet TAKE 2 TABLETS (100MG) BY MOUTH NIGHTLY AT BEDTIME AND 1 TABLET ONCE DAILY AS NEEDED       ibuprofen (ADVIL/MOTRIN) 200 MG tablet Take 600 mg by mouth every 6 hours as needed for pain       ketoconazole (XOLEGEL) 2 % external gel Apply once daily up to 14 days 45 g 0     lamoTRIgine (LAMICTAL) 200 MG tablet TAKE 1 TABLET BY MOUTH NIGHTLY AT BEDTIME   "2     lamoTRIgine (LAMICTAL) 25 MG tablet TAKE 2 TABLETS BY MOUTH DAILY  2     Misc. Devices (ROLLER WALKER) MISC Rolling Walker for home use.  2 wheels       montelukast (SINGULAIR) 10 MG tablet TAKE 1 TABLET BY MOUTH AT BEDTIME. 90 tablet 0     order for DME Equipment being ordered: right knee brace 1 each 0     oxybutynin ER (DITROPAN-XL) 5 MG 24 hr tablet TAKE 1 TAB BY MOUTH ONCE DAILY 90 tablet 2     permethrin (ELIMITE) 5 % cream Apply cream from neck down; leave on for 8-14 hours then wash off with water; reapply in 1 week if rash is not improved or is spreading. 60 g 1     SAFETY-ELBERT TB SYRINGE 25G X 5/8\" 1 ML MISC SAFETY GLIDE-FOR B-12 SHOTS 4 each 3     syringe/needle, sisp, 25G X 5/8\" 1 ML MISC Safety glide - for B12 Shots       triamcinolone (KENALOG) 0.1 % cream Apply sparingly to affected area three times daily as needed 80 g 2     vilazodone (VIIBRYD) 40 MG TABS tablet Take 40 mg by mouth daily with food          Patient Active Problem List    Diagnosis Date Noted     Abnormal Pap smear of cervix 04/11/2018     Priority: Medium     Overview:   had scraping of cervix       Allergic rhinitis due to pollen 02/08/2018     Priority: Medium     Esophageal reflux 02/08/2018     Priority: Medium     History of substance abuse 02/08/2018     Priority: Medium     Bilateral chronic knee pain 04/20/2017     Priority: Medium     Crepitus of joint of right knee 04/20/2017     Priority: Medium     Menorrhagia with irregular cycle 02/16/2017     Priority: Medium     Preop examination 02/16/2017     Priority: Medium     Bilateral foot pain 12/18/2016     Priority: Medium     Elevated random blood glucose level 12/18/2016     Priority: Medium     Peripheral polyneuropathy 12/18/2016     Priority: Medium     Vitamin B12 deficiency 12/18/2016     Priority: Medium     Vitamin D deficiency 12/18/2016     Priority: Medium     Lymphedema of both lower extremities 11/22/2016     Priority: Medium     Stasis dermatitis of both " legs 08/17/2016     Priority: Medium     Overview:   Updated per 10/1/17 IMO import       Alcohol use disorder, moderate, in sustained remission, dependence (H) 11/28/2015     Priority: Medium     Generalized anxiety disorder 11/28/2015     Priority: Medium     Cannabis use disorder, moderate, in sustained remission, dependence (H) 11/28/2015     Priority: Medium     Bipolar I disorder, most recent episode depressed, moderate (H) 11/28/2015     Priority: Medium     Morbid obesity with BMI of 50.0-59.9, adult (H) 03/23/2015     Priority: Medium     Moderate persistent asthma 08/19/2013     Priority: Medium     Overview:   AAP completed on 08/19/13  Triggers: pollen, fumes, exercise, URI, warm weather. Peak flow today is 250. Symptomatic: moderate  Overview:   Overview:   AAP completed on 08/19/13  Triggers: pollen, fumes, exercise, URI, warm weather. Peak flow today is 250. Symptomatic: moderate  Overview:   AAP completed on 08/19/13  Triggers: pollen, fumes, exercise, URI, warm weather. Peak flow today is 250. Symptomatic: moderate       Urinary incontinence 03/15/2013     Priority: Medium     Closed dislocation of tarsal joint 02/04/2011     Priority: Medium     Borderline personality disorder (H) 07/07/2003     Priority: Medium     Overview:   [facility].  Overview:   Overview:   [facility].       Other disorder of menstruation and other abnormal bleeding from female genital tract 07/05/2001     Priority: Medium     Overview:   DUB, dysmenorrhea  Overview:   Overview:   DUB, dysmenorrhea       Past Medical History:   Diagnosis Date     Edema     No Comments Provided     Encounter for removal and reinsertion of intrauterine contraceptive device     05/16/05,IUD placement, Removed     Excessive and frequent menstruation with irregular cycle     2/16/2017     Major depressive disorder, single episode     No Comments Provided     Personal history of other medical treatment (CODE)     G3, P2-0-1-2  with history of spontaneous      Personal history of other medical treatment (CODE)     No Comments Provided     Uncomplicated asthma     No Comments Provided     Past Surgical History:   Procedure Laterality Date     ANKLE SURGERY      fracture, repair with screws     CONIZATION LEEP      ,Underwent a loop     LAPAROSCOPIC TUBAL LIGATION      ,tubal ligation     Social History     Socioeconomic History     Marital status:      Spouse name: None     Number of children: None     Years of education: None     Highest education level: None   Occupational History     None   Social Needs     Financial resource strain: None     Food insecurity:     Worry: None     Inability: None     Transportation needs:     Medical: None     Non-medical: None   Tobacco Use     Smoking status: Former Smoker     Packs/day: 1.00     Years: 3.00     Pack years: 3.00     Types: Cigarettes     Last attempt to quit: 2003     Years since quittin.2     Smokeless tobacco: Never Used   Substance and Sexual Activity     Alcohol use: No     Alcohol/week: 0.0 oz     Drug use: No     Sexual activity: No   Lifestyle     Physical activity:     Days per week: None     Minutes per session: None     Stress: None   Relationships     Social connections:     Talks on phone: None     Gets together: None     Attends [employer] service: None     Active member of club or organization: None     Attends meetings of clubs or organizations: None     Relationship status: None     Intimate partner violence:     Fear of current or ex partner: None     Emotionally abused: None     Physically abused: None     Forced sexual activity: None   Other Topics Concern     Parent/sibling w/ CABG, MI or angioplasty before 65F 55M? Not Asked   Social History Narrative    No longer in adult foster care lived with Charley Godwin.    .   2 sons - age 12 and 7 - as of 2016.   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  "    Family History   Problem Relation Age of Onset     Other - See Comments Mother         No Known Problems     Asthma Father         Asthma,+ Leg edema, knee, hip replacement. + Lymphedema     Other - See Comments Paternal Grandmother         Lymphedema     Osteoporosis Brother         Osteoporosis     Other - See Comments Brother         No Known Problems       EXAM:   Vitals:    04/03/19 1058   BP: 122/80   BP Location: Right arm   Patient Position: Sitting   Cuff Size: Adult Regular   Pulse: 72   Resp: 16   Temp: 97.1  F (36.2  C)   TempSrc: Tympanic   Weight: (!) 164.8 kg (363 lb 4 oz)   Height: 1.702 m (5' 7\")       Current Pain Score: No Pain (0)     BP Readings from Last 3 Encounters:   04/03/19 122/80   03/12/19 128/82   01/15/19 118/82      Wt Readings from Last 3 Encounters:   04/03/19 (!) 164.8 kg (363 lb 4 oz)   03/12/19 (!) 160.3 kg (353 lb 6.4 oz)   01/15/19 (!) 162.6 kg (358 lb 6.4 oz)      Estimated body mass index is 56.89 kg/m  as calculated from the following:    Height as of this encounter: 1.702 m (5' 7\").    Weight as of this encounter: 164.8 kg (363 lb 4 oz).     Physical Exam     Procedures   INVESTIGATIONS:  --- see below.     ASSESSMENT AND PLAN:  Problem List Items Addressed This Visit        Digestive    Morbid obesity with BMI of 50.0-59.9, adult (H)    Relevant Orders    NUTRITION REFERRAL    Vitamin B12 deficiency    Relevant Medications    cyanocobalamin injection 1,000 mcg (Completed)    Vitamin D deficiency       Musculoskeletal and Integumentary    Lymphedema of both lower extremities    Relevant Medications    acetaminophen (TYLENOL) 500 MG tablet       Behavioral    Generalized anxiety disorder - Primary    Borderline personality disorder (H)    Bipolar I disorder, most recent episode depressed, moderate (H)        reviewed diet, exercise and weight control  -- Expected clinical course discussed    -- Medications and their side effects discussed    The 10-year ASCVD risk score " (Shar VANCE Jr., et al., 2013) is: 0.6%    Values used to calculate the score:      Age: 44 years      Sex: Female      Is Non- : No      Diabetic: No      Tobacco smoker: No      Systolic Blood Pressure: 122 mmHg      Is BP treated: No      HDL Cholesterol: 47 mg/dL      Total Cholesterol: 176 mg/dL    Patient Instructions     No limitations or restrictions.  Okay to exercise, weight lift, cardio and stretching.    B12 shots, see med list.    Tetanus shot is up-to-date.    No obvious communicable diseases.    Low-carb diet.  Limit caffeine to 3 servings or less per day.    Consider trying to figure 8 applied Ace wraps to the lower extremities daily.  On the morning, off in the evening.    Immunization History   Administered Date(s) Administered     FLU 6-35 months 10/04/2016     Flu, Unspecified 10/15/2009, 10/04/2012, 10/04/2012, 11/25/2013, 11/25/2013, 10/04/2016, 10/04/2016     Hep B, Peds or Adolescent 08/30/2006, 08/30/2006     HepB, Unspecified 09/09/1997, 10/07/1997, 08/30/2006, 09/30/2006     HepB-Adult 09/09/1997, 10/07/1997, 08/30/2006, 09/30/2006     Influenza (IIV3) PF 10/04/2012, 11/25/2013     Influenza Vaccine IM 3yrs+ 4 Valent IIV4 10/27/2017     MMR 09/09/1997, 09/09/1997     Mantoux Tuberculin Skin Test 03/12/2010     Pneumo Conj 13-V (2010&after) 01/09/2007     Pneumococcal 23 valent 01/09/2007, 03/12/2010     TD (ADULT, 7+) 09/09/1997, 08/16/2007     TDAP Vaccine (Boostrix) 09/09/1997, 08/16/2007, 05/15/2013, 01/05/2016     Td (Adult), Adsorbed 09/09/1997     Varicella 09/09/1997, 10/07/1997      Results for orders placed or performed in visit on 01/04/19   Lipid Panel   Result Value Ref Range    Cholesterol 176 <200 mg/dL    Triglycerides 151 (H) <150 mg/dL    HDL Cholesterol 47 23 - 92 mg/dL    LDL Cholesterol Calculated 99 <100 mg/dL    Non HDL Cholesterol 129 <130 mg/dL   Hemoglobin A1c   Result Value Ref Range    Hemoglobin A1C 5.0 4.0 - 6.0 %   Comprehensive metabolic  panel   Result Value Ref Range    Sodium 140 134 - 144 mmol/L    Potassium 4.6 3.5 - 5.1 mmol/L    Chloride 105 98 - 107 mmol/L    Carbon Dioxide 28 21 - 31 mmol/L    Anion Gap 7 3 - 14 mmol/L    Glucose 94 70 - 105 mg/dL    Urea Nitrogen 16 7 - 25 mg/dL    Creatinine 0.98 0.60 - 1.20 mg/dL    GFR Estimate 63 >60 mL/min/[1.73_m2]    GFR Estimate If Black 76 >60 mL/min/[1.73_m2]    Calcium 9.4 8.6 - 10.3 mg/dL    Bilirubin Total 0.7 0.3 - 1.0 mg/dL    Albumin 4.4 3.5 - 5.7 g/dL    Protein Total 7.7 6.4 - 8.9 g/dL    Alkaline Phosphatase 59 34 - 104 U/L    ALT 15 7 - 52 U/L    AST 15 13 - 39 U/L   CBC and Differential   Result Value Ref Range    WBC 9.6 4.0 - 11.0 10e9/L    RBC Count 4.81 3.8 - 5.2 10e12/L    Hemoglobin 13.9 11.7 - 15.7 g/dL    Hematocrit 43.8 35.0 - 47.0 %    MCV 91 78 - 100 fl    MCH 28.9 26.5 - 33.0 pg    MCHC 31.7 31.5 - 36.5 g/dL    RDW 12.8 10.0 - 15.0 %    Platelet Count 231 150 - 450 10e9/L    Diff Method Automated Method     % Neutrophils 65.8 %    % Lymphocytes 23.8 %    % Monocytes 8.8 %    % Eosinophils 1.1 %    % Basophils 0.3 %    % Immature Granulocytes 0.2 %    Absolute Neutrophil 6.3 1.6 - 8.3 10e9/L    Absolute Lymphocytes 2.3 0.8 - 5.3 10e9/L    Absolute Monocytes 0.8 0.0 - 1.3 10e9/L    Absolute Eosinophils 0.1 0.0 - 0.7 10e9/L    Absolute Basophils 0.0 0.0 - 0.2 10e9/L    Abs Immature Granulocytes 0.0 0 - 0.4 10e9/L        Return as needed for follow-up with Dr. Curiel.    Clinic : [phone]  Appointment line: [phone]     Lucio Curiel MD  Internal Medicine  [facility]     Portions of this note were dictated using speech recognition software. The note has been proofread but errors in the text may have been overlooked. Please contact me if there are any concerns regarding the accuracy of the dictation.

## 2019-04-03 ENCOUNTER — OFFICE VISIT (OUTPATIENT)
Dept: INTERNAL MEDICINE | Facility: OTHER | Age: 44
End: 2019-04-03
Attending: INTERNAL MEDICINE
Payer: MEDICARE

## 2019-04-03 ENCOUNTER — TELEPHONE (OUTPATIENT)
Dept: INTERNAL MEDICINE | Facility: OTHER | Age: 44
End: 2019-04-03

## 2019-04-03 VITALS
WEIGHT: 293 LBS | HEIGHT: 67 IN | HEART RATE: 72 BPM | TEMPERATURE: 97.1 F | BODY MASS INDEX: 45.99 KG/M2 | SYSTOLIC BLOOD PRESSURE: 122 MMHG | DIASTOLIC BLOOD PRESSURE: 80 MMHG | RESPIRATION RATE: 16 BRPM

## 2019-04-03 DIAGNOSIS — J45.40 MODERATE PERSISTENT ASTHMA, UNSPECIFIED WHETHER COMPLICATED: ICD-10-CM

## 2019-04-03 DIAGNOSIS — E53.8 VITAMIN B12 DEFICIENCY: ICD-10-CM

## 2019-04-03 DIAGNOSIS — F31.32 BIPOLAR I DISORDER, MOST RECENT EPISODE DEPRESSED, MODERATE (H): ICD-10-CM

## 2019-04-03 DIAGNOSIS — E66.01 MORBID OBESITY WITH BMI OF 50.0-59.9, ADULT (H): ICD-10-CM

## 2019-04-03 DIAGNOSIS — I89.0 LYMPHEDEMA OF BOTH LOWER EXTREMITIES: ICD-10-CM

## 2019-04-03 DIAGNOSIS — F41.1 GENERALIZED ANXIETY DISORDER: Primary | ICD-10-CM

## 2019-04-03 DIAGNOSIS — E55.9 VITAMIN D DEFICIENCY: ICD-10-CM

## 2019-04-03 DIAGNOSIS — F60.3 BORDERLINE PERSONALITY DISORDER (H): ICD-10-CM

## 2019-04-03 PROCEDURE — G0463 HOSPITAL OUTPT CLINIC VISIT: HCPCS | Mod: 25

## 2019-04-03 PROCEDURE — 99214 OFFICE O/P EST MOD 30 MIN: CPT | Performed by: INTERNAL MEDICINE

## 2019-04-03 PROCEDURE — 25000128 H RX IP 250 OP 636: Performed by: INTERNAL MEDICINE

## 2019-04-03 PROCEDURE — 96372 THER/PROPH/DIAG INJ SC/IM: CPT

## 2019-04-03 PROCEDURE — G0463 HOSPITAL OUTPT CLINIC VISIT: HCPCS

## 2019-04-03 RX ORDER — ACETAMINOPHEN 500 MG
500 TABLET ORAL EVERY 6 HOURS PRN
Qty: 100 TABLET | Refills: 5 | Status: ON HOLD | OUTPATIENT
Start: 2019-04-03 | End: 2022-04-21

## 2019-04-03 RX ORDER — CYANOCOBALAMIN 1000 UG/ML
1000 INJECTION, SOLUTION INTRAMUSCULAR; SUBCUTANEOUS ONCE
Status: COMPLETED | OUTPATIENT
Start: 2019-04-03 | End: 2019-04-03

## 2019-04-03 RX ADMIN — CYANOCOBALAMIN 1000 MCG: 1000 INJECTION, SOLUTION INTRAMUSCULAR at 11:29

## 2019-04-03 ASSESSMENT — ANXIETY QUESTIONNAIRES
3. WORRYING TOO MUCH ABOUT DIFFERENT THINGS: NOT AT ALL
7. FEELING AFRAID AS IF SOMETHING AWFUL MIGHT HAPPEN: NOT AT ALL
1. FEELING NERVOUS, ANXIOUS, OR ON EDGE: NOT AT ALL
5. BEING SO RESTLESS THAT IT IS HARD TO SIT STILL: NOT AT ALL
IF YOU CHECKED OFF ANY PROBLEMS ON THIS QUESTIONNAIRE, HOW DIFFICULT HAVE THESE PROBLEMS MADE IT FOR YOU TO DO YOUR WORK, TAKE CARE OF THINGS AT HOME, OR GET ALONG WITH OTHER PEOPLE: NOT DIFFICULT AT ALL
GAD7 TOTAL SCORE: 0
2. NOT BEING ABLE TO STOP OR CONTROL WORRYING: NOT AT ALL
6. BECOMING EASILY ANNOYED OR IRRITABLE: NOT AT ALL

## 2019-04-03 ASSESSMENT — ENCOUNTER SYMPTOMS
BRUISES/BLEEDS EASILY: 0
MYALGIAS: 1
WOUND: 0
CONFUSION: 0
HEMATURIA: 0
CHEST TIGHTNESS: 0
FEVER: 0
CHILLS: 0
FATIGUE: 1
ADENOPATHY: 0
ABDOMINAL PAIN: 0
SHORTNESS OF BREATH: 0
ARTHRALGIAS: 1
BACK PAIN: 0
NECK PAIN: 1
NECK STIFFNESS: 1

## 2019-04-03 ASSESSMENT — PATIENT HEALTH QUESTIONNAIRE - PHQ9
5. POOR APPETITE OR OVEREATING: NOT AT ALL
SUM OF ALL RESPONSES TO PHQ QUESTIONS 1-9: 0

## 2019-04-03 ASSESSMENT — PAIN SCALES - GENERAL: PAINLEVEL: NO PAIN (0)

## 2019-04-03 ASSESSMENT — MIFFLIN-ST. JEOR: SCORE: 2340.32

## 2019-04-03 NOTE — PATIENT INSTRUCTIONS
No limitations or restrictions.  Okay to exercise, weight lift, cardio and stretching.    B12 shots, see med list.    Tetanus shot is up-to-date.    No obvious communicable diseases.    Low-carb diet.  Limit caffeine to 3 servings or less per day.    Consider trying to figure 8 applied Ace wraps to the lower extremities daily.  On the morning, off in the evening.    Immunization History   Administered Date(s) Administered     FLU 6-35 months 10/04/2016     Flu, Unspecified 10/15/2009, 10/04/2012, 10/04/2012, 11/25/2013, 11/25/2013, 10/04/2016, 10/04/2016     Hep B, Peds or Adolescent 08/30/2006, 08/30/2006     HepB, Unspecified 09/09/1997, 10/07/1997, 08/30/2006, 09/30/2006     HepB-Adult 09/09/1997, 10/07/1997, 08/30/2006, 09/30/2006     Influenza (IIV3) PF 10/04/2012, 11/25/2013     Influenza Vaccine IM 3yrs+ 4 Valent IIV4 10/27/2017     MMR 09/09/1997, 09/09/1997     Mantoux Tuberculin Skin Test 03/12/2010     Pneumo Conj 13-V (2010&after) 01/09/2007     Pneumococcal 23 valent 01/09/2007, 03/12/2010     TD (ADULT, 7+) 09/09/1997, 08/16/2007     TDAP Vaccine (Boostrix) 09/09/1997, 08/16/2007, 05/15/2013, 01/05/2016     Td (Adult), Adsorbed 09/09/1997     Varicella 09/09/1997, 10/07/1997      Results for orders placed or performed in visit on 01/04/19   Lipid Panel   Result Value Ref Range    Cholesterol 176 <200 mg/dL    Triglycerides 151 (H) <150 mg/dL    HDL Cholesterol 47 23 - 92 mg/dL    LDL Cholesterol Calculated 99 <100 mg/dL    Non HDL Cholesterol 129 <130 mg/dL   Hemoglobin A1c   Result Value Ref Range    Hemoglobin A1C 5.0 4.0 - 6.0 %   Comprehensive metabolic panel   Result Value Ref Range    Sodium 140 134 - 144 mmol/L    Potassium 4.6 3.5 - 5.1 mmol/L    Chloride 105 98 - 107 mmol/L    Carbon Dioxide 28 21 - 31 mmol/L    Anion Gap 7 3 - 14 mmol/L    Glucose 94 70 - 105 mg/dL    Urea Nitrogen 16 7 - 25 mg/dL    Creatinine 0.98 0.60 - 1.20 mg/dL    GFR Estimate 63 >60 mL/min/[1.73_m2]    GFR Estimate If  Black 76 >60 mL/min/[1.73_m2]    Calcium 9.4 8.6 - 10.3 mg/dL    Bilirubin Total 0.7 0.3 - 1.0 mg/dL    Albumin 4.4 3.5 - 5.7 g/dL    Protein Total 7.7 6.4 - 8.9 g/dL    Alkaline Phosphatase 59 34 - 104 U/L    ALT 15 7 - 52 U/L    AST 15 13 - 39 U/L   CBC and Differential   Result Value Ref Range    WBC 9.6 4.0 - 11.0 10e9/L    RBC Count 4.81 3.8 - 5.2 10e12/L    Hemoglobin 13.9 11.7 - 15.7 g/dL    Hematocrit 43.8 35.0 - 47.0 %    MCV 91 78 - 100 fl    MCH 28.9 26.5 - 33.0 pg    MCHC 31.7 31.5 - 36.5 g/dL    RDW 12.8 10.0 - 15.0 %    Platelet Count 231 150 - 450 10e9/L    Diff Method Automated Method     % Neutrophils 65.8 %    % Lymphocytes 23.8 %    % Monocytes 8.8 %    % Eosinophils 1.1 %    % Basophils 0.3 %    % Immature Granulocytes 0.2 %    Absolute Neutrophil 6.3 1.6 - 8.3 10e9/L    Absolute Lymphocytes 2.3 0.8 - 5.3 10e9/L    Absolute Monocytes 0.8 0.0 - 1.3 10e9/L    Absolute Eosinophils 0.1 0.0 - 0.7 10e9/L    Absolute Basophils 0.0 0.0 - 0.2 10e9/L    Abs Immature Granulocytes 0.0 0 - 0.4 10e9/L        Return as needed for follow-up with Dr. Curiel.    Clinic : [phone]  Appointment line: [phone]

## 2019-04-03 NOTE — NURSING NOTE
"Patient presents to the clinic for medication management for foster care placement.    Chief Complaint   Patient presents with     Recheck Medication       Initial /80 (BP Location: Right arm, Patient Position: Sitting, Cuff Size: Adult Regular)   Pulse 72   Temp 97.1  F (36.2  C) (Tympanic)   Resp 16   Ht 1.702 m (5' 7\")   Wt (!) 164.8 kg (363 lb 4 oz)   BMI 56.89 kg/m   Estimated body mass index is 56.89 kg/m  as calculated from the following:    Height as of this encounter: 1.702 m (5' 7\").    Weight as of this encounter: 164.8 kg (363 lb 4 oz).  Medication Reconciliation: complete    Deirdre Bhatti LPN    "

## 2019-04-03 NOTE — TELEPHONE ENCOUNTER
Spoke with Irma at [facility] and relayed message. No further questions or concerns, staff confirmed understanding.     Francisca Donald LPN........................4/3/2019  3:49 PM

## 2019-04-03 NOTE — TELEPHONE ENCOUNTER
Noted.  Nutrition referral sent.   - they will call with date/time of appointment.     Okay to use Tylenol 500 mg tablet.  Prescription sent to pharmacy.    Lucio Curiel MD

## 2019-04-03 NOTE — TELEPHONE ENCOUNTER
They state that they need more documentation from appt.  They would like to get a referral to a nutritionist. Also, is it ok if pt takes the 500mg of tylenol.

## 2019-04-04 RX ORDER — MONTELUKAST SODIUM 10 MG/1
TABLET ORAL
Qty: 90 TABLET | Refills: 3 | Status: SHIPPED | OUTPATIENT
Start: 2019-04-04 | End: 2020-03-17

## 2019-04-04 ASSESSMENT — ANXIETY QUESTIONNAIRES: GAD7 TOTAL SCORE: 0

## 2019-04-04 ASSESSMENT — ASTHMA QUESTIONNAIRES: ACT_TOTALSCORE: 25

## 2019-04-04 NOTE — TELEPHONE ENCOUNTER
Routing refill request to provider for review/approval because:  Labs out of range:  Asthma control test- 25        LOV: 4/3/19  Shirley Wren RN on 4/4/2019 at 3:04 PM

## 2019-04-15 ENCOUNTER — OFFICE VISIT (OUTPATIENT)
Dept: FAMILY MEDICINE | Facility: OTHER | Age: 44
End: 2019-04-15
Attending: DIETITIAN, REGISTERED
Payer: MEDICARE

## 2019-04-15 VITALS — BODY MASS INDEX: 45.99 KG/M2 | WEIGHT: 293 LBS | HEIGHT: 67 IN

## 2019-04-15 DIAGNOSIS — E66.01 MORBID OBESITY WITH BMI OF 50.0-59.9, ADULT (H): ICD-10-CM

## 2019-04-15 PROCEDURE — 97802 MEDICAL NUTRITION INDIV IN: CPT | Performed by: DIETITIAN, REGISTERED

## 2019-04-15 ASSESSMENT — MIFFLIN-ST. JEOR: SCORE: 2330.12

## 2019-04-15 NOTE — PROGRESS NOTES
"[facility]  Medical Nutrition Therapy    Visit Type: Initial Assessment    Rosi Lamb referred by Dr. Curiel for MNT related to Obesity    Patient accompanied by her PCA/.    Rosi reports that she has had a weight gain in the past 5 years, she is unsure of how much. Today she would like ideas for safe weight loss that are not too overwhelming for her.     Nutrition Assessment:  Anthropometrics  Height: .  170.2 cm (5' 7\") BMI:    56.54  Weight:  (!) 163.7 kg (361 lb) BSA:  2.78  IBW (kg):  Female: 61.8      Nutrition History  Eats 3 meals at the group home. Once per week she cooks the meal. Eats in restaurants occa/ about 1x/week.    Beverages: 20 oz 7-up per day, 1-2 cups 1% milk per day, coffee with cream x8 oz per day  Planned snacks and meals per group home schedule.      Physical Activity   [facility] [employer] [facility]  Will walk with staff if the weather is good      Nutrition Prescription  Energy: 1,750         Protein: 85 grams            Fluid: 90 oz        Fat: 65 grams          Carbohydrate:   15-30 grams per meal (1-2 choices per meal)             Micronutrient:   B-12 injections and Vit D3 5000 international unit(s) per day     Nutrition Diagnosis:   morbid obesity due to excess calories     Nutrition Intervention:   CHO controlled diet, 5 small meals per day  Count CHO for 2 choices per meal (30 grams). Decrease simple sugars. She will try sparkling H2O instead of 7up. She will watch her portions of CHO and increase lean proteins, veggies and good fats.    Lists of meal ideas provided and snack options. Discussed strategies for emotional eating such as going for a walk first or drinking H2O.    Nutrition Goals:   (1) 2 CHO per meal  (2) exercise 3 days per week for 30+ minutes  (3) change beverage choices to sparkling H2O (less 7up)    Nutrition Follow Up / Monitorin weeks for goals and weight check    Time spent: 60 " min    Patient to follow-up with RD in 4 weeks.  Patient has RD contact information to call/email if needed.  KRISTIN K. KLINEFELTER, RD on 4/15/2019 at 2:47 PM                          [facility]      Physical Exam

## 2019-04-26 ENCOUNTER — OFFICE VISIT (OUTPATIENT)
Dept: FAMILY MEDICINE | Facility: OTHER | Age: 44
End: 2019-04-26
Attending: PHYSICIAN ASSISTANT
Payer: MEDICARE

## 2019-04-26 ENCOUNTER — HOSPITAL ENCOUNTER (OUTPATIENT)
Dept: GENERAL RADIOLOGY | Facility: OTHER | Age: 44
Discharge: HOME | End: 2019-04-26
Attending: PHYSICIAN ASSISTANT | Admitting: PHYSICIAN ASSISTANT
Payer: MEDICARE

## 2019-04-26 VITALS
SYSTOLIC BLOOD PRESSURE: 102 MMHG | DIASTOLIC BLOOD PRESSURE: 82 MMHG | TEMPERATURE: 95.1 F | WEIGHT: 293 LBS | RESPIRATION RATE: 22 BRPM | BODY MASS INDEX: 45.99 KG/M2 | HEIGHT: 67 IN | HEART RATE: 72 BPM

## 2019-04-26 DIAGNOSIS — N39.0 ACUTE UTI: ICD-10-CM

## 2019-04-26 DIAGNOSIS — M25.562 ACUTE PAIN OF LEFT KNEE: ICD-10-CM

## 2019-04-26 DIAGNOSIS — R30.0 DYSURIA: ICD-10-CM

## 2019-04-26 DIAGNOSIS — M25.562 ACUTE PAIN OF LEFT KNEE: Primary | ICD-10-CM

## 2019-04-26 LAB
ALBUMIN UR-MCNC: NEGATIVE MG/DL
APPEARANCE UR: CLEAR
BACTERIA #/AREA URNS HPF: ABNORMAL /HPF
BILIRUB UR QL STRIP: NEGATIVE
COLOR UR AUTO: YELLOW
GLUCOSE UR STRIP-MCNC: NEGATIVE MG/DL
HGB UR QL STRIP: NEGATIVE
KETONES UR STRIP-MCNC: ABNORMAL MG/DL
LEUKOCYTE ESTERASE UR QL STRIP: ABNORMAL
NITRATE UR QL: NEGATIVE
NON-SQ EPI CELLS #/AREA URNS LPF: ABNORMAL /LPF
PH UR STRIP: 5.5 PH (ref 5–9)
RBC #/AREA URNS AUTO: ABNORMAL /HPF
SOURCE: ABNORMAL
SP GR UR STRIP: 1.02 (ref 1–1.03)
UROBILINOGEN UR STRIP-ACNC: 0.2 EU/DL (ref 0.2–1)
WBC #/AREA URNS AUTO: ABNORMAL /HPF

## 2019-04-26 PROCEDURE — G0463 HOSPITAL OUTPT CLINIC VISIT: HCPCS | Mod: 25

## 2019-04-26 PROCEDURE — 81001 URINALYSIS AUTO W/SCOPE: CPT | Mod: ZL | Performed by: PHYSICIAN ASSISTANT

## 2019-04-26 PROCEDURE — G0463 HOSPITAL OUTPT CLINIC VISIT: HCPCS

## 2019-04-26 PROCEDURE — 73560 X-RAY EXAM OF KNEE 1 OR 2: CPT | Mod: RT

## 2019-04-26 PROCEDURE — 87086 URINE CULTURE/COLONY COUNT: CPT | Mod: ZL | Performed by: PHYSICIAN ASSISTANT

## 2019-04-26 PROCEDURE — 99213 OFFICE O/P EST LOW 20 MIN: CPT | Performed by: PHYSICIAN ASSISTANT

## 2019-04-26 RX ORDER — NITROFURANTOIN 25; 75 MG/1; MG/1
100 CAPSULE ORAL 2 TIMES DAILY
Qty: 10 CAPSULE | Refills: 0 | Status: SHIPPED | OUTPATIENT
Start: 2019-04-26 | End: 2019-05-06

## 2019-04-26 ASSESSMENT — PATIENT HEALTH QUESTIONNAIRE - PHQ9: SUM OF ALL RESPONSES TO PHQ QUESTIONS 1-9: 0

## 2019-04-26 ASSESSMENT — PAIN SCALES - GENERAL: PAINLEVEL: MODERATE PAIN (4)

## 2019-04-26 ASSESSMENT — MIFFLIN-ST. JEOR: SCORE: 2329.21

## 2019-04-26 NOTE — PATIENT INSTRUCTIONS
Knee pain:  Encouraged to take ibuprofen (400-800mg) for relief up to 4 times per day.  Encouraged rest and elevation.  Encouraged to use ice or heat 15 minutes at a time several times per day to decrease pain. Return to clinic in 1-2 weeks as necessary for persistent pain. Return to clinic with any change or worsening of symptoms.   Encouraged to increase activity as tolerated.      Bladder infection:  Started on macrobid.   Antibiotic has been sent to pharmacy. Please take full course of antibiotic even if symptoms have completely resolved. This helps prevent against antibiotic resistance.     We will culture the urine to see what bacteria grows out of the urine.  We will call the patient if a change of antibiotic is necessary per the culture.  Patient was instructed in increase fluids including water and cranberry juice.  Monitor for fevers, chills, back pain.  Return to clinic after antibiotic completion if symptoms are not resolved.  Call clinic if symptoms change/worsen.

## 2019-04-26 NOTE — NURSING NOTE
Chief Complaint   Patient presents with     Knee Pain     Left     Urinary Problem         Medication Reconciliation: complete    Dona Rodriguez, LPN

## 2019-04-26 NOTE — PROGRESS NOTES
Nursing Notes:   Dona Rodriguez LPN  2019  1:00 PM  Signed  Chief Complaint   Patient presents with     Knee Pain     Left     Urinary Problem         Medication Reconciliation: complete    Dona Rodriguez LPN      HPI:    Rosi Lamb is a 44 year old female who presents for left knee pain and a urinary concern.   Left knee pain x few weeks. Pain with movement. Unsure if stepped wrong. Some swelling.  Comes and goes. Achy.  Dad has OA.  Tx tylenol, ibuprofen, ice and heat.      Hard to hold urine a few times.  Currently having dysuria. Urgency. No hematuria.  Urinary frequency.  Patient has had bladder infections in the past.  No fevers or chills.  No GI symptoms.  Keeping food and fluids down.    Past Medical History:   Diagnosis Date     Edema     No Comments Provided     Encounter for removal and reinsertion of intrauterine contraceptive device     05,IUD placement, Removed     Excessive and frequent menstruation with irregular cycle     2017     Major depressive disorder, single episode     No Comments Provided     Personal history of other medical treatment (CODE)     G3, P2-0-1-2 with history of spontaneous      Personal history of other medical treatment (CODE)     No Comments Provided     Uncomplicated asthma     No Comments Provided       Past Surgical History:   Procedure Laterality Date     ANKLE SURGERY      fracture, repair with screws     CONIZATION LEEP      ,Underwent a loop     LAPAROSCOPIC TUBAL LIGATION      ,tubal ligation       Family History   Problem Relation Age of Onset     Other - See Comments Mother         No Known Problems     Asthma Father         Asthma,+ Leg edema, knee, hip replacement. + Lymphedema     Other - See Comments Paternal Grandmother         Lymphedema     Osteoporosis Brother         Osteoporosis     Other - See Comments Brother         No Known Problems       Social History     Tobacco Use     Smoking status:  Former Smoker     Packs/day: 1.00     Years: 3.00     Pack years: 3.00     Types: Cigarettes     Last attempt to quit: 2003     Years since quittin.3     Smokeless tobacco: Never Used   Substance Use Topics     Alcohol use: No     Alcohol/week: 0.0 oz       Current Outpatient Medications   Medication Sig Dispense Refill     acetaminophen (TYLENOL) 500 MG tablet Take 1 tablet (500 mg) by mouth every 6 hours as needed for fever or pain 100 tablet 5     albuterol (VENTOLIN HFA) 108 (90 BASE) MCG/ACT Inhaler Inhale 1-2 puffs into the lungs every 4 hours as needed       ARIPiprazole (ABILIFY) 5 MG tablet Take 5 mg by mouth daily       B Complex Vitamins (VITAMIN-B COMPLEX) TABS Take 1 tablet by mouth daily for low b12. Start 2017       cholecalciferol (VITAMIN D3) 5000 units CAPS capsule TAKE 1 CAPSULE BY MOUTH ONCE DAILY. FOR VITAMIN D DEFICIENCY - DOSEINCREASE 2017 100 capsule 2     cyanocobalamin (VITAMIN B12) 1000 MCG/ML injection Inject 1 ML intramuscularly every 2 weeks. 4 mL 3     gabapentin (NEURONTIN) 300 MG capsule TAKE 1 CAPSULE BY MOUTH DAILY FOR 3 DAYS, THEN INCREASE TO TAKE 1 CAPSULE TWICE DAILY FOR 3 DAYS, THEN 1 CAPSULE 3 TIMES DAILY.  1     hydrOXYzine (ATARAX) 50 MG tablet TAKE 2 TABLETS (100MG) BY MOUTH NIGHTLY AT BEDTIME AND 1 TABLET ONCE DAILY AS NEEDED       ibuprofen (ADVIL/MOTRIN) 200 MG tablet Take 600 mg by mouth every 6 hours as needed for pain       ketoconazole (XOLEGEL) 2 % external gel Apply once daily up to 14 days 45 g 0     lamoTRIgine (LAMICTAL) 200 MG tablet TAKE 1 TABLET BY MOUTH NIGHTLY AT BEDTIME  2     lamoTRIgine (LAMICTAL) 25 MG tablet TAKE 2 TABLETS BY MOUTH DAILY  2     Misc. Devices (ROLLER WALKER) MISC Rolling Walker for home use.  2 wheels       montelukast (SINGULAIR) 10 MG tablet TAKE 1 TABLET BY MOUTH AT BEDTIME. 90 tablet 3     nitroFURantoin macrocrystal-monohydrate (MACROBID) 100 MG capsule Take 1 capsule (100 mg) by mouth 2 times daily for 5 days 10  "capsule 0     order for DME Equipment being ordered: right knee brace 1 each 0     oxybutynin ER (DITROPAN-XL) 5 MG 24 hr tablet TAKE 1 TAB BY MOUTH ONCE DAILY 90 tablet 2     permethrin (ELIMITE) 5 % cream Apply cream from neck down; leave on for 8-14 hours then wash off with water; reapply in 1 week if rash is not improved or is spreading. 60 g 1     SAFETY-ELBERT TB SYRINGE 25G X 5/8\" 1 ML MISC SAFETY GLIDE-FOR B-12 SHOTS 4 each 3     syringe/needle, sisp, 25G X 5/8\" 1 ML MISC Safety glide - for B12 Shots       triamcinolone (KENALOG) 0.1 % cream Apply sparingly to affected area three times daily as needed 80 g 2     vilazodone (VIIBRYD) 40 MG TABS tablet Take 40 mg by mouth daily with food         Allergies   Allergen Reactions     Clonazepam Other (See Comments)     Causes Violence and aggresssion     Hydrocodone-Acetaminophen      Other reaction(s): Seizures  Can take Percocet without difficulty.     Lorazepam Other (See Comments)     Causes Violence and aggresssion     Sertraline Other (See Comments)     Caused suicidally      Bupropion Other (See Comments)     Caused Major Depression     Dust Mite Extract      Other reaction(s): Asthma symptoms     Lithium Unknown     Pollen Extract      Other reaction(s): Asthma symptoms     Risperidone Unknown     Sulfa Drugs Unknown     Trichophyton      Other reaction(s): Asthma symptoms     Valproic Acid Unknown       REVIEW OF SYSTEMS:  Refer to HPI.    EXAM:   Vitals:    /82 (BP Location: Right arm, Patient Position: Sitting, Cuff Size: Adult Large)   Pulse 72   Temp 95.1  F (35.1  C)   Resp 22   Ht 1.702 m (5' 7\")   Wt (!) 163.7 kg (360 lb 12.8 oz)   BMI 56.51 kg/m      General Appearance: Pleasant, alert, appropriate appearance for age. No acute distress  Chest/Respiratory Exam: Normal chest wall and respirations. Clear to auscultation.  Cardiovascular Exam: Regular rate and rhythm. S1, S2, no murmur, click, gallop, or rubs.  Gastrointestinal Exam: Soft, " non-tender, no masses or organomegaly. Normal BS x 4.  Mild suprapubic tenderness to palpation.  Musculoskeletal Exam: Left anterior knee pain with palpation.  No bruising or swelling appreciated.  Pain with extreme flexion and extension.  No lower extremity edema..  Skin: no rash or abnormalities  Neurologic Exam: Nonfocal, normal gross motor, tone coordination and no tremor.  Psychiatric Exam: Alert and oriented - appropriate affect.    PHQ Depression Screen  PHQ-9 SCORE 1/4/2019 4/3/2019 4/26/2019   PHQ-9 Total Score 2 0 0     Labs:  Results for orders placed or performed in visit on 04/26/19   Urinalysis w Reflex Microscopic If Positive   Result Value Ref Range    Color Urine Yellow     Appearance Urine Clear     Glucose Urine Negative NEG^Negative mg/dL    Bilirubin Urine Negative NEG^Negative    Ketones Urine Trace (A) NEG^Negative mg/dL    Specific Gravity Urine 1.025 1.000 - 1.030    Blood Urine Negative NEG^Negative    pH Urine 5.5 5.0 - 9.0 pH    Protein Albumin Urine Negative NEG^Negative mg/dL    Urobilinogen Urine 0.2 0.2 - 1.0 EU/dL    Nitrite Urine Negative NEG^Negative    Leukocyte Esterase Urine Trace (A) NEG^Negative    Source Midstream Urine    Urine Microscopic   Result Value Ref Range    WBC Urine 5-10 (A) OTO5^0 - 5 /HPF    RBC Urine O - 2 OTO2^O - 2 /HPF    Squamous Epithelial /LPF Urine Many (A) FEW^Few /LPF    Bacteria Urine Many (A) NEG^Negative /HPF   Urine Culture Aerobic Bacterial   Result Value Ref Range    Specimen Description Midstream Urine     Culture Micro       50,000 to 100,000 colonies/mL  mixed urogenital amie         ASSESSMENT AND PLAN:      ICD-10-CM    1. Acute pain of left knee M25.562 XR Knee Standing 2v  Bilateral & 2v Left     Urine Microscopic   2. Dysuria R30.0 Urinalysis w Reflex Microscopic If Positive   3. Acute UTI N39.0 nitroFURantoin macrocrystal-monohydrate (MACROBID) 100 MG capsule     Urine Culture Aerobic Bacterial       Completed left knee xray.  I  personally reviewed the xray. I found no fracture appreciated upon initial read of xray.  Final read pending by radiology.    Knee pain:  Encouraged to take ibuprofen (400-800mg) for relief up to 4 times per day.  Encouraged rest and elevation.  Encouraged to use ice or heat 15 minutes at a time several times per day to decrease pain. Return to clinic in 1-2 weeks as necessary for persistent pain. Return to clinic with any change or worsening of symptoms.   Encouraged to increase activity as tolerated.  Patient can use Ace wrap as needed for symptomatic relief.      Bladder infection:  Started on macrobid for an acute urinary tract infection.   Antibiotic has been sent to pharmacy. Please take full course of antibiotic even if symptoms have completely resolved. This helps prevent against antibiotic resistance.     We will culture the urine to see what bacteria grows out of the urine.  We will call the patient if a change of antibiotic is necessary per the culture.  Patient was instructed in increase fluids including water and cranberry juice.  Monitor for fevers, chills, back pain.  Return to clinic after antibiotic completion if symptoms are not resolved.  Call clinic if symptoms change/worsen.         Patient Instructions   Knee pain:  Encouraged to take ibuprofen (400-800mg) for relief up to 4 times per day.  Encouraged rest and elevation.  Encouraged to use ice or heat 15 minutes at a time several times per day to decrease pain. Return to clinic in 1-2 weeks as necessary for persistent pain. Return to clinic with any change or worsening of symptoms.   Encouraged to increase activity as tolerated.      Bladder infection:  Started on macrobid.   Antibiotic has been sent to pharmacy. Please take full course of antibiotic even if symptoms have completely resolved. This helps prevent against antibiotic resistance.     We will culture the urine to see what bacteria grows out of the urine.  We will call the patient if  a change of antibiotic is necessary per the culture.  Patient was instructed in increase fluids including water and cranberry juice.  Monitor for fevers, chills, back pain.  Return to clinic after antibiotic completion if symptoms are not resolved.  Call clinic if symptoms change/worsen.       Liz Diaz PA-C..................4/26/2019 12:58 PM

## 2019-04-28 LAB
BACTERIA SPEC CULT: NORMAL
SPECIMEN SOURCE: NORMAL

## 2019-05-06 ENCOUNTER — OFFICE VISIT (OUTPATIENT)
Dept: PEDIATRICS | Facility: OTHER | Age: 44
End: 2019-05-06
Attending: INTERNAL MEDICINE
Payer: MEDICARE

## 2019-05-06 VITALS
HEIGHT: 67 IN | WEIGHT: 293 LBS | BODY MASS INDEX: 45.99 KG/M2 | DIASTOLIC BLOOD PRESSURE: 72 MMHG | SYSTOLIC BLOOD PRESSURE: 112 MMHG | RESPIRATION RATE: 18 BRPM | HEART RATE: 76 BPM | TEMPERATURE: 98.2 F

## 2019-05-06 DIAGNOSIS — R35.0 URINE FREQUENCY: ICD-10-CM

## 2019-05-06 DIAGNOSIS — R32 URINARY INCONTINENCE, UNSPECIFIED TYPE: Primary | ICD-10-CM

## 2019-05-06 LAB
ALBUMIN UR-MCNC: NEGATIVE MG/DL
APPEARANCE UR: CLEAR
BILIRUB UR QL STRIP: NEGATIVE
COLOR UR AUTO: YELLOW
GLUCOSE UR STRIP-MCNC: NEGATIVE MG/DL
HGB UR QL STRIP: NEGATIVE
KETONES UR STRIP-MCNC: NEGATIVE MG/DL
LEUKOCYTE ESTERASE UR QL STRIP: NEGATIVE
NITRATE UR QL: NEGATIVE
PH UR STRIP: 6 PH (ref 5–9)
SOURCE: NORMAL
SP GR UR STRIP: <1.005 (ref 1–1.03)
UROBILINOGEN UR STRIP-ACNC: 0.2 EU/DL (ref 0.2–1)

## 2019-05-06 PROCEDURE — 87086 URINE CULTURE/COLONY COUNT: CPT | Mod: ZL | Performed by: INTERNAL MEDICINE

## 2019-05-06 PROCEDURE — 99213 OFFICE O/P EST LOW 20 MIN: CPT | Performed by: INTERNAL MEDICINE

## 2019-05-06 PROCEDURE — 81003 URINALYSIS AUTO W/O SCOPE: CPT | Mod: ZL | Performed by: INTERNAL MEDICINE

## 2019-05-06 PROCEDURE — G0463 HOSPITAL OUTPT CLINIC VISIT: HCPCS

## 2019-05-06 ASSESSMENT — ANXIETY QUESTIONNAIRES
3. WORRYING TOO MUCH ABOUT DIFFERENT THINGS: NOT AT ALL
7. FEELING AFRAID AS IF SOMETHING AWFUL MIGHT HAPPEN: NOT AT ALL
1. FEELING NERVOUS, ANXIOUS, OR ON EDGE: NOT AT ALL
GAD7 TOTAL SCORE: 0
IF YOU CHECKED OFF ANY PROBLEMS ON THIS QUESTIONNAIRE, HOW DIFFICULT HAVE THESE PROBLEMS MADE IT FOR YOU TO DO YOUR WORK, TAKE CARE OF THINGS AT HOME, OR GET ALONG WITH OTHER PEOPLE: NOT DIFFICULT AT ALL
2. NOT BEING ABLE TO STOP OR CONTROL WORRYING: NOT AT ALL
6. BECOMING EASILY ANNOYED OR IRRITABLE: NOT AT ALL
5. BEING SO RESTLESS THAT IT IS HARD TO SIT STILL: NOT AT ALL

## 2019-05-06 ASSESSMENT — PAIN SCALES - GENERAL: PAINLEVEL: NO PAIN (0)

## 2019-05-06 ASSESSMENT — MIFFLIN-ST. JEOR: SCORE: 2311.97

## 2019-05-06 ASSESSMENT — PATIENT HEALTH QUESTIONNAIRE - PHQ9
5. POOR APPETITE OR OVEREATING: NOT AT ALL
SUM OF ALL RESPONSES TO PHQ QUESTIONS 1-9: 0

## 2019-05-06 NOTE — PATIENT INSTRUCTIONS
-- Urology consult    Patient Education     Urinary Incontinence, Female (Adult)  Urinary incontinence means loss of control of the bladder. This problem affects many women, especially as they get older. If you have incontinence, you may be embarrassed to ask for help. But know that this problem can be treated.  Types of Incontinence  There are different types of incontinence. Two of the main types are described here. You can have more than one type.    Stress incontinence. With this type, urine leaks when pressure (stress) is put on the bladder. This may happen when you cough, sneeze, or laugh. Stress incontinence most often occurs because the pelvic floor muscles that support the bladder and urethra are weak. This can happen after pregnancy and vaginal childbirth or a hysterectomy. It can also be due to excess body weight or hormone changes.    Urge incontinence (also called overactive bladder). With this type, a sudden urge to urinate is felt often. This may happen even though there may not be much urine in the bladder. The need to urinate often during the night is common. Urge incontinence most often occurs because of bladder spasms. This may be due to bladder irritation or infection. Damage to bladder nerves or pelvic muscles, constipation, and certain medicines can also lead to urge incontinence.  Treatment of urinary incontinence depends on the cause. Further evaluation is needed to find the type you have. This will likely include an exam and certain tests. Based on the results, you and your healthcare provider can then plan treatment. Until a diagnosis is made, the home care tips below can help relieve symptoms.  Home care    Do pelvic floor muscle exercises, if they are prescribed. The pelvic floor muscles help support the bladder and urethra. Many women find that their symptoms improve when doing special exercises that strengthen these muscles. To do the exercises contract the muscles you would use to  stop your stream of urine, but do this when you re not urinating. Hold for 10 seconds, then relax. Repeat 10 to 20 times in a row, at least 3 times a day. Your provider may give you other instructions for how to do the exercises and how often.    Keep a bladder diary. This helps track how often and how much you urinate over a set period of time. Bring this diary with you to your next visit with the provider. The information can help your provider learn more about your bladder problem.    Lose weight, if advised to by your provider. Excess weight puts pressure on the bladder. Your provider can help you create a weight-loss plan that s right for you. This may include exercising more and making certain diet changes.    Don't consume foods and drinks that may irritate the bladder. These can include alcohol and caffeinated drinks.    Quit smoking. Smoking and other tobacco use can lead to chronic cough that strains the pelvic floor muscles. Smoking may also damage the bladder and urethra. Talk with your provider about treatments or methods you can use to quit smoking.    If drinking large amounts of fluid causes you to have symptoms, you may be advised to limit your fluid intake. You may also be advised to drink most of your fluids during the day and to limit fluids at night.    If you re worried about urine leakage or accidents, you may wear absorbent pads to catch urine. Change the pads often. This helps reduce discomfort. It may also reduce the risk of skin or bladder infections.  Follow-up care  Follow up with your healthcare provider, or as directed. It may take some to find the right treatment for your problem. Your treatment plan may include special therapies or medicines. Certain procedures or surgery may also be options. Be sure to discuss any questions you have with your provider.  When to seek medical advice  Call the healthcare provider right away if any of these occur:    Fever of 100.4 F (38 C) or higher,  or as directed by your provider    Bladder pain or fullness    Abdominal swelling    Nausea or vomiting    Back pain    Weakness, dizziness or fainting  Date Last Reviewed: 10/1/2017    2014-4428 [employer]. [address] [place] [address], [place] [address]. All rights reserved. This information is not intended as a substitute for professional medical care. Always follow your healthcare professional's instructions.

## 2019-05-06 NOTE — NURSING NOTE
Patient presents to clinic for urine frequency and not being able to make it to the bathroom.  Kassy Salcedo LPN ....................  5/6/2019   2:17 PM      No LMP recorded. Patient has had an ablation.  Medication Reconciliation: complete    Kassy Salcedo LPN  5/6/2019 2:17 PM

## 2019-05-06 NOTE — LETTER
May 9, 2019      Rosi Lamb  [address] [address]        Dear ,    We are writing to inform you of your test results.    Most likely a pattern of contamination or colonization.  Next step is the Urology consult.    Signed, Bhavesh Ortiz MD  Internal Medicine & Pediatrics      Resulted Orders   Urinalysis w Reflex Microscopic If Positive   Result Value Ref Range    Color Urine Yellow     Appearance Urine Clear     Glucose Urine Negative NEG^Negative mg/dL    Bilirubin Urine Negative NEG^Negative    Ketones Urine Negative NEG^Negative mg/dL    Specific Gravity Urine <1.005 1.000 - 1.030    Blood Urine Negative NEG^Negative    pH Urine 6.0 5.0 - 9.0 pH    Protein Albumin Urine Negative NEG^Negative mg/dL    Urobilinogen Urine 0.2 0.2 - 1.0 EU/dL    Nitrite Urine Negative NEG^Negative    Leukocyte Esterase Urine Negative NEG^Negative    Source Midstream Urine    Urine Culture Aerobic Bacterial   Result Value Ref Range    Specimen Description Midstream Urine     Culture Micro       10,000 to 50,000 colonies/mL  mixed urogenital amie  No further identification or sensitivity done         If you have any questions or concerns, please call the clinic at the number listed above.       Sincerely,        Bhavesh Ortiz MD

## 2019-05-06 NOTE — PROGRESS NOTES
Subjective  Rosi Lamb is a 44 year old female who presents for ongoing bladder issues.  She was seen last in clinic a few weeks ago.  This was for burning with urination and incontinence.  She was treated with Macrobid.  Symptoms cleared up for a few days but then came back.  No fever.  No vomiting.  No constipation or diarrhea.  No vaginal discharge or drainage.  No known exposure to sexually transmitted infection.  No recent bubble baths or Jacuzzi's.  No allergy medications.  She does take hydroxyzine sometimes as needed for anxiety.  She has some back pain which is bilateral and lower.  She thinks this is new.  She typically has a bowel movement every other day or so.  Formed log.  She is been taking oxybutynin for years for incontinence.  She was incontinent of urine while waiting in the lobby.    Problem List/PMH: reviewed in EMR, and made relevant updates today.  Medications: reviewed in EMR, and made relevant updates today.  Allergies: reviewed in EMR, and made relevant updates today.    Social Hx:  Social History     Tobacco Use     Smoking status: Former Smoker     Packs/day: 1.00     Years: 3.00     Pack years: 3.00     Types: Cigarettes     Last attempt to quit: 2003     Years since quittin.3     Smokeless tobacco: Never Used   Substance Use Topics     Alcohol use: Not Currently     Alcohol/week: 0.0 oz     Drug use: Not Currently     Social History     Social History Narrative    No longer in adult foster care lived with Charley Godwin.    .   2 sons - age 12 and 7 - as of 2016.   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     I reviewed social history and made relevant updates today.    Family Hx:   Family History   Problem Relation Age of Onset     Other - See Comments Mother         No Known Problems     Asthma Father         Asthma,+ Leg edema, knee, hip replacement. + Lymphedema     Other - See Comments Paternal Grandmother         Lymphedema      "Osteoporosis Brother         Osteoporosis     Other - See Comments Brother         No Known Problems       Objective  Vitals: reviewed in EMR.  /72 (BP Location: Right arm, Patient Position: Sitting, Cuff Size: Adult Large)   Pulse 76   Temp 98.2  F (36.8  C) (Tympanic)   Resp 18   Ht 1.702 m (5' 7\")   Wt (!) 161.9 kg (357 lb)   Breastfeeding? No   BMI 55.91 kg/m      Gen: Pleasant female, NAD.  HEENT: MMM, no OP erythema.   Neck: Supple, no JVD, no bruits.  CV: RRR no m/r/g.   Pulm: CTAB no w/r/r  GI: Soft, NT, ND. Obese  Back: No TTP  Neuro: Grossly intact  Msk: No lower extremity edema.  Skin: No concerning lesions.  Psychiatric: Normal affect and insight. Does not appear anxious or depressed.    Component      Latest Ref Rng & Units 4/26/2019   Color Urine       Yellow   Appearance Urine       Clear   Glucose Urine      NEG:Negative mg/dL Negative   Bilirubin Urine      NEG:Negative Negative   Ketones Urine      NEG:Negative mg/dL Trace (A)   Specific Gravity Urine      1.000 - 1.030 1.025   Blood Urine      NEG:Negative Negative   pH Urine      5.0 - 9.0 pH 5.5   Protein Albumin Urine      NEG:Negative mg/dL Negative   Urobilinogen Urine      0.2 - 1.0 EU/dL 0.2   Nitrite Urine      NEG:Negative Negative   Leukocyte Esterase Urine      NEG:Negative Trace (A)   Source       Midstream Urine   WBC Urine      OTO5:0 - 5 /HPF 5-10 (A)   RBC Urine      OTO2:O - 2 /HPF O - 2   Squamous Epithelial /LPF Urine      FEW:Few /LPF Many (A)   Bacteria Urine      NEG:Negative /HPF Many (A)   Specimen Description       Midstream Urine   Culture Micro       50,000 to 100,000 colonies/mL . . .       Results for orders placed or performed in visit on 05/06/19   Urinalysis w Reflex Microscopic If Positive   Result Value Ref Range    Color Urine Yellow     Appearance Urine Clear     Glucose Urine Negative NEG^Negative mg/dL    Bilirubin Urine Negative NEG^Negative    Ketones Urine Negative NEG^Negative mg/dL    " Specific Gravity Urine <1.005 1.000 - 1.030    Blood Urine Negative NEG^Negative    pH Urine 6.0 5.0 - 9.0 pH    Protein Albumin Urine Negative NEG^Negative mg/dL    Urobilinogen Urine 0.2 0.2 - 1.0 EU/dL    Nitrite Urine Negative NEG^Negative    Leukocyte Esterase Urine Negative NEG^Negative    Source Midstream Urine          Assessment    ICD-10-CM    1. Urinary incontinence, unspecified type R32 UROLOGY ADULT REFERRAL   2. Urine frequency R35.0 Urinalysis w Reflex Microscopic If Positive     Urine Culture Aerobic Bacterial     UROLOGY ADULT REFERRAL     Orders Placed This Encounter   Procedures     Urinalysis w Reflex Microscopic If Positive     UROLOGY ADULT REFERRAL       Previous testing is more consistent with contamination from external skin versus,  colonization.  She appears to have more difficulty with urinary incontinence.  Urology consultation is requested.    Plan   -- Expected clinical course discussed   -- Medications and their side effects discussed  Patient Instructions    -- Urology consult    Patient Education     Urinary Incontinence, Female (Adult)  Urinary incontinence means loss of control of the bladder. This problem affects many women, especially as they get older. If you have incontinence, you may be embarrassed to ask for help. But know that this problem can be treated.  Types of Incontinence  There are different types of incontinence. Two of the main types are described here. You can have more than one type.    Stress incontinence. With this type, urine leaks when pressure (stress) is put on the bladder. This may happen when you cough, sneeze, or laugh. Stress incontinence most often occurs because the pelvic floor muscles that support the bladder and urethra are weak. This can happen after pregnancy and vaginal childbirth or a hysterectomy. It can also be due to excess body weight or hormone changes.    Urge incontinence (also called overactive bladder). With this type, a sudden urge to  urinate is felt often. This may happen even though there may not be much urine in the bladder. The need to urinate often during the night is common. Urge incontinence most often occurs because of bladder spasms. This may be due to bladder irritation or infection. Damage to bladder nerves or pelvic muscles, constipation, and certain medicines can also lead to urge incontinence.  Treatment of urinary incontinence depends on the cause. Further evaluation is needed to find the type you have. This will likely include an exam and certain tests. Based on the results, you and your healthcare provider can then plan treatment. Until a diagnosis is made, the home care tips below can help relieve symptoms.  Home care    Do pelvic floor muscle exercises, if they are prescribed. The pelvic floor muscles help support the bladder and urethra. Many women find that their symptoms improve when doing special exercises that strengthen these muscles. To do the exercises contract the muscles you would use to stop your stream of urine, but do this when you re not urinating. Hold for 10 seconds, then relax. Repeat 10 to 20 times in a row, at least 3 times a day. Your provider may give you other instructions for how to do the exercises and how often.    Keep a bladder diary. This helps track how often and how much you urinate over a set period of time. Bring this diary with you to your next visit with the provider. The information can help your provider learn more about your bladder problem.    Lose weight, if advised to by your provider. Excess weight puts pressure on the bladder. Your provider can help you create a weight-loss plan that s right for you. This may include exercising more and making certain diet changes.    Don't consume foods and drinks that may irritate the bladder. These can include alcohol and caffeinated drinks.    Quit smoking. Smoking and other tobacco use can lead to chronic cough that strains the pelvic floor  muscles. Smoking may also damage the bladder and urethra. Talk with your provider about treatments or methods you can use to quit smoking.    If drinking large amounts of fluid causes you to have symptoms, you may be advised to limit your fluid intake. You may also be advised to drink most of your fluids during the day and to limit fluids at night.    If you re worried about urine leakage or accidents, you may wear absorbent pads to catch urine. Change the pads often. This helps reduce discomfort. It may also reduce the risk of skin or bladder infections.  Follow-up care  Follow up with your healthcare provider, or as directed. It may take some to find the right treatment for your problem. Your treatment plan may include special therapies or medicines. Certain procedures or surgery may also be options. Be sure to discuss any questions you have with your provider.  When to seek medical advice  Call the healthcare provider right away if any of these occur:    Fever of 100.4 F (38 C) or higher, or as directed by your provider    Bladder pain or fullness    Abdominal swelling    Nausea or vomiting    Back pain    Weakness, dizziness or fainting  Date Last Reviewed: 10/1/2017    2982-1561 [employer]. [address] [place] [id number]. All rights reserved. This information is not intended as a substitute for professional medical care. Always follow your healthcare professional's instructions.               No follow-ups on file.    Signed, Bhavesh Ortiz MD  Internal Medicine & Pediatrics

## 2019-05-07 ASSESSMENT — ANXIETY QUESTIONNAIRES: GAD7 TOTAL SCORE: 0

## 2019-05-07 ASSESSMENT — ASTHMA QUESTIONNAIRES: ACT_TOTALSCORE: 25

## 2019-05-08 DIAGNOSIS — E53.8 VITAMIN B12 DEFICIENCY: ICD-10-CM

## 2019-05-08 LAB
BACTERIA SPEC CULT: NORMAL
SPECIMEN SOURCE: NORMAL

## 2019-05-09 RX ORDER — CYANOCOBALAMIN 1000 UG/ML
INJECTION, SOLUTION INTRAMUSCULAR; SUBCUTANEOUS
Qty: 1 ML | Refills: 11 | Status: SHIPPED | OUTPATIENT
Start: 2019-05-09 | End: 2019-10-22

## 2019-05-09 NOTE — TELEPHONE ENCOUNTER
Refill Request for: cyanocobalamin (VITAMIN B12) 1000 MCG/ML injection   Received From: [id number]    Last Written Prescription Date: 08/20/18 for 4 ml and 3 refills.   LOV: 04/03/19 with PCP  Next Appointment: No upcoming office visit on file during initial refill review with PCP  Protocol: Vitamin Supplements (Adult) Protocol Passed - 5/9 3:12 PM    Prescription approved per [facility] Refill Protocol.      Mercedez Powers RN on 5/9/2019 at 3:16 PM

## 2019-05-17 ENCOUNTER — OFFICE VISIT (OUTPATIENT)
Dept: FAMILY MEDICINE | Facility: OTHER | Age: 44
End: 2019-05-17
Attending: DIETITIAN, REGISTERED
Payer: MEDICARE

## 2019-05-17 VITALS — WEIGHT: 293 LBS | BODY MASS INDEX: 45.99 KG/M2 | HEIGHT: 67 IN

## 2019-05-17 PROCEDURE — 97803 MED NUTRITION INDIV SUBSEQ: CPT | Performed by: DIETITIAN, REGISTERED

## 2019-05-17 ASSESSMENT — MIFFLIN-ST. JEOR: SCORE: 2298.36

## 2019-05-17 NOTE — PROGRESS NOTES
"[facility]  Medical Nutrition Therapy    Visit Type: Reassessment    Rosi Lamb referred by Dr. Curiel for MNT related to Obesity    Patient accompanied by MAME Solis at the group home where Rosi lives.    Nutrition Assessment:  Anthropometrics  Height: .  170.2 cm (5' 7\") BMI:    55.44  Weight:  (!) 160.6 kg (354 lb) BSA:  2.75  IBW (kg):  Female: 61.8 Male: 66.8      Rosi has lost 7# in 4 weeks which is right on track for weight loss goals. She reports that have made changes with nutrition including reducing CHO, choosing sparkling water instead of 7-up and watching portions.  She also has increased exercise by walking many days each week [facility]     Nutrition Diagnosis:   BMI 55, improving. Weight loss of 7# in 4 weeks (desired)    Nutrition Intervention:   continue with plan for nutrition and exercise  Reviewed snack choices today. Goal 0-1 CHO choice each snack    Nutrition Goals:   (1) 2 CHO choices per meal, 1 per snack  (2) Exercise 4 days per week, 30 min+  (3) continue to reduce calories with beverages and drink zero sugar choices    Nutrition Follow Up / Monitoring:   3 months      Time spent: 30 minutes  Patient has RD contact information to call/email if needed.  KRISTIN K. KLINEFELTER, RD on 5/17/2019 at 2:43 PM                          ROS      Physical Exam      "

## 2019-05-19 ENCOUNTER — OFFICE VISIT (OUTPATIENT)
Dept: FAMILY MEDICINE | Facility: OTHER | Age: 44
End: 2019-05-19
Attending: NURSE PRACTITIONER
Payer: MEDICARE

## 2019-05-19 VITALS
HEIGHT: 67 IN | RESPIRATION RATE: 16 BRPM | HEART RATE: 89 BPM | DIASTOLIC BLOOD PRESSURE: 80 MMHG | WEIGHT: 293 LBS | OXYGEN SATURATION: 98 % | SYSTOLIC BLOOD PRESSURE: 128 MMHG | TEMPERATURE: 97.9 F | BODY MASS INDEX: 45.99 KG/M2

## 2019-05-19 DIAGNOSIS — K04.7 DENTAL ABSCESS: Primary | ICD-10-CM

## 2019-05-19 PROCEDURE — G0463 HOSPITAL OUTPT CLINIC VISIT: HCPCS

## 2019-05-19 PROCEDURE — 99213 OFFICE O/P EST LOW 20 MIN: CPT | Performed by: NURSE PRACTITIONER

## 2019-05-19 ASSESSMENT — PAIN SCALES - GENERAL: PAINLEVEL: EXTREME PAIN (8)

## 2019-05-19 ASSESSMENT — MIFFLIN-ST. JEOR: SCORE: 2298.36

## 2019-05-19 NOTE — NURSING NOTE
Chief Complaint   Patient presents with     Dental Pain       Medication Reconciliation: complete   Patient presents with abscess tooth on lower left for a few days. Patient has no dental appt. Andria Stanley LPN .............5/19/2019  12:35 PM

## 2019-05-19 NOTE — LETTER
[facility]  [address]  [facility] [address]  Phone: [phone]  Fax: [phone]    May 19, 2019        Rosi Lamb  [address] [address]          To whom it may concern:    RE: Rosi Lamb    Patient was seen and treated today at our clinic.    Rosi is prescribed Augmentin - take 1 pill twice a day for 10 days.  Take with food.    Please contact me for questions or concerns.      Sincerely,        Sarah Acosta NP

## 2019-05-19 NOTE — PROGRESS NOTES
HPI:    Rosi Lamb is a 44 year old female  who presents to clinic today for dental concern.    Bottom left tooth with pain, swelling and pus pocket for the past 4 days.  No foul odor or taste.  No facial swelling or tenderness.  No fevers.  No difficulty swallowing.  She is on a cancellation list for the dentist in [place].  Taking Ibuprofen 400 mg once daily for pain with some relief.    She lives in a group home.      Past Medical History:   Diagnosis Date     Edema     No Comments Provided     Encounter for removal and reinsertion of intrauterine contraceptive device     05,IUD placement, Removed     Excessive and frequent menstruation with irregular cycle     2017     Major depressive disorder, single episode     No Comments Provided     Personal history of other medical treatment (CODE)     G3, P2-0-1-2 with history of spontaneous      Personal history of other medical treatment (CODE)     No Comments Provided     Uncomplicated asthma     No Comments Provided     Past Surgical History:   Procedure Laterality Date     ANKLE SURGERY      fracture, repair with screws     CONIZATION LEEP      ,Underwent a loop     LAPAROSCOPIC TUBAL LIGATION      ,tubal ligation     Social History     Tobacco Use     Smoking status: Former Smoker     Packs/day: 1.00     Years: 3.00     Pack years: 3.00     Types: Cigarettes     Last attempt to quit: 2003     Years since quittin.3     Smokeless tobacco: Never Used   Substance Use Topics     Alcohol use: Not Currently     Alcohol/week: 0.0 oz     Current Outpatient Medications   Medication Sig Dispense Refill     acetaminophen (TYLENOL) 500 MG tablet Take 1 tablet (500 mg) by mouth every 6 hours as needed for fever or pain 100 tablet 5     albuterol (VENTOLIN HFA) 108 (90 BASE) MCG/ACT Inhaler Inhale 1-2 puffs into the lungs every 4 hours as needed       ARIPiprazole (ABILIFY) 5 MG tablet Take 5 mg by mouth daily       B Complex Vitamins  "(VITAMIN-B COMPLEX) TABS Take 1 tablet by mouth daily for low b12. Start 2/16/2017       cholecalciferol (VITAMIN D3) 5000 units CAPS capsule TAKE 1 CAPSULE BY MOUTH ONCE DAILY. FOR VITAMIN D DEFICIENCY - DOSEINCREASE 2/16/2017 100 capsule 2     cyanocobalamin (CYANOCOBALAMIN) 1000 MCG/ML injection INJECT 1 ML INTRAMUSCULARLY EVERY 2 WEEKS. 1 mL 11     gabapentin (NEURONTIN) 300 MG capsule TAKE 1 CAPSULE BY MOUTH DAILY FOR 3 DAYS, THEN INCREASE TO TAKE 1 CAPSULE TWICE DAILY FOR 3 DAYS, THEN 1 CAPSULE 3 TIMES DAILY.  1     hydrOXYzine (ATARAX) 50 MG tablet TAKE 2 TABLETS (100MG) BY MOUTH NIGHTLY AT BEDTIME AND 1 TABLET ONCE DAILY AS NEEDED       ibuprofen (ADVIL/MOTRIN) 200 MG tablet Take 600 mg by mouth every 6 hours as needed for pain       ketoconazole (XOLEGEL) 2 % external gel Apply once daily up to 14 days 45 g 0     lamoTRIgine (LAMICTAL) 200 MG tablet TAKE 1 TABLET BY MOUTH NIGHTLY AT BEDTIME  2     lamoTRIgine (LAMICTAL) 25 MG tablet TAKE 2 TABLETS BY MOUTH DAILY  2     montelukast (SINGULAIR) 10 MG tablet TAKE 1 TABLET BY MOUTH AT BEDTIME. 90 tablet 3     oxybutynin ER (DITROPAN-XL) 5 MG 24 hr tablet TAKE 1 TAB BY MOUTH ONCE DAILY 90 tablet 2     permethrin (ELIMITE) 5 % cream Apply cream from neck down; leave on for 8-14 hours then wash off with water; reapply in 1 week if rash is not improved or is spreading. 60 g 1     triamcinolone (KENALOG) 0.1 % cream Apply sparingly to affected area three times daily as needed 80 g 2     vilazodone (VIIBRYD) 40 MG TABS tablet Take 40 mg by mouth daily with food       Misc. Devices (ROLLER WALKER) MISC Rolling Walker for home use.  2 wheels       order for DME Equipment being ordered: right knee brace 1 each 0     SAFETY-ELBERT TB SYRINGE 25G X 5/8\" 1 ML MISC SAFETY GLIDE-FOR B-12 SHOTS 4 each 3     syringe/needle, sisp, 25G X 5/8\" 1 ML MISC Safety glide - for B12 Shots       Allergies   Allergen Reactions     Clonazepam Other (See Comments)     Causes Violence and " "aggresssion     Hydrocodone-Acetaminophen      Other reaction(s): Seizures  Can take Percocet without difficulty.     Lorazepam Other (See Comments)     Causes Violence and aggresssion     Sertraline Other (See Comments)     Caused suicidally      Bupropion Other (See Comments)     Caused Major Depression     Dust Mite Extract      Other reaction(s): Asthma symptoms     Lithium Unknown     Pollen Extract      Other reaction(s): Asthma symptoms     Risperidone Unknown     Sulfa Drugs Unknown     Trichophyton      Other reaction(s): Asthma symptoms     Valproic Acid Unknown         Past medical history, past surgical history, current medications and allergies reviewed and accurate to the best of my knowledge.        ROS:  Refer to HPI    /80 (BP Location: Right arm, Patient Position: Sitting, Cuff Size: Adult Large)   Pulse 89   Temp 97.9  F (36.6  C) (Tympanic)   Resp 16   Ht 1.702 m (5' 7\")   Wt (!) 160.6 kg (354 lb)   SpO2 98%   Breastfeeding? No   BMI 55.44 kg/m      EXAM:  General Appearance: Well appearing adult female, non ill appearance, appropriate appearance for age. No acute distress  Eyes: conjunctivae normal without erythema or irritation, no drainage or crusting, no eyelid swelling, pupils equal   Orophayrnx: moist mucous membranes, posterior pharynx without erythema, tonsils without hypertrophy, no erythema, no exudates or petechiae, no post nasal drip seen, no trismus, voice clear.  Left lower 2nd premolar with mild tenderness to palpation, poor dentition, and surrounding gum with erythema, swelling and appearance of small external abscess of gum along buccal surface.  Left lower 1st molar with poor dentition and mild tenderness to palpation.    Sinuses:  No sinus tenderness upon palpation of the frontal or maxillary sinuses  Nose: no drainage or congestion   Neck: supple without adenopathy  Respiratory: normal chest wall and respirations.  Normal effort.  Clear to auscultation " bilaterally, no wheezing, crackles or rhonchi.  No increased work of breathing.  No cough appreciated, oxygen saturation 98%   Cardiac: RRR with no murmurs  Musculoskeletal:  Normal gait.  Equal movement of bilateral upper extremities.  Equal movement of bilateral lower extremities.    Dermatological: no facial swelling or rash   Psychological: normal affect, alert and pleasant              ASSESSMENT/PLAN:  1. Dental abscess    - amoxicillin-clavulanate (AUGMENTIN) 875-125 MG tablet; Take 1 tablet by mouth 2 times daily for 10 days  Dispense: 20 tablet; Refill: 0    Symptomatic treatment - Encouraged fluids, salt water gargles, mouth rinse, dental hygiene, etc     Tylenol or ibuprofen PRN    Discussed warning signs/symptoms indicative of need to f/u    Follow up with dentist as soon as possible

## 2019-05-19 NOTE — PATIENT INSTRUCTIONS
Augmentin twice daily x 10 days     Warm packs to side of mouth/face    Salt water rinses    Mouth rinse    Continue Ibuprofen for pain and swelling    Follow up with dentist

## 2019-05-20 ENCOUNTER — OFFICE VISIT (OUTPATIENT)
Dept: UROLOGY | Facility: OTHER | Age: 44
End: 2019-05-20
Attending: UROLOGY
Payer: MEDICARE

## 2019-05-20 VITALS
HEART RATE: 96 BPM | DIASTOLIC BLOOD PRESSURE: 74 MMHG | WEIGHT: 293 LBS | RESPIRATION RATE: 16 BRPM | BODY MASS INDEX: 55.05 KG/M2 | SYSTOLIC BLOOD PRESSURE: 122 MMHG

## 2019-05-20 DIAGNOSIS — N39.41 URGE INCONTINENCE: Primary | ICD-10-CM

## 2019-05-20 PROCEDURE — 51798 US URINE CAPACITY MEASURE: CPT | Performed by: UROLOGY

## 2019-05-20 PROCEDURE — 99203 OFFICE O/P NEW LOW 30 MIN: CPT | Performed by: UROLOGY

## 2019-05-20 PROCEDURE — G0463 HOSPITAL OUTPT CLINIC VISIT: HCPCS | Mod: 25

## 2019-05-20 RX ORDER — OXYBUTYNIN CHLORIDE 10 MG/1
10 TABLET, EXTENDED RELEASE ORAL DAILY
Qty: 90 TABLET | Refills: 3 | Status: SHIPPED | OUTPATIENT
Start: 2019-05-20 | End: 2020-04-22

## 2019-05-20 ASSESSMENT — PAIN SCALES - GENERAL: PAINLEVEL: NO PAIN (0)

## 2019-05-20 NOTE — NURSING NOTE
Review of Systems:    Weight loss:    Yes     Recent fever/chills:  No   Night sweats:   No  Current skin rash:  No   Recent hair loss:  No  Heat intolerance:  No   Cold intolerance:  No  Chest pain:   No   Palpitations:   No  Shortness of breath:  No   Wheezing:   No  Constipation:    No   Diarrhea:   No   Nausea:   No   Vomiting:   No   Kidney/side pain:  Yes   Back pain:   Yes  Frequent headaches:  No   Dizziness:     Yes  Leg swelling:   Yes   Calf pain:    No    Parents, brothers or sisters with history of kidney cancer:   No  Parents, brothers or sisters with history of bladder cancer: No    Post-Void Residual  A post-void residual was measured by ultrasonic bladder scanner.  29 mL

## 2019-05-20 NOTE — PROGRESS NOTES
I was asked to see this patient by Dr Curiel and provide my opinion about the following: Incontinence  Type of Visit  Consult    Chief Complaint  Incontinence    HPI  Ms. Lamb is a 44 year old female who presents with incontinence.  Patient leaks urine about once a day.  Volume of incontinence is described as small .  Overall, leaking urine interferes (bother score) with daily living approximately 6/10.  Patient uses 2 pads per day.  Onset was 10 years ago.  Patient denies: dysuria and gross hematuria.    Patient has tried oxybutynin XL 5mg daily, started 10 years ago.  She reports minimal benefit.    Leakage of urine occurs with urgency? Yes  Leakage of urine occurs with valsalva? No  Leakage of urine occurs without sensation? No      Past Medical History  She  has a past medical history of Edema, Encounter for removal and reinsertion of intrauterine contraceptive device, Excessive and frequent menstruation with irregular cycle, Major depressive disorder, single episode, Personal history of other medical treatment (CODE), Personal history of other medical treatment (CODE), and Uncomplicated asthma.  Patient Active Problem List   Diagnosis     Alcohol use disorder, moderate, in sustained remission, dependence (H)     Allergic rhinitis due to pollen     Generalized anxiety disorder     Bilateral chronic knee pain     Bilateral foot pain     Borderline personality disorder (H)     Cannabis use disorder, moderate, in sustained remission, dependence (H)     Closed dislocation of tarsal joint     Crepitus of joint of right knee     Other disorder of menstruation and other abnormal bleeding from female genital tract     Elevated random blood glucose level     Esophageal reflux     Lymphedema of both lower extremities     Menorrhagia with irregular cycle     Moderate persistent asthma     Morbid obesity with BMI of 50.0-59.9, adult (H)     Peripheral polyneuropathy     Preop examination     Urinary incontinence      History of substance abuse     Stasis dermatitis of both legs     Vitamin B12 deficiency     Vitamin D deficiency     Abnormal Pap smear of cervix     Bipolar I disorder, most recent episode depressed, moderate (H)       Past Surgical History  She  has a past surgical history that includes Conization leep; Laparoscopic tubal ligation; and Ankle surgery.    Medications  She has a current medication list which includes the following prescription(s): acetaminophen, albuterol, amoxicillin-clavulanate, aripiprazole, cholecalciferol, cyanocobalamin, gabapentin, hydroxyzine, ibuprofen, lamotrigine, lamotrigine, roller walker, montelukast, order for dme, oxybutynin er, safety-michael tb syringe, syringe/needle (sisp), triamcinolone, and vilazodone.    Allergies  Allergies   Allergen Reactions     Clonazepam Other (See Comments)     Causes Violence and aggresssion     Hydrocodone-Acetaminophen      Other reaction(s): Seizures  Can take Percocet without difficulty.     Lorazepam Other (See Comments)     Causes Violence and aggresssion     Sertraline Other (See Comments)     Caused suicidally      Bupropion Other (See Comments)     Caused Major Depression     Dust Mite Extract      Other reaction(s): Asthma symptoms     Lithium Unknown     Pollen Extract      Other reaction(s): Asthma symptoms     Risperidone Unknown     Sulfa Drugs Unknown     Trichophyton      Other reaction(s): Asthma symptoms     Valproic Acid Unknown       Social History  She  reports that she quit smoking about 16 years ago. Her smoking use included cigarettes. She has a 3.00 pack-year smoking history. She has never used smokeless tobacco. She reports that she drank alcohol. She reports that she does not use drugs.  No drug abuse.    Family History  Family History   Problem Relation Age of Onset     Other - See Comments Mother         No Known Problems     Asthma Father         Asthma,+ Leg edema, knee, hip replacement. + Lymphedema     Other - See Comments  Paternal Grandmother         Lymphedema     Osteoporosis Brother         Osteoporosis     Other - See Comments Brother         No Known Problems       Review of Systems  I personally reviewed the ROS with the patient.    Nursing Notes:   Anjali Sotelo RN  5/20/2019 10:15 AM  Signed  Review of Systems:    Weight loss:    Yes     Recent fever/chills:  No   Night sweats:   No  Current skin rash:  No   Recent hair loss:  No  Heat intolerance:  No   Cold intolerance:  No  Chest pain:   No   Palpitations:   No  Shortness of breath:  No   Wheezing:   No  Constipation:    No   Diarrhea:   No   Nausea:   No   Vomiting:   No   Kidney/side pain:  Yes   Back pain:   Yes  Frequent headaches:  No   Dizziness:     Yes  Leg swelling:   Yes   Calf pain:    No    Parents, brothers or sisters with history of kidney cancer:   No  Parents, brothers or sisters with history of bladder cancer: No    Post-Void Residual  A post-void residual was measured by ultrasonic bladder scanner.  29 mL    Physical Exam  Vitals:    05/20/19 1008   BP: 122/74   BP Location: Right arm   Patient Position: Sitting   Cuff Size: Adult Large   Pulse: 96   Resp: 16   Weight: (!) 159.4 kg (351 lb 8 oz)     Constitutional: NAD, WDWN  Head: NCAT  Eyes: Conjunctivae normal  Cardiovascular: Regular rate  Pulmonary/Chest: Respirations are even and non-labored bilaterally  Abdominal: Soft with no distension, tenderness, masses, guarding or CVA tenderness  Neurological: A + O x 3,  cranial nerves II-XII grossly intact  Extremities: MORAIMA x 4, warm, no clubbing, no cyanosis.  Significant bilateral lower extremity lymphedema  Skin: Pink, warm, dry with no rash  Psychiatric:  Normal mood and affect  Genitourinary: Nonpalpable bladder    Labs  Results for orders placed or performed in visit on 05/06/19   Urinalysis w Reflex Microscopic If Positive   Result Value Ref Range    Color Urine Yellow     Appearance Urine Clear     Glucose Urine Negative NEG^Negative mg/dL     Bilirubin Urine Negative NEG^Negative    Ketones Urine Negative NEG^Negative mg/dL    Specific Gravity Urine <1.005 1.000 - 1.030    Blood Urine Negative NEG^Negative    pH Urine 6.0 5.0 - 9.0 pH    Protein Albumin Urine Negative NEG^Negative mg/dL    Urobilinogen Urine 0.2 0.2 - 1.0 EU/dL    Nitrite Urine Negative NEG^Negative    Leukocyte Esterase Urine Negative NEG^Negative    Source Midstream Urine    Urine Culture Aerobic Bacterial   Result Value Ref Range    Specimen Description Midstream Urine     Culture Micro       10,000 to 50,000 colonies/mL  mixed urogenital amie  No further identification or sensitivity done         Post-Void Residual  A post-void residual was measured by ultrasonic bladder scanner.  29 mL today    Assessment  Ms. Lamb is a 44 year old female who presents with clinically predominate urge incontinence.  We discussed urge versus stress incontinence.  She has no stress symptoms.  She has been on oxybutynin long-term however the dose is possibly subtherapeutic.    We did discuss alternative options such as Myrbetriq, Botox, PTNM, InterStim.  Her bilateral lower extremity lymphedema would likely make PTNM unsuccessful.    Plan  Increase dose of oxybutynin XL to 10mg daily  Follow in 2-4 weeks and if not at goal we will consider Botox.

## 2019-05-27 DIAGNOSIS — E55.9 VITAMIN D DEFICIENCY: ICD-10-CM

## 2019-06-10 ENCOUNTER — OFFICE VISIT (OUTPATIENT)
Dept: UROLOGY | Facility: OTHER | Age: 44
End: 2019-06-10
Attending: UROLOGY
Payer: MEDICARE

## 2019-06-10 VITALS — HEART RATE: 84 BPM | RESPIRATION RATE: 12 BRPM | BODY MASS INDEX: 54.07 KG/M2 | WEIGHT: 293 LBS

## 2019-06-10 DIAGNOSIS — N39.41 URGE INCONTINENCE: Primary | ICD-10-CM

## 2019-06-10 PROCEDURE — G0463 HOSPITAL OUTPT CLINIC VISIT: HCPCS | Mod: 25

## 2019-06-10 PROCEDURE — 99213 OFFICE O/P EST LOW 20 MIN: CPT | Performed by: UROLOGY

## 2019-06-10 PROCEDURE — 51798 US URINE CAPACITY MEASURE: CPT | Performed by: UROLOGY

## 2019-06-10 ASSESSMENT — PAIN SCALES - GENERAL: PAINLEVEL: NO PAIN (0)

## 2019-06-10 NOTE — NURSING NOTE
Review of Systems:    Weight loss:    Yes     Recent fever/chills:  No   Night sweats:   No  Current skin rash:  No   Recent hair loss:  No  Heat intolerance:  No   Cold intolerance:  No  Chest pain:   No   Palpitations:   No  Shortness of breath:  No   Wheezing:   No  Constipation:    No   Diarrhea:   No   Nausea:   No   Vomiting:   No   Kidney/side pain:  Yes   Back pain:   Yes  Frequent headaches:  No   Dizziness:     No  Leg swelling:   Yes   Calf pain:    Yes    Post-Void Residual  A post-void residual was measured by ultrasonic bladder scanner.  0 mL

## 2019-06-10 NOTE — PROGRESS NOTES
Type of Visit  EST    Chief Complaint  Incontinence    HPI  Ms. Lamb is a 44 year old female who follows up with incontinence.  At the prior visit I recommended increasing the dose of oxybutynin XL from 5 mg daily to 10 mg daily.  She had been on 5 mg daily for over 10 years.  Dose increased has significantly improved her symptoms with somewhat minimal side effects.  Patient leaks urine about once a day.  Volume of incontinence is described as small .  Overall, leaking urine interferes (bother score) with daily living approximately 4/10.  This was previously 6 out of 10.  She is using 1 pad to 2 pads per day  Onset was 11 years ago.  Patient denies gross hematuria and dysuria.      Family History  Family History   Problem Relation Age of Onset     Other - See Comments Mother         No Known Problems     Asthma Father         Asthma,+ Leg edema, knee, hip replacement. + Lymphedema     Other - See Comments Paternal Grandmother         Lymphedema     Osteoporosis Brother         Osteoporosis     Other - See Comments Brother         No Known Problems     Review of Systems  I personally reviewed the ROS with the patient.    Nursing Notes:   Anjali Sotelo RN  6/10/2019 10:26 AM  Signed  Review of Systems:    Weight loss:    Yes     Recent fever/chills:  No   Night sweats:   No  Current skin rash:  No   Recent hair loss:  No  Heat intolerance:  No   Cold intolerance:  No  Chest pain:   No   Palpitations:   No  Shortness of breath:  No   Wheezing:   No  Constipation:    No   Diarrhea:   No   Nausea:   No   Vomiting:   No   Kidney/side pain:  Yes   Back pain:   Yes  Frequent headaches:  No   Dizziness:     No  Leg swelling:   Yes   Calf pain:    Yes    Post-Void Residual  A post-void residual was measured by ultrasonic bladder scanner.  0 mL    Physical Exam  Vitals:    06/10/19 1019   Pulse: 84   Resp: 12   Weight: (!) 156.6 kg (345 lb 3.2 oz)     Constitutional: NAD, WDWN  Head: NCAT  Eyes: Conjunctivae  normal  Cardiovascular: Regular rate  Pulmonary/Chest: Respirations are even and non-labored bilaterally  Abdominal: Soft with no distension, tenderness, masses, guarding or CVA tenderness  Neurological: A + O x 3,  cranial nerves II-XII grossly intact  Extremities: MORAIMA x 4, warm, no clubbing, no cyanosis.  Significant bilateral lower extremity lymphedema  Skin: Pink, warm, dry with no rash  Psychiatric:  Normal mood and affect  Genitourinary: Nonpalpable bladder    Labs  Results for orders placed or performed in visit on 05/06/19   Urinalysis w Reflex Microscopic If Positive   Result Value Ref Range    Color Urine Yellow     Appearance Urine Clear     Glucose Urine Negative NEG^Negative mg/dL    Bilirubin Urine Negative NEG^Negative    Ketones Urine Negative NEG^Negative mg/dL    Specific Gravity Urine <1.005 1.000 - 1.030    Blood Urine Negative NEG^Negative    pH Urine 6.0 5.0 - 9.0 pH    Protein Albumin Urine Negative NEG^Negative mg/dL    Urobilinogen Urine 0.2 0.2 - 1.0 EU/dL    Nitrite Urine Negative NEG^Negative    Leukocyte Esterase Urine Negative NEG^Negative    Source Midstream Urine    Urine Culture Aerobic Bacterial   Result Value Ref Range    Specimen Description Midstream Urine     Culture Micro       10,000 to 50,000 colonies/mL  mixed urogenital amie  No further identification or sensitivity done         Post-Void Residual  A post-void residual was measured by ultrasonic bladder scanner.  0 mL today  29mL (previously recorded)    Assessment  Ms. Lamb is a 44 year old female who follows up with clinically predominate urge incontinence managed fairly well with an anticholinergic.  We discussed urge versus stress incontinence.  She has no stress symptoms.    Plan  Continue oxybutynin XL 10mg daily  Follow-up in 1 year

## 2019-06-19 ENCOUNTER — ALLIED HEALTH/NURSE VISIT (OUTPATIENT)
Dept: NUTRITION | Facility: OTHER | Age: 44
End: 2019-06-19

## 2019-06-19 VITALS — BODY MASS INDEX: 45.99 KG/M2 | HEIGHT: 67 IN | WEIGHT: 293 LBS

## 2019-06-19 ASSESSMENT — MIFFLIN-ST. JEOR: SCORE: 2248.47

## 2019-06-19 NOTE — PROGRESS NOTES
Patient is here for a weight check today.    Weight: 343#  Has lost 9# in one month.      Is on track with nutriiton and fitness goals set from last RD visit.    Continue with plan.    KRISTIN K. KLINEFELTER, RD on 6/19/2019 at 2:07 PM          ROS      Physical Exam

## 2019-07-15 ENCOUNTER — ALLIED HEALTH/NURSE VISIT (OUTPATIENT)
Dept: NUTRITION | Facility: OTHER | Age: 44
End: 2019-07-15

## 2019-07-15 NOTE — PROGRESS NOTES
Patient is here for a weight check today.     Weight: 341#  Has lost 11# in 6 weeks     Is on track with nutriiton and fitness goals set from last RD visit.     Continue with plan.    KRISTIN K. KLINEFELTER, RD on 7/15/2019 at 1:04 PM          ROS      Physical Exam

## 2019-08-14 ENCOUNTER — ALLIED HEALTH/NURSE VISIT (OUTPATIENT)
Dept: FAMILY MEDICINE | Facility: OTHER | Age: 44
End: 2019-08-14
Attending: INTERNAL MEDICINE
Payer: MEDICARE

## 2019-08-14 VITALS — BODY MASS INDEX: 51.92 KG/M2 | WEIGHT: 293 LBS

## 2019-08-14 DIAGNOSIS — E66.01 MORBID OBESITY WITH BMI OF 50.0-59.9, ADULT (H): Primary | ICD-10-CM

## 2019-08-14 NOTE — NURSING NOTE
Patient presents to clinic today for weight check only  Yuly Beasley CMA([facility])..................8/14/2019   1:20 PM

## 2019-08-28 ENCOUNTER — OFFICE VISIT (OUTPATIENT)
Dept: FAMILY MEDICINE | Facility: OTHER | Age: 44
End: 2019-08-28
Attending: PHYSICIAN ASSISTANT
Payer: MEDICARE

## 2019-08-28 VITALS
SYSTOLIC BLOOD PRESSURE: 100 MMHG | TEMPERATURE: 96.6 F | DIASTOLIC BLOOD PRESSURE: 82 MMHG | HEART RATE: 80 BPM | RESPIRATION RATE: 18 BRPM | WEIGHT: 293 LBS | BODY MASS INDEX: 52 KG/M2

## 2019-08-28 DIAGNOSIS — H60.332 ACUTE SWIMMER'S EAR OF LEFT SIDE: Primary | ICD-10-CM

## 2019-08-28 PROCEDURE — 99213 OFFICE O/P EST LOW 20 MIN: CPT | Performed by: PHYSICIAN ASSISTANT

## 2019-08-28 PROCEDURE — G0463 HOSPITAL OUTPT CLINIC VISIT: HCPCS

## 2019-08-28 RX ORDER — OFLOXACIN 3 MG/ML
5 SOLUTION AURICULAR (OTIC) 2 TIMES DAILY
Qty: 10 ML | Refills: 0 | Status: SHIPPED | OUTPATIENT
Start: 2019-08-28 | End: 2019-12-20

## 2019-08-28 ASSESSMENT — PATIENT HEALTH QUESTIONNAIRE - PHQ9: SUM OF ALL RESPONSES TO PHQ QUESTIONS 1-9: 0

## 2019-08-28 NOTE — PATIENT INSTRUCTIONS
Started on ear drops for the left ear.     Please take tylenol or ibuprofen as needed for ear pain.  Monitor for any fevers or chills. Return in 7-10 days if not feeling better. Please call clinic with any questions or concerns. Please take in a lot offluids and get rest. Discouraged swimming while patient has the infection.    Encouraged to use warm compresses with a warm washcloth or a heating pad on the lowest setting for 10-15 minutes at a time several times daily for comfort.     May use cough syrup or cough drops.  Using a humidifier works well to break up the congestion. You can also sleep propped up on a couple pillows to decrease symptoms at night.     You will need to be evaluated if you start to experience:  Fever higher than 102.5 F (39.2 C)   Sudden and severe pain in the face and head   Trouble seeing or seeing double   Trouble thinking clearly   Swelling or redness around 1 or both eyes   Trouble breathing or a stiff neck    Call 9-1-1 or go to the emergency room if you:  Have trouble breathing   Are drooling because you cannot swallow your saliva   Have swelling of the neck or tongue   Cannot move your neck or have trouble opening your mouth

## 2019-08-28 NOTE — PROGRESS NOTES
Nursing Notes:   Dona Rodriguez LPN  2019 11:39 AM  Signed  Chief Complaint   Patient presents with     Ear Problem     Left         Medication Reconciliation: complete    Dona Rodriguez LPN      HPI:    Rosi Lamb is a 44 year old female who presents for left ear itching. Pain x couple days.  Patient has history of ear infections.  She was swimming about 1 week ago.  History of allergies.  No sinus pain or pressure.  No sore throat, ear drainage, cough, wheezing, rattling.  No fevers or chills.      Past Medical History:   Diagnosis Date     Edema     No Comments Provided     Encounter for removal and reinsertion of intrauterine contraceptive device     05,IUD placement, Removed     Excessive and frequent menstruation with irregular cycle     2017     Major depressive disorder, single episode     No Comments Provided     Personal history of other medical treatment (CODE)     G3, P2-0-1-2 with history of spontaneous      Personal history of other medical treatment (CODE)     No Comments Provided     Uncomplicated asthma     No Comments Provided       Past Surgical History:   Procedure Laterality Date     ANKLE SURGERY      fracture, repair with screws     CONIZATION LEEP      ,Underwent a loop     LAPAROSCOPIC TUBAL LIGATION      ,tubal ligation       Family History   Problem Relation Age of Onset     Other - See Comments Mother         No Known Problems     Asthma Father         Asthma,+ Leg edema, knee, hip replacement. + Lymphedema     Other - See Comments Paternal Grandmother         Lymphedema     Osteoporosis Brother         Osteoporosis     Other - See Comments Brother         No Known Problems       Social History     Tobacco Use     Smoking status: Former Smoker     Packs/day: 1.00     Years: 3.00     Pack years: 3.00     Types: Cigarettes     Last attempt to quit: 2003     Years since quittin.6     Smokeless tobacco: Never Used  "  Substance Use Topics     Alcohol use: Not Currently     Alcohol/week: 0.0 oz       Current Outpatient Medications   Medication Sig Dispense Refill     acetaminophen (TYLENOL) 500 MG tablet Take 1 tablet (500 mg) by mouth every 6 hours as needed for fever or pain 100 tablet 5     albuterol (VENTOLIN HFA) 108 (90 BASE) MCG/ACT Inhaler Inhale 1-2 puffs into the lungs every 4 hours as needed       ARIPiprazole (ABILIFY) 5 MG tablet Take 5 mg by mouth daily       cholecalciferol (VITAMIN D3) 5000 units (125 mcg) capsule TAKE 1 CAPSULE BY MOUTH ONCE DAILY. FOR VITAMIN D DEFICIENCY - DOSEINCREASE 2/16/2017 100 capsule 2     cyanocobalamin (CYANOCOBALAMIN) 1000 MCG/ML injection INJECT 1 ML INTRAMUSCULARLY EVERY 2 WEEKS. 1 mL 11     gabapentin (NEURONTIN) 300 MG capsule TAKE 1 CAPSULE BY MOUTH DAILY FOR 3 DAYS, THEN INCREASE TO TAKE 1 CAPSULE TWICE DAILY FOR 3 DAYS, THEN 1 CAPSULE 3 TIMES DAILY.  1     hydrOXYzine (ATARAX) 50 MG tablet TAKE 2 TABLETS (100MG) BY MOUTH NIGHTLY AT BEDTIME AND 1 TABLET ONCE DAILY AS NEEDED       ibuprofen (ADVIL/MOTRIN) 200 MG tablet Take 600 mg by mouth every 6 hours as needed for pain       lamoTRIgine (LAMICTAL) 200 MG tablet TAKE 1 TABLET BY MOUTH NIGHTLY AT BEDTIME  2     lamoTRIgine (LAMICTAL) 25 MG tablet TAKE 2 TABLETS BY MOUTH DAILY  2     Misc. Devices (ROLLER WALKER) MISC Rolling Walker for home use.  2 wheels       montelukast (SINGULAIR) 10 MG tablet TAKE 1 TABLET BY MOUTH AT BEDTIME. 90 tablet 3     ofloxacin (FLOXIN) 0.3 % otic solution Place 5 drops Into the left ear 2 times daily for 7 days 10 mL 0     order for DME Equipment being ordered: right knee brace 1 each 0     oxybutynin ER (DITROPAN-XL) 10 MG 24 hr tablet Take 1 tablet (10 mg) by mouth daily 90 tablet 3     oxybutynin ER (DITROPAN-XL) 5 MG 24 hr tablet TAKE 1 TAB BY MOUTH ONCE DAILY 90 tablet 2     SAFETY-ELBERT TB SYRINGE 25G X 5/8\" 1 ML MISC SAFETY GLIDE-FOR B-12 SHOTS 4 each 3     syringe/needle, sisp, 25G X 5/8\" 1 " ML MISC Safety glide - for B12 Shots       triamcinolone (KENALOG) 0.1 % cream Apply sparingly to affected area three times daily as needed 80 g 2     vilazodone (VIIBRYD) 40 MG TABS tablet Take 40 mg by mouth daily with food         Allergies   Allergen Reactions     Clonazepam Other (See Comments)     Causes Violence and aggresssion     Hydrocodone-Acetaminophen      Other reaction(s): Seizures  Can take Percocet without difficulty.     Lorazepam Other (See Comments)     Causes Violence and aggresssion     Sertraline Other (See Comments)     Caused suicidally      Bupropion Other (See Comments)     Caused Major Depression     Dust Mite Extract      Other reaction(s): Asthma symptoms     Lithium Unknown     Pollen Extract      Other reaction(s): Asthma symptoms     Risperidone Unknown     Sulfa Drugs Unknown     Trichophyton      Other reaction(s): Asthma symptoms     Valproic Acid Unknown       REVIEW OF SYSTEMS:  Refer to HPI.    EXAM:   Vitals:    /82 (BP Location: Right arm, Patient Position: Sitting, Cuff Size: Adult Large)   Pulse 80   Temp 96.6  F (35.9  C)   Resp 18   Wt (!) 150.6 kg (332 lb)   BMI 52.00 kg/m      General Appearance: Pleasant, alert, appropriate appearance for age. No acute distress  Ear Exam: Normal TM's bilaterally, grey, translucent, bony landmarks appreciated.   Left/Right TM: Effusion is not present. TM is not bulging. There is no pus appreciated.    Mildly erythematous left auditory canal.  Normal external ears.  Left ear tender with pinna movement.  OroPharynx Exam: Non-erythematous posterior pharynx with no exudates. No sinus pain upon palpation of frontal, ethmoid, and maxillary sinuses.  Chest/Respiratory Exam: Normal chest wall and respirations. Clear to auscultation. No retractions appreciated.  Cardiovascular Exam: Regular rate and rhythm. S1, S2, no murmur, click, gallop, or rubs.  Lymphatic Exam: Neg.  Skin: no rash or abnormalities  Psychiatric Exam: Alert and  oriented - appropriate affect.    PHQ Depression Screen  PHQ-9 SCORE 4/26/2019 5/6/2019 8/28/2019   PHQ-9 Total Score 0 0 0         ASSESSMENT AND PLAN:      ICD-10-CM    1. Acute swimmer's ear of left side H60.332 ofloxacin (FLOXIN) 0.3 % otic solution       Acute swimmer's ear of the left side: Started on ofloxacin eardrops.    Patient Instructions   Started on ear drops for the left ear.     Please take tylenol or ibuprofen as needed for ear pain.  Monitor for any fevers or chills. Return in 7-10 days if not feeling better. Please call clinic with any questions or concerns. Please take in a lot offluids and get rest. Discouraged swimming while patient has the infection.    Encouraged to use warm compresses with a warm washcloth or a heating pad on the lowest setting for 10-15 minutes at a time several times daily for comfort.     May use cough syrup or cough drops.  Using a humidifier works well to break up the congestion. You can also sleep propped up on a couple pillows to decrease symptoms at night.     You will need to be evaluated if you start to experience:  Fever higher than 102.5 F (39.2 C)   Sudden and severe pain in the face and head   Trouble seeing or seeing double   Trouble thinking clearly   Swelling or redness around 1 or both eyes   Trouble breathing or a stiff neck    Call 9-1-1 or go to the emergency room if you:  Have trouble breathing   Are drooling because you cannot swallow your saliva   Have swelling of the neck or tongue   Cannot move your neck or have trouble opening your mouth        Liz Diaz PA-C PA-C..................8/28/2019 11:37 AM

## 2019-08-28 NOTE — NURSING NOTE
Chief Complaint   Patient presents with     Ear Problem     Left         Medication Reconciliation: complete    Dona Rodriguez, LPN

## 2019-09-11 ENCOUNTER — ALLIED HEALTH/NURSE VISIT (OUTPATIENT)
Dept: FAMILY MEDICINE | Facility: OTHER | Age: 44
End: 2019-09-11
Attending: INTERNAL MEDICINE
Payer: MEDICARE

## 2019-09-11 VITALS — BODY MASS INDEX: 51.47 KG/M2 | WEIGHT: 293 LBS

## 2019-09-11 DIAGNOSIS — E66.01 MORBID OBESITY WITH BMI OF 50.0-59.9, ADULT (H): Primary | ICD-10-CM

## 2019-09-18 DIAGNOSIS — E53.8 VITAMIN B12 DEFICIENCY: ICD-10-CM

## 2019-09-23 NOTE — TELEPHONE ENCOUNTER
Routing refill request to provider for review/approval because:  Drug not on the [facility] refill protocol   LOV: 5/6/19  Shirley Wren RN on 9/23/2019 at 8:46 AM

## 2019-10-02 ENCOUNTER — ALLIED HEALTH/NURSE VISIT (OUTPATIENT)
Dept: FAMILY MEDICINE | Facility: OTHER | Age: 44
End: 2019-10-02
Attending: INTERNAL MEDICINE
Payer: MEDICARE

## 2019-10-02 VITALS — BODY MASS INDEX: 51.06 KG/M2 | WEIGHT: 293 LBS

## 2019-10-02 DIAGNOSIS — E66.01 MORBID OBESITY WITH BMI OF 50.0-59.9, ADULT (H): Primary | ICD-10-CM

## 2019-10-16 ENCOUNTER — ALLIED HEALTH/NURSE VISIT (OUTPATIENT)
Dept: FAMILY MEDICINE | Facility: OTHER | Age: 44
End: 2019-10-16
Attending: INTERNAL MEDICINE
Payer: MEDICARE

## 2019-10-16 DIAGNOSIS — Z23 NEED FOR PROPHYLACTIC VACCINATION AND INOCULATION AGAINST INFLUENZA: Primary | ICD-10-CM

## 2019-10-16 PROCEDURE — 90686 IIV4 VACC NO PRSV 0.5 ML IM: CPT

## 2019-10-16 PROCEDURE — 90471 IMMUNIZATION ADMIN: CPT

## 2019-12-20 ENCOUNTER — OFFICE VISIT (OUTPATIENT)
Dept: FAMILY MEDICINE | Facility: OTHER | Age: 44
End: 2019-12-20
Attending: NURSE PRACTITIONER
Payer: MEDICARE

## 2019-12-20 VITALS
TEMPERATURE: 97.7 F | WEIGHT: 293 LBS | OXYGEN SATURATION: 98 % | BODY MASS INDEX: 45.99 KG/M2 | RESPIRATION RATE: 24 BRPM | SYSTOLIC BLOOD PRESSURE: 120 MMHG | HEIGHT: 67 IN | HEART RATE: 104 BPM | DIASTOLIC BLOOD PRESSURE: 74 MMHG

## 2019-12-20 DIAGNOSIS — Z13.1 SCREENING FOR DIABETES MELLITUS: ICD-10-CM

## 2019-12-20 DIAGNOSIS — R42 DIZZINESS: Primary | ICD-10-CM

## 2019-12-20 DIAGNOSIS — R73.09 ELEVATED GLUCOSE LEVEL: ICD-10-CM

## 2019-12-20 DIAGNOSIS — R35.0 URINARY FREQUENCY: ICD-10-CM

## 2019-12-20 DIAGNOSIS — Z13.29 SCREENING FOR THYROID DISORDER: ICD-10-CM

## 2019-12-20 LAB
ALBUMIN UR-MCNC: NEGATIVE MG/DL
ANION GAP SERPL CALCULATED.3IONS-SCNC: 5 MMOL/L (ref 3–14)
APPEARANCE UR: CLEAR
BILIRUB UR QL STRIP: NEGATIVE
BUN SERPL-MCNC: 13 MG/DL (ref 7–25)
CALCIUM SERPL-MCNC: 9.8 MG/DL (ref 8.6–10.3)
CHLORIDE SERPL-SCNC: 102 MMOL/L (ref 98–107)
CO2 SERPL-SCNC: 31 MMOL/L (ref 21–31)
COLOR UR AUTO: YELLOW
CREAT SERPL-MCNC: 1.05 MG/DL (ref 0.6–1.2)
ERYTHROCYTE [DISTWIDTH] IN BLOOD BY AUTOMATED COUNT: 12.6 % (ref 10–15)
GFR SERPL CREATININE-BSD FRML MDRD: 57 ML/MIN/{1.73_M2}
GLUCOSE SERPL-MCNC: 82 MG/DL (ref 70–105)
GLUCOSE UR STRIP-MCNC: NEGATIVE MG/DL
HBA1C MFR BLD: 5 % (ref 4–6)
HCT VFR BLD AUTO: 43.4 % (ref 35–47)
HGB BLD-MCNC: 13.7 G/DL (ref 11.7–15.7)
HGB UR QL STRIP: NEGATIVE
KETONES UR STRIP-MCNC: NEGATIVE MG/DL
LEUKOCYTE ESTERASE UR QL STRIP: NEGATIVE
MCH RBC QN AUTO: 29.7 PG (ref 26.5–33)
MCHC RBC AUTO-ENTMCNC: 31.6 G/DL (ref 31.5–36.5)
MCV RBC AUTO: 94 FL (ref 78–100)
NITRATE UR QL: NEGATIVE
PH UR STRIP: 5.5 PH (ref 5–9)
PLATELET # BLD AUTO: 235 10E9/L (ref 150–450)
POTASSIUM SERPL-SCNC: 4.2 MMOL/L (ref 3.5–5.1)
RBC # BLD AUTO: 4.61 10E12/L (ref 3.8–5.2)
SODIUM SERPL-SCNC: 138 MMOL/L (ref 134–144)
SOURCE: NORMAL
SP GR UR STRIP: 1.01 (ref 1–1.03)
TSH SERPL DL<=0.05 MIU/L-ACNC: 2.77 IU/ML (ref 0.34–5.6)
UROBILINOGEN UR STRIP-ACNC: 0.2 EU/DL (ref 0.2–1)
WBC # BLD AUTO: 6.6 10E9/L (ref 4–11)

## 2019-12-20 PROCEDURE — 36415 COLL VENOUS BLD VENIPUNCTURE: CPT | Mod: ZL | Performed by: NURSE PRACTITIONER

## 2019-12-20 PROCEDURE — 80048 BASIC METABOLIC PNL TOTAL CA: CPT | Mod: ZL | Performed by: NURSE PRACTITIONER

## 2019-12-20 PROCEDURE — 81003 URINALYSIS AUTO W/O SCOPE: CPT | Mod: ZL | Performed by: NURSE PRACTITIONER

## 2019-12-20 PROCEDURE — 85027 COMPLETE CBC AUTOMATED: CPT | Mod: ZL | Performed by: NURSE PRACTITIONER

## 2019-12-20 PROCEDURE — G0463 HOSPITAL OUTPT CLINIC VISIT: HCPCS

## 2019-12-20 PROCEDURE — 99214 OFFICE O/P EST MOD 30 MIN: CPT | Performed by: NURSE PRACTITIONER

## 2019-12-20 PROCEDURE — 83036 HEMOGLOBIN GLYCOSYLATED A1C: CPT | Mod: ZL | Performed by: NURSE PRACTITIONER

## 2019-12-20 PROCEDURE — 84443 ASSAY THYROID STIM HORMONE: CPT | Mod: ZL,GZ | Performed by: NURSE PRACTITIONER

## 2019-12-20 ASSESSMENT — ENCOUNTER SYMPTOMS
LIGHT-HEADEDNESS: 1
RHINORRHEA: 0
DYSURIA: 0
POLYPHAGIA: 0
POLYDIPSIA: 0
HEMATOCHEZIA: 0
PHOTOPHOBIA: 0
FREQUENCY: 0
CHILLS: 0
VOMITING: 0
SPEECH DIFFICULTY: 0
FEVER: 0
COUGH: 0
WEAKNESS: 0
DIARRHEA: 0

## 2019-12-20 ASSESSMENT — ANXIETY QUESTIONNAIRES
5. BEING SO RESTLESS THAT IT IS HARD TO SIT STILL: NOT AT ALL
1. FEELING NERVOUS, ANXIOUS, OR ON EDGE: NOT AT ALL
3. WORRYING TOO MUCH ABOUT DIFFERENT THINGS: NOT AT ALL
IF YOU CHECKED OFF ANY PROBLEMS ON THIS QUESTIONNAIRE, HOW DIFFICULT HAVE THESE PROBLEMS MADE IT FOR YOU TO DO YOUR WORK, TAKE CARE OF THINGS AT HOME, OR GET ALONG WITH OTHER PEOPLE: NOT DIFFICULT AT ALL
6. BECOMING EASILY ANNOYED OR IRRITABLE: NOT AT ALL
7. FEELING AFRAID AS IF SOMETHING AWFUL MIGHT HAPPEN: NOT AT ALL
2. NOT BEING ABLE TO STOP OR CONTROL WORRYING: NOT AT ALL
GAD7 TOTAL SCORE: 0

## 2019-12-20 ASSESSMENT — MIFFLIN-ST. JEOR: SCORE: 2122.03

## 2019-12-20 ASSESSMENT — PAIN SCALES - GENERAL: PAINLEVEL: NO PAIN (0)

## 2019-12-20 ASSESSMENT — PATIENT HEALTH QUESTIONNAIRE - PHQ9
5. POOR APPETITE OR OVEREATING: NOT AT ALL
SUM OF ALL RESPONSES TO PHQ QUESTIONS 1-9: 0

## 2019-12-20 NOTE — PROGRESS NOTES
SUBJECTIVE:   Rosi Lamb is a 44 year old female who presents to clinic today for the following health issues:    HPI  Patient presents for evaluation of dizziness. She feels like it happens sporadically, no aggravating factors. No problems with sitting to standing. Reports it is intermittent over the last few weeks. No diarrhea/vomiting. No recent illness. No chest pain or palpitations. No syncope. She has no vaginal or rectal bleeding. No medication adjustments. Denies substance abuse. Non-smoker. No alcohol use. She notes LE lymphedema is unchanged, does not wear wraps or stockings. She is working out at the [employer] in order to lose weight and is on a low carb diet. No history of diabetes. She is not on antihypertensives. She has intentional weight loss of 15 pounds in the last 6 months with increasing exercise and dietary changes.   She is not symptomatic at this visit.     Patient Active Problem List    Diagnosis Date Noted     Abnormal Pap smear of cervix 04/11/2018     Priority: Medium     Overview:   had scraping of cervix       Allergic rhinitis due to pollen 02/08/2018     Priority: Medium     Esophageal reflux 02/08/2018     Priority: Medium     History of substance abuse (H) 02/08/2018     Priority: Medium     Bilateral chronic knee pain 04/20/2017     Priority: Medium     Crepitus of joint of right knee 04/20/2017     Priority: Medium     Menorrhagia with irregular cycle 02/16/2017     Priority: Medium     Preop examination 02/16/2017     Priority: Medium     Bilateral foot pain 12/18/2016     Priority: Medium     Elevated random blood glucose level 12/18/2016     Priority: Medium     Peripheral polyneuropathy 12/18/2016     Priority: Medium     Vitamin B12 deficiency 12/18/2016     Priority: Medium     Vitamin D deficiency 12/18/2016     Priority: Medium     Lymphedema of both lower extremities 11/22/2016     Priority: Medium     Stasis dermatitis of both legs 08/17/2016     Priority: Medium      Overview:   Updated per 10/1/17 IMO import       Alcohol use disorder, moderate, in sustained remission, dependence (H) 2015     Priority: Medium     Generalized anxiety disorder 2015     Priority: Medium     Cannabis use disorder, moderate, in sustained remission, dependence (H) 2015     Priority: Medium     Bipolar I disorder, most recent episode depressed, moderate (H) 2015     Priority: Medium     Morbid obesity with BMI of 50.0-59.9, adult (H) 2015     Priority: Medium     Moderate persistent asthma 2013     Priority: Medium     Overview:   AAP completed on 13  Triggers: pollen, fumes, exercise, URI, warm weather. Peak flow today is 250. Symptomatic: moderate  Overview:   Overview:   AAP completed on 13  Triggers: pollen, fumes, exercise, URI, warm weather. Peak flow today is 250. Symptomatic: moderate  Overview:   AAP completed on 13  Triggers: pollen, fumes, exercise, URI, warm weather. Peak flow today is 250. Symptomatic: moderate       Urinary incontinence 03/15/2013     Priority: Medium     Closed dislocation of tarsal joint 2011     Priority: Medium     Borderline personality disorder (H) 2003     Priority: Medium     Overview:   [facility].  Overview:   Overview:   [facility].       Other disorder of menstruation and other abnormal bleeding from female genital tract 2001     Priority: Medium     Overview:   DUB, dysmenorrhea  Overview:   Overview:   DUB, dysmenorrhea       Past Medical History:   Diagnosis Date     Edema     No Comments Provided     Encounter for removal and reinsertion of intrauterine contraceptive device     05,IUD placement, Removed     Excessive and frequent menstruation with irregular cycle     2017     Major depressive disorder, single episode     No Comments Provided     Personal history of other medical treatment (CODE)     G3, P2-0-1-2 with history of spontaneous       "Personal history of other medical treatment (CODE)     No Comments Provided     Uncomplicated asthma     No Comments Provided      Past Surgical History:   Procedure Laterality Date     ANKLE SURGERY      fracture, repair with screws     CONIZATION LEEP      07/04,Underwent a loop     LAPAROSCOPIC TUBAL LIGATION      2009,tubal ligation       Review of Systems   Constitutional: Negative for chills and fever.   HENT: Negative for ear discharge, ear pain, hearing loss, rhinorrhea and tinnitus.    Eyes: Negative for photophobia and visual disturbance.   Respiratory: Negative for cough.    Gastrointestinal: Negative for diarrhea, hematochezia and vomiting.   Endocrine: Negative for cold intolerance, heat intolerance, polydipsia, polyphagia and polyuria.   Genitourinary: Negative for dysuria, frequency and vaginal bleeding.   Neurological: Positive for light-headedness. Negative for syncope, speech difficulty and weakness.        OBJECTIVE:     /74 (BP Location: Right arm, Patient Position: Sitting, Cuff Size: Adult Large)   Pulse 104   Temp 97.7  F (36.5  C) (Tympanic)   Resp 24   Ht 1.702 m (5' 7\")   Wt 142.9 kg (315 lb 2 oz)   LMP  (LMP Unknown)   SpO2 98%   Breastfeeding No   BMI 49.36 kg/m    Body mass index is 49.36 kg/m .  Physical Exam  Constitutional:       Appearance: She is obese.   HENT:      Head: Normocephalic.      Right Ear: Tympanic membrane normal.      Left Ear: Tympanic membrane normal.   Eyes:      Extraocular Movements: Extraocular movements intact.      Conjunctiva/sclera: Conjunctivae normal.      Pupils: Pupils are equal, round, and reactive to light.   Cardiovascular:      Rate and Rhythm: Normal rate and regular rhythm.      Heart sounds: No murmur.   Pulmonary:      Effort: Pulmonary effort is normal.      Breath sounds: Normal breath sounds.   Lymphadenopathy:      Cervical: No cervical adenopathy.   Skin:     General: Skin is warm and dry.   Neurological:      Mental Status: " She is alert.   Psychiatric:         Mood and Affect: Mood normal.     Bilateral LE lymphedema  Orthostatic BPs normal.     Diagnostic Test Results:  Results for orders placed or performed in visit on 12/20/19   Urinalysis w Reflex Microscopic If Positive     Status: None   Result Value Ref Range    Color Urine Yellow     Appearance Urine Clear     Glucose Urine Negative NEG^Negative mg/dL    Bilirubin Urine Negative NEG^Negative    Ketones Urine Negative NEG^Negative mg/dL    Specific Gravity Urine 1.015 1.000 - 1.030    Blood Urine Negative NEG^Negative    pH Urine 5.5 5.0 - 9.0 pH    Protein Albumin Urine Negative NEG^Negative mg/dL    Urobilinogen Urine 0.2 0.2 - 1.0 EU/dL    Nitrite Urine Negative NEG^Negative    Leukocyte Esterase Urine Negative NEG^Negative    Source Midstream Urine    CBC W PLT No Diff     Status: None   Result Value Ref Range    WBC 6.6 4.0 - 11.0 10e9/L    RBC Count 4.61 3.8 - 5.2 10e12/L    Hemoglobin 13.7 11.7 - 15.7 g/dL    Hematocrit 43.4 35.0 - 47.0 %    MCV 94 78 - 100 fl    MCH 29.7 26.5 - 33.0 pg    MCHC 31.6 31.5 - 36.5 g/dL    RDW 12.6 10.0 - 15.0 %    Platelet Count 235 150 - 450 10e9/L   Hemoglobin A1c     Status: None   Result Value Ref Range    Hemoglobin A1C 5.0 4.0 - 6.0 %   TSH     Status: None   Result Value Ref Range    Thyrotropin 2.77 0.34 - 5.60 IU/mL   Basic Metabolic Panel     Status: Abnormal   Result Value Ref Range    Sodium 138 134 - 144 mmol/L    Potassium 4.2 3.5 - 5.1 mmol/L    Chloride 102 98 - 107 mmol/L    Carbon Dioxide 31 21 - 31 mmol/L    Anion Gap 5 3 - 14 mmol/L    Glucose 82 70 - 105 mg/dL    Urea Nitrogen 13 7 - 25 mg/dL    Creatinine 1.05 0.60 - 1.20 mg/dL    GFR Estimate 57 (L) >60 mL/min/[1.73_m2]    GFR Estimate If Black 70 >60 mL/min/[1.73_m2]    Calcium 9.8 8.6 - 10.3 mg/dL     ASSESSMENT/PLAN:   1. Dizziness  BMP/CBC stable. Discussed considerations for diagnosis include volume depletion. Treatment includes oral hydration. Restart lymphedema  wraps or compression stockings, she could be having more pooling in LE resulting in dizziness. Also discussed having small snacks throughout the day to ensure she is not having episodes of hypoglycemia with new low carb diet. Good healthy snacks can be almonds or peanuts. Follow-up for worsening or symptoms that are not improving.    - Basic Metabolic Panel; Future  - CBC W PLT No Diff; Future  - CBC W PLT No Diff  - Basic Metabolic Panel    2. Urinary frequency  UA negative.   - Urinalysis w Reflex Microscopic If Positive    3. Screening for diabetes mellitus  HgbA1C normal    4. Screening for thyroid disorder  TSH normal.   - TSH; Future  - TSH    5. Elevated glucose level  HgbA1C normal  - Hemoglobin A1c; Future  - Hemoglobin A1c      Yuly Hernandez [facility]

## 2019-12-21 ASSESSMENT — ANXIETY QUESTIONNAIRES: GAD7 TOTAL SCORE: 0

## 2019-12-21 ASSESSMENT — ASTHMA QUESTIONNAIRES: ACT_TOTALSCORE: 25

## 2020-01-05 ENCOUNTER — OFFICE VISIT (OUTPATIENT)
Dept: FAMILY MEDICINE | Facility: OTHER | Age: 45
End: 2020-01-05
Attending: PHYSICIAN ASSISTANT
Payer: MEDICARE

## 2020-01-05 VITALS
HEIGHT: 67 IN | OXYGEN SATURATION: 99 % | TEMPERATURE: 98.1 F | RESPIRATION RATE: 20 BRPM | SYSTOLIC BLOOD PRESSURE: 140 MMHG | DIASTOLIC BLOOD PRESSURE: 88 MMHG | HEART RATE: 88 BPM | BODY MASS INDEX: 45.99 KG/M2 | WEIGHT: 293 LBS

## 2020-01-05 DIAGNOSIS — R39.89 URINARY PROBLEM: ICD-10-CM

## 2020-01-05 DIAGNOSIS — N94.9 VAGINAL DISCOMFORT: Primary | ICD-10-CM

## 2020-01-05 LAB
ALBUMIN UR-MCNC: NEGATIVE MG/DL
APPEARANCE UR: CLEAR
BILIRUB UR QL STRIP: NEGATIVE
COLOR UR AUTO: YELLOW
GLUCOSE UR STRIP-MCNC: NEGATIVE MG/DL
HGB UR QL STRIP: NEGATIVE
KETONES UR STRIP-MCNC: ABNORMAL MG/DL
LEUKOCYTE ESTERASE UR QL STRIP: NEGATIVE
NITRATE UR QL: NEGATIVE
PH UR STRIP: 7 PH (ref 5–9)
SOURCE: ABNORMAL
SP GR UR STRIP: 1.01 (ref 1–1.03)
SPECIMEN SOURCE: NORMAL
UROBILINOGEN UR STRIP-ACNC: 1 EU/DL (ref 0.2–1)
WET PREP SPEC: NORMAL

## 2020-01-05 PROCEDURE — 81003 URINALYSIS AUTO W/O SCOPE: CPT | Mod: ZL | Performed by: PHYSICIAN ASSISTANT

## 2020-01-05 PROCEDURE — 87210 SMEAR WET MOUNT SALINE/INK: CPT | Mod: ZL | Performed by: PHYSICIAN ASSISTANT

## 2020-01-05 PROCEDURE — 99213 OFFICE O/P EST LOW 20 MIN: CPT | Performed by: PHYSICIAN ASSISTANT

## 2020-01-05 PROCEDURE — G0463 HOSPITAL OUTPT CLINIC VISIT: HCPCS

## 2020-01-05 PROCEDURE — 87086 URINE CULTURE/COLONY COUNT: CPT | Mod: ZL | Performed by: PHYSICIAN ASSISTANT

## 2020-01-05 ASSESSMENT — MIFFLIN-ST. JEOR: SCORE: 2136.43

## 2020-01-05 NOTE — NURSING NOTE
Patient presents to the clinic for burning with urination and pelvic pain that started yesterday. Patient is concerned about a UTI.  Medication Reconciliation: complete    Ninoska Aviles, CMA

## 2020-01-05 NOTE — PROGRESS NOTES
"SUBJECTIVE: Rosi Lamb is a 44 year old female who complains of urinary frequency, urgency and dysuria x 1 day, course is gradually worsening  Associated symptoms - lower back discomfort  No fever, N/V, abdominal pain  She has bladder spasms and she does see Dr. Jacob    Treatments - drink more fluids, ibuprofen  History of UTI - infrequent infections about 1 per year  Vaginal discomfort/discharge - discomfort noted. No discharge  History of kidney stone, kidney infection, kidney disease - none  LMP - ablation a few years ago  BM - normal bowel    Past Medical History:   Diagnosis Date     Edema     No Comments Provided     Encounter for removal and reinsertion of intrauterine contraceptive device     05,IUD placement, Removed     Excessive and frequent menstruation with irregular cycle     2017     Major depressive disorder, single episode     No Comments Provided     Personal history of other medical treatment (CODE)     G3, P2-0-1-2 with history of spontaneous      Personal history of other medical treatment (CODE)     No Comments Provided     Uncomplicated asthma     No Comments Provided     Current Outpatient Medications   Medication     acetaminophen (TYLENOL) 500 MG tablet     albuterol (VENTOLIN HFA) 108 (90 BASE) MCG/ACT Inhaler     ARIPiprazole (ABILIFY) 5 MG tablet     cholecalciferol (VITAMIN D3) 5000 units (125 mcg) capsule     cyanocobalamin (CYANOCOBALAMIN) 1000 MCG/ML injection     gabapentin (NEURONTIN) 300 MG capsule     hydrOXYzine (ATARAX) 50 MG tablet     ibuprofen (ADVIL/MOTRIN) 200 MG tablet     lamoTRIgine (LAMICTAL) 200 MG tablet     lamoTRIgine (LAMICTAL) 25 MG tablet     Misc. Devices (ROLLER WALKER) MISC     montelukast (SINGULAIR) 10 MG tablet     order for DME     oxybutynin ER (DITROPAN-XL) 10 MG 24 hr tablet     oxybutynin ER (DITROPAN-XL) 5 MG 24 hr tablet     SAFETY-ELBERT TB SYRINGE 25G X 5/8\" 1 ML MISC     syringe/needle, sisp, 25G X 5/8\" 1 ML MISC     " "triamcinolone (KENALOG) 0.1 % cream     vilazodone (VIIBRYD) 40 MG TABS tablet     No current facility-administered medications for this visit.         Allergies   Allergen Reactions     Clonazepam Other (See Comments)     Causes Violence and aggresssion     Hydrocodone-Acetaminophen      Other reaction(s): Seizures  Can take Percocet without difficulty.     Lorazepam Other (See Comments)     Causes Violence and aggresssion     Sertraline Other (See Comments)     Caused suicidally      Bupropion Other (See Comments)     Caused Major Depression     Dust Mite Extract      Other reaction(s): Asthma symptoms     Lithium Unknown     Pollen Extract      Other reaction(s): Asthma symptoms     Risperidone Unknown     Sulfa Drugs Unknown     Trichophyton      Other reaction(s): Asthma symptoms     Valproic Acid Unknown         ROS  General: feels well, no fever  Abdomen: negative  : POSITIVE - per HPI    OBJECTIVE:  Vitals:    01/05/20 1240   BP: (!) 140/88   BP Location: Left arm   Patient Position: Sitting   Cuff Size: Adult Large   Pulse: 88   Resp: 20   Temp: 98.1  F (36.7  C)   TempSrc: Tympanic   SpO2: 99%   Weight: 144.4 kg (318 lb 4.8 oz)   Height: 1.702 m (5' 7\")      Appears well, in no apparent distress.  Vital signs are normal.  Cardiac: normal RR, no murmur  Respiratory: normal respiration, clear to ausculation  Abdomen: soft, TTP suprapubic, non tender, No rigidity, no rebound. No guarding. No CVAT  : inflammation at vaginal introitis and mildly over labia. Mild white homogenous discharge. Cervix is normal, mild CMT, no adnexal tenderness. Swab obtained for WET prep. Patient declined testing for C/G today    Results for orders placed or performed in visit on 01/05/20   Urinalysis w Reflex Microscopic If Positive     Status: Abnormal   Result Value Ref Range    Color Urine Yellow     Appearance Urine Clear     Glucose Urine Negative NEG^Negative mg/dL    Bilirubin Urine Negative NEG^Negative    Ketones Urine " Trace (A) NEG^Negative mg/dL    Specific Gravity Urine 1.015 1.000 - 1.030    Blood Urine Negative NEG^Negative    pH Urine 7.0 5.0 - 9.0 pH    Protein Albumin Urine Negative NEG^Negative mg/dL    Urobilinogen Urine 1.0 0.2 - 1.0 EU/dL    Nitrite Urine Negative NEG^Negative    Leukocyte Esterase Urine Negative NEG^Negative    Source Unspecified Urine    Wet prep     Status: None   Result Value Ref Range    Specimen Description Vagina     Wet Prep No yeast seen     Wet Prep No Trichomonas seen     Wet Prep No clue cells seen          ASSESSMENT:   (N94.9) Vaginal discomfort  (primary encounter diagnosis)  Plan: Wet prep      (R39.89) Urinary problem  Plan: Urinalysis w Reflex Microscopic If Positive      Discomfort with urination and vaginal discomfort  UA is not definitive for infection, culture pending  Discussed consideration of BV or STI. Patient is agreeable to WET prep but declines C/G screen today. She hasn't been sexually active for a while & has had the same partner.   WET prep is negative, discussed that this could be a false negative and we could go ahead and treat for suspected BV. Patient would like to wait for treatment to see if symptoms persist  She also has history of bladder spasm and has followed with Dr. Jacob in past  Discussed symptomatic treatments per AVS  Follow up with PCP or Dr. Jacob if symptoms persist or worsen  Patient received verbal and written instruction including review of warning signs    Catherine Morales PA-C on 1/5/2020 at 3:12 PM

## 2020-01-05 NOTE — PATIENT INSTRUCTIONS
Urinary pain  Urinalysis is negative for infection  WET prep is negative for yeast, clue cells, trichomonas  Can try OTC AZO for 1-2 days  Push fluids  Follow up with PCP or Dr. Jacob for a recheck  Patient Education     Dysuria with Uncertain Cause (Adult)    The urethra is the tube that allows urine to pass out of the body. In a woman, the urethra is the opening above the vagina. In men, the urethra is the opening on the tip of the penis. Dysuria is the feeling of pain or burning in the urethra when passing urine.  Dysuria can be caused by anything that irritates or inflames the urethra. An infection or chemical irritation can cause this reaction. A bladder infection is the most common cause of dysuria in adults. A urine test can diagnose this. A bladder infection needs antibiotic treatment.  Soaps, lotions, colognes and feminine hygiene products can cause dysuria. So can birth control jellies, creams, and foams. It will go away 1 to 3 days after using these irritants.  Sexually transmitted diseases (STDs) such as chlamydia or gonorrhea can cause dysuria. Your healthcare provider may take a culture sample. Your provider may start you on antibiotic medicine before the culture test returns.  In women who have gone through menopause, dysuria can be from dryness in the lining of the urethra. This can be treated with hormones. Dysuria becomes long-term (chronic) when it lasts for weeks or months. You may need to see a specialist (urologist) to diagnose and treat chronic dysuria.  Home care  These home care tips may help:    Don't use any chemicals or products that you think may be causing your symptoms.    If you were given a prescription medicine, take as directed. Be sure to take it until it is all used up.    If a culture was taken, don't have sex until you have been told that it is negative. This means you don't have an infection. Then follow your healthcare provider's advice to treat your condition.  If a culture  was done and it is positive:    Both you and your sexual partner may need to be treated. This is true even if your partner has no symptoms.    Contact your healthcare provider or go to an urgent care clinic or the public health department to be looked at and treated.    Don't have sex until both you and your partner(s) have finished all antibiotics and your healthcare provider says you are no longer contagious.    Learn about and use safe sex practices. The safest sex is with a partner who has tested negative and only has sex with you. Condoms can prevent STDs from spreading, but they aren't a guarantee.  Follow-up care  Follow up with your healthcare provider, or as advised. If a culture was taken, you may call as directed for the results. If you have an STD, follow up with your provider or the public health department for a complete STD screening, including HIV testing. For more information, contact CDC-INFO at 502-345-4738.  When to seek medical advice  Call your healthcare provider right away if any of these occur:    You aren't better after 3 days of treatment    Fever of 100.4 F (38 C) or higher, or as directed by your healthcare provider    Back or belly pain that gets worse    You can't urinate because of pain    New discharge from the urethra, vagina, or penis    Painful sores on the penis    Rash or joint pain    Painful lumps (lymph nodes) in the groin    Testicle pain or swelling of the scrotum  Date Last Reviewed: 11/1/2016 2000-2019 [employer]. [address] [place] [address]. All rights reserved. This information is not intended as a substitute for professional medical care. Always follow your healthcare professional's instructions.

## 2020-01-07 LAB
BACTERIA SPEC CULT: NORMAL
SPECIMEN SOURCE: NORMAL

## 2020-02-03 ENCOUNTER — OFFICE VISIT (OUTPATIENT)
Dept: UROLOGY | Facility: OTHER | Age: 45
End: 2020-02-03
Attending: UROLOGY
Payer: MEDICARE

## 2020-02-03 VITALS
RESPIRATION RATE: 20 BRPM | BODY MASS INDEX: 49.96 KG/M2 | DIASTOLIC BLOOD PRESSURE: 76 MMHG | HEART RATE: 72 BPM | WEIGHT: 293 LBS | SYSTOLIC BLOOD PRESSURE: 112 MMHG

## 2020-02-03 DIAGNOSIS — N39.41 URGE INCONTINENCE: Primary | ICD-10-CM

## 2020-02-03 PROCEDURE — 99214 OFFICE O/P EST MOD 30 MIN: CPT | Performed by: UROLOGY

## 2020-02-03 PROCEDURE — G0463 HOSPITAL OUTPT CLINIC VISIT: HCPCS

## 2020-02-03 ASSESSMENT — PAIN SCALES - GENERAL: PAINLEVEL: MODERATE PAIN (4)

## 2020-02-03 NOTE — PROGRESS NOTES
Type of Visit  EST    Chief Complaint  Incontinence, urge    HPI  Ms. Lamb is a 44 year old female who follows up with incontinence.  The patient's clinical bother regarding urgency and urge incontinence has progressively worsened over the last 2 years.  She has been treated with oxybutynin XL 10 mg daily.  She started this medication many years ago.  Initially her urinary symptoms improved to goal.  About a year ago I increased the dose due to progressively worsening symptoms.  Today she follows up very frustrated with the worsening urgency and urge incontinence.  She describes it as a moderate to high clinical bother.  She is highly motivated for a different treatment approach.  She is using 2-3 pads per day  Onset was 12 years ago.  Patient denies gross hematuria and dysuria.    The patient has been seen twice in the last 2 months for these symptoms.  Urinalyses both visits were negative for blood and infection.      Family History  Family History   Problem Relation Age of Onset     Other - See Comments Mother         No Known Problems     Asthma Father         Asthma,+ Leg edema, knee, hip replacement. + Lymphedema     Other - See Comments Paternal Grandmother         Lymphedema     Osteoporosis Brother         Osteoporosis     Other - See Comments Brother         No Known Problems     Review of Systems  I personally reviewed the ROS with the patient.    Nursing Notes:   Kassandra Miller LPN  2/3/2020  2:34 PM  Signed  Pt presents to clinic for incontinence follow up    Review of Systems:    Weight loss:    Yes     Recent fever/chills:  No   Night sweats:   No  Current skin rash:  No   Recent hair loss:  No  Heat intolerance:  No   Cold intolerance:  No  Chest pain:   No   Palpitations:   No  Shortness of breath:  No   Wheezing:   No  Constipation:    No   Diarrhea:   No   Nausea:   No   Vomiting:   No   Kidney/side pain:  No   Back pain:   Yes  Frequent headaches:  No   Dizziness:     No  Leg  swelling:   Yes   Calf pain:    No        Physical Exam  Vitals:    02/03/20 1415   BP: 112/76   BP Location: Right arm   Patient Position: Sitting   Cuff Size: Adult Large   Pulse: 72   Resp: 20   Weight: 144.7 kg (319 lb)     Constitutional: NAD, WDWN  Head: NCAT  Eyes: Conjunctivae normal  Cardiovascular: Regular rate  Pulmonary/Chest: Respirations are even and non-labored bilaterally  Abdominal: Soft with no distension, tenderness, masses, guarding or CVA tenderness  Neurological: A + O x 3,  cranial nerves II-XII grossly intact  Extremities: MORAIMA x 4, warm, no clubbing, no cyanosis.  Significant bilateral lower extremity lymphedema  Skin: Pink, warm, dry with no rash  Psychiatric:  Normal mood and affect  Genitourinary: Nonpalpable bladder    Labs  Results for BEATRICE BOYCE (MRN [id number]) as of 2/3/2020 16:18   12/20/2019 10:57 1/5/2020 12:39   Color Urine Yellow Yellow   Appearance Urine Clear Clear   Glucose Urine Negative Negative   Bilirubin Urine Negative Negative   Ketones Urine Negative Trace (A)   Specific Gravity Urine 1.015 1.015   pH Urine 5.5 7.0   Protein Albumin Urine Negative Negative   Urobilinogen Urine 0.2 1.0   Nitrite Urine Negative Negative   Blood Urine Negative Negative   Leukocyte Esterase Urine Negative Negative   Source Midstream Urine Unspecified Urine       Post-Void Residual  A post-void residual was measured by ultrasonic bladder scanner.  0 mL (previously recorded)  29mL (previously recorded)    Assessment  Ms. Boyec is a 45 year old female who follows up with clinically predominate urge incontinence with progressively worsening symptoms in spite of dose escalation of an anticholinergic.    Patient complains of significant urinary frequency, urgency and urge incontinence.    Prior treatments include attempted behavior modification and anticholinergic medication.  Failure of anticholinergics due to poor efficacy and intolerable side effects.  Treatment options were  discussed: Botox injection, posterior tibial nerve stimulation, and Interstim neuromodulation.      Patient elected to proceed with intradetrusor Botox injections.  Discussed risks of UTI and/or urinary retention (6%) requiring self cath or indwelling catheter for a temporary period of time.  The benefit lasts about 6 months on average and I explained the need for retreatment.    Plan  Intravesical Botox 100 units in the clinic  Discontinue oxybutynin XL 10mg daily

## 2020-02-03 NOTE — NURSING NOTE
Pt presents to clinic for incontinence follow up    Review of Systems:    Weight loss:    Yes     Recent fever/chills:  No   Night sweats:   No  Current skin rash:  No   Recent hair loss:  No  Heat intolerance:  No   Cold intolerance:  No  Chest pain:   No   Palpitations:   No  Shortness of breath:  No   Wheezing:   No  Constipation:    No   Diarrhea:   No   Nausea:   No   Vomiting:   No   Kidney/side pain:  No   Back pain:   Yes  Frequent headaches:  No   Dizziness:     No  Leg swelling:   Yes   Calf pain:    No

## 2020-02-05 ENCOUNTER — TELEPHONE (OUTPATIENT)
Dept: UROLOGY | Facility: OTHER | Age: 45
End: 2020-02-05

## 2020-02-05 NOTE — TELEPHONE ENCOUNTER
After receiving a prior authorization for botox, I called patient to schedule. She states at this time she is declining the appointment. She will see how she does over the next couple weeks and may call to schedule at that time.    Gisel Quintana on 2/5/2020 at 1:12 PM

## 2020-02-10 DIAGNOSIS — J45.40 MODERATE PERSISTENT ASTHMA, UNSPECIFIED WHETHER COMPLICATED: ICD-10-CM

## 2020-02-11 RX ORDER — MONTELUKAST SODIUM 10 MG/1
TABLET ORAL
Qty: 90 TABLET | Refills: 3 | OUTPATIENT
Start: 2020-02-11

## 2020-02-11 NOTE — TELEPHONE ENCOUNTER
Montelukast refilled on 4/4/19 #90 x 3 refills to [facility].    Shirley Wren RN on 2/11/2020 at 11:34 AM

## 2020-03-11 ENCOUNTER — HOSPITAL ENCOUNTER (OUTPATIENT)
Dept: MAMMOGRAPHY | Facility: OTHER | Age: 45
End: 2020-03-11
Attending: INTERNAL MEDICINE
Payer: MEDICARE

## 2020-03-11 ENCOUNTER — OFFICE VISIT (OUTPATIENT)
Dept: FAMILY MEDICINE | Facility: OTHER | Age: 45
End: 2020-03-11
Attending: PHYSICIAN ASSISTANT
Payer: MEDICARE

## 2020-03-11 VITALS
TEMPERATURE: 96.6 F | HEART RATE: 84 BPM | SYSTOLIC BLOOD PRESSURE: 124 MMHG | RESPIRATION RATE: 20 BRPM | HEIGHT: 67 IN | BODY MASS INDEX: 45.99 KG/M2 | WEIGHT: 293 LBS | DIASTOLIC BLOOD PRESSURE: 80 MMHG

## 2020-03-11 DIAGNOSIS — Z01.419 PAP TEST, AS PART OF ROUTINE GYNECOLOGICAL EXAMINATION: ICD-10-CM

## 2020-03-11 DIAGNOSIS — Z12.31 VISIT FOR SCREENING MAMMOGRAM: ICD-10-CM

## 2020-03-11 DIAGNOSIS — Z00.00 ROUTINE HISTORY AND PHYSICAL EXAMINATION OF ADULT: Primary | ICD-10-CM

## 2020-03-11 PROCEDURE — G0463 HOSPITAL OUTPT CLINIC VISIT: HCPCS

## 2020-03-11 PROCEDURE — 99396 PREV VISIT EST AGE 40-64: CPT | Performed by: PHYSICIAN ASSISTANT

## 2020-03-11 PROCEDURE — 87624 HPV HI-RISK TYP POOLED RSLT: CPT | Mod: ZL | Performed by: PHYSICIAN ASSISTANT

## 2020-03-11 PROCEDURE — 77067 SCR MAMMO BI INCL CAD: CPT

## 2020-03-11 PROCEDURE — 40001026 ZZHCL STATISTICAL PAP TEST QC: Performed by: PHYSICIAN ASSISTANT

## 2020-03-11 PROCEDURE — G0123 SCREEN CERV/VAG THIN LAYER: HCPCS | Performed by: PHYSICIAN ASSISTANT

## 2020-03-11 RX ORDER — ARIPIPRAZOLE 15 MG/1
TABLET ORAL
COMMUNITY
Start: 2020-02-24 | End: 2020-03-11

## 2020-03-11 RX ORDER — GABAPENTIN 300 MG/1
300 CAPSULE ORAL 2 TIMES DAILY
Refills: 1 | COMMUNITY
Start: 2020-03-11 | End: 2020-07-10

## 2020-03-11 RX ORDER — ARIPIPRAZOLE 15 MG/1
15 TABLET ORAL AT BEDTIME
Status: ON HOLD | COMMUNITY
Start: 2020-03-11 | End: 2022-10-31

## 2020-03-11 ASSESSMENT — MIFFLIN-ST. JEOR: SCORE: 2133.7

## 2020-03-11 ASSESSMENT — PATIENT HEALTH QUESTIONNAIRE - PHQ9: SUM OF ALL RESPONSES TO PHQ QUESTIONS 1-9: 0

## 2020-03-11 NOTE — PATIENT INSTRUCTIONS
"Healthy Strategies  1. Eat at least 3 meals a day and never skip breakfast.  2. Eat more slowly.  3. Decrease portion size.  4. Provide structure by using meal replacement bars or shakes, and/or low calorie frozen meals.  5. For good nutrition incorporate fruit, vegetables, whole grains, lean protein, and low-fat dairy.  6. Remove trigger foods from yourenvironment to avoid impulse eating.  7. Increase physical activity: get a pedometer and aim for 10,000 steps a day or 30-35 minutes of activity 5 days per week.  8. Weigh yourself daily or at least weekly.  9. Keep a record of what you eat and your activity.  10. Establish a support system such as afriend, group or program.    11. Read Tin Danielle's \"Eat to Live\". Remember it is important to have a minimum of 1200 calories a day, okay to use olive oil, 40 grams of fiber daily. No more than two servings (the size of your palm) of red meat a week.     Please consider the following general health recommendations:    Schedule a mammogram annually starting at the age of 40 years old unless recommended earlier by your primary care provider. Come for a general exam once yearly.    Eat a quality diet (generally, low in simple sugars, starches, cholesterol and saturated fat.)    Please get 1200 mg of calcium in divided doses with 800 units vitamin D in your diet daily. Take supplements as needed to obtain full recommended amounts.     Stay physically active. Regular walking or other exercise is one of the best ways to minimize pain of arthritis; maintain independence and mobility; maintain bone strength; maintain conditioning of your heart. Find something you enjoy and a friend to do it with you.    Maintain ideal weight. Your Body mass index is Body mass index is 49.93 kg/m .. Generally a BMI of 20-25 is considered ideal. Overweight is defined as 25-30, obese is 30-35 and markedly obese is greater than 35.    Apply sun block (SPF 25 or greater) on exposed skin anytime you " are out in the sun to prevent skin cancer.     Wear a seatbelt whenever you are in a car.    Obtain a flu shot every fall.    You should have a tetanus booster at least once every 10 years.    Check blood sugar annually. Cholesterol annually unless you have had a normal level when last checked within 5 years.     I recommend that you have a general physical exam every year. You should have a pap test every 3 years between the ages of 21 and 30 and every 3-5 years between the ages of 30 and 65 depending on your test unless you have had previous abnormal pap smears, (in these cases the exams and PAP's should be done on a schedule as recommended by your primary care provider). If you have had hysterectomy in the past, your future Pap plan may be different.

## 2020-03-11 NOTE — PROGRESS NOTES
Nursing Notes:   Dona Rodriguez LPN  3/11/2020 10:53 AM  Signed  Chief Complaint   Patient presents with     Physical         Medication Reconciliation: complete    Dona Rodriguez LPN      ANNUAL PHYSICAL - FEMALE    HPI: Rosi Lamb who presents for a yearly exam.  Concerns include: No acute concerns at this time.  Needs a female exam.    No LMP recorded. Patient has had an ablation.   Contraception: ablation, no abnormal vaginal bleeding  Risk for STI?: no, declines testing  Last pap: LGSIL with negative pap on 1/17/2018 - needs to be repeated  Any hx of abnormal paps:  ASCUS and neg HPV 1/11/2017  FH of early CA?: breast cancer and colon cancer  Cholesterol/DM concerns/screening: UTD  Tobacco?: former  Calcium intake: yes  DEXA: none  Last mammo: 3/11/2020  Colonoscopy: none  Immunizations: UTD    Patient Active Problem List    Diagnosis Date Noted     Abnormal Pap smear of cervix 04/11/2018     Priority: Medium     Overview:   had scraping of cervix       Allergic rhinitis due to pollen 02/08/2018     Priority: Medium     Esophageal reflux 02/08/2018     Priority: Medium     History of substance abuse (H) 02/08/2018     Priority: Medium     Bilateral chronic knee pain 04/20/2017     Priority: Medium     Crepitus of joint of right knee 04/20/2017     Priority: Medium     Menorrhagia with irregular cycle 02/16/2017     Priority: Medium     Preop examination 02/16/2017     Priority: Medium     Bilateral foot pain 12/18/2016     Priority: Medium     Elevated random blood glucose level 12/18/2016     Priority: Medium     Peripheral polyneuropathy 12/18/2016     Priority: Medium     Vitamin B12 deficiency 12/18/2016     Priority: Medium     Vitamin D deficiency 12/18/2016     Priority: Medium     Lymphedema of both lower extremities 11/22/2016     Priority: Medium     Stasis dermatitis of both legs 08/17/2016     Priority: Medium     Overview:   Updated per 10/1/17 IMO import       Alcohol  use disorder, moderate, in sustained remission, dependence (H) 2015     Priority: Medium     Generalized anxiety disorder 2015     Priority: Medium     Cannabis use disorder, moderate, in sustained remission, dependence (H) 2015     Priority: Medium     Bipolar I disorder, most recent episode depressed, moderate (H) 2015     Priority: Medium     Morbid obesity with BMI of 50.0-59.9, adult (H) 2015     Priority: Medium     Moderate persistent asthma 2013     Priority: Medium     Overview:   AAP completed on 13  Triggers: pollen, fumes, exercise, URI, warm weather. Peak flow today is 250. Symptomatic: moderate  Overview:   Overview:   AAP completed on 13  Triggers: pollen, fumes, exercise, URI, warm weather. Peak flow today is 250. Symptomatic: moderate  Overview:   AAP completed on 13  Triggers: pollen, fumes, exercise, URI, warm weather. Peak flow today is 250. Symptomatic: moderate       Urinary incontinence 03/15/2013     Priority: Medium     Closed dislocation of tarsal joint 2011     Priority: Medium     Borderline personality disorder (H) 2003     Priority: Medium     Overview:   [facility].  Overview:   Overview:   [facility].       Other disorder of menstruation and other abnormal bleeding from female genital tract 2001     Priority: Medium     Overview:   DUB, dysmenorrhea  Overview:   Overview:   DUB, dysmenorrhea         Past Medical History:   Diagnosis Date     Edema     No Comments Provided     Encounter for removal and reinsertion of intrauterine contraceptive device     05,IUD placement, Removed     Excessive and frequent menstruation with irregular cycle     2017     Major depressive disorder, single episode     No Comments Provided     Personal history of other medical treatment (CODE)     G3, P2-0-1-2 with history of spontaneous      Personal history of other medical treatment (CODE)      No Comments Provided     Uncomplicated asthma     No Comments Provided       Past Surgical History:   Procedure Laterality Date     ANKLE SURGERY      fracture, repair with screws     CONIZATION LEEP      ,Underwent a loop     LAPAROSCOPIC TUBAL LIGATION      ,tubal ligation       Family History   Problem Relation Age of Onset     Osteoporosis Mother      Asthma Father         Asthma,+ Leg edema, knee, hip replacement. + Lymphedema     Heart Failure Father      Other - See Comments Paternal Grandmother         Lymphedema     Osteoporosis Brother         Osteoporosis     Other - See Comments Brother         No Known Problems       Social History     Tobacco Use     Smoking status: Former Smoker     Packs/day: 1.00     Years: 3.00     Pack years: 3.00     Types: Cigarettes     Last attempt to quit: 2003     Years since quittin.2     Smokeless tobacco: Never Used   Substance Use Topics     Alcohol use: Not Currently     Alcohol/week: 0.0 standard drinks       Current Outpatient Medications   Medication Sig Dispense Refill     acetaminophen (TYLENOL) 500 MG tablet Take 1 tablet (500 mg) by mouth every 6 hours as needed for fever or pain 100 tablet 5     albuterol (VENTOLIN HFA) 108 (90 BASE) MCG/ACT Inhaler Inhale 1-2 puffs into the lungs every 4 hours as needed       ARIPiprazole (ABILIFY) 15 MG tablet Take 1 tablet (15 mg) by mouth daily       cholecalciferol (VITAMIN D3) 5000 units (125 mcg) capsule TAKE 1 CAPSULE BY MOUTH ONCE DAILY. FOR VITAMIN D DEFICIENCY - DOSEINCREASE 2017 100 capsule 2     cyanocobalamin (CYANOCOBALAMIN) 1000 MCG/ML injection INJECT 1 ML INTRAMUSCULARLY EVERY 2 WEEKS. 6 mL 11     gabapentin (NEURONTIN) 300 MG capsule Take 1 capsule (300 mg) by mouth 2 times daily  1     hydrOXYzine (ATARAX) 50 MG tablet TAKE 2 TABLETS (100MG) BY MOUTH NIGHTLY AT BEDTIME AND 1 TABLET ONCE DAILY AS NEEDED       ibuprofen (ADVIL/MOTRIN) 200 MG tablet Take 600 mg by mouth every 6 hours as  "needed for pain       lamoTRIgine (LAMICTAL) 200 MG tablet TAKE 1 TABLET BY MOUTH NIGHTLY AT BEDTIME  2     lamoTRIgine (LAMICTAL) 25 MG tablet TAKE 2 TABLETS BY MOUTH DAILY  2     Misc. Devices (ROLLER WALKER) MISC Rolling Walker for home use.  2 wheels       montelukast (SINGULAIR) 10 MG tablet TAKE 1 TABLET BY MOUTH AT BEDTIME. 90 tablet 3     order for DME Equipment being ordered: right knee brace 1 each 0     oxybutynin ER (DITROPAN-XL) 10 MG 24 hr tablet Take 1 tablet (10 mg) by mouth daily 90 tablet 3     SAFETY-ELBERT TB SYRINGE 25G X 5/8\" 1 ML MISC USE TO INJECT B-12 INTRAMUSCULARLY EVERY 2 WEEKS 100 each 0     syringe/needle, sisp, 25G X 5/8\" 1 ML MISC Safety glide - for B12 Shots       triamcinolone (KENALOG) 0.1 % cream Apply sparingly to affected area three times daily as needed 80 g 2     vilazodone (VIIBRYD) 40 MG TABS tablet Take 40 mg by mouth daily with food         Allergies   Allergen Reactions     Clonazepam Other (See Comments)     Causes Violence and aggresssion     Hydrocodone-Acetaminophen      Other reaction(s): Seizures  Can take Percocet without difficulty.     Lorazepam Other (See Comments)     Causes Violence and aggresssion     Sertraline Other (See Comments)     Caused suicidally      Bupropion Other (See Comments)     Caused Major Depression     Dust Mite Extract      Other reaction(s): Asthma symptoms     Lithium Unknown     Pollen Extract      Other reaction(s): Asthma symptoms     Risperidone Unknown     Sulfa Drugs Unknown     Trichophyton      Other reaction(s): Asthma symptoms     Valproic Acid Unknown       REVIEW OF SYSTEMS:  Refer to HPI.    PHYSICAL EXAM:  /80 (BP Location: Right arm, Patient Position: Sitting, Cuff Size: Adult Large)   Pulse 84   Temp 96.6  F (35.9  C)   Resp 20   Ht 1.702 m (5' 7\")   Wt 144.6 kg (318 lb 12.8 oz)   BMI 49.93 kg/m    CONSTITUTIONAL:  Alert,cooperative, NAD.  EYES: No scleral icterus.  PERRLA.  Conjunctiva clear.  ENT/MOUTH: " External ears and nose normal.  TMs normal.  Moist mucous membranes. Oropharynx clear.    ENDO: No thyromegaly or thyroidnodules.  LYMPH:  No cervical or supraclavicular LA.    BREASTS: No skin abnormalities, no erythema.  No discrete masses.  No nipple discharge, no axillary, supra- or infraclavicular LA.   CARDIOVASCULAR: Regular,S1, S2.  No S3 or S4.  No murmur/gallop/rub.  No peripheral edema.  RESPIRATORY: CTA bilaterally, no wheezes, rhonchi or rales.  GI: Bowel sounds wnl.  Soft, nontender, nondistended.  No masses or HSM.  No rebound or guarding.  : Vulva: normal, no lesions or discharge  Urethral meatus: normal size and location, no lesions or discharge  Urethra: no tenderness or masses  Bladder: no fullness or tenderness  Vagina: normal appearance, no abnormal discharge, no lesions.  No evidence of cystocele or rectocele.  Cervix: normal appearance, no lesions, no abnormal discharge, no cervical motion tenderness  Uterus: normal size and position, mobile, non-tender  Adnexa: no palpable masses bilaterally. No cervical motion tenderness.  Pap smear obtained: yes  MSKEL: Grossly normal ROM.  No clubbing.  INTEGUMENTARY:  Warm, dry.  No rash noted on exposed skin.  NEUROLOGIC: Facies symmetric.  Grossly normal movement and tone.  No tremor.  PSYCHIATRIC: Affect normal.  Speech fluent.      PHQ Depression Screen  PHQ-9 SCORE 8/28/2019 12/20/2019 3/11/2020   PHQ-9 Total Score 0 0 0       Labs:  Results for orders placed or performed during the hospital encounter of 03/11/20   MA Screen Bilateral w/Alcon     Status: None    Narrative    EXAM: MA SCREENING BILATERAL W/ ALCON, 3/11/2020 3:12 PM    COMPARISONS: 12/11/2014    HISTORY: Visit for screening mammogram    BREAST DENSITY: Heterogeneously dense.    FINDINGS: No new architectural distortion, dominant masses or  suspicious microcalcifications are identified in either breast. An  intramammary lymph node in the posterior upper left breast is smaller  when  "compared to the immediate prior.      Impression    IMPRESSION: BI-RADS CATEGORY: 2 - Benign.    No radiographic evidence of malignancy in either breast.    RECOMMENDED FOLLOW-UP: Annual Mammography.      JOSE TEE MD       ASSESSMENT AND PLAN:      ICD-10-CM    1. Routine history and physical examination of adult  Z00.00    2. Pap test, as part of routine gynecological examination  Z01.419 HPV High Risk Types DNA Cervical     Pap Screen Thin Prep with HPV - recommended age 30 - 65 years (select HPV order below)         Completed Pap and HPV.  Results are pending.    Gave patient education.  No acute concerns are appreciated at this time.  Declined STD testing.  Recheck in 1 year for repeat physical.    Relevant cancer screening discussed.    Counseled on healthy diet, Calcium and vitamin D intake, and exercise.    Patient Instructions   Healthy Strategies  1. Eat at least 3 meals a day and never skip breakfast.  2. Eat more slowly.  3. Decrease portion size.  4. Provide structure by using meal replacement bars or shakes, and/or low calorie frozen meals.  5. For good nutrition incorporate fruit, vegetables, whole grains, lean protein, and low-fat dairy.  6. Remove trigger foods from yourenvironment to avoid impulse eating.  7. Increase physical activity: get a pedometer and aim for 10,000 steps a day or 30-35 minutes of activity 5 days per week.  8. Weigh yourself daily or at least weekly.  9. Keep a record of what you eat and your activity.  10. Establish a support system such as afriend, group or program.    11. Read Tin Danielle's \"Eat to Live\". Remember it is important to have a minimum of 1200 calories a day, okay to use olive oil, 40 grams of fiber daily. No more than two servings (the size of your palm) of red meat a week.     Please consider the following general health recommendations:    Schedule a mammogram annually starting at the age of 40 years old unless recommended earlier by your primary " care provider. Come for a general exam once yearly.    Eat a quality diet (generally, low in simple sugars, starches, cholesterol and saturated fat.)    Please get 1200 mg of calcium in divided doses with 800 units vitamin D in your diet daily. Take supplements as needed to obtain full recommended amounts.     Stay physically active. Regular walking or other exercise is one of the best ways to minimize pain of arthritis; maintain independence and mobility; maintain bone strength; maintain conditioning of your heart. Find something you enjoy and a friend to do it with you.    Maintain ideal weight. Your Body mass index is Body mass index is 49.93 kg/m .. Generally a BMI of 20-25 is considered ideal. Overweight is defined as 25-30, obese is 30-35 and markedly obese is greater than 35.    Apply sun block (SPF 25 or greater) on exposed skin anytime you are out in the sun to prevent skin cancer.     Wear a seatbelt whenever you are in a car.    Obtain a flu shot every fall.    You should have a tetanus booster at least once every 10 years.    Check blood sugar annually. Cholesterol annually unless you have had a normal level when last checked within 5 years.     I recommend that you have a general physical exam every year. You should have a pap test every 3 years between the ages of 21 and 30 and every 3-5 years between the ages of 30 and 65 depending on your test unless you have had previous abnormal pap smears, (in these cases the exams and PAP's should be done on a schedule as recommended by your primary care provider). If you have had hysterectomy in the past, your future Pap plan may be different.            Liz Diaz PA-C PA-C..................3/11/2020 10:39 AM

## 2020-03-11 NOTE — LETTER
March 20, 2020      Beatrice Boyce  [address] [address]        Dear ,    We are writing to inform you of your test results.    Your Pap and HPV are negative.  Please have this important test repeated in 3 years for monitoring.    Resulted Orders   HPV High Risk Types DNA Cervical   Result Value Ref Range    HPV Source ThinPrep       Comment:      CORRECTED ON 03/20 AT 0854: PREVIOUSLY REPORTED AS [facility]    HPV 16 DNA Negative NEG^Negative    HPV 18 DNA Negative NEG^Negative    Other HR HPV Negative NEG^Negative    Final Diagnosis This patient's sample is negative for HPV DNA.       Comment:      (Note)  METHODOLOGY:  The Roche grayson 4800 system uses automated extraction,   simultaneous amplification of HPV (L1 region) and beta-globin,    followed by  real time detection of fluorescent labeled HPV and beta   globin using specific oligonucleotide probes . The test specifically   identifies types HPV 16 DNA and HPV 18 DNA while concurrently   detecting the rest of the high risk types (31, 33, 35, 39, 45, 51,   52, 56, 58, 59, 66 or 68).  COMMENTS:  This test is not intended for use as a screening device   for women under age 30 with normal cervical cytology.  Results should   be correlated with cytologic and histologic findings. Close clinical   followup is recommended.      Specimen Description Cervical Cells       Comment:      BG20  235     Pap Screen Thin Prep with HPV - recommended age 30 - 65 years (select HPV order below)   Result Value Ref Range    PAP NIL     Copath Report         Patient Name: BEATRICE BOYCE  MR#: [id number]  Specimen #: [id number]  Collected: 3/11/2020  Received: 3/13/2020  Reported: 3/20/2020 11:20  Ordering Phy(s): JESSICA THOMAS    For improved result formatting, select 'View Enhanced Report Format' under   Linked Documents section.    SPECIMEN/STAIN PROCESS:  Thin Prep Pap Screen - GICH (ThinPrep)       Pap Stain (GICH) x 1, HPV ordered x  1    SOURCE: Cervical, endocervical  ----------------------------------------------------------------   Thin Prep Pap Screen - GICH (ThinPrep)  SPECIMEN ADEQUACY:  Satisfactory for evaluation.  -Transformation zone component present.    CYTOLOGIC INTERPRETATION:    Negative for intraepithelial lesion or malignancy    Electronically signed out by:  HARJIT Garzon (ASCP)    Processed and screened at [facility],   [facility]    CLINICAL HISTORY:  LMP: N/A  Ablation  Post Menopausal, Previous LGSIL  Date of Last Pap: 01/17/2018,    Papanicolaou Test Carrizales itations:  Cervical cytology is a screening test with   limited sensitivity; regular  screening is critical for cancer prevention; Pap tests are primarily   effective for the diagnosis/prevention of  squamous cell carcinoma, not adenocarcinomas or other cancers.    TESTING LAB LOCATION:  [facility]  [address] [place] [address] [phone]    COLLECTION SITE:  Client:  [facility]  Location: [facility] ([facility])           If you have any questions or concerns, please call the clinic at the number listed above.       Sincerely,        Liz Diaz PA-C

## 2020-03-11 NOTE — NURSING NOTE
Chief Complaint   Patient presents with     Physical         Medication Reconciliation: complete    Dona Rodriguez, LPN

## 2020-03-12 ASSESSMENT — ASTHMA QUESTIONNAIRES: ACT_TOTALSCORE: 25

## 2020-03-17 DIAGNOSIS — J45.40 MODERATE PERSISTENT ASTHMA, UNSPECIFIED WHETHER COMPLICATED: Primary | ICD-10-CM

## 2020-03-17 RX ORDER — MONTELUKAST SODIUM 10 MG/1
1 TABLET ORAL AT BEDTIME
Qty: 90 TABLET | Refills: 3 | Status: SHIPPED | OUTPATIENT
Start: 2020-03-17 | End: 2021-03-09

## 2020-03-20 LAB
COPATH REPORT: NORMAL
FINAL DIAGNOSIS: NORMAL
HPV HR 12 DNA CVX QL NAA+PROBE: NEGATIVE
HPV16 DNA SPEC QL NAA+PROBE: NEGATIVE
HPV18 DNA SPEC QL NAA+PROBE: NEGATIVE
PAP: NORMAL
SPECIMEN DESCRIPTION: NORMAL
SPECIMEN SOURCE CVX/VAG CYTO: NORMAL

## 2020-04-03 ENCOUNTER — TELEPHONE (OUTPATIENT)
Dept: FAMILY MEDICINE | Facility: OTHER | Age: 45
End: 2020-04-03

## 2020-04-03 NOTE — TELEPHONE ENCOUNTER
Reason for call: Request for results.    Name of test or procedure: PAP    Date of test or procedure: 3/11/20    Location of test or procedure: [facility]    Preferred method for responding to this message: Telephone Call    Phone number patient can be reached at: Home number on file [phone]    If we can't reach you directly, may we leave a detailed response at the number you provided?Yes

## 2020-04-03 NOTE — TELEPHONE ENCOUNTER
Called and verified patient full name and . Notified patient of results based from letter that was mailed on 3/20.     Noemy Rodgers LPN on 4/3/2020 at 2:05 PM

## 2020-04-06 DIAGNOSIS — J45.40 MODERATE PERSISTENT ASTHMA WITHOUT COMPLICATION: Primary | ICD-10-CM

## 2020-04-07 RX ORDER — ALBUTEROL SULFATE 90 UG/1
AEROSOL, METERED RESPIRATORY (INHALATION)
Qty: 54 G | Refills: 1 | Status: SHIPPED | OUTPATIENT
Start: 2020-04-07 | End: 2020-04-22

## 2020-04-07 NOTE — TELEPHONE ENCOUNTER
[id number] of [place] sent Rx request for the following:      Ventolin HFA 90 mcg/actuation aerosol inhaler     Last Office Visit:              3/11/20 (Physical)  Future Office visit:             Next 5 appointments (look out 90 days)    Apr 22, 2020 11:00 AM CDT  Telephone Visit with Lucio Curiel MD  [facility] ([facility] [address]  [place] [address]  [phone]      Routing refill request to provider for review/approval because:  Medication is reported/historical    Unable to complete prescription refill per RN Medication Refill Policy. Imelda Leyva RN .............. 4/7/2020  11:43 AM

## 2020-04-22 ENCOUNTER — VIRTUAL VISIT (OUTPATIENT)
Dept: INTERNAL MEDICINE | Facility: OTHER | Age: 45
End: 2020-04-22
Attending: INTERNAL MEDICINE
Payer: MEDICARE

## 2020-04-22 VITALS — BODY MASS INDEX: 45.99 KG/M2 | TEMPERATURE: 97.6 F | HEIGHT: 67 IN | WEIGHT: 293 LBS

## 2020-04-22 DIAGNOSIS — I89.0 LYMPHEDEMA OF BOTH LOWER EXTREMITIES: Primary | ICD-10-CM

## 2020-04-22 DIAGNOSIS — J45.40 MODERATE PERSISTENT ASTHMA WITHOUT COMPLICATION: ICD-10-CM

## 2020-04-22 DIAGNOSIS — E55.9 VITAMIN D DEFICIENCY: ICD-10-CM

## 2020-04-22 DIAGNOSIS — N39.41 URGE INCONTINENCE: ICD-10-CM

## 2020-04-22 PROCEDURE — 99214 OFFICE O/P EST MOD 30 MIN: CPT | Mod: GT | Performed by: INTERNAL MEDICINE

## 2020-04-22 RX ORDER — OXYBUTYNIN CHLORIDE 10 MG/1
10 TABLET, EXTENDED RELEASE ORAL DAILY
Qty: 90 TABLET | Refills: 3 | Status: SHIPPED | OUTPATIENT
Start: 2020-04-22 | End: 2021-04-26

## 2020-04-22 RX ORDER — ALBUTEROL SULFATE 90 UG/1
1-2 AEROSOL, METERED RESPIRATORY (INHALATION) EVERY 4 HOURS PRN
Qty: 18 G | Refills: 5 | Status: SHIPPED | OUTPATIENT
Start: 2020-04-22 | End: 2021-09-08

## 2020-04-22 ASSESSMENT — ANXIETY QUESTIONNAIRES
6. BECOMING EASILY ANNOYED OR IRRITABLE: NOT AT ALL
5. BEING SO RESTLESS THAT IT IS HARD TO SIT STILL: NOT AT ALL
7. FEELING AFRAID AS IF SOMETHING AWFUL MIGHT HAPPEN: NOT AT ALL
2. NOT BEING ABLE TO STOP OR CONTROL WORRYING: NOT AT ALL
GAD7 TOTAL SCORE: 0
IF YOU CHECKED OFF ANY PROBLEMS ON THIS QUESTIONNAIRE, HOW DIFFICULT HAVE THESE PROBLEMS MADE IT FOR YOU TO DO YOUR WORK, TAKE CARE OF THINGS AT HOME, OR GET ALONG WITH OTHER PEOPLE: NOT DIFFICULT AT ALL
1. FEELING NERVOUS, ANXIOUS, OR ON EDGE: NOT AT ALL
3. WORRYING TOO MUCH ABOUT DIFFERENT THINGS: NOT AT ALL

## 2020-04-22 ASSESSMENT — ENCOUNTER SYMPTOMS
FEVER: 0
NERVOUS/ANXIOUS: 1
WHEEZING: 1
FREQUENCY: 1
CHILLS: 0
ABDOMINAL PAIN: 0
WOUND: 0
HEMATURIA: 0

## 2020-04-22 ASSESSMENT — MIFFLIN-ST. JEOR: SCORE: 2107.39

## 2020-04-22 ASSESSMENT — PATIENT HEALTH QUESTIONNAIRE - PHQ9
5. POOR APPETITE OR OVEREATING: NOT AT ALL
SUM OF ALL RESPONSES TO PHQ QUESTIONS 1-9: 0

## 2020-04-22 ASSESSMENT — PAIN SCALES - GENERAL: PAINLEVEL: MILD PAIN (3)

## 2020-04-22 NOTE — NURSING NOTE
Spoke to patient on speaker phone with caregiver.  Patient is follow up on her lymphedema.  Patient states that she is still have quite a bit of swelling in the right leg and foot and it is tender at times.  Mercedez Garcia LPN 4/22/2020   10:59 AM    Chief Complaint   Patient presents with     Clinic Care Coordination - Follow-up     Lymphedema     Medication Reconciliation: complete  Mercedez Garcia LPN

## 2020-04-22 NOTE — PROGRESS NOTES
"Rosi Lamb is a 45 year old female who is being evaluated via a billable video visit.      The patient has been notified of following:     \"This video visit will be conducted via a call between you and your physician/provider. We have found that certain health care needs can be provided without the need for an in-person physical exam.  This service lets us provide the care you need with a video conversation.  If a prescription is necessary we can send it directly to your pharmacy.  If lab work is needed we can place an order for that and you can then stop by our lab to have the test done at a later time.    Video visits are billed at different rates depending on your insurance coverage.  Please reach out to your insurance provider with any questions.    If during the course of the call the physician/provider feels a video visit is not appropriate, you will not be charged for this service.\"    Patient has given verbal consent for Video visit? Yes    How would you like to obtain your AVS? Mail a copy    Patient would like the video invitation sent by: Text to cell phone: [phone]    Will anyone else be joining your video visit? No    Subjective     Rosi Lamb is a 45 year old female who presents to clinic today for the following health issues:    HPI  Video Start Time: 11:30 AM    Reviewed and updated as needed this visit by Provider  Tobacco  Allergies  Meds  Problems  Med Hx  Surg Hx  Fam Hx         Review of Systems   Constitutional: Negative for chills and fever.        Followed up with nutrition/dietitian and lost about 50 pounds.   HENT: Negative for congestion.    Respiratory: Positive for wheezing (exercise induced asthma).    Cardiovascular: Positive for peripheral edema (lymphedema).   Gastrointestinal: Negative for abdominal pain.   Genitourinary: Positive for frequency. Negative for hematuria.   Musculoskeletal: Positive for gait problem.   Skin: Negative for wound.   Neurological: " "Negative for syncope.   Psychiatric/Behavioral: The patient is nervous/anxious.         Follows regularly with mental health specialist.            Objective    Temp 97.6  F (36.4  C) (Oral)   Ht 1.702 m (5' 7\")   Wt 142 kg (313 lb)   Breastfeeding No   BMI 49.02 kg/m    Estimated body mass index is 49.02 kg/m  as calculated from the following:    Height as of this encounter: 1.702 m (5' 7\").    Weight as of this encounter: 142 kg (313 lb).  Physical Exam  Constitutional:       General: She is not in acute distress.     Appearance: Normal appearance.   Eyes:      General: No scleral icterus.     Conjunctiva/sclera: Conjunctivae normal.   Pulmonary:      Effort: Pulmonary effort is normal.   Neurological:      Mental Status: She is alert and oriented to person, place, and time.      Cranial Nerves: No cranial nerve deficit.   Psychiatric:         Mood and Affect: Mood normal.         Behavior: Behavior normal.        Diagnostic Test Results:  Labs reviewed in Epic          ICD-10-CM    1. Lymphedema of both lower extremities  I89.0 PHYSICAL THERAPY REFERRAL   2. Moderate persistent asthma without complication  J45.40 albuterol (VENTOLIN HFA) 108 (90 Base) MCG/ACT inhaler   3. Vitamin D deficiency  E55.9 cholecalciferol (VITAMIN D3) 5000 units (125 mcg) capsule   4. Urge incontinence  N39.41 oxybutynin ER (DITROPAN-XL) 10 MG 24 hr tablet     Lymphedema of both lower extremities, persistent.  Has had issues for years.  Has a tender bulge in the right lower leg that is different from her left leg.  She lost about 50 pounds with help of a nutrition/dietitian specialist.  She would like to go back to the lymphedema physical therapist for ongoing therapy/work on her lymphedema of the legs.    Exercise-induced asthma, reports mild, intermittent.  Uses her albuterol inhaler about twice weekly.  She feels like her asthma is well controlled.  She only uses her inhaler when she exercises.  Needs inhaler refill.    MND " deficiency, taking oral replacement.  Needs refills.  Vitamin D levels have improved nicely with replacement.    Urge incontinence, ongoing.  Using oxybutynin which helps.  Needs refills.    Video-Visit Details    Type of service:  Video Visit    Video End Time (time video stopped): 11:40    Billing based on moderate complexity MDM    Originating Location (pt. Location): Home    Distant Location (provider location):  [facility]     Mode of Communication:  Video Conference    Return in about 2 months (around 6/22/2020) for -Video/Telephone Visit - Follow-up Lymphedema.       Lucio Curiel MD

## 2020-04-23 ENCOUNTER — HOSPITAL ENCOUNTER (OUTPATIENT)
Dept: PHYSICAL THERAPY | Facility: OTHER | Age: 45
Setting detail: THERAPIES SERIES
End: 2020-04-23
Attending: INTERNAL MEDICINE
Payer: MEDICARE

## 2020-04-23 DIAGNOSIS — I89.0 LYMPHEDEMA OF BOTH LOWER EXTREMITIES: ICD-10-CM

## 2020-04-23 PROCEDURE — 97530 THERAPEUTIC ACTIVITIES: CPT | Mod: GP

## 2020-04-23 PROCEDURE — 97161 PT EVAL LOW COMPLEX 20 MIN: CPT | Mod: GP

## 2020-04-23 ASSESSMENT — ANXIETY QUESTIONNAIRES: GAD7 TOTAL SCORE: 0

## 2020-04-23 NOTE — PROGRESS NOTES
[facility]        OUTPATIENT PHYSICAL THERAPY EDEMA EVALUATION  PLAN OF TREATMENT FOR OUTPATIENT REHABILITATION  (COMPLETE FOR INITIAL CLAIMS ONLY)  Patient's Last Name, First Name, ALEENASimoneJIMMYSimone  ZainabJanejennifer MORROW                           Provider s Name:   [facility] Medical Record No.  [id number]     Start of Care Date:  04/23/20   Onset Date:      Type:  PT   Medical Diagnosis:  lymphedema of both legs   Therapy Diagnosis:  bilateral lower extremity edema Visits from SOC:  1                                     __________________________________________________________________________________   Plan of Treatment/Functional Goals:    Manual lymph drainage, Gradient compression bandaging, Fit for compression garment, Exercises, Education, ADL training, Home management program development        GOALS  1. Goal description: Patient to have 10% reduction of edema in lower extremities to reduce discomfort while walking for exercise.        Target date: 07/23/20  2. Goal description: Patient to conistently use compression with either prior garments (circaid) or wraps to aid in control of edema.        Target date: 07/23/20                Treatment Frequency: 2x/week   Treatment duration: 3 months    Joanie Jean Baptiste, PT                                    I CERTIFY THE NEED FOR THESE SERVICES FURNISHED UNDER        THIS PLAN OF TREATMENT AND WHILE UNDER MY CARE     (Physician co-signature of this document indicates review and certification of the therapy plan).                   Certification date from: 04/23/20       Certification date to: 07/23/20           Referring physician: Lucio Curiel MD   Initial Assessment  See Epic Evaluation- Start of care: 04/23/20

## 2020-04-23 NOTE — PROGRESS NOTES
04/23/20 1500   Quick Adds   Quick Adds Certification   Rehab Discipline   Discipline PT   Type of Visit   Type of visit Initial Edema Evaluation   General Information   Start of care 04/23/20   Referring physician Lucio Curiel MD   Orders Evaluate and treat as indicated   Order date 04/22/20   Medical diagnosis lymphedema of both lower extremities   Onset of illness / date of surgery 04/22/20   Affected body parts LLE;RLE   Edema etiology Unknown   Edema etiology comments likely lipedema   Pertinent history of current problem (PT: include personal factors and/or comorbidities that impact the POC; OT: include additional occupational profile info) Lost 50#   Surgical / medical history reviewed Yes   Prior level of functional mobility no limitations   Prior treatment Complete decongestive therapy;Compression garments;MLD;Gradient compression bandaging   Community support Other  (foster home)   Patient role / employment history Unemployed   Living environment Group home   Fall Risk Screen   Fall screen completed by PT   Have you fallen 2 or more times in the past year? No   Have you fallen and had an injury in the past year? No   Is patient a fall risk? No   System Outcome Measures   Outcome Measures Lymphedema   Lymphedema Life Impact Scale (score range 0-72). A higher score indicates greater impairment. 29   Subjective Report   Patient report of symptoms Has noticed her swelling seems to be worse as of late. Not sure why. Received circaid wraps after prior episode of care with PT but wraps would not stay in place.    Patient / Family Goals   Patient / family goals statement reduce swelling   Pain   Patient currently in pain Yes   Pain rating 4/10   Pain description Dull   Pain description comment with palpation   Edema Exam / Assessment   Skin condition Non-pitting;Dryness;Intact   Capillary refill Symmetrical   Dorsal pedal pulse Symmetrical   Stemmer sign Negative   Girth Measurements   Girth Measurements  Refer to separate girth measurement flowsheet   Volume LE   Right LE (mL) 83894.13   Left LE (mL) 06017.79   LE volume comparison RLE volume greater than LLE volume   Range of Motion   ROM No deficits were identified   Planned Edema Interventions   Planned edema interventions Manual lymph drainage;Gradient compression bandaging;Fit for compression garment;Exercises;Education;ADL training;Home management program development   Clinical Impression   Criteria for skilled therapeutic intervention met Yes   Therapy diagnosis bilateral lower extremity edema   Influenced by the following impairments / conditions Edema;Stage 2;Lipedema;Lipolymphedema   Functional limitations due to impairments / conditions comfort, tolerance of being on feet for activites   Clinical Presentation Stable/Uncomplicated   Clinical Decision Making (Complexity) Low complexity   Treatment Frequency 2x/week   Treatment duration 3 months   Patient / family and/or staff in agreement with plan of care Yes   Risks and benefits of therapy have been explained Yes   Clinical impression comments Patient presents with lipolymphedema, bilateral lower legs, severe severity, stage 2.    Goals   Edema Eval Goals 1;2;3   Goal 1   Goal identifier edema reduction   Goal description Patient to have 10% reduction of edema in lower extremities to reduce discomfort while walking for exercise.    Target date 07/23/20   Goal 2   Goal identifier garments   Goal description Patient to conistently use compression with either prior garments (circaid) or wraps to aid in control of edema.    Target date 07/23/20   Total Evaluation Time   PT Eval, Low Complexity Minutes (49099) 20   Certification   Certification date from 04/23/20   Certification date to 07/23/20   Medical Diagnosis lymphedema of both legs

## 2020-04-24 ENCOUNTER — HOSPITAL ENCOUNTER (OUTPATIENT)
Dept: PHYSICAL THERAPY | Facility: OTHER | Age: 45
Setting detail: THERAPIES SERIES
End: 2020-04-24
Attending: INTERNAL MEDICINE
Payer: MEDICARE

## 2020-04-24 PROCEDURE — 97530 THERAPEUTIC ACTIVITIES: CPT | Mod: GP

## 2020-05-05 ENCOUNTER — HOSPITAL ENCOUNTER (OUTPATIENT)
Dept: PHYSICAL THERAPY | Facility: OTHER | Age: 45
Setting detail: THERAPIES SERIES
End: 2020-05-05
Attending: INTERNAL MEDICINE
Payer: MEDICARE

## 2020-05-05 PROCEDURE — 97110 THERAPEUTIC EXERCISES: CPT | Mod: GP

## 2020-05-05 PROCEDURE — 97530 THERAPEUTIC ACTIVITIES: CPT | Mod: GP

## 2020-05-06 ENCOUNTER — TELEPHONE (OUTPATIENT)
Dept: INTERNAL MEDICINE | Facility: OTHER | Age: 45
End: 2020-05-06

## 2020-05-06 DIAGNOSIS — E66.01 MORBID OBESITY WITH BMI OF 50.0-59.9, ADULT (H): ICD-10-CM

## 2020-05-06 DIAGNOSIS — G89.29 BILATERAL CHRONIC KNEE PAIN: ICD-10-CM

## 2020-05-06 DIAGNOSIS — M25.561 BILATERAL CHRONIC KNEE PAIN: ICD-10-CM

## 2020-05-06 DIAGNOSIS — M25.562 BILATERAL CHRONIC KNEE PAIN: ICD-10-CM

## 2020-05-06 DIAGNOSIS — I89.0 LYMPHEDEMA OF BOTH LOWER EXTREMITIES: Primary | ICD-10-CM

## 2020-05-08 ENCOUNTER — HOSPITAL ENCOUNTER (OUTPATIENT)
Dept: PHYSICAL THERAPY | Facility: OTHER | Age: 45
Setting detail: THERAPIES SERIES
End: 2020-05-08
Attending: INTERNAL MEDICINE
Payer: MEDICARE

## 2020-05-08 PROCEDURE — 97110 THERAPEUTIC EXERCISES: CPT | Mod: GP

## 2020-05-18 ENCOUNTER — HOSPITAL ENCOUNTER (OUTPATIENT)
Dept: PHYSICAL THERAPY | Facility: OTHER | Age: 45
Setting detail: THERAPIES SERIES
End: 2020-05-18
Attending: INTERNAL MEDICINE
Payer: MEDICARE

## 2020-05-18 PROCEDURE — 97110 THERAPEUTIC EXERCISES: CPT | Mod: GP

## 2020-06-01 ENCOUNTER — HOSPITAL ENCOUNTER (OUTPATIENT)
Dept: PHYSICAL THERAPY | Facility: OTHER | Age: 45
Setting detail: THERAPIES SERIES
End: 2020-06-01
Attending: INTERNAL MEDICINE
Payer: MEDICARE

## 2020-06-01 PROCEDURE — 97110 THERAPEUTIC EXERCISES: CPT | Mod: GP

## 2020-06-25 ENCOUNTER — TELEPHONE (OUTPATIENT)
Dept: INTERNAL MEDICINE | Facility: OTHER | Age: 45
End: 2020-06-25

## 2020-06-25 NOTE — TELEPHONE ENCOUNTER
Patient was informed staph infections can or cannot be contagious. It depends on the bacteria that is causing the infection. Understanding voiced.     Noemy Caceres CMA on 6/25/2020 at 11:59 AM

## 2020-07-10 ENCOUNTER — OFFICE VISIT (OUTPATIENT)
Dept: INTERNAL MEDICINE | Facility: OTHER | Age: 45
End: 2020-07-10
Attending: INTERNAL MEDICINE
Payer: MEDICARE

## 2020-07-10 VITALS
HEART RATE: 84 BPM | RESPIRATION RATE: 16 BRPM | TEMPERATURE: 98.1 F | SYSTOLIC BLOOD PRESSURE: 122 MMHG | BODY MASS INDEX: 46.6 KG/M2 | WEIGHT: 293 LBS | DIASTOLIC BLOOD PRESSURE: 74 MMHG

## 2020-07-10 DIAGNOSIS — M79.671 BILATERAL FOOT PAIN: ICD-10-CM

## 2020-07-10 DIAGNOSIS — E66.01 MORBID OBESITY WITH BMI OF 45.0-49.9, ADULT (H): ICD-10-CM

## 2020-07-10 DIAGNOSIS — F41.1 GENERALIZED ANXIETY DISORDER: Primary | ICD-10-CM

## 2020-07-10 DIAGNOSIS — M79.672 BILATERAL FOOT PAIN: ICD-10-CM

## 2020-07-10 DIAGNOSIS — Z00.00 HEALTH CARE MAINTENANCE: ICD-10-CM

## 2020-07-10 PROCEDURE — G0463 HOSPITAL OUTPT CLINIC VISIT: HCPCS

## 2020-07-10 PROCEDURE — 99213 OFFICE O/P EST LOW 20 MIN: CPT | Performed by: INTERNAL MEDICINE

## 2020-07-10 RX ORDER — GABAPENTIN 300 MG/1
100 CAPSULE ORAL 3 TIMES DAILY
COMMUNITY
Start: 2020-07-10 | End: 2023-11-28

## 2020-07-10 ASSESSMENT — ENCOUNTER SYMPTOMS
BACK PAIN: 0
CHEST TIGHTNESS: 0
FATIGUE: 1
CHILLS: 0
ADENOPATHY: 0
MYALGIAS: 1
FEVER: 0
HEMATURIA: 0
ARTHRALGIAS: 1
SHORTNESS OF BREATH: 0
CONFUSION: 0
NECK STIFFNESS: 1
BRUISES/BLEEDS EASILY: 0
ABDOMINAL PAIN: 0
WOUND: 0
NECK PAIN: 1

## 2020-07-10 ASSESSMENT — PAIN SCALES - GENERAL: PAINLEVEL: MILD PAIN (3)

## 2020-07-10 NOTE — PATIENT INSTRUCTIONS
1. Generalized anxiety disorder  2. Bilateral foot pain  - OCCUPATIONAL MEDICINE REFERRAL -- Dr. Forde - Orthotics consult.     3. Health care maintenance  4. Morbid obesity with BMI of 45.0-49.9, adult (H)    Congratulations on the weight loss! Keep up the hard work!    Return as needed for follow-up with Dr. Curiel.    Clinic : [phone]  Appointment line: [phone]

## 2020-07-10 NOTE — PROGRESS NOTES
"Nursing Notes:   Deirdre Sanabria LPN  7/10/2020  3:52 PM  Signed  Patient presents to the clinic for 2 month follow up and would like a referral to Podiatry.    Chief Complaint   Patient presents with     Clinic Care Coordination - Follow-up       Initial /74 (BP Location: Right arm, Patient Position: Sitting, Cuff Size: Adult Regular)   Pulse 84   Temp 98.1  F (36.7  C) (Tympanic)   Resp 16   Wt 134.9 kg (297 lb 8 oz)   BMI 46.60 kg/m   Estimated body mass index is 46.6 kg/m  as calculated from the following:    Height as of 4/22/20: 1.702 m (5' 7\").    Weight as of this encounter: 134.9 kg (297 lb 8 oz).  Medication Reconciliation: complete    Deirdre Sanabria LPN    Nursing note reviewed with patient.  Accuracy and completeness verified.   Ms. Lamb is a 45 year old female who:  Patient presents with:  Clinic Care Coordination - Follow-up      ICD-10-CM    1. Generalized anxiety disorder  F41.1    2. Bilateral foot pain  M79.671 OCCUPATIONAL MEDICINE REFERRAL    M79.672    3. Health care maintenance  Z00.00    4. Morbid obesity with BMI of 45.0-49.9, adult (H)  E66.01     Z68.42      HPI  Patient presents for follow-up.  Overall doing quite well she has lost almost 60 pounds with use of diet and exercise.  She is using a low-carb diet.  Overall doing well feeling great.  She is hoping to get down to about 190 pounds.  Currently at about 297 pounds.    Follows with her mental health specialist regularly.  Currently utilizing gabapentin to help with her chronic foot pain and anxiety.  Med list updated today.    Regarding her foot pain, states that she used to have some inserts or insoles in her shoes.  Helped quite a while but has not had them in a long time.  She would like to get this reevaluated to see if custom inserts would once again help with her foot pain and walking and mobility.  Orthotics referral sent to Dr. Forde.    She is congratulated on her weight loss and encouraged to continue " "with healthy lifestyle.    Functional Capacity: > 4 METS.   Review of Systems   Constitutional: Positive for fatigue (+ noted when B12 is running out. ). Negative for chills and fever.   HENT: Negative for congestion, ear discharge and ear pain.    Eyes: Negative for visual disturbance.   Respiratory: Negative for chest tightness and shortness of breath.    Cardiovascular: Positive for leg swelling. Negative for chest pain.   Gastrointestinal: Negative for abdominal pain.   Genitourinary: Negative for hematuria.   Musculoskeletal: Positive for arthralgias (+ bilateral foot pain), myalgias (+ improved with Vit D. ), neck pain and neck stiffness. Negative for back pain.   Skin: Negative for rash and wound.   Neurological: Negative for syncope.   Hematological: Negative for adenopathy. Does not bruise/bleed easily.   Psychiatric/Behavioral: Negative for confusion.      Reviewed and updated as needed this visit by Provider   Tobacco  Allergies  Meds  Problems  Med Hx  Surg Hx  Fam Hx         EXAM:   Vitals:    07/10/20 1539   BP: 122/74   BP Location: Right arm   Patient Position: Sitting   Cuff Size: Adult Regular   Pulse: 84   Resp: 16   Temp: 98.1  F (36.7  C)   TempSrc: Tympanic   Weight: 134.9 kg (297 lb 8 oz)       Current Pain Score: Mild Pain (3)     BP Readings from Last 3 Encounters:   07/10/20 122/74   03/11/20 124/80   02/03/20 112/76      Wt Readings from Last 3 Encounters:   07/10/20 134.9 kg (297 lb 8 oz)   04/22/20 142 kg (313 lb)   03/11/20 144.6 kg (318 lb 12.8 oz)      Estimated body mass index is 46.6 kg/m  as calculated from the following:    Height as of 4/22/20: 1.702 m (5' 7\").    Weight as of this encounter: 134.9 kg (297 lb 8 oz).     Physical Exam  Constitutional:       General: She is not in acute distress.     Appearance: Normal appearance. She is well-developed. She is obese. She is not diaphoretic.   HENT:      Head: Normocephalic and atraumatic.   Eyes:      General: No scleral " icterus.     Conjunctiva/sclera: Conjunctivae normal.   Neck:      Musculoskeletal: Neck supple.   Cardiovascular:      Rate and Rhythm: Normal rate and regular rhythm.   Pulmonary:      Effort: Pulmonary effort is normal.      Breath sounds: Normal breath sounds.   Abdominal:      Palpations: Abdomen is soft.      Tenderness: There is no abdominal tenderness.   Musculoskeletal:         General: No deformity.      Right lower leg: Edema present.      Left lower leg: Edema present.   Lymphadenopathy:      Cervical: No cervical adenopathy.   Skin:     General: Skin is warm and dry.      Findings: No rash.   Neurological:      Mental Status: She is alert and oriented to person, place, and time. Mental status is at baseline.      Cranial Nerves: No cranial nerve deficit.   Psychiatric:         Mood and Affect: Mood normal.         Behavior: Behavior normal.          Procedures   INVESTIGATIONS:  - Labs reviewed in [facility]     ASSESSMENT AND PLAN:  Problem List Items Addressed This Visit        Nervous and Auditory    Bilateral foot pain    Relevant Orders    OCCUPATIONAL MEDICINE REFERRAL       Digestive    Morbid obesity with BMI of 45.0-49.9, adult (H)       Behavioral    Generalized anxiety disorder - Primary    Relevant Medications    gabapentin (NEURONTIN) 300 MG capsule      Other Visit Diagnoses     Health care maintenance            reviewed diet, exercise and weight control  -- Expected clinical course discussed    -- Medications and their side effects discussed    The 10-year ASCVD risk score (Sharendy VANCE Jr., et al., 2013) is: 0.7%    Values used to calculate the score:      Age: 45 years      Sex: Female      Is Non- : No      Diabetic: No      Tobacco smoker: No      Systolic Blood Pressure: 122 mmHg      Is BP treated: No      HDL Cholesterol: 47 mg/dL      Total Cholesterol: 176 mg/dL    Patient Instructions   1. Generalized anxiety disorder  2. Bilateral foot pain  - OCCUPATIONAL  MEDICINE REFERRAL -- Dr. Forde - Orthotics consult.     3. Health care maintenance  4. Morbid obesity with BMI of 45.0-49.9, adult (H)    Congratulations on the weight loss! Keep up the hard work!    Return as needed for follow-up with Dr. Curiel.    Clinic : [phone]  Appointment line: [phone]       Lucio Curiel MD   Internal Medicine  [facility]     Portions of this note were dictated using speech recognition software. The note has been proofread but errors in the text may have been overlooked. Please contact me if there are any concerns regarding the accuracy of the dictation.

## 2020-07-10 NOTE — NURSING NOTE
"Patient presents to the clinic for 2 month follow up and would like a referral to Podiatry.    Chief Complaint   Patient presents with     Clinic Care Coordination - Follow-up       Initial /74 (BP Location: Right arm, Patient Position: Sitting, Cuff Size: Adult Regular)   Pulse 84   Temp 98.1  F (36.7  C) (Tympanic)   Resp 16   Wt 134.9 kg (297 lb 8 oz)   BMI 46.60 kg/m   Estimated body mass index is 46.6 kg/m  as calculated from the following:    Height as of 4/22/20: 1.702 m (5' 7\").    Weight as of this encounter: 134.9 kg (297 lb 8 oz).  Medication Reconciliation: complete    Deirdre Sanabria LPN    "

## 2020-07-22 ENCOUNTER — OFFICE VISIT (OUTPATIENT)
Dept: FAMILY MEDICINE | Facility: OTHER | Age: 45
End: 2020-07-22
Attending: CHIROPRACTOR
Payer: MEDICARE

## 2020-07-22 VITALS
BODY MASS INDEX: 46.05 KG/M2 | WEIGHT: 293 LBS | HEART RATE: 71 BPM | SYSTOLIC BLOOD PRESSURE: 120 MMHG | TEMPERATURE: 98.4 F | OXYGEN SATURATION: 100 % | DIASTOLIC BLOOD PRESSURE: 80 MMHG | RESPIRATION RATE: 16 BRPM

## 2020-07-22 DIAGNOSIS — M21.6X1 PRONATION DEFORMITY OF RIGHT FOOT: Primary | ICD-10-CM

## 2020-07-22 PROCEDURE — G0463 HOSPITAL OUTPT CLINIC VISIT: HCPCS

## 2020-07-22 PROCEDURE — 99207 ZZC NO CHARGE LOS: CPT | Performed by: CHIROPRACTOR

## 2020-07-22 NOTE — NURSING NOTE
Rosi Lamb is a 45 year old female  presenting for orthotics evaluation.  Medication Reconciliation: complete    Malaika Wren LPN  7/22/2020 3:50 PM

## 2020-07-23 NOTE — PROGRESS NOTES
Rosi Lamb was seen for orthotic evaluation at the request of Dr. Curiel    CHIEF COMPLAINT: Rosi Lamb is a 45 year old female  Chief Complaint   Patient presents with     Orthotics     orthotic evaluation       This evaluation is in conjunction with a recent examination on 7/19/2020 with Dr. Lucio Curiel and will not be billed as a separate service.  See his evaluation note for specifics of referral and medical information.    Rosi has been in the process of losing weight.  She has a goal of getting to 200 lbs. Past orthotic use was beneficial.  She no longer has the orthotics, however, as this was greater than 10 years ago.    ORTHOTIC EXAM:     Right Foot:     Forefoot varus, Rearfoot valgus   Foot flexibility: 20 degrees   Excessive Pronation: moderate   Excessive Supination: n/a   Bunion: mild   Pes Planus: mild   Pes Cavus: neg   Heel Pain: neg   Hammer Toe: neg   Mallet Toe: neg   Hallux Rigidus: neg   Hallux Varus: neg   Skin: wnl   Sensation: Intact   Edema: neg     Left Foot:     Forefoot varus, Rearfoot valgus   Foot flexibility: 40 degrees   Excessive Pronation: mild   Excessive Supination: n/a   Bunion: neg   Pes Planus: mild   Pes Cavus: neg   Heel Pain: neg   Hammer Toe: neg   Mallet Toe: neg   Hallux Rigidus: neg   Hallux Varus: neg   Skin: moderate callous formation at 1st metatarsal head   Sensation: Intact    Edema: neg     IMPRESSION/PLAN:    She certainly would benefit from custom made orthotics to address the 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 This is likely causing her chronic right knee and LBP.  I encouraged her to continue her weight loss and to return once her diet is complete.  Orthotic intervention will be most helpful when we are able to cast with stabilized weight levels.  Consistent weight will determine many factors in the correction.  I congratulated her on her weight loss success thus far and encouraged her to continue.  Finances are also a concern for her  and we can certainly help her find a provider that may take her insurance.      Note to:  Dr. Lucio Forde DC, ROMA  Director - Occupational [place] [address]  Phone [phone]  Fax [phone]    Disclaimer:  This note consists of words and symbols derived from keyboarding, dictation, or using voice recognition software. As a result, there may be errors in the script that have gone undetected. Please consider this when interpreting information found in this note.    8:02 AM 7/23/2020

## 2020-07-28 NOTE — PROGRESS NOTES
Outpatient Physical Therapy Discharge Note     Patient: Rosi Lamb  : 1975    Beginning/End Dates of Reporting Period:  20 to 2020    Referring Provider: Lucio Curiel MD    Therapy Diagnosis: bilateral lower extremity edema     Client Self Report: Continues to wrap and due self MLD, things are going well.     Objective Measurements:  Objective Measure: girth- see flow sheets      Goals:  Goal Identifier edema reduction   Goal Description Patient to have 10% reduction of edema in lower extremities to reduce discomfort while walking for exercise.    Target Date 20   Date Met      Progress: average 4% decrease in edema     Goal Identifier garments   Goal Description Patient to conistently use compression with either prior garments (circaid) or wraps to aid in control of edema.    Target Date 20   Date Met   20   Progress:       Progress Toward Goals:   Progress this reporting period: Patient has become independent with compression on lower legs to aid in control of edema and discomfort; completes skin care, daily walks.       Plan:  Discharge from therapy.    Discharge:    Reason for Discharge: Patient has met all goals.    Equipment Issued: NA    Discharge Plan: Patient to continue home program.

## 2020-07-29 ENCOUNTER — OFFICE VISIT (OUTPATIENT)
Dept: INTERNAL MEDICINE | Facility: OTHER | Age: 45
End: 2020-07-29
Attending: NURSE PRACTITIONER
Payer: MEDICARE

## 2020-07-29 VITALS
DIASTOLIC BLOOD PRESSURE: 80 MMHG | RESPIRATION RATE: 16 BRPM | WEIGHT: 289.4 LBS | OXYGEN SATURATION: 99 % | SYSTOLIC BLOOD PRESSURE: 118 MMHG | HEART RATE: 74 BPM | HEIGHT: 66 IN | TEMPERATURE: 98.6 F | BODY MASS INDEX: 46.51 KG/M2

## 2020-07-29 DIAGNOSIS — B37.2 YEAST INFECTION OF THE SKIN: Primary | ICD-10-CM

## 2020-07-29 PROCEDURE — 99213 OFFICE O/P EST LOW 20 MIN: CPT | Performed by: NURSE PRACTITIONER

## 2020-07-29 PROCEDURE — G0463 HOSPITAL OUTPT CLINIC VISIT: HCPCS

## 2020-07-29 RX ORDER — NYSTATIN 100000 U/G
CREAM TOPICAL 2 TIMES DAILY
Qty: 30 G | Refills: 1 | Status: SHIPPED | OUTPATIENT
Start: 2020-07-29 | End: 2020-08-12

## 2020-07-29 ASSESSMENT — ENCOUNTER SYMPTOMS: RECTAL PAIN: 1

## 2020-07-29 ASSESSMENT — PAIN SCALES - GENERAL: PAINLEVEL: NO PAIN (0)

## 2020-07-29 ASSESSMENT — MIFFLIN-ST. JEOR: SCORE: 1984.46

## 2020-07-29 NOTE — NURSING NOTE
"Chief Complaint   Patient presents with     Derm Problem     buttocks- skin burning itching and redness         Initial /80   Pulse 74   Temp 98.6  F (37  C) (Tympanic)   Resp 16   Ht 1.676 m (5' 6\")   Wt 131.3 kg (289 lb 6.4 oz)   SpO2 99%   BMI 46.71 kg/m   Estimated body mass index is 46.71 kg/m  as calculated from the following:    Height as of this encounter: 1.676 m (5' 6\").    Weight as of this encounter: 131.3 kg (289 lb 6.4 oz).    Medication Reconciliation: complete      Norma J. Gosselin, LPN  "

## 2020-07-29 NOTE — PROGRESS NOTES
"Nursing Notes:   Gosselin, Norma J., LPN  7/29/2020 10:48 AM  Signed  Chief Complaint   Patient presents with     Derm Problem     buttocks- skin burning itching and redness         Initial /80   Pulse 74   Temp 98.6  F (37  C) (Tympanic)   Resp 16   Ht 1.676 m (5' 6\")   Wt 131.3 kg (289 lb 6.4 oz)   SpO2 99%   BMI 46.71 kg/m   Estimated body mass index is 46.71 kg/m  as calculated from the following:    Height as of this encounter: 1.676 m (5' 6\").    Weight as of this encounter: 131.3 kg (289 lb 6.4 oz).    Medication Reconciliation: complete      Norma J. Gosselin, LPN     Nursing note reviewed with patient.  Accuracy and completeness verified.    SUBJECTIVE:                                                      Rosi Lamb is a 45 year old year old female patient who presents to the clinic today for complaints of itching, burning, redness in her perirectal area and between her buttocks.  She denies any open areas or drainage.  She reports this area is tender and worsens when she is sweaty or very active.    Problem List/PMH: Reviewed in EMR, and made relevant updates today.  Medications: Reviewed in EMR, and made relevant updates today.  Allergies: Reviewed in EMR, and made relevant updates today.    Current Code Status:  No Order    REVIEW OF SYSTEMS:    Review of Systems   Gastrointestinal: Positive for rectal pain.   Skin: Positive for rash.   All other systems reviewed and are negative.      OBJECTIVE:                                                      EXAM:     There were no vitals taken for this visit.    Current Pain Score: Data Unavailable     Physical Exam  Vitals signs and nursing note reviewed.   Constitutional:       Appearance: Normal appearance. She is obese.   Skin:     General: Skin is warm and moist.      Findings: Erythema and rash present.          Neurological:      Mental Status: She is alert.       Diagnostics Completed at this Visit:  No results found for any visits on " 07/29/20.     ASSESSMENT/PLAN:                                                      Yeast infection of the skin  Instructed patient that she should use unscented fragrance free baby wipes to clean this area prior to application of nystatin cream.  If necessary, she can use small strips of soft cloth between her buttocks.  She may use Goldbond powder as needed.  Encouraged her to eat yogurt 4 times a day.  Encouraged to not wear underwear to bed and allow this area open to air as much as possible.  Follow-up in 10 to 14 days.  May cancel this appointment if her yeast has resolved.  - nystatin (MYCOSTATIN) [id number] UNIT/GM external cream  Dispense: 30 g; Refill: 1      Patient verbalizes understanding and is agreeable to plan of care.    Return in about 10-14 days (around 8/8/2020) for Recheck.  May cancel appointment if it is resolved.      DASHA Hernandez, AGNP-C  Internal Medicine  [facility]    07/29/2020 8:04 AM    Portions of this note were dictated using speech recognition software. The note has been proofread but errors in the text may have been overlooked. Please contact me if there are any concerns regarding the accuracy of the dictation.

## 2020-07-30 ASSESSMENT — ASTHMA QUESTIONNAIRES: ACT_TOTALSCORE: 25

## 2020-08-12 ENCOUNTER — OFFICE VISIT (OUTPATIENT)
Dept: INTERNAL MEDICINE | Facility: OTHER | Age: 45
End: 2020-08-12
Attending: INTERNAL MEDICINE
Payer: MEDICARE

## 2020-08-12 VITALS
RESPIRATION RATE: 16 BRPM | HEART RATE: 75 BPM | DIASTOLIC BLOOD PRESSURE: 80 MMHG | BODY MASS INDEX: 46.32 KG/M2 | TEMPERATURE: 97.9 F | WEIGHT: 287 LBS | SYSTOLIC BLOOD PRESSURE: 110 MMHG | OXYGEN SATURATION: 100 %

## 2020-08-12 DIAGNOSIS — R30.0 DYSURIA: ICD-10-CM

## 2020-08-12 DIAGNOSIS — K21.9 GASTROESOPHAGEAL REFLUX DISEASE, ESOPHAGITIS PRESENCE NOT SPECIFIED: Primary | ICD-10-CM

## 2020-08-12 LAB
ALBUMIN UR-MCNC: NEGATIVE MG/DL
APPEARANCE UR: CLEAR
BILIRUB UR QL STRIP: NEGATIVE
COLOR UR AUTO: ABNORMAL
GLUCOSE UR STRIP-MCNC: NEGATIVE MG/DL
HGB UR QL STRIP: NEGATIVE
KETONES UR STRIP-MCNC: NEGATIVE MG/DL
LEUKOCYTE ESTERASE UR QL STRIP: ABNORMAL
NITRATE UR QL: NEGATIVE
PH UR STRIP: 6.5 PH (ref 5–7)
RBC #/AREA URNS AUTO: <1 /HPF (ref 0–2)
SOURCE: ABNORMAL
SP GR UR STRIP: 1.02 (ref 1–1.03)
SQUAMOUS #/AREA URNS AUTO: 3 /HPF (ref 0–1)
UROBILINOGEN UR STRIP-MCNC: NORMAL MG/DL (ref 0–2)
WBC #/AREA URNS AUTO: 1 /HPF (ref 0–5)

## 2020-08-12 PROCEDURE — 99213 OFFICE O/P EST LOW 20 MIN: CPT | Performed by: NURSE PRACTITIONER

## 2020-08-12 PROCEDURE — 81001 URINALYSIS AUTO W/SCOPE: CPT | Mod: ZL | Performed by: NURSE PRACTITIONER

## 2020-08-12 PROCEDURE — G0463 HOSPITAL OUTPT CLINIC VISIT: HCPCS

## 2020-08-12 RX ORDER — FAMOTIDINE 20 MG/1
20 TABLET, FILM COATED ORAL 2 TIMES DAILY
Qty: 60 TABLET | Refills: 3 | Status: SHIPPED | OUTPATIENT
Start: 2020-08-12 | End: 2021-03-12

## 2020-08-12 ASSESSMENT — ANXIETY QUESTIONNAIRES

## 2020-08-12 ASSESSMENT — PATIENT HEALTH QUESTIONNAIRE - PHQ9
SUM OF ALL RESPONSES TO PHQ QUESTIONS 1-9: 0
5. POOR APPETITE OR OVEREATING: NOT AT ALL

## 2020-08-12 ASSESSMENT — PAIN SCALES - GENERAL: PAINLEVEL: NO PAIN (0)

## 2020-08-12 NOTE — NURSING NOTE
"Chief Complaint   Patient presents with     Gastrophageal Reflux     acid reflux and nausea       Initial /80 (BP Location: Right arm, Patient Position: Sitting, Cuff Size: Adult Large)   Pulse 75   Temp 97.9  F (36.6  C) (Tympanic)   Resp 16   Wt 130.2 kg (287 lb)   SpO2 100%   BMI 46.32 kg/m   Estimated body mass index is 46.32 kg/m  as calculated from the following:    Height as of 7/29/20: 1.676 m (5' 6\").    Weight as of this encounter: 130.2 kg (287 lb).  Medication Reconciliation: complete    Deirdre Brown LPN    "

## 2020-08-12 NOTE — PROGRESS NOTES
Subjective:  She is here today for a couple of concerns.  She has symptoms of acid reflux.  She has noticed improvement in the back of her throat and some nausea.  This is been occurring intermittently over the past couple weeks.  Does not seem to be associated with food.  She really does not have any abdominal pain.  No vomiting.  Has occasional heartburn symptoms.  No exertional chest pain or dyspnea.  Symptoms was relieved with taking Tums and Pepto-Bismol.  She is not taking aspirin or NSAIDs.  Has also noticed burning with urination, frequency and some mild suprapubic discomfort over the past couple of days.  She would like to have a urinalysis.  She believes she may have had a bladder infection about 3 months ago.    Patient Active Problem List   Diagnosis     Alcohol use disorder, moderate, in sustained remission, dependence (H)     Allergic rhinitis due to pollen     Generalized anxiety disorder     Bilateral chronic knee pain     Bilateral foot pain     Borderline personality disorder (H)     Cannabis use disorder, moderate, in sustained remission, dependence (H)     Closed dislocation of tarsal joint     Crepitus of joint of right knee     Other disorder of menstruation and other abnormal bleeding from female genital tract     Elevated random blood glucose level     Esophageal reflux     Lymphedema of both lower extremities     Menorrhagia with irregular cycle     Moderate persistent asthma     Morbid obesity with BMI of 45.0-49.9, adult (H)     Peripheral polyneuropathy     Preop examination     Urinary incontinence     History of substance abuse (H)     Stasis dermatitis of both legs     Vitamin B12 deficiency     Vitamin D deficiency     Abnormal Pap smear of cervix     Bipolar I disorder, most recent episode depressed, moderate (H)     Past Medical History:   Diagnosis Date     Edema     No Comments Provided     Encounter for removal and reinsertion of intrauterine contraceptive device     05/16/05,IUD  placement, Removed     Excessive and frequent menstruation with irregular cycle     2017     Major depressive disorder, single episode     No Comments Provided     Personal history of other medical treatment (CODE)     G3, P2-0-1-2 with history of spontaneous      Personal history of other medical treatment (CODE)     No Comments Provided     Uncomplicated asthma     No Comments Provided     Past Surgical History:   Procedure Laterality Date     ANKLE SURGERY      fracture, repair with screws     CONIZATION LEEP      ,Underwent a loop     LAPAROSCOPIC TUBAL LIGATION      ,tubal ligation     Social History     Socioeconomic History     Marital status:      Spouse name: Not on file     Number of children: Not on file     Years of education: Not on file     Highest education level: Not on file   Occupational History     Not on file   Social Needs     Financial resource strain: Not on file     Food insecurity     Worry: Not on file     Inability: Not on file     Transportation needs     Medical: Not on file     Non-medical: Not on file   Tobacco Use     Smoking status: Former Smoker     Packs/day: 1.00     Years: 3.00     Pack years: 3.00     Types: Cigarettes     Last attempt to quit: 2003     Years since quittin.6     Smokeless tobacco: Never Used   Substance and Sexual Activity     Alcohol use: Not Currently     Alcohol/week: 0.0 standard drinks     Drug use: Never     Sexual activity: Not Currently   Lifestyle     Physical activity     Days per week: Not on file     Minutes per session: Not on file     Stress: Not on file   Relationships     Social connections     Talks on phone: Not on file     Gets together: Not on file     Attends Christian service: Not on file     Active member of club or organization: Not on file     Attends meetings of clubs or organizations: Not on file     Relationship status: Not on file     Intimate partner violence     Fear of current or ex partner:  Not on file     Emotionally abused: Not on file     Physically abused: Not on file     Forced sexual activity: Not on file   Other Topics Concern     Parent/sibling w/ CABG, MI or angioplasty before 65F 55M? Not Asked   Social History Narrative    No longer in adult foster care lived with Charley Godwin.    .   2 sons - age 12 and 7 - as of 11/2016.   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     Family History   Problem Relation Age of Onset     Osteoporosis Mother      Asthma Father         Asthma,+ Leg edema, knee, hip replacement. + Lymphedema     Heart Failure Father      Other - See Comments Paternal Grandmother         Lymphedema     Osteoporosis Brother         Osteoporosis     Other - See Comments Brother         No Known Problems     Current Outpatient Medications   Medication Sig Dispense Refill     acetaminophen (TYLENOL) 500 MG tablet Take 1 tablet (500 mg) by mouth every 6 hours as needed for fever or pain 100 tablet 5     albuterol (VENTOLIN HFA) 108 (90 Base) MCG/ACT inhaler Inhale 1-2 puffs into the lungs every 4 hours as needed for shortness of breath / dyspnea or wheezing 18 g 5     ARIPiprazole (ABILIFY) 15 MG tablet Take 1 tablet (15 mg) by mouth daily       cholecalciferol (VITAMIN D3) 5000 units (125 mcg) capsule Take 1 capsule (5,000 Units) by mouth daily 100 capsule 3     cyanocobalamin (CYANOCOBALAMIN) 1000 MCG/ML injection INJECT 1 ML INTRAMUSCULARLY EVERY 2 WEEKS. 6 mL 11     famotidine (PEPCID) 20 MG tablet Take 1 tablet (20 mg) by mouth 2 times daily 60 tablet 3     gabapentin (NEURONTIN) 300 MG capsule 300 mg po BID and 300 mg po PRN at Noon -- Rx by Psych -- Fabienne Duncan       hydrOXYzine (ATARAX) 50 MG tablet Take 50 mg by mouth 2 times daily as needed       ibuprofen (ADVIL/MOTRIN) 200 MG tablet Take 600 mg by mouth every 6 hours as needed for pain       lamoTRIgine (LAMICTAL) 200 MG tablet TAKE 1 TABLET BY MOUTH NIGHTLY AT BEDTIME  2     lamoTRIgine  "(LAMICTAL) 25 MG tablet Take 1 tablet by mouth At Bedtime With 200 mg  2     Misc. Devices (ROLLER WALKER) MISC Rolling Walker for home use.  2 wheels       montelukast (SINGULAIR) 10 MG tablet Take 1 tablet (10 mg) by mouth At Bedtime 90 tablet 3     order for DME Equipment being ordered: Bariatric  4 wheeled walker with seat, storage container, and handbrakes 1 each 0     order for DME Equipment being ordered: right knee brace 1 each 0     oxybutynin ER (DITROPAN-XL) 10 MG 24 hr tablet Take 1 tablet (10 mg) by mouth daily 90 tablet 3     SAFETY-ELBERT TB SYRINGE 25G X 5/8\" 1 ML MISC USE TO INJECT B-12 INTRAMUSCULARLY EVERY 2 WEEKS 100 each 0     syringe/needle, sisp, 25G X 5/8\" 1 ML MISC Safety glide - for B12 Shots       vilazodone (VIIBRYD) 40 MG TABS tablet Take 40 mg by mouth daily with food       Clonazepam; Hydrocodone-acetaminophen; Lorazepam; Sertraline; Bupropion; Dust mite extract; Lithium; Pollen extract; Risperidone; Sulfa drugs; Trichophyton; and Valproic acid      Review of Systems:  Review of Systems  Denies fever, chills, hematemesis, rectal bleeding and black stools.  See HPI  Objective:   /80 (BP Location: Right arm, Patient Position: Sitting, Cuff Size: Adult Large)   Pulse 75   Temp 97.9  F (36.6  C) (Tympanic)   Resp 16   Wt 130.2 kg (287 lb)   SpO2 100%   BMI 46.32 kg/m    Physical Exam  Pleasant female no acute distress.  Affect normal.  Alert and oriented x4.  Skin color pink.  Mucous membranes moist.  Lung fields clear to auscultation.  Cardiovascular regular rate and rhythm.  Abdomen is soft and obese but without masses, tenderness and organomegaly.  No CVA or suprapubic tenderness.    Assessment:    ICD-10-CM    1. Gastroesophageal reflux disease, esophagitis presence not specified  K21.9 famotidine (PEPCID) 20 MG tablet   2. Dysuria  R30.0 UA reflex to Microscopic       Plan:   1.  Start Pepcid AC 20 mg 30 minutes prior to breakfast and evening meal.  Avoid aspirin and NSAIDs.  " If no improvement within the next 1-2 weeks or if develops worsening then needs to be seen.  2.  Will check urinalysis.  She did not want to stay for results.  We will call with results and treat as indicated.  If urinalysis negative and continues to have symptoms that should help further evaluation.    ROSENDO Cortés   8/12/2020  10:52 AM

## 2020-08-13 ASSESSMENT — ANXIETY QUESTIONNAIRES: GAD7 TOTAL SCORE: 0

## 2020-08-13 ASSESSMENT — ASTHMA QUESTIONNAIRES: ACT_TOTALSCORE: 22

## 2020-08-16 ENCOUNTER — OFFICE VISIT (OUTPATIENT)
Dept: FAMILY MEDICINE | Facility: OTHER | Age: 45
End: 2020-08-16
Attending: PHYSICIAN ASSISTANT
Payer: MEDICARE

## 2020-08-16 VITALS
WEIGHT: 291.4 LBS | SYSTOLIC BLOOD PRESSURE: 122 MMHG | BODY MASS INDEX: 45.74 KG/M2 | HEART RATE: 86 BPM | RESPIRATION RATE: 16 BRPM | TEMPERATURE: 97.5 F | HEIGHT: 67 IN | OXYGEN SATURATION: 99 % | DIASTOLIC BLOOD PRESSURE: 76 MMHG

## 2020-08-16 DIAGNOSIS — L03.115 CELLULITIS OF RIGHT LOWER EXTREMITY: Primary | ICD-10-CM

## 2020-08-16 PROCEDURE — G0463 HOSPITAL OUTPT CLINIC VISIT: HCPCS

## 2020-08-16 PROCEDURE — 99213 OFFICE O/P EST LOW 20 MIN: CPT | Performed by: NURSE PRACTITIONER

## 2020-08-16 RX ORDER — CEFDINIR 300 MG/1
300 CAPSULE ORAL 2 TIMES DAILY
Qty: 14 CAPSULE | Refills: 0 | Status: SHIPPED | OUTPATIENT
Start: 2020-08-16 | End: 2020-08-23

## 2020-08-16 ASSESSMENT — PAIN SCALES - GENERAL: PAINLEVEL: MILD PAIN (3)

## 2020-08-16 ASSESSMENT — MIFFLIN-ST. JEOR: SCORE: 2009.41

## 2020-08-16 NOTE — PATIENT INSTRUCTIONS
May use hot packs, cold packs as needed.    Over-the-counter medications such as ibuprofen or Tylenol for pain.    Elevation of the leg when at rest    Start the antibiotic today.    Follow-up in 3 to 5 days if symptoms are not improving.

## 2020-08-16 NOTE — PROGRESS NOTES
"Nursing Notes:   Ainsley Grove LPN  8/16/2020  1:02 PM  Signed  Chief Complaint   Patient presents with     Musculoskeletal Problem     Patient is here for pain and redness in the right lower leg that started \" a few days ago\". Patient states she tried neosporin with some relief.     Initial /76   Pulse 86   Temp 97.5  F (36.4  C) (Tympanic)   Resp 16   Ht 1.702 m (5' 7\")   Wt 132.2 kg (291 lb 6.4 oz)   SpO2 99%   BMI 45.64 kg/m   Estimated body mass index is 45.64 kg/m  as calculated from the following:    Height as of this encounter: 1.702 m (5' 7\").    Weight as of this encounter: 132.2 kg (291 lb 6.4 oz).  Medication Reconciliation: complete    Ainsley Grove LPN    SUBJECTIVE:   Rosi Lamb is a 45 year old female who presents to clinic today for the following health issues:  Right leg redness and pain.  Patient reports about a week ago she fell and bruised her right lower leg.  She does have a history of lymphedema which subsequently developed cellulitis.  She has had a history of cellulitis in the right lower leg.  She denies fevers or chills.  She denies signs or symptoms of systemic illness.  She is eating and drinking well.      Problem list and histories reviewed & adjusted, as indicated.  Additional history: as documented    Patient Active Problem List   Diagnosis     Alcohol use disorder, moderate, in sustained remission, dependence (H)     Allergic rhinitis due to pollen     Generalized anxiety disorder     Bilateral chronic knee pain     Bilateral foot pain     Borderline personality disorder (H)     Cannabis use disorder, moderate, in sustained remission, dependence (H)     Closed dislocation of tarsal joint     Crepitus of joint of right knee     Other disorder of menstruation and other abnormal bleeding from female genital tract     Elevated random blood glucose level     Esophageal reflux     Lymphedema of both lower extremities     Menorrhagia with irregular cycle     " Moderate persistent asthma     Morbid obesity with BMI of 45.0-49.9, adult (H)     Peripheral polyneuropathy     Preop examination     Urinary incontinence     History of substance abuse (H)     Stasis dermatitis of both legs     Vitamin B12 deficiency     Vitamin D deficiency     Abnormal Pap smear of cervix     Bipolar I disorder, most recent episode depressed, moderate (H)     Past Surgical History:   Procedure Laterality Date     ANKLE SURGERY      fracture, repair with screws     CONIZATION LEEP      ,Underwent a loop     LAPAROSCOPIC TUBAL LIGATION      ,tubal ligation       Social History     Tobacco Use     Smoking status: Former Smoker     Packs/day: 1.00     Years: 3.00     Pack years: 3.00     Types: Cigarettes     Last attempt to quit: 2003     Years since quittin.6     Smokeless tobacco: Never Used   Substance Use Topics     Alcohol use: Not Currently     Alcohol/week: 0.0 standard drinks     Family History   Problem Relation Age of Onset     Osteoporosis Mother      Asthma Father         Asthma,+ Leg edema, knee, hip replacement. + Lymphedema     Heart Failure Father      Other - See Comments Paternal Grandmother         Lymphedema     Osteoporosis Brother         Osteoporosis     Other - See Comments Brother         No Known Problems         Current Outpatient Medications   Medication Sig Dispense Refill     acetaminophen (TYLENOL) 500 MG tablet Take 1 tablet (500 mg) by mouth every 6 hours as needed for fever or pain 100 tablet 5     albuterol (VENTOLIN HFA) 108 (90 Base) MCG/ACT inhaler Inhale 1-2 puffs into the lungs every 4 hours as needed for shortness of breath / dyspnea or wheezing 18 g 5     ARIPiprazole (ABILIFY) 15 MG tablet Take 1 tablet (15 mg) by mouth daily       cefdinir (OMNICEF) 300 MG capsule Take 1 capsule (300 mg) by mouth 2 times daily for 7 days 14 capsule 0     cholecalciferol (VITAMIN D3) 5000 units (125 mcg) capsule Take 1 capsule (5,000 Units) by mouth daily  "100 capsule 3     cyanocobalamin (CYANOCOBALAMIN) 1000 MCG/ML injection INJECT 1 ML INTRAMUSCULARLY EVERY 2 WEEKS. 6 mL 11     famotidine (PEPCID) 20 MG tablet Take 1 tablet (20 mg) by mouth 2 times daily 60 tablet 3     gabapentin (NEURONTIN) 300 MG capsule 300 mg po BID and 300 mg po PRN at Noon -- Rx by Psych -- Fabienne Duncan       hydrOXYzine (ATARAX) 50 MG tablet Take 50 mg by mouth 2 times daily as needed       ibuprofen (ADVIL/MOTRIN) 200 MG tablet Take 600 mg by mouth every 6 hours as needed for pain       lamoTRIgine (LAMICTAL) 200 MG tablet TAKE 1 TABLET BY MOUTH NIGHTLY AT BEDTIME  2     lamoTRIgine (LAMICTAL) 25 MG tablet Take 1 tablet by mouth At Bedtime With 200 mg  2     montelukast (SINGULAIR) 10 MG tablet Take 1 tablet (10 mg) by mouth At Bedtime 90 tablet 3     oxybutynin ER (DITROPAN-XL) 10 MG 24 hr tablet Take 1 tablet (10 mg) by mouth daily (Patient taking differently: Take 5 mg by mouth daily ) 90 tablet 3     SAFETY-ELBERT TB SYRINGE 25G X 5/8\" 1 ML MISC USE TO INJECT B-12 INTRAMUSCULARLY EVERY 2 WEEKS 100 each 0     SF 1.1 % GEL topical gel BRUSH, SWISH TOOTHPASTE FOR 30 SECONDS AND SPIT BEFORE BED. DO NOT RINSE, EAT OR DRINK FOR 30 MINUTES       syringe/needle, sisp, 25G X 5/8\" 1 ML MISC Safety glide - for B12 Shots       vilazodone (VIIBRYD) 40 MG TABS tablet Take 40 mg by mouth daily with food       Misc. Devices (ROLLER WALKER) MISC Rolling Walker for home use.  2 wheels       order for DME Equipment being ordered: Bariatric  4 wheeled walker with seat, storage container, and handbrakes (Patient not taking: Reported on 8/16/2020) 1 each 0     order for DME Equipment being ordered: right knee brace (Patient not taking: Reported on 8/16/2020) 1 each 0     Allergies   Allergen Reactions     Clonazepam Other (See Comments)     Causes Violence and aggresssion     Hydrocodone-Acetaminophen      Other reaction(s): Seizures  Can take Percocet without difficulty.     Lorazepam Other (See Comments) " "    Causes Violence and aggresssion     Sertraline Other (See Comments)     Caused suicidally      Bupropion Other (See Comments)     Caused Major Depression     Dust Mite Extract      Other reaction(s): Asthma symptoms     Lithium Other (See Comments)     Mood alteration     Pollen Extract      Other reaction(s): Asthma symptoms     Risperidone Other (See Comments)     Agitation       Sulfa Drugs Itching     Trichophyton      Other reaction(s): Asthma symptoms     Valproic Acid Other (See Comments)     Weight gain         ROS:  Notable findings in the HPI.       OBJECTIVE:     /76   Pulse 86   Temp 97.5  F (36.4  C) (Tympanic)   Resp 16   Ht 1.702 m (5' 7\")   Wt 132.2 kg (291 lb 6.4 oz)   SpO2 99%   BMI 45.64 kg/m    Body mass index is 45.64 kg/m .  GENERAL: healthy, alert and no distress  RESP: Without increased work of breathing  SKIN: Right lower leg there is a 6 cm area of erythema.  In the center there is some bruising and a puncture type wound.  Upon palpation the area is warm and swollen feeling.  She does have evidence of lymphedema in both legs.  This is not new.  There is no bleeding or drainage noted.  The area is tender to touch.  PSYCH: mentation appears normal, affect normal/bright    Diagnostic Test Results:  none     ASSESSMENT/PLAN:     1. Cellulitis of right lower extremity  - cefdinir (OMNICEF) 300 MG capsule; Take 1 capsule (300 mg) by mouth 2 times daily for 7 days  Dispense: 14 capsule; Refill: 0  She never had    PLAN:    Discussed with patient wound care and home cares.  Discussed antibiotics and stomach care.  Patient will follow-up if symptoms worsen.    Followup:    If not improving or if condition worsens, follow up with your Primary Care Provider    I explained my diagnostic considerations and recommendations to the patient, who voiced understanding and agreement with the treatment plan. All questions were answered. We discussed potential side effects of any prescribed or " recommended therapies, as well as expectations for response to treatments. She was advised to contact our office if there is no improvement or worsening of conditions or symptoms.  If s/s worsen or persist, patient will either come back or follow up with PCP.    Disclaimer:  This note consists of words and symbols derived from keyboarding, dictation, or using voice recognition software. As a result, there may be errors in the script that have gone undetected. Please consider this when interpreting information found in this note.      Romelia Brandt NP, 8/16/2020 1:04 PM

## 2020-08-16 NOTE — NURSING NOTE
"Chief Complaint   Patient presents with     Musculoskeletal Problem     Patient is here for pain and redness in the right lower leg that started \" a few days ago\". Patient states she tried neosporin with some relief.     Initial /76   Pulse 86   Temp 97.5  F (36.4  C) (Tympanic)   Resp 16   Ht 1.702 m (5' 7\")   Wt 132.2 kg (291 lb 6.4 oz)   SpO2 99%   BMI 45.64 kg/m   Estimated body mass index is 45.64 kg/m  as calculated from the following:    Height as of this encounter: 1.702 m (5' 7\").    Weight as of this encounter: 132.2 kg (291 lb 6.4 oz).  Medication Reconciliation: complete    Ainsley Grove LPN  "

## 2020-09-08 NOTE — PROGRESS NOTES
Nursing Notes:   Yareli Vallejo LPN  2020 11:38 AM  Addendum  Patient presents to clinic with pain in left 2nd digit of foot. Possibly an ingrown toenail?  Yareli Vallejo LPN ....................  2020   11:34 AM      SUBJECTIVE:     HPI  Rosi Lamb is a 45 year old female who presents to clinic today for foot concerns. States she developed pain in her the second toe of her left foot that began approximately two weeks ago. Notes the pain is mostly at the tip of the toe and in the past few days has developed surrounding erythema. Has been wearing new crocs for a a few weeks. Does wear socks but notes shoes to slip quite a bit when she is walking. No fever/chills, open sores, bleeding, pus, or drainage. No other known injury. Has not been taking anything at home for symptomatic relief.     Review of Systems   Per HPI.     PAST MEDICAL HISTORY:   Past Medical History:   Diagnosis Date     Edema     No Comments Provided     Encounter for removal and reinsertion of intrauterine contraceptive device     05,IUD placement, Removed     Excessive and frequent menstruation with irregular cycle     2017     Major depressive disorder, single episode     No Comments Provided     Personal history of other medical treatment (CODE)     G3, P2-0-1-2 with history of spontaneous      Personal history of other medical treatment (CODE)     No Comments Provided     Uncomplicated asthma     No Comments Provided       PAST SURGICAL HISTORY:   Past Surgical History:   Procedure Laterality Date     ANKLE SURGERY      fracture, repair with screws     CONIZATION LEEP      ,Underwent a loop     LAPAROSCOPIC TUBAL LIGATION      ,tubal ligation       FAMILY HISTORY:   Family History   Problem Relation Age of Onset     Osteoporosis Mother      Asthma Father         Asthma,+ Leg edema, knee, hip replacement. + Lymphedema     Heart Failure Father      Other - See Comments Paternal Grandmother          Lymphedema     Osteoporosis Brother         Osteoporosis     Other - See Comments Brother         No Known Problems       SOCIAL HISTORY:   Social History     Tobacco Use     Smoking status: Former Smoker     Packs/day: 1.00     Years: 3.00     Pack years: 3.00     Types: Cigarettes     Last attempt to quit: 2003     Years since quittin.7     Smokeless tobacco: Never Used   Substance Use Topics     Alcohol use: Not Currently     Alcohol/week: 0.0 standard drinks        Allergies   Allergen Reactions     Clonazepam Other (See Comments)     Causes Violence and aggresssion     Hydrocodone-Acetaminophen      Other reaction(s): Seizures  Can take Percocet without difficulty.     Lorazepam Other (See Comments)     Causes Violence and aggresssion     Sertraline Other (See Comments)     Caused suicidally      Bupropion Other (See Comments)     Caused Major Depression     Dust Mite Extract      Other reaction(s): Asthma symptoms     Lithium Other (See Comments)     Mood alteration     Pollen Extract      Other reaction(s): Asthma symptoms     Risperidone Other (See Comments)     Agitation       Sulfa Drugs Itching     Trichophyton      Other reaction(s): Asthma symptoms     Valproic Acid Other (See Comments)     Weight gain     Current Outpatient Medications   Medication     acetaminophen (TYLENOL) 500 MG tablet     albuterol (VENTOLIN HFA) 108 (90 Base) MCG/ACT inhaler     ARIPiprazole (ABILIFY) 15 MG tablet     cephALEXin (KEFLEX) 500 MG capsule     cholecalciferol (VITAMIN D3) 5000 units (125 mcg) capsule     cyanocobalamin (CYANOCOBALAMIN) 1000 MCG/ML injection     famotidine (PEPCID) 20 MG tablet     gabapentin (NEURONTIN) 300 MG capsule     hydrOXYzine (ATARAX) 50 MG tablet     ibuprofen (ADVIL/MOTRIN) 200 MG tablet     lamoTRIgine (LAMICTAL) 200 MG tablet     lamoTRIgine (LAMICTAL) 25 MG tablet     Misc. Devices (ROLLER WALKER) MISC     montelukast (SINGULAIR) 10 MG tablet     order for DME     order for  "DME     oxybutynin ER (DITROPAN-XL) 10 MG 24 hr tablet     SAFETY-ELBERT TB SYRINGE 25G X 5/8\" 1 ML MISC     SF 1.1 % GEL topical gel     syringe/needle, sisp, 25G X 5/8\" 1 ML MISC     vilazodone (VIIBRYD) 40 MG TABS tablet     No current facility-administered medications for this visit.          OBJECTIVE:     /62   Pulse 84   Temp 97.7  F (36.5  C)   Resp 16   Ht 1.702 m (5' 7\")   Wt 127.7 kg (281 lb 9.6 oz)   Breastfeeding No   BMI 44.10 kg/m    Body mass index is 44.1 kg/m .  Physical Exam  General: Pleasant, in no apparent distress.  Cardiovascular: Regular rate and rhythm with S1 equal to S2. No murmurs, friction rubs, or gallops.   Respiratory: Lungs are resonant and clear to auscultation bilaterally. No wheezes, crackles, or rhonchi.  Skin: Mild break down of skin at tip of second digit of left foot with mild tenderness to palpation. Surrounding erythema. No open sores, bleeding, pus, or drainage. No ingrown toenails.   Psych: Appropriate mood and affect.        ASSESSMENT/PLAN:     1. Cellulitis of toe of left foot      Discussed with patient and staff that symptoms likely resulted from irritation from toe rubbing against new shoes. Concerned for developing cellulitis given mild skin break down and surrounding erythema and warmth and patient does have a history of cellulitis several times in the past. Will treat with Keflex 500 mg 4 times daily X 5 days. Educated on medication, use, and side effects. Encouraged use of probiotic or yogurt while taking antibiotic. Discussed ways to decrease rubbing on tip of toe including wearing bandaid or padding, purchasing new shoes, etc. Call or return to the clinic if symptoms persist, worsen, or new symptoms arise.     Victoria Garibay PA-C  [facility]    "

## 2020-09-09 ENCOUNTER — OFFICE VISIT (OUTPATIENT)
Dept: FAMILY MEDICINE | Facility: OTHER | Age: 45
End: 2020-09-09
Attending: PHYSICIAN ASSISTANT
Payer: MEDICARE

## 2020-09-09 VITALS
DIASTOLIC BLOOD PRESSURE: 62 MMHG | TEMPERATURE: 97.7 F | HEIGHT: 67 IN | WEIGHT: 281.6 LBS | RESPIRATION RATE: 16 BRPM | HEART RATE: 84 BPM | SYSTOLIC BLOOD PRESSURE: 128 MMHG | BODY MASS INDEX: 44.2 KG/M2

## 2020-09-09 DIAGNOSIS — L03.032 CELLULITIS OF TOE OF LEFT FOOT: Primary | ICD-10-CM

## 2020-09-09 PROCEDURE — 99213 OFFICE O/P EST LOW 20 MIN: CPT | Performed by: PHYSICIAN ASSISTANT

## 2020-09-09 PROCEDURE — G0463 HOSPITAL OUTPT CLINIC VISIT: HCPCS

## 2020-09-09 RX ORDER — CEPHALEXIN 500 MG/1
500 CAPSULE ORAL 4 TIMES DAILY
Qty: 20 CAPSULE | Refills: 0 | Status: SHIPPED | OUTPATIENT
Start: 2020-09-09 | End: 2021-03-12

## 2020-09-09 ASSESSMENT — PAIN SCALES - GENERAL: PAINLEVEL: MODERATE PAIN (5)

## 2020-09-09 ASSESSMENT — MIFFLIN-ST. JEOR: SCORE: 1964.96

## 2020-09-09 NOTE — NURSING NOTE
Patient presents to clinic with pain in left 2nd digit of foot. Possibly an ingrown toenail?  Yareli Vallejo LPN ....................  9/9/2020   11:34 AM

## 2020-09-21 ENCOUNTER — OFFICE VISIT (OUTPATIENT)
Dept: FAMILY MEDICINE | Facility: OTHER | Age: 45
End: 2020-09-21
Attending: FAMILY MEDICINE
Payer: MEDICARE

## 2020-09-21 VITALS
SYSTOLIC BLOOD PRESSURE: 114 MMHG | HEART RATE: 76 BPM | DIASTOLIC BLOOD PRESSURE: 80 MMHG | WEIGHT: 280 LBS | BODY MASS INDEX: 43.85 KG/M2 | RESPIRATION RATE: 16 BRPM | TEMPERATURE: 98.3 F

## 2020-09-21 DIAGNOSIS — H61.21 EXCESSIVE CERUMEN IN EAR CANAL, RIGHT: Primary | ICD-10-CM

## 2020-09-21 PROCEDURE — 69209 REMOVE IMPACTED EAR WAX UNI: CPT | Performed by: FAMILY MEDICINE

## 2020-09-21 PROCEDURE — G0463 HOSPITAL OUTPT CLINIC VISIT: HCPCS

## 2020-09-21 PROCEDURE — 99213 OFFICE O/P EST LOW 20 MIN: CPT | Performed by: FAMILY MEDICINE

## 2020-09-21 PROCEDURE — G0463 HOSPITAL OUTPT CLINIC VISIT: HCPCS | Mod: 25

## 2020-09-21 ASSESSMENT — PAIN SCALES - GENERAL: PAINLEVEL: MODERATE PAIN (4)

## 2020-09-21 NOTE — PATIENT INSTRUCTIONS
Patient Education     Impacted Earwax     Inner ear structures including ear canal and eardrum.     Impacted earwax is a buildup of the natural wax in the ear (cerumen). Impacted earwax is very common. It can cause symptoms such as hearing loss. It can also make it difficult for a doctor to examine your ear.  Understanding earwax  Tiny glands in your ear make substances that combine with dead skin cells to form earwax. Earwax helps protect your ear canal from water, dirt, infection, and injury. Over time, earwax travels from the inner part of your ear canal to the entrance of the canal. Then it falls away naturally. But in some cases, it can t travel to the entrance of the canal. This may be because of a health condition or objects put in the ear. With age, earwax tends to become harder and less fluid. Older adults are more likely to have problems with earwax buildup.  What causes impacted earwax?  Earwax can build up because of many health conditions. Some cause a physical blockage. Others cause too much earwax to be made. Health conditions that can cause earwax buildup include:    Use of cotton swabs to clean deep in the ear canal    Bony blockage in the ear (osteoma or exostoses)    Infections, such as  infection of the outer ear (external otitis)    Skin disease, such as eczema    Autoimmune diseases, such as lupus    A narrowed ear canal from birth, chronic inflammation, or injury    Too much earwax because of injury    Too much earwax because of  water in the ear canal  Objects repeatedly placed in the ear can also cause impacted earwax. For example, putting cotton swabs in the ear may push the wax deeper into the ear. Over time, this may cause blockage. Hearing aids, swimming plugs, and swim molds can cause the same problem when used again and again.  In some cases, the cause of impacted earwax is not known.  Symptoms of impacted earwax  Excess earwax usually does not cause any symptoms, unless there is a  large amount of buildup. Then it may cause symptoms such as:    Hearing loss    Earache    Sense of ear fullness    Itching in the ear    Odor from the ear    Ear drainage    Dizziness    Ringing in the ears    Cough  Treatment for impacted earwax  If you don t have symptoms, you may not need treatment. Often, the earwax goes away on its own with time. If you have symptoms, you may have one or more treatments such as:    Eardrops to soften the earwax. This helps it leave the ear over time.    Rinsing (irrigation) of the ear canal with water. This is done in a doctor s office.    Removal of the earwax with small tools. This is also done in a doctor s office.  In rare cases, some treatments for earwax removal may cause complications such as:    Infection of the outer ear (otitis external)    Earache    Short-term hearing loss    Dizziness    Water trapped in the ear canal    Hole in the eardrum    Ringing in the ears    Bleeding from the ear  Talk with your healthcare provider about which risks apply most to you.  Healthcare providers do not advise use of ear candles or ear vacuum kits. These methods are not shown to work and may cause problems.  Preventing impacted earwax  You may not be able to prevent impacted earwax if you have a health condition that causes it, such as eczema. In other cases, you may be able to prevent earwax buildup by:    Using ear drops once a week    Having routine cleaning of the ear about every 6 months    Not using cotton swabs in the ear  Call your healthcare provider if you have symptoms of impacted earwax. Also call right away if you have severe symptoms after earwax removal. These may include bleeding or severe ear pain.  Date Last Reviewed: 5/1/2017 2000-2019 [employer]. [address] [place] [address], [place] [address]. All rights reserved. This information is not intended as a substitute for professional medical care. Always follow your healthcare professional's  instructions.

## 2020-09-21 NOTE — NURSING NOTE
"Chief Complaint   Patient presents with     Ear Problem     right ear     Hard time hearing out of right ear along with itchiness of the right ear has been going on for 3 days.    Initial There were no vitals taken for this visit. Estimated body mass index is 44.1 kg/m  as calculated from the following:    Height as of 9/9/20: 1.702 m (5' 7\").    Weight as of 9/9/20: 127.7 kg (281 lb 9.6 oz).    Medication Reconciliation: complete      Zechariah Miller LPN  "

## 2020-09-21 NOTE — PROGRESS NOTES
"Nursing Notes:   Zechariah Miller LPN  9/21/2020 11:57 AM  Signed  Chief Complaint   Patient presents with     Ear Problem     right ear     Hard time hearing out of right ear along with itchiness of the right ear has been going on for 3 days.    Initial There were no vitals taken for this visit. Estimated body mass index is 44.1 kg/m  as calculated from the following:    Height as of 9/9/20: 1.702 m (5' 7\").    Weight as of 9/9/20: 127.7 kg (281 lb 9.6 oz).    Medication Reconciliation: complete      Zechariah Miller LPN    SUBJECTIVE:  Rosi Lamb is a 45 year old female with right ear feeling plugged and itching for about 3 days. NO drainage from the ear. Cannot hear as well on phone with that ear.      Social History     Social History Narrative    No longer in adult foster care lived with Charley Godwin.    .   2 sons - age 12 and 7 - as of 11/2016.   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       OBJECTIVE:  /80   Pulse 76   Temp 98.3  F (36.8  C) (Tympanic)   Resp 16   Wt 127 kg (280 lb)   BMI 43.85 kg/m    General appearance: healthy and alert.    Ears: Left normal. Right with cerumen deep and on TM, irrigated. Some middle ear fluid noted.       ASSESSMENT:  1. Excessive cerumen in ear canal, right          PLAN:  Middle ear fluid may be related to seasonal allergies and will resolve.   Follow up as needed.  Form for her group home done.   Demetra Washington MD  12:48 PM 9/21/2020       "

## 2020-09-30 DIAGNOSIS — E53.8 VITAMIN B12 DEFICIENCY: ICD-10-CM

## 2020-09-30 NOTE — TELEPHONE ENCOUNTER
"[id number] sent Rx request for the following:   B-D TB SYRINGE 25G X 5/8\" 1 ML MISC  Sig: USE TO INJECT B-12 INTRAMUSCULARLY EVERY 2 WEEKS    Last Prescription Date:   09/23/2019  Last Fill Qty/Refills:         100 each , R-0    Last Office Visit:              09/21/2020 (Felix)      04/22/2020 (Veena)   Future Office visit:           None noted   Vitamin Supplements (Adult) Protocol Passed -         Passed - High dose Vitamin D not ordered        Passed - Recent (12 mo) or future (30 days) visit within the authorizing provider's specialty     Patient has had an office visit with the authorizing provider or a provider within the authorizing providers department within the previous 12 mos or has a future within next 30 days. See \"Patient Info\" tab in inbasket, or \"Choose Columns\" in Meds & Orders section of the refill encounter.              Passed - Medication is active on med list           Prescription approved per [facility].  Isabel Wheatley RN ....................  9/30/2020   2:13 PM      "

## 2020-10-26 ENCOUNTER — OFFICE VISIT (OUTPATIENT)
Dept: INTERNAL MEDICINE | Facility: OTHER | Age: 45
End: 2020-10-26
Attending: INTERNAL MEDICINE
Payer: MEDICARE

## 2020-10-26 VITALS
TEMPERATURE: 97.8 F | WEIGHT: 278 LBS | DIASTOLIC BLOOD PRESSURE: 72 MMHG | BODY MASS INDEX: 43.54 KG/M2 | HEART RATE: 76 BPM | RESPIRATION RATE: 18 BRPM | SYSTOLIC BLOOD PRESSURE: 104 MMHG

## 2020-10-26 DIAGNOSIS — F31.32 BIPOLAR I DISORDER, MOST RECENT EPISODE DEPRESSED, MODERATE (H): ICD-10-CM

## 2020-10-26 DIAGNOSIS — E53.8 VITAMIN B12 DEFICIENCY: ICD-10-CM

## 2020-10-26 DIAGNOSIS — M25.519 TRIGGER POINT OF SHOULDER REGION, UNSPECIFIED LATERALITY: ICD-10-CM

## 2020-10-26 DIAGNOSIS — F60.3 BORDERLINE PERSONALITY DISORDER (H): ICD-10-CM

## 2020-10-26 DIAGNOSIS — E66.01 MORBID OBESITY WITH BMI OF 40.0-44.9, ADULT (H): ICD-10-CM

## 2020-10-26 DIAGNOSIS — M54.9 TRIGGER POINT WITH BACK PAIN: Primary | ICD-10-CM

## 2020-10-26 DIAGNOSIS — E55.9 VITAMIN D DEFICIENCY: ICD-10-CM

## 2020-10-26 PROCEDURE — G0463 HOSPITAL OUTPT CLINIC VISIT: HCPCS

## 2020-10-26 PROCEDURE — 99214 OFFICE O/P EST MOD 30 MIN: CPT | Performed by: INTERNAL MEDICINE

## 2020-10-26 RX ORDER — CYCLOBENZAPRINE HCL 10 MG
10 TABLET ORAL 2 TIMES DAILY PRN
Qty: 60 TABLET | Refills: 3 | Status: SHIPPED | OUTPATIENT
Start: 2020-10-26 | End: 2021-04-12

## 2020-10-26 RX ORDER — HYDROXYZINE PAMOATE 50 MG/1
50 CAPSULE ORAL 2 TIMES DAILY PRN
COMMUNITY
Start: 2020-10-12 | End: 2022-07-18

## 2020-10-26 RX ORDER — METHOCARBAMOL 500 MG/1
500-1000 TABLET, FILM COATED ORAL 4 TIMES DAILY PRN
Qty: 80 TABLET | Refills: 1 | Status: SHIPPED | OUTPATIENT
Start: 2020-10-26 | End: 2020-11-05

## 2020-10-26 RX ORDER — FAMOTIDINE 40 MG/1
TABLET, FILM COATED ORAL
COMMUNITY
Start: 2020-10-21 | End: 2020-11-24

## 2020-10-26 ASSESSMENT — ENCOUNTER SYMPTOMS
ABDOMINAL PAIN: 0
BACK PAIN: 1
CHEST TIGHTNESS: 0
NECK PAIN: 1
HEMATURIA: 0
WOUND: 0
FEVER: 0
ADENOPATHY: 0
CHILLS: 0
MYALGIAS: 1
BRUISES/BLEEDS EASILY: 0
CONFUSION: 0
ARTHRALGIAS: 1
FATIGUE: 1
SHORTNESS OF BREATH: 0
NECK STIFFNESS: 1

## 2020-10-26 ASSESSMENT — PAIN SCALES - GENERAL: PAINLEVEL: SEVERE PAIN (7)

## 2020-10-26 NOTE — PROGRESS NOTES
"Nursing Notes:   Ena Alexander LPN  10/26/2020 11:23 AM  Signed  Chief Complaint   Patient presents with     Rib Pain     Pt present to clinic today for right sided back pain that radiates to her right rib cage.  Initial /72 (BP Location: Right arm, Patient Position: Sitting, Cuff Size: Adult Large)   Pulse 76   Temp 97.8  F (36.6  C) (Tympanic)   Resp 18   Wt 126.1 kg (278 lb)   BMI 43.54 kg/m   Estimated body mass index is 43.54 kg/m  as calculated from the following:    Height as of 9/9/20: 1.702 m (5' 7\").    Weight as of this encounter: 126.1 kg (278 lb).  Medication Reconciliation: complete    Ena Alexander LPN    Nursing note reviewed with patient.  Accuracy and completeness verified.   Ms. Lamb is a 45 year old female who:  Patient presents with:  Rib Pain      ICD-10-CM    1. Trigger point with back pain  M54.9 PHYSICAL THERAPY REFERRAL     methocarbamol (ROBAXIN) 500 MG tablet     cyclobenzaprine (FLEXERIL) 10 MG tablet   2. Trigger point of shoulder region, unspecified laterality  M25.519 PHYSICAL THERAPY REFERRAL     methocarbamol (ROBAXIN) 500 MG tablet     cyclobenzaprine (FLEXERIL) 10 MG tablet   3. Borderline personality disorder (H)  F60.3    4. Morbid obesity with BMI of 40.0-44.9, adult (H)  E66.01     Z68.41    5. Vitamin D deficiency  E55.9    6. Vitamin B12 deficiency  E53.8    7. Bipolar I disorder, most recent episode depressed, moderate (H)  F31.32      HPI  Patient presents for evaluation.  She has been having pain in the mid and upper back for the last 3 to 4 days.  Feels like sometimes she has a little bit of pain going laterally into her lateral rib area as well.  No blood in the urine.  No flank pain.  No CVA tenderness.  Has not had any changes in pain with activity.  Palpation over multiple areas do worsen the pain.  Denies fevers or chills.  No changes in symptoms with urination.    She did try a warm pack which seemed to help.  She thinks she pulled a muscle " or has some muscle pain.    She was out shoveling some snow on Saturday.  Did not really have any effect on the symptoms at first but now she is having more discomfort.    Examination is consistent with trigger points and myofascial pain.  Recommend muscle relaxers, warm packs, massage, trigger point therapy, physical therapy with [facility].  Referral sent.  Start Robaxin and/or Flexeril.  Has had Flexeril in the past, uncertain if either of these medications are covered through her insurance.  Recommend only using one at a time.  Possibly Robaxin during the day and Flexeril at night.      She does not drive.  She gets rides to her appointments.    Continues to follow with mental health regularly.  She has bipolar disorder and borderline personality disorder which both are stable at this time.    BMI previously greater than 45 now down to 43.5.  Encouraged continued healthy diet, exercise and weight loss.    Has chronic vitamin D and vitamin B12 deficiency.  Recommend continued oral replacement.    Functional Capacity: > or about 4 METS.   Review of Systems   Constitutional: Positive for fatigue (+ noted when B12 is running out. ). Negative for chills and fever.   HENT: Negative for congestion, ear discharge and ear pain.    Eyes: Negative for visual disturbance.   Respiratory: Negative for chest tightness and shortness of breath.    Cardiovascular: Positive for leg swelling. Negative for chest pain.   Gastrointestinal: Negative for abdominal pain.   Genitourinary: Negative for hematuria.   Musculoskeletal: Positive for arthralgias (+ bilateral foot pain), back pain, myalgias (+ improved with Vit D. ), neck pain and neck stiffness.   Skin: Negative for rash and wound.   Neurological: Negative for syncope.   Hematological: Negative for adenopathy. Does not bruise/bleed easily.   Psychiatric/Behavioral: Negative for confusion.        Reviewed and updated as needed this visit by Provider   Tobacco  Allergies  Meds   "Problems  Med Hx  Surg Hx  Fam Hx          EXAM:   Vitals:    10/26/20 1107   BP: 104/72   BP Location: Right arm   Patient Position: Sitting   Cuff Size: Adult Large   Pulse: 76   Resp: 18   Temp: 97.8  F (36.6  C)   TempSrc: Tympanic   Weight: 126.1 kg (278 lb)       Current Pain Score: Severe Pain (7)     BP Readings from Last 3 Encounters:   10/26/20 104/72   09/21/20 114/80   09/09/20 128/62      Wt Readings from Last 3 Encounters:   10/26/20 126.1 kg (278 lb)   09/21/20 127 kg (280 lb)   09/09/20 127.7 kg (281 lb 9.6 oz)      Estimated body mass index is 43.54 kg/m  as calculated from the following:    Height as of 9/9/20: 1.702 m (5' 7\").    Weight as of this encounter: 126.1 kg (278 lb).     Physical Exam  Constitutional:       Appearance: She is obese.      Comments: Has lost a considerable amount of weight over the past 12 months.  Has been working on diet and exercise.   HENT:      Head: Normocephalic and atraumatic.   Eyes:      General: No scleral icterus.     Conjunctiva/sclera: Conjunctivae normal.   Cardiovascular:      Rate and Rhythm: Normal rate and regular rhythm.      Pulses: Normal pulses.   Pulmonary:      Effort: Pulmonary effort is normal. No respiratory distress.      Breath sounds: Normal breath sounds.   Abdominal:      Comments: Abdominal obesity   Musculoskeletal:         General: Tenderness (Significant myofascial tenderness to palpation in the mid and upper back and shoulder areas.  Numerous trigger points noted.) present. No deformity.   Lymphadenopathy:      Cervical: No cervical adenopathy.   Skin:     General: Skin is warm and dry.      Findings: No rash.   Neurological:      Mental Status: She is alert and oriented to person, place, and time. Mental status is at baseline.   Psychiatric:         Mood and Affect: Mood normal.         Behavior: Behavior normal.          Procedures   INVESTIGATIONS:  - Labs reviewed in Epic     ASSESSMENT AND PLAN:  Problem List Items Addressed " This Visit        Digestive    Morbid obesity with BMI of 40.0-44.9, adult (H)    Vitamin B12 deficiency    Vitamin D deficiency       Behavioral    Borderline personality disorder (H)    Bipolar I disorder, most recent episode depressed, moderate (H)    Relevant Medications    hydrOXYzine (VISTARIL) 50 MG capsule      Other Visit Diagnoses     Trigger point with back pain    -  Primary    Relevant Medications    methocarbamol (ROBAXIN) 500 MG tablet    cyclobenzaprine (FLEXERIL) 10 MG tablet    Other Relevant Orders    PHYSICAL THERAPY REFERRAL    Trigger point of shoulder region, unspecified laterality        Relevant Medications    methocarbamol (ROBAXIN) 500 MG tablet    cyclobenzaprine (FLEXERIL) 10 MG tablet    Other Relevant Orders    PHYSICAL THERAPY REFERRAL        reviewed diet, exercise and weight control, recommended sodium restriction  -- Expected clinical course discussed    -- Medications and their side effects discussed    The 10-year ASCVD risk score (Shar VANCE JrSimone, et al., 2013) is: 0.5%    Values used to calculate the score:      Age: 45 years      Sex: Female      Is Non- : No      Diabetic: No      Tobacco smoker: No      Systolic Blood Pressure: 104 mmHg      Is BP treated: No      HDL Cholesterol: 47 mg/dL      Total Cholesterol: 176 mg/dL    Patient Instructions   1. Trigger point with back pain  2. Trigger point of shoulder region, unspecified laterality    Physical therapy referral sent  - they will call with date/time of appointment.    -- Start [place] / Myofascial therapy.        START:   - methocarbamol (ROBAXIN) 500 MG tablet; Take 1-2 tablets (500-1,000 mg) by mouth 4 times daily as needed for muscle spasms --- Use 1st, if covered.. otherwise use Flexeril ---  Dispense: 80 tablet; Refill: 1  - cyclobenzaprine (FLEXERIL) 10 MG tablet; Take 1 tablet (10 mg) by mouth 2 times daily as needed for muscle spasms  Dispense: 60 tablet; Refill: 3    Return as needed for  follow-up with Dr. Curiel.    Clinic : [phone]  Appointment line: [phone]     Lucio Curiel MD   Internal Medicine  [facility]     Portions of this note were dictated using speech recognition software. The note has been proofread but errors in the text may have been overlooked. Please contact me if there are any concerns regarding the accuracy of the dictation.

## 2020-10-26 NOTE — NURSING NOTE
"Chief Complaint   Patient presents with     Rib Pain     Pt present to clinic today for right sided back pain that radiates to her right rib cage.  Initial /72 (BP Location: Right arm, Patient Position: Sitting, Cuff Size: Adult Large)   Pulse 76   Temp 97.8  F (36.6  C) (Tympanic)   Resp 18   Wt 126.1 kg (278 lb)   BMI 43.54 kg/m   Estimated body mass index is 43.54 kg/m  as calculated from the following:    Height as of 9/9/20: 1.702 m (5' 7\").    Weight as of this encounter: 126.1 kg (278 lb).  Medication Reconciliation: complete    Ena Alexander LPN  "

## 2020-11-05 ENCOUNTER — ALLIED HEALTH/NURSE VISIT (OUTPATIENT)
Dept: FAMILY MEDICINE | Facility: OTHER | Age: 45
End: 2020-11-05
Payer: MEDICARE

## 2020-11-05 DIAGNOSIS — Z20.822 COVID-19 RULED OUT: Primary | ICD-10-CM

## 2020-11-05 PROCEDURE — U0003 INFECTIOUS AGENT DETECTION BY NUCLEIC ACID (DNA OR RNA); SEVERE ACUTE RESPIRATORY SYNDROME CORONAVIRUS 2 (SARS-COV-2) (CORONAVIRUS DISEASE [COVID-19]), AMPLIFIED PROBE TECHNIQUE, MAKING USE OF HIGH THROUGHPUT TECHNOLOGIES AS DESCRIBED BY CMS-2020-01-R: HCPCS | Mod: ZL

## 2020-11-05 PROCEDURE — 99207 PR NO CHARGE NURSE ONLY: CPT

## 2020-11-05 PROCEDURE — C9803 HOPD COVID-19 SPEC COLLECT: HCPCS

## 2020-11-07 LAB
SARS-COV-2 RNA SPEC QL NAA+PROBE: ABNORMAL
SPECIMEN SOURCE: ABNORMAL

## 2020-11-11 DIAGNOSIS — E53.8 VITAMIN B12 DEFICIENCY: ICD-10-CM

## 2020-11-12 RX ORDER — CYANOCOBALAMIN 1000 UG/ML
INJECTION, SOLUTION INTRAMUSCULAR; SUBCUTANEOUS
Qty: 6 ML | Refills: 2 | Status: SHIPPED | OUTPATIENT
Start: 2020-11-12 | End: 2021-07-21

## 2020-11-12 NOTE — TELEPHONE ENCOUNTER
Prescription approved per [facility] Refill Protocol.  LOV: 10/26/2020  Shirley Wren RN on 11/12/2020 at 2:38 PM

## 2020-11-23 DIAGNOSIS — K21.9 GASTROESOPHAGEAL REFLUX DISEASE WITHOUT ESOPHAGITIS: Primary | ICD-10-CM

## 2020-11-24 RX ORDER — FAMOTIDINE 40 MG/1
TABLET, FILM COATED ORAL
Qty: 30 TABLET | Refills: 0 | Status: SHIPPED | OUTPATIENT
Start: 2020-11-24 | End: 2020-12-22

## 2020-12-04 ENCOUNTER — TRANSFERRED RECORDS (OUTPATIENT)
Dept: HEALTH INFORMATION MANAGEMENT | Facility: OTHER | Age: 45
End: 2020-12-04

## 2020-12-14 ENCOUNTER — HEALTH MAINTENANCE LETTER (OUTPATIENT)
Age: 45
End: 2020-12-14

## 2020-12-21 DIAGNOSIS — K21.9 GASTROESOPHAGEAL REFLUX DISEASE WITHOUT ESOPHAGITIS: ICD-10-CM

## 2020-12-22 RX ORDER — FAMOTIDINE 20 MG/1
TABLET, FILM COATED ORAL
Qty: 30 TABLET | Refills: 0 | Status: SHIPPED | OUTPATIENT
Start: 2020-12-22 | End: 2021-01-05

## 2020-12-22 NOTE — TELEPHONE ENCOUNTER
[id number] sent Rx request for the following:   famotidine (PEPCID) 20 MG tablet   Sig:TAKE 1 TABLET BY MOUTH TWICE A DAY   Last Prescription Date:   11/24/2020  Last Fill Qty/Refills:         30, R-0    Last Office Visit:              10/26/2020 (Veena)   Future Office visit:           None noted   H2 Blockers Protocol Passed - 12/21/2020  3:26 PM     Prescription approved per [facility] Refill Protocol.  Isabel Wheatley RN ....................  12/22/2020   2:18 PM

## 2021-03-08 DIAGNOSIS — J45.40 MODERATE PERSISTENT ASTHMA, UNSPECIFIED WHETHER COMPLICATED: ICD-10-CM

## 2021-03-09 ENCOUNTER — DOCUMENTATION ONLY (OUTPATIENT)
Dept: OTHER | Facility: CLINIC | Age: 46
End: 2021-03-09

## 2021-03-09 RX ORDER — MONTELUKAST SODIUM 10 MG/1
1 TABLET ORAL AT BEDTIME
Qty: 90 TABLET | Refills: 3 | Status: SHIPPED | OUTPATIENT
Start: 2021-03-09 | End: 2022-03-09

## 2021-03-09 NOTE — TELEPHONE ENCOUNTER
Routing refill request to provider for review/approval because:    Asthma control assessment score within normal limits in last 6 months    LOV: 10/26/2020    Shirley Wren RN on 3/9/2021 at 2:41 PM

## 2021-03-12 ENCOUNTER — HOSPITAL ENCOUNTER (OUTPATIENT)
Dept: MAMMOGRAPHY | Facility: OTHER | Age: 46
End: 2021-03-12
Attending: PHYSICIAN ASSISTANT
Payer: MEDICARE

## 2021-03-12 ENCOUNTER — OFFICE VISIT (OUTPATIENT)
Dept: FAMILY MEDICINE | Facility: OTHER | Age: 46
End: 2021-03-12
Attending: PHYSICIAN ASSISTANT
Payer: MEDICARE

## 2021-03-12 VITALS
DIASTOLIC BLOOD PRESSURE: 72 MMHG | HEART RATE: 62 BPM | BODY MASS INDEX: 43.68 KG/M2 | HEIGHT: 66 IN | TEMPERATURE: 97.9 F | SYSTOLIC BLOOD PRESSURE: 118 MMHG | WEIGHT: 271.8 LBS | RESPIRATION RATE: 20 BRPM

## 2021-03-12 DIAGNOSIS — Z00.00 ROUTINE HISTORY AND PHYSICAL EXAMINATION OF ADULT: Primary | ICD-10-CM

## 2021-03-12 DIAGNOSIS — Z13.1 SCREENING FOR DIABETES MELLITUS: ICD-10-CM

## 2021-03-12 DIAGNOSIS — Z13.220 SCREENING CHOLESTEROL LEVEL: ICD-10-CM

## 2021-03-12 DIAGNOSIS — Z12.31 VISIT FOR SCREENING MAMMOGRAM: ICD-10-CM

## 2021-03-12 DIAGNOSIS — Z79.899 OTHER LONG TERM (CURRENT) DRUG THERAPY: ICD-10-CM

## 2021-03-12 DIAGNOSIS — E55.9 HYPOVITAMINOSIS D: ICD-10-CM

## 2021-03-12 LAB
ALBUMIN SERPL-MCNC: 4.4 G/DL (ref 3.5–5.7)
ALP SERPL-CCNC: 49 U/L (ref 34–104)
ALT SERPL W P-5'-P-CCNC: 18 U/L (ref 7–52)
ANION GAP SERPL CALCULATED.3IONS-SCNC: 6 MMOL/L (ref 3–14)
AST SERPL W P-5'-P-CCNC: 22 U/L (ref 13–39)
BASOPHILS # BLD AUTO: 0 10E9/L (ref 0–0.2)
BASOPHILS NFR BLD AUTO: 0.4 %
BILIRUB SERPL-MCNC: 0.8 MG/DL (ref 0.3–1)
BUN SERPL-MCNC: 12 MG/DL (ref 7–25)
CALCIUM SERPL-MCNC: 9.7 MG/DL (ref 8.6–10.3)
CHLORIDE SERPL-SCNC: 102 MMOL/L (ref 98–107)
CHOLEST SERPL-MCNC: 150 MG/DL
CO2 SERPL-SCNC: 32 MMOL/L (ref 21–31)
CREAT SERPL-MCNC: 0.99 MG/DL (ref 0.6–1.2)
DEPRECATED CALCIDIOL+CALCIFEROL SERPL-MC: 57.2 NG/ML
DIFFERENTIAL METHOD BLD: NORMAL
EOSINOPHIL # BLD AUTO: 0.1 10E9/L (ref 0–0.7)
EOSINOPHIL NFR BLD AUTO: 1.6 %
ERYTHROCYTE [DISTWIDTH] IN BLOOD BY AUTOMATED COUNT: 12.5 % (ref 10–15)
GFR SERPL CREATININE-BSD FRML MDRD: 61 ML/MIN/{1.73_M2}
GLUCOSE SERPL-MCNC: 87 MG/DL (ref 70–105)
HBA1C MFR BLD: 4.9 % (ref 4–6)
HCT VFR BLD AUTO: 42.3 % (ref 35–47)
HDLC SERPL-MCNC: 54 MG/DL (ref 23–92)
HGB BLD-MCNC: 13.6 G/DL (ref 11.7–15.7)
IMM GRANULOCYTES # BLD: 0 10E9/L (ref 0–0.4)
IMM GRANULOCYTES NFR BLD: 0.3 %
LDLC SERPL CALC-MCNC: 70 MG/DL
LYMPHOCYTES # BLD AUTO: 1.5 10E9/L (ref 0.8–5.3)
LYMPHOCYTES NFR BLD AUTO: 21.9 %
MCH RBC QN AUTO: 30.1 PG (ref 26.5–33)
MCHC RBC AUTO-ENTMCNC: 32.2 G/DL (ref 31.5–36.5)
MCV RBC AUTO: 94 FL (ref 78–100)
MONOCYTES # BLD AUTO: 0.6 10E9/L (ref 0–1.3)
MONOCYTES NFR BLD AUTO: 8.3 %
NEUTROPHILS # BLD AUTO: 4.7 10E9/L (ref 1.6–8.3)
NEUTROPHILS NFR BLD AUTO: 67.5 %
NONHDLC SERPL-MCNC: 96 MG/DL
PLATELET # BLD AUTO: 224 10E9/L (ref 150–450)
POTASSIUM SERPL-SCNC: 4.2 MMOL/L (ref 3.5–5.1)
PROT SERPL-MCNC: 7.4 G/DL (ref 6.4–8.9)
RBC # BLD AUTO: 4.52 10E12/L (ref 3.8–5.2)
SODIUM SERPL-SCNC: 140 MMOL/L (ref 134–144)
TRIGL SERPL-MCNC: 130 MG/DL
WBC # BLD AUTO: 7 10E9/L (ref 4–11)

## 2021-03-12 PROCEDURE — 77063 BREAST TOMOSYNTHESIS BI: CPT

## 2021-03-12 PROCEDURE — 99396 PREV VISIT EST AGE 40-64: CPT | Performed by: PHYSICIAN ASSISTANT

## 2021-03-12 PROCEDURE — 83036 HEMOGLOBIN GLYCOSYLATED A1C: CPT | Mod: ZL | Performed by: PHYSICIAN ASSISTANT

## 2021-03-12 PROCEDURE — 82306 VITAMIN D 25 HYDROXY: CPT | Mod: ZL | Performed by: PHYSICIAN ASSISTANT

## 2021-03-12 PROCEDURE — 36415 COLL VENOUS BLD VENIPUNCTURE: CPT | Mod: ZL | Performed by: PHYSICIAN ASSISTANT

## 2021-03-12 PROCEDURE — 80061 LIPID PANEL: CPT | Mod: ZL | Performed by: PHYSICIAN ASSISTANT

## 2021-03-12 PROCEDURE — G0463 HOSPITAL OUTPT CLINIC VISIT: HCPCS

## 2021-03-12 PROCEDURE — 80053 COMPREHEN METABOLIC PANEL: CPT | Mod: ZL | Performed by: PHYSICIAN ASSISTANT

## 2021-03-12 PROCEDURE — 85025 COMPLETE CBC W/AUTO DIFF WBC: CPT | Mod: ZL | Performed by: PHYSICIAN ASSISTANT

## 2021-03-12 ASSESSMENT — MIFFLIN-ST. JEOR: SCORE: 1903.6

## 2021-03-12 NOTE — PROGRESS NOTES
Nursing Notes:   Dona Rodriguez LPN  3/12/2021  9:40 AM  Signed  Chief Complaint   Patient presents with     Physical         Medication Reconciliation: complete    Dona Rodriguez LPN        ANNUAL PHYSICAL - FEMALE    HPI: Rosi ODALYS Zainab who presents for a yearly exam.  Concerns include: Patient has history of hypovitaminosis D.  Needs her vitamin D level rechecked.  Currently taking vitamin B12 injections.  No side effects noted with her medications.  Needs labs updated.  No recent cough or cold symptoms.  No fevers or chills.  No chest pain or palpitations, problems breathing, GI or urinary symptoms.  Patient has been working on a low-carb diet to lose weight.    No LMP recorded. Patient has had an ablation.   Contraception: ablation, no abnormal vaginal bleeding  Risk for STI?: no, declines testing  Last pap:   2017: ASCUS and neg HPV  2018: LGSIL with negative pap  3/11/2020: normal pap and negative HPV  Repeat pap in 3 years  Any hx of abnormal paps:  ASCUS and neg HPV 2017  FH of early CA?: breast cancer and colon cancer   Cholesterol/DM concerns/screenin2019  Tobacco?: former  Calcium intake: yes, take calcium supplements  DEXA: none  Last mammo: 3/11/2020, having repeat mammogram today  Colonoscopy: none  Immunizations: UTD    Patient Active Problem List    Diagnosis Date Noted     Abnormal Pap smear of cervix 2018     Priority: Medium     Overview:   had scraping of cervix       Allergic rhinitis due to pollen 2018     Priority: Medium     Esophageal reflux 2018     Priority: Medium     History of substance abuse (H) 2018     Priority: Medium     Bilateral chronic knee pain 2017     Priority: Medium     Crepitus of joint of right knee 2017     Priority: Medium     Menorrhagia with irregular cycle 2017     Priority: Medium     Preop examination 2017     Priority: Medium     Bilateral foot pain 2016      Priority: Medium     Elevated random blood glucose level 12/18/2016     Priority: Medium     Peripheral polyneuropathy 12/18/2016     Priority: Medium     Vitamin B12 deficiency 12/18/2016     Priority: Medium     Vitamin D deficiency 12/18/2016     Priority: Medium     Lymphedema of both lower extremities 11/22/2016     Priority: Medium     Stasis dermatitis of both legs 08/17/2016     Priority: Medium     Overview:   Updated per 10/1/17 IMO import       Alcohol use disorder, moderate, in sustained remission, dependence (H) 11/28/2015     Priority: Medium     Generalized anxiety disorder 11/28/2015     Priority: Medium     Cannabis use disorder, moderate, in sustained remission, dependence (H) 11/28/2015     Priority: Medium     Bipolar I disorder, most recent episode depressed, moderate (H) 11/28/2015     Priority: Medium     Morbid obesity with BMI of 40.0-44.9, adult (H) 03/23/2015     Priority: Medium     Moderate persistent asthma 08/19/2013     Priority: Medium     Overview:   AAP completed on 08/19/13  Triggers: pollen, fumes, exercise, URI, warm weather. Peak flow today is 250. Symptomatic: moderate  Overview:   Overview:   AAP completed on 08/19/13  Triggers: pollen, fumes, exercise, URI, warm weather. Peak flow today is 250. Symptomatic: moderate  Overview:   AAP completed on 08/19/13  Triggers: pollen, fumes, exercise, URI, warm weather. Peak flow today is 250. Symptomatic: moderate       Urinary incontinence 03/15/2013     Priority: Medium     Closed dislocation of tarsal joint 02/04/2011     Priority: Medium     Borderline personality disorder (H) 07/07/2003     Priority: Medium     Overview:   [facility].  Overview:   Overview:   [facility].       Other disorder of menstruation and other abnormal bleeding from female genital tract 07/05/2001     Priority: Medium     Overview:   DUB, dysmenorrhea  Overview:   Overview:   DUB, dysmenorrhea         Past Medical History:   Diagnosis  "Date     Edema     No Comments Provided     Encounter for removal and reinsertion of intrauterine contraceptive device     05,IUD placement, Removed     Excessive and frequent menstruation with irregular cycle     2017     Major depressive disorder, single episode     No Comments Provided     Personal history of other medical treatment (CODE)     G3, P2-0-1-2 with history of spontaneous      Personal history of other medical treatment (CODE)     No Comments Provided     Uncomplicated asthma     No Comments Provided       Past Surgical History:   Procedure Laterality Date     ANKLE SURGERY      fracture, repair with screws     CONIZATION LEEP      ,Underwent a loop     LAPAROSCOPIC TUBAL LIGATION      ,tubal ligation       Family History   Problem Relation Age of Onset     Osteoporosis Mother      Asthma Father         Asthma,+ Leg edema, knee, hip replacement. + Lymphedema     Heart Failure Father      Other - See Comments Paternal Grandmother         Lymphedema     Osteoporosis Brother         Osteoporosis     Other - See Comments Brother         No Known Problems       Social History     Tobacco Use     Smoking status: Former Smoker     Packs/day: 1.00     Years: 3.00     Pack years: 3.00     Types: Cigarettes     Quit date: 2003     Years since quittin.2     Smokeless tobacco: Never Used   Substance Use Topics     Alcohol use: Not Currently     Alcohol/week: 0.0 standard drinks       Current Outpatient Medications   Medication Sig Dispense Refill     acetaminophen (TYLENOL) 500 MG tablet Take 1 tablet (500 mg) by mouth every 6 hours as needed for fever or pain 100 tablet 5     albuterol (VENTOLIN HFA) 108 (90 Base) MCG/ACT inhaler Inhale 1-2 puffs into the lungs every 4 hours as needed for shortness of breath / dyspnea or wheezing 18 g 5     ARIPiprazole (ABILIFY) 15 MG tablet Take 1 tablet (15 mg) by mouth daily       B-D TB SYRINGE 25G X \" 1 ML MISC USE TO INJECT B-12 " "INTRAMUSCULARLY EVERY 2 WEEKS 100 each 0     cholecalciferol (VITAMIN D3) 5000 units (125 mcg) capsule Take 1 capsule (5,000 Units) by mouth daily 100 capsule 3     cyanocobalamin (CYANOCOBALAMIN) 1000 MCG/ML injection INJECT 1 ML INTRAMUSCULARLY EVERY 2 WEEKS. 6 mL 2     cyclobenzaprine (FLEXERIL) 10 MG tablet Take 1 tablet (10 mg) by mouth 2 times daily as needed for muscle spasms 60 tablet 3     famotidine (PEPCID) 20 MG tablet Take 1 tablet (20 mg) by mouth 2 times daily 180 tablet 3     gabapentin (NEURONTIN) 300 MG capsule 300 mg po BID and 300 mg po PRN at Noon -- Rx by Psych -- Fabienne Perla       hydrOXYzine (ATARAX) 50 MG tablet Take 50 mg by mouth 2 times daily as needed       hydrOXYzine (VISTARIL) 50 MG capsule Take 50 mg by mouth 2 times daily as needed       ibuprofen (ADVIL/MOTRIN) 200 MG tablet Take 600 mg by mouth every 6 hours as needed for pain       lamoTRIgine (LAMICTAL) 200 MG tablet TAKE 1 TABLET BY MOUTH NIGHTLY AT BEDTIME  2     lamoTRIgine (LAMICTAL) 25 MG tablet Take 1 tablet by mouth At Bedtime With 200 mg  2     Misc. Devices (ROLLER WALKER) MISC Rolling Walker for home use.  2 wheels       montelukast (SINGULAIR) 10 MG tablet TAKE 1 TABLET (10 MG) BY MOUTH AT BEDTIME 90 tablet 3     order for DME Equipment being ordered: Bariatric  4 wheeled walker with seat, storage container, and handbrakes 1 each 0     order for DME Equipment being ordered: right knee brace 1 each 0     oxybutynin ER (DITROPAN-XL) 10 MG 24 hr tablet Take 1 tablet (10 mg) by mouth daily (Patient taking differently: Take 5 mg by mouth daily ) 90 tablet 3     SF 1.1 % GEL topical gel BRUSH, SWISH TOOTHPASTE FOR 30 SECONDS AND SPIT BEFORE BED. DO NOT RINSE, EAT OR DRINK FOR 30 MINUTES       syringe/needle, sisp, 25G X 5/8\" 1 ML MISC Safety glide - for B12 Shots       vilazodone (VIIBRYD) 40 MG TABS tablet Take 40 mg by mouth daily with food         Allergies   Allergen Reactions     Clonazepam Other (See Comments)    " " Causes Violence and aggresssion     Hydrocodone-Acetaminophen      Other reaction(s): Seizures  Can take Percocet without difficulty.     Lorazepam Other (See Comments)     Causes Violence and aggresssion     Sertraline Other (See Comments)     Caused suicidally      Bupropion Other (See Comments)     Caused Major Depression     Dust Mite Extract      Other reaction(s): Asthma symptoms     Lithium Other (See Comments)     Mood alteration     Pollen Extract      Other reaction(s): Asthma symptoms     Risperidone Other (See Comments)     Agitation       Sulfa Drugs Itching     Trichophyton      Other reaction(s): Asthma symptoms     Valproic Acid Other (See Comments)     Weight gain       REVIEW OF SYSTEMS:  Refer to HPI.    PHYSICAL EXAM:  /72 (BP Location: Right arm, Patient Position: Sitting, Cuff Size: Adult Large)   Pulse 62   Temp 97.9  F (36.6  C)   Resp 20   Ht 1.683 m (5' 6.25\")   Wt 123.3 kg (271 lb 12.8 oz)   BMI 43.54 kg/m    CONSTITUTIONAL:  Alert,cooperative, NAD.  EYES: No scleral icterus.  PERRLA.  Conjunctiva clear.  ENT/MOUTH: External ears and nose normal.  TMs normal.  Moist mucous membranes. Oropharynx clear.    ENDO: No thyromegaly or thyroidnodules.  LYMPH:  No cervical or supraclavicular LA.    BREASTS: No skin abnormalities, no erythema.  No discrete masses.  No nipple discharge, no axillary, supra- or infraclavicular LA.   CARDIOVASCULAR: Regular,S1, S2.  No S3 or S4.  No murmur/gallop/rub.  No peripheral edema.  RESPIRATORY: CTA bilaterally, no wheezes, rhonchi or rales.  GI: Bowel sounds wnl.  Soft, nontender, nondistended.  No masses or HSM.  No rebound or guarding.  : declined exam.  Pap smear obtained: no  MSKEL: Grossly normal ROM.  No clubbing.  INTEGUMENTARY:  Warm, dry.  No rash noted on exposed skin.  NEUROLOGIC: Facies symmetric.  Grossly normal movement and tone.  No tremor.  PSYCHIATRIC: Affect normal.  Speech fluent.      PHQ Depression Screen  PHQ-9 SCORE " 3/11/2020 4/22/2020 8/12/2020   PHQ-9 Total Score 0 0 0       Labs:  Results for orders placed or performed during the hospital encounter of 03/12/21   MA Screen Bilateral w/Alcon     Status: None    Narrative    EXAM: MA SCREENING BILATERAL W/ ALCON, 3/12/2021 10:42 AM    COMPARISONS: 3/11/2020 18 and 12/11/2014.    HISTORY: Visit for screening mammogram    FINDINGS: No architectural distortion, dominant mass or suspicious  microcalcifications are identified in either breast.    BREAST DENSITY: Heterogeneously dense.      Impression    IMPRESSION: BI-RADS CATEGORY: 2 - Benign.    RECOMMENDED FOLLOW-UP: Annual Mammography.      KIEL ALVARENGA MD   Results for orders placed or performed in visit on 03/12/21   Lipid Panel     Status: None   Result Value Ref Range    Cholesterol 150 <200 mg/dL    Triglycerides 130 <150 mg/dL    HDL Cholesterol 54 23 - 92 mg/dL    LDL Cholesterol Calculated 70 <100 mg/dL    Non HDL Cholesterol 96 <130 mg/dL   Hemoglobin A1c     Status: None   Result Value Ref Range    Hemoglobin A1C 4.9 4.0 - 6.0 %   CBC and Differential     Status: None   Result Value Ref Range    WBC 7.0 4.0 - 11.0 10e9/L    RBC Count 4.52 3.8 - 5.2 10e12/L    Hemoglobin 13.6 11.7 - 15.7 g/dL    Hematocrit 42.3 35.0 - 47.0 %    MCV 94 78 - 100 fl    MCH 30.1 26.5 - 33.0 pg    MCHC 32.2 31.5 - 36.5 g/dL    RDW 12.5 10.0 - 15.0 %    Platelet Count 224 150 - 450 10e9/L    Diff Method Automated Method     % Neutrophils 67.5 %    % Lymphocytes 21.9 %    % Monocytes 8.3 %    % Eosinophils 1.6 %    % Basophils 0.4 %    % Immature Granulocytes 0.3 %    Absolute Neutrophil 4.7 1.6 - 8.3 10e9/L    Absolute Lymphocytes 1.5 0.8 - 5.3 10e9/L    Absolute Monocytes 0.6 0.0 - 1.3 10e9/L    Absolute Eosinophils 0.1 0.0 - 0.7 10e9/L    Absolute Basophils 0.0 0.0 - 0.2 10e9/L    Abs Immature Granulocytes 0.0 0 - 0.4 10e9/L   Comprehensive Metabolic Panel     Status: Abnormal   Result Value Ref Range    Sodium 140 134 - 144 mmol/L     Potassium 4.2 3.5 - 5.1 mmol/L    Chloride 102 98 - 107 mmol/L    Carbon Dioxide 32 (H) 21 - 31 mmol/L    Anion Gap 6 3 - 14 mmol/L    Glucose 87 70 - 105 mg/dL    Urea Nitrogen 12 7 - 25 mg/dL    Creatinine 0.99 0.60 - 1.20 mg/dL    GFR Estimate 61 >60 mL/min/[1.73_m2]    GFR Estimate If Black 74 >60 mL/min/[1.73_m2]    Calcium 9.7 8.6 - 10.3 mg/dL    Bilirubin Total 0.8 0.3 - 1.0 mg/dL    Albumin 4.4 3.5 - 5.7 g/dL    Protein Total 7.4 6.4 - 8.9 g/dL    Alkaline Phosphatase 49 34 - 104 U/L    ALT 18 7 - 52 U/L    AST 22 13 - 39 U/L       ASSESSMENT AND PLAN:      ICD-10-CM    1. Routine history and physical examination of adult  Z00.00    2. Screening cholesterol level  Z13.220 CBC and Differential     Comprehensive Metabolic Panel     Lipid Panel     Lipid Panel     CBC and Differential     Comprehensive Metabolic Panel   3. Screening for diabetes mellitus  Z13.1 Comprehensive Metabolic Panel     Hemoglobin A1c     Hemoglobin A1c     Comprehensive Metabolic Panel   4. Other long term (current) drug therapy   Z79.899 Hemoglobin A1c     Hemoglobin A1c   5. Hypovitaminosis D  E55.9 Vitamin D Total     Vitamin D Total       Complete labs for monitoring including CBC, CMP, lipid panel, hemoglobin A1c, and vitamin D.  Labs are stable.  No acute concerns at this time.  Completed lipid panel for cholesterol screening and CMP and hemoglobin A1c for diabetes mellitus screening.  Completed vitamin D for hypovitaminosis D monitoring.  Lab is pending.    Recheck in 1 year for repeat physical.  Encourage good diet, exercise, weight loss, and reducing fat in diet.    Completed paperwork.    Relevant cancer screening discussed.    Counseled on healthy diet, Calcium and vitamin D intake, and exercise.    Patient Instructions   Healthy Strategies  1. Eat at least 3 meals a day and never skip breakfast.  2. Eat more slowly.  3. Decrease portion size.  4. Provide structure by using meal replacement bars or shakes, and/or low calorie  "frozen meals.  5. For good nutrition incorporate fruit, vegetables, whole grains, lean protein, and low-fat dairy.  6. Remove trigger foods from yourenvironment to avoid impulse eating.  7. Increase physical activity: get a pedometer and aim for 10,000 steps a day or 30-35 minutes of activity 5 days per week.  8. Weigh yourself daily or at least weekly.  9. Keep a record of what you eat and your activity.  10. Establish a support system such as afriend, group or program.    11. Read Tin Danielle's \"Eat to Live\". Remember it is important to have a minimum of 1200 calories a day, okay to use olive oil, 40 grams of fiber daily. No more than two servings (the size of your palm) of red meat a week.     Please consider the following general health recommendations:    Eat a quality diet (generally, low in simple sugars, starches, cholesterol and saturated fat.)    Please get 1200 mg of calcium in divided doses with 800 units vitamin D in your diet daily. Take supplements as needed to obtain full recommended amounts.     Stay physically active. Regular walking or other exercise is one of the best ways to minimize pain of arthritis; maintain independence and mobility; maintain bone strength; maintain conditioning of your heart. Find something you enjoy and a friend to do it with you.    Maintain ideal weight. Your Body mass index is Body mass index is 43.54 kg/m .. Generally a BMI of 20-25 is considered ideal. Overweight is defined as 25-30, obese is 30-35 and markedly obese is greater than 35.    Apply sun block (SPF 25 or greater) on exposed skin anytime you are out in the sun to prevent skin cancer.     Wear a seatbelt whenever you are in a car.    Obtain a flu shot every fall.    You should have a tetanus booster at least once every 10 years.    Schedule a mammogram annually starting at the age of 40 years old unless recommended earlier by your primary care provider. Come for a general exam once yearly.    Check blood " sugar annually. Cholesterol annually unless you have had a normal level when last checked within 5 years.     I recommend that you have a general physical exam every year. You should have a pap test every 3 years between the ages of 21 and 30 and every 3-5 years between the ages of 30 and 65 depending on your test unless you have had previous abnormal pap smears, (in these cases the exams and PAP's should be done on a schedule as recommended by your primary care provider). If you have had hysterectomy in the past, your future Pap plan may be different.            Liz Diaz PA-C PA-C..................3/12/2021 9:36 AM

## 2021-03-12 NOTE — PATIENT INSTRUCTIONS
"Healthy Strategies  1. Eat at least 3 meals a day and never skip breakfast.  2. Eat more slowly.  3. Decrease portion size.  4. Provide structure by using meal replacement bars or shakes, and/or low calorie frozen meals.  5. For good nutrition incorporate fruit, vegetables, whole grains, lean protein, and low-fat dairy.  6. Remove trigger foods from yourenvironment to avoid impulse eating.  7. Increase physical activity: get a pedometer and aim for 10,000 steps a day or 30-35 minutes of activity 5 days per week.  8. Weigh yourself daily or at least weekly.  9. Keep a record of what you eat and your activity.  10. Establish a support system such as afriend, group or program.    11. Read Tin Danielle's \"Eat to Live\". Remember it is important to have a minimum of 1200 calories a day, okay to use olive oil, 40 grams of fiber daily. No more than two servings (the size of your palm) of red meat a week.     Please consider the following general health recommendations:    Eat a quality diet (generally, low in simple sugars, starches, cholesterol and saturated fat.)    Please get 1200 mg of calcium in divided doses with 800 units vitamin D in your diet daily. Take supplements as needed to obtain full recommended amounts.     Stay physically active. Regular walking or other exercise is one of the best ways to minimize pain of arthritis; maintain independence and mobility; maintain bone strength; maintain conditioning of your heart. Find something you enjoy and a friend to do it with you.    Maintain ideal weight. Your Body mass index is Body mass index is 43.54 kg/m .. Generally a BMI of 20-25 is considered ideal. Overweight is defined as 25-30, obese is 30-35 and markedly obese is greater than 35.    Apply sun block (SPF 25 or greater) on exposed skin anytime you are out in the sun to prevent skin cancer.     Wear a seatbelt whenever you are in a car.    Obtain a flu shot every fall.    You should have a tetanus booster at " least once every 10 years.    Schedule a mammogram annually starting at the age of 40 years old unless recommended earlier by your primary care provider. Come for a general exam once yearly.    Check blood sugar annually. Cholesterol annually unless you have had a normal level when last checked within 5 years.     I recommend that you have a general physical exam every year. You should have a pap test every 3 years between the ages of 21 and 30 and every 3-5 years between the ages of 30 and 65 depending on your test unless you have had previous abnormal pap smears, (in these cases the exams and PAP's should be done on a schedule as recommended by your primary care provider). If you have had hysterectomy in the past, your future Pap plan may be different.

## 2021-03-13 ASSESSMENT — ASTHMA QUESTIONNAIRES: ACT_TOTALSCORE: 25

## 2021-03-24 ENCOUNTER — TELEPHONE (OUTPATIENT)
Dept: INTERNAL MEDICINE | Facility: OTHER | Age: 46
End: 2021-03-24

## 2021-03-24 NOTE — TELEPHONE ENCOUNTER
Reason for call: Request for results.    Name of test or procedure: Blood work    Date of test or procedure: 3/12/21    Location of test or procedure: [facility]    Preferred method for responding to this message: Telephone Call    Phone number patient can be reached at: Home number on file [phone] (home)    If we can't reach you directly, may we leave a detailed response at the number you provided?Yes

## 2021-03-27 ENCOUNTER — OFFICE VISIT (OUTPATIENT)
Dept: FAMILY MEDICINE | Facility: OTHER | Age: 46
End: 2021-03-27
Attending: PHYSICIAN ASSISTANT
Payer: MEDICARE

## 2021-03-27 VITALS
SYSTOLIC BLOOD PRESSURE: 114 MMHG | HEART RATE: 78 BPM | TEMPERATURE: 97.9 F | RESPIRATION RATE: 18 BRPM | WEIGHT: 268.5 LBS | OXYGEN SATURATION: 96 % | BODY MASS INDEX: 43.01 KG/M2 | DIASTOLIC BLOOD PRESSURE: 76 MMHG

## 2021-03-27 DIAGNOSIS — H65.91 OME (OTITIS MEDIA WITH EFFUSION), RIGHT: Primary | ICD-10-CM

## 2021-03-27 PROCEDURE — 99213 OFFICE O/P EST LOW 20 MIN: CPT | Performed by: PHYSICIAN ASSISTANT

## 2021-03-27 PROCEDURE — G0463 HOSPITAL OUTPT CLINIC VISIT: HCPCS

## 2021-03-27 RX ORDER — CEFDINIR 300 MG/1
300 CAPSULE ORAL 2 TIMES DAILY
Qty: 20 CAPSULE | Refills: 0 | Status: SHIPPED | OUTPATIENT
Start: 2021-03-27 | End: 2021-04-06

## 2021-03-27 ASSESSMENT — PAIN SCALES - GENERAL: PAINLEVEL: MILD PAIN (3)

## 2021-03-27 NOTE — PROGRESS NOTES
HPI:    Rosi Lamb is a 46 year old female  who presents to [facility] today for right ear pain and pressure, feeling plugged sensation, has been bothersome for 3 days and getting worse. Has a history of similar symptoms: sometimes otitis otherwise cerumen impaction. Denies any discharge from ear.     Symptoms:  Pharyngitis: No  Congestion: No  Cough/Productive Cough: No  Post Nasal Drip: No  Coryza: No  Headache: Ear to head, unsure if from neck pain   Fevers, Chills: No  Sneezing/Allergy Symptoms: No  Sinus Pain/Pressure: No  Myalgias: No  Otalgia: Yes: right ear only  Activity Level Changes: No  Appetite/Liquid Intake Changes: No  Changes to Bowel Habits: No  Changes to Bladder Habits: No    Treatments tried: peroxide in ear, no change. Tried a cotton ball, no change either    PCP: Dr. Veena MD    Past Medical History:   Diagnosis Date     Edema     No Comments Provided     Encounter for removal and reinsertion of intrauterine contraceptive device     05,IUD placement, Removed     Excessive and frequent menstruation with irregular cycle     2017     Major depressive disorder, single episode     No Comments Provided     Personal history of other medical treatment (CODE)     G3, P2-0-1-2 with history of spontaneous      Personal history of other medical treatment (CODE)     No Comments Provided     Uncomplicated asthma     No Comments Provided     Past Surgical History:   Procedure Laterality Date     ANKLE SURGERY      fracture, repair with screws     CONIZATION LEEP      ,Underwent a loop     LAPAROSCOPIC TUBAL LIGATION      ,tubal ligation     Social History     Tobacco Use     Smoking status: Former Smoker     Packs/day: 1.00     Years: 3.00     Pack years: 3.00     Types: Cigarettes     Quit date: 2003     Years since quittin.2     Smokeless tobacco: Never Used   Substance Use Topics     Alcohol use: Not Currently     Alcohol/week: 0.0 standard drinks  "    Current Outpatient Medications   Medication Sig Dispense Refill     acetaminophen (TYLENOL) 500 MG tablet Take 1 tablet (500 mg) by mouth every 6 hours as needed for fever or pain 100 tablet 5     albuterol (VENTOLIN HFA) 108 (90 Base) MCG/ACT inhaler Inhale 1-2 puffs into the lungs every 4 hours as needed for shortness of breath / dyspnea or wheezing 18 g 5     ARIPiprazole (ABILIFY) 15 MG tablet Take 1 tablet (15 mg) by mouth daily       B-D TB SYRINGE 25G X 5/8\" 1 ML MISC USE TO INJECT B-12 INTRAMUSCULARLY EVERY 2 WEEKS 100 each 0     cholecalciferol (VITAMIN D3) 5000 units (125 mcg) capsule Take 1 capsule (5,000 Units) by mouth daily 100 capsule 3     cyanocobalamin (CYANOCOBALAMIN) 1000 MCG/ML injection INJECT 1 ML INTRAMUSCULARLY EVERY 2 WEEKS. 6 mL 2     cyclobenzaprine (FLEXERIL) 10 MG tablet Take 1 tablet (10 mg) by mouth 2 times daily as needed for muscle spasms 60 tablet 3     famotidine (PEPCID) 20 MG tablet Take 1 tablet (20 mg) by mouth 2 times daily 180 tablet 3     gabapentin (NEURONTIN) 300 MG capsule 300 mg po BID and 300 mg po PRN at Noon -- Rx by Psych -- Fabienne Duncan       hydrOXYzine (ATARAX) 50 MG tablet Take 50 mg by mouth 2 times daily as needed       hydrOXYzine (VISTARIL) 50 MG capsule Take 50 mg by mouth 2 times daily as needed       ibuprofen (ADVIL/MOTRIN) 200 MG tablet Take 600 mg by mouth every 6 hours as needed for pain       lamoTRIgine (LAMICTAL) 200 MG tablet TAKE 1 TABLET BY MOUTH NIGHTLY AT BEDTIME  2     lamoTRIgine (LAMICTAL) 25 MG tablet Take 1 tablet by mouth At Bedtime With 200 mg  2     Misc. Devices (ROLLER WALKER) MISC Rolling Walker for home use.  2 wheels       montelukast (SINGULAIR) 10 MG tablet TAKE 1 TABLET (10 MG) BY MOUTH AT BEDTIME 90 tablet 3     order for DME Equipment being ordered: Bariatric  4 wheeled walker with seat, storage container, and handbrakes 1 each 0     order for DME Equipment being ordered: right knee brace 1 each 0     oxybutynin ER " "(DITROPAN-XL) 10 MG 24 hr tablet Take 1 tablet (10 mg) by mouth daily (Patient taking differently: Take 5 mg by mouth daily ) 90 tablet 3     SF 1.1 % GEL topical gel BRUSH, SWISH TOOTHPASTE FOR 30 SECONDS AND SPIT BEFORE BED. DO NOT RINSE, EAT OR DRINK FOR 30 MINUTES       syringe/needle, sisp, 25G X 5/8\" 1 ML MISC Safety glide - for B12 Shots       vilazodone (VIIBRYD) 40 MG TABS tablet Take 40 mg by mouth daily with food       Allergies   Allergen Reactions     Clonazepam Other (See Comments)     Causes Violence and aggresssion     Hydrocodone-Acetaminophen      Other reaction(s): Seizures  Can take Percocet without difficulty.     Lorazepam Other (See Comments)     Causes Violence and aggresssion     Sertraline Other (See Comments)     Caused suicidally      Bupropion Other (See Comments)     Caused Major Depression     Dust Mite Extract      Other reaction(s): Asthma symptoms     Lithium Other (See Comments)     Mood alteration     Pollen Extract      Other reaction(s): Asthma symptoms     Risperidone Other (See Comments)     Agitation       Sulfa Drugs Itching     Trichophyton      Other reaction(s): Asthma symptoms     Valproic Acid Other (See Comments)     Weight gain     Past medical history, past surgical history, current medications and allergies reviewed and accurate to the best of my knowledge.      ROS:  Refer to HPI    /76   Pulse 78   Temp 97.9  F (36.6  C) (Tympanic)   Resp 18   Wt 121.8 kg (268 lb 8 oz)   SpO2 96%   BMI 43.01 kg/m      EXAM:  General Appearance: Well appearing 46-year old female, appropriate appearance for age. No acute distress  Ears: Left TM intact, translucent with bony landmarks appreciated, no erythema, no effusion, no bulging, no purulence.  Right TM intact, dull with bony landmarks appreciated, moderate erythema, evidence of effusion, no bulging, no purulence. Left auditory canal clear.  Right auditory canal clear.  Normal external ears, non tender.  Eyes: " "conjunctivae normal without erythema or irritation, corneas clear, no drainage or crusting, no eyelid swelling, pupils equal   Orophayrnx: moist mucous membranes, posterior pharynx without erythema, tonsils without hypertrophy, no erythema, no exudates or petechiae, no post nasal drip seen, no trismus, voice clear.    Sinuses:  No sinus tenderness upon palpation of the frontal or maxillary sinuses  Nose:  Bilateral nares: no erythema, no edema, no drainage or congestion   Neck: supple without adenopathy  Respiratory: normal chest wall and respirations.  Normal effort.  Clear to auscultation bilaterally, no wheezing, crackles or rhonchi.  No increased work of breathing.  No cough appreciated.  Cardiac: RRR with no murmurs   Dermatological: no rashes noted of exposed skin  Psychological: normal affect, alert, oriented, and pleasant.     ASSESSMENT/PLAN:    I have reviewed the nursing notes.  I have reviewed the findings, diagnosis, plan and need for follow up with the patient.    (H65.91) OME (otitis media with effusion), right  (primary encounter diagnosis)  Comment: 3 day duration  Plan: cefdinir (OMNICEF) 300 MG capsule  -Omnicef prescribed, discussed to take entire course of treatment, even if feeling better prior to this. Can use warm compresses as well.   - Discussed probiotics with patient and recommended the following: to eat yogurt, kefir or take over-the-counter \"probiotic\" at least 2 hours before or after a dose of antibiotic.   -Alternative ibuprofen and tylenol as needed.   -Rest/relaxation and keeping hydrated with clear liquids (ie: water or gatorade). Using a humidifier may be beneficial as well.     Discussed warning signs/symptoms indicative of need to f/u    Follow up if symptoms persist or worsen or concerns    Appropriate paperwork filled out for group home.     Ena Estrada PA-C  3/27/2021  11:37 AM    I explained my diagnostic considerations and recommendations to the patient, who voiced " understanding and agreement with the treatment plan. All questions were answered. We discussed potential side effects of any prescribed or recommended therapies, as well as expectations for response to treatments.    Disclaimer:  This note consists of words and symbols derived from keyboarding, dictation, or using voice recognition software. As a result, there may be errors in the script that have gone undetected. Please consider this when interpreting information found in this note.

## 2021-03-27 NOTE — NURSING NOTE
"Chief Complaint   Patient presents with     Ear Problem     Right ear plugged feeling has been going on for 3 days.     Initial There were no vitals taken for this visit. Estimated body mass index is 43.54 kg/m  as calculated from the following:    Height as of 3/12/21: 1.683 m (5' 6.25\").    Weight as of 3/12/21: 123.3 kg (271 lb 12.8 oz).         Medication Reconciliation: Complete      Zechariah Miller LPN   "

## 2021-03-27 NOTE — PATIENT INSTRUCTIONS
"Please refer to your AVS for follow up and pain/symptoms management recommendations (I.e.: medications, helpful conservative treatment modalities, appropriate follow up if need to a specialist or family practice, etc.). Please return to urgent care if your symptoms change or worsen.     Discharge instructions:  -If you were prescribed a medication(s), please take this as prescribed/directed  -Monitor your symptoms, if changing/worsening, return to UC/ER or PCP for follow up    -Take entire course of antibiotics to ensure this clears (even if feeling better).  -Tylenol or ibuprofen for pain and fevers.   -Eat yogurt, kefir or take over-the-counter \"probiotic\" at least 2 hours before or after a dose of antibiotic. This will replace good bacteria that may have been lost due to the antibiotic. (This may also help to prevent yeast infections and upset stomach during the course of antibiotic.)  - In the future at onset of congestion: Blow nose or use bulb syringe to keep nasal congestion cleared and use saline nasal spray/flush.  -Alternative ibuprofen and tylenol as needed.   -Rest/relaxation and keeping hydrated with clear liquids (ie: water or gatorade). Using a humidifier may be beneficial as well.     * Recheck with family practice in 7 days to recheck - ER sooner with worsening or concerns.     "

## 2021-04-05 ENCOUNTER — NURSE TRIAGE (OUTPATIENT)
Dept: INTERNAL MEDICINE | Facility: OTHER | Age: 46
End: 2021-04-05

## 2021-04-05 DIAGNOSIS — B37.31 YEAST INFECTION OF THE VAGINA: Primary | ICD-10-CM

## 2021-04-05 RX ORDER — FLUCONAZOLE 150 MG/1
150 TABLET ORAL ONCE
Qty: 1 TABLET | Refills: 0 | Status: SHIPPED | OUTPATIENT
Start: 2021-04-05 | End: 2021-04-05

## 2021-04-12 ENCOUNTER — OFFICE VISIT (OUTPATIENT)
Dept: FAMILY MEDICINE | Facility: OTHER | Age: 46
End: 2021-04-12
Attending: PHYSICIAN ASSISTANT
Payer: MEDICARE

## 2021-04-12 VITALS
BODY MASS INDEX: 43.51 KG/M2 | WEIGHT: 271.6 LBS | HEART RATE: 84 BPM | RESPIRATION RATE: 20 BRPM | TEMPERATURE: 98.7 F | SYSTOLIC BLOOD PRESSURE: 112 MMHG | DIASTOLIC BLOOD PRESSURE: 84 MMHG

## 2021-04-12 DIAGNOSIS — R14.0 BLOATING SYMPTOM: Primary | ICD-10-CM

## 2021-04-12 PROCEDURE — G0463 HOSPITAL OUTPT CLINIC VISIT: HCPCS

## 2021-04-12 PROCEDURE — 99213 OFFICE O/P EST LOW 20 MIN: CPT | Performed by: PHYSICIAN ASSISTANT

## 2021-04-12 RX ORDER — METHOCARBAMOL 500 MG/1
500 TABLET, FILM COATED ORAL 4 TIMES DAILY PRN
COMMUNITY
Start: 2021-04-12 | End: 2022-07-18

## 2021-04-12 ASSESSMENT — PAIN SCALES - GENERAL: PAINLEVEL: NO PAIN (0)

## 2021-04-12 NOTE — PROGRESS NOTES
Nursing Notes:   Dona Rodriguez LPN  2021 11:35 AM  Signed  Chief Complaint   Patient presents with     Bloated         Medication Reconciliation: complete    Dona Rodriguez LPN        HPI:    Rosi Lamb is a 46 year old female who presents for bloating concerns.  Patient has been working on her diet.  She is drinking more water.  She has increased diet with lettuce and vegetables recently.  Feels like this made her stomach worse.  She also was eating bran cereal every morning.  She was having increased bloated symptoms.  Previously was having a bowel movement every other day.  She is now having a bowel movement twice daily.  No blood in the stool or black tarry stools.  No nausea or vomiting.  No dehydration concerns.  No recent cough or cold symptoms.  No fevers or chills.    Past Medical History:   Diagnosis Date     Edema     No Comments Provided     Encounter for removal and reinsertion of intrauterine contraceptive device     05,IUD placement, Removed     Excessive and frequent menstruation with irregular cycle     2017     Major depressive disorder, single episode     No Comments Provided     Personal history of other medical treatment (CODE)     G3, P2-0-1-2 with history of spontaneous      Personal history of other medical treatment (CODE)     No Comments Provided     Uncomplicated asthma     No Comments Provided       Past Surgical History:   Procedure Laterality Date     ANKLE SURGERY      fracture, repair with screws     CONIZATION LEEP      ,Underwent a loop     LAPAROSCOPIC TUBAL LIGATION      ,tubal ligation       Family History   Problem Relation Age of Onset     Osteoporosis Mother      Asthma Father         Asthma,+ Leg edema, knee, hip replacement. + Lymphedema     Heart Failure Father      Other - See Comments Paternal Grandmother         Lymphedema     Osteoporosis Brother         Osteoporosis     Other - See Comments Brother          "No Known Problems       Social History     Tobacco Use     Smoking status: Former Smoker     Packs/day: 1.00     Years: 3.00     Pack years: 3.00     Types: Cigarettes     Quit date: 2003     Years since quittin.2     Smokeless tobacco: Never Used   Substance Use Topics     Alcohol use: Not Currently     Alcohol/week: 0.0 standard drinks       Current Outpatient Medications   Medication Sig Dispense Refill     acetaminophen (TYLENOL) 500 MG tablet Take 1 tablet (500 mg) by mouth every 6 hours as needed for fever or pain 100 tablet 5     albuterol (VENTOLIN HFA) 108 (90 Base) MCG/ACT inhaler Inhale 1-2 puffs into the lungs every 4 hours as needed for shortness of breath / dyspnea or wheezing 18 g 5     ARIPiprazole (ABILIFY) 15 MG tablet Take 1 tablet (15 mg) by mouth daily       B-D TB SYRINGE 25G X \" 1 ML MISC USE TO INJECT B-12 INTRAMUSCULARLY EVERY 2 WEEKS 100 each 0     cholecalciferol (VITAMIN D3) 5000 units (125 mcg) capsule Take 1 capsule (5,000 Units) by mouth daily 100 capsule 3     cyanocobalamin (CYANOCOBALAMIN) 1000 MCG/ML injection INJECT 1 ML INTRAMUSCULARLY EVERY 2 WEEKS. 6 mL 2     famotidine (PEPCID) 20 MG tablet Take 1 tablet (20 mg) by mouth 2 times daily 180 tablet 3     gabapentin (NEURONTIN) 300 MG capsule 300 mg po BID and 300 mg po PRN at Noon -- Rx by Psych -- Fabienne Duncan       hydrOXYzine (ATARAX) 50 MG tablet Take 50 mg by mouth 2 times daily as needed       hydrOXYzine (VISTARIL) 50 MG capsule Take 50 mg by mouth 2 times daily as needed       ibuprofen (ADVIL/MOTRIN) 200 MG tablet Take 600 mg by mouth every 6 hours as needed for pain       lamoTRIgine (LAMICTAL) 200 MG tablet TAKE 1 TABLET BY MOUTH NIGHTLY AT BEDTIME  2     lamoTRIgine (LAMICTAL) 25 MG tablet Take 1 tablet by mouth At Bedtime With 200 mg  2     methocarbamol (ROBAXIN) 500 MG tablet Take 1 tablet (500 mg) by mouth 4 times daily as needed for muscle spasms       montelukast (SINGULAIR) 10 MG tablet TAKE 1 " "TABLET (10 MG) BY MOUTH AT BEDTIME 90 tablet 3     oxybutynin ER (DITROPAN-XL) 10 MG 24 hr tablet Take 1 tablet (10 mg) by mouth daily (Patient taking differently: Take 5 mg by mouth daily ) 90 tablet 3     SF 1.1 % GEL topical gel BRUSH, SWISH TOOTHPASTE FOR 30 SECONDS AND SPIT BEFORE BED. DO NOT RINSE, EAT OR DRINK FOR 30 MINUTES       syringe/needle, sisp, 25G X 5/8\" 1 ML MISC Safety glide - for B12 Shots       vilazodone (VIIBRYD) 40 MG TABS tablet Take 40 mg by mouth daily with food         Allergies   Allergen Reactions     Clonazepam Other (See Comments)     Causes Violence and aggresssion     Hydrocodone-Acetaminophen      Other reaction(s): Seizures  Can take Percocet without difficulty.     Lorazepam Other (See Comments)     Causes Violence and aggresssion     Sertraline Other (See Comments)     Caused suicidally      Bupropion Other (See Comments)     Caused Major Depression     Dust Mite Extract      Other reaction(s): Asthma symptoms     Lithium Other (See Comments)     Mood alteration     Pollen Extract      Other reaction(s): Asthma symptoms     Risperidone Other (See Comments)     Agitation       Sulfa Drugs Itching     Trichophyton      Other reaction(s): Asthma symptoms     Valproic Acid Other (See Comments)     Weight gain       REVIEW OF SYSTEMS:  Refer to HPI.    EXAM:   Vitals:    /84 (BP Location: Right arm, Patient Position: Sitting, Cuff Size: Adult Large)   Pulse 84   Temp 98.7  F (37.1  C)   Resp 20   Wt 123.2 kg (271 lb 9.6 oz)   BMI 43.51 kg/m      General Appearance: Pleasant, alert, appropriate appearance for age. No acute distress  Chest/Respiratory Exam: Normal chest wall and respirations. Clear to auscultation.  Cardiovascular Exam: Regular rate and rhythm. S1, S2, no murmur, click, gallop, or rubs.  Gastrointestinal Exam: Soft, non-tender, no masses or organomegaly. Normal BS x 4.  Skin: no rash or abnormalities  Psychiatric Exam: Alert and oriented - appropriate " affect.    PHQ Depression Screen  PHQ-9 SCORE 3/11/2020 4/22/2020 8/12/2020   PHQ-9 Total Score 0 0 0       ASSESSMENT AND PLAN:      ICD-10-CM    1. Bloating symptom  R14.0        Continue to monitor symptoms.  No acute concerns at this time.  Encouraged to closely monitor her fiber intake and the amount that she needs in order to decrease her bloating symptoms.  Continue good diet and exercise.    Use Gas X as needed.     Eat yogurt daily.     If the pain does not begin improving, localizes to the right lower belly, there is increased fever, or other progression of symptoms, return for reassessment.    Should I see a doctor or nurse about my stomach ache? -- Most people do not need to see a doctor or nurse for a stomach ache. But you should see your doctor or nurse if:  ?You have bloody bowel movements, diarrhea, or vomiting  ?Your pain is severe and lasts more than an hour or comes and goes for more than 24 hours  ?You cannot eat or drink for hours  ?You have a fever higher than 102 F (39 C)  ?You lose a lot of weight without trying to, or lose interest in food         Patient Instructions   Continue to monitor symptoms.     Use Gas X as needed.     Eat yogurt daily.     If the pain does not begin improving, localizes to the right lower belly, there is increased fever, or other progression of symptoms, return for reassessment.    Should I see a doctor or nurse about my stomach ache? -- Most people do not need to see a doctor or nurse for a stomach ache. But you should see your doctor or nurse if:  ?You have bloody bowel movements, diarrhea, or vomiting  ?Your pain is severe and lasts more than an hour or comes and goes for more than 24 hours  ?You cannot eat or drink for hours  ?You have a fever higher than 102 F (39 C)  ?You lose a lot of weight without trying to, or lose interest in food          Liz Diaz PA-C PA-C..................4/12/2021 11:34 AM

## 2021-04-12 NOTE — PATIENT INSTRUCTIONS
Continue to monitor symptoms.     Use Gas X as needed.     Eat yogurt daily.     If the pain does not begin improving, localizes to the right lower belly, there is increased fever, or other progression of symptoms, return for reassessment.    Should I see a doctor or nurse about my stomach ache? -- Most people do not need to see a doctor or nurse for a stomach ache. But you should see your doctor or nurse if:  ?You have bloody bowel movements, diarrhea, or vomiting  ?Your pain is severe and lasts more than an hour or comes and goes for more than 24 hours  ?You cannot eat or drink for hours  ?You have a fever higher than 102 F (39 C)  ?You lose a lot of weight without trying to, or lose interest in food

## 2021-04-12 NOTE — NURSING NOTE
Chief Complaint   Patient presents with     Bloated         Medication Reconciliation: complete    Dona Rodriguez, LPN

## 2021-04-26 DIAGNOSIS — N39.41 URGE INCONTINENCE: ICD-10-CM

## 2021-04-26 RX ORDER — OXYBUTYNIN CHLORIDE 10 MG/1
10 TABLET, EXTENDED RELEASE ORAL DAILY
Qty: 90 TABLET | Refills: 2 | Status: SHIPPED | OUTPATIENT
Start: 2021-04-26 | End: 2022-01-25

## 2021-04-26 NOTE — TELEPHONE ENCOUNTER
"[id number] sent Rx request for the following:   oxybutynin ER (DITROPAN-XL) 10 MG 24 hr tablet   SigTAKE 1 TABLET (10 MG) BY MOUTH DAILY    Last Prescription Date:   04/22/2020  Last Fill Qty/Refills:         90, R-3    Last Office Visit:              03/12/2021 (Oja)   Future Office visit:           None noted   Muscarinic Antagonists (Urinary Incontinence Agents) Passed - 4/26/2021  3:47 PM        Passed - Recent (12 mo) or future (30 days) visit within the authorizing provider's specialty     Patient has had an office visit with the authorizing provider or a provider within the authorizing providers department within the previous 12 mos or has a future within next 30 days. See \"Patient Info\" tab in inbasket, or \"Choose Columns\" in Meds & Orders section of the refill encounter.              Passed - Medication is Oxybutynin and patient is 5 years of age or older        Passed - Patient does not have a diagnosis of glaucoma on the problem list     If glaucoma diagnosis is new, refer refill to physician.          Passed - Medication is active on med list        Passed - Patient is 18 years of age or older           Prescription approved per [facility] Refill Protocol.  Isabel Taylor RN ....................  4/26/2021   4:07 PM      "

## 2021-05-04 ENCOUNTER — OFFICE VISIT (OUTPATIENT)
Dept: FAMILY MEDICINE | Facility: OTHER | Age: 46
End: 2021-05-04
Attending: FAMILY MEDICINE
Payer: MEDICARE

## 2021-05-04 VITALS
RESPIRATION RATE: 20 BRPM | TEMPERATURE: 97 F | HEART RATE: 69 BPM | WEIGHT: 286.4 LBS | SYSTOLIC BLOOD PRESSURE: 120 MMHG | DIASTOLIC BLOOD PRESSURE: 66 MMHG | OXYGEN SATURATION: 100 % | BODY MASS INDEX: 45.88 KG/M2

## 2021-05-04 DIAGNOSIS — I87.2 VENOUS STASIS DERMATITIS OF RIGHT LOWER EXTREMITY: ICD-10-CM

## 2021-05-04 DIAGNOSIS — I89.0 LYMPHEDEMA OF BOTH LOWER EXTREMITIES: Primary | ICD-10-CM

## 2021-05-04 PROCEDURE — 99213 OFFICE O/P EST LOW 20 MIN: CPT | Performed by: FAMILY MEDICINE

## 2021-05-04 PROCEDURE — G0463 HOSPITAL OUTPT CLINIC VISIT: HCPCS

## 2021-05-04 ASSESSMENT — PAIN SCALES - GENERAL: PAINLEVEL: MODERATE PAIN (4)

## 2021-05-04 NOTE — NURSING NOTE
Patient is here for concerns of a spot on her right lower leg. Noticed spot yesterday, did have a fall Thursday.     No LMP recorded (lmp unknown). Patient has had an ablation.  Medication Reconciliation: complete    Gem Dorado LPN  5/4/2021 3:52 PM

## 2021-05-04 NOTE — PROGRESS NOTES
SUBJECTIVE:   Rosi Lamb is a 46 year old female who presents to clinic today for the following health issues:  Nursing Notes:   Gem Dorado LPN  5/4/2021  3:52 PM  Sign at exiting of workspace  Patient is here for concerns of a spot on her right lower leg. Noticed spot yesterday, did have a fall Thursday.     No LMP recorded (lmp unknown). Patient has had an ablation.  Medication Reconciliation: chelsie Doardo LPN  5/4/2021 3:52 PM      HPI    46-year-old female presents with a concerns over redness on right lower extremity.  Patient has a diagnosis of lymphedema.  She does not use wraps or compression stockings.    Recently started working a new job when she is standing for a few hours per day.  She noticed an area of irritation on the right lower anterior shin.  She is concerned he could be infected.  There is no warmth or drainage.          Review of Systems   See hpi  OBJECTIVE:     /66 (BP Location: Right arm, Patient Position: Sitting, Cuff Size: Adult Large)   Pulse 69   Temp 97  F (36.1  C) (Tympanic)   Resp 20   Wt 129.9 kg (286 lb 6.4 oz)   LMP  (LMP Unknown)   SpO2 100%   Breastfeeding No   BMI 45.88 kg/m    Body mass index is 45.88 kg/m .  Physical Exam    4 x 5 cm area of light red, shiny skin lower anterior shin. No warmth. No drainage.     ASSESSMENT/PLAN:           ICD-10-CM    1. Lymphedema of both lower extremities  I89.0    2. Venous stasis dermatitis of right lower extremity  I87.2      Doubt infection.  ACE wrap, elevate.   Marked edges to monitor. Return if expands.    Kimberly Crowe MD  [facility] AND [facility]

## 2021-05-05 ENCOUNTER — OFFICE VISIT (OUTPATIENT)
Dept: FAMILY MEDICINE | Facility: OTHER | Age: 46
End: 2021-05-05
Attending: PHYSICIAN ASSISTANT
Payer: MEDICARE

## 2021-05-05 VITALS
DIASTOLIC BLOOD PRESSURE: 70 MMHG | HEART RATE: 75 BPM | TEMPERATURE: 97.5 F | SYSTOLIC BLOOD PRESSURE: 130 MMHG | RESPIRATION RATE: 20 BRPM | WEIGHT: 283.8 LBS | OXYGEN SATURATION: 100 % | BODY MASS INDEX: 45.46 KG/M2

## 2021-05-05 DIAGNOSIS — L03.115 CELLULITIS OF RIGHT LOWER EXTREMITY: Primary | ICD-10-CM

## 2021-05-05 PROCEDURE — G0463 HOSPITAL OUTPT CLINIC VISIT: HCPCS

## 2021-05-05 PROCEDURE — 99213 OFFICE O/P EST LOW 20 MIN: CPT | Performed by: FAMILY MEDICINE

## 2021-05-05 RX ORDER — DICLOXACILLIN SODIUM 500 MG
500 CAPSULE ORAL 4 TIMES DAILY
Qty: 28 CAPSULE | Refills: 0 | Status: SHIPPED | OUTPATIENT
Start: 2021-05-05 | End: 2021-05-12

## 2021-05-05 ASSESSMENT — PAIN SCALES - GENERAL: PAINLEVEL: SEVERE PAIN (6)

## 2021-05-05 NOTE — PROGRESS NOTES
"  SUBJECTIVE:   Rosi Lamb is a 46 year old female who presents to clinic today for the following health issues:    HPI  Rosi is here for concerns of increasing RLE redness over the last few days.  Was seen yesterday and encouraged wrap/elevation.  Justin has been drawn.  Chronic history of lymphedema.   Did have a fall just prior to this occurring.  Has had celluilitis in the past; last episode in a \"stubbed toe\" ~4-5 months ago.  No fever/chills.  Does feel more warm today, more red, and more tender to the touch.  Recently started a new job at [employer]; doing more standing than normal.  Typically working 3d/week x 5 hour shifts.  Does get a lunch break.    Patient Active Problem List    Diagnosis Date Noted     Abnormal Pap smear of cervix 04/11/2018     Priority: Medium     Overview:   had scraping of cervix       Allergic rhinitis due to pollen 02/08/2018     Priority: Medium     Esophageal reflux 02/08/2018     Priority: Medium     History of substance abuse (H) 02/08/2018     Priority: Medium     Bilateral chronic knee pain 04/20/2017     Priority: Medium     Crepitus of joint of right knee 04/20/2017     Priority: Medium     Menorrhagia with irregular cycle 02/16/2017     Priority: Medium     Preop examination 02/16/2017     Priority: Medium     Bilateral foot pain 12/18/2016     Priority: Medium     Elevated random blood glucose level 12/18/2016     Priority: Medium     Peripheral polyneuropathy 12/18/2016     Priority: Medium     Vitamin B12 deficiency 12/18/2016     Priority: Medium     Vitamin D deficiency 12/18/2016     Priority: Medium     Lymphedema of both lower extremities 11/22/2016     Priority: Medium     Stasis dermatitis of both legs 08/17/2016     Priority: Medium     Overview:   Updated per 10/1/17 IMO import       Alcohol use disorder, moderate, in sustained remission, dependence (H) 11/28/2015     Priority: Medium     Generalized anxiety disorder 11/28/2015     Priority: Medium "     Cannabis use disorder, moderate, in sustained remission, dependence (H) 2015     Priority: Medium     Bipolar I disorder, most recent episode depressed, moderate (H) 2015     Priority: Medium     Morbid obesity with BMI of 40.0-44.9, adult (H) 2015     Priority: Medium     Moderate persistent asthma 2013     Priority: Medium     Overview:   AAP completed on 13  Triggers: pollen, fumes, exercise, URI, warm weather. Peak flow today is 250. Symptomatic: moderate  Overview:   Overview:   AAP completed on 13  Triggers: pollen, fumes, exercise, URI, warm weather. Peak flow today is 250. Symptomatic: moderate  Overview:   AAP completed on 13  Triggers: pollen, fumes, exercise, URI, warm weather. Peak flow today is 250. Symptomatic: moderate       Urinary incontinence 03/15/2013     Priority: Medium     Closed dislocation of tarsal joint 2011     Priority: Medium     Borderline personality disorder (H) 2003     Priority: Medium     Overview:   [facility].  Overview:   Overview:   [facility].       Other disorder of menstruation and other abnormal bleeding from female genital tract 2001     Priority: Medium     Overview:   DUB, dysmenorrhea  Overview:   Overview:   DUB, dysmenorrhea       Past Medical History:   Diagnosis Date     Edema     No Comments Provided     Encounter for removal and reinsertion of intrauterine contraceptive device     05,IUD placement, Removed     Excessive and frequent menstruation with irregular cycle     2017     Major depressive disorder, single episode     No Comments Provided     Personal history of other medical treatment (CODE)     G3, P2-0-1-2 with history of spontaneous      Personal history of other medical treatment (CODE)     No Comments Provided     Uncomplicated asthma     No Comments Provided      Past Surgical History:   Procedure Laterality Date     ANKLE SURGERY      fracture,  "repair with screws     CONIZATION LEEP      ,Underwent a loop     LAPAROSCOPIC TUBAL LIGATION      ,tubal ligation     Family History   Problem Relation Age of Onset     Osteoporosis Mother      Asthma Father         Asthma,+ Leg edema, knee, hip replacement. + Lymphedema     Heart Failure Father      Other - See Comments Paternal Grandmother         Lymphedema     Osteoporosis Brother         Osteoporosis     Other - See Comments Brother         No Known Problems     Social History     Tobacco Use     Smoking status: Former Smoker     Packs/day: 1.00     Years: 3.00     Pack years: 3.00     Types: Cigarettes     Quit date: 2003     Years since quittin.3     Smokeless tobacco: Never Used   Substance Use Topics     Alcohol use: Not Currently     Alcohol/week: 0.0 standard drinks     Social History     Social History Narrative    No longer in adult foster care lived with Charley Godwin.    .   2 sons - age 12 and 7 - as of 2016.   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     Current Outpatient Medications   Medication Sig Dispense Refill     acetaminophen (TYLENOL) 500 MG tablet Take 1 tablet (500 mg) by mouth every 6 hours as needed for fever or pain 100 tablet 5     albuterol (VENTOLIN HFA) 108 (90 Base) MCG/ACT inhaler Inhale 1-2 puffs into the lungs every 4 hours as needed for shortness of breath / dyspnea or wheezing 18 g 5     ARIPiprazole (ABILIFY) 15 MG tablet Take 1 tablet (15 mg) by mouth daily       B-D TB SYRINGE 25G X 5\" 1 ML MISC USE TO INJECT B-12 INTRAMUSCULARLY EVERY 2 WEEKS 100 each 0     cholecalciferol (VITAMIN D3) 5000 units (125 mcg) capsule Take 1 capsule (5,000 Units) by mouth daily 100 capsule 3     cyanocobalamin (CYANOCOBALAMIN) 1000 MCG/ML injection INJECT 1 ML INTRAMUSCULARLY EVERY 2 WEEKS. 6 mL 2     famotidine (PEPCID) 20 MG tablet Take 1 tablet (20 mg) by mouth 2 times daily 180 tablet 3     gabapentin (NEURONTIN) 300 MG capsule 300 mg " "po BID and 300 mg po PRN at Noon -- Rx by Psych -- Fabienne Duncan       hydrOXYzine (ATARAX) 50 MG tablet Take 50 mg by mouth 2 times daily as needed       hydrOXYzine (VISTARIL) 50 MG capsule Take 50 mg by mouth 2 times daily as needed       ibuprofen (ADVIL/MOTRIN) 200 MG tablet Take 600 mg by mouth every 6 hours as needed for pain       lamoTRIgine (LAMICTAL) 200 MG tablet TAKE 1 TABLET BY MOUTH NIGHTLY AT BEDTIME  2     lamoTRIgine (LAMICTAL) 25 MG tablet Take 1 tablet by mouth At Bedtime With 200 mg  2     methocarbamol (ROBAXIN) 500 MG tablet Take 1 tablet (500 mg) by mouth 4 times daily as needed for muscle spasms       montelukast (SINGULAIR) 10 MG tablet TAKE 1 TABLET (10 MG) BY MOUTH AT BEDTIME 90 tablet 3     oxybutynin ER (DITROPAN-XL) 10 MG 24 hr tablet TAKE 1 TABLET (10 MG) BY MOUTH DAILY 90 tablet 2     SF 1.1 % GEL topical gel BRUSH, SWISH TOOTHPASTE FOR 30 SECONDS AND SPIT BEFORE BED. DO NOT RINSE, EAT OR DRINK FOR 30 MINUTES       syringe/needle, sisp, 25G X 5/8\" 1 ML MISC Safety glide - for B12 Shots       vilazodone (VIIBRYD) 40 MG TABS tablet Take 40 mg by mouth daily with food       Allergies   Allergen Reactions     Clonazepam Other (See Comments)     Causes Violence and aggresssion     Hydrocodone-Acetaminophen      Other reaction(s): Seizures  Can take Percocet without difficulty.     Lorazepam Other (See Comments)     Causes Violence and aggresssion     Sertraline Other (See Comments)     Caused suicidally      Bupropion Other (See Comments)     Caused Major Depression     Dust Mite Extract      Other reaction(s): Asthma symptoms     Lithium Other (See Comments)     Mood alteration     Pollen Extract      Other reaction(s): Asthma symptoms     Risperidone Other (See Comments)     Agitation       Sulfa Drugs Itching     Trichophyton      Other reaction(s): Asthma symptoms     Valproic Acid Other (See Comments)     Weight gain     OBJECTIVE:     /70   Pulse 75   Temp 97.5  F (36.4 "  C) (Tympanic)   Resp 20   Wt 128.7 kg (283 lb 12.8 oz)   LMP  (LMP Unknown)   SpO2 100%   BMI 45.46 kg/m    Body mass index is 45.46 kg/m .  Physical Exam  Vitals signs and nursing note reviewed.   Constitutional:       Appearance: Normal appearance.   Cardiovascular:      Rate and Rhythm: Normal rate.   Pulmonary:      Effort: Pulmonary effort is normal.   Skin:     Capillary Refill: Capillary refill takes less than 2 seconds.             Comments: Erythema extending ~1 inch past previously drawn skin pen from yesterday on medial aspect; up to a 3 inches over lateral aspect.  + warm to touch; with more ashly appearance centrally.  No open lesions, weeping.  + edematous at baseline obviously but increased compared to contralateral side.  Normal ROM of ankle.   Neurological:      Mental Status: She is alert.     Diagnostic Test Results:  No results found for any visits on 05/05/21.    ASSESSMENT/PLAN:     1. Cellulitis of right lower extremity  With increase of erythema/pain and redness compared to pen marking from yesterday.  No systemic infectious symptoms and no history of MRSA that patient is aware of.  Will treat with dicloxacillin 500mg QID x 7 days.  Elevate as much as possible; okay to work short work shifts but encouraged elevation through lunch break.  Follow up with failure to improve after ~48 hours or significant worsening prior to that.  - dicloxacillin (DYNAPEN) 500 MG capsule; Take 1 capsule (500 mg) by mouth 4 times daily for 7 days  Dispense: 28 capsule; Refill: 0      Imelda Knowles DO  [facility]

## 2021-05-05 NOTE — PATIENT INSTRUCTIONS
Patient Education     Cellulitis  Cellulitis is an infection of the deep layers of skin. A break in the skin, such as a cut or scratch, can let bacteria under the skin. If the bacteria get to deep layers of the skin, it can be serious. If not treated, cellulitis can get into the bloodstream and lymph nodes. The infection can then spread throughout the body. This causes serious illness.   Cellulitis causes the affected skin to become red, swollen, warm, and sore. The reddened areas have a visible border. An open sore may leak fluid (pus). You may have a fever, chills, and pain.   Cellulitis is treated with antibiotics taken for 7 to 10 days. An open sore may be cleaned and covered with cool wet gauze. Symptoms should get better 1 to 2 days after treatment is started. Make sure to take all the antibiotics for the full number of days until they are gone. Keep taking the medicine even if your symptoms go away.   Home care  Follow these tips:    Limit the use of the part of your body with cellulitis.     If the infection is on your leg, keep your leg raised while sitting. This helps reduce swelling.    Take all of the antibiotic medicine exactly as directed until it is gone. Don't miss any doses, especially during the first 7 days. Don t stop taking the medicine when your symptoms get better.    Keep the affected area clean and dry.    Wash your hands with soap and clean, running water before and after touching your skin. Anyone else who touches your skin should also wash his or her hands. Don't share towels.  Follow-up care  Follow up with your healthcare provider, or as advised. If your infection doesn't go away on the first antibiotic, your healthcare provider will prescribe a different one.   When to seek medical advice  Call your healthcare provider right away if any of these occur:    Red areas that spread    Swelling or pain that gets worse    Fluid leaking from the skin (pus)    Fever higher of 100.4  F (38.0  C)  or higher after 2 days on antibiotics  [employer] last reviewed this educational content on 8/1/2019 2000-2021 [employer]. All rights reserved. This information is not intended as a substitute for professional medical care. Always follow your healthcare professional's instructions.

## 2021-05-05 NOTE — NURSING NOTE
"Chief Complaint   Patient presents with     Derm Problem     Patient is here for redness, swelling and pain in her lower right leg. Patient states the area is hot. Patient had Ibuprofen at 3:47pm. Patient was seen yesterday for the leg and she states it has gotten worse.     Initial /70   Pulse 75   Temp 97.5  F (36.4  C) (Tympanic)   Resp 20   Wt 128.7 kg (283 lb 12.8 oz)   LMP  (LMP Unknown)   SpO2 100%   BMI 45.46 kg/m   Estimated body mass index is 45.46 kg/m  as calculated from the following:    Height as of 3/12/21: 1.683 m (5' 6.25\").    Weight as of this encounter: 128.7 kg (283 lb 12.8 oz).  Medication Reconciliation: complete    Ainsley Grove LPN  "

## 2021-05-13 ENCOUNTER — NURSE TRIAGE (OUTPATIENT)
Dept: INTERNAL MEDICINE | Facility: OTHER | Age: 46
End: 2021-05-13

## 2021-05-13 ENCOUNTER — OFFICE VISIT (OUTPATIENT)
Dept: FAMILY MEDICINE | Facility: OTHER | Age: 46
End: 2021-05-13
Attending: FAMILY MEDICINE
Payer: MEDICARE

## 2021-05-13 VITALS
TEMPERATURE: 97.9 F | WEIGHT: 279.2 LBS | BODY MASS INDEX: 44.72 KG/M2 | OXYGEN SATURATION: 97 % | DIASTOLIC BLOOD PRESSURE: 82 MMHG | SYSTOLIC BLOOD PRESSURE: 120 MMHG | HEART RATE: 90 BPM | RESPIRATION RATE: 20 BRPM

## 2021-05-13 DIAGNOSIS — T80.89XA IRRITATION OF INJECTION SITE, INITIAL ENCOUNTER: Primary | ICD-10-CM

## 2021-05-13 PROCEDURE — G0463 HOSPITAL OUTPT CLINIC VISIT: HCPCS

## 2021-05-13 PROCEDURE — 99212 OFFICE O/P EST SF 10 MIN: CPT | Performed by: FAMILY MEDICINE

## 2021-05-13 ASSESSMENT — PAIN SCALES - GENERAL: PAINLEVEL: NO PAIN (0)

## 2021-05-13 NOTE — TELEPHONE ENCOUNTER
Okay to see any available provider today / tomorrow for check-up of the injection site.   -- can go to [facility] after hours if that is more convenient.     Lucio Curiel MD

## 2021-05-13 NOTE — TELEPHONE ENCOUNTER
Patient had a B-12 shot yesterday there is a red raised spot about an inch wide and stung while she was in the shower    Please call to advise    Thank you

## 2021-05-13 NOTE — TELEPHONE ENCOUNTER
"S-(situation): patient calling and states has redness , swelling around injection site in right arm    B-(background): patient states had B12 injection yesterday by foster care nurse.  States just treated for cellulitis in leg.    A-(assessment): patient states 1.5\"x1\" redness, raise around injection area.  States did have some itching this morning but that has cleared.  Denies fever or any other symptoms.      R-(recommendations): it has been about 24 hours since injection per protocol to be seen if over 48 hours.  Will route to Dr. Curiel for review and consideration.    Shirley Wren RN on 5/13/2021 at 10:35 AM      Reason for Disposition    Mild immunization reaction    Additional Information    Negative: Difficulty with breathing or swallowing starts within 2 hours after injection    Negative: Difficult to awaken or acting confused (e.g., disoriented, slurred speech)    Negative: Unresponsive, passed out, or very weak    Negative: Sounds like a life-threatening emergency to the triager    Negative: Sounds like a severe, unusual reaction to the triager    Negative: Fever > 103 F (39.4 C)    Negative: Fever > 101 F (38.3 C) and over 60 years of age    Negative: Fever > 100.0 F (37.8 C) and has diabetes mellitus or a weak immune system (e.g., HIV positive, cancer chemotherapy, organ transplant, splenectomy, chronic steroids)    Negative: Fever > 100.0 F (37.8 C) and bedridden (e.g., nursing home patient, stroke, chronic illness, recovering from surgery)    Negative: Measles vaccine and purple/blood-colored rash (onset day 6-12)    Negative: Redness or red streak around the injection site begins > 48 hours after shot    Negative: Fever present > 3 days (72 hours)    Negative: Smallpox vaccine and eye pain, eye redness, or rash on eyelids    Negative: Deep lump follows (in 2 to 8 weeks) Td or TDaP shot, and becomes tender to the touch    Negative: Patient wants to be seen    Answer Assessment - Initial " "Assessment Questions  1. SYMPTOMS: \"What is the main symptom?\" (e.g., redness, swelling, pain)       Right arm B12 shot yesterday redness, raised 1.5x 1\"  2. ONSET: \"When was the vaccine (shot) given?\" \"How much later did the  begin?\" (e.g., hours, days ago)       This morning  3. SEVERITY: \"How bad is it?\"       Red raised area , itching has stopped  4. FEVER: \"Is there a fever?\" If so, ask: \"What is it, how was it measured, and when did it start?\"       denies  5. IMMUNIZATIONS GIVEN: \"What shots have you recently received?\"      yesterday  6. PAST REACTIONS: \"Have you reacted to immunizations before?\" If so, ask: \"What happened?\"      denies  7. OTHER SYMPTOMS: \"Do you have any other symptoms?\"      denies    Protocols used: IMMUNIZATION [id number]    "

## 2021-05-13 NOTE — TELEPHONE ENCOUNTER
Called and informed patient of  response and transferred to scheduling    Shirley Wren RN on 5/13/2021 at 12:23 PM

## 2021-05-13 NOTE — NURSING NOTE
Patient is here to have her arm checked. States had a b12 injection yesterday 915. Area was red and raised pain with touch.     No LMP recorded (lmp unknown). Patient has had an ablation.  Medication Reconciliation: complete    Gem Dorado LPN  5/13/2021 3:48 PM

## 2021-05-13 NOTE — PROGRESS NOTES
SUBJECTIVE:   Rosi Lamb is a 46 year old female who presents to clinic today for the following health issues:  Nursing Notes:   Gem Dorado LPN  5/13/2021  3:48 PM  Sign at exiting of workspace  Patient is here to have her arm checked. States had a b12 injection yesterday 915. Area was red and raised pain with touch.     No LMP recorded (lmp unknown). Patient has had an ablation.  Medication Reconciliation: chelsie Dorado LPN  5/13/2021 3:48 PM        HPI    46-year-old female presents for evaluation of an injection site.  Nurse gave her a B12 injection yesterday morning.  Noticed a small red raised area this morning.  It is not spreading.  No pain.      Review of Systems     OBJECTIVE:     /82 (BP Location: Right arm, Patient Position: Sitting, Cuff Size: Adult Large)   Pulse 90   Temp 97.9  F (36.6  C) (Temporal)   Resp 20   Wt 126.6 kg (279 lb 3.2 oz)   LMP  (LMP Unknown)   SpO2 97%   Breastfeeding No   BMI 44.72 kg/m    Body mass index is 44.72 kg/m .  Physical Exam  Right deltoid area, 1.5 cm x 0.5 cm raised red area, nontender no warmth.      ASSESSMENT/PLAN:           ICD-10-CM    1. Irritation of injection site, initial encounter  T80.89XA      Reassurance provided.  Recommend ice.    Kimberly Crowe MD  [facility] AND [facility]

## 2021-05-23 ENCOUNTER — OFFICE VISIT (OUTPATIENT)
Dept: FAMILY MEDICINE | Facility: OTHER | Age: 46
End: 2021-05-23
Attending: PHYSICIAN ASSISTANT
Payer: MEDICARE

## 2021-05-23 VITALS
HEART RATE: 79 BPM | TEMPERATURE: 97.4 F | BODY MASS INDEX: 44.68 KG/M2 | OXYGEN SATURATION: 98 % | HEIGHT: 66 IN | RESPIRATION RATE: 18 BRPM | SYSTOLIC BLOOD PRESSURE: 110 MMHG | DIASTOLIC BLOOD PRESSURE: 68 MMHG | WEIGHT: 278 LBS

## 2021-05-23 DIAGNOSIS — H61.21 IMPACTED CERUMEN OF RIGHT EAR: Primary | ICD-10-CM

## 2021-05-23 PROCEDURE — G0463 HOSPITAL OUTPT CLINIC VISIT: HCPCS | Mod: 25

## 2021-05-23 PROCEDURE — 99213 OFFICE O/P EST LOW 20 MIN: CPT | Performed by: FAMILY MEDICINE

## 2021-05-23 PROCEDURE — 69209 REMOVE IMPACTED EAR WAX UNI: CPT | Performed by: FAMILY MEDICINE

## 2021-05-23 PROCEDURE — G0463 HOSPITAL OUTPT CLINIC VISIT: HCPCS

## 2021-05-23 ASSESSMENT — PAIN SCALES - GENERAL: PAINLEVEL: MODERATE PAIN (5)

## 2021-05-23 ASSESSMENT — MIFFLIN-ST. JEOR: SCORE: 1931.72

## 2021-05-23 NOTE — PROGRESS NOTES
"Nursing Notes:   Rosie Peña LPN  5/23/2021  1:59 PM  Signed  Chief Complaint   Patient presents with     Ear Problem     patient presented to the clinic with mainly right ear pain and once in awhile the left ear hurts as well. This all started about three days ago.    Initial /68 (BP Location: Right arm, Patient Position: Sitting, Cuff Size: Adult Large)   Pulse 79   Temp 97.4  F (36.3  C) (Tympanic)   Resp 18   Ht 1.683 m (5' 6.25\")   Wt 126.1 kg (278 lb)   LMP  (LMP Unknown)   SpO2 98%   Breastfeeding No   BMI 44.53 kg/m   Estimated body mass index is 44.53 kg/m  as calculated from the following:    Height as of this encounter: 1.683 m (5' 6.25\").    Weight as of this encounter: 126.1 kg (278 lb).         Medication Reconciliation: Complete      Rosie Peña LPN        SUBJECTIVE:   CC:  Rosi Lamb is a 46 year old female who presents to clinic today for the following health issues:  Ear pain.     HPI  Rosi Lamb is a 46 year old female who presents for evaluation of ear pain.  This is mainly the right ear.  First it was popping, now feels plugged.  No drainage from her ear.  Some popping still with swallowing. The left had felt intermittently plugged.  No trauma.  Hearing will change a little bit.  Past history of ome this winter.    No fever.  Some allergy symptoms.         Allergies   Allergen Reactions     Clonazepam Other (See Comments)     Causes Violence and aggresssion     Hydrocodone-Acetaminophen      Other reaction(s): Seizures  Can take Percocet without difficulty.     Lorazepam Other (See Comments)     Causes Violence and aggresssion     Sertraline Other (See Comments)     Caused suicidally      Bupropion Other (See Comments)     Caused Major Depression     Dust Mite Extract      Other reaction(s): Asthma symptoms     Lithium Other (See Comments)     Mood alteration     Pollen Extract      Other reaction(s): Asthma symptoms     Risperidone Other (See Comments) " "    Agitation       Sulfa Drugs Itching     Trichophyton      Other reaction(s): Asthma symptoms     Valproic Acid Other (See Comments)     Weight gain     Current Outpatient Medications   Medication     acetaminophen (TYLENOL) 500 MG tablet     albuterol (VENTOLIN HFA) 108 (90 Base) MCG/ACT inhaler     ARIPiprazole (ABILIFY) 15 MG tablet     B-D TB SYRINGE 25G X 5/8\" 1 ML MISC     cholecalciferol (VITAMIN D3) 5000 units (125 mcg) capsule     cyanocobalamin (CYANOCOBALAMIN) 1000 MCG/ML injection     famotidine (PEPCID) 20 MG tablet     gabapentin (NEURONTIN) 300 MG capsule     hydrOXYzine (ATARAX) 50 MG tablet     hydrOXYzine (VISTARIL) 50 MG capsule     ibuprofen (ADVIL/MOTRIN) 200 MG tablet     lamoTRIgine (LAMICTAL) 200 MG tablet     lamoTRIgine (LAMICTAL) 25 MG tablet     methocarbamol (ROBAXIN) 500 MG tablet     montelukast (SINGULAIR) 10 MG tablet     oxybutynin ER (DITROPAN-XL) 10 MG 24 hr tablet     SF 1.1 % GEL topical gel     syringe/needle, sisp, 25G X 5/8\" 1 ML MISC     vilazodone (VIIBRYD) 40 MG TABS tablet     No current facility-administered medications for this visit.       Past Medical History:   Diagnosis Date     Edema     No Comments Provided     Encounter for removal and reinsertion of intrauterine contraceptive device     05,IUD placement, Removed     Excessive and frequent menstruation with irregular cycle     2017     Major depressive disorder, single episode     No Comments Provided     Personal history of other medical treatment (CODE)     G3, P2-0-1-2 with history of spontaneous      Personal history of other medical treatment (CODE)     No Comments Provided     Uncomplicated asthma     No Comments Provided      Past Surgical History:   Procedure Laterality Date     ANKLE SURGERY      fracture, repair with screws     CONIZATION LEEP      ,Underwent a loop     LAPAROSCOPIC TUBAL LIGATION      ,tubal ligation       Review of Systems     PHQ-2 Score:     PHQ-2 " "( 1999 [employer]) 5/23/2021 5/13/2021   Q1: Little interest or pleasure in doing things 0 0   Q2: Feeling down, depressed or hopeless 0 0   PHQ-2 Score 0 0         PHQ-9 SCORE 3/11/2020 4/22/2020 8/12/2020   PHQ-9 Total Score 0 0 0     AGNIESZKA-7 SCORE 12/20/2019 4/22/2020 8/12/2020   Total Score 0 0 0             OBJECTIVE:     /68 (BP Location: Right arm, Patient Position: Sitting, Cuff Size: Adult Large)   Pulse 79   Temp 97.4  F (36.3  C) (Tympanic)   Resp 18   Ht 1.683 m (5' 6.25\")   Wt 126.1 kg (278 lb)   LMP  (LMP Unknown)   SpO2 98%   Breastfeeding No   BMI 44.53 kg/m    Wt Readings from Last 3 Encounters:   05/23/21 126.1 kg (278 lb)   05/13/21 126.6 kg (279 lb 3.2 oz)   05/05/21 128.7 kg (283 lb 12.8 oz)       Body mass index is 44.53 kg/m .  Physical Exam  Vitals signs and nursing note reviewed.   Constitutional:       Appearance: Normal appearance. She is obese.   HENT:      Right Ear: Tympanic membrane and ear canal normal.      Left Ear: There is impacted cerumen.   Neurological:      Mental Status: She is alert.          No results found for any visits on 05/23/21.      ASSESSMENT/PLAN:     1. Impacted cerumen of right ear        Assessment & Plan   Problem List Items Addressed This Visit     None      Visit Diagnoses     Impacted cerumen of right ear    -  Primary        Otalgia and plugging feeling due to impacted cerumen.  Ear wash performed today.    Follow up prn.          CANDY SALAZAR MD  [facility]      "

## 2021-05-23 NOTE — NURSING NOTE
"Chief Complaint   Patient presents with     Ear Problem    unsuccessful ear flush.      Initial /68 (BP Location: Right arm, Patient Position: Sitting, Cuff Size: Adult Large)   Pulse 79   Temp 97.4  F (36.3  C) (Tympanic)   Resp 18   Ht 1.683 m (5' 6.25\")   Wt 126.1 kg (278 lb)   LMP  (LMP Unknown)   SpO2 98%   Breastfeeding No   BMI 44.53 kg/m   Estimated body mass index is 44.53 kg/m  as calculated from the following:    Height as of this encounter: 1.683 m (5' 6.25\").    Weight as of this encounter: 126.1 kg (278 lb).         Medication Reconciliation: Complete      Rosie Peña LPN   "

## 2021-05-23 NOTE — NURSING NOTE
"Chief Complaint   Patient presents with     Ear Problem     patient presented to the clinic with mainly right ear pain and once in awhile the left ear hurts as well. This all started about three days ago.    Initial /68 (BP Location: Right arm, Patient Position: Sitting, Cuff Size: Adult Large)   Pulse 79   Temp 97.4  F (36.3  C) (Tympanic)   Resp 18   Ht 1.683 m (5' 6.25\")   Wt 126.1 kg (278 lb)   LMP  (LMP Unknown)   SpO2 98%   Breastfeeding No   BMI 44.53 kg/m   Estimated body mass index is 44.53 kg/m  as calculated from the following:    Height as of this encounter: 1.683 m (5' 6.25\").    Weight as of this encounter: 126.1 kg (278 lb).         Medication Reconciliation: Complete      Rosie Peña LPN   "

## 2021-05-23 NOTE — PATIENT INSTRUCTIONS
Patient Education       Earwax, Home Treatment    Everyone produces earwax from the lining of the ear canal. It serves to lubricate and protect the ear. The wax that forms in the canal naturally moves toward the outside of the ear and falls out. Sometimes the ear canal may contain too much wax. This can cause a blockage and loss of hearing. Directions are given below for home treatment.  Home care  If your doctor has advised you to remove a wax blockage yourself, follow these directions:    Unless a medicine was prescribed, you may use an over-the-counter product made for clearing earwax. These contain carbamide peroxide. Lie down with the blocked ear facing upward. Apply one dropper full of medicine and wait a few minutes. Grasp the outer ear and wiggle it to help the solution enter the canal.    Lean over a sink or basin with the blocked ear facing downward. Use a bulb syringe filled with warm (not hot or cold) water to rinse the ear several times. Use gentle pressure only.    If you are having trouble draining the water out of your ear canal, put a few drops of rubbing alcohol (isopropyl alcohol) into the ear canal. This will help remove the remaining water.    Repeat this procedure once a day for up to three days, or until your hearing is back to normal. Do not use this treatment for more than three days in a row.  Don ts    Don t use cold water to rinse the ear. This will make you dizzy.    Don t perform this procedure if you have an ear infection.    Don t perform this procedure if you have a ruptured eardrum.    Don t use cotton swabs, matches, hairpins, keys, or other objects to  clean  the ear canal. This can cause infection of the ear canal or rupture the eardrum. Because of their size and shape, cotton swabs can push earwax deeper into the ear canal instead of removing it.  Follow-up care  Follow up with your health care provider if you are not improving after three cleaning attempts, or as advised.  When  to seek medical advice  Call your health care provider right away if any of these occur:    Worsening ear pain    Fever of 101 F (38.3 C) or higher, or as directed by your health care provider    Hearing does not return to normal after three days of treatment    Fluid drainage or bleeding from the ear canal    Swelling, redness, or tenderness of the outer ear    Headache, neck pain, or stiff neck    3764-7009 [employer]. [address] [place] [address], [place] [address]. All rights reserved. This information is not intended as a substitute for professional medical care. Always follow your healthcare professional's instructions.

## 2021-06-01 ENCOUNTER — OFFICE VISIT (OUTPATIENT)
Dept: OTOLARYNGOLOGY | Facility: OTHER | Age: 46
End: 2021-06-01
Attending: OTOLARYNGOLOGY
Payer: MEDICARE

## 2021-06-01 DIAGNOSIS — H61.21 IMPACTED CERUMEN OF RIGHT EAR: Primary | ICD-10-CM

## 2021-06-01 PROCEDURE — G0463 HOSPITAL OUTPT CLINIC VISIT: HCPCS

## 2021-06-03 NOTE — PROGRESS NOTES
document embedded image  Patient Name: Rosi Baires    Address: [address] [place] [address]     YOB: 1975    [place] [address]    MR Number: [id number]    Phone: [phone]  PCP: Lucio Curiel MD            Appointment Date: 06/01/21   Visit Provider: Reinier Busch MD    cc: Lucio Curiel MD; ~    ENT Progress Note  Visit Reasons: Right ear cleaning/no audio needed    HPI  History of Present Illness  Chief complaint:  Cerumen impaction right ear    History  The patient is a 46-year-old female who comes to the office today with a plugged ear on the right.  She has has a cerumen impaction present that has been unable to be dislodged with irrigation by remarried doctor.    Exam  There is a dense cerumen impaction her drum.  This was carefully cleared away using suction and alligator forceps.  The underlying tympanic membrane is intact and healthy.  Left ear canal and TM are clear  Remainder of the head neck exam is unremarkable    A&P  Assessment & Plan  (1) Impacted cerumen, right ear:        Status: Acute        Code(s):  H61.21 - Impacted cerumen, right ear  Additional Plan Details  Plan Details: She will follow up with me as needed      Reinier Busch MD    06/01/21 0458    <Electronically signed by Reinier Busch MD> 06/02/21 6758

## 2021-07-21 DIAGNOSIS — E53.8 VITAMIN B12 DEFICIENCY: ICD-10-CM

## 2021-07-22 RX ORDER — CYANOCOBALAMIN 1000 UG/ML
INJECTION, SOLUTION INTRAMUSCULAR; SUBCUTANEOUS
Qty: 6 ML | Refills: 2 | Status: SHIPPED | OUTPATIENT
Start: 2021-07-22 | End: 2021-09-08

## 2021-07-22 NOTE — TELEPHONE ENCOUNTER
TW #[id number] sent Rx request for the following:      cyanocobalamin (CYANOCOBALAMIN) 1000 MCG/ML injection  Sig: INJECT 1 ML INTRAMUSCULARLY EVERY 2 WEEKS      Last Prescription Date:   11/12/2020  Last Fill Qty/Refills:         6ml, R-2    Last Office Visit:              3/12/2020   Future Office visit:           none    Prescription refilled per RN Medication Refill Policy.................... Oscar Dykes RN ....................  7/22/2021   2:22 PM

## 2021-08-02 ENCOUNTER — DOCUMENTATION ONLY (OUTPATIENT)
Dept: OTHER | Facility: CLINIC | Age: 46
End: 2021-08-02

## 2021-08-05 NOTE — PROGRESS NOTES
Assessment & Plan     1. Right foot pain  Patient with right medial foot pain without any known injury.  Symptoms and exam seem most consistent with a possible muscle strain, developing rash/developing zoster infection, neuropathy, arthritis, infection, etc.  Pursued x-ray per patient request, results unremarkable for acute injury.  Encourage symptomatic relief with Tylenol ibuprofen, icing, elevating, stretching, activity as tolerated.  Follow-up as needed.  - XR Foot Right G/E 3 Views; Future      Return if symptoms worsen or fail to improve.    Victoria Garibay PA-C  [facility] AND [facility]   Rosi is a 46 year old with a history of bilateral foot pain, peripheral polyneuropathy, morbid obesity, close dislocation of tarsal joint, lymphedema bilateral lower extremities, stasis dermatitis of bilateral lower extremities who presents for the following health issues     HPI   Here for evaluation of right foot concerns.  Patient states that she has been having some medial right foot pain for about the past week.  Pain is progressively worsening.  She feels like there is some overlying skin changing as well.  Pain is worse with bearing weight and ambulation.  Ibuprofen for symptomatic relief at home which does provide good relief.  Has not tried icing, elevating or stretching.  No known injury.  No numbness or tingling, weakness.      PAST MEDICAL HISTORY:   Past Medical History:   Diagnosis Date     Edema     No Comments Provided     Encounter for removal and reinsertion of intrauterine contraceptive device     05,IUD placement, Removed     Excessive and frequent menstruation with irregular cycle     2017     Major depressive disorder, single episode     No Comments Provided     Personal history of other medical treatment (CODE)     G3, P2-0-1-2 with history of spontaneous      Personal history of other medical treatment (CODE)     No Comments Provided     Uncomplicated  asthma     No Comments Provided       PAST SURGICAL HISTORY:   Past Surgical History:   Procedure Laterality Date     ANKLE SURGERY      fracture, repair with screws     CONIZATION LEEP      ,Underwent a loop     LAPAROSCOPIC TUBAL LIGATION      ,tubal ligation       FAMILY HISTORY:   Family History   Problem Relation Age of Onset     Osteoporosis Mother      Asthma Father         Asthma,+ Leg edema, knee, hip replacement. + Lymphedema     Heart Failure Father      Other - See Comments Paternal Grandmother         Lymphedema     Osteoporosis Brother         Osteoporosis     Other - See Comments Brother         No Known Problems       SOCIAL HISTORY:   Social History     Tobacco Use     Smoking status: Former Smoker     Packs/day: 1.00     Years: 3.00     Pack years: 3.00     Types: Cigarettes     Quit date: 2003     Years since quittin.6     Smokeless tobacco: Never Used   Substance Use Topics     Alcohol use: Not Currently     Alcohol/week: 0.0 standard drinks        Allergies   Allergen Reactions     Clonazepam Other (See Comments)     Causes Violence and aggresssion     Hydrocodone-Acetaminophen      Other reaction(s): Seizures  Can take Percocet without difficulty.     Lorazepam Other (See Comments)     Causes Violence and aggresssion     Sertraline Other (See Comments)     Caused suicidally      Bupropion Other (See Comments)     Caused Major Depression     Dust Mite Extract      Other reaction(s): Asthma symptoms     Lithium Other (See Comments)     Mood alteration     Pollen Extract      Other reaction(s): Asthma symptoms     Risperidone Other (See Comments)     Agitation       Sulfa Drugs Itching     Trichophyton      Other reaction(s): Asthma symptoms     Valproic Acid Other (See Comments)     Weight gain     Current Outpatient Medications   Medication     acetaminophen (TYLENOL) 500 MG tablet     albuterol (VENTOLIN HFA) 108 (90 Base) MCG/ACT inhaler     ARIPiprazole (ABILIFY) 15 MG  "tablet     B-D TB SYRINGE 25G X 5/8\" 1 ML MISC     cholecalciferol (VITAMIN D3) 5000 units (125 mcg) capsule     cyanocobalamin (CYANOCOBALAMIN) 1000 MCG/ML injection     famotidine (PEPCID) 20 MG tablet     gabapentin (NEURONTIN) 300 MG capsule     hydrOXYzine (ATARAX) 50 MG tablet     hydrOXYzine (VISTARIL) 50 MG capsule     ibuprofen (ADVIL/MOTRIN) 200 MG tablet     lamoTRIgine (LAMICTAL) 200 MG tablet     lamoTRIgine (LAMICTAL) 25 MG tablet     methocarbamol (ROBAXIN) 500 MG tablet     montelukast (SINGULAIR) 10 MG tablet     oxybutynin ER (DITROPAN-XL) 10 MG 24 hr tablet     SF 1.1 % GEL topical gel     syringe/needle, sisp, 25G X 5/8\" 1 ML MISC     vilazodone (VIIBRYD) 40 MG TABS tablet     No current facility-administered medications for this visit.         Review of Systems   Per HPI          Objective    /74   Pulse 87   Temp 97.9  F (36.6  C)   Resp 16   Ht 1.683 m (5' 6.25\")   Wt 132.5 kg (292 lb)   SpO2 100%   Breastfeeding No   BMI 46.78 kg/m    Body mass index is 46.78 kg/m .  Physical Exam   General: Pleasant, in no apparent distress.  Musculoskeletal: Moderate tenderness to light touch throughout medial aspect of right foot starting from base of great digit back towards start of heel region.  Full range of motion of toes and foot but increased pain with range of motion of right great toe.  Able to see overlying skin changes, no ecchymosis, no swelling.  Able to bear weight and ambulate in clinic today.  Neurologic Exam: normal gross motor, tone coordination and no visible tremor.  Skin: No jaundice, pallor, rashes, or lesions.  Psych: Appropriate mood and affect.    Results for orders placed or performed during the hospital encounter of 08/06/21   XR Foot Right G/E 3 Views     Status: None    Narrative    PROCEDURE:  XR FOOT RIGHT G/E 3 VIEWS    HISTORY: Right foot pain.    COMPARISON:  None.    TECHNIQUE:  3 views right foot.    FINDINGS:  No fracture or dislocation is identified. Mild " to moderate  mid foot osteophytosis is noted. No foreign body is seen.       Impression    IMPRESSION: Mid foot osteoarthritis.      JOSE TEE MD         SYSTEM ID:  [id number]

## 2021-08-06 ENCOUNTER — HOSPITAL ENCOUNTER (OUTPATIENT)
Dept: GENERAL RADIOLOGY | Facility: OTHER | Age: 46
End: 2021-08-06
Attending: PHYSICIAN ASSISTANT
Payer: MEDICARE

## 2021-08-06 ENCOUNTER — OFFICE VISIT (OUTPATIENT)
Dept: FAMILY MEDICINE | Facility: OTHER | Age: 46
End: 2021-08-06
Attending: PHYSICIAN ASSISTANT
Payer: MEDICARE

## 2021-08-06 VITALS
SYSTOLIC BLOOD PRESSURE: 118 MMHG | WEIGHT: 292 LBS | TEMPERATURE: 97.9 F | BODY MASS INDEX: 46.93 KG/M2 | HEART RATE: 87 BPM | OXYGEN SATURATION: 100 % | DIASTOLIC BLOOD PRESSURE: 74 MMHG | HEIGHT: 66 IN | RESPIRATION RATE: 16 BRPM

## 2021-08-06 DIAGNOSIS — M79.671 RIGHT FOOT PAIN: Primary | ICD-10-CM

## 2021-08-06 DIAGNOSIS — M79.671 RIGHT FOOT PAIN: ICD-10-CM

## 2021-08-06 PROCEDURE — 99213 OFFICE O/P EST LOW 20 MIN: CPT | Performed by: PHYSICIAN ASSISTANT

## 2021-08-06 PROCEDURE — G0463 HOSPITAL OUTPT CLINIC VISIT: HCPCS

## 2021-08-06 PROCEDURE — G0463 HOSPITAL OUTPT CLINIC VISIT: HCPCS | Mod: 25

## 2021-08-06 PROCEDURE — 73630 X-RAY EXAM OF FOOT: CPT | Mod: RT

## 2021-08-06 ASSESSMENT — MIFFLIN-ST. JEOR: SCORE: 1995.22

## 2021-08-06 ASSESSMENT — ANXIETY QUESTIONNAIRES
1. FEELING NERVOUS, ANXIOUS, OR ON EDGE: NOT AT ALL
3. WORRYING TOO MUCH ABOUT DIFFERENT THINGS: NOT AT ALL
6. BECOMING EASILY ANNOYED OR IRRITABLE: NOT AT ALL
IF YOU CHECKED OFF ANY PROBLEMS ON THIS QUESTIONNAIRE, HOW DIFFICULT HAVE THESE PROBLEMS MADE IT FOR YOU TO DO YOUR WORK, TAKE CARE OF THINGS AT HOME, OR GET ALONG WITH OTHER PEOPLE: NOT DIFFICULT AT ALL
5. BEING SO RESTLESS THAT IT IS HARD TO SIT STILL: NOT AT ALL
2. NOT BEING ABLE TO STOP OR CONTROL WORRYING: NOT AT ALL
GAD7 TOTAL SCORE: 0
7. FEELING AFRAID AS IF SOMETHING AWFUL MIGHT HAPPEN: NOT AT ALL

## 2021-08-06 ASSESSMENT — PATIENT HEALTH QUESTIONNAIRE - PHQ9: 5. POOR APPETITE OR OVEREATING: NOT AT ALL

## 2021-08-06 ASSESSMENT — PAIN SCALES - GENERAL: PAINLEVEL: MODERATE PAIN (5)

## 2021-08-06 NOTE — NURSING NOTE
Patient presents to clinic wit concerns about right foot being discolored on the inside arch area, 1 week. She states that she thinks its getting worse.  Yareli Vallejo LPN ....................  8/6/2021   3:17 PM

## 2021-08-07 ASSESSMENT — ANXIETY QUESTIONNAIRES: GAD7 TOTAL SCORE: 0

## 2021-09-08 ENCOUNTER — OFFICE VISIT (OUTPATIENT)
Dept: INTERNAL MEDICINE | Facility: OTHER | Age: 46
End: 2021-09-08
Attending: INTERNAL MEDICINE
Payer: MEDICARE

## 2021-09-08 VITALS
HEART RATE: 76 BPM | OXYGEN SATURATION: 97 % | WEIGHT: 293 LBS | BODY MASS INDEX: 47.51 KG/M2 | TEMPERATURE: 97.5 F | RESPIRATION RATE: 16 BRPM | SYSTOLIC BLOOD PRESSURE: 128 MMHG | DIASTOLIC BLOOD PRESSURE: 82 MMHG

## 2021-09-08 DIAGNOSIS — M54.10 RADICULAR LOW BACK PAIN: ICD-10-CM

## 2021-09-08 DIAGNOSIS — G89.29 BILATERAL CHRONIC KNEE PAIN: ICD-10-CM

## 2021-09-08 DIAGNOSIS — M54.50 INTERMITTENT LOW BACK PAIN: Primary | ICD-10-CM

## 2021-09-08 DIAGNOSIS — E53.8 VITAMIN B12 DEFICIENCY: ICD-10-CM

## 2021-09-08 DIAGNOSIS — J45.40 MODERATE PERSISTENT ASTHMA WITHOUT COMPLICATION: ICD-10-CM

## 2021-09-08 DIAGNOSIS — I89.0 LYMPHEDEMA OF BOTH LOWER EXTREMITIES: ICD-10-CM

## 2021-09-08 DIAGNOSIS — M25.562 BILATERAL CHRONIC KNEE PAIN: ICD-10-CM

## 2021-09-08 DIAGNOSIS — M25.561 BILATERAL CHRONIC KNEE PAIN: ICD-10-CM

## 2021-09-08 DIAGNOSIS — E66.01 MORBID OBESITY WITH BMI OF 40.0-44.9, ADULT (H): ICD-10-CM

## 2021-09-08 PROCEDURE — 99214 OFFICE O/P EST MOD 30 MIN: CPT | Performed by: INTERNAL MEDICINE

## 2021-09-08 PROCEDURE — G0463 HOSPITAL OUTPT CLINIC VISIT: HCPCS

## 2021-09-08 RX ORDER — ALBUTEROL SULFATE 90 UG/1
1-2 AEROSOL, METERED RESPIRATORY (INHALATION) EVERY 4 HOURS PRN
Qty: 18 G | Refills: 5 | Status: SHIPPED | OUTPATIENT
Start: 2021-09-08 | End: 2023-03-12

## 2021-09-08 RX ORDER — TRAZODONE HYDROCHLORIDE 50 MG/1
25-50 TABLET, FILM COATED ORAL
Status: ON HOLD | COMMUNITY
Start: 2021-08-18 | End: 2022-11-06

## 2021-09-08 RX ORDER — CYANOCOBALAMIN 1000 UG/ML
INJECTION, SOLUTION INTRAMUSCULAR; SUBCUTANEOUS
Qty: 6 ML | Refills: 2 | Status: SHIPPED | OUTPATIENT
Start: 2021-09-08 | End: 2022-06-22

## 2021-09-08 ASSESSMENT — ANXIETY QUESTIONNAIRES
GAD7 TOTAL SCORE: 0
GAD7 TOTAL SCORE: 0
1. FEELING NERVOUS, ANXIOUS, OR ON EDGE: NOT AT ALL
3. WORRYING TOO MUCH ABOUT DIFFERENT THINGS: NOT AT ALL
7. FEELING AFRAID AS IF SOMETHING AWFUL MIGHT HAPPEN: NOT AT ALL
2. NOT BEING ABLE TO STOP OR CONTROL WORRYING: NOT AT ALL
GAD7 TOTAL SCORE: 0
5. BEING SO RESTLESS THAT IT IS HARD TO SIT STILL: NOT AT ALL
4. TROUBLE RELAXING: NOT AT ALL
7. FEELING AFRAID AS IF SOMETHING AWFUL MIGHT HAPPEN: NOT AT ALL
8. IF YOU CHECKED OFF ANY PROBLEMS, HOW DIFFICULT HAVE THESE MADE IT FOR YOU TO DO YOUR WORK, TAKE CARE OF THINGS AT HOME, OR GET ALONG WITH OTHER PEOPLE?: NOT DIFFICULT AT ALL
6. BECOMING EASILY ANNOYED OR IRRITABLE: NOT AT ALL

## 2021-09-08 ASSESSMENT — ENCOUNTER SYMPTOMS
NECK PAIN: 1
NECK STIFFNESS: 1
BRUISES/BLEEDS EASILY: 0
ADENOPATHY: 0
SHORTNESS OF BREATH: 0
ABDOMINAL PAIN: 0
FEVER: 0
HEMATURIA: 0
MYALGIAS: 1
CONFUSION: 0
FATIGUE: 1
BACK PAIN: 1
WOUND: 0
CHILLS: 0
ARTHRALGIAS: 1
CHEST TIGHTNESS: 0

## 2021-09-08 ASSESSMENT — PATIENT HEALTH QUESTIONNAIRE - PHQ9
SUM OF ALL RESPONSES TO PHQ QUESTIONS 1-9: 0
SUM OF ALL RESPONSES TO PHQ QUESTIONS 1-9: 0
10. IF YOU CHECKED OFF ANY PROBLEMS, HOW DIFFICULT HAVE THESE PROBLEMS MADE IT FOR YOU TO DO YOUR WORK, TAKE CARE OF THINGS AT HOME, OR GET ALONG WITH OTHER PEOPLE: NOT DIFFICULT AT ALL

## 2021-09-08 ASSESSMENT — PAIN SCALES - GENERAL: PAINLEVEL: MODERATE PAIN (4)
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
## 2021-09-08 NOTE — PATIENT INSTRUCTIONS
Immunization History   Administered Date(s) Administered     COVID-19,PF,Moderna 01/23/2021, 02/18/2021     HepB-Adult 09/09/1997, 10/07/1997, 08/30/2006, 09/30/2006     Influenza (IIV3) PF 10/04/2012, 11/25/2013     Influenza Vaccine IM > 6 months Valent IIV4 (Alfuria,Fluzone) 10/27/2017, 10/10/2018, 10/16/2019     Influenza,INJ,MDCK,PF,Quad >4yrs 10/10/2018     MMR 09/09/1997, 09/09/1997     Mantoux Tuberculin Skin Test 03/12/2010     Pneumo Conj 13-V (2010&after) 01/09/2007     Pneumococcal 23 valent 01/09/2007, 03/12/2010     TD (ADULT, 7+) 09/09/1997, 08/16/2007     TDAP Vaccine (Boostrix) 05/15/2013, 01/05/2016     Varicella 09/09/1997, 10/07/1997      -- Booster shots for COVID-19 are coming.    -- Moderna and Pfizer booster shot   - If immunocompromised, can get 3rd shot (booster shot) 28+ days from date of 2nd shot.   - Otherwise - (Tentative) vaccine schedule is to get 3rd shot (booster shot) 8+ months from date of 2nd shot.     -- Consider Annual Flu / Influenza vaccination - Every Fall (Starting about October 1st)    Blood pressure is well controlled.   Medications refilled.     Physical therapy referral sent  - they will call with date/time of appointment.         Return in approximately 4-8 week(s), or sooner as needed for follow-up with Dr. Curiel.  - follow-up back pain / arthritis    Clinic : [phone]  Appointment line: [phone]

## 2021-09-08 NOTE — NURSING NOTE
"Chief Complaint   Patient presents with     Arthritis           Initial /82 (BP Location: Right arm, Patient Position: Sitting, Cuff Size: Adult Regular)   Pulse 76   Temp 97.5  F (36.4  C) (Tympanic)   Resp 16   Wt 134.5 kg (296 lb 9.6 oz)   SpO2 97%   BMI 47.51 kg/m   Estimated body mass index is 47.51 kg/m  as calculated from the following:    Height as of 8/6/21: 1.683 m (5' 6.25\").    Weight as of this encounter: 134.5 kg (296 lb 9.6 oz).  Medication Reconciliation: complete    Isabel Calderon LPN  "

## 2021-09-09 ASSESSMENT — PATIENT HEALTH QUESTIONNAIRE - PHQ9: SUM OF ALL RESPONSES TO PHQ QUESTIONS 1-9: 0

## 2021-09-09 ASSESSMENT — ANXIETY QUESTIONNAIRES: GAD7 TOTAL SCORE: 0

## 2021-10-03 ENCOUNTER — HEALTH MAINTENANCE LETTER (OUTPATIENT)
Age: 46
End: 2021-10-03

## 2021-10-13 ENCOUNTER — HOSPITAL ENCOUNTER (OUTPATIENT)
Dept: PHYSICAL THERAPY | Facility: OTHER | Age: 46
Setting detail: THERAPIES SERIES
End: 2021-10-13
Attending: INTERNAL MEDICINE
Payer: MEDICARE

## 2021-10-13 DIAGNOSIS — M54.10 RADICULAR LOW BACK PAIN: ICD-10-CM

## 2021-10-13 DIAGNOSIS — M54.50 INTERMITTENT LOW BACK PAIN: ICD-10-CM

## 2021-10-13 PROCEDURE — 97112 NEUROMUSCULAR REEDUCATION: CPT | Mod: GP

## 2021-10-13 PROCEDURE — 97161 PT EVAL LOW COMPLEX 20 MIN: CPT | Mod: GP

## 2021-10-13 NOTE — PROGRESS NOTES
[facility]          OUTPATIENT PHYSICAL THERAPY ORTHOPEDIC EVALUATION  PLAN OF TREATMENT FOR OUTPATIENT REHABILITATION  (COMPLETE FOR INITIAL CLAIMS ONLY)  Patient's Last Name, First Name, M.I.  YOB: 1975  Rosi Lamb    Provider s Name:  [facility]   Medical Record No.  [id number]   Start of Care Date:  10/13/21   Onset Date:  08/01/21   Type:     _X__PT   ___OT   ___SLP Medical Diagnosis:  (P) intermittent low back pain, radicular low back pain     PT Diagnosis:  (P) impaired joint and soft tissue mobility   Visits from SOC:  1      _________________________________________________________________________________  Plan of Treatment/Functional Goals:  joint mobilization, manual therapy, neuromuscular re-education, ROM, strengthening, stretching     Cryotherapy, Hot packs, Ultrasound     Goals  Goal Identifier: (P) SLR  Goal Description: (P) Pt will have improved SLR to 75 degrees on the left for improved flexibility and decreased pain with bending to 2/10 or less.  Target Date: (P) 12/08/21    Goal Identifier: (P) mobility  Goal Description: (P) Pt will have improved lumbosacral mobility to allow sitting for 1 hour with pain less than 3/10.  Target Date: (P) 11/24/21    Goal Identifier: (P) standing  Goal Description: (P) Pt will have improved core and gluteal strength to allow standing for work greater than 30 minutes with pain less than 4/10.  Target Date: (P) 01/05/22                                                           Therapy Frequency:  (P) 2 times/Week  Predicted Duration of Therapy Intervention:  (P) 12 weeks    Areli Lucia, PT                 I CERTIFY THE NEED FOR THESE SERVICES FURNISHED UNDER        THIS PLAN OF TREATMENT AND WHILE UNDER MY CARE     (Physician co-signature of this document indicates review and certification of the therapy plan).                       Certification Date From:  (P) 10/13/21    Certification Date To:  (P) 01/05/22    Referring Provider:  Dr. Curiel    Initial Assessment        See Epic Evaluation Start of Care Date: 10/13/21

## 2021-10-13 NOTE — PROGRESS NOTES
10/13/21 1200   General Information   Type of Visit Initial OP Ortho PT Evaluation   Start of Care Date 10/13/21   Referring Physician Dr. Curiel   Patient/Family Goals Statement decrease LBP   Orders Evaluate and Treat   Certification Required? Yes   Medical Diagnosis intermittent low back pain, radicular low back pain   Surgical/Medical history reviewed Yes   Body Part(s)   Body Part(s) Lumbar Spine/SI   Presentation and Etiology   Pertinent history of current problem (include personal factors and/or comorbidities that impact the POC) Couple month history of low back pain that occasionally goes down the back of the leg to the knee on both legs simultaneously. Feels the legs and low back are weak. Shoots outward from the spine on both sides. Has arthritis in her spine. Most difficulty with standing greater than 30 minutes for work.   Impairments A. Pain;E. Decreased flexibility;F. Decreased strength and endurance;G. Impaired balance;Q. Dizziness   Functional Limitations perform activities of daily living;perform desired leisure / sports activities   Symptom Location low back   How/Where did it occur From insidious onset;From Degenerative Joint Disease   Onset date of current episode/exacerbation 08/01/21   Chronicity Chronic   Pain rating (0-10 point scale) Best (/10);Worst (/10)   Best (/10) 1/10   Worst (/10) 7/10   Pain quality A. Sharp;E. Shooting   Frequency of pain/symptoms A. Constant   Pain/symptoms are: Worse during the day   Pain/symptoms exacerbated by A. Sitting;C. Lifting;G. Certain positions;I. Bending;L. Work tasks   Pain/symptoms eased by B. Walking;E. Changing positions;I. OTC medication(s);G. 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 Employed  ([employer]- [employer] staff)   Home/community  accessibility stairs 1 flight   Fall Risk Screen   Fall screen completed by PT   Have you fallen 2 or more times in the past year? No   Have you fallen and had an injury in the past year? No   Is patient a fall risk? No   Abuse Screen (yes response referral indicated)   Feels Unsafe at Home or Work/School no   Feels Threatened by Someone no   Does Anyone Try to Keep You From Having Contact with Others or Doing Things Outside Your Home? no   Physical Signs of Abuse Present no   Lumbar Spine/SI Objective Findings   Observation increased lumbar lordosis   Flexion ROM 40- stiff   Extension ROM 14 w/ pain   Pelvic Screen R FFT   Hip Screen FADIR negative B   Hip Extension Strength Left gluteal recruitment impaired   SLR L: 66 R: 86   Segmental Mobility L5 FRS L; LoL fwd sacrum   Palpation tender SIJ B, low lumbar TPs   Planned Therapy Interventions   Planned Therapy Interventions joint mobilization;manual therapy;neuromuscular re-education;ROM;strengthening;stretching   Planned Modality Interventions   Planned Modality Interventions Cryotherapy;Hot packs;Ultrasound   Clinical Impression   Criteria for Skilled Therapeutic Interventions Met yes, treatment indicated   PT Diagnosis impaired joint and soft tissue mobility   Influenced by the following impairments see above   Functional limitations due to impairments standing for work, bending, sitting, lifting   Clinical Presentation Stable/Uncomplicated   Clinical Decision Making (Complexity) Low complexity   Therapy Frequency 2 times/Week   Predicted Duration of Therapy Intervention (days/wks) 12 weeks   Risk & Benefits of therapy have been explained Yes   Patient, Family & other staff in agreement with plan of care Yes   Clinical Impression Comments Pt demonstrates impaired lumbosacral mobility affecting her ability to sit or stand for her job. She will benefit from skilled PT services to address impairments.    Education Assessment   Preferred Learning Style  Listening;Demonstration;Pictures/video   Barriers to Learning No barriers   ORTHO GOALS   PT Ortho Eval Goals 1;2;3   Ortho Goal 1   Goal Identifier SLR   Goal Description Pt will have improved SLR to 75 degrees on the left for improved flexibility and decreased pain with bending to 2/10 or less.   Target Date 12/08/21   Ortho Goal 2   Goal Identifier mobility   Goal Description Pt will have improved lumbosacral mobility to allow sitting for 1 hour with pain less than 3/10.   Target Date 11/24/21   Ortho Goal 3   Goal Identifier standing   Goal Description Pt will have improved core and gluteal strength to allow standing for work greater than 30 minutes with pain less than 4/10.   Target Date 01/05/22   Total Evaluation Time   PT Eval, Low Complexity Minutes (95195) 30   Therapy Certification   Certification date from 10/13/21   Certification date to 01/05/22   Medical Diagnosis intermittent low back pain, radicular low back pain

## 2021-10-20 ENCOUNTER — HOSPITAL ENCOUNTER (OUTPATIENT)
Dept: PHYSICAL THERAPY | Facility: OTHER | Age: 46
Setting detail: THERAPIES SERIES
End: 2021-10-20
Attending: INTERNAL MEDICINE
Payer: MEDICARE

## 2021-10-20 PROCEDURE — 97112 NEUROMUSCULAR REEDUCATION: CPT | Mod: GP

## 2021-10-22 ENCOUNTER — HOSPITAL ENCOUNTER (OUTPATIENT)
Dept: PHYSICAL THERAPY | Facility: OTHER | Age: 46
Setting detail: THERAPIES SERIES
End: 2021-10-22
Attending: INTERNAL MEDICINE
Payer: MEDICARE

## 2021-10-22 PROCEDURE — 97112 NEUROMUSCULAR REEDUCATION: CPT | Mod: GP

## 2021-10-26 ENCOUNTER — OFFICE VISIT (OUTPATIENT)
Dept: INTERNAL MEDICINE | Facility: OTHER | Age: 46
End: 2021-10-26
Attending: NURSE PRACTITIONER
Payer: MEDICARE

## 2021-10-26 VITALS
BODY MASS INDEX: 47.38 KG/M2 | DIASTOLIC BLOOD PRESSURE: 80 MMHG | SYSTOLIC BLOOD PRESSURE: 128 MMHG | HEART RATE: 85 BPM | TEMPERATURE: 97.7 F | OXYGEN SATURATION: 100 % | RESPIRATION RATE: 14 BRPM | WEIGHT: 293 LBS

## 2021-10-26 DIAGNOSIS — N30.00 ACUTE CYSTITIS WITHOUT HEMATURIA: ICD-10-CM

## 2021-10-26 DIAGNOSIS — R39.9 URINARY SYMPTOM OR SIGN: Primary | ICD-10-CM

## 2021-10-26 LAB
ALBUMIN UR-MCNC: NEGATIVE MG/DL
APPEARANCE UR: CLEAR
BACTERIA #/AREA URNS HPF: ABNORMAL /HPF
BILIRUB UR QL STRIP: NEGATIVE
COLOR UR AUTO: ABNORMAL
GLUCOSE UR STRIP-MCNC: NEGATIVE MG/DL
HGB UR QL STRIP: NEGATIVE
KETONES UR STRIP-MCNC: ABNORMAL MG/DL
LEUKOCYTE ESTERASE UR QL STRIP: ABNORMAL
NITRATE UR QL: NEGATIVE
PH UR STRIP: 5 [PH] (ref 5–9)
RBC URINE: 1 /HPF
SP GR UR STRIP: 1.01 (ref 1–1.03)
UROBILINOGEN UR STRIP-MCNC: NORMAL MG/DL
WBC URINE: 1 /HPF

## 2021-10-26 PROCEDURE — 87086 URINE CULTURE/COLONY COUNT: CPT | Mod: ZL | Performed by: NURSE PRACTITIONER

## 2021-10-26 PROCEDURE — G0463 HOSPITAL OUTPT CLINIC VISIT: HCPCS

## 2021-10-26 PROCEDURE — 81001 URINALYSIS AUTO W/SCOPE: CPT | Mod: ZL | Performed by: NURSE PRACTITIONER

## 2021-10-26 PROCEDURE — 99212 OFFICE O/P EST SF 10 MIN: CPT | Performed by: NURSE PRACTITIONER

## 2021-10-26 RX ORDER — NITROFURANTOIN 25; 75 MG/1; MG/1
100 CAPSULE ORAL 2 TIMES DAILY
Qty: 10 CAPSULE | Refills: 0 | Status: SHIPPED | OUTPATIENT
Start: 2021-10-26 | End: 2021-10-31

## 2021-10-26 ASSESSMENT — ENCOUNTER SYMPTOMS
FREQUENCY: 1
DYSURIA: 1

## 2021-10-26 ASSESSMENT — PAIN SCALES - GENERAL: PAINLEVEL: SEVERE PAIN (6)

## 2021-10-26 NOTE — PROGRESS NOTES
Rosi Lamb  : 1975 Age: 46 year old Sex: female MRN: [id number]    ASSESSMENT AND PLAN:    Urinary symptom or sign  - UA with Microscopic reflex to Culture  - Urine Culture    Acute cystitis without hematuria  - nitroFURantoin macrocrystal-monohydrate (MACROBID) 100 MG capsule  Dispense: 10 capsule; Refill: 0  - Exam and UA consistent with cystitis.  - Based off UA results, patient does meet criteria for antibiotic therapy.  Patient placed on nitrofurantoin.     [x] Will add on urine culture and adjust treatment as indicated per results of culture.  [x] Will treat with antibiotics; encouraged intake of probiotics/yogurt while on antibiotics and for 1 week after.    [x]  Educated on prevention including avoidance of bubble baths, douching, powders, lotions, scented pads   [x] Control sugars, keep working on this.  [x] Drink plenty of liquids, especially water. Drinking water helps dilute your urine and ensures that you'll urinate more frequently -- allowing bacteria to be flushed from your urinary tract before an infection can begin.  [x] Drink sugar free cranberry juice or take cranberry tabs to help prevent symptoms.  [x] Good gaston-care which means wipe from front to back, doing so after urinating and after a bowel movement.  [x] Empty your bladder as soon as possible after intercourse (remember, wipe front to back).   [] Change your birth control method. Diaphragms, or unlubricated or spermicide-treated condoms, can all contribute to bacterial growth.  [x] Eat a yogurt/drink Kefir or take a probiotic daily while on antibiotics and for a week after.  [x] Return/call as needed for follow-up should any new symptoms develop, for worsening of current symptoms or if symptoms do not resolve with above plan.  [x] Recommend alternating Tylenol and ibuprofen every 4-6 hours as needed, warm heating pad, pushing fluids/hydration, cranberry juice/pills, urinating when urge arises.   [x] May also try over the  "counter remedies such as Azo (as long as pyridium not already prescribed). Patient aware that if they do take Azo, that this medication can change color of urine to an \"orange\" color.     I explained my diagnostic considerations and recommendations to the patient, who voiced understanding and agreement with the treatment plan. All questions were answered. We discussed potential side effects of any prescribed or recommended therapies, as well as expectations for response to treatments.    Patient was advised to allow up to 2 business days for response on lab results via MyChart and/or result letter to be sent.    FOLLOW-UP:    Return if symptoms worsen or fail to improve.     Clinic : [phone]  Appointment line: [phone]     DASHA Hernandez, AGNP-C  Internal Medicine  10/26/2021 2:32 PM    Total time spent with this patient was 15minutes which included chart review, visualization and interpretation of labs/images, time spent with the patient and documentation.        SUBJECTIVE:    Nursing Notes:   Betty Doshi LPN  10/26/2021  1:40 PM  Sign at exiting of workspace  Chief Complaint   Patient presents with     Urinary Problem     Possible UTI   Patient presents with symptoms of burning upon urination, as well as pain in vaginal, groin and abdomen.  Patient states she has been having symptoms for about 2-3 days.    Initial /80 (BP Location: Right arm, Patient Position: Sitting, Cuff Size: Adult Regular)   Pulse 85   Temp 97.7  F (36.5  C) (Tympanic)   Resp 14   Wt 134.2 kg (295 lb 12.8 oz)   LMP 10/26/2017 (Approximate)   SpO2 100%   Breastfeeding No   BMI 47.38 kg/m   Estimated body mass index is 47.38 kg/m  as calculated from the following:    Height as of 8/6/21: 1.683 m (5' 6.25\").    Weight as of this encounter: 134.2 kg (295 lb 12.8 oz).  Medication Reconciliation: complete  FOOD SECURITY SCREENING QUESTIONS  Hunger Vital Signs:  Within the past 12 months we worried " "whether our food would run out before we got money to buy more. Never  Within the past 12 months the food we bought just didn't last and we didn't have money to get more. Never    Advance care plan reviewed      Betty Doshi LPN        Nursing note reviewed with patient.  Accuracy and completeness verified.     Rosi Lamb is a 46 year old female patient who presents to the clinic today with complaints of urinary tract symptoms.  Her symptoms include burning with urination, urgency, frequency and decreased urine.  The patient has a past medical history as listed below:  Past Medical History:   Diagnosis Date     Edema     No Comments Provided     Encounter for removal and reinsertion of intrauterine contraceptive device     05,IUD placement, Removed     Excessive and frequent menstruation with irregular cycle     2017     Major depressive disorder, single episode     No Comments Provided     Personal history of other medical treatment (CODE)     G3, P2-0-1-2 with history of spontaneous      Personal history of other medical treatment (CODE)     No Comments Provided     Uncomplicated asthma     No Comments Provided        REVIEW OF SYSTEMS:  Review of Systems   Genitourinary: Positive for decreased urine volume, dysuria, frequency and urgency.   All other systems reviewed and are negative.      OBJECTIVE:    /80 (BP Location: Right arm, Patient Position: Sitting, Cuff Size: Adult Regular)   Pulse 85   Temp 97.7  F (36.5  C) (Tympanic)   Resp 14   Wt 134.2 kg (295 lb 12.8 oz)   LMP 10/26/2017 (Approximate)   SpO2 100%   Breastfeeding No   BMI 47.38 kg/m    Estimated body mass index is 47.51 kg/m  as calculated from the following:    Height as of 21: 1.683 m (5' 6.25\").    Weight as of 21: 134.5 kg (296 lb 9.6 oz).     PHYSICAL EXAM:   Physical Exam  Vitals and nursing note reviewed.   Constitutional:       Appearance: Normal appearance.   HENT:      Head: Normocephalic " and atraumatic.   Cardiovascular:      Heart sounds: Normal heart sounds.   Pulmonary:      Effort: Pulmonary effort is normal.      Breath sounds: Normal breath sounds.   Abdominal:      General: Bowel sounds are normal.      Palpations: Abdomen is soft.   Musculoskeletal:         General: Normal range of motion.   Skin:     General: Skin is warm and dry.   Neurological:      Mental Status: She is alert and oriented to person, place, and time. Mental status is at baseline.   Psychiatric:         Mood and Affect: Mood normal.         Behavior: Behavior normal.         Thought Content: Thought content normal.         Judgment: Judgment normal.          DIAGNOSTICS FROM TODAY'S VISIT:  Results for orders placed or performed in visit on 10/26/21   UA with Microscopic reflex to Culture     Status: Abnormal    Specimen: Urine, Midstream   Result Value Ref Range    Color Urine Light Yellow Colorless, Straw, Light Yellow, Yellow    Appearance Urine Clear Clear    Glucose Urine Negative Negative mg/dL    Bilirubin Urine Negative Negative    Ketones Urine Trace (A) Negative mg/dL    Specific Gravity Urine 1.015 1.000 - 1.030    Blood Urine Negative Negative    pH Urine 5.0 5.0 - 9.0    Protein Albumin Urine Negative Negative mg/dL    Urobilinogen Urine Normal Normal, 2.0 mg/dL    Nitrite Urine Negative Negative    Leukocyte Esterase Urine Moderate (A) Negative    Bacteria Urine Few (A) None Seen /HPF    RBC Urine 1 <=2 /HPF    WBC Urine 1 <=5 /HPF    Narrative    Urine Culture ordered based on laboratory criteria

## 2021-10-26 NOTE — NURSING NOTE
"Chief Complaint   Patient presents with     Urinary Problem     Possible UTI   Patient presents with symptoms of burning upon urination, as well as pain in vaginal, groin and abdomen.  Patient states she has been having symptoms for about 2-3 days.    Initial /80 (BP Location: Right arm, Patient Position: Sitting, Cuff Size: Adult Regular)   Pulse 85   Temp 97.7  F (36.5  C) (Tympanic)   Resp 14   Wt 134.2 kg (295 lb 12.8 oz)   LMP 10/26/2017 (Approximate)   SpO2 100%   Breastfeeding No   BMI 47.38 kg/m   Estimated body mass index is 47.38 kg/m  as calculated from the following:    Height as of 8/6/21: 1.683 m (5' 6.25\").    Weight as of this encounter: 134.2 kg (295 lb 12.8 oz).  Medication Reconciliation: complete  FOOD SECURITY SCREENING QUESTIONS  Hunger Vital Signs:  Within the past 12 months we worried whether our food would run out before we got money to buy more. Never  Within the past 12 months the food we bought just didn't last and we didn't have money to get more. Never    Advance care plan reviewed      Betty Doshi LPN    "

## 2021-10-26 NOTE — PATIENT INSTRUCTIONS
"[x]  Educated on prevention including avoidance of bubble baths, douching, powders, lotions, scented pads   [x] Control sugars, keep working on this.  [x] Drink plenty of liquids, especially water. Drinking water helps dilute your urine and ensures that you'll urinate more frequently -- allowing bacteria to be flushed from your urinary tract before an infection can begin.  [x] Drink sugar free cranberry juice or take cranberry tabs to help prevent symptoms.  [x] Good gaston-care which means wipe from front to back, doing so after urinating and after a bowel movement.  [x] Empty your bladder as soon as possible after intercourse (remember, wipe front to back).   [] Change your birth control method. Diaphragms, or unlubricated or spermicide-treated condoms, can all contribute to bacterial growth.  [x] Eat a yogurt/drink Kefir or take a probiotic daily while on antibiotics and for a week after.  [x] Return/call as needed for follow-up should any new symptoms develop, for worsening of current symptoms or if symptoms do not resolve with above plan.  [x] Recommend alternating Tylenol and ibuprofen every 4-6 hours as needed, warm heating pad, pushing fluids/hydration, cranberry juice/pills, urinating when urge arises.   [x] May also try over the counter remedies such as Azo (as long as pyridium not already prescribed). Patient aware that if they do take Azo, that this medication can change color of urine to an \"orange\" color.   "

## 2021-10-27 ENCOUNTER — HOSPITAL ENCOUNTER (OUTPATIENT)
Dept: PHYSICAL THERAPY | Facility: OTHER | Age: 46
Setting detail: THERAPIES SERIES
End: 2021-10-27
Attending: INTERNAL MEDICINE
Payer: MEDICARE

## 2021-10-27 PROCEDURE — 97112 NEUROMUSCULAR REEDUCATION: CPT | Mod: GP

## 2021-10-28 ENCOUNTER — TELEPHONE (OUTPATIENT)
Dept: INTERNAL MEDICINE | Facility: OTHER | Age: 46
End: 2021-10-28

## 2021-10-28 LAB — BACTERIA UR CULT: NO GROWTH

## 2021-10-28 NOTE — TELEPHONE ENCOUNTER
Patient is wondering what the results of for urine test are.  Please call back. Thank you   Loulou Posadas on 10/28/2021 at 9:09 AM

## 2021-11-02 ENCOUNTER — OFFICE VISIT (OUTPATIENT)
Dept: FAMILY MEDICINE | Facility: OTHER | Age: 46
End: 2021-11-02
Attending: NURSE PRACTITIONER
Payer: MEDICARE

## 2021-11-02 VITALS
WEIGHT: 293 LBS | SYSTOLIC BLOOD PRESSURE: 124 MMHG | TEMPERATURE: 97.7 F | RESPIRATION RATE: 20 BRPM | HEART RATE: 100 BPM | BODY MASS INDEX: 47.66 KG/M2 | DIASTOLIC BLOOD PRESSURE: 72 MMHG

## 2021-11-02 DIAGNOSIS — N89.8 VAGINAL DISCHARGE: ICD-10-CM

## 2021-11-02 DIAGNOSIS — R39.9 UTI SYMPTOMS: Primary | ICD-10-CM

## 2021-11-02 DIAGNOSIS — Z01.89 PATIENT REQUEST FOR DIAGNOSTIC TESTING: ICD-10-CM

## 2021-11-02 LAB
ALBUMIN UR-MCNC: NEGATIVE MG/DL
APPEARANCE UR: CLEAR
BILIRUB UR QL STRIP: NEGATIVE
CLUE CELLS: ABNORMAL
COLOR UR AUTO: YELLOW
GLUCOSE UR STRIP-MCNC: NEGATIVE MG/DL
HCG UR QL: NEGATIVE
HGB UR QL STRIP: NEGATIVE
KETONES UR STRIP-MCNC: NEGATIVE MG/DL
LEUKOCYTE ESTERASE UR QL STRIP: NEGATIVE
NITRATE UR QL: NEGATIVE
PH UR STRIP: 5.5 [PH] (ref 5–9)
SP GR UR STRIP: 1 (ref 1–1.03)
TRICHOMONAS, WET PREP: ABNORMAL
UROBILINOGEN UR STRIP-MCNC: NORMAL MG/DL
WBC'S/HIGH POWER FIELD, WET PREP: ABNORMAL
YEAST, WET PREP: ABNORMAL

## 2021-11-02 PROCEDURE — 99214 OFFICE O/P EST MOD 30 MIN: CPT | Performed by: NURSE PRACTITIONER

## 2021-11-02 PROCEDURE — 81003 URINALYSIS AUTO W/O SCOPE: CPT | Mod: ZL

## 2021-11-02 PROCEDURE — 87210 SMEAR WET MOUNT SALINE/INK: CPT | Mod: ZL | Performed by: NURSE PRACTITIONER

## 2021-11-02 PROCEDURE — 81025 URINE PREGNANCY TEST: CPT | Mod: ZL | Performed by: NURSE PRACTITIONER

## 2021-11-02 PROCEDURE — G0463 HOSPITAL OUTPT CLINIC VISIT: HCPCS

## 2021-11-02 ASSESSMENT — PAIN SCALES - GENERAL: PAINLEVEL: MODERATE PAIN (4)

## 2021-11-02 NOTE — PROGRESS NOTES
ASSESSMENT/PLAN:     I have reviewed the nursing notes.  I have reviewed the findings, diagnosis, plan and need for follow up with the patient.      1. UTI symptoms    - UA reflex to Microscopic and Culture    Urinalysis - normal    Seen on 10/26/21 for UTI, treated with Macrobid, negative urine culture.  Returns today as she has had no improvement in symptoms.  Normal urinalysis today, no clinical indications for further antibiotics.      May use Pyridium OTC PRN.     Follow up with PCP for ongoing symptoms or concerns.  She already has an appt scheduled for 11/5/21      2. Patient request for diagnostic testing    - Pregnancy, Urine (HCG)    Negative urine pregnancy test     3. Vaginal discharge    - Wet Prep, Genital    Negative wet prep - no bacteria, yeast or trichomonas        I explained my diagnostic considerations and recommendations to the patient, who voiced understanding and agreement with the treatment plan. All questions were answered. We discussed potential side effects of any prescribed or recommended therapies, as well as expectations for response to treatments.    Sarah Acosta NP  [facility]      SUBJECTIVE:   Rosi Lamb is a 46 year old female who presents to clinic today for the following health issues:  Possible UTI and pregnancy test    HPI  Seen on 10/26/21 for UTI, treated with Macrobid, negative urine culture.  Returns today as she has had no improvement in symptoms.  Symptoms include frequency, urgency, and dysuria.  No hematuria.  No fevers or chills.  Intermittent lower abdominal cramping.  No nausea or vomiting.  No change in appetite.  No diarrhea or constipation.  No back pain.  Vaginal discharge.  No vaginal itching.  No STD concerns.  No new partners.  States she does not get menstrual cycles.  States she has had UTI symptoms in the past with pregnancy so requesting pregnancy testing today.  No home pregnancy tests.            Patient Active Problem  List    Diagnosis Date Noted     Intermittent low back pain 09/08/2021     Priority: Medium     Abnormal Pap smear of cervix 04/11/2018     Priority: Medium     Overview:   had scraping of cervix       Allergic rhinitis due to pollen 02/08/2018     Priority: Medium     Esophageal reflux 02/08/2018     Priority: Medium     History of substance abuse (H) 02/08/2018     Priority: Medium     Bilateral chronic knee pain 04/20/2017     Priority: Medium     Crepitus of joint of right knee 04/20/2017     Priority: Medium     Menorrhagia with irregular cycle 02/16/2017     Priority: Medium     Preop examination 02/16/2017     Priority: Medium     Bilateral foot pain 12/18/2016     Priority: Medium     Elevated random blood glucose level 12/18/2016     Priority: Medium     Peripheral polyneuropathy 12/18/2016     Priority: Medium     Vitamin B12 deficiency 12/18/2016     Priority: Medium     Vitamin D deficiency 12/18/2016     Priority: Medium     Lymphedema of both lower extremities 11/22/2016     Priority: Medium     Stasis dermatitis of both legs 08/17/2016     Priority: Medium     Overview:   Updated per 10/1/17 IMO import       Alcohol use disorder, moderate, in sustained remission, dependence (H) 11/28/2015     Priority: Medium     Generalized anxiety disorder 11/28/2015     Priority: Medium     Cannabis use disorder, moderate, in sustained remission, dependence (H) 11/28/2015     Priority: Medium     Bipolar I disorder, most recent episode depressed, moderate (H) 11/28/2015     Priority: Medium     Morbid obesity with BMI of 40.0-44.9, adult (H) 03/23/2015     Priority: Medium     Moderate persistent asthma 08/19/2013     Priority: Medium     AAP completed on 08/19/13  Triggers: pollen, fumes, exercise, URI, warm weather. Peak flow today is 250. Symptomatic: moderate       Urinary incontinence 03/15/2013     Priority: Medium     Closed dislocation of tarsal joint 02/04/2011     Priority: Medium     Borderline  personality disorder (H) 2003     Priority: Medium     Overview:   [facility].  Overview:   Overview:   [facility].       Other disorder of menstruation and other abnormal bleeding from female genital tract 2001     Priority: Medium     Overview:   DUB, dysmenorrhea  Overview:   Overview:   DUB, dysmenorrhea       Past Medical History:   Diagnosis Date     Edema     No Comments Provided     Encounter for removal and reinsertion of intrauterine contraceptive device     05,IUD placement, Removed     Excessive and frequent menstruation with irregular cycle     2017     Major depressive disorder, single episode     No Comments Provided     Personal history of other medical treatment (CODE)     G3, P2-0-1-2 with history of spontaneous      Personal history of other medical treatment (CODE)     No Comments Provided     Uncomplicated asthma     No Comments Provided      Past Surgical History:   Procedure Laterality Date     ANKLE SURGERY      fracture, repair with screws     CONIZATION LEEP      ,Underwent a loop     LAPAROSCOPIC TUBAL LIGATION      ,tubal ligation     Social History     Tobacco Use     Smoking status: Former Smoker     Packs/day: 1.00     Years: 3.00     Pack years: 3.00     Types: Cigarettes     Quit date: 2003     Years since quittin.8     Smokeless tobacco: Never Used   Substance Use Topics     Alcohol use: Not Currently     Alcohol/week: 0.0 standard drinks     Social History     Social History Narrative    No longer in adult foster care lived with Charley Godwin.    .   2 sons - age 12 and 7 - as of 2016.   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     Current Outpatient Medications   Medication Sig Dispense Refill     acetaminophen (TYLENOL) 500 MG tablet Take 1 tablet (500 mg) by mouth every 6 hours as needed for fever or pain 100 tablet 5     albuterol (VENTOLIN HFA) 108 (90 Base) MCG/ACT inhaler  "Inhale 1-2 puffs into the lungs every 4 hours as needed for shortness of breath / dyspnea or wheezing 18 g 5     ARIPiprazole (ABILIFY) 15 MG tablet Take 1 tablet (15 mg) by mouth daily       cholecalciferol (VITAMIN D3) 5000 units (125 mcg) capsule Take 1 capsule (5,000 Units) by mouth daily 100 capsule 3     cyanocobalamin (CYANOCOBALAMIN) 1000 MCG/ML injection INJECT 1ML INTRAMUSCULARLY EVERY 2 WEEKS 6 mL 2     diclofenac (VOLTAREN) 1 % topical gel Apply 4 g topically 4 times daily 150 g 3     famotidine (PEPCID) 20 MG tablet Take 1 tablet (20 mg) by mouth 2 times daily 180 tablet 3     gabapentin (NEURONTIN) 300 MG capsule 300 mg po BID and 300 mg po PRN at Noon -- Rx by Psych -- Fabienne Duncan       hydrOXYzine (ATARAX) 50 MG tablet Take 50 mg by mouth 2 times daily as needed       hydrOXYzine (VISTARIL) 50 MG capsule Take 50 mg by mouth 2 times daily as needed       ibuprofen (ADVIL/MOTRIN) 200 MG tablet Take 600 mg by mouth every 6 hours as needed for pain       lamoTRIgine (LAMICTAL) 200 MG tablet TAKE 1 TABLET BY MOUTH NIGHTLY AT BEDTIME  2     lamoTRIgine (LAMICTAL) 25 MG tablet Take 1 tablet by mouth At Bedtime With 200 mg  2     methocarbamol (ROBAXIN) 500 MG tablet Take 1 tablet (500 mg) by mouth 4 times daily as needed for muscle spasms       montelukast (SINGULAIR) 10 MG tablet TAKE 1 TABLET (10 MG) BY MOUTH AT BEDTIME 90 tablet 3     oxybutynin ER (DITROPAN-XL) 10 MG 24 hr tablet TAKE 1 TABLET (10 MG) BY MOUTH DAILY 90 tablet 2     SF 1.1 % GEL topical gel BRUSH, SWISH TOOTHPASTE FOR 30 SECONDS AND SPIT BEFORE BED. DO NOT RINSE, EAT OR DRINK FOR 30 MINUTES       syringe/needle, sisp, 25G X 5/8\" 1 ML MISC Safety glide - for B12 Shots       traZODone (DESYREL) 50 MG tablet TAKE 1/2 TABLET TO 1 TABLET BY MOUTH NIGHTLY AT BEDTIME AS NEEDED FOR SLEEP       Tuberculin-Allergy Syringes (B-D TB SYRINGE) 25G X 5/8\" 1 ML MISC USE TO INJECT B-12 INTRAMUSCULARLY EVERY 2 WEEKS 50 each 0     vilazodone (VIIBRYD) " 40 MG TABS tablet Take 40 mg by mouth daily with food       Allergies   Allergen Reactions     Clonazepam Other (See Comments)     Causes Violence and aggresssion     Hydrocodone-Acetaminophen      Other reaction(s): Seizures  Can take Percocet without difficulty.     Lorazepam Other (See Comments)     Causes Violence and aggresssion     Sertraline Other (See Comments)     Caused suicidally      Bupropion Other (See Comments)     Caused Major Depression     Dust Mite Extract      Other reaction(s): Asthma symptoms     Lithium Other (See Comments)     Mood alteration     Pollen Extract      Other reaction(s): Asthma symptoms     Risperidone Other (See Comments)     Agitation       Sulfa Drugs Itching     Trichophyton      Other reaction(s): Asthma symptoms     Valproic Acid Other (See Comments)     Weight gain           OBJECTIVE:     /72 (BP Location: Right arm, Patient Position: Sitting, Cuff Size: Adult Large)   Pulse 100   Temp 97.7  F (36.5  C) (Tympanic)   Resp 20   Wt 134.9 kg (297 lb 8 oz)   Breastfeeding No   BMI 47.66 kg/m    Body mass index is 47.66 kg/m .     Physical Exam  General Appearance: Well appearing adult female, non ill appearance, appropriate appearance for age. No acute distress  Respiratory: normal chest wall and respirations.  Normal effort.  No cough appreciated.  Abdomen: soft, nontender, no rigidity, no rebound tenderness or guarding, normal bowel sounds present  :  No suprapubic tenderness to palpation.  No CVA tenderness to palpation.     female:  Normal external female genitale with normal hair distrubution, mild erythema of skin.  Vaginal opening without noted erythema or discharge.  Wet prep sampling obtained.  No vaginal speculum exam completed at this time.    Musculoskeletal:  Equal movement of bilateral upper extremities.  Equal movement of bilateral lower extremities.  Normal gait.   Psychological: normal affect, alert, oriented, and pleasant.        Labs:  Results for orders placed or performed in visit on 11/02/21   UA reflex to Microscopic and Culture     Status: Normal    Specimen: Urine, Midstream   Result Value Ref Range    Color Urine Yellow Colorless, Straw, Light Yellow, Yellow    Appearance Urine Clear Clear    Glucose Urine Negative Negative mg/dL    Bilirubin Urine Negative Negative    Ketones Urine Negative Negative mg/dL    Specific Gravity Urine 1.004 1.000 - 1.030    Blood Urine Negative Negative    pH Urine 5.5 5.0 - 9.0    Protein Albumin Urine Negative Negative mg/dL    Urobilinogen Urine Normal Normal, 2.0 mg/dL    Nitrite Urine Negative Negative    Leukocyte Esterase Urine Negative Negative    Narrative    Microscopic not indicated   Pregnancy, Urine (HCG)     Status: Normal   Result Value Ref Range    hCG Urine Qualitative Negative Negative   Wet Prep, Genital     Status: Abnormal    Specimen: Vagina; Swab   Result Value Ref Range    Trichomonas Absent Absent    Yeast Absent Absent    Clue Cells Absent Absent    WBCs/high power field 1+ (A) None

## 2021-11-02 NOTE — NURSING NOTE
"Chief Complaint   Patient presents with     UTI     urinary frequency and burning with urination     Patient stated she just finished antibiotics, but still has the s/s stated above.    Initial /72 (BP Location: Right arm, Patient Position: Sitting, Cuff Size: Adult Large)   Pulse 100   Temp 97.7  F (36.5  C) (Tympanic)   Resp 20   Wt 134.9 kg (297 lb 8 oz)   Breastfeeding No   BMI 47.66 kg/m   Estimated body mass index is 47.66 kg/m  as calculated from the following:    Height as of 8/6/21: 1.683 m (5' 6.25\").    Weight as of this encounter: 134.9 kg (297 lb 8 oz).  Medication Reconciliation: Completed     Advanced Care Directive Reviewed    Ruben Vasquez LPN  "

## 2021-11-04 RX ORDER — LAMOTRIGINE 200 MG/1
200 TABLET ORAL AT BEDTIME
Refills: 2 | Status: CANCELLED | OUTPATIENT
Start: 2021-11-04

## 2021-11-04 RX ORDER — LAMOTRIGINE 25 MG/1
25 TABLET ORAL AT BEDTIME
Refills: 2 | Status: CANCELLED | OUTPATIENT
Start: 2021-11-04

## 2021-11-04 RX ORDER — ARIPIPRAZOLE 15 MG/1
15 TABLET ORAL DAILY
Status: CANCELLED | OUTPATIENT
Start: 2021-11-04

## 2021-11-04 RX ORDER — HYDROXYZINE PAMOATE 50 MG/1
50 CAPSULE ORAL 2 TIMES DAILY PRN
Status: CANCELLED | OUTPATIENT
Start: 2021-11-04

## 2021-11-04 RX ORDER — VILAZODONE HYDROCHLORIDE 40 MG/1
40 TABLET ORAL
Status: CANCELLED | OUTPATIENT
Start: 2021-11-04

## 2021-11-04 RX ORDER — 1.1% SODIUM FLUORIDE 11 MG/G
GEL DENTAL
Status: CANCELLED | OUTPATIENT
Start: 2021-11-04

## 2021-11-04 RX ORDER — HYDROXYZINE HYDROCHLORIDE 50 MG/1
50 TABLET, FILM COATED ORAL 2 TIMES DAILY PRN
Status: CANCELLED | OUTPATIENT
Start: 2021-11-04

## 2021-11-05 ENCOUNTER — OFFICE VISIT (OUTPATIENT)
Dept: INTERNAL MEDICINE | Facility: OTHER | Age: 46
End: 2021-11-05
Attending: INTERNAL MEDICINE
Payer: MEDICARE

## 2021-11-05 VITALS
OXYGEN SATURATION: 100 % | SYSTOLIC BLOOD PRESSURE: 130 MMHG | DIASTOLIC BLOOD PRESSURE: 78 MMHG | TEMPERATURE: 97.4 F | WEIGHT: 293 LBS | RESPIRATION RATE: 16 BRPM | BODY MASS INDEX: 47.83 KG/M2 | HEART RATE: 93 BPM

## 2021-11-05 DIAGNOSIS — E53.8 VITAMIN B12 DEFICIENCY: ICD-10-CM

## 2021-11-05 DIAGNOSIS — G47.62 NOCTURNAL LEG CRAMPS: Primary | ICD-10-CM

## 2021-11-05 DIAGNOSIS — L60.0 INGROWN NAIL OF SECOND TOE OF LEFT FOOT: ICD-10-CM

## 2021-11-05 DIAGNOSIS — Z23 NEED FOR IMMUNIZATION AGAINST INFLUENZA: ICD-10-CM

## 2021-11-05 LAB
ANION GAP SERPL CALCULATED.3IONS-SCNC: 6 MMOL/L (ref 3–14)
BUN SERPL-MCNC: 11 MG/DL (ref 7–25)
CALCIUM SERPL-MCNC: 10 MG/DL (ref 8.6–10.3)
CHLORIDE BLD-SCNC: 101 MMOL/L (ref 98–107)
CO2 SERPL-SCNC: 32 MMOL/L (ref 21–31)
CREAT SERPL-MCNC: 1.08 MG/DL (ref 0.6–1.2)
GFR SERPL CREATININE-BSD FRML MDRD: 63 ML/MIN/1.73M2
GLUCOSE BLD-MCNC: 69 MG/DL (ref 70–105)
MAGNESIUM SERPL-MCNC: 1.7 MG/DL (ref 1.9–2.7)
POTASSIUM BLD-SCNC: 4.1 MMOL/L (ref 3.5–5.1)
SODIUM SERPL-SCNC: 139 MMOL/L (ref 134–144)
VIT B12 SERPL-MCNC: 731 PG/ML (ref 180–914)

## 2021-11-05 PROCEDURE — 11720 DEBRIDE NAIL 1-5: CPT | Performed by: INTERNAL MEDICINE

## 2021-11-05 PROCEDURE — G0463 HOSPITAL OUTPT CLINIC VISIT: HCPCS

## 2021-11-05 PROCEDURE — 90686 IIV4 VACC NO PRSV 0.5 ML IM: CPT

## 2021-11-05 PROCEDURE — 83735 ASSAY OF MAGNESIUM: CPT | Mod: ZL | Performed by: INTERNAL MEDICINE

## 2021-11-05 PROCEDURE — 80048 BASIC METABOLIC PNL TOTAL CA: CPT | Mod: ZL | Performed by: INTERNAL MEDICINE

## 2021-11-05 PROCEDURE — G0008 ADMIN INFLUENZA VIRUS VAC: HCPCS

## 2021-11-05 PROCEDURE — G0463 HOSPITAL OUTPT CLINIC VISIT: HCPCS | Mod: 25

## 2021-11-05 PROCEDURE — 82607 VITAMIN B-12: CPT | Mod: ZL | Performed by: INTERNAL MEDICINE

## 2021-11-05 PROCEDURE — 99213 OFFICE O/P EST LOW 20 MIN: CPT | Mod: 25 | Performed by: INTERNAL MEDICINE

## 2021-11-05 PROCEDURE — 36415 COLL VENOUS BLD VENIPUNCTURE: CPT | Mod: ZL | Performed by: INTERNAL MEDICINE

## 2021-11-05 ASSESSMENT — PATIENT HEALTH QUESTIONNAIRE - PHQ9
SUM OF ALL RESPONSES TO PHQ QUESTIONS 1-9: 0
10. IF YOU CHECKED OFF ANY PROBLEMS, HOW DIFFICULT HAVE THESE PROBLEMS MADE IT FOR YOU TO DO YOUR WORK, TAKE CARE OF THINGS AT HOME, OR GET ALONG WITH OTHER PEOPLE: NOT DIFFICULT AT ALL
SUM OF ALL RESPONSES TO PHQ QUESTIONS 1-9: 0

## 2021-11-05 ASSESSMENT — ENCOUNTER SYMPTOMS
NECK PAIN: 1
HEMATURIA: 0
FEVER: 0
MYALGIAS: 1
ADENOPATHY: 0
BACK PAIN: 1
CHILLS: 0
ABDOMINAL PAIN: 0
ARTHRALGIAS: 1
CONFUSION: 0
BRUISES/BLEEDS EASILY: 0
NECK STIFFNESS: 1
CHEST TIGHTNESS: 0
SHORTNESS OF BREATH: 0
FATIGUE: 1
WOUND: 0

## 2021-11-05 ASSESSMENT — ANXIETY QUESTIONNAIRES
7. FEELING AFRAID AS IF SOMETHING AWFUL MIGHT HAPPEN: NOT AT ALL
6. BECOMING EASILY ANNOYED OR IRRITABLE: NOT AT ALL
8. IF YOU CHECKED OFF ANY PROBLEMS, HOW DIFFICULT HAVE THESE MADE IT FOR YOU TO DO YOUR WORK, TAKE CARE OF THINGS AT HOME, OR GET ALONG WITH OTHER PEOPLE?: NOT DIFFICULT AT ALL
GAD7 TOTAL SCORE: 0
4. TROUBLE RELAXING: NOT AT ALL
7. FEELING AFRAID AS IF SOMETHING AWFUL MIGHT HAPPEN: NOT AT ALL
1. FEELING NERVOUS, ANXIOUS, OR ON EDGE: NOT AT ALL
GAD7 TOTAL SCORE: 0
2. NOT BEING ABLE TO STOP OR CONTROL WORRYING: NOT AT ALL
GAD7 TOTAL SCORE: 0
5. BEING SO RESTLESS THAT IT IS HARD TO SIT STILL: NOT AT ALL
3. WORRYING TOO MUCH ABOUT DIFFERENT THINGS: NOT AT ALL

## 2021-11-05 ASSESSMENT — PAIN SCALES - GENERAL: PAINLEVEL: MILD PAIN (3)

## 2021-11-05 NOTE — NURSING NOTE
Immunization Documentation    Prior to Immunization administration, verified patients identity using patient's name and date of birth. Please see IMMUNIZATIONS  and order for additional information.  Patient / Parent instructed to remain in clinic for 15 minutes and report any adverse reaction to staff immediately.    Was entire vial of medication used? Yes  Vial/Syringe: Syringe    Andria Stanley LPN  11/5/2021   2:24 PM

## 2021-11-05 NOTE — PROGRESS NOTES
Medication Reconciliation: complete   Advance Care Directive Reviewed    FOOD SECURITY SCREENING QUESTIONS  Hunger Vital Signs:  Within the past 12 months we worried whether our food would run out before we got money to buy more. Never  Within the past 12 months the food we bought just didn't last and we didn't have money to get more. Never  Andria Stanley LPN .............11/5/2021  2:22 PM      Answers for HPI/ROS submitted by the patient on 11/5/2021  If you checked off any problems, how difficult have these problems made it for you to do your work, take care of things at home, or get along with other people?: Not difficult at all  PHQ9 TOTAL SCORE: 0  AGNIESZKA 7 TOTAL SCORE: 0  Do you have a cough?: No  Are you experiencing any wheezing in your chest?: No  Do you have any shortness of breath?: No  Use of rescue inhaler:: a few times a month  Taking Asthma medication as prescribed:: 0  Asthma triggers:: cold air, dust mites, exercise or sports, humidity, mold, pollens  Have you had any Emergency Room visits, Urgent Care visits, or Hospital Admissions since your last office visit?: No

## 2021-11-05 NOTE — PROGRESS NOTES
"Assessment & Plan       ICD-10-CM    1. Nocturnal leg cramps  G47.62 Basic Metabolic Panel     Magnesium     Basic Metabolic Panel     Magnesium   2. Vitamin B12 deficiency  E53.8 Vitamin B12     Vitamin B12   3. Ingrown nail of second toe of left foot  L60.0 DEBRIDEMENT OF NAIL(S), 1-5   4. Need for immunization against influenza  Z23 FLU SHOT 6 MOS - 50 YRS (FLUZONE)     Patient presents for follow-up of multiple issues.  She has been having nocturnal leg cramps and is worried about electrolyte issues, B12 issues.  She would like lab work today.    Has been taking vitamin supplement replacements.  Recheck lab work.    Flu shot today.    Ingrown toenail of the left second toe.  This is been quite irritating and painful.  She would like this debrided today.  See below.    ACT Total Scores 8/12/2020 3/12/2021 11/5/2021   ACT TOTAL SCORE (Goal Greater than or Equal to 20) 22 25 18   In the past 12 months, how many times did you visit the emergency room for your asthma without being admitted to the hospital? 0 0 0   In the past 12 months, how many times were you hospitalized overnight because of your asthma? 0 0 0       BMI:   Estimated body mass index is 47.83 kg/m  as calculated from the following:    Height as of 8/6/21: 1.683 m (5' 6.25\").    Weight as of this encounter: 135.4 kg (298 lb 9.6 oz).   Weight management plan: Discussed healthy diet and exercise guidelines    Work on weight loss and regular exercise    No follow-ups on file.    Lucio Curiel MD  [facility]    Subjective     Rosi Lamb is a 46 year old female who presents to clinic today for the following health issues:    History of Present Illness     Asthma:  She presents for follow up of asthma.  She has no cough, no wheezing, and no shortness of breath. She is using a relief medication a few times a month. She does not miss any doses of her controller medication throughout the week.Patient is aware of the following " triggers: cold air, dust mites, exercise or sports, humidity, mold and pollens. The patient has not had a visit to the Emergency Room, Urgent Care or Hospital due to asthma since the last clinic visit.        Review of Systems   Constitutional: Positive for fatigue (+ noted when B12 is running out. ). Negative for chills and fever.   HENT: Negative for congestion, ear discharge and ear pain.    Eyes: Negative for visual disturbance.   Respiratory: Negative for chest tightness and shortness of breath.    Cardiovascular: Negative for chest pain.   Gastrointestinal: Negative for abdominal pain.   Genitourinary: Negative for hematuria.   Musculoskeletal: Positive for arthralgias (+ bilateral foot pain), back pain, myalgias (+ improved with Vit D. ), neck pain and neck stiffness.        + night leg cramps   Skin: Negative for rash and wound.        Left 2nd toenail problem   Allergic/Immunologic: Negative for immunocompromised state.   Neurological: Negative for syncope.   Hematological: Negative for adenopathy. Does not bruise/bleed easily.   Psychiatric/Behavioral: Negative for confusion.          Objective    /78 (BP Location: Right arm, Patient Position: Sitting, Cuff Size: Adult Regular)   Pulse 93   Temp 97.4  F (36.3  C) (Tympanic)   Resp 16   Wt 135.4 kg (298 lb 9.6 oz)   SpO2 100%   Breastfeeding No   BMI 47.83 kg/m    Body mass index is 47.83 kg/m .  Physical Exam  Constitutional:       Appearance: She is obese.      Comments: Has lost a considerable amount of weight over the past 12 months.  Has been working on diet and exercise.   HENT:      Head: Normocephalic and atraumatic.   Eyes:      General: No scleral icterus.     Conjunctiva/sclera: Conjunctivae normal.   Cardiovascular:      Rate and Rhythm: Normal rate and regular rhythm.      Pulses: Normal pulses.   Pulmonary:      Effort: Pulmonary effort is normal. No respiratory distress.      Breath sounds: Normal breath sounds.   Abdominal:       Palpations: Abdomen is soft.      Tenderness: There is no abdominal tenderness.   Musculoskeletal:         General: Tenderness (Significant myofascial tenderness to palpation in the mid and upper back and shoulder areas.  Numerous trigger points noted.) present. No deformity.   Lymphadenopathy:      Cervical: No cervical adenopathy.   Skin:     General: Skin is warm and dry.      Findings: No rash.      Comments: Left 2nd toenail - ingrown laterally.   - debrided with large side cutting clippers, tolerated well.   Neurological:      Mental Status: She is alert and oriented to person, place, and time. Mental status is at baseline.   Psychiatric:         Mood and Affect: Mood normal.         Behavior: Behavior normal.        Results for orders placed or performed in visit on 11/05/21   Basic Metabolic Panel     Status: Abnormal   Result Value Ref Range    Sodium 139 134 - 144 mmol/L    Potassium 4.1 3.5 - 5.1 mmol/L    Chloride 101 98 - 107 mmol/L    Carbon Dioxide (CO2) 32 (H) 21 - 31 mmol/L    Anion Gap 6 3 - 14 mmol/L    Urea Nitrogen 11 7 - 25 mg/dL    Creatinine 1.08 0.60 - 1.20 mg/dL    Calcium 10.0 8.6 - 10.3 mg/dL    Glucose 69 (L) 70 - 105 mg/dL    GFR Estimate 63 >60 mL/min/1.73m2   Magnesium     Status: Abnormal   Result Value Ref Range    Magnesium 1.7 (L) 1.9 - 2.7 mg/dL   Vitamin B12     Status: Normal   Result Value Ref Range    Vitamin B12 731 180 - 914 pg/mL      + low Magnesium levels. Normal potassium. Normal creatinine. Low glucose level. Normal B12 level.         Answers for HPI/ROS submitted by the patient on 11/5/2021  If you checked off any problems, how difficult have these problems made it for you to do your work, take care of things at home, or get along with other people?: Not difficult at all  PHQ9 TOTAL SCORE: 0  AGNIESZKA 7 TOTAL SCORE: 0

## 2021-11-06 ASSESSMENT — ANXIETY QUESTIONNAIRES: GAD7 TOTAL SCORE: 0

## 2021-11-06 ASSESSMENT — ASTHMA QUESTIONNAIRES: ACT_TOTALSCORE: 18

## 2021-11-06 ASSESSMENT — PATIENT HEALTH QUESTIONNAIRE - PHQ9: SUM OF ALL RESPONSES TO PHQ QUESTIONS 1-9: 0

## 2021-11-19 ENCOUNTER — IMMUNIZATION (OUTPATIENT)
Dept: FAMILY MEDICINE | Facility: OTHER | Age: 46
End: 2021-11-19
Attending: FAMILY MEDICINE
Payer: MEDICARE

## 2021-11-19 PROCEDURE — 91300 PR COVID VAC PFIZER DIL RECON 30 MCG/0.3 ML IM: CPT

## 2021-12-03 ENCOUNTER — OFFICE VISIT (OUTPATIENT)
Dept: FAMILY MEDICINE | Facility: OTHER | Age: 46
End: 2021-12-03
Attending: NURSE PRACTITIONER
Payer: MEDICARE

## 2021-12-03 VITALS
DIASTOLIC BLOOD PRESSURE: 80 MMHG | HEIGHT: 66 IN | SYSTOLIC BLOOD PRESSURE: 124 MMHG | BODY MASS INDEX: 47.09 KG/M2 | TEMPERATURE: 97.7 F | RESPIRATION RATE: 16 BRPM | OXYGEN SATURATION: 99 % | HEART RATE: 68 BPM | WEIGHT: 293 LBS

## 2021-12-03 DIAGNOSIS — M21.41 PES PLANUS OF BOTH FEET: Primary | ICD-10-CM

## 2021-12-03 DIAGNOSIS — M21.42 PES PLANUS OF BOTH FEET: Primary | ICD-10-CM

## 2021-12-03 DIAGNOSIS — M79.672 BILATERAL FOOT PAIN: ICD-10-CM

## 2021-12-03 DIAGNOSIS — L84 CALLUS OF FOOT: ICD-10-CM

## 2021-12-03 DIAGNOSIS — M79.671 BILATERAL FOOT PAIN: ICD-10-CM

## 2021-12-03 PROCEDURE — 99213 OFFICE O/P EST LOW 20 MIN: CPT | Performed by: NURSE PRACTITIONER

## 2021-12-03 PROCEDURE — G0463 HOSPITAL OUTPT CLINIC VISIT: HCPCS

## 2021-12-03 ASSESSMENT — PAIN SCALES - GENERAL: PAINLEVEL: MODERATE PAIN (5)

## 2021-12-03 ASSESSMENT — MIFFLIN-ST. JEOR: SCORE: 2001.57

## 2021-12-03 NOTE — NURSING NOTE
Patient presents today for foot concerns.     Medication Reconciliation Complete    Maricarmen Gutierrez LPN  12/3/2021 10:22 AM

## 2021-12-07 ENCOUNTER — OFFICE VISIT (OUTPATIENT)
Dept: FAMILY MEDICINE | Facility: OTHER | Age: 46
End: 2021-12-07
Attending: CHIROPRACTOR
Payer: MEDICARE

## 2021-12-07 VITALS
BODY MASS INDEX: 45.97 KG/M2 | RESPIRATION RATE: 16 BRPM | WEIGHT: 287 LBS | DIASTOLIC BLOOD PRESSURE: 74 MMHG | HEART RATE: 70 BPM | OXYGEN SATURATION: 98 % | SYSTOLIC BLOOD PRESSURE: 136 MMHG | TEMPERATURE: 98.1 F

## 2021-12-07 DIAGNOSIS — M79.672 BILATERAL FOOT PAIN: ICD-10-CM

## 2021-12-07 DIAGNOSIS — M21.42 PES PLANUS OF BOTH FEET: ICD-10-CM

## 2021-12-07 DIAGNOSIS — M79.671 BILATERAL FOOT PAIN: ICD-10-CM

## 2021-12-07 DIAGNOSIS — M21.41 PES PLANUS OF BOTH FEET: ICD-10-CM

## 2021-12-07 DIAGNOSIS — L84 CALLUS OF FOOT: ICD-10-CM

## 2021-12-07 PROCEDURE — G0463 HOSPITAL OUTPT CLINIC VISIT: HCPCS | Mod: 25

## 2021-12-07 ASSESSMENT — ANXIETY QUESTIONNAIRES
GAD7 TOTAL SCORE: 0
2. NOT BEING ABLE TO STOP OR CONTROL WORRYING: NOT AT ALL
GAD7 TOTAL SCORE: 0
6. BECOMING EASILY ANNOYED OR IRRITABLE: NOT AT ALL
3. WORRYING TOO MUCH ABOUT DIFFERENT THINGS: NOT AT ALL
4. TROUBLE RELAXING: NOT AT ALL
7. FEELING AFRAID AS IF SOMETHING AWFUL MIGHT HAPPEN: NOT AT ALL
8. IF YOU CHECKED OFF ANY PROBLEMS, HOW DIFFICULT HAVE THESE MADE IT FOR YOU TO DO YOUR WORK, TAKE CARE OF THINGS AT HOME, OR GET ALONG WITH OTHER PEOPLE?: NOT DIFFICULT AT ALL
5. BEING SO RESTLESS THAT IT IS HARD TO SIT STILL: NOT AT ALL
7. FEELING AFRAID AS IF SOMETHING AWFUL MIGHT HAPPEN: NOT AT ALL
1. FEELING NERVOUS, ANXIOUS, OR ON EDGE: NOT AT ALL
GAD7 TOTAL SCORE: 0

## 2021-12-07 ASSESSMENT — PAIN SCALES - GENERAL: PAINLEVEL: SEVERE PAIN (6)

## 2021-12-07 NOTE — NURSING NOTE
"Patient presents to the clinic for consultation for Orthotics.      FOOD SECURITY SCREENING QUESTIONS  Hunger Vital Signs:  Within the past 12 months we worried whether our food would run out before we got money to buy more. Never  Within the past 12 months the food we bought just didn't last and we didn't have money to get more. Never    Advance Care Directive on file?yes  Advance Care Directive provided to patient? N/a    Chief Complaint   Patient presents with     Clinic Care Coordination - Follow-up     ORTHOTICS       Initial /74 (BP Location: Left arm, Patient Position: Sitting, Cuff Size: Adult Large)   Pulse 70   Temp 98.1  F (36.7  C) (Tympanic)   Resp 16   Wt 130.2 kg (287 lb)   SpO2 98%   BMI 45.97 kg/m   Estimated body mass index is 45.97 kg/m  as calculated from the following:    Height as of 12/3/21: 1.683 m (5' 6.25\").    Weight as of this encounter: 130.2 kg (287 lb).  Medication Reconciliation: complete        Deirdre Sanabria LPN       "

## 2021-12-08 ASSESSMENT — ANXIETY QUESTIONNAIRES: GAD7 TOTAL SCORE: 0

## 2021-12-08 ASSESSMENT — PATIENT HEALTH QUESTIONNAIRE - PHQ9: SUM OF ALL RESPONSES TO PHQ QUESTIONS 1-9: 0

## 2021-12-08 NOTE — PROGRESS NOTES
Rosi Lamb was seen for follow up orthotic evaluation.    CHIEF COMPLAINT: Rosi Lamb is a 46 year old female  Chief Complaint   Patient presents with     Clinic Care Coordination - Follow-up     ORTHOTICS     Patient was evaluated for orthotics on 7/22/2020 and decided to forego having them made due to costs.  She is interested in pursuing our OTC option today.    OBJECTIVE:  Unchanged objective findings relative to orthotic examination as previously found with the exception of more callous formation as was documented and treated by Cristina Loja.    IMPRESSION/PLAN:    Patient would like to proceed with OTC Aetrex insoles.  She will return next week to be fitted.    No charge until customization next week.      Broderick Forde DC, DABFP, CICE  Director - Occupational Medicine Department  Board Certified Diplomate - American Board of Forensic Professionals  Board Certified - American Board of Independent Medical Examiners  [facility]  [address] [place] [address]  Phone [phone]  Fax [phone]    8:39 AM 12/8/2021

## 2021-12-15 ENCOUNTER — OFFICE VISIT (OUTPATIENT)
Dept: FAMILY MEDICINE | Facility: OTHER | Age: 46
End: 2021-12-15
Attending: CHIROPRACTOR
Payer: MEDICARE

## 2021-12-15 DIAGNOSIS — M21.6X2 PRONATION DEFORMITY OF BOTH FEET: Primary | ICD-10-CM

## 2021-12-15 DIAGNOSIS — M21.6X1 PRONATION DEFORMITY OF BOTH FEET: Primary | ICD-10-CM

## 2021-12-15 PROCEDURE — L3040 FT ARCH SUPRT PREMOLD LONGIT: HCPCS | Performed by: CHIROPRACTOR

## 2021-12-15 NOTE — NURSING NOTE
Rosi Lamb is a 46 year old female presenting for orthotic fitting.  No vitals needed.  Malaika Wren LPN  12/15/2021 1:48 PM

## 2021-12-27 NOTE — PROGRESS NOTES
"Rosi Lamb  : 1975 Age: 46 year old Sex: female MRN: [id number]    Nursing Notes:   Betty Doshi LPN  2021 12:13 PM  Signed  Chief Complaint   Patient presents with     Musculoskeletal Problem     F/N care       Initial /86 (BP Location: Right arm, Patient Position: Sitting, Cuff Size: Adult Large)   Temp 97.6  F (36.4  C) (Tympanic)   Resp 16   Wt 131 kg (288 lb 12.8 oz)   LMP  (LMP Unknown)   SpO2 98%   Breastfeeding No   BMI 46.26 kg/m   Estimated body mass index is 46.26 kg/m  as calculated from the following:    Height as of 12/3/21: 1.683 m (5' 6.25\").    Weight as of this encounter: 131 kg (288 lb 12.8 oz).     Medication Reconciliation: complete      FOOD SECURITY SCREENING QUESTIONS:    The next two questions are to help us understand your food security.  If you are feeling you need any assistance in this area, we have resources available to support you today.    Hunger Vital Signs:  Within the past 12 months we worried whether our food would run out before we got money to buy more. Never  Within the past 12 months the food we bought just didn't last and we didn't have money to get more. Never  Betty Doshi LPN, LPN on 2021 at 11:38 AM        Advance care plan reviewed      Betty Doshi LPN         Nursing note reviewed with patient.  Accuracy and completeness verified.     Encounter Date:  2021    Patient Name:  Rosi Lamb  Patient MRN:   [id number]     Last Footcare Appt:  N/A    PCP: Lucio Curiel    Other Providers Seen for Foot/Nail Care (name/location/date): [] Yes [x] No List (if applicable):    ABN Given to patient and signed in clinic today:    [x] Yes [] No    SUBJECTIVE:    Patient Reported Symptoms: RIGHT  LEFT   Neuropathic symptoms []  []  DESCRIBE:   Pain in foot/toes at rest []  []  DESCRIBE:   Intermittent claudication []  []  DESCRIBE:   Previous foot " ulcer/injury []  []  DESCRIBE:   Amputation []  []  DESCRIBE:    OBJECTIVE:    Nurse/Provider Inspection of Feet/Nails: RIGHT  LEFT   Infection []  []  DESCRIBE:   Ulcerations []  []  DESCRIBE:   Calluses/Corns [x]  [x]  DESCRIBE: Right: Space corn third, callus fifth MTP; left: Callus great MTP, plantar wart third   Fissures/Cracks []  []  DESCRIBE:   Nail Disorders/Changes [x]  [x]  DESCRIBE: All 10 toenails   Other []      []  DESCRIBE:  VASCULAR TESTING:    Foot Pulses:  RIGHT  LEFT   Dorsalis pedis [x]  [x]    Posterior tibial []  []   Capillary refill of the hallux (<3 seconds): [x] [x]  Digital hair present: [] []  Varicosities (+/- presents): [] []  Edema (pitting vs. non pitting): [x] [x]     DESCRIBE: [x] 1+ minimal   [] 2+ moderate   [] 3+ severe  Skin Temperature:  [x] [x]     DESCRIBE: [x] Cold   [] Hot    [] Warm to touch    NEUROLOGIC TESTING:        RIGHT LEFT  Sharp/dull Testing: (the blunt end and sharp end of swab stick) [x] [x]  Proprioception: (moves hallux up/down in space) [x] [x]  Superficial Reflexes: (Babinski sign) [x] [x]    Monofilament Testing: RIGHT  LEFT  RIGHT LEFT    Site 1 [x]  [x]  Site 6 [x] [x]    Site 2 [x]  [x]  Site 7 [x] [x]    Site 3 [x]  [x]  Site 8 [x] [x]    Site 4 [x]  [x]  Site 9 [x] [x]    Site 5 [x]  [x]  Site 10 [x] [x]      MUSCULOSKELETAL: RIGHT LEFT  Foot Type: [x] [x]  DESCRIBE: [] Pes cavus (high arch) [x] pes rectus (normal) [] pes planus (flat)  Digital deformity: [] []  DESCRIBE: [] bunion [] claw toe [] hammer toe [] mallet toe [] hallux limitus [] other  Joint ROM: [x] [x]  DESCRIBE: [x] adequate ROM to MTPH, STJ, and AJ without crepitus  Adequate Muscle Strength: [x] [x]   Other [] []  DESCRIBE:     DERMATOLOGIC:      Nails: RIGHT LEFT  Onychauxis (thickened, long nail): [x] [x]  DESCRIBE: All 10 toenails  Onycholysis ( nail): [] []  DESCRIBE:   Onychocryptosis (ingrown toenail): [] []  DESCRIBE:    Onychogryphosis (lacey's horn  nail): [] []  DESCRIBE:   Onchomycosis (fungal nail): [x] [x]  DESCRIBE: All 10 toenails    Skin: RIGHT LEFT  [x] Hyperkeratotic lesions [] verruca tissue [] foreign body: [x] [x]  DESCRIBE: Right: Fifth MTP callus, third corn; left great toe MTP callus, third plantar wart  [] Mild edema [] erythema [] open lesions [] interdigit maceration: [] []  DESCRIBE:   [] Varicosities [] telangiectasis [] pigmented lesion [] venous stasis:  [] []  DESCRIBE:   Adequate padding to the plantar aspect of the foot: [x] [x]  DESCRIBE:     Footwear Assessment:  Type: Slip on's   Condition Good [x] Fair [] Poor []   Fit Good [x] Fair [] Poor []            Yes No  Does the patient use an ambulatory aid?      [] [x] DESCRIBE:  Does the patient understand the effects of diabetes on foot health? [] [x]  Can the patient identify appropriate foot care practices?    [] [x]  Are the patient's feet adequately cared for?      [] [x]            Yes No  Does the patient have impaired vision?      [] [x]  Can the patient reach own feet for safe self care?     [] [x]  Are there other factors influencing ability to safely care for own feet? [x] []     Pertinent Past Medical History:  Patient Active Problem List   Diagnosis     Alcohol use disorder, moderate, in sustained remission, dependence (H)     Allergic rhinitis due to pollen     Generalized anxiety disorder     Bilateral chronic knee pain     Bilateral foot pain     Borderline personality disorder (H)     Cannabis use disorder, moderate, in sustained remission, dependence (H)     Closed dislocation of tarsal joint     Crepitus of joint of right knee     Other disorder of menstruation and other abnormal bleeding from female genital tract     Elevated random blood glucose level     Esophageal reflux     Lymphedema of both lower extremities     Menorrhagia with irregular cycle     Moderate persistent asthma     Morbid obesity with BMI of 40.0-44.9, adult (H)     Peripheral polyneuropathy      Preop examination     Urinary incontinence     History of substance abuse (H)     Stasis dermatitis of both legs     Vitamin B12 deficiency     Vitamin D deficiency     Abnormal Pap smear of cervix     Bipolar I disorder, most recent episode depressed, moderate (H)     Intermittent low back pain     Past Medical History:   Diagnosis Date     Edema     No Comments Provided     Encounter for removal and reinsertion of intrauterine contraceptive device     05,IUD placement, Removed     Excessive and frequent menstruation with irregular cycle     2017     Major depressive disorder, single episode     No Comments Provided     Personal history of other medical treatment (CODE)     G3, P2-0-1-2 with history of spontaneous      Personal history of other medical treatment (CODE)     No Comments Provided     Uncomplicated asthma     No Comments Provided       Diagnostics:  Hemoglobin A1C POCT   Date Value Ref Range Status   2021 4.9 4.0 - 6.0 % Final       ASSESSMENT/PLAN:    Bilateral foot pain  Peripheral polyneuropathy  Bipolar I disorder, most recent episode depressed, moderate (H)  Borderline personality disorder (H)  Nail dystrophy  Other hereditary and idiopathic neuropathies   - TRIMMING DYSTROPHIC NAILS,ANY NUMBER    Callus of foot  Calluses shaved down with #15 blade and then sanded smooth with Dremel tool and angled [place]. Lotion applied. Patient tolerated well.   - TRIM HYPERKERATOTIC SKIN LESION,>4    Lymphedema  - TRIMMING DYSTROPHIC NAILS,ANY NUMBER  - TRIM HYPERKERATOTIC SKIN LESION,>4    Time spent doing foot/nail care was 35 minutes.    [x] Patient is unable to trim own toenails due to mobility limitation as a result of obesity, lymphedema    [] Patient is on chronic anticoagulation and chronic use of this medication poses a increased risk of bleeding should patient sustain a nail/skin injury.    [x] Patient has noted onychomycosis for some time now and has tried various OTC  treatments without success. Most recently been using Gordy's VapoRub to feet and/or toenails.    Educated Patient On:   [x] Proper healthy diet. Eliminate soda, high fructose corn syrup products, artificial sweeteners, and high sodium, high cholesterol foods. Encouraged more fruits and   vegetables and an increase in daily water intake.  [x] Educated on importance of diet and exercise for weight loss and reduction on symptoms.   [x] Daily exercise for at least 30 minutes is recommended. This can be walking, riding a bike, low impact aerobic activity, or yoga.    [x] Importance of daily skin assessments.   [x] Importance of not walking barefoot. Recommend wearing Crocs in house versus going barefoot.   [x] Importance of good, supportive shoes. Handout given today.  [x] Importance of smoking cessation. Greater than 3 minutes were spent on smoking cessation including encouragement to reduce cigarette use and discussion of modalities to assist with this. Cessation was encouraged in order to improve pain, reduce mortality, improve quality of life, and long term outcomes.     Recommended:      YES NO   Diabetic socks:      [] [] [x] Not applicable   Compression stockings/sleeves:   [x] [] [] Not applicable   Diabetic/supportive shoegear with wide toe box:  [x] [] [] Not applicable   Use of Gordy's VapoRub daily to feet and/or toenails: [x] [] [] Not applicable   Smoking Cessation:     [] [] [x] Not applicable    FOLLOW-UP:    Return to clinic: [] One week [] 30 Days (wounds) [x] 63+ Days   [] As directed by Provider    I explained my diagnostic considerations and recommendations to the patient, who voiced understanding and agreement with the treatment plan. All questions were answered. We discussed potential side effects of any prescribed or recommended therapies, as well as expectations for response to treatments.     DASHA Hernandez, AGNP-C  Internal Medicine  [facility]    12/27/2021 11:00  AM    Total time spent with this patient was 40 minutes which included chart review, procedures, patient education, visualization and interpretation of labs/images, time spent with the patient and documentation.     [x]  TRIMMING DYSTROPHIC NAILS,ANY NUMBER  [] 67566 TRIM HYPERKERATOTIC SKIN LESION, 1  [x] 66304 TRIM HYPERKERATOTIC SKIN LESION,2-4  [] 36546 TRIM HYPERKERATOTIC SKIN LESION,>4  [] 35789 TRIM NON DYSTROPHIC NAIL(S)  [] 24205 DEBRIDEMENT OF NAIL(S) 1-5  [] 17096 DEBRIDEMENT OF NAIL(S) 6 OR MORE  [] 97272 REMOVAL NAIL/NAIL BED, PARTIAL OR COMPLETE

## 2021-12-30 ENCOUNTER — OFFICE VISIT (OUTPATIENT)
Dept: INTERNAL MEDICINE | Facility: OTHER | Age: 46
End: 2021-12-30
Attending: INTERNAL MEDICINE
Payer: MEDICARE

## 2021-12-30 VITALS
RESPIRATION RATE: 16 BRPM | OXYGEN SATURATION: 98 % | SYSTOLIC BLOOD PRESSURE: 126 MMHG | BODY MASS INDEX: 46.26 KG/M2 | TEMPERATURE: 97.6 F | DIASTOLIC BLOOD PRESSURE: 86 MMHG | WEIGHT: 288.8 LBS

## 2021-12-30 DIAGNOSIS — L84 CALLUS OF FOOT: ICD-10-CM

## 2021-12-30 DIAGNOSIS — F60.3 BORDERLINE PERSONALITY DISORDER (H): ICD-10-CM

## 2021-12-30 DIAGNOSIS — F31.32 BIPOLAR I DISORDER, MOST RECENT EPISODE DEPRESSED, MODERATE (H): ICD-10-CM

## 2021-12-30 DIAGNOSIS — G60.8 OTHER HEREDITARY AND IDIOPATHIC NEUROPATHIES: ICD-10-CM

## 2021-12-30 DIAGNOSIS — L60.3 NAIL DYSTROPHY: ICD-10-CM

## 2021-12-30 DIAGNOSIS — G63 POLYNEUROPATHY IN DISEASES CLASSIFIED ELSEWHERE (H): ICD-10-CM

## 2021-12-30 DIAGNOSIS — M79.672 BILATERAL FOOT PAIN: Primary | ICD-10-CM

## 2021-12-30 DIAGNOSIS — G62.9 PERIPHERAL POLYNEUROPATHY: ICD-10-CM

## 2021-12-30 DIAGNOSIS — M79.671 BILATERAL FOOT PAIN: Primary | ICD-10-CM

## 2021-12-30 DIAGNOSIS — I89.0 LYMPHEDEMA: ICD-10-CM

## 2021-12-30 PROCEDURE — G0127 TRIM NAIL(S): HCPCS | Performed by: NURSE PRACTITIONER

## 2021-12-30 PROCEDURE — G0463 HOSPITAL OUTPT CLINIC VISIT: HCPCS | Mod: 25

## 2021-12-30 PROCEDURE — 11057 PARNG/CUTG B9 HYPRKR LES >4: CPT | Performed by: NURSE PRACTITIONER

## 2021-12-30 PROCEDURE — 99212 OFFICE O/P EST SF 10 MIN: CPT | Mod: 25 | Performed by: NURSE PRACTITIONER

## 2021-12-30 PROCEDURE — 11056 PARNG/CUTG B9 HYPRKR LES 2-4: CPT | Performed by: NURSE PRACTITIONER

## 2021-12-30 PROCEDURE — G0463 HOSPITAL OUTPT CLINIC VISIT: HCPCS

## 2021-12-30 ASSESSMENT — PAIN SCALES - GENERAL: PAINLEVEL: SEVERE PAIN (7)

## 2021-12-30 NOTE — NURSING NOTE
"Chief Complaint   Patient presents with     Musculoskeletal Problem     F/N care       Initial /86 (BP Location: Right arm, Patient Position: Sitting, Cuff Size: Adult Large)   Temp 97.6  F (36.4  C) (Tympanic)   Resp 16   Wt 131 kg (288 lb 12.8 oz)   LMP  (LMP Unknown)   SpO2 98%   Breastfeeding No   BMI 46.26 kg/m   Estimated body mass index is 46.26 kg/m  as calculated from the following:    Height as of 12/3/21: 1.683 m (5' 6.25\").    Weight as of this encounter: 131 kg (288 lb 12.8 oz).     Medication Reconciliation: complete      FOOD SECURITY SCREENING QUESTIONS:    The next two questions are to help us understand your food security.  If you are feeling you need any assistance in this area, we have resources available to support you today.    Hunger Vital Signs:  Within the past 12 months we worried whether our food would run out before we got money to buy more. Never  Within the past 12 months the food we bought just didn't last and we didn't have money to get more. Never  Betty Doshi LPN,LPN on 12/30/2021 at 11:38 AM        Advance care plan reviewed      Betty Doshi LPN    "

## 2021-12-30 NOTE — PATIENT INSTRUCTIONS
* Soak feet three times a week. Use foot brush or pumice stone to callused areas. Scrub toenails with old toothbrush to remove dead skin. Use TEO's VAPORUB to callused areas and toenails.    * Follow up in 9 weeks.

## 2022-01-20 NOTE — PROGRESS NOTES
"[facility]    Outpatient Physical Therapy Discharge Note  Patient: Rois Lamb  : 1975    Beginning/End Dates of Reporting Period:  10/13/21 to 2022     Referring Provider: Dr. Curiel    Therapy Diagnosis: impaired joint and soft tissue mobility     Client Self Report: Pt states she has a UTI, so pain in the abdomen, but low back pain is 1-2/10 at worst with standing or bending. \"Getting better.\"    Objective Measurements:  Objective Measure: pelvis  Details: negative stork and reverse stork; pelvis symmetry good  Objective Measure: Lumbar mobility  Details: L5 FRS L           Outcome Measures (most recent score):  Taylor STarT    Taylor STarT         Goals:  Goal Identifier SLR   Goal Description Pt will have improved SLR to 75 degrees on the left for improved flexibility and decreased pain with bending to 2/10 or less.   Target Date 21   Date Met      Progress (detail required for progress note):       Goal Identifier mobility   Goal Description Pt will have improved lumbosacral mobility to allow sitting for 1 hour with pain less than 3/10.   Target Date 21   Date Met      Progress (detail required for progress note):       Goal Identifier standing   Goal Description Pt will have improved core and gluteal strength to allow standing for work greater than 30 minutes with pain less than 4/10.   Target Date 22   Date Met      Progress (detail required for progress note):           Plan:  Discharge from therapy.    Discharge:    Reason for Discharge: Patient chooses to discontinue therapy.    Equipment Issued: n/a    Discharge Plan: Patient to continue home program.  "

## 2022-01-25 ENCOUNTER — OFFICE VISIT (OUTPATIENT)
Dept: FAMILY MEDICINE | Facility: OTHER | Age: 47
End: 2022-01-25
Attending: NURSE PRACTITIONER
Payer: MEDICARE

## 2022-01-25 VITALS
RESPIRATION RATE: 16 BRPM | OXYGEN SATURATION: 100 % | WEIGHT: 278 LBS | BODY MASS INDEX: 44.53 KG/M2 | HEART RATE: 76 BPM | TEMPERATURE: 98.9 F | SYSTOLIC BLOOD PRESSURE: 124 MMHG | DIASTOLIC BLOOD PRESSURE: 70 MMHG

## 2022-01-25 DIAGNOSIS — H92.01 OTALGIA, RIGHT: Primary | ICD-10-CM

## 2022-01-25 PROCEDURE — G0463 HOSPITAL OUTPT CLINIC VISIT: HCPCS

## 2022-01-25 PROCEDURE — 99213 OFFICE O/P EST LOW 20 MIN: CPT | Performed by: NURSE PRACTITIONER

## 2022-01-25 RX ORDER — FLUTICASONE PROPIONATE 50 MCG
1 SPRAY, SUSPENSION (ML) NASAL DAILY PRN
Qty: 16 G | Refills: 0 | Status: SHIPPED | OUTPATIENT
Start: 2022-01-25 | End: 2023-11-28

## 2022-01-25 ASSESSMENT — ENCOUNTER SYMPTOMS
SINUS PAIN: 0
SINUS PRESSURE: 0
SORE THROAT: 0
EYES NEGATIVE: 1
RESPIRATORY NEGATIVE: 1
RHINORRHEA: 0
CONSTITUTIONAL NEGATIVE: 1
CARDIOVASCULAR NEGATIVE: 1

## 2022-01-25 ASSESSMENT — PAIN SCALES - GENERAL: PAINLEVEL: SEVERE PAIN (6)

## 2022-01-25 NOTE — PROGRESS NOTES
Assessment & Plan   Problem List Items Addressed This Visit     None      Visit Diagnoses     Otalgia, right    -  Primary    Relevant Medications    fluticasone (FLONASE) 50 MCG/ACT nasal spray      Right otalgia, no signs of ear infection.  Suspect eustachian tube dysfunction.  Recommended chewing gum, warm compresses and Tylenol as well as consideration of steroid nasal spray.  She is interested in steroid nasal sprays of Flonase was ordered to use daily for the next couple weeks and then as needed.  Follow-up as needed.    Prescription drug management  18 minutes spent on the date of the encounter doing chart review, history and exam, documentation and further activities per the note           No follow-ups on file.    DASHA Lowry [facility] AND \[facility] [place] [facility]\""   Rosi is a 47 year old who presents for the following health issues     She reports she is had right ear pain for the past 3 to 4 days.  Denies any fevers or cold symptoms.  She has been using Tylenol for her pain which has been helpful.  She does state she has a history of ear infections and excessive earwax.  No other concerns today.      History of Present Illness       She eats 2-3 servings of fruits and vegetables daily.She consumes 2 sweetened beverage(s) daily.She exercises with enough effort to increase her heart rate 30 to 60 minutes per day.  She exercises with enough effort to increase her heart rate 3 or less days per week.   She is taking medications regularly.             Review of Systems   Constitutional: Negative.    HENT: Positive for ear pain. Negative for postnasal drip, rhinorrhea, sinus pressure, sinus pain and sore throat.    Eyes: Negative.    Respiratory: Negative.    Cardiovascular: Negative.             Objective    /70 (BP Location: Right arm, Patient Position: Sitting, Cuff Size: Adult Large)   Pulse 76   Temp 98.9  F (37.2  C) (Tympanic)   Resp 16   Wt 126.1 kg (278 lb)   LMP   (LMP Unknown)   SpO2 100%   BMI 44.53 kg/m    Body mass index is 44.53 kg/m .  Physical Exam  Constitutional:       Appearance: Normal appearance.   HENT:      Right Ear: Tympanic membrane normal.      Left Ear: Tympanic membrane normal.      Mouth/Throat:      Mouth: Mucous membranes are moist.      Pharynx: Oropharynx is clear.   Eyes:      Conjunctiva/sclera: Conjunctivae normal.   Cardiovascular:      Rate and Rhythm: Normal rate and regular rhythm.   Pulmonary:      Effort: Pulmonary effort is normal.      Breath sounds: Normal breath sounds.   Musculoskeletal:      Cervical back: Normal range of motion and neck supple.   Neurological:      Mental Status: She is alert.

## 2022-02-27 PROBLEM — L60.3 NAIL DYSTROPHY: Chronic | Status: ACTIVE | Noted: 2022-02-27

## 2022-02-27 PROBLEM — K21.9 ESOPHAGEAL REFLUX: Chronic | Status: ACTIVE | Noted: 2018-02-08

## 2022-02-27 NOTE — PROGRESS NOTES
"Answers for HPI/ROS submitted by the patient on 3/3/2022  How many servings of fruits and vegetables do you eat daily?: 2-3  On average, how many sweetened beverages do you drink each day (Examples: soda, juice, sweet tea, etc.  Do NOT count diet or artificially sweetened beverages)?: 1  How many minutes a day do you exercise enough to make your heart beat faster?: 30 to 60  How many days a week do you exercise enough to make your heart beat faster?: 4  How many days per week do you miss taking your medication?: 0  What is the reason for your visit today?: Foot and nail care  When did your symptoms begin?: More than a month  What are your symptoms?: Foot pain and irritation  How would you describe these symptoms?: Moderate  Are your symptoms:: Improving  Have you had these symptoms before?: Yes  Have you tried or received treatment for these symptoms before?: Yes  Did that treatment work? : Yes  Please describe the treatment you had:: Removal of calluses and corns  Is there anything that makes you feel worse?: Standing too long in one place  Is there anything that makes you feel better?: Orthotic shoe inserts    Rosi Lamb  : 1975 Age: 47 year old Sex: female MRN: [id number]    Nursing Notes:   Betty Doshi LPN  3/3/2022 11:32 AM  Signed  Chief Complaint   Patient presents with     Musculoskeletal Problem     F/N Cares       Initial /86 (BP Location: Right arm, Patient Position: Sitting, Cuff Size: Adult Regular)   Pulse 81   Temp 98.8  F (37.1  C) (Temporal)   Resp 14   Wt 127.3 kg (280 lb 9.6 oz)   LMP  (LMP Unknown)   SpO2 98%   Breastfeeding No   BMI 44.95 kg/m   Estimated body mass index is 44.95 kg/m  as calculated from the following:    Height as of 12/3/21: 1.683 m (5' 6.25\").    Weight as of this encounter: 127.3 kg (280 lb 9.6 oz).     Medication Reconciliation: complete      FOOD SECURITY SCREENING QUESTIONS:    The next two questions are to help us understand your food " security.  If you are feeling you need any assistance in this area, we have resources available to support you today.    Hunger Vital Signs:  Within the past 12 months we worried whether our food would run out before we got money to buy more. Never  Within the past 12 months the food we bought just didn't last and we didn't have money to get more. Never     Betty Doshi LPN,LPN on 3/3/2022 at 10:54 AM        Advance care plan reviewed      Betty Doshi LPN           Nursing note reviewed with patient.  Accuracy and completeness verified.     Encounter Date:  03/03/22    Patient Name:  Rosi Lamb  Patient MRN:   [id number]     Last Footcare Appt:  12/30/2021    PCP: Lucio Curiel    Other Providers Seen for Foot/Nail Care (name/location/date): [x] Yes [] No List (if applicable): Dr. Forde/Orthotics    ABN Given to patient and signed in clinic today:    [x] Yes [] No    SUBJECTIVE:    Patient Reported Symptoms: RIGHT  LEFT   Neuropathic symptoms [x]  [x]  DESCRIBE: all ten toes   Pain in foot/toes at rest [x]  [x]  DESCRIBE: all ten toes   Intermittent claudication []  []  DESCRIBE:   Previous foot ulcer/injury []  []  DESCRIBE:   Amputation []  []  DESCRIBE:    OBJECTIVE:    Nurse/Provider Inspection of Feet/Nails: RIGHT  LEFT   Infection []  []  DESCRIBE:   Ulcerations []  []  DESCRIBE:   Calluses/Corns [x]  [x]  DESCRIBE: Right: 3 toe tip corn; 5 callus MTP; Left: callus great MTP, 2 nd 3, corn toe tips; plantar surface large callus/corn   Fissures/Cracks []  []  DESCRIBE:   Nail Disorders/Changes [x]  [x]  DESCRIBE: All 10 toenails   Other []      [x]  DESCRIBE: pressure injury top of foot  VASCULAR TESTING:    Foot Pulses:  RIGHT  LEFT   Dorsalis pedis [x]  [x]    Posterior tibial []  []   Capillary refill of the hallux (<3 seconds): [x] [x]  Digital hair present: [] []  Varicosities (+/- presents): [] []  Edema (pitting vs. non pitting): [x] [x]     DESCRIBE: [x] 1+ minimal   [] 2+ moderate   [] 3+  severe  Skin Temperature:  [x] [x]     DESCRIBE: [] Cold   [] Hot    [] Warm to touch    NEUROLOGIC TESTING:        RIGHT LEFT  Sharp/dull Testing: (the blunt end and sharp end of swab stick) [x] [x]  Proprioception: (moves hallux up/down in space) [x] [x]  Superficial Reflexes: (Babinski sign) [x] [x]    Monofilament Testing: RIGHT  LEFT  RIGHT LEFT    Site 1 [x]  [x]  Site 6 [x] [x]    Site 2 [x]  [x]  Site 7 [x] [x]    Site 3 [x]  [x]  Site 8 [x] [x]    Site 4 [x]  [x]  Site 9 [x] [x]    Site 5 [x]  [x]  Site 10 [x] [x]      MUSCULOSKELETAL: RIGHT LEFT  Foot Type: [x] [x]  DESCRIBE: [x] Pes cavus (high arch) [] pes rectus (normal) [] pes planus (flat)  Digital deformity: [] []  DESCRIBE: [] bunion [] claw toe [] hammer toe [] mallet toe [] hallux limitus [] other  Joint ROM: [x] [x]  DESCRIBE: [x] adequate ROM to MTPH, STJ, and AJ without crepitus  Adequate Muscle Strength: [x] [x]   Other [] []  DESCRIBE:     DERMATOLOGIC:      Nails: RIGHT LEFT  Onychauxis (thickened, long nail): [x] [x]  DESCRIBE: All 10 toenails  Onycholysis ( nail): [] []  DESCRIBE:   Onychocryptosis (ingrown toenail): [] []  DESCRIBE:    Onychogryphosis (lacey's horn nail): [] []  DESCRIBE:   Onchomycosis (fungal nail): [x] [x]  DESCRIBE: All 10 toenails    Skin: RIGHT LEFT  [x] Hyperkeratotic lesions [] verruca tissue [] foreign body:   [x] [x]  DESCRIBE: Right: 3 toe tip corn; 5 callus MTP; Left: callus great MTP, 2 and 3, corn toe tips; plantar surface large callus/corn  [x] Mild edema [x] erythema [] open lesions [] interdigit maceration: [] [x]  DESCRIBE: top of foot  [] Varicosities [] telangiectasis [] pigmented lesion [] venous stasis:  [] []  DESCRIBE:   Adequate padding to the plantar aspect of the foot: [x] [x]  DESCRIBE:     Footwear Assessment:  Type: boots   Condition Good [] Fair [x] Poor []   Fit Good [] Fair [x] Poor []            Yes No  Does the patient use an ambulatory aid?      [] [x] DESCRIBE:  Does the patient  understand the effects of diabetes on foot health? [] [x]  Can the patient identify appropriate foot care practices?    [] [x]  Are the patient's feet adequately cared for?      [] [x]            Yes No  Does the patient have impaired vision?      [] [x]  Can the patient reach own feet for safe self care?     [] [x]  Are there other factors influencing ability to safely care for own feet? [x] []     Pertinent Past Medical History:  Patient Active Problem List   Diagnosis     Alcohol use disorder, moderate, in sustained remission, dependence (H)     Allergic rhinitis due to pollen     Generalized anxiety disorder     Bilateral chronic knee pain     Bilateral foot pain     Borderline personality disorder (H)     Cannabis use disorder, moderate, in sustained remission, dependence (H)     Closed dislocation of tarsal joint     Crepitus of joint of right knee     Other disorder of menstruation and other abnormal bleeding from female genital tract     Elevated random blood glucose level     Esophageal reflux     Lymphedema of both lower extremities     Menorrhagia with irregular cycle     Moderate persistent asthma     Morbid obesity with BMI of 40.0-44.9, adult (H)     Peripheral polyneuropathy     Preop examination     Urinary incontinence     History of substance abuse (H)     Stasis dermatitis of both legs     Vitamin B12 deficiency     Vitamin D deficiency     Abnormal Pap smear of cervix     Bipolar I disorder, most recent episode depressed, moderate (H)     Intermittent low back pain     Past Medical History:   Diagnosis Date     Edema     No Comments Provided     Encounter for removal and reinsertion of intrauterine contraceptive device     05/16/05,IUD placement, Removed     Excessive and frequent menstruation with irregular cycle     2/16/2017     Major depressive disorder, single episode     No Comments Provided     Personal history of other medical treatment (CODE)     G3, P2-0-1-2 with history of spontaneous       Personal history of other medical treatment (CODE)     No Comments Provided     Uncomplicated asthma     No Comments Provided       Diagnostics:  Hemoglobin A1C POCT   Date Value Ref Range Status   2021 4.9 4.0 - 6.0 % Final       ASSESSMENT/PLAN:    Peripheral polyneuropathy  Bilateral foot pain  Morbid obesity with BMI of 40.0-44.9, adult (H)  Lymphedema of both lower extremities  Stasis dermatitis of both legs  Bipolar I disorder, most recent episode depressed, moderate (H)  Borderline personality disorder (H)  Nail dystrophy  - TRIMMING DYSTROPHIC NAILS,ANY NUMBER  - TRIM HYPERKERATOTIC SKIN LESION,>4  All toenails filed down to manageable thickness with Dremel tool and angled [place].  Toenails were then trimmed with clippers.  Lotion applied to bilateral lower extremities.  Patient tolerated well.   Calluses shaved down with #15 blade and then sanded smooth with Dremel tool and angled [place]. Lotion applied. Patient tolerated well. Time spent doing foot/nail care was 35 minutes.    Corn or callus  Calluses shaved down with #15 blade and then sanded smooth with Dremel tool and angled [place]. Lotion applied. Patient tolerated well. Time spent doing foot/nail care was 35 minutes.  - TRIM HYPERKERATOTIC SKIN LESION,>4    [x] Patient is unable to trim own toenails due to mobility limitation as a result of obesity, lymphedema    [] Patient is on chronic anticoagulation and chronic use of this medication poses a increased risk of bleeding should patient sustain a nail/skin injury.    [x] Patient has noted onychomycosis for some time now and has tried various OTC treatments without success. Most recently been using Gordy's VapoRub to feet and/or toenails.    Educated Patient On:   [x] Proper healthy diet. Eliminate soda, high fructose corn syrup products, artificial sweeteners, and high sodium, high cholesterol foods. Encouraged more fruits and   vegetables and an increase in daily water intake.  [x]  Educated on importance of diet and exercise for weight loss and reduction on symptoms.   [x] Daily exercise for at least 30 minutes is recommended. This can be walking, riding a bike, low impact aerobic activity, or yoga.    [x] Importance of daily skin assessments.   [x] Importance of not walking barefoot. Recommend wearing Crocs in house versus going barefoot.   [x] Importance of good, supportive shoes. Handout given today.  [x] Importance of smoking cessation. Greater than 3 minutes were spent on smoking cessation including encouragement to reduce cigarette use and discussion of modalities to assist with this. Cessation was encouraged in order to improve pain, reduce mortality, improve quality of life, and long term outcomes.     Recommended:      YES NO   Diabetic socks:      [] [] [x] Not applicable   Compression stockings/sleeves:   [x] [] [] Not applicable   Diabetic/supportive shoegear with wide toe box:  [x] [] [] Not applicable   Use of Gordy's VapoRub daily to feet and/or toenails: [x] [] [] Not applicable   Smoking Cessation:     [] [] [x] Not applicable    FOLLOW-UP:    Return to clinic: [] One week [] 30 Days (wounds) [x] 63+ Days   [] As directed by Provider    I explained my diagnostic considerations and recommendations to the patient, who voiced understanding and agreement with the treatment plan. All questions were answered. We discussed potential side effects of any prescribed or recommended therapies, as well as expectations for response to treatments.    I recommend she follow up with Dr. Frode for orthotic adjustment on left foot due to pressure injury.     DASHA Hernandez, AGNP-C  Internal Medicine  [facility]    03/03/2022 11:52 AM    Total time spent with this patient was 40 minutes which included chart review, procedures, patient education, visualization and interpretation of labs/images, time spent with the patient and documentation.     [x]  TRIMMING  DYSTROPHIC NAILS,ANY NUMBER  [] 60779 TRIM HYPERKERATOTIC SKIN LESION, 1  [] 66032 TRIM HYPERKERATOTIC SKIN LESION,2-4  [x] 41339 TRIM HYPERKERATOTIC SKIN LESION,>4  [] 78513 TRIM NON DYSTROPHIC NAIL(S)  [] 74057 DEBRIDEMENT OF NAIL(S) 1-5  [] 25502 DEBRIDEMENT OF NAIL(S) 6 OR MORE  [] 43948 REMOVAL NAIL/NAIL BED, PARTIAL OR COMPLETE

## 2022-03-03 ENCOUNTER — OFFICE VISIT (OUTPATIENT)
Dept: INTERNAL MEDICINE | Facility: OTHER | Age: 47
End: 2022-03-03
Attending: NURSE PRACTITIONER
Payer: MEDICARE

## 2022-03-03 VITALS
WEIGHT: 280.6 LBS | BODY MASS INDEX: 44.95 KG/M2 | OXYGEN SATURATION: 98 % | HEART RATE: 81 BPM | RESPIRATION RATE: 14 BRPM | TEMPERATURE: 98.8 F | DIASTOLIC BLOOD PRESSURE: 86 MMHG | SYSTOLIC BLOOD PRESSURE: 130 MMHG

## 2022-03-03 DIAGNOSIS — M79.671 BILATERAL FOOT PAIN: ICD-10-CM

## 2022-03-03 DIAGNOSIS — L60.3 NAIL DYSTROPHY: Chronic | ICD-10-CM

## 2022-03-03 DIAGNOSIS — E66.01 MORBID OBESITY WITH BMI OF 40.0-44.9, ADULT (H): ICD-10-CM

## 2022-03-03 DIAGNOSIS — I87.2 STASIS DERMATITIS OF BOTH LEGS: ICD-10-CM

## 2022-03-03 DIAGNOSIS — L84 CORN OR CALLUS: ICD-10-CM

## 2022-03-03 DIAGNOSIS — F31.32 BIPOLAR I DISORDER, MOST RECENT EPISODE DEPRESSED, MODERATE (H): ICD-10-CM

## 2022-03-03 DIAGNOSIS — G62.9 PERIPHERAL POLYNEUROPATHY: Primary | ICD-10-CM

## 2022-03-03 DIAGNOSIS — M79.672 BILATERAL FOOT PAIN: ICD-10-CM

## 2022-03-03 DIAGNOSIS — I89.0 LYMPHEDEMA OF BOTH LOWER EXTREMITIES: ICD-10-CM

## 2022-03-03 DIAGNOSIS — F60.3 BORDERLINE PERSONALITY DISORDER (H): ICD-10-CM

## 2022-03-03 PROCEDURE — 11055 PARING/CUTG B9 HYPRKER LES 1: CPT | Mod: Q9 | Performed by: NURSE PRACTITIONER

## 2022-03-03 PROCEDURE — G0463 HOSPITAL OUTPT CLINIC VISIT: HCPCS

## 2022-03-03 PROCEDURE — G0127 TRIM NAIL(S): HCPCS | Mod: Q9,GA | Performed by: NURSE PRACTITIONER

## 2022-03-03 PROCEDURE — G0463 HOSPITAL OUTPT CLINIC VISIT: HCPCS | Mod: 25

## 2022-03-03 PROCEDURE — 99212 OFFICE O/P EST SF 10 MIN: CPT | Mod: 25 | Performed by: NURSE PRACTITIONER

## 2022-03-03 PROCEDURE — 11057 PARNG/CUTG B9 HYPRKR LES >4: CPT | Performed by: NURSE PRACTITIONER

## 2022-03-03 ASSESSMENT — PAIN SCALES - GENERAL: PAINLEVEL: NO PAIN (0)

## 2022-03-03 NOTE — Clinical Note
She has pressure injury starting on top of left foot from wearing her orthotics in her walking shoes.

## 2022-03-03 NOTE — NURSING NOTE
"Chief Complaint   Patient presents with     Musculoskeletal Problem     F/N Cares       Initial /86 (BP Location: Right arm, Patient Position: Sitting, Cuff Size: Adult Regular)   Pulse 81   Temp 98.8  F (37.1  C) (Temporal)   Resp 14   Wt 127.3 kg (280 lb 9.6 oz)   LMP  (LMP Unknown)   SpO2 98%   Breastfeeding No   BMI 44.95 kg/m   Estimated body mass index is 44.95 kg/m  as calculated from the following:    Height as of 12/3/21: 1.683 m (5' 6.25\").    Weight as of this encounter: 127.3 kg (280 lb 9.6 oz).     Medication Reconciliation: complete      FOOD SECURITY SCREENING QUESTIONS:    The next two questions are to help us understand your food security.  If you are feeling you need any assistance in this area, we have resources available to support you today.    Hunger Vital Signs:  Within the past 12 months we worried whether our food would run out before we got money to buy more. Never  Within the past 12 months the food we bought just didn't last and we didn't have money to get more. Never     Betty Doshi LPN,LPN on 3/3/2022 at 10:54 AM        Advance care plan reviewed      Betty Doshi LPN      "

## 2022-03-03 NOTE — PATIENT INSTRUCTIONS
Make appointment to see Dr. Welch about your left top foot/orthotic adjustment due to pressure injury.    F/U foot/nail cares in 63+ days with JACQUI Cox.

## 2022-03-07 ENCOUNTER — OFFICE VISIT (OUTPATIENT)
Dept: FAMILY MEDICINE | Facility: OTHER | Age: 47
End: 2022-03-07
Attending: CHIROPRACTOR
Payer: MEDICARE

## 2022-03-07 VITALS
RESPIRATION RATE: 14 BRPM | SYSTOLIC BLOOD PRESSURE: 132 MMHG | BODY MASS INDEX: 44.69 KG/M2 | OXYGEN SATURATION: 98 % | HEART RATE: 88 BPM | TEMPERATURE: 97.8 F | WEIGHT: 279 LBS | DIASTOLIC BLOOD PRESSURE: 84 MMHG

## 2022-03-07 DIAGNOSIS — M21.42 PES PLANUS OF BOTH FEET: Primary | ICD-10-CM

## 2022-03-07 DIAGNOSIS — L84 CALLUS OF FOOT: ICD-10-CM

## 2022-03-07 DIAGNOSIS — M21.41 PES PLANUS OF BOTH FEET: Primary | ICD-10-CM

## 2022-03-07 PROCEDURE — 99207 PR BACK PROGRAM: CPT | Performed by: CHIROPRACTOR

## 2022-03-07 PROCEDURE — G0463 HOSPITAL OUTPT CLINIC VISIT: HCPCS

## 2022-03-07 ASSESSMENT — PAIN SCALES - GENERAL: PAINLEVEL: NO PAIN (0)

## 2022-03-07 NOTE — PROGRESS NOTES
Orthotics check due to left foot skin abrasion.  Orthotics found to be correct and non-contributory. Referring to Dr. Castillo for further evaluation and x-rays of previous foot surgery greater than 10 years ago.
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
## 2022-03-18 ENCOUNTER — OFFICE VISIT (OUTPATIENT)
Dept: INTERNAL MEDICINE | Facility: OTHER | Age: 47
End: 2022-03-18
Attending: INTERNAL MEDICINE
Payer: MEDICARE

## 2022-03-18 VITALS
DIASTOLIC BLOOD PRESSURE: 80 MMHG | HEART RATE: 83 BPM | RESPIRATION RATE: 16 BRPM | WEIGHT: 281.6 LBS | HEIGHT: 66 IN | OXYGEN SATURATION: 100 % | BODY MASS INDEX: 45.26 KG/M2 | TEMPERATURE: 98.2 F | SYSTOLIC BLOOD PRESSURE: 128 MMHG

## 2022-03-18 DIAGNOSIS — J45.40 MODERATE PERSISTENT ASTHMA WITHOUT COMPLICATION: Primary | ICD-10-CM

## 2022-03-18 DIAGNOSIS — E53.8 VITAMIN B12 DEFICIENCY: Chronic | ICD-10-CM

## 2022-03-18 DIAGNOSIS — E78.2 MIXED HYPERLIPIDEMIA: ICD-10-CM

## 2022-03-18 DIAGNOSIS — F60.3 BORDERLINE PERSONALITY DISORDER (H): Chronic | ICD-10-CM

## 2022-03-18 DIAGNOSIS — Z00.00 ENCOUNTER FOR MEDICARE ANNUAL WELLNESS EXAM: ICD-10-CM

## 2022-03-18 DIAGNOSIS — F31.32 BIPOLAR I DISORDER, MOST RECENT EPISODE DEPRESSED, MODERATE (H): Chronic | ICD-10-CM

## 2022-03-18 DIAGNOSIS — N39.41 URGE INCONTINENCE: ICD-10-CM

## 2022-03-18 DIAGNOSIS — R73.9 ELEVATED RANDOM BLOOD GLUCOSE LEVEL: ICD-10-CM

## 2022-03-18 DIAGNOSIS — I89.0 LYMPHEDEMA OF BOTH LOWER EXTREMITIES: Chronic | ICD-10-CM

## 2022-03-18 DIAGNOSIS — F41.1 GENERALIZED ANXIETY DISORDER: Chronic | ICD-10-CM

## 2022-03-18 DIAGNOSIS — K21.9 GASTROESOPHAGEAL REFLUX DISEASE WITHOUT ESOPHAGITIS: ICD-10-CM

## 2022-03-18 DIAGNOSIS — E55.9 VITAMIN D DEFICIENCY: Chronic | ICD-10-CM

## 2022-03-18 DIAGNOSIS — Z12.11 COLON CANCER SCREENING: ICD-10-CM

## 2022-03-18 DIAGNOSIS — E66.01 MORBID OBESITY WITH BMI OF 40.0-44.9, ADULT (H): Chronic | ICD-10-CM

## 2022-03-18 PROBLEM — Z01.818 PREOP EXAMINATION: Status: RESOLVED | Noted: 2017-02-16 | Resolved: 2022-03-18

## 2022-03-18 PROBLEM — R87.619 ABNORMAL PAP SMEAR OF CERVIX: Status: RESOLVED | Noted: 2018-04-11 | Resolved: 2022-03-18

## 2022-03-18 LAB
ALBUMIN SERPL-MCNC: 4.5 G/DL (ref 3.5–5.7)
ALP SERPL-CCNC: 42 U/L (ref 34–104)
ALT SERPL W P-5'-P-CCNC: 18 U/L (ref 7–52)
ANION GAP SERPL CALCULATED.3IONS-SCNC: 7 MMOL/L (ref 3–14)
AST SERPL W P-5'-P-CCNC: 20 U/L (ref 13–39)
BILIRUB SERPL-MCNC: 0.9 MG/DL (ref 0.3–1)
BUN SERPL-MCNC: 11 MG/DL (ref 7–25)
CALCIUM SERPL-MCNC: 10.1 MG/DL (ref 8.6–10.3)
CHLORIDE BLD-SCNC: 103 MMOL/L (ref 98–107)
CHOLEST SERPL-MCNC: 156 MG/DL
CO2 SERPL-SCNC: 27 MMOL/L (ref 21–31)
CREAT SERPL-MCNC: 1 MG/DL (ref 0.6–1.2)
DEPRECATED CALCIDIOL+CALCIFEROL SERPL-MC: 63 UG/L (ref 30–100)
ERYTHROCYTE [DISTWIDTH] IN BLOOD BY AUTOMATED COUNT: 13.3 % (ref 10–15)
FASTING STATUS PATIENT QL REPORTED: NORMAL
GFR SERPL CREATININE-BSD FRML MDRD: 70 ML/MIN/1.73M2
GLUCOSE BLD-MCNC: 94 MG/DL (ref 70–105)
HBA1C MFR BLD: 5.2 % (ref 4–6.2)
HCT VFR BLD AUTO: 40.5 % (ref 35–47)
HDLC SERPL-MCNC: 59 MG/DL (ref 23–92)
HGB BLD-MCNC: 13.2 G/DL (ref 11.7–15.7)
LDLC SERPL CALC-MCNC: 81 MG/DL
MCH RBC QN AUTO: 30.3 PG (ref 26.5–33)
MCHC RBC AUTO-ENTMCNC: 32.6 G/DL (ref 31.5–36.5)
MCV RBC AUTO: 93 FL (ref 78–100)
NONHDLC SERPL-MCNC: 97 MG/DL
PLATELET # BLD AUTO: 210 10E3/UL (ref 150–450)
POTASSIUM BLD-SCNC: 4 MMOL/L (ref 3.5–5.1)
PROT SERPL-MCNC: 7.6 G/DL (ref 6.4–8.9)
RBC # BLD AUTO: 4.36 10E6/UL (ref 3.8–5.2)
SODIUM SERPL-SCNC: 137 MMOL/L (ref 134–144)
TRIGL SERPL-MCNC: 79 MG/DL
VIT B12 SERPL-MCNC: 1477 PG/ML (ref 180–914)
WBC # BLD AUTO: 7.8 10E3/UL (ref 4–11)

## 2022-03-18 PROCEDURE — 85027 COMPLETE CBC AUTOMATED: CPT | Mod: ZL | Performed by: INTERNAL MEDICINE

## 2022-03-18 PROCEDURE — G0439 PPPS, SUBSEQ VISIT: HCPCS | Performed by: INTERNAL MEDICINE

## 2022-03-18 PROCEDURE — G0463 HOSPITAL OUTPT CLINIC VISIT: HCPCS

## 2022-03-18 PROCEDURE — 80053 COMPREHEN METABOLIC PANEL: CPT | Mod: ZL | Performed by: INTERNAL MEDICINE

## 2022-03-18 PROCEDURE — 80061 LIPID PANEL: CPT | Mod: ZL | Performed by: INTERNAL MEDICINE

## 2022-03-18 PROCEDURE — 36415 COLL VENOUS BLD VENIPUNCTURE: CPT | Mod: ZL | Performed by: INTERNAL MEDICINE

## 2022-03-18 PROCEDURE — 82040 ASSAY OF SERUM ALBUMIN: CPT | Mod: ZL | Performed by: INTERNAL MEDICINE

## 2022-03-18 PROCEDURE — 82607 VITAMIN B-12: CPT | Mod: ZL | Performed by: INTERNAL MEDICINE

## 2022-03-18 PROCEDURE — 99214 OFFICE O/P EST MOD 30 MIN: CPT | Mod: 25 | Performed by: INTERNAL MEDICINE

## 2022-03-18 PROCEDURE — 83036 HEMOGLOBIN GLYCOSYLATED A1C: CPT | Mod: ZL | Performed by: INTERNAL MEDICINE

## 2022-03-18 PROCEDURE — 82306 VITAMIN D 25 HYDROXY: CPT | Mod: ZL | Performed by: INTERNAL MEDICINE

## 2022-03-18 RX ORDER — MONTELUKAST SODIUM 10 MG/1
10 TABLET ORAL AT BEDTIME
Qty: 90 TABLET | Refills: 4 | Status: SHIPPED | OUTPATIENT
Start: 2022-03-18 | End: 2023-03-17

## 2022-03-18 RX ORDER — FAMOTIDINE 20 MG/1
20 TABLET, FILM COATED ORAL 2 TIMES DAILY
Qty: 180 TABLET | Refills: 4 | Status: SHIPPED | OUTPATIENT
Start: 2022-03-18 | End: 2023-03-17

## 2022-03-18 RX ORDER — OXYBUTYNIN CHLORIDE 10 MG/1
10 TABLET, EXTENDED RELEASE ORAL DAILY
Qty: 90 TABLET | Refills: 4 | Status: SHIPPED | OUTPATIENT
Start: 2022-03-18 | End: 2023-03-17

## 2022-03-18 ASSESSMENT — ANXIETY QUESTIONNAIRES
GAD7 TOTAL SCORE: 0
7. FEELING AFRAID AS IF SOMETHING AWFUL MIGHT HAPPEN: NOT AT ALL
1. FEELING NERVOUS, ANXIOUS, OR ON EDGE: NOT AT ALL
4. TROUBLE RELAXING: NOT AT ALL
5. BEING SO RESTLESS THAT IT IS HARD TO SIT STILL: NOT AT ALL
GAD7 TOTAL SCORE: 0
7. FEELING AFRAID AS IF SOMETHING AWFUL MIGHT HAPPEN: NOT AT ALL
6. BECOMING EASILY ANNOYED OR IRRITABLE: NOT AT ALL
GAD7 TOTAL SCORE: 0
3. WORRYING TOO MUCH ABOUT DIFFERENT THINGS: NOT AT ALL
2. NOT BEING ABLE TO STOP OR CONTROL WORRYING: NOT AT ALL

## 2022-03-18 ASSESSMENT — ENCOUNTER SYMPTOMS
CHEST TIGHTNESS: 0
HEMATURIA: 0
NECK PAIN: 1
WOUND: 0
FATIGUE: 1
FEVER: 0
MYALGIAS: 1
BACK PAIN: 1
ABDOMINAL PAIN: 0
CONFUSION: 0
BRUISES/BLEEDS EASILY: 0
ADENOPATHY: 0
NECK STIFFNESS: 1
ARTHRALGIAS: 1
CHILLS: 0
SHORTNESS OF BREATH: 0

## 2022-03-18 ASSESSMENT — PAIN SCALES - GENERAL: PAINLEVEL: NO PAIN (0)

## 2022-03-18 ASSESSMENT — ACTIVITIES OF DAILY LIVING (ADL)
CURRENT_FUNCTION: MEDICATION ADMINISTRATION REQUIRES ASSISTANCE
CURRENT_FUNCTION: MONEY MANAGEMENT REQUIRES ASSISTANCE

## 2022-03-18 NOTE — PATIENT INSTRUCTIONS
Patient Education   Personalized Prevention Plan  You are due for the preventive services outlined below.  Your care team is available to assist you in scheduling these services.  If you have already completed any of these items, please share that information with your care team to update in your medical record.  Health Maintenance Due   Topic Date Due     Colorectal Cancer Screening  Never done     Annual Wellness Visit  03/12/2022     Mammogram  03/12/2022     Preventive Health Recommendations    See your health care provider every year to    Review health changes.     Discuss preventive care.      Review your medicines if your doctor has prescribed any.    You no longer need a yearly Pap test unless you've had an abnormal Pap test in the past 10 years. If you have vaginal symptoms, such as bleeding or discharge, be sure to talk with your provider about a Pap test.    Every 1 to 2 years, have a mammogram.  If you are over 69, talk with your health care provider about whether or not you want to continue having screening mammograms.    Every 10 years, have a colonoscopy. Or, have a yearly FIT test (stool test). These exams will check for colon cancer.     Have a cholesterol test every 5 years, or more often if your doctor advises it.     Have a diabetes test (fasting glucose) every three years. If you are at risk for diabetes, you should have this test more often.     At age 65, have a bone density scan (DEXA) to check for osteoporosis (brittle bone disease).    Shots:    Get a flu shot each year.    Get a tetanus shot every 10 years.    Talk to your doctor about your pneumonia vaccines. There are now two you should receive - Pneumovax (PPSV 23) and Prevnar (PCV 13).    Talk to your pharmacist about the shingles vaccine.    Talk to your doctor about the hepatitis B vaccine.    Nutrition:     Eat at least 5 servings of fruits and vegetables each day.    Eat whole-grain bread, whole-wheat pasta and brown rice  instead of white grains and rice.    Get adequate Calcium and Vitamin D.     Lifestyle    Exercise at least 150 minutes a week (30 minutes a day, 5 days a week). This will help you control your weight and prevent disease.    Limit alcohol to one drink per day.    No smoking.     Wear sunscreen to prevent skin cancer.     See your dentist twice a year for an exam and cleaning.    See your eye doctor every 1 to 2 years to screen for conditions such as glaucoma, macular degeneration and cataracts.    Personalized Prevention Plan  You are due for the preventive services outlined below.  Your care team is available to assist you in scheduling these services.  If you have already completed any of these items, please share that information with your care team to update in your medical record.  Health Maintenance   Topic Date Due     COLORECTAL CANCER SCREENING  Never done     MEDICARE ANNUAL WELLNESS VISIT  03/12/2022     MAMMO SCREENING  03/12/2022     ASTHMA CONTROL TEST  05/05/2022     ASTHMA ACTION PLAN  11/04/2022     HPV TEST  03/11/2023     PAP  03/11/2023     DTAP/TDAP/TD IMMUNIZATION (5 - Td or Tdap) 01/05/2026     LIPID  03/12/2026     ADVANCE CARE PLANNING  08/02/2026     Pneumococcal Vaccine: Pediatrics (0 to 5 Years) and At-Risk Patients (6 to 64 Years) (2 of 2 - PPSV23) 01/14/2042     HEPATITIS C SCREENING  Completed     HIV SCREENING  Completed     PHQ-2 (once per calendar year)  Completed     INFLUENZA VACCINE  Completed     COVID-19 Vaccine  Completed     IPV IMMUNIZATION  Aged Out     MENINGITIS IMMUNIZATION  Aged Out     HEPATITIS B IMMUNIZATION  Aged Out     Activities of Daily Living    Your Health Risk Assessment indicates you have difficulties with activities of daily living such as housework, bathing, preparing meals, taking medication, etc. Please make a follow up appointment for us to address this issue in more detail.

## 2022-03-18 NOTE — LETTER
March 19, 2022      Rosi Lamb  [address] [id number]        Dear ,    We are writing to inform you of your test results.    Labs are stable.   Continue current medications.   Plan to recheck labs again in 3 to 4 months.     Electronically signed by:  Lucio Curiel MD  on March 19, 2022     Resulted Orders   CBC W PLT No Diff   Result Value Ref Range    WBC Count 7.8 4.0 - 11.0 10e3/uL    RBC Count 4.36 3.80 - 5.20 10e6/uL    Hemoglobin 13.2 11.7 - 15.7 g/dL    Hematocrit 40.5 35.0 - 47.0 %    MCV 93 78 - 100 fL    MCH 30.3 26.5 - 33.0 pg    MCHC 32.6 31.5 - 36.5 g/dL    RDW 13.3 10.0 - 15.0 %    Platelet Count 210 150 - 450 10e3/uL   Comprehensive Metabolic Panel   Result Value Ref Range    Sodium 137 134 - 144 mmol/L    Potassium 4.0 3.5 - 5.1 mmol/L    Chloride 103 98 - 107 mmol/L    Carbon Dioxide (CO2) 27 21 - 31 mmol/L    Anion Gap 7 3 - 14 mmol/L    Urea Nitrogen 11 7 - 25 mg/dL    Creatinine 1.00 0.60 - 1.20 mg/dL    Calcium 10.1 8.6 - 10.3 mg/dL    Glucose 94 70 - 105 mg/dL    Alkaline Phosphatase 42 34 - 104 U/L    AST 20 13 - 39 U/L    ALT 18 7 - 52 U/L    Protein Total 7.6 6.4 - 8.9 g/dL    Albumin 4.5 3.5 - 5.7 g/dL    Bilirubin Total 0.9 0.3 - 1.0 mg/dL    GFR Estimate 70 >60 mL/min/1.73m2      Comment:      Effective December 21, 2021 eGFRcr in adults is calculated using the 2021 CKD-EPI creatinine equation which includes age and gender (Sang et al., NEJM, DOI: 10.1056/INRTfk6069104)   Lipid Panel   Result Value Ref Range    Cholesterol 156 <200 mg/dL    Triglycerides 79 <150 mg/dL    Direct Measure HDL 59 23 - 92 mg/dL    LDL Cholesterol Calculated 81 <=100 mg/dL    Non HDL Cholesterol 97 <130 mg/dL    Patient Fasting > 8hrs? Unknown     Narrative    Cholesterol  Desirable:  <200 mg/dL    Triglycerides  Normal:  Less than 150 mg/dL  Borderline High:  150-199 mg/dL  High:  200-499 mg/dL  Very High:  Greater than or equal to 500 mg/dL    Direct Measure  HDL  Female:  Greater than or equal to 50 mg/dL   Male:  Greater than or equal to 40 mg/dL    LDL Cholesterol  Desirable:  <100mg/dL  Above Desirable:  100-129 mg/dL   Borderline High:  130-159 mg/dL   High:  160-189 mg/dL   Very High:  >= 190 mg/dL    Non HDL Cholesterol  Desirable:  130 mg/dL  Above Desirable:  130-159 mg/dL  Borderline High:  160-189 mg/dL  High:  190-219 mg/dL  Very High:  Greater than or equal to 220 mg/dL   Vitamin D Total   Result Value Ref Range    Vitamin D, Total (25-Hydroxy) 63 30 - 100 ug/L      Comment:      Deficiency:          <10 ug/L  Insufficiency:       10-29 ug/L  Sufficiency:          ug/L  Possible Toxicity:   >100 ug/L     Vitamin B12   Result Value Ref Range    Vitamin B12 1,477 (H) 180 - 914 pg/mL   Hemoglobin A1c   Result Value Ref Range    Hemoglobin A1C 5.2 4.0 - 6.2 %       If you have any questions or concerns, please call the clinic at the number listed above.       Sincerely,      Lucio Curiel MD

## 2022-03-18 NOTE — NURSING NOTE
"Chief Complaint   Patient presents with     Physical       FOOD SECURITY SCREENING QUESTIONS  Hunger Vital Signs:  Within the past 12 months we worried whether our food would run out before we got money to buy more. Never  Within the past 12 months the food we bought just didn't last and we didn't have money to get more. Never  Isabel Calderon LPN 3/18/2022 10:50 AM      Initial /80 (BP Location: Right arm, Patient Position: Sitting, Cuff Size: Adult Large)   Pulse 83   Temp 98.2  F (36.8  C) (Tympanic)   Resp 16   Ht 1.68 m (5' 6.14\")   Wt 127.7 kg (281 lb 9.6 oz)   LMP  (LMP Unknown)   SpO2 100%   BMI 45.26 kg/m   Estimated body mass index is 45.26 kg/m  as calculated from the following:    Height as of this encounter: 1.68 m (5' 6.14\").    Weight as of this encounter: 127.7 kg (281 lb 9.6 oz).  Medication Reconciliation: complete    Isabel Calderon LPN  "

## 2022-03-19 ASSESSMENT — ANXIETY QUESTIONNAIRES: GAD7 TOTAL SCORE: 0

## 2022-03-19 ASSESSMENT — PATIENT HEALTH QUESTIONNAIRE - PHQ9: SUM OF ALL RESPONSES TO PHQ QUESTIONS 1-9: 0

## 2022-03-23 DIAGNOSIS — M79.671 RIGHT FOOT PAIN: ICD-10-CM

## 2022-03-23 DIAGNOSIS — M79.672 LEFT FOOT PAIN: Primary | ICD-10-CM

## 2022-03-24 ENCOUNTER — HOSPITAL ENCOUNTER (OUTPATIENT)
Dept: GENERAL RADIOLOGY | Facility: OTHER | Age: 47
Discharge: HOME | End: 2022-03-24
Attending: PODIATRIST
Payer: MEDICARE

## 2022-03-24 ENCOUNTER — OFFICE VISIT (OUTPATIENT)
Dept: ORTHOPEDICS | Facility: OTHER | Age: 47
End: 2022-03-24
Attending: CHIROPRACTOR
Payer: MEDICARE

## 2022-03-24 VITALS — OXYGEN SATURATION: 98 % | HEART RATE: 87 BPM

## 2022-03-24 DIAGNOSIS — M19.079 ARTHRITIS OF MIDFOOT: Primary | ICD-10-CM

## 2022-03-24 DIAGNOSIS — M21.42 PES PLANUS OF BOTH FEET: ICD-10-CM

## 2022-03-24 DIAGNOSIS — L84 CALLUS OF FOOT: ICD-10-CM

## 2022-03-24 DIAGNOSIS — M79.671 RIGHT FOOT PAIN: ICD-10-CM

## 2022-03-24 DIAGNOSIS — M21.41 PES PLANUS OF BOTH FEET: ICD-10-CM

## 2022-03-24 DIAGNOSIS — M79.672 LEFT FOOT PAIN: ICD-10-CM

## 2022-03-24 PROCEDURE — 73630 X-RAY EXAM OF FOOT: CPT

## 2022-03-24 PROCEDURE — 99204 OFFICE O/P NEW MOD 45 MIN: CPT | Performed by: PODIATRIST

## 2022-03-24 PROCEDURE — G0463 HOSPITAL OUTPT CLINIC VISIT: HCPCS

## 2022-03-24 ASSESSMENT — PAIN SCALES - GENERAL: PAINLEVEL: MILD PAIN (2)

## 2022-03-24 NOTE — PROGRESS NOTES
Visit Date: 03/24/2022    HISTORY OF PRESENT ILLNESS:  Rosi is a 47-year-old female with history of a Lisfranc injury, left foot, fixed in 2010.  She has a prominence associated with what she calls hardware and pain associated with this.  Here to have this evaluated and discuss options.  The patient also has some evident lymphedema bilaterally.    PHYSICAL EXAMINATION:   CONSTITUTIONAL:  The patient is alert and oriented x3, well appearing and in no apparent distress.  Affect is pleasant and answers questions appropriately.  VASCULAR:  Circulation is intact with palpable pedal pulses and adequate capillary fill time to all digits.  Hair growth is present and appropriate to mid foot and digits.  NEUROLOGIC:  Light touch sensation is intact to digits.  There is a negative Tinel sign.  There are no signs of apparent nerve entrapment of superficial peroneal or common peroneal nerves.  INTEGUMENT:  No abnormal dermatologic lesions are noted.  Skin has normal texture and turgor.    MUSCULOSKELETAL:  Tender to palpate overlying the prominence of the first TMT.  It is a fairly rigid joint here, but there is some micromotion tender with manipulation tender to palpate this prominence.  Similar changes to second, third with evident posttraumatic spurring and arthrosis here.  Right foot exam reveals some ankle pain.  Equinus is evident.  She has mild valgus and collapse of medial longitudinal arch, less symptomatic than the left currently.    IMAGING:  Three views of bilateral feet were taken.  Left foot reveals postsurgical changes, intact hardware.  Joint spaces are still evident, but significant spurring and posttraumatic arthritic changes evident.  Right foot x-rays demonstrated again some mild midfoot arthrosis.  There is some anterior tibial spurring and ankle arthrosis evident.  No acute concerns found.  Lymphedema and excessive tissue was evident at the ankle and proximal.    ASSESSMENT:  Left foot posttraumatic  arthrosis, retained hardware, some of which is broken.  Dorsal foot spurring.  Right ankle arthrosis, mild midfoot arthrosis.    PLAN:  I discussed condition and treatment with the patient today.  We did discuss options given prominence, bony spurring, associated symptoms.  I think surgery here is appropriate.  Surgery would consist of hardware removal and formal joint fusions of at least first and second, and possible third TMT.  I discussed this in detail including surgery, recovery, risks, potential complications, alternatives and benefits.  She will schedule this at her convenience, get a preop prior to proceeding, follow up accordingly postop.    Anthony Castillo DPM        D: 2022   T: 2022   MT: MARGARETTE    Name:     BEATRICE BOYCESimone  MRN:      [id number]        Account:    [id number]   :      1975           Visit Date: 2022     Document: [id number]

## 2022-03-24 NOTE — PROGRESS NOTES
Patient is here for consult on her feet.  Judi Benoit LPN .....................3/24/2022 9:55 AM

## 2022-04-05 ENCOUNTER — OFFICE VISIT (OUTPATIENT)
Dept: INTERNAL MEDICINE | Facility: OTHER | Age: 47
End: 2022-04-05
Attending: NURSE PRACTITIONER
Payer: MEDICARE

## 2022-04-05 VITALS
SYSTOLIC BLOOD PRESSURE: 128 MMHG | TEMPERATURE: 97.8 F | HEART RATE: 74 BPM | DIASTOLIC BLOOD PRESSURE: 84 MMHG | OXYGEN SATURATION: 98 % | BODY MASS INDEX: 45.55 KG/M2 | RESPIRATION RATE: 16 BRPM | HEIGHT: 66 IN | WEIGHT: 283.4 LBS

## 2022-04-05 DIAGNOSIS — Z01.818 PREOP GENERAL PHYSICAL EXAM: Primary | ICD-10-CM

## 2022-04-05 PROBLEM — G89.29 BILATERAL CHRONIC KNEE PAIN: Chronic | Status: ACTIVE | Noted: 2017-04-20

## 2022-04-05 PROBLEM — M25.561 BILATERAL CHRONIC KNEE PAIN: Chronic | Status: ACTIVE | Noted: 2017-04-20

## 2022-04-05 PROBLEM — M25.562 BILATERAL CHRONIC KNEE PAIN: Chronic | Status: ACTIVE | Noted: 2017-04-20

## 2022-04-05 PROBLEM — F19.11 HISTORY OF SUBSTANCE ABUSE (H): Chronic | Status: ACTIVE | Noted: 2018-02-08

## 2022-04-05 PROBLEM — R87.619 ABNORMAL PAP SMEAR OF CERVIX: Status: ACTIVE | Noted: 2018-04-11

## 2022-04-05 LAB
ALBUMIN SERPL-MCNC: 4.3 G/DL (ref 3.5–5.7)
ALP SERPL-CCNC: 58 U/L (ref 34–104)
ALT SERPL W P-5'-P-CCNC: 21 U/L (ref 7–52)
ANION GAP SERPL CALCULATED.3IONS-SCNC: 5 MMOL/L (ref 3–14)
AST SERPL W P-5'-P-CCNC: 22 U/L (ref 13–39)
BASOPHILS # BLD AUTO: 0 10E3/UL (ref 0–0.2)
BASOPHILS NFR BLD AUTO: 1 %
BILIRUB SERPL-MCNC: 0.5 MG/DL (ref 0.3–1)
BUN SERPL-MCNC: 14 MG/DL (ref 7–25)
CALCIUM SERPL-MCNC: 10.1 MG/DL (ref 8.6–10.3)
CHLORIDE BLD-SCNC: 103 MMOL/L (ref 98–107)
CO2 SERPL-SCNC: 33 MMOL/L (ref 21–31)
CREAT SERPL-MCNC: 1.03 MG/DL (ref 0.6–1.2)
EOSINOPHIL # BLD AUTO: 0.2 10E3/UL (ref 0–0.7)
EOSINOPHIL NFR BLD AUTO: 3 %
ERYTHROCYTE [DISTWIDTH] IN BLOOD BY AUTOMATED COUNT: 13.1 % (ref 10–15)
GFR SERPL CREATININE-BSD FRML MDRD: 68 ML/MIN/1.73M2
GLUCOSE BLD-MCNC: 103 MG/DL (ref 70–105)
HCT VFR BLD AUTO: 40.9 % (ref 35–47)
HGB BLD-MCNC: 13.2 G/DL (ref 11.7–15.7)
IMM GRANULOCYTES # BLD: 0 10E3/UL
IMM GRANULOCYTES NFR BLD: 0 %
LYMPHOCYTES # BLD AUTO: 2.2 10E3/UL (ref 0.8–5.3)
LYMPHOCYTES NFR BLD AUTO: 25 %
MCH RBC QN AUTO: 30.3 PG (ref 26.5–33)
MCHC RBC AUTO-ENTMCNC: 32.3 G/DL (ref 31.5–36.5)
MCV RBC AUTO: 94 FL (ref 78–100)
MONOCYTES # BLD AUTO: 0.8 10E3/UL (ref 0–1.3)
MONOCYTES NFR BLD AUTO: 9 %
NEUTROPHILS # BLD AUTO: 5.5 10E3/UL (ref 1.6–8.3)
NEUTROPHILS NFR BLD AUTO: 62 %
NRBC # BLD AUTO: 0 10E3/UL
NRBC BLD AUTO-RTO: 0 /100
PLATELET # BLD AUTO: 248 10E3/UL (ref 150–450)
POTASSIUM BLD-SCNC: 4 MMOL/L (ref 3.5–5.1)
PROT SERPL-MCNC: 7.6 G/DL (ref 6.4–8.9)
RBC # BLD AUTO: 4.35 10E6/UL (ref 3.8–5.2)
SODIUM SERPL-SCNC: 141 MMOL/L (ref 134–144)
WBC # BLD AUTO: 8.8 10E3/UL (ref 4–11)

## 2022-04-05 PROCEDURE — G0463 HOSPITAL OUTPT CLINIC VISIT: HCPCS

## 2022-04-05 PROCEDURE — 93010 ELECTROCARDIOGRAM REPORT: CPT | Performed by: INTERNAL MEDICINE

## 2022-04-05 PROCEDURE — 93005 ELECTROCARDIOGRAM TRACING: CPT | Performed by: NURSE PRACTITIONER

## 2022-04-05 PROCEDURE — 85025 COMPLETE CBC W/AUTO DIFF WBC: CPT | Mod: ZL | Performed by: NURSE PRACTITIONER

## 2022-04-05 PROCEDURE — 80053 COMPREHEN METABOLIC PANEL: CPT | Mod: ZL | Performed by: NURSE PRACTITIONER

## 2022-04-05 PROCEDURE — 36415 COLL VENOUS BLD VENIPUNCTURE: CPT | Mod: ZL | Performed by: NURSE PRACTITIONER

## 2022-04-05 PROCEDURE — 82040 ASSAY OF SERUM ALBUMIN: CPT | Mod: ZL | Performed by: NURSE PRACTITIONER

## 2022-04-05 PROCEDURE — 99214 OFFICE O/P EST MOD 30 MIN: CPT | Performed by: NURSE PRACTITIONER

## 2022-04-05 ASSESSMENT — PAIN SCALES - GENERAL: PAINLEVEL: NO PAIN (0)

## 2022-04-05 NOTE — NURSING NOTE
"Chief Complaint   Patient presents with     Pre-Op Exam     L foot hardware removal/new hardware, 4/21/22, KEERTHI Condon       Initial /84 (BP Location: Right arm, Patient Position: Sitting, Cuff Size: Adult Large)   Pulse 74   Temp 97.8  F (36.6  C) (Temporal)   Resp 16   Ht 1.68 m (5' 6.13\")   Wt 128.5 kg (283 lb 6.4 oz)   LMP  (LMP Unknown)   SpO2 98%   Breastfeeding No   BMI 45.56 kg/m   Estimated body mass index is 45.56 kg/m  as calculated from the following:    Height as of this encounter: 1.68 m (5' 6.13\").    Weight as of this encounter: 128.5 kg (283 lb 6.4 oz).     Medication Reconciliation: complete      FOOD SECURITY SCREENING QUESTIONS:    The next two questions are to help us understand your food security.  If you are feeling you need any assistance in this area, we have resources available to support you today.    Hunger Vital Signs:  Within the past 12 months we worried whether our food would run out before we got money to buy more. Never  Within the past 12 months the food we bought just didn't last and we didn't have money to get more. Never     Betty Doshi LPN,LPN on 4/5/2022 at 4:16 PM        Advance care plan reviewed      Betty Doshi LPN    "

## 2022-04-05 NOTE — H&P (VIEW-ONLY)
[facility] AND [facility]  [address]  [place] [address]  Phone: [phone]  Primary Provider: Lucio Curiel  Pre-op Performing Provider: KAREN SIMMONS    PREOPERATIVE EVALUATION:  Today's date: 4/5/2022    Rosi Lamb is a 47 year old female who presents for a preoperative evaluation.    Surgical Information:  Surgery/Procedure: left foot hardware removal,  bone graft from caclaneus, fusion of 1st-2nd TMT  Surgery Location: [facility]  Surgeon: Dr. Castillo  Surgery Date: 4/21/22  Time of Surgery: TBD  Where patient plans to recover: At home with family  Fax number for surgical facility: [facility]    Type of Anesthesia Anticipated: General    Assessment & Plan     The proposed surgical procedure is considered INTERMEDIATE risk.    Preop general physical exam  Prior left foot injury requiring placement of hardware  - EKG 12-lead ([facility] and [facility] Only)  - Comprehensive Metabolic Panel  - CBC with Platelets & Differential ([facility] Only)    Risks and Recommendations:  The patient has the following additional risks and recommendations for perioperative complications:   - No identified additional risk factors other than previously addressed    Medication Instructions:  Patient is to take all scheduled medications on the day of surgery EXCEPT for modifications listed below:   - ibuprofen (Advil, Motrin): HOLD 1 day before surgery.    - naproxen (Aleve, Naprosyn): HOLD 4 days before surgery.    - Benzodiazepines: Continue without modification.   - SSRIs, SNRIs, TCAs, Antipsychotics: Continue without modification.     RECOMMENDATION:  APPROVAL GIVEN to proceed with proposed procedure, without further diagnostic evaluation.    30 minutes spent on the date of the encounter doing chart review, history and exam, documentation and further activities per the note    Subjective     HPI related to upcoming procedure: left foot pain, prior injury. history of a Lisfranc injury, left foot, fixed in 2010.   She has a prominence associated with what she calls hardware and pain associated with this.  The patient also has some evident lymphedema bilaterally.    Preop Questions 4/5/2022   1. Have you ever had a heart attack or stroke? No   2. Have you ever had surgery on your heart or blood vessels, such as a stent placement, a coronary artery bypass, or surgery on an artery in your head, neck, heart, or legs? No   3. Do you have chest pain with activity? No   4. Do you have a history of  heart failure? No   5. Do you currently have a cold, bronchitis or symptoms of other infection? No   6. Do you have a cough, shortness of breath, or wheezing? No   7. Do you or anyone in your family have previous history of blood clots? No   8. Do you or does anyone in your family have a serious bleeding problem such as prolonged bleeding following surgeries or cuts? No   9. Have you ever had problems with anemia or been told to take iron pills? No   10. Have you had any abnormal blood loss such as black, tarry or bloody stools, or abnormal vaginal bleeding? No   11. Have you ever had a blood transfusion? No   12. Are you willing to have a blood transfusion if it is medically needed before, during, or after your surgery? Yes   13. Have you or any of your relatives ever had problems with anesthesia? No   14. Do you have sleep apnea, excessive snoring or daytime drowsiness? No   15. Do you have any artifical heart valves or other implanted medical devices like a pacemaker, defibrillator, or continuous glucose monitor? No   16. Do you have artificial joints? No   17. Are you allergic to latex? No   18. Is there any chance that you may be pregnant? No     Health Care Directive:  Patient has a Health Care Directive on file    Preoperative Review of :   reviewed - no record of controlled substances prescribed.    Status of Chronic Conditions:  See problem list for active medical problems.  Problems all longstanding and stable, except as  noted/documented.  See ROS for pertinent symptoms related to these conditions.    ASTHMA - Patient has a longstanding history of moderate-severe Asthma . Patient has been doing well overall noting NO SYMPTOMS and continues on medication regimen consisting of SINGULAIR without adverse reactions or side effects.     DEPRESSION - Patient has a long history of Depression of moderate severity requiring medication for control with recent symptoms being stable..Current symptoms of depression include none.     Review of Systems  Constitutional, neuro, ENT, endocrine, pulmonary, cardiac, gastrointestinal, genitourinary, musculoskeletal, integument and psychiatric systems are negative, except as otherwise noted.    Patient Active Problem List    Diagnosis Date Noted     Nail dystrophy 02/27/2022     Priority: Medium     Intermittent low back pain 09/08/2021     Priority: Medium     Allergic rhinitis due to pollen 02/08/2018     Priority: Medium     Esophageal reflux 02/08/2018     Priority: Medium     History of substance abuse (H) 02/08/2018     Priority: Medium     Bilateral chronic knee pain 04/20/2017     Priority: Medium     Crepitus of joint of right knee 04/20/2017     Priority: Medium     Menorrhagia with irregular cycle 02/16/2017     Priority: Medium     Bilateral foot pain 12/18/2016     Priority: Medium     Elevated random blood glucose level 12/18/2016     Priority: Medium     Peripheral polyneuropathy 12/18/2016     Priority: Medium     Vitamin B12 deficiency 12/18/2016     Priority: Medium     Vitamin D deficiency 12/18/2016     Priority: Medium     Lymphedema of both lower extremities 11/22/2016     Priority: Medium     Alcohol use disorder, moderate, in sustained remission, dependence (H) 11/28/2015     Priority: Medium     Generalized anxiety disorder 11/28/2015     Priority: Medium     Bipolar I disorder, most recent episode depressed, moderate (H) 11/28/2015     Priority: Medium     Morbid obesity with BMI  of 40.0-44.9, adult (H) 2015     Priority: Medium     Moderate persistent asthma 2013     Priority: Medium     AAP completed on 13  Triggers: pollen, fumes, exercise, URI, warm weather. Peak flow today is 250. Symptomatic: moderate       Urinary incontinence 03/15/2013     Priority: Medium     Borderline personality disorder (H) 2003     Priority: Medium     Other disorder of menstruation and other abnormal bleeding from female genital tract 2001     Priority: Medium     Overview:   DUB, dysmenorrhea  Overview:   Overview:   DUB, dysmenorrhea        Past Medical History:   Diagnosis Date     Abnormal Pap smear of cervix 2018    Overview:  had scraping of cervix     Cannabis use disorder, moderate, in sustained remission, dependence (H) 2015     Closed dislocation of tarsal joint 2011     Edema     No Comments Provided     Encounter for removal and reinsertion of intrauterine contraceptive device     05,IUD placement, Removed     Excessive and frequent menstruation with irregular cycle     2017     Major depressive disorder, single episode     No Comments Provided     Personal history of other medical treatment (CODE)     G3, P2-0-1-2 with history of spontaneous      Personal history of other medical treatment (CODE)     No Comments Provided     Uncomplicated asthma     No Comments Provided     Past Surgical History:   Procedure Laterality Date     ANKLE SURGERY      fracture, repair with screws     CONIZATION LEEP      ,Underwent a loop     LAPAROSCOPIC TUBAL LIGATION      ,tubal ligation     Current Outpatient Medications   Medication Sig Dispense Refill     acetaminophen (TYLENOL) 500 MG tablet Take 1 tablet (500 mg) by mouth every 6 hours as needed for fever or pain 100 tablet 5     albuterol (VENTOLIN HFA) 108 (90 Base) MCG/ACT inhaler Inhale 1-2 puffs into the lungs every 4 hours as needed for shortness of breath / dyspnea or wheezing 18 g  "5     ARIPiprazole (ABILIFY) 15 MG tablet Take 15 mg by mouth daily Thurston       cholecalciferol (VITAMIN D3) 125 mcg (5000 units) capsule Take 1 capsule (125 mcg) by mouth daily 100 capsule 4     cyanocobalamin (CYANOCOBALAMIN) 1000 MCG/ML injection INJECT 1ML INTRAMUSCULARLY EVERY 2 WEEKS 6 mL 2     famotidine (PEPCID) 20 MG tablet Take 1 tablet (20 mg) by mouth 2 times daily - For Heartburn 180 tablet 4     fluticasone (FLONASE) 50 MCG/ACT nasal spray Spray 1 spray into both nostrils daily as needed for rhinitis or allergies 16 g 0     gabapentin (NEURONTIN) 300 MG capsule 300 mg po BID and 300 mg po PRN at Noon -- Rx by Psych -- Fabienne Dnucan       hydrOXYzine (ATARAX) 50 MG tablet Take 50 mg by mouth 2 times daily as needed Thurston       hydrOXYzine (VISTARIL) 50 MG capsule Take 50 mg by mouth 2 times daily as needed Thurston       ibuprofen (ADVIL/MOTRIN) 200 MG tablet Take 600 mg by mouth every 6 hours as needed for pain       lamoTRIgine (LAMICTAL) 200 MG tablet Luckey  2     lamoTRIgine (LAMICTAL) 25 MG tablet Take 1 tablet by mouth At Bedtime With 200 mg  Thurston  2     methocarbamol (ROBAXIN) 500 MG tablet Take 1 tablet (500 mg) by mouth 4 times daily as needed for muscle spasms       montelukast (SINGULAIR) 10 MG tablet Take 1 tablet (10 mg) by mouth At Bedtime 90 tablet 4     oxybutynin ER (DITROPAN-XL) 10 MG 24 hr tablet Take 1 tablet (10 mg) by mouth daily 90 tablet 4     SF 1.1 % GEL topical gel BRUSH, SWISH TOOTHPASTE FOR 30 SECONDS AND SPIT BEFORE BED. DO NOT RINSE, EAT OR DRINK FOR 30 MINUTES       traZODone (DESYREL) 50 MG tablet TAKE 1/2 TABLET TO 1 TABLET BY MOUTH NIGHTLY AT BEDTIME AS NEEDED FOR SLEEP       Tuberculin-Allergy Syringes (B-D TB SYRINGE) 25G X 5/8\" 1 ML MISC USE TO INJECT B-12 INTRAMUSCULARLY EVERY 2 WEEKS 50 each 0     vilazodone (VIIBRYD) 40 MG TABS tablet Take 40 mg by mouth daily with food Thurston         Allergies   Allergen Reactions     Clonazepam Other (See " "Comments)     Causes Violence and aggresssion     Hydrocodone-Acetaminophen      Other reaction(s): Seizures  Can take Percocet without difficulty.     Lorazepam Other (See Comments)     Causes Violence and aggresssion     Sertraline Other (See Comments)     Caused suicidally      Bupropion Other (See Comments)     Caused Major Depression     Dust Mite Extract      Other reaction(s): Asthma symptoms     Lithium Other (See Comments)     Mood alteration     Pollen Extract      Other reaction(s): Asthma symptoms     Risperidone Other (See Comments)     Agitation       Sulfa Drugs Itching     Trichophyton      Other reaction(s): Asthma symptoms     Valproic Acid Other (See Comments)     Weight gain        Social History     Tobacco Use     Smoking status: Former Smoker     Packs/day: 1.00     Years: 3.00     Pack years: 3.00     Types: Cigarettes     Quit date: 2003     Years since quittin.2     Smokeless tobacco: Never Used   Substance Use Topics     Alcohol use: Not Currently     Alcohol/week: 0.0 standard drinks     Family History   Problem Relation Age of Onset     Osteoporosis Mother      Asthma Father         Asthma,+ Leg edema, knee, hip replacement. + Lymphedema     Heart Failure Father      Other - See Comments Paternal Grandmother         Lymphedema     Osteoporosis Brother         Osteoporosis     Other - See Comments Brother         No Known Problems     History   Drug Use Unknown         Objective     /84 (BP Location: Right arm, Patient Position: Sitting, Cuff Size: Adult Large)   Pulse 74   Temp 97.8  F (36.6  C) (Temporal)   Resp 16   Ht 1.68 m (5' 6.13\")   Wt 128.5 kg (283 lb 6.4 oz)   LMP  (LMP Unknown)   SpO2 98%   Breastfeeding No   BMI 45.56 kg/m      Physical Exam    GENERAL APPEARANCE: healthy, alert and no distress     EYES: EOMI, PERRL     RESP: lungs clear to auscultation - no rales, rhonchi or wheezes     CV: regular rates and rhythm, normal S1 S2, no S3 or S4 and no " murmur, click or rub     ABDOMEN:  soft, nontender, no HSM or masses and bowel sounds normal     MS: extremities normal- no gross deformities noted, no evidence of inflammation in joints, FROM in all extremities.     SKIN: no suspicious lesions or rashes     NEURO: Normal strength and tone, sensory exam grossly normal, mentation intact and speech normal     PSYCH: mentation appears normal. and affect normal/bright    Recent Labs   Lab Test 03/18/22  1131 11/05/21  1518 03/12/21  1006   HGB 13.2  --  13.6     --  224    139 140   POTASSIUM 4.0 4.1 4.2   CR 1.00 1.08 0.99   A1C 5.2  --  4.9      Diagnostics:  Recent Results (from the past 24 hour(s))   CBC with platelets and differential    Collection Time: 04/05/22  4:07 PM   Result Value Ref Range    WBC Count 8.8 4.0 - 11.0 10e3/uL    RBC Count 4.35 3.80 - 5.20 10e6/uL    Hemoglobin 13.2 11.7 - 15.7 g/dL    Hematocrit 40.9 35.0 - 47.0 %    MCV 94 78 - 100 fL    MCH 30.3 26.5 - 33.0 pg    MCHC 32.3 31.5 - 36.5 g/dL    RDW 13.1 10.0 - 15.0 %    Platelet Count 248 150 - 450 10e3/uL    % Neutrophils 62 %    % Lymphocytes 25 %    % Monocytes 9 %    % Eosinophils 3 %    % Basophils 1 %    % Immature Granulocytes 0 %    NRBCs per 100 WBC 0 <1 /100    Absolute Neutrophils 5.5 1.6 - 8.3 10e3/uL    Absolute Lymphocytes 2.2 0.8 - 5.3 10e3/uL    Absolute Monocytes 0.8 0.0 - 1.3 10e3/uL    Absolute Eosinophils 0.2 0.0 - 0.7 10e3/uL    Absolute Basophils 0.0 0.0 - 0.2 10e3/uL    Absolute Immature Granulocytes 0.0 <=0.4 10e3/uL    Absolute NRBCs 0.0 10e3/uL   EKG 12-lead ([facility] Only)    Collection Time: 04/05/22  4:34 PM   Result Value Ref Range    Systolic Blood Pressure  mmHg    Diastolic Blood Pressure  mmHg    Ventricular Rate 65 BPM    Atrial Rate 65 BPM    IA Interval 182 ms    QRS Duration 100 ms     ms    QTc 445 ms    P Axis 52 degrees    R AXIS 53 degrees    T Axis 48 degrees    Interpretation ECG       Sinus rhythm  Normal ECG  No  "previous ECGs available        EKG: appears normal, NSR, unchanged from previous tracings    Revised Cardiac Risk Index (RCRI):  The patient has the following serious cardiovascular risks for perioperative complications:   - No serious cardiac risks = 0 points     RCRI Interpretation: 0 points: Class I (very low risk - 0.4% complication rate)     Signed Electronically by: Maria Del Rosario Cox NP  Copy of this evaluation report is provided to requesting physician.    Nursing Notes:   Betty Doshi LPN  4/5/2022  4:16 PM  Sign at exiting of workspace  Chief Complaint   Patient presents with     Pre-Op Exam     L foot hardware removal/new hardware, 4/21/22, KEERTHI Condon       Initial /84 (BP Location: Right arm, Patient Position: Sitting, Cuff Size: Adult Large)   Pulse 74   Temp 97.8  F (36.6  C) (Temporal)   Resp 16   Ht 1.68 m (5' 6.13\")   Wt 128.5 kg (283 lb 6.4 oz)   LMP  (LMP Unknown)   SpO2 98%   Breastfeeding No   BMI 45.56 kg/m   Estimated body mass index is 45.56 kg/m  as calculated from the following:    Height as of this encounter: 1.68 m (5' 6.13\").    Weight as of this encounter: 128.5 kg (283 lb 6.4 oz).     Medication Reconciliation: complete      FOOD SECURITY SCREENING QUESTIONS:    The next two questions are to help us understand your food security.  If you are feeling you need any assistance in this area, we have resources available to support you today.    Hunger Vital Signs:  Within the past 12 months we worried whether our food would run out before we got money to buy more. Never  Within the past 12 months the food we bought just didn't last and we didn't have money to get more. Never     Betty Doshi LPN, LPN on 4/5/2022 at 4:16 PM        Advance care plan reviewed      Betty Doshi LPN      "

## 2022-04-05 NOTE — PATIENT INSTRUCTIONS
Medication Instructions:  Patient is to take all scheduled medications on the day of surgery EXCEPT for modifications listed below:   - ibuprofen (Advil, Motrin): HOLD 7 days before surgery.    - naproxen (Aleve, Naprosyn): HOLD 7 days before surgery.    - Benzodiazepines: Continue without modification.   - SSRIs, SNRIs, TCAs, Antipsychotics: Continue without modification.     Preparing for Your Surgery  Getting started  A nurse will call you to review your health history and instructions. They will give you an arrival time based on your scheduled surgery time. Please be ready to share:    Your doctor's clinic name and phone number    Your medical, surgical and anesthesia history    A list of allergies and sensitivities    A list of medicines, including herbal treatments and over-the-counter drugs    Whether the patient has a legal guardian (ask how to send us the papers in advance)  Please tell us if you're pregnant--or if there's any chance you might be pregnant. Some surgeries may injure a fetus (unborn baby), so they require a pregnancy test. Surgeries that are safe for a fetus don't always need a test, and you can choose whether to have one.   If you have a child who's having surgery, please ask for a copy of Preparing for Your Child's Surgery.    Preparing for surgery    Within 30 days of surgery: Have a pre-op exam (sometimes called an H&P, or History and Physical). This can be done at a clinic or pre-operative center.  ? If you're having a , you may not need this exam. Talk to your care team.    At your pre-op exam, talk to your care team about all medicines you take. If you need to stop any medicines before surgery, ask when to start taking them again.  ? We do this for your safety. Many medicines can make you bleed too much during surgery. Some change how well surgery (anesthesia) drugs work.    Call your insurance company to let them know you're having surgery. (If you don't have insurance, call  [phone].)    Call your clinic if there's any change in your health. This includes signs of a cold or flu (sore throat, runny nose, cough, rash, fever). It also includes a scrape or scratch near the surgery site.    If you have questions on the day of surgery, call your hospital or surgery center.  COVID testing  You may need to be tested for COVID-19 before having surgery. If so, your surgical team will give you instructions for scheduling this test, separate from your preoperative history and physical.  Eating and drinking guidelines  For your safety: Unless your surgeon tells you otherwise, follow the guidelines below.    Eat and drink as usual until 8 hours before surgery. After that, no food or milk.    Drink clear liquids until 2 hours before surgery. These are liquids you can see through, like water, Gatorade and Propel Water. You may also have black coffee and tea (no cream or milk).    Nothing by mouth within 2 hours of surgery. This includes gum, candy and breath mints.    If you drink alcohol: Stop drinking it the night before surgery.    If your care team tells you to take medicine on the morning of surgery, it's okay to take it with a sip of water.  Preventing infection    Shower or bathe the night before and morning of your surgery. Follow the instructions your clinic gave you. (If no instructions, use regular soap.)    Don't shave or clip hair near your surgery site. We'll remove the hair if needed.    Don't smoke or vape the morning of surgery. You may chew nicotine gum up to 2 hours before surgery. A nicotine patch is okay.  ? Note: Some surgeries require you to completely quit smoking and nicotine. Check with your surgeon.    Your care team will make every effort to keep you safe from infection. We will:  ? Clean our hands often with soap and water (or an alcohol-based hand rub).  ? Clean the skin at your surgery site with a special soap that kills germs.  ? Give you a special gown to keep you  warm. (Cold raises the risk of infection.)  ? Wear special hair covers, masks, gowns and gloves during surgery.  ? Give antibiotic medicine, if prescribed. Not all surgeries need antibiotics.  What to bring on the day of surgery    Photo ID and insurance card    Copy of your health care directive, if you have one    Glasses and hearing aides (bring cases)  ? You can't wear contacts during surgery    Inhaler and eye drops, if you use them (tell us about these when you arrive)    CPAP machine or breathing device, if you use them    A few personal items, if spending the night    If you have . . .  ? A pacemaker, ICD (cardiac defibrillator) or other implant: Bring the ID card.  ? An implanted stimulator: Bring the remote control.  ? A legal guardian: Bring a copy of the certified (court-stamped) guardianship papers.  Please remove any jewelry, including body piercings. Leave jewelry and other valuables at home.  If you're going home the day of surgery    You must have a responsible adult drive you home. They should stay with you overnight as well.    If you don't have someone to stay with you, and you aren't safe to go home alone, we may keep you overnight. Insurance often won't pay for this.  After surgery  If it's hard to control your pain or you need more pain medicine, please call your surgeon's office.  Questions?   If you have any questions for your care team, list them here: _________________________________________________________________________________________________________________________________________________________________________ ____________________________________ ____________________________________ ____________________________________  For informational purposes only. Not to replace the advice of your health care provider. Copyright   2003, 2019 [facility]. All rights reserved. Clinically reviewed by Linda Jaimes MD. [employer] [id number] - REV 07/21.

## 2022-04-05 NOTE — PROGRESS NOTES
[facility] AND [facility]  [address]  [place] [address]  Phone: [phone]  Primary Provider: Lucio Curiel  Pre-op Performing Provider: KAREN SIMMONS    PREOPERATIVE EVALUATION:  Today's date: 4/5/2022    Rosi Lamb is a 47 year old female who presents for a preoperative evaluation.    Surgical Information:  Surgery/Procedure: left foot hardware removal,  bone graft from caclaneus, fusion of 1st-2nd TMT  Surgery Location: [facility]  Surgeon: Dr. Castillo  Surgery Date: 4/21/22  Time of Surgery: TBD  Where patient plans to recover: At home with family  Fax number for surgical facility: [facility]    Type of Anesthesia Anticipated: General    Assessment & Plan     The proposed surgical procedure is considered INTERMEDIATE risk.    Preop general physical exam  Prior left foot injury requiring placement of hardware  - EKG 12-lead ([facility] and [facility] Only)  - Comprehensive Metabolic Panel  - CBC with Platelets & Differential ([facility] Only)    Risks and Recommendations:  The patient has the following additional risks and recommendations for perioperative complications:   - No identified additional risk factors other than previously addressed    Medication Instructions:  Patient is to take all scheduled medications on the day of surgery EXCEPT for modifications listed below:   - ibuprofen (Advil, Motrin): HOLD 1 day before surgery.    - naproxen (Aleve, Naprosyn): HOLD 4 days before surgery.    - Benzodiazepines: Continue without modification.   - SSRIs, SNRIs, TCAs, Antipsychotics: Continue without modification.     RECOMMENDATION:  APPROVAL GIVEN to proceed with proposed procedure, without further diagnostic evaluation.    30 minutes spent on the date of the encounter doing chart review, history and exam, documentation and further activities per the note    Subjective     HPI related to upcoming procedure: left foot pain, prior injury. history of a Lisfranc injury, left foot, fixed in 2010.   She has a prominence associated with what she calls hardware and pain associated with this.  The patient also has some evident lymphedema bilaterally.    Preop Questions 4/5/2022   1. Have you ever had a heart attack or stroke? No   2. Have you ever had surgery on your heart or blood vessels, such as a stent placement, a coronary artery bypass, or surgery on an artery in your head, neck, heart, or legs? No   3. Do you have chest pain with activity? No   4. Do you have a history of  heart failure? No   5. Do you currently have a cold, bronchitis or symptoms of other infection? No   6. Do you have a cough, shortness of breath, or wheezing? No   7. Do you or anyone in your family have previous history of blood clots? No   8. Do you or does anyone in your family have a serious bleeding problem such as prolonged bleeding following surgeries or cuts? No   9. Have you ever had problems with anemia or been told to take iron pills? No   10. Have you had any abnormal blood loss such as black, tarry or bloody stools, or abnormal vaginal bleeding? No   11. Have you ever had a blood transfusion? No   12. Are you willing to have a blood transfusion if it is medically needed before, during, or after your surgery? Yes   13. Have you or any of your relatives ever had problems with anesthesia? No   14. Do you have sleep apnea, excessive snoring or daytime drowsiness? No   15. Do you have any artifical heart valves or other implanted medical devices like a pacemaker, defibrillator, or continuous glucose monitor? No   16. Do you have artificial joints? No   17. Are you allergic to latex? No   18. Is there any chance that you may be pregnant? No     Health Care Directive:  Patient has a Health Care Directive on file    Preoperative Review of :   reviewed - no record of controlled substances prescribed.    Status of Chronic Conditions:  See problem list for active medical problems.  Problems all longstanding and stable, except as  noted/documented.  See ROS for pertinent symptoms related to these conditions.    ASTHMA - Patient has a longstanding history of moderate-severe Asthma . Patient has been doing well overall noting NO SYMPTOMS and continues on medication regimen consisting of SINGULAIR without adverse reactions or side effects.     DEPRESSION - Patient has a long history of Depression of moderate severity requiring medication for control with recent symptoms being stable..Current symptoms of depression include none.     Review of Systems  Constitutional, neuro, ENT, endocrine, pulmonary, cardiac, gastrointestinal, genitourinary, musculoskeletal, integument and psychiatric systems are negative, except as otherwise noted.    Patient Active Problem List    Diagnosis Date Noted     Nail dystrophy 02/27/2022     Priority: Medium     Intermittent low back pain 09/08/2021     Priority: Medium     Allergic rhinitis due to pollen 02/08/2018     Priority: Medium     Esophageal reflux 02/08/2018     Priority: Medium     History of substance abuse (H) 02/08/2018     Priority: Medium     Bilateral chronic knee pain 04/20/2017     Priority: Medium     Crepitus of joint of right knee 04/20/2017     Priority: Medium     Menorrhagia with irregular cycle 02/16/2017     Priority: Medium     Bilateral foot pain 12/18/2016     Priority: Medium     Elevated random blood glucose level 12/18/2016     Priority: Medium     Peripheral polyneuropathy 12/18/2016     Priority: Medium     Vitamin B12 deficiency 12/18/2016     Priority: Medium     Vitamin D deficiency 12/18/2016     Priority: Medium     Lymphedema of both lower extremities 11/22/2016     Priority: Medium     Alcohol use disorder, moderate, in sustained remission, dependence (H) 11/28/2015     Priority: Medium     Generalized anxiety disorder 11/28/2015     Priority: Medium     Bipolar I disorder, most recent episode depressed, moderate (H) 11/28/2015     Priority: Medium     Morbid obesity with BMI  of 40.0-44.9, adult (H) 2015     Priority: Medium     Moderate persistent asthma 2013     Priority: Medium     AAP completed on 13  Triggers: pollen, fumes, exercise, URI, warm weather. Peak flow today is 250. Symptomatic: moderate       Urinary incontinence 03/15/2013     Priority: Medium     Borderline personality disorder (H) 2003     Priority: Medium     Other disorder of menstruation and other abnormal bleeding from female genital tract 2001     Priority: Medium     Overview:   DUB, dysmenorrhea  Overview:   Overview:   DUB, dysmenorrhea        Past Medical History:   Diagnosis Date     Abnormal Pap smear of cervix 2018    Overview:  had scraping of cervix     Cannabis use disorder, moderate, in sustained remission, dependence (H) 2015     Closed dislocation of tarsal joint 2011     Edema     No Comments Provided     Encounter for removal and reinsertion of intrauterine contraceptive device     05,IUD placement, Removed     Excessive and frequent menstruation with irregular cycle     2017     Major depressive disorder, single episode     No Comments Provided     Personal history of other medical treatment (CODE)     G3, P2-0-1-2 with history of spontaneous      Personal history of other medical treatment (CODE)     No Comments Provided     Uncomplicated asthma     No Comments Provided     Past Surgical History:   Procedure Laterality Date     ANKLE SURGERY      fracture, repair with screws     CONIZATION LEEP      ,Underwent a loop     LAPAROSCOPIC TUBAL LIGATION      ,tubal ligation     Current Outpatient Medications   Medication Sig Dispense Refill     acetaminophen (TYLENOL) 500 MG tablet Take 1 tablet (500 mg) by mouth every 6 hours as needed for fever or pain 100 tablet 5     albuterol (VENTOLIN HFA) 108 (90 Base) MCG/ACT inhaler Inhale 1-2 puffs into the lungs every 4 hours as needed for shortness of breath / dyspnea or wheezing 18 g  "5     ARIPiprazole (ABILIFY) 15 MG tablet Take 15 mg by mouth daily [place]       cholecalciferol (VITAMIN D3) 125 mcg (5000 units) capsule Take 1 capsule (125 mcg) by mouth daily 100 capsule 4     cyanocobalamin (CYANOCOBALAMIN) 1000 MCG/ML injection INJECT 1ML INTRAMUSCULARLY EVERY 2 WEEKS 6 mL 2     famotidine (PEPCID) 20 MG tablet Take 1 tablet (20 mg) by mouth 2 times daily - For Heartburn 180 tablet 4     fluticasone (FLONASE) 50 MCG/ACT nasal spray Spray 1 spray into both nostrils daily as needed for rhinitis or allergies 16 g 0     gabapentin (NEURONTIN) 300 MG capsule 300 mg po BID and 300 mg po PRN at Noon -- Rx by Psych -- Fabienne Duncan       hydrOXYzine (ATARAX) 50 MG tablet Take 50 mg by mouth 2 times daily as needed [place]       hydrOXYzine (VISTARIL) 50 MG capsule Take 50 mg by mouth 2 times daily as needed [place]       ibuprofen (ADVIL/MOTRIN) 200 MG tablet Take 600 mg by mouth every 6 hours as needed for pain       lamoTRIgine (LAMICTAL) 200 MG tablet Protivin  2     lamoTRIgine (LAMICTAL) 25 MG tablet Take 1 tablet by mouth At Bedtime With 200 mg  [place]  2     methocarbamol (ROBAXIN) 500 MG tablet Take 1 tablet (500 mg) by mouth 4 times daily as needed for muscle spasms       montelukast (SINGULAIR) 10 MG tablet Take 1 tablet (10 mg) by mouth At Bedtime 90 tablet 4     oxybutynin ER (DITROPAN-XL) 10 MG 24 hr tablet Take 1 tablet (10 mg) by mouth daily 90 tablet 4     SF 1.1 % GEL topical gel BRUSH, SWISH TOOTHPASTE FOR 30 SECONDS AND SPIT BEFORE BED. DO NOT RINSE, EAT OR DRINK FOR 30 MINUTES       traZODone (DESYREL) 50 MG tablet TAKE 1/2 TABLET TO 1 TABLET BY MOUTH NIGHTLY AT BEDTIME AS NEEDED FOR SLEEP       Tuberculin-Allergy Syringes (B-D TB SYRINGE) 25G X 5/8\" 1 ML MISC USE TO INJECT B-12 INTRAMUSCULARLY EVERY 2 WEEKS 50 each 0     vilazodone (VIIBRYD) 40 MG TABS tablet Take 40 mg by mouth daily with food [place]         Allergies   Allergen Reactions     Clonazepam Other (See " "Comments)     Causes Violence and aggresssion     Hydrocodone-Acetaminophen      Other reaction(s): Seizures  Can take Percocet without difficulty.     Lorazepam Other (See Comments)     Causes Violence and aggresssion     Sertraline Other (See Comments)     Caused suicidally      Bupropion Other (See Comments)     Caused Major Depression     Dust Mite Extract      Other reaction(s): Asthma symptoms     Lithium Other (See Comments)     Mood alteration     Pollen Extract      Other reaction(s): Asthma symptoms     Risperidone Other (See Comments)     Agitation       Sulfa Drugs Itching     Trichophyton      Other reaction(s): Asthma symptoms     Valproic Acid Other (See Comments)     Weight gain        Social History     Tobacco Use     Smoking status: Former Smoker     Packs/day: 1.00     Years: 3.00     Pack years: 3.00     Types: Cigarettes     Quit date: 2003     Years since quittin.2     Smokeless tobacco: Never Used   Substance Use Topics     Alcohol use: Not Currently     Alcohol/week: 0.0 standard drinks     Family History   Problem Relation Age of Onset     Osteoporosis Mother      Asthma Father         Asthma,+ Leg edema, knee, hip replacement. + Lymphedema     Heart Failure Father      Other - See Comments Paternal Grandmother         Lymphedema     Osteoporosis Brother         Osteoporosis     Other - See Comments Brother         No Known Problems     History   Drug Use Unknown         Objective     /84 (BP Location: Right arm, Patient Position: Sitting, Cuff Size: Adult Large)   Pulse 74   Temp 97.8  F (36.6  C) (Temporal)   Resp 16   Ht 1.68 m (5' 6.13\")   Wt 128.5 kg (283 lb 6.4 oz)   LMP  (LMP Unknown)   SpO2 98%   Breastfeeding No   BMI 45.56 kg/m      Physical Exam    GENERAL APPEARANCE: healthy, alert and no distress     EYES: EOMI, PERRL     RESP: lungs clear to auscultation - no rales, rhonchi or wheezes     CV: regular rates and rhythm, normal S1 S2, no S3 or S4 and no " murmur, click or rub     ABDOMEN:  soft, nontender, no HSM or masses and bowel sounds normal     MS: extremities normal- no gross deformities noted, no evidence of inflammation in joints, FROM in all extremities.     SKIN: no suspicious lesions or rashes     NEURO: Normal strength and tone, sensory exam grossly normal, mentation intact and speech normal     PSYCH: mentation appears normal. and affect normal/bright    Recent Labs   Lab Test 03/18/22  1131 11/05/21  1518 03/12/21  1006   HGB 13.2  --  13.6     --  224    139 140   POTASSIUM 4.0 4.1 4.2   CR 1.00 1.08 0.99   A1C 5.2  --  4.9      Diagnostics:  Recent Results (from the past 24 hour(s))   CBC with platelets and differential    Collection Time: 04/05/22  4:07 PM   Result Value Ref Range    WBC Count 8.8 4.0 - 11.0 10e3/uL    RBC Count 4.35 3.80 - 5.20 10e6/uL    Hemoglobin 13.2 11.7 - 15.7 g/dL    Hematocrit 40.9 35.0 - 47.0 %    MCV 94 78 - 100 fL    MCH 30.3 26.5 - 33.0 pg    MCHC 32.3 31.5 - 36.5 g/dL    RDW 13.1 10.0 - 15.0 %    Platelet Count 248 150 - 450 10e3/uL    % Neutrophils 62 %    % Lymphocytes 25 %    % Monocytes 9 %    % Eosinophils 3 %    % Basophils 1 %    % Immature Granulocytes 0 %    NRBCs per 100 WBC 0 <1 /100    Absolute Neutrophils 5.5 1.6 - 8.3 10e3/uL    Absolute Lymphocytes 2.2 0.8 - 5.3 10e3/uL    Absolute Monocytes 0.8 0.0 - 1.3 10e3/uL    Absolute Eosinophils 0.2 0.0 - 0.7 10e3/uL    Absolute Basophils 0.0 0.0 - 0.2 10e3/uL    Absolute Immature Granulocytes 0.0 <=0.4 10e3/uL    Absolute NRBCs 0.0 10e3/uL   EKG 12-lead ([facility] Only)    Collection Time: 04/05/22  4:34 PM   Result Value Ref Range    Systolic Blood Pressure  mmHg    Diastolic Blood Pressure  mmHg    Ventricular Rate 65 BPM    Atrial Rate 65 BPM    TX Interval 182 ms    QRS Duration 100 ms     ms    QTc 445 ms    P Axis 52 degrees    R AXIS 53 degrees    T Axis 48 degrees    Interpretation ECG       Sinus rhythm  Normal ECG  No  "previous ECGs available        EKG: appears normal, NSR, unchanged from previous tracings    Revised Cardiac Risk Index (RCRI):  The patient has the following serious cardiovascular risks for perioperative complications:   - No serious cardiac risks = 0 points     RCRI Interpretation: 0 points: Class I (very low risk - 0.4% complication rate)     Signed Electronically by: Maria Del Rosario Cox NP  Copy of this evaluation report is provided to requesting physician.    Nursing Notes:   Betty Doshi LPN  4/5/2022  4:16 PM  Sign at exiting of workspace  Chief Complaint   Patient presents with     Pre-Op Exam     L foot hardware removal/new hardware, 4/21/22, KEERTHI Condon       Initial /84 (BP Location: Right arm, Patient Position: Sitting, Cuff Size: Adult Large)   Pulse 74   Temp 97.8  F (36.6  C) (Temporal)   Resp 16   Ht 1.68 m (5' 6.13\")   Wt 128.5 kg (283 lb 6.4 oz)   LMP  (LMP Unknown)   SpO2 98%   Breastfeeding No   BMI 45.56 kg/m   Estimated body mass index is 45.56 kg/m  as calculated from the following:    Height as of this encounter: 1.68 m (5' 6.13\").    Weight as of this encounter: 128.5 kg (283 lb 6.4 oz).     Medication Reconciliation: complete      FOOD SECURITY SCREENING QUESTIONS:    The next two questions are to help us understand your food security.  If you are feeling you need any assistance in this area, we have resources available to support you today.    Hunger Vital Signs:  Within the past 12 months we worried whether our food would run out before we got money to buy more. Never  Within the past 12 months the food we bought just didn't last and we didn't have money to get more. Never     Betty Doshi LPN, LPN on 4/5/2022 at 4:16 PM        Advance care plan reviewed      Betty Doshi LPN      "

## 2022-04-06 LAB
ATRIAL RATE - MUSE: 65 BPM
DIASTOLIC BLOOD PRESSURE - MUSE: NORMAL MMHG
INTERPRETATION ECG - MUSE: NORMAL
P AXIS - MUSE: 52 DEGREES
PR INTERVAL - MUSE: 182 MS
QRS DURATION - MUSE: 100 MS
QT - MUSE: 428 MS
QTC - MUSE: 445 MS
R AXIS - MUSE: 53 DEGREES
SYSTOLIC BLOOD PRESSURE - MUSE: NORMAL MMHG
T AXIS - MUSE: 48 DEGREES
VENTRICULAR RATE- MUSE: 65 BPM

## 2022-04-08 DIAGNOSIS — Z00.00 HEALTHCARE MAINTENANCE: Primary | ICD-10-CM

## 2022-04-08 NOTE — TELEPHONE ENCOUNTER
Screening Questions for the Scheduling of Screening Colonoscopies   (If Colonoscopy is diagnostic, Provider should review the chart before scheduling.)  Are you younger than 50 or older than 80?  YES  Do you take aspirin or fish oil?  NO (if yes, tell patient to stop 1 week prior to Colonoscopy)  Do you take warfarin (Coumadin), clopidogrel (Plavix), apixaban (Eliquis), dabigatram (Pradaxa), rivaroxaban (Xarelto) or any blood thinner? NO  Do you use oxygen at home?  NO  Do you have kidney disease? NO  Are you on dialysis? NO  Have you had a stroke or heart attack in the last year? NO  Have you had a stent in your heart or any blood vessel in the last year? NO  Have you had a transplant of any organ? NO  Have you had a colonoscopy or upper endoscopy (EGD) before? NO  Date of scheduled Colonoscopy. 07/25/2022  Provider JAY  [facility]

## 2022-04-11 RX ORDER — BISACODYL 5 MG/1
TABLET, DELAYED RELEASE ORAL
Qty: 2 TABLET | Refills: 0 | Status: SHIPPED | OUTPATIENT
Start: 2022-04-11 | End: 2022-05-18

## 2022-04-11 RX ORDER — POLYETHYLENE GLYCOL 3350, SODIUM CHLORIDE, SODIUM BICARBONATE, POTASSIUM CHLORIDE 420; 11.2; 5.72; 1.48 G/4L; G/4L; G/4L; G/4L
4000 POWDER, FOR SOLUTION ORAL ONCE
Qty: 4000 ML | Refills: 0 | Status: SHIPPED | OUTPATIENT
Start: 2022-04-11 | End: 2022-04-11

## 2022-04-14 ENCOUNTER — OFFICE VISIT (OUTPATIENT)
Dept: ORTHOPEDICS | Facility: OTHER | Age: 47
End: 2022-04-14
Attending: PODIATRIST
Payer: MEDICARE

## 2022-04-14 DIAGNOSIS — M79.672 LEFT FOOT PAIN: Primary | ICD-10-CM

## 2022-04-14 PROCEDURE — 99214 OFFICE O/P EST MOD 30 MIN: CPT | Performed by: PODIATRIST

## 2022-04-14 PROCEDURE — G0463 HOSPITAL OUTPT CLINIC VISIT: HCPCS

## 2022-04-14 NOTE — PROGRESS NOTES
Visit Date: 2022    HISTORY OF PRESENT ILLNESS:  Beatrice is here today to discuss specifically surgery and what we discussed prior.  She has many questions.  We have not discussed this with her for quite some time.  All questions were answered to her satisfaction.    PHYSICAL EXAMINATION:   CONSTITUTIONAL:  The patient is alert and oriented x3, well appearing and in no apparent distress.  Affect is pleasant and answers questions appropriately.  VASCULAR:  Circulation is intact with palpable pedal pulses and adequate capillary fill time to all digits.  Hair growth is present and appropriate to mid foot and digits.  NEUROLOGIC:  Light touch sensation is intact to digits.  There is a negative Tinel sign.  There are no signs of apparent nerve entrapment of superficial peroneal or common peroneal nerves.  INTEGUMENT:  No abnormal dermatologic lesions are noted.  Skin has normal texture and turgor.    MUSCULOSKELETAL:  Continues to be tender to palpate over first TMT.  She has a prominence here associated with previous surgery.  Arthritic spurring is evident.  Exam largely is unchanged.    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    PLAN:  I discussed condition and treatment with the patient today.  Again, surgical discussion today.  All her questions were answered to her satisfaction.  Detailed discussion of this today.  Over 30-minute evaluation and discussion.    Anthony Castillo DPM        D: 2022   T: 2022   MT: MARGARETTE    Name:     BEATRICE BOYCE  MRN:      [id number]        Account:    [id number]   :      1975           Visit Date: 2022     Document: [id number]

## 2022-04-14 NOTE — PROGRESS NOTES
Patient is here for follow up on her left foot.  Judi Benoit LPN .....................4/14/2022 8:48 AM     .

## 2022-04-18 ENCOUNTER — ALLIED HEALTH/NURSE VISIT (OUTPATIENT)
Dept: FAMILY MEDICINE | Facility: OTHER | Age: 47
End: 2022-04-18
Attending: NURSE PRACTITIONER
Payer: MEDICARE

## 2022-04-18 DIAGNOSIS — Z00.00 HEALTHCARE MAINTENANCE: ICD-10-CM

## 2022-04-18 PROCEDURE — C9803 HOPD COVID-19 SPEC COLLECT: HCPCS

## 2022-04-18 PROCEDURE — U0003 INFECTIOUS AGENT DETECTION BY NUCLEIC ACID (DNA OR RNA); SEVERE ACUTE RESPIRATORY SYNDROME CORONAVIRUS 2 (SARS-COV-2) (CORONAVIRUS DISEASE [COVID-19]), AMPLIFIED PROBE TECHNIQUE, MAKING USE OF HIGH THROUGHPUT TECHNOLOGIES AS DESCRIBED BY CMS-2020-01-R: HCPCS | Mod: ZL

## 2022-04-18 NOTE — PROGRESS NOTES
Patient here today for Covid test. Procedure 4/21/22.     Liz Ignacio CNA .............................on 4/18/2022 at 11:43 AM

## 2022-04-19 LAB — SARS-COV-2 RNA RESP QL NAA+PROBE: NEGATIVE

## 2022-04-20 ENCOUNTER — ANESTHESIA EVENT (OUTPATIENT)
Dept: SURGERY | Facility: OTHER | Age: 47
End: 2022-04-20
Payer: MEDICARE

## 2022-04-21 ENCOUNTER — ANESTHESIA (OUTPATIENT)
Dept: SURGERY | Facility: OTHER | Age: 47
End: 2022-04-21
Payer: MEDICARE

## 2022-04-21 ENCOUNTER — HOSPITAL ENCOUNTER (OUTPATIENT)
Facility: OTHER | Age: 47
Discharge: NURSING, REHAB OR LONG-TERM CARE | End: 2022-04-21
Attending: PODIATRIST | Admitting: PODIATRIST
Payer: MEDICARE

## 2022-04-21 ENCOUNTER — HOSPITAL ENCOUNTER (OUTPATIENT)
Dept: GENERAL RADIOLOGY | Facility: OTHER | Age: 47
Discharge: HOME | End: 2022-04-21
Attending: PODIATRIST | Admitting: PODIATRIST
Payer: MEDICARE

## 2022-04-21 VITALS
HEART RATE: 73 BPM | BODY MASS INDEX: 45.5 KG/M2 | OXYGEN SATURATION: 100 % | RESPIRATION RATE: 16 BRPM | SYSTOLIC BLOOD PRESSURE: 124 MMHG | WEIGHT: 283 LBS | TEMPERATURE: 97.9 F | DIASTOLIC BLOOD PRESSURE: 51 MMHG

## 2022-04-21 DIAGNOSIS — G89.18 POST-OP PAIN: Primary | ICD-10-CM

## 2022-04-21 DIAGNOSIS — Z98.1 HX OF SURGICAL FUSION JOINT: ICD-10-CM

## 2022-04-21 LAB — GLUCOSE BLDC GLUCOMTR-MCNC: 86 MG/DL (ref 70–99)

## 2022-04-21 PROCEDURE — 370N000017 HC ANESTHESIA TECHNICAL FEE, PER MIN: Performed by: PODIATRIST

## 2022-04-21 PROCEDURE — 250N000009 HC RX 250: Performed by: PODIATRIST

## 2022-04-21 PROCEDURE — 28730 FUSION OF FOOT BONES: CPT | Mod: LT | Performed by: PODIATRIST

## 2022-04-21 PROCEDURE — 250N000009 HC RX 250: Performed by: NURSE ANESTHETIST, CERTIFIED REGISTERED

## 2022-04-21 PROCEDURE — 258N000003 HC RX IP 258 OP 636: Performed by: NURSE ANESTHETIST, CERTIFIED REGISTERED

## 2022-04-21 PROCEDURE — 250N000013 HC RX MED GY IP 250 OP 250 PS 637: Performed by: PODIATRIST

## 2022-04-21 PROCEDURE — C1769 GUIDE WIRE: HCPCS | Performed by: PODIATRIST

## 2022-04-21 PROCEDURE — 999N000141 HC STATISTIC PRE-PROCEDURE NURSING ASSESSMENT: Performed by: PODIATRIST

## 2022-04-21 PROCEDURE — 278N000051 HC OR IMPLANT GENERAL: Performed by: PODIATRIST

## 2022-04-21 PROCEDURE — 82962 GLUCOSE BLOOD TEST: CPT

## 2022-04-21 PROCEDURE — 28730 FUSION OF FOOT BONES: CPT | Performed by: NURSE ANESTHETIST, CERTIFIED REGISTERED

## 2022-04-21 PROCEDURE — 710N000010 HC RECOVERY PHASE 1, LEVEL 2, PER MIN: Performed by: PODIATRIST

## 2022-04-21 PROCEDURE — 710N000012 HC RECOVERY PHASE 2, PER MINUTE: Performed by: PODIATRIST

## 2022-04-21 PROCEDURE — 250N000025 HC SEVOFLURANE, PER MIN: Performed by: PODIATRIST

## 2022-04-21 PROCEDURE — 272N000001 HC OR GENERAL SUPPLY STERILE: Performed by: PODIATRIST

## 2022-04-21 PROCEDURE — 999N000180 XR SURGERY CARM FLUORO LESS THAN 5 MIN: Mod: TC

## 2022-04-21 PROCEDURE — 250N000011 HC RX IP 250 OP 636: Performed by: PODIATRIST

## 2022-04-21 PROCEDURE — 20680 REMOVAL OF IMPLANT DEEP: CPT | Performed by: PODIATRIST

## 2022-04-21 PROCEDURE — 360N000083 HC SURGERY LEVEL 3 W/ FLUORO, PER MIN: Performed by: PODIATRIST

## 2022-04-21 PROCEDURE — C1713 ANCHOR/SCREW BN/BN,TIS/BN: HCPCS | Performed by: PODIATRIST

## 2022-04-21 PROCEDURE — 250N000011 HC RX IP 250 OP 636: Performed by: NURSE ANESTHETIST, CERTIFIED REGISTERED

## 2022-04-21 DEVICE — SUPERMX NITI STAPLE W/ INSTRS, 20W X 20L
Type: IMPLANTABLE DEVICE | Site: FOOT | Status: FUNCTIONAL
Brand: ARTHREX®

## 2022-04-21 DEVICE — LOW PROFILE LAPIDUS PLATE, TITANIUM
Type: IMPLANTABLE DEVICE | Site: FOOT | Status: FUNCTIONAL
Brand: ARTHREX®

## 2022-04-21 DEVICE — LO-PRO SCRW,TI,3.5MMX 24MM
Type: IMPLANTABLE DEVICE | Site: FOOT | Status: FUNCTIONAL
Brand: ARTHREX®

## 2022-04-21 DEVICE — QCKFIX SCRW,TI CANN ST,CANC.,4.0X 40MM
Type: IMPLANTABLE DEVICE | Site: FOOT | Status: FUNCTIONAL
Brand: ARTHREX®

## 2022-04-21 DEVICE — QCKFIX SCRW,TI,CANN ST,CANC.,4.0X 38MM
Type: IMPLANTABLE DEVICE | Site: FOOT | Status: FUNCTIONAL
Brand: ARTHREX®

## 2022-04-21 DEVICE — LOCKING SCREW, TITANIUM 3.5MM X 22MM
Type: IMPLANTABLE DEVICE | Site: FOOT | Status: FUNCTIONAL
Brand: ARTHREX®

## 2022-04-21 DEVICE — LOCKING SCREW, TITANIUM 3.5MM X 16MM
Type: IMPLANTABLE DEVICE | Site: FOOT | Status: FUNCTIONAL
Brand: ARTHREX®

## 2022-04-21 DEVICE — LOCKING SCREW, TITANIUM 3.5MM X 26MM
Type: IMPLANTABLE DEVICE | Site: FOOT | Status: FUNCTIONAL
Brand: ARTHREX®

## 2022-04-21 RX ORDER — ONDANSETRON 4 MG/1
4 TABLET, ORALLY DISINTEGRATING ORAL EVERY 30 MIN PRN
Status: DISCONTINUED | OUTPATIENT
Start: 2022-04-21 | End: 2022-04-21 | Stop reason: HOSPADM

## 2022-04-21 RX ORDER — ASPIRIN 81 MG/1
81 TABLET ORAL 2 TIMES DAILY
Qty: 60 TABLET | Refills: 0 | Status: ON HOLD | OUTPATIENT
Start: 2022-04-21 | End: 2022-10-30

## 2022-04-21 RX ORDER — NALOXONE HYDROCHLORIDE 0.4 MG/ML
0.4 INJECTION, SOLUTION INTRAMUSCULAR; INTRAVENOUS; SUBCUTANEOUS
Status: DISCONTINUED | OUTPATIENT
Start: 2022-04-21 | End: 2022-04-21 | Stop reason: HOSPADM

## 2022-04-21 RX ORDER — NALOXONE HYDROCHLORIDE 0.4 MG/ML
0.2 INJECTION, SOLUTION INTRAMUSCULAR; INTRAVENOUS; SUBCUTANEOUS
Status: DISCONTINUED | OUTPATIENT
Start: 2022-04-21 | End: 2022-04-21 | Stop reason: HOSPADM

## 2022-04-21 RX ORDER — ONDANSETRON 2 MG/ML
4 INJECTION INTRAMUSCULAR; INTRAVENOUS EVERY 30 MIN PRN
Status: DISCONTINUED | OUTPATIENT
Start: 2022-04-21 | End: 2022-04-21 | Stop reason: HOSPADM

## 2022-04-21 RX ORDER — MEPERIDINE HYDROCHLORIDE 50 MG/ML
12.5 INJECTION INTRAMUSCULAR; INTRAVENOUS; SUBCUTANEOUS
Status: DISCONTINUED | OUTPATIENT
Start: 2022-04-21 | End: 2022-04-21 | Stop reason: HOSPADM

## 2022-04-21 RX ORDER — ONDANSETRON 2 MG/ML
INJECTION INTRAMUSCULAR; INTRAVENOUS PRN
Status: DISCONTINUED | OUTPATIENT
Start: 2022-04-21 | End: 2022-04-21

## 2022-04-21 RX ORDER — SODIUM CHLORIDE, SODIUM LACTATE, POTASSIUM CHLORIDE, CALCIUM CHLORIDE 600; 310; 30; 20 MG/100ML; MG/100ML; MG/100ML; MG/100ML
INJECTION, SOLUTION INTRAVENOUS CONTINUOUS
Status: DISCONTINUED | OUTPATIENT
Start: 2022-04-21 | End: 2022-04-21 | Stop reason: HOSPADM

## 2022-04-21 RX ORDER — OXYCODONE HYDROCHLORIDE 5 MG/1
5 TABLET ORAL EVERY 6 HOURS PRN
Qty: 20 TABLET | Refills: 0 | Status: SHIPPED | OUTPATIENT
Start: 2022-04-21 | End: 2022-05-18

## 2022-04-21 RX ORDER — DEXAMETHASONE SODIUM PHOSPHATE 4 MG/ML
INJECTION, SOLUTION INTRA-ARTICULAR; INTRALESIONAL; INTRAMUSCULAR; INTRAVENOUS; SOFT TISSUE PRN
Status: DISCONTINUED | OUTPATIENT
Start: 2022-04-21 | End: 2022-04-21

## 2022-04-21 RX ORDER — LIDOCAINE 40 MG/G
CREAM TOPICAL
Status: DISCONTINUED | OUTPATIENT
Start: 2022-04-21 | End: 2022-04-21 | Stop reason: HOSPADM

## 2022-04-21 RX ORDER — GLYCOPYRROLATE 0.2 MG/ML
INJECTION, SOLUTION INTRAMUSCULAR; INTRAVENOUS PRN
Status: DISCONTINUED | OUTPATIENT
Start: 2022-04-21 | End: 2022-04-21

## 2022-04-21 RX ORDER — PROPOFOL 10 MG/ML
INJECTION, EMULSION INTRAVENOUS PRN
Status: DISCONTINUED | OUTPATIENT
Start: 2022-04-21 | End: 2022-04-21

## 2022-04-21 RX ORDER — ACETAMINOPHEN 325 MG/1
1000 TABLET ORAL EVERY 8 HOURS
Qty: 45 TABLET | Refills: 0 | Status: SHIPPED | OUTPATIENT
Start: 2022-04-21 | End: 2022-04-26

## 2022-04-21 RX ORDER — FENTANYL CITRATE 50 UG/ML
50 INJECTION, SOLUTION INTRAMUSCULAR; INTRAVENOUS EVERY 5 MIN PRN
Status: DISCONTINUED | OUTPATIENT
Start: 2022-04-21 | End: 2022-04-21 | Stop reason: HOSPADM

## 2022-04-21 RX ORDER — BUPIVACAINE HYDROCHLORIDE 5 MG/ML
INJECTION, SOLUTION PERINEURAL PRN
Status: DISCONTINUED | OUTPATIENT
Start: 2022-04-21 | End: 2022-04-21 | Stop reason: HOSPADM

## 2022-04-21 RX ORDER — LIDOCAINE HYDROCHLORIDE 20 MG/ML
INJECTION, SOLUTION INFILTRATION; PERINEURAL PRN
Status: DISCONTINUED | OUTPATIENT
Start: 2022-04-21 | End: 2022-04-21

## 2022-04-21 RX ORDER — SODIUM CHLORIDE, SODIUM LACTATE, POTASSIUM CHLORIDE, CALCIUM CHLORIDE 600; 310; 30; 20 MG/100ML; MG/100ML; MG/100ML; MG/100ML
INJECTION, SOLUTION INTRAVENOUS CONTINUOUS PRN
Status: DISCONTINUED | OUTPATIENT
Start: 2022-04-21 | End: 2022-04-21

## 2022-04-21 RX ORDER — CEFAZOLIN SODIUM IN 0.9 % NACL 3 G/100 ML
3 INTRAVENOUS SOLUTION, PIGGYBACK (ML) INTRAVENOUS SEE ADMIN INSTRUCTIONS
Status: DISCONTINUED | OUTPATIENT
Start: 2022-04-21 | End: 2022-04-21 | Stop reason: HOSPADM

## 2022-04-21 RX ORDER — OXYCODONE HYDROCHLORIDE 5 MG/1
5 TABLET ORAL
Status: COMPLETED | OUTPATIENT
Start: 2022-04-21 | End: 2022-04-21

## 2022-04-21 RX ORDER — IBUPROFEN 600 MG/1
600 TABLET, FILM COATED ORAL EVERY 6 HOURS
Qty: 12 TABLET | Refills: 0 | Status: SHIPPED | OUTPATIENT
Start: 2022-04-21 | End: 2022-04-24

## 2022-04-21 RX ORDER — FENTANYL CITRATE 50 UG/ML
INJECTION, SOLUTION INTRAMUSCULAR; INTRAVENOUS PRN
Status: DISCONTINUED | OUTPATIENT
Start: 2022-04-21 | End: 2022-04-21

## 2022-04-21 RX ORDER — FENTANYL CITRATE 50 UG/ML
25 INJECTION, SOLUTION INTRAMUSCULAR; INTRAVENOUS
Status: DISCONTINUED | OUTPATIENT
Start: 2022-04-21 | End: 2022-04-21 | Stop reason: HOSPADM

## 2022-04-21 RX ORDER — CEFAZOLIN SODIUM IN 0.9 % NACL 3 G/100 ML
3 INTRAVENOUS SOLUTION, PIGGYBACK (ML) INTRAVENOUS
Status: COMPLETED | OUTPATIENT
Start: 2022-04-21 | End: 2022-04-21

## 2022-04-21 RX ORDER — MAGNESIUM HYDROXIDE 1200 MG/15ML
LIQUID ORAL PRN
Status: DISCONTINUED | OUTPATIENT
Start: 2022-04-21 | End: 2022-04-21 | Stop reason: HOSPADM

## 2022-04-21 RX ADMIN — DEXAMETHASONE SODIUM PHOSPHATE 8 MG: 4 INJECTION, SOLUTION INTRAMUSCULAR; INTRAVENOUS at 12:33

## 2022-04-21 RX ADMIN — SODIUM CHLORIDE, POTASSIUM CHLORIDE, SODIUM LACTATE AND CALCIUM CHLORIDE: 600; 310; 30; 20 INJECTION, SOLUTION INTRAVENOUS at 10:11

## 2022-04-21 RX ADMIN — PROPOFOL 200 MG: 10 INJECTION, EMULSION INTRAVENOUS at 11:19

## 2022-04-21 RX ADMIN — GLYCOPYRROLATE 0.2 MG: 0.2 INJECTION, SOLUTION INTRAMUSCULAR; INTRAVENOUS at 11:27

## 2022-04-21 RX ADMIN — LIDOCAINE HYDROCHLORIDE 100 MG: 20 INJECTION, SOLUTION INFILTRATION; PERINEURAL at 11:19

## 2022-04-21 RX ADMIN — OXYCODONE HYDROCHLORIDE 5 MG: 5 TABLET ORAL at 14:06

## 2022-04-21 RX ADMIN — SODIUM CHLORIDE 8 MCG: 900 INJECTION INTRAVENOUS at 12:33

## 2022-04-21 RX ADMIN — ONDANSETRON HYDROCHLORIDE 4 MG: 2 SOLUTION INTRAMUSCULAR; INTRAVENOUS at 12:33

## 2022-04-21 RX ADMIN — FENTANYL CITRATE 50 MCG: 50 INJECTION, SOLUTION INTRAMUSCULAR; INTRAVENOUS at 12:47

## 2022-04-21 RX ADMIN — SODIUM CHLORIDE 12 MCG: 900 INJECTION INTRAVENOUS at 11:33

## 2022-04-21 RX ADMIN — FENTANYL CITRATE 50 MCG: 50 INJECTION, SOLUTION INTRAMUSCULAR; INTRAVENOUS at 12:18

## 2022-04-21 RX ADMIN — Medication 3 G: at 11:10

## 2022-04-21 RX ADMIN — FENTANYL CITRATE 100 MCG: 50 INJECTION, SOLUTION INTRAMUSCULAR; INTRAVENOUS at 11:15

## 2022-04-21 RX ADMIN — SODIUM CHLORIDE, SODIUM LACTATE, POTASSIUM CHLORIDE, AND CALCIUM CHLORIDE: 600; 310; 30; 20 INJECTION, SOLUTION INTRAVENOUS at 11:14

## 2022-04-21 NOTE — ANESTHESIA PROCEDURE NOTES
Airway       Patient location during procedure: OR       Procedure Start/Stop Times: 4/21/2022 11:19 AM  Staff -        CRNA: Kellerman, David, APRN CRNA       Performed By: CRNA  Consent for Airway        Urgency: elective  Indications and Patient Condition       Indications for airway management: gaston-procedural       Induction type:intravenous       Mask difficulty assessment: 0 - not attempted    Final Airway Details       Final airway type: supraglottic airway    Supraglottic Airway Details        Type: LMA       Brand: I-Gel       LMA size: 4    Post intubation assessment        Placement verified by: capnometry, equal breath sounds and chest rise        Number of attempts at approach: 1       Number of other approaches attempted: 0       Ease of procedure: easy       Dentition: Intact and Unchanged

## 2022-04-21 NOTE — DISCHARGE INSTRUCTIONS
[facility]  Adult Discharge Orders & Instructions      For 24 hours after surgery:  Get plenty of rest.  A responsible adult must stay with you for at least 24 hours after you leave the hospital.   You may feel lightheaded.  IF so, sit for a few minutes before standing.  Have someone help you get up.   You may have a slight fever. Call the doctor if your fever is over 101 F (38.3 C) (taken under the tongue) or lasts longer than 24 hours.  You may have a dry mouth, a sore throat, muscle aches or trouble sleeping.  These should go away after 24 hours.  Do not make important or legal decisions.  6.   Do not drive or use heavy equipment.  If you have weakness or tingling, don't drive or use heavy equipment until this feeling goes away.                                                                                                                                                                           To contact a doctor, call    [phone]

## 2022-04-21 NOTE — ANESTHESIA POSTPROCEDURE EVALUATION
Patient: Rosi Lamb    Procedure: Procedure(s):  left foot hardware removaL, fusion of 1st-2nd Tarsal metatarsal       Anesthesia Type:  General    Note:  Disposition: Outpatient   Postop Pain Control: Uneventful            Sign Out: Well controlled pain   PONV: No   Neuro/Psych: Uneventful            Sign Out: Acceptable/Baseline neuro status   Airway/Respiratory: Uneventful            Sign Out: Acceptable/Baseline resp. status   CV/Hemodynamics: Uneventful            Sign Out: Acceptable CV status; No obvious hypovolemia; No obvious fluid overload   Other NRE: NONE   DID A NON-ROUTINE EVENT OCCUR? No           Last vitals:  Vitals Value Taken Time   /64 04/21/22 1330   Temp 97.6  F (36.4  C) 04/21/22 1325   Pulse 78 04/21/22 1330   Resp 10 04/21/22 1328   SpO2 100 % 04/21/22 1331   Vitals shown include unvalidated device data.    Electronically Signed By: DASHA SALDANA CRNA  April 21, 2022  2:38 PM

## 2022-04-21 NOTE — OP NOTE
Procedure Date: 04/21/2022    SURGEON:  Anthony Castillo MD    ASSISTANT:  Juancho Campos PA-C.  A skilled first assistant was necessary due to technical complexity of the procedure and for the patient's safety.  The assistant helped with positioning, retraction, visualization of the operative field and moreover helped to complete the procedure in a technically safe and efficient manner.      PREOPERATIVE DIAGNOSIS:  Left midfoot arthrosis, retained broken hardware, previous open reduction internal fixation of Lisfranc fracture dislocation injury fixed by another provider.    POSTOPERATIVE DIAGNOSIS:  Left midfoot arthrosis, retained broken hardware, previous open reduction internal fixation of Lisfranc fracture dislocation injury fixed by another provider.    PROCEDURE:    1.  Hardware removal, left mid foot.  2.  Spur resection, left tarsometatarsal joint, third.  3.  Left tarsometatarsal fusion, multiple joints, including first, second tarsometatarsal and inner cuneiform joint.    ANESTHESIA:  General with local.    HEMOSTASIS:  Ankle tourniquet for portion of the case.    ESTIMATED BLOOD LOSS:  50 mL    MATERIALS:  Arthrex midfoot fusion plate with 3.5 screws, Arthrex 20 x 20 mm MX staple, 4-0 cannulated screws x2.    INDICATIONS FOR PROCEDURE:  The patient has longstanding pain associated with midfoot arthrosis, broken screws, would like to proceed with surgery as detailed after discussion of surgery, recovery, risks, potential complications, alternatives and benefits.    DESCRIPTION OF PROCEDURE:  First procedure involved hardware removal.  We did this with multiple incisions.  A small incision made in the medial cuneiform on the plantar aspect of the screw head.  From this, a home run screw was identified and removed without incident.  This was a broken screw.  This was plantar, so the remainder of the screw was too difficult to access so it was left intact.  We were still able to achieve good joint access from  different incisions to prep for fusion.  A second screw was identified and removed from the dorsal incision and extended incision between first and second TMT was made at the previous interval from the original ORIF.  Screw through the first TMT was identified and removed without incident.  This was intact.  Third TMT screw was left alone as most of her pain is around the first.  At this time, she had significant dorsal spurring around the first, second and third TMT.  It was carefully exposed with careful deep dissection.  I elevated tissue laterally to gain access to all three joints.  Exostectomy performed allowing the joint to be accessed.  The first and second were partially mobile, particularly the first, which was very mobile.  This was prepped for fusion by resecting the articular surface, removing all fibrous tissue, nonviable tissue and remaining cartilage within the joint.  I had good bleeding bones on both sides of the fusion.  We then prepped the intercuneiform joint and the second tarsometatarsal joint in an identical fashion.  We had good bleeding bone on all sides of the fusion sites.  It was prepped appropriately and fixed with multiple screws.  After preparation, we did a distal medial to proximal lateral screw from the base of the first metatarsal and the intermediate cuneiform.  A second crossing screw from the medial cuneiform to the base of second metatarsal, our so-called home run screw, had good compression through both these fusion sites.  Further augmented this with a dorsal medial locking plate overlying the first TMT with multiple locking and nonlocking screws.  Overlying the second TMT, we threw a 20 x 20 mm MX staple.  Difficult to get a plate in here at this access and the previously thrown screw so a staple sat nicely here and allowed good compression and fixation.  This was all confirmed with intraoperative fluoroscopy in terms of position and good alignment of the metatarsals and  compression of the fusion sites.  Incision was flushed with copious amounts of normal saline.  Deep tissue repaired with 2-0 Vicryl and skin with nonabsorbable suture.  Placed in sterile dressing, posterior sugar tong splint.  She will be nonweightbearing.  Follow up as scheduled.    Anthony Castillo DPM        D: 2022   T: 2022   MT: MARGARETTE    Name:     BEATRICE BOYCESimone  MRN:      [id number]        Account:        [id number]   :      1975           Procedure Date: 2022     Document: [id number]

## 2022-04-21 NOTE — BRIEF OP NOTE
[facility]    Brief Operative Note    Pre-operative diagnosis: Arthritis of midfoot [M19.079]  Post-operative diagnosis Same as pre-operative diagnosis    Procedure: Procedure(s):  left foot hardware removaL, fusion of 1st-2nd Tarsal metatarsal  Surgeon: Surgeon(s) and Role:     * Anthony Castillo DPM - Primary     * Juancho Campos PA - Assisting  Anesthesia: Combined MAC with Local   Estimated Blood Loss: Minimal    Drains: None  Specimens: * No specimens in log *  Findings:   None.  Complications: None.  Implants:   Implant Name Type Inv. Item Serial No.  Lot No. LRB No. Used Action   ARTHREX DYNANITE SUPERMX STAPLE WITH INSTRUMENTATION 20W X 20L     [id number] Left 1 Implanted

## 2022-04-21 NOTE — ANESTHESIA CARE TRANSFER NOTE
Patient: Rosi Lamb    Procedure: Procedure(s):  left foot hardware removaL, fusion of 1st-2nd Tarsal metatarsal       Diagnosis: Arthritis of midfoot [M19.079]  Diagnosis Additional Information: No value filed.    Anesthesia Type:   General     Note:      Level of Consciousness: awake  Oxygen Supplementation: face mask  Level of Supplemental Oxygen (L/min / FiO2): 8 L/min  Independent Airway: airway patency satisfactory and stable  Dentition: dentition unchanged  Vital Signs Stable: post-procedure vital signs reviewed and stable  Report to RN Given: handoff report given  Patient transferred to: PACU    Handoff Report: Identifed the Patient, Identified the Reponsible Provider, Reviewed the pertinent medical history, Discussed the surgical course, Reviewed Intra-OP anesthesia mangement and issues during anesthesia, Set expectations for post-procedure period and Allowed opportunity for questions and acknowledgement of understanding      Vitals:  Vitals Value Taken Time   BP     Temp     Pulse 75 04/21/22 1316   Resp 8 04/21/22 1316   SpO2 100 % 04/21/22 1316   Vitals shown include unvalidated device data.    Electronically Signed By: David Kellerman, APRN CRNA  April 21, 2022  1:16 PM

## 2022-04-21 NOTE — ANESTHESIA PREPROCEDURE EVALUATION
Anesthesia Pre-Procedure Evaluation    Patient: Rosi Lamb   MRN: [id number] : 1975        Procedure : Procedure(s):  left foot hardware removal,  bone graft from calcaneus, fusion of 1st-2nd Tarsal metatarsal          Past Medical History:   Diagnosis Date     Abnormal Pap smear of cervix 2018    Overview:  had scraping of cervix     Cannabis use disorder, moderate, in sustained remission, dependence (H) 2015     Closed dislocation of tarsal joint - left 2011     Edema     No Comments Provided     Encounter for removal and reinsertion of intrauterine contraceptive device     05,IUD placement, Removed     Excessive and frequent menstruation with irregular cycle     2017     Major depressive disorder, single episode     No Comments Provided     Personal history of other medical treatment (CODE)     G3, P2-0-1-2 with history of spontaneous      Personal history of other medical treatment (CODE)     No Comments Provided     Uncomplicated asthma     No Comments Provided      Past Surgical History:   Procedure Laterality Date     ANKLE SURGERY      fracture, repair with screws     CONIZATION LEEP      ,Underwent a loop     LAPAROSCOPIC TUBAL LIGATION      ,tubal ligation      Allergies   Allergen Reactions     Clonazepam Other (See Comments)     Causes Violence and aggresssion     Hydrocodone-Acetaminophen      Other reaction(s): Seizures  Can take Percocet without difficulty.     Lorazepam Other (See Comments)     Causes Violence and aggresssion     Sertraline Other (See Comments)     Caused suicidally      Bupropion Other (See Comments)     Caused Major Depression     Dust Mite Extract      Other reaction(s): Asthma symptoms     Lithium Other (See Comments)     Mood alteration     Pollen Extract      Other reaction(s): Asthma symptoms     Risperidone Other (See Comments)     Agitation       Sulfa Drugs Itching     Trichophyton      Other reaction(s): Asthma  symptoms     Valproic Acid Other (See Comments)     Weight gain      Social History     Tobacco Use     Smoking status: Former Smoker     Packs/day: 1.00     Years: 3.00     Pack years: 3.00     Types: Cigarettes     Quit date: 2003     Years since quittin.3     Smokeless tobacco: Never Used   Substance Use Topics     Alcohol use: Not Currently     Alcohol/week: 0.0 standard drinks      Wt Readings from Last 1 Encounters:   22 128.4 kg (283 lb)        Anesthesia Evaluation   Pt has had prior anesthetic.         ROS/MED HX  ENT/Pulmonary:     (+) TAWNYA risk factors, snores loudly, obese, Intermittent, asthma Treatment: Inhaler prn,      Neurologic:     (+) peripheral neuropathy,     Cardiovascular:       METS/Exercise Tolerance: 3 - Able to walk 1-2 blocks without stopping    Hematologic:  - neg hematologic  ROS     Musculoskeletal: Comment: Lymphedema to lower extremities      GI/Hepatic:     (+) GERD, Asymptomatic on medication,     Renal/Genitourinary:  - neg Renal ROS     Endo:  - neg endo ROS   (+) Obesity,     Psychiatric/Substance Use: Comment: Hx of substance abuse  Borderline personality disorder    (+) psychiatric history other (comment)     Infectious Disease:  - neg infectious disease ROS     Malignancy:  - neg malignancy ROS     Other:            Physical Exam    Airway      Comment: Prominent teeth    Mallampati: I   TM distance: > 3 FB   Neck ROM: full   Mouth opening: > 3 cm    Respiratory Devices and Support         Dental  no notable dental history         Cardiovascular   cardiovascular exam normal          Pulmonary   pulmonary exam normal                OUTSIDE LABS:  CBC:   Lab Results   Component Value Date    WBC 8.8 2022    WBC 7.8 2022    HGB 13.2 2022    HGB 13.2 2022    HCT 40.9 2022    HCT 40.5 2022     2022     2022     BMP:   Lab Results   Component Value Date     2022     2022     POTASSIUM 4.0 04/05/2022    POTASSIUM 4.0 03/18/2022    CHLORIDE 103 04/05/2022    CHLORIDE 103 03/18/2022    CO2 33 (H) 04/05/2022    CO2 27 03/18/2022    BUN 14 04/05/2022    BUN 11 03/18/2022    CR 1.03 04/05/2022    CR 1.00 03/18/2022     04/05/2022    GLC 94 03/18/2022     COAGS:   Lab Results   Component Value Date    INR 1.2 11/04/2016     POC:   Lab Results   Component Value Date    HCG Negative 11/02/2021     HEPATIC:   Lab Results   Component Value Date    ALBUMIN 4.3 04/05/2022    PROTTOTAL 7.6 04/05/2022    ALT 21 04/05/2022    AST 22 04/05/2022    ALKPHOS 58 04/05/2022    BILITOTAL 0.5 04/05/2022     OTHER:   Lab Results   Component Value Date    LACT 0.5 11/04/2016    A1C 5.2 03/18/2022    NOE 10.1 04/05/2022    MAG 1.7 (L) 11/05/2021    SED 15 10/13/2016       Anesthesia Plan    ASA Status:  3   NPO Status:  NPO Appropriate    Anesthesia Type: General.              Consents    Anesthesia Plan(s) and associated risks, benefits, and realistic alternatives discussed. Questions answered and patient/representative(s) expressed understanding.     - Discussed: Risks, Benefits and Alternatives for BOTH SEDATION and the PROCEDURE were discussed     - Discussed with:  Patient      - Extended Intubation/Ventilatory Support Discussed: No.      - Patient is DNR/DNI Status: No    Use of blood products discussed: No .     Postoperative Care       PONV prophylaxis: Ondansetron (or other 5HT-3), Dexamethasone or Solumedrol     Comments:                DASHA SALDANA CRNA

## 2022-04-21 NOTE — INTERVAL H&P NOTE
"I have reviewed the surgical (or preoperative) H&P that is linked to this encounter, and examined the patient. There are no significant changes    Clinical Conditions Present on Arrival:  Clinically Significant Risk Factors Present on Admission                   # Severe Obesity: Estimated body mass index is 45.5 kg/m  as calculated from the following:    Height as of 4/5/22: 1.68 m (5' 6.13\").    Weight as of this encounter: 128.4 kg (283 lb).       "

## 2022-04-21 NOTE — OR NURSING
PACU Transfer Note    Rosi Lamb was transferred to dsu via cart.  Equipment used for transport:  none.  Accompanied by:  PORFIRIO Hinojosa  Prescriptions were: escribed    PACU Respiratory Event Documentation     1) Episodes of Apnea greater than or equal to 10 seconds: 0    2) Bradypnea - less than 8 breaths per minute: 0    3) Pain score on 0 to 10 scale: 0    4) Pain-sedation mismatch (yes or no): no    5) Repeated 02 desaturation less than 90% (yes or no): no    Anesthesia notified? (yes or no): no    Any of the above events occuring repeatedly in separate 30 minute intervals may be considered recurrent PACU respiratory events.    Patient stable and meets phase 1 discharge criteria for transport from PACU.    Report to PORFIRIO Nugent

## 2022-04-22 DIAGNOSIS — M79.672 LEFT FOOT PAIN: Primary | ICD-10-CM

## 2022-04-22 DIAGNOSIS — Z98.1 HX OF SURGICAL FUSION JOINT: Primary | ICD-10-CM

## 2022-04-25 ENCOUNTER — OFFICE VISIT (OUTPATIENT)
Dept: ORTHOPEDICS | Facility: OTHER | Age: 47
End: 2022-04-25
Attending: PODIATRIST
Payer: MEDICARE

## 2022-04-25 DIAGNOSIS — Z98.1 HX OF SURGICAL FUSION JOINT: Primary | ICD-10-CM

## 2022-04-25 PROCEDURE — G0463 HOSPITAL OUTPT CLINIC VISIT: HCPCS

## 2022-04-25 NOTE — PROGRESS NOTES
Patient presents today with her splint from surgery last week almost completely slid off. Splint didn't need to be unwrapped, it just slid completely off. Incisions are clean, dry and stitches are intact. Patient has been staying nonweightbearing. She has a scooter for at home. I called Dr. Castillo and he agreed that she can go into either a short boot, or a postop shoe. The short boot did fit and patient said it felt a lot more stable. Incisions were re-dressed with sterile dressings and a short boot applied. She will continue to be non weightbearing on this and will not take the dressing off nor will she get it wet. She will follow up as scheduled this Thursday with Dr. Castillo.   Judi Benoit LPN .....................4/25/2022 11:26 AM

## 2022-04-28 ENCOUNTER — HOSPITAL ENCOUNTER (OUTPATIENT)
Dept: GENERAL RADIOLOGY | Facility: OTHER | Age: 47
Discharge: HOME | End: 2022-04-28
Attending: PODIATRIST
Payer: MEDICARE

## 2022-04-28 ENCOUNTER — OFFICE VISIT (OUTPATIENT)
Dept: ORTHOPEDICS | Facility: OTHER | Age: 47
End: 2022-04-28
Attending: PODIATRIST
Payer: MEDICARE

## 2022-04-28 DIAGNOSIS — M79.672 LEFT FOOT PAIN: ICD-10-CM

## 2022-04-28 DIAGNOSIS — Z09 POSTOPERATIVE FOLLOW-UP: Primary | ICD-10-CM

## 2022-04-28 PROCEDURE — G0463 HOSPITAL OUTPT CLINIC VISIT: HCPCS

## 2022-04-28 PROCEDURE — 99024 POSTOP FOLLOW-UP VISIT: CPT | Performed by: PHYSICIAN ASSISTANT

## 2022-04-28 PROCEDURE — 73630 X-RAY EXAM OF FOOT: CPT | Mod: LT

## 2022-04-28 NOTE — PROGRESS NOTES
Visit Date: 04/28/2022    Rosi comes in today for followup of left foot hardware removal, spur resection, left metatarsal joint, left metatarsal fusion, multiple joints including the first, second tarsometatarsal and intercuneiform joints, performed by Dr. Castillo on 04/21/2022.  The patient states that she has been quite comfortable with her postoperative course so far.  Unfortunately, she had had to come in earlier last week because her splint was slipping off because of lymphedema.  Seema changed it out to a Cam walking boot and it seemed to fit her a little bit more, and more comfortably then.  She is currently using a knee scooter to ambulate.  She has no acute concerns today.    PHYSICAL EXAM:  Today, the incisions are clean and dry.  There are no signs of infection.  The sutures are intact.  She has a fair amount of bruising into her toes, quite a bit of swelling as well.  She denies any numbness or tingling.  Denies any calf pain.     Sutures were removed and Steri-Stripped today.    IMAGING:  X-rays taken today show well-fixed, well-maintained hardware in satisfactory alignment and no signs of loosening.    ASSESSMENT:  Status post hardware removal, left midfoot spur resection of the third left transmetatarsal joint, left tarsometatarsal fusion of multiple joints including the first, second tarsometatarsal and intercuneiform joints, doing well.    PLAN:  At this time, patient would like to have a postop shoe versus a boot since, with her lymphedema, it will be more comfortable and fit her a little bit more comfortably.  This seemed like a reasonable idea.  I went ahead with that.  She was fitted with the shoe.  I also told her that she may switch up from the shoe to the boot if necessary and she may start weightbearing as tolerated in about 2 weeks, as long as she is in either one, the foot or the shoe.  She will follow up in 4 weeks with Dr. Castillo and we will take x-rays of her left foot at that  time.    Anthony Castillo DPM    As Dictated by SUN MEDINA        D: 2022   T: 2022   MT: YOMI    Name:     BEATRICE BOYCE  MRN:      [id number]        Account:    [id number]   :      1975           Visit Date: 2022     Document: [id number]

## 2022-04-28 NOTE — PROGRESS NOTES
Patient is here for follow up on her left foot.  Judi Benoit LPN .....................4/28/2022 10:16 AM

## 2022-05-14 ENCOUNTER — HEALTH MAINTENANCE LETTER (OUTPATIENT)
Age: 47
End: 2022-05-14

## 2022-05-17 ENCOUNTER — TELEPHONE (OUTPATIENT)
Dept: INTERNAL MEDICINE | Facility: OTHER | Age: 47
End: 2022-05-17
Payer: MEDICARE

## 2022-05-17 DIAGNOSIS — M25.579 PAIN IN JOINT, ANKLE AND FOOT, UNSPECIFIED LATERALITY: Primary | ICD-10-CM

## 2022-05-17 NOTE — TELEPHONE ENCOUNTER
Pt is no longer homebound and would benefit from continued PT following her L foot hardware removal and metatarsal fusions. PT orders have been started.  Please sign and send to [facility] rehab

## 2022-05-18 ENCOUNTER — OFFICE VISIT (OUTPATIENT)
Dept: SURGERY | Facility: OTHER | Age: 47
End: 2022-05-18
Attending: INTERNAL MEDICINE
Payer: MEDICARE

## 2022-05-18 VITALS
TEMPERATURE: 98 F | RESPIRATION RATE: 18 BRPM | BODY MASS INDEX: 44.32 KG/M2 | OXYGEN SATURATION: 98 % | SYSTOLIC BLOOD PRESSURE: 110 MMHG | DIASTOLIC BLOOD PRESSURE: 72 MMHG | HEIGHT: 67 IN | HEART RATE: 114 BPM

## 2022-05-18 DIAGNOSIS — T14.8XXA BLOOD BLISTER: Primary | ICD-10-CM

## 2022-05-18 PROCEDURE — G0463 HOSPITAL OUTPT CLINIC VISIT: HCPCS

## 2022-05-18 PROCEDURE — 99202 OFFICE O/P NEW SF 15 MIN: CPT | Performed by: SURGERY

## 2022-05-18 ASSESSMENT — PAIN SCALES - GENERAL: PAINLEVEL: NO PAIN (0)

## 2022-05-18 NOTE — NURSING NOTE
"Chief Complaint   Patient presents with     WOUND CARE     Left bottom foot black and cracked       Initial /72 (BP Location: Right arm, Patient Position: Sitting, Cuff Size: Adult Large)   Pulse 114   Temp 98  F (36.7  C) (Tympanic)   Resp 18   Ht 1.702 m (5' 7\")   LMP  (LMP Unknown)   SpO2 98%   Breastfeeding No   BMI 44.32 kg/m   Estimated body mass index is 44.32 kg/m  as calculated from the following:    Height as of this encounter: 1.702 m (5' 7\").    Weight as of 4/21/22: 128.4 kg (283 lb).  Medication Reconciliation: complete    Renée Llamas LPN  "

## 2022-05-18 NOTE — PROGRESS NOTES
Surgical Clinic Consult  Referring physician:  SANTIAGO Castillo  Primary physician:     Lucio Curiel    Chief complaint:   Black area on left heel    History of present illness:  This is a 47 year old female I am seeing in consultation for an black area on the left heel.  The patient underwent left foot surgery on 2022 with Dr. Castillo.  She developed a black area on the heel and the home health nurse was concerned for necrosis.     Past medical history:   Past Medical History:   Diagnosis Date     Abnormal Pap smear of cervix 2018    Overview:  had scraping of cervix     Cannabis use disorder, moderate, in sustained remission, dependence (H) 2015     Closed dislocation of tarsal joint - left 2011     Edema     No Comments Provided     Encounter for removal and reinsertion of intrauterine contraceptive device     05,IUD placement, Removed     Excessive and frequent menstruation with irregular cycle     2017     Major depressive disorder, single episode     No Comments Provided     Personal history of other medical treatment (CODE)     G3, P2-0-1-2 with history of spontaneous      Personal history of other medical treatment (CODE)     No Comments Provided     Uncomplicated asthma     No Comments Provided       Pastsurgical history:  Past Surgical History:   Procedure Laterality Date     ANKLE SURGERY      fracture, repair with screws     ARTHRODESIS FOOT Left 2022    Procedure: left foot hardware removaL, fusion of 1st-2nd Tarsal metatarsal;  Surgeon: Anthony Castillo DPM;  Location: [facility] OR     CONIZATION LEEP      ,Underwent a loop     LAPAROSCOPIC TUBAL LIGATION      ,tubal ligation       Current medications:  Current Outpatient Medications   Medication Sig Dispense Refill     albuterol (VENTOLIN HFA) 108 (90 Base) MCG/ACT inhaler Inhale 1-2 puffs into the lungs every 4 hours as needed for shortness of breath / dyspnea or wheezing 18 g 5     ARIPiprazole (ABILIFY)  "15 MG tablet Take 15 mg by mouth daily [place]       aspirin 81 MG EC tablet Take 1 tablet (81 mg) by mouth 2 times daily 60 tablet 0     cholecalciferol (VITAMIN D3) 125 mcg (5000 units) capsule Take 1 capsule (125 mcg) by mouth daily 100 capsule 4     cyanocobalamin (CYANOCOBALAMIN) 1000 MCG/ML injection INJECT 1ML INTRAMUSCULARLY EVERY 2 WEEKS 6 mL 2     famotidine (PEPCID) 20 MG tablet Take 1 tablet (20 mg) by mouth 2 times daily - For Heartburn 180 tablet 4     fluticasone (FLONASE) 50 MCG/ACT nasal spray Spray 1 spray into both nostrils daily as needed for rhinitis or allergies 16 g 0     gabapentin (NEURONTIN) 300 MG capsule 300 mg po BID and 300 mg po PRN at Noon -- Rx by Psych -- Fabienne Duncan       hydrOXYzine (ATARAX) 50 MG tablet Take 50 mg by mouth 2 times daily as needed [place]       hydrOXYzine (VISTARIL) 50 MG capsule Take 50 mg by mouth 2 times daily as needed [place]       lamoTRIgine (LAMICTAL) 200 MG tablet [place]  2     lamoTRIgine (LAMICTAL) 25 MG tablet Take 1 tablet by mouth At Bedtime With 200 mg  [place]  2     methocarbamol (ROBAXIN) 500 MG tablet Take 1 tablet (500 mg) by mouth 4 times daily as needed for muscle spasms       montelukast (SINGULAIR) 10 MG tablet Take 1 tablet (10 mg) by mouth At Bedtime 90 tablet 4     oxybutynin ER (DITROPAN-XL) 10 MG 24 hr tablet Take 1 tablet (10 mg) by mouth daily 90 tablet 4     SF 1.1 % GEL topical gel BRUSH, SWISH TOOTHPASTE FOR 30 SECONDS AND SPIT BEFORE BED. DO NOT RINSE, EAT OR DRINK FOR 30 MINUTES       traZODone (DESYREL) 50 MG tablet TAKE 1/2 TABLET TO 1 TABLET BY MOUTH NIGHTLY AT BEDTIME AS NEEDED FOR SLEEP       Tuberculin-Allergy Syringes (B-D TB SYRINGE) 25G X 5/8\" 1 ML MISC USE TO INJECT B-12 INTRAMUSCULARLY EVERY 2 WEEKS 50 each 0     vilazodone (VIIBRYD) 40 MG TABS tablet Take 40 mg by mouth daily with food [place]         Allergies:  Allergies   Allergen Reactions     Clonazepam Other (See Comments)     Causes Violence and " aggresssion     Hydrocodone-Acetaminophen      Other reaction(s): Seizures  Can take Percocet without difficulty.     Lorazepam Other (See Comments)     Causes Violence and aggresssion     Sertraline Other (See Comments)     Caused suicidally      Bupropion Other (See Comments)     Caused Major Depression     Dust Mite Extract      Other reaction(s): Asthma symptoms     Lithium Other (See Comments)     Mood alteration     Pollen Extract      Other reaction(s): Asthma symptoms     Risperidone Other (See Comments)     Agitation       Sulfa Drugs Itching     Trichophyton      Other reaction(s): Asthma symptoms     Valproic Acid Other (See Comments)     Weight gain       Family history:  Family History   Problem Relation Age of Onset     Osteoporosis Mother      Asthma Father         Asthma,+ Leg edema, knee, hip replacement. + Lymphedema     Heart Failure Father      Other - See Comments Paternal Grandmother         Lymphedema     Osteoporosis Brother         Osteoporosis     Other - See Comments Brother         No Known Problems       Social history:  Social History     Socioeconomic History     Marital status:      Spouse name: Not on file     Number of children: Not on file     Years of education: Not on file     Highest education level: Not on file   Occupational History     Not on file   Tobacco Use     Smoking status: Former Smoker     Packs/day: 1.00     Years: 3.00     Pack years: 3.00     Types: Cigarettes     Quit date: 2003     Years since quittin.3     Smokeless tobacco: Never Used   Vaping Use     Vaping Use: Never used   Substance and Sexual Activity     Alcohol use: Not Currently     Alcohol/week: 0.0 standard drinks     Drug use: Never     Sexual activity: Yes     Partners: Male     Birth control/protection: Female Surgical   Other Topics Concern     Parent/sibling w/ CABG, MI or angioplasty before 65F 55M? Not Asked   Social History Narrative    No longer in adult foster care lived  "with Charley Godwin.    .   2 sons - age 12 and 7 - as of 11/2016.   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     Social Determinants of Health     Financial Resource Strain: Not on file   Food Insecurity: Not on file   Transportation Needs: Not on file   Physical Activity: Not on file   Stress: Not on file   Social Connections: Not on file   Intimate Partner Violence: Not on file   Housing Stability: Not on file       PROBLEM LIST:  Patient Active Problem List   Diagnosis     Alcohol use disorder, moderate, in sustained remission, dependence (H)     Allergic rhinitis due to pollen     Generalized anxiety disorder     Allergic rhinitis due to other allergen     Bilateral chronic knee pain     Bilateral foot pain     Borderline personality disorder (H)     Closed dislocation of tarsal joint     Crepitus of joint of right knee     Disorder of female genital organ     Elevated random blood glucose level     Esophageal reflux     Lymphedema of both lower extremities     Menorrhagia with irregular cycle     Moderate persistent asthma     Peripheral polyneuropathy     Urinary incontinence     History of substance abuse (H)     Chronic venous stasis dermatitis of right lower extremity     Vitamin B12 deficiency     Vitamin D deficiency     Abnormal Pap smear of cervix     Bipolar I disorder, most recent episode depressed, moderate (H)     Intermittent low back pain     Nail dystrophy     Edema of extremity of unknown cause     Blood blister left heel           Physical exam: /72 (BP Location: Right arm, Patient Position: Sitting, Cuff Size: Adult Large)   Pulse 114   Temp 98  F (36.7  C) (Tympanic)   Resp 18   Ht 1.702 m (5' 7\")   LMP  (LMP Unknown)   SpO2 98%   Breastfeeding No   BMI 44.32 kg/m        General: this is a pleasant female patient in no acute distress.  Patient is awake alert and oriented x3 .   Left heel: 3 x 4 cm blackened skin easily removed with scissors. Base is " healthy, thin epidermis.    Assessment:   Dried blood blister    Plan:    May resume weightbearing on area .      Leonel Hu MD FACS

## 2022-05-25 DIAGNOSIS — Z09 POSTOPERATIVE FOLLOW-UP: Primary | ICD-10-CM

## 2022-05-26 ENCOUNTER — OFFICE VISIT (OUTPATIENT)
Dept: ORTHOPEDICS | Facility: OTHER | Age: 47
End: 2022-05-26
Attending: PODIATRIST
Payer: MEDICARE

## 2022-05-26 ENCOUNTER — HOSPITAL ENCOUNTER (OUTPATIENT)
Dept: GENERAL RADIOLOGY | Facility: OTHER | Age: 47
Discharge: HOME | End: 2022-05-26
Attending: PODIATRIST
Payer: MEDICARE

## 2022-05-26 DIAGNOSIS — Z09 POSTOPERATIVE FOLLOW-UP: Primary | ICD-10-CM

## 2022-05-26 DIAGNOSIS — Z09 POSTOPERATIVE FOLLOW-UP: ICD-10-CM

## 2022-05-26 PROCEDURE — 73630 X-RAY EXAM OF FOOT: CPT

## 2022-05-26 PROCEDURE — 99024 POSTOP FOLLOW-UP VISIT: CPT | Performed by: PODIATRIST

## 2022-05-26 NOTE — NURSING NOTE
"Chief Complaint   Patient presents with     RECHECK     Left foot hwr and fusion s/p 5 weeks      Patient is here today for a recheck of her left foot. She is s/p 5 weeks from a hwr and fusion.     Dacia March LPN on 5/26/2022 at 10:33 AM      Initial LMP  (LMP Unknown)  Estimated body mass index is 44.32 kg/m  as calculated from the following:    Height as of 5/18/22: 1.702 m (5' 7\").    Weight as of 4/21/22: 128.4 kg (283 lb).  Medication Reconciliation: complete    Dacia March LPN  "

## 2022-05-26 NOTE — PROGRESS NOTES
Visit Date: 2022    Beatrice comes in today 5 weeks out from left foot hardware removal, revision fusion of first and second TMT, intercuneiform joint fusion.  This was done on , so she is now about 5 weeks out.  She is doing well, no acute concern.    PHYSICAL EXAMINATION:  Surgical site well healed, normal postoperative edema.  CMS is intact.  No signs of infection.    IMAGING:  Three views of foot demonstrate intact, well-positioned hardware, interval healing through the fusion sites.  No real clinical radiographic concerns.    ASSESSMENT:  Status post left foot surgery as described.    PLAN:  I discussed progression of treatment.  At this point, the patient will continue in her boot or postop shoe.  She had a hard time with her boot due to lymphedema, so postop shoe.  Minimal weightbearing as needed only at this point. I will see her back in 4 weeks, repeat x-rays and continue to progress as able.    Anthony Castillo DPM        D: 2022   T: 2022   MT: RUSTAM    Name:     BEATRICE BOYCE  MRN:      [id number]        Account:    [id number]   :      1975           Visit Date: 2022     Document: [id number]

## 2022-05-26 NOTE — PROGRESS NOTES
Patient is here today for a recheck of her left foot. She is s/p 5 weeks from a hwr and fusion.     Dacia March LPN on 5/26/2022 at 10:33 AM

## 2022-06-22 DIAGNOSIS — E53.8 VITAMIN B12 DEFICIENCY: ICD-10-CM

## 2022-06-22 RX ORDER — CYANOCOBALAMIN 1000 UG/ML
INJECTION, SOLUTION INTRAMUSCULAR; SUBCUTANEOUS
Qty: 6 ML | Refills: 11 | Status: SHIPPED | OUTPATIENT
Start: 2022-06-22 | End: 2023-03-17

## 2022-06-22 NOTE — TELEPHONE ENCOUNTER
[id number] sent Rx request for the following:      Requested Prescriptions   Pending Prescriptions Disp Refills     cyanocobalamin (CYANOCOBALAMIN) 1000 MCG/ML injection [Pharmacy Med Name: CYANOCOBALAMIN 1000MCG/ML INJ] 6 mL 2     Sig: INJECT 1ML INTRAMUSCULARLY EVERY 2 WEEKS   Last Prescription Date:   9/8/21  Last Fill Qty/Refills:         6 ml, R-2    Last Office Visit:              4/5/22   Future Office visit:           None    Imelda Leyva RN .............. 6/22/2022  3:01 PM

## 2022-06-23 DIAGNOSIS — Z09 POSTOPERATIVE FOLLOW-UP: Primary | ICD-10-CM

## 2022-06-30 ENCOUNTER — OFFICE VISIT (OUTPATIENT)
Dept: ORTHOPEDICS | Facility: OTHER | Age: 47
End: 2022-06-30
Attending: PODIATRIST
Payer: MEDICARE

## 2022-06-30 ENCOUNTER — HOSPITAL ENCOUNTER (OUTPATIENT)
Dept: GENERAL RADIOLOGY | Facility: OTHER | Age: 47
Discharge: HOME | End: 2022-06-30
Attending: PODIATRIST
Payer: MEDICARE

## 2022-06-30 DIAGNOSIS — Z09 POSTOPERATIVE FOLLOW-UP: Primary | ICD-10-CM

## 2022-06-30 DIAGNOSIS — Z09 POSTOPERATIVE FOLLOW-UP: ICD-10-CM

## 2022-06-30 PROCEDURE — 73630 X-RAY EXAM OF FOOT: CPT | Mod: LT

## 2022-06-30 PROCEDURE — G0463 HOSPITAL OUTPT CLINIC VISIT: HCPCS

## 2022-06-30 PROCEDURE — 99024 POSTOP FOLLOW-UP VISIT: CPT | Performed by: PODIATRIST

## 2022-06-30 NOTE — PROGRESS NOTES
Visit Date: 2022    SUBJECTIVE:  Beatrice is here 10 weeks out from midfoot fusion revision, is doing very well, no acute concern.    PHYSICAL EXAMINATION:  Surgical site well healed.  No symptoms.  Ambulatory in normal shoe without assistive device.    IMAGING:  Three views of foot demonstrate intact, well-positioned hardware, interval healing.    ASSESSMENT:  Status post left foot surgery as described.    PLAN:  I discussed progression and treatment.  She was released to progress her activity as tolerated.  Continue with therapy.  Follow up as needed.    Anthony Castillo DPM        D: 2022   T: 2022   MT: MARGARETTE    Name:     BEATRICE BOYCE  MRN:      [id number]        Account:    [id number]   :      1975           Visit Date: 2022     Document: [id number]

## 2022-06-30 NOTE — PROGRESS NOTES
Pt is here today for a recheck following a left foot fusion.     Dacia March LPN on 6/30/2022 at 10:19 AM

## 2022-07-08 ENCOUNTER — TELEPHONE (OUTPATIENT)
Dept: INTERNAL MEDICINE | Facility: OTHER | Age: 47
End: 2022-07-08

## 2022-07-08 NOTE — TELEPHONE ENCOUNTER
DJS-patient wants to know if she can take pro biotics pt is aware that DJS is not going to be in clinic unit 07.13.22    Please call and advise  Thank You    Halle Galvan on 7/8/2022 at 3:37 PM

## 2022-07-18 RX ORDER — POLYETHYLENE GLYCOL 3350, SODIUM CHLORIDE, SODIUM BICARBONATE, POTASSIUM CHLORIDE 420; 11.2; 5.72; 1.48 G/4L; G/4L; G/4L; G/4L
POWDER, FOR SOLUTION ORAL
Status: ON HOLD | COMMUNITY
Start: 2022-04-11 | End: 2022-07-25

## 2022-07-19 ENCOUNTER — HOSPITAL ENCOUNTER (OUTPATIENT)
Dept: MAMMOGRAPHY | Facility: OTHER | Age: 47
Discharge: HOME | End: 2022-07-19
Attending: INTERNAL MEDICINE | Admitting: INTERNAL MEDICINE
Payer: MEDICARE

## 2022-07-19 DIAGNOSIS — Z12.31 VISIT FOR SCREENING MAMMOGRAM: ICD-10-CM

## 2022-07-19 PROBLEM — Z00.00 HEALTHCARE MAINTENANCE: Status: ACTIVE | Noted: 2022-07-19

## 2022-07-19 PROBLEM — T14.8XXA BLOOD BLISTER: Status: RESOLVED | Noted: 2022-05-18 | Resolved: 2022-07-19

## 2022-07-19 PROCEDURE — 77067 SCR MAMMO BI INCL CAD: CPT

## 2022-07-22 ENCOUNTER — ALLIED HEALTH/NURSE VISIT (OUTPATIENT)
Dept: FAMILY MEDICINE | Facility: OTHER | Age: 47
End: 2022-07-22
Attending: INTERNAL MEDICINE
Payer: MEDICARE

## 2022-07-22 DIAGNOSIS — Z20.822 COVID-19 RULED OUT: Primary | ICD-10-CM

## 2022-07-22 PROCEDURE — C9803 HOPD COVID-19 SPEC COLLECT: HCPCS

## 2022-07-22 PROCEDURE — U0003 INFECTIOUS AGENT DETECTION BY NUCLEIC ACID (DNA OR RNA); SEVERE ACUTE RESPIRATORY SYNDROME CORONAVIRUS 2 (SARS-COV-2) (CORONAVIRUS DISEASE [COVID-19]), AMPLIFIED PROBE TECHNIQUE, MAKING USE OF HIGH THROUGHPUT TECHNOLOGIES AS DESCRIBED BY CMS-2020-01-R: HCPCS | Mod: ZL

## 2022-07-22 NOTE — PROGRESS NOTES
Patient here today for Covid test. Procedure 7/25/22.     Liz Ignacio CNA .............................on 7/22/2022 at 10:54 AM

## 2022-07-23 LAB — SARS-COV-2 RNA RESP QL NAA+PROBE: NEGATIVE

## 2022-07-24 ENCOUNTER — TELEPHONE (OUTPATIENT)
Dept: NURSING | Facility: CLINIC | Age: 47
End: 2022-07-24

## 2022-07-25 ENCOUNTER — ANESTHESIA EVENT (OUTPATIENT)
Dept: SURGERY | Facility: OTHER | Age: 47
End: 2022-07-25
Payer: MEDICARE

## 2022-07-25 ENCOUNTER — TRANSFERRED RECORDS (OUTPATIENT)
Dept: MULTI SPECIALTY CLINIC | Facility: CLINIC | Age: 47
End: 2022-07-25

## 2022-07-25 ENCOUNTER — ANESTHESIA (OUTPATIENT)
Dept: SURGERY | Facility: OTHER | Age: 47
End: 2022-07-25
Payer: MEDICARE

## 2022-07-25 ENCOUNTER — HOSPITAL ENCOUNTER (OUTPATIENT)
Facility: OTHER | Age: 47
Discharge: HOME | End: 2022-07-25
Attending: SURGERY | Admitting: SURGERY
Payer: MEDICARE

## 2022-07-25 VITALS
SYSTOLIC BLOOD PRESSURE: 110 MMHG | TEMPERATURE: 97.9 F | HEART RATE: 75 BPM | DIASTOLIC BLOOD PRESSURE: 60 MMHG | OXYGEN SATURATION: 96 % | RESPIRATION RATE: 16 BRPM

## 2022-07-25 PROBLEM — K63.5 COLON POLYPS: Status: ACTIVE | Noted: 2022-07-25

## 2022-07-25 PROCEDURE — 250N000011 HC RX IP 250 OP 636: Performed by: NURSE ANESTHETIST, CERTIFIED REGISTERED

## 2022-07-25 PROCEDURE — 88305 TISSUE EXAM BY PATHOLOGIST: CPT

## 2022-07-25 PROCEDURE — 45384 COLONOSCOPY W/LESION REMOVAL: CPT | Mod: PT | Performed by: SURGERY

## 2022-07-25 PROCEDURE — 250N000009 HC RX 250: Performed by: SURGERY

## 2022-07-25 PROCEDURE — 250N000009 HC RX 250: Performed by: NURSE ANESTHETIST, CERTIFIED REGISTERED

## 2022-07-25 PROCEDURE — 45384 COLONOSCOPY W/LESION REMOVAL: CPT | Performed by: NURSE ANESTHETIST, CERTIFIED REGISTERED

## 2022-07-25 PROCEDURE — 45384 COLONOSCOPY W/LESION REMOVAL: CPT | Performed by: SURGERY

## 2022-07-25 PROCEDURE — 258N000003 HC RX IP 258 OP 636: Performed by: SURGERY

## 2022-07-25 RX ORDER — SODIUM CHLORIDE, SODIUM LACTATE, POTASSIUM CHLORIDE, CALCIUM CHLORIDE 600; 310; 30; 20 MG/100ML; MG/100ML; MG/100ML; MG/100ML
INJECTION, SOLUTION INTRAVENOUS CONTINUOUS
Status: DISCONTINUED | OUTPATIENT
Start: 2022-07-25 | End: 2022-07-25 | Stop reason: HOSPADM

## 2022-07-25 RX ORDER — NALOXONE HYDROCHLORIDE 0.4 MG/ML
0.4 INJECTION, SOLUTION INTRAMUSCULAR; INTRAVENOUS; SUBCUTANEOUS
Status: DISCONTINUED | OUTPATIENT
Start: 2022-07-25 | End: 2022-07-25 | Stop reason: HOSPADM

## 2022-07-25 RX ORDER — PROPOFOL 10 MG/ML
INJECTION, EMULSION INTRAVENOUS PRN
Status: DISCONTINUED | OUTPATIENT
Start: 2022-07-25 | End: 2022-07-25

## 2022-07-25 RX ORDER — LIDOCAINE HYDROCHLORIDE 20 MG/ML
INJECTION, SOLUTION INFILTRATION; PERINEURAL PRN
Status: DISCONTINUED | OUTPATIENT
Start: 2022-07-25 | End: 2022-07-25

## 2022-07-25 RX ORDER — NALOXONE HYDROCHLORIDE 0.4 MG/ML
0.2 INJECTION, SOLUTION INTRAMUSCULAR; INTRAVENOUS; SUBCUTANEOUS
Status: DISCONTINUED | OUTPATIENT
Start: 2022-07-25 | End: 2022-07-25 | Stop reason: HOSPADM

## 2022-07-25 RX ORDER — PROPOFOL 10 MG/ML
INJECTION, EMULSION INTRAVENOUS CONTINUOUS PRN
Status: DISCONTINUED | OUTPATIENT
Start: 2022-07-25 | End: 2022-07-25

## 2022-07-25 RX ORDER — FLUMAZENIL 0.1 MG/ML
0.2 INJECTION, SOLUTION INTRAVENOUS
Status: DISCONTINUED | OUTPATIENT
Start: 2022-07-25 | End: 2022-07-25 | Stop reason: HOSPADM

## 2022-07-25 RX ORDER — ONDANSETRON 2 MG/ML
4 INJECTION INTRAMUSCULAR; INTRAVENOUS
Status: DISCONTINUED | OUTPATIENT
Start: 2022-07-25 | End: 2022-07-25 | Stop reason: HOSPADM

## 2022-07-25 RX ORDER — LIDOCAINE 40 MG/G
CREAM TOPICAL
Status: DISCONTINUED | OUTPATIENT
Start: 2022-07-25 | End: 2022-07-25 | Stop reason: HOSPADM

## 2022-07-25 RX ADMIN — LIDOCAINE HYDROCHLORIDE 40 MG: 20 INJECTION, SOLUTION INFILTRATION; PERINEURAL at 07:30

## 2022-07-25 RX ADMIN — SODIUM CHLORIDE, POTASSIUM CHLORIDE, SODIUM LACTATE AND CALCIUM CHLORIDE: 600; 310; 30; 20 INJECTION, SOLUTION INTRAVENOUS at 07:19

## 2022-07-25 RX ADMIN — PROPOFOL 135 MCG/KG/MIN: 10 INJECTION, EMULSION INTRAVENOUS at 07:30

## 2022-07-25 RX ADMIN — PROPOFOL 100 MG: 10 INJECTION, EMULSION INTRAVENOUS at 07:30

## 2022-07-25 ASSESSMENT — LIFESTYLE VARIABLES: TOBACCO_USE: 1

## 2022-07-25 NOTE — ANESTHESIA CARE TRANSFER NOTE
Patient: Rosi Lamb    Procedure: Procedure(s):  COLONOSCOPY, WITH LESION EXCISION USING HOT BIOPSY DEVICE       Diagnosis: Encounter for screening colonoscopy [Z12.11]  Diagnosis Additional Information: No value filed.    Anesthesia Type:   MAC     Note:    Oropharynx: oropharynx clear of all foreign objects  Level of Consciousness: awake  Oxygen Supplementation: room air    Independent Airway: airway patency satisfactory and stable  Dentition: dentition unchanged  Vital Signs Stable: post-procedure vital signs reviewed and stable  Report to RN Given: handoff report given  Patient transferred to: Phase II    Handoff Report: Identifed the Patient, Identified the Reponsible Provider, Reviewed the pertinent medical history, Discussed the surgical course, Reviewed Intra-OP anesthesia mangement and issues during anesthesia, Set expectations for post-procedure period and Allowed opportunity for questions and acknowledgement of understanding      Vitals:  Vitals Value Taken Time   BP     Temp     Pulse     Resp     SpO2         Electronically Signed By: DASHA TRAYLOR CRNA  July 25, 2022  8:04 AM

## 2022-07-25 NOTE — H&P
History and Physical    CHIEF COMPLAINT / REASON FOR PROCEDURE:  Healthcare maintenance     PERTINENT HISTORY   Patient is a 47 year old female who presents today for colonoscopy for Healthcare maintenance .   First colonoscopy.  Patient has no complaints.    Past Medical History:   Diagnosis Date     Abnormal Pap smear of cervix 2018    Overview:  had scraping of cervix     Cannabis use disorder, moderate, in sustained remission, dependence (H) 2015     Closed dislocation of tarsal joint - left 2011     Edema     No Comments Provided     Encounter for removal and reinsertion of intrauterine contraceptive device     05,IUD placement, Removed     Excessive and frequent menstruation with irregular cycle     2017     Major depressive disorder, single episode     No Comments Provided     Personal history of other medical treatment (CODE)     G3, P2-0-1-2 with history of spontaneous      Personal history of other medical treatment (CODE)     No Comments Provided     Uncomplicated asthma     No Comments Provided     Past Surgical History:   Procedure Laterality Date     ANKLE SURGERY      fracture, repair with screws     ARTHRODESIS FOOT Left 2022    Procedure: left foot hardware removaL, fusion of 1st-2nd Tarsal metatarsal;  Surgeon: Anthony Castillo DPM;  Location: [facility] OR     CONIZATION LEEP      ,Underwent a loop     LAPAROSCOPIC TUBAL LIGATION      ,tubal ligation       Bleeding tendencies:  No    ALLERGIES/SENSITIVITIES:   Allergies   Allergen Reactions     Clonazepam Other (See Comments)     Causes Violence and aggresssion     Hydrocodone-Acetaminophen      Other reaction(s): Seizures  Can take Percocet without difficulty.     Lorazepam Other (See Comments)     Causes Violence and aggresssion     Sertraline Other (See Comments)     Caused suicidally      Bupropion Other (See Comments)     Caused Major Depression     Dust Mite Extract      Other reaction(s): Asthma  symptoms     Lithium Other (See Comments)     Mood alteration     Pollen Extract      Other reaction(s): Asthma symptoms     Risperidone Other (See Comments)     Agitation       Sulfa Drugs Itching     Trichophyton      Other reaction(s): Asthma symptoms     Valproic Acid Other (See Comments)     Weight gain        CURRENT MEDICATIONS:    Prior to Admission medications    Medication Sig Start Date End Date Taking? Authorizing Provider   albuterol (VENTOLIN HFA) 108 (90 Base) MCG/ACT inhaler Inhale 1-2 puffs into the lungs every 4 hours as needed for shortness of breath / dyspnea or wheezing 9/8/21  Yes Lucio Curiel MD   ARIPiprazole (ABILIFY) 15 MG tablet Take 15 mg by mouth daily [place] 3/11/20  Yes Liz Diaz PA-C   aspirin 81 MG EC tablet Take 1 tablet (81 mg) by mouth 2 times daily 4/21/22  Yes Anthony Castillo DPM   cholecalciferol (VITAMIN D3) 125 mcg (5000 units) capsule Take 1 capsule (125 mcg) by mouth daily 3/18/22  Yes Lucio Curiel MD   cyanocobalamin (CYANOCOBALAMIN) 1000 MCG/ML injection INJECT 1ML INTRAMUSCULARLY EVERY 2 WEEKS 6/22/22  Yes Lucio Curiel MD   famotidine (PEPCID) 20 MG tablet Take 1 tablet (20 mg) by mouth 2 times daily - For Heartburn 3/18/22  Yes Lucio Curiel MD   fluticasone (FLONASE) 50 MCG/ACT nasal spray Spray 1 spray into both nostrils daily as needed for rhinitis or allergies 1/25/22  Yes Cristina Loja APRN CNP   gabapentin (NEURONTIN) 300 MG capsule 300 mg po BID and 300 mg po PRN at Noon -- Rx by Psych -- Fabienne Duncan 7/10/20  Yes Lucio Curiel MD   hydrOXYzine (ATARAX) 50 MG tablet Take 50 mg by mouth daily [place] and PRN 11/20/17  Yes Reported, Patient   lamoTRIgine (LAMICTAL) 200 MG tablet [place] at bedtime 2/12/18  Yes Reported, Patient   lamoTRIgine (LAMICTAL) 25 MG tablet Take 1 tablet by mouth At Bedtime With 200 mg  [place] 5/3/18  Yes Reported, Patient   montelukast (SINGULAIR) 10 MG tablet Take 1 tablet (10 mg) by mouth At Bedtime  "3/18/22  Yes Lucio Curiel MD   oxybutynin ER (DITROPAN-XL) 10 MG 24 hr tablet Take 1 tablet (10 mg) by mouth daily 3/18/22  Yes Lucio Curiel MD   SF 1.1 % GEL topical gel BRUSH, SWISH TOOTHPASTE FOR 30 SECONDS AND SPIT BEFORE BED. DO NOT RINSE, EAT OR DRINK FOR 30 MINUTES 8/12/20  Yes Reported, Patient   traZODone (DESYREL) 50 MG tablet TAKE 1/2 TABLET TO 1 TABLET BY MOUTH NIGHTLY AT BEDTIME AS NEEDED FOR SLEEP 8/18/21  Yes Reported, Patient   vilazodone (VIIBRYD) 40 MG TABS tablet Take 40 mg by mouth daily with food [place]   Yes Reported, Patient   Tuberculin-Allergy Syringes (B-D TB SYRINGE) 25G X 5/8\" 1 ML MISC USE TO INJECT B-12 INTRAMUSCULARLY EVERY 2 WEEKS 9/8/21   Lucio Curiel MD       Physical Exam:  /64   Pulse 81   Temp 98.6  F (37  C) (Tympanic)   Resp 18   SpO2 96%   EXAM:  Chest/Respiratory Exam: Normal - Clear to auscultation without rales, rhonchi, or wheezing.  Cardiovascular Exam: normal, regular rate and rhythm        PLAN: COLONOSCOPY .  Patient understands risks of bleeding, perforation, potential inability to reach cecum, aspiration and wishes to proceed. MAC needed for BMI.    "

## 2022-07-25 NOTE — ANESTHESIA PREPROCEDURE EVALUATION
Anesthesia Pre-Procedure Evaluation    Patient: Rosi Lamb   MRN: [id number] : 1975        Procedure : Procedure(s):  COLONOSCOPY          Past Medical History:   Diagnosis Date     Abnormal Pap smear of cervix 2018    Overview:  had scraping of cervix     Cannabis use disorder, moderate, in sustained remission, dependence (H) 2015     Closed dislocation of tarsal joint - left 2011     Edema     No Comments Provided     Encounter for removal and reinsertion of intrauterine contraceptive device     05,IUD placement, Removed     Excessive and frequent menstruation with irregular cycle     2017     Major depressive disorder, single episode     No Comments Provided     Personal history of other medical treatment (CODE)     G3, P2-0-1-2 with history of spontaneous      Personal history of other medical treatment (CODE)     No Comments Provided     Uncomplicated asthma     No Comments Provided      Past Surgical History:   Procedure Laterality Date     ANKLE SURGERY      fracture, repair with screws     ARTHRODESIS FOOT Left 2022    Procedure: left foot hardware removaL, fusion of 1st-2nd Tarsal metatarsal;  Surgeon: Anthony Castillo DPM;  Location: [facility] OR     CONIZATION LEEP      ,Underwent a loop     LAPAROSCOPIC TUBAL LIGATION      ,tubal ligation      Allergies   Allergen Reactions     Clonazepam Other (See Comments)     Causes Violence and aggresssion     Hydrocodone-Acetaminophen      Other reaction(s): Seizures  Can take Percocet without difficulty.     Lorazepam Other (See Comments)     Causes Violence and aggresssion     Sertraline Other (See Comments)     Caused suicidally      Bupropion Other (See Comments)     Caused Major Depression     Dust Mite Extract      Other reaction(s): Asthma symptoms     Lithium Other (See Comments)     Mood alteration     Pollen Extract      Other reaction(s): Asthma symptoms     Risperidone Other (See Comments)      Agitation       Sulfa Drugs Itching     Trichophyton      Other reaction(s): Asthma symptoms     Valproic Acid Other (See Comments)     Weight gain      Social History     Tobacco Use     Smoking status: Former Smoker     Packs/day: 1.00     Years: 3.00     Pack years: 3.00     Types: Cigarettes     Quit date: 2003     Years since quittin.5     Smokeless tobacco: Never Used   Substance Use Topics     Alcohol use: Not Currently     Alcohol/week: 0.0 standard drinks      Wt Readings from Last 1 Encounters:   22 128.4 kg (283 lb)        Anesthesia Evaluation   Pt has had prior anesthetic.     No history of anesthetic complications       ROS/MED HX  ENT/Pulmonary:     (+) tobacco use, Past use, Mild Persistent, asthma Treatment: Inhaler prn,      Neurologic:  - neg neurologic ROS     Cardiovascular:  - neg cardiovascular ROS     METS/Exercise Tolerance: >4 METS    Hematologic:  - neg hematologic  ROS     Musculoskeletal:  - neg musculoskeletal ROS     GI/Hepatic:     (+) GERD, Asymptomatic on medication,     Renal/Genitourinary:  - neg Renal ROS     Endo:  - neg endo ROS     Psychiatric/Substance Use:     (+) psychiatric history bipolar, anxiety and depression alcohol abuse Recreational drug usage: Cannabis.    Infectious Disease:  - neg infectious disease ROS     Malignancy:  - neg malignancy ROS     Other:  - neg other ROS          Physical Exam    Airway        Mallampati: II   TM distance: > 3 FB   Neck ROM: full   Mouth opening: > 3 cm    Respiratory Devices and Support         Dental  no notable dental history         Cardiovascular   cardiovascular exam normal       Rhythm and rate: regular and normal     Pulmonary   pulmonary exam normal        breath sounds clear to auscultation           OUTSIDE LABS:  CBC:   Lab Results   Component Value Date    WBC 8.8 2022    WBC 7.8 2022    HGB 13.2 2022    HGB 13.2 2022    HCT 40.9 2022    HCT 40.5 2022      04/05/2022     03/18/2022     BMP:   Lab Results   Component Value Date     04/05/2022     03/18/2022    POTASSIUM 4.0 04/05/2022    POTASSIUM 4.0 03/18/2022    CHLORIDE 103 04/05/2022    CHLORIDE 103 03/18/2022    CO2 33 (H) 04/05/2022    CO2 27 03/18/2022    BUN 14 04/05/2022    BUN 11 03/18/2022    CR 1.03 04/05/2022    CR 1.00 03/18/2022    GLC 86 04/21/2022     04/05/2022     COAGS:   Lab Results   Component Value Date    INR 1.2 11/04/2016     POC:   Lab Results   Component Value Date    HCG Negative 11/02/2021     HEPATIC:   Lab Results   Component Value Date    ALBUMIN 4.3 04/05/2022    PROTTOTAL 7.6 04/05/2022    ALT 21 04/05/2022    AST 22 04/05/2022    ALKPHOS 58 04/05/2022    BILITOTAL 0.5 04/05/2022     OTHER:   Lab Results   Component Value Date    LACT 0.5 11/04/2016    A1C 5.2 03/18/2022    NOE 10.1 04/05/2022    MAG 1.7 (L) 11/05/2021    SED 15 10/13/2016       Anesthesia Plan    ASA Status:  2   NPO Status:  NPO Appropriate    Anesthesia Type: MAC.   Induction: Propofol.           Consents    Anesthesia Plan(s) and associated risks, benefits, and realistic alternatives discussed. Questions answered and patient/representative(s) expressed understanding.     - Discussed: Risks, Benefits and Alternatives for BOTH SEDATION and the PROCEDURE were discussed     - Discussed with:  Patient      - Extended Intubation/Ventilatory Support Discussed: No.      - Patient is DNR/DNI Status: No    Use of blood products discussed: Yes.     - Discussed with: Patient.     - Consented: consented to blood products            Reason for refusal: other.     Postoperative Care            Comments:                DASHA TRAYLOR CRNA

## 2022-07-25 NOTE — ANESTHESIA POSTPROCEDURE EVALUATION
Patient: Rosi Lamb    Procedure: Procedure(s):  COLONOSCOPY, WITH LESION EXCISION USING HOT BIOPSY DEVICE       Anesthesia Type:  MAC    Note:  Disposition: Outpatient   Postop Pain Control: Uneventful            Sign Out: Well controlled pain   PONV: No   Neuro/Psych: Uneventful            Sign Out: Acceptable/Baseline neuro status   Airway/Respiratory: Uneventful            Sign Out: Acceptable/Baseline resp. status   CV/Hemodynamics: Uneventful            Sign Out: Acceptable CV status; No obvious hypovolemia; No obvious fluid overload   Other NRE: NONE   DID A NON-ROUTINE EVENT OCCUR? No           Last vitals:  Vitals Value Taken Time   /60 07/25/22 0829   Temp     Pulse 75 07/25/22 0829   Resp     SpO2 100 % 07/25/22 0819   Vitals shown include unvalidated device data.    Electronically Signed By: DASHA TRAYLOR CRNA  July 25, 2022  8:42 AM

## 2022-07-25 NOTE — OR NURSING
The patient was unable or refused to remove jewelry prior to their surgical procedure. The patient has been instructed on the risk of injury and possible infection. In the event jewelry or piercings are obstructing the surgical process or impeding circulation, the jewelry or piercings may need to be cut off. The surgeon and anesthesia provider have been notified that an item cannot be removed, or that the patient refuses to remove the jewelry or piercing. The surgeon and anesthesia provider will determine if it is in the patient's best interest to proceed with the procedure with the jewelry or piercings remaining in contact with the patient's body.

## 2022-07-25 NOTE — OP NOTE
PROCEDURE NOTE    DATE OF SERVICE: 7/25/2022    SURGEON: Leonel Hu MD    PRE-OP DIAGNOSIS:    Healthcare maintenance       POST-OP DIAGNOSIS:  Same  Polyps at HF and mid TC    PROCEDURE:     Colonoscopy with hot biopsy    ANESTHESIA:  VINCE Moreno CRNA    INDICATION FOR THE PROCEDURE: The patient is a 47 year old female in need of Healthcare maintenance  . The patient has no other complaints  . After explaining the risks to include bleeding, perforation, potential inability toreach the cecum, the patient wished to proceed.    PROCEDURE:After adequate sedation, the patient was in the left lateral decubitus position.  Rectal exam was performed.  There was normal tone and no palpable masses .  The colonoscope was introduced into the rectum and advanced to the cecum with Mild difficulty.  The patient's prep was good.  The terminal cecum was reached.  The cecum, ascending, transverse, descending and sigmoid colon was with small ( 0.4 cm ) polyp at HF and diminutive polyp at mid TC that were hot biopsied and destroyed .  The scope was retroflexed in the rectum.  The rectum was unremarkable  .  The scope was straightened and removed.  The patient tolerated the procedure well.     ESTIMATED BLOOD LOSS: none    COMPLICATIONS:  None    TISSUE REMOVED:  Yes    RECOMMEND:        Follow-up pending pathology      Leonel Hu MD FACS

## 2022-07-25 NOTE — DISCHARGE INSTRUCTIONS
[facility]  Adult Discharge Orders & Instructions    ________________________________________________________________          For 12 hours after surgery  Get plenty of rest.  A responsible adult must stay with you for at least 12 hours after you leave the hospital.   You may feel lightheaded.  IF so, sit for a few minutes before standing.  Have someone help you get up.   You may have a slight fever. Call the doctor if your fever is over 101 F (38.3 C) (taken under the tongue) or lasts longer than 24 hours.  You may have a dry mouth, a sore throat, muscle aches or trouble sleeping.  These should go away after 24 hours.  Do not make important or legal decisions.  6.   Do not drive or use heavy equipment.  If you have weakness or tingling, don't drive or use heavy equipment until this feeling goes away.    To contact a doctor, call   [phone]_______________________   
WDL

## 2022-07-25 NOTE — TELEPHONE ENCOUNTER
Maricarmen, caregiver from [facility] counseling [facility]  Ext 2    Pt is vomiting colonoscopy prep.  Care staff calling to ask if she can take more than 10 minutes between drinks so she can keep the fluid down.    Advised that taking longer between drinks is better than vomiting all drinks up.  Care staff verbalized understanding and agree with this plan.    Joyce Warren RN, Nurse Advisor 7:37 PM 7/24/2022

## 2022-07-27 PROBLEM — Z00.00 HEALTHCARE MAINTENANCE: Status: RESOLVED | Noted: 2022-07-19 | Resolved: 2022-07-27

## 2022-07-27 PROBLEM — Z86.0101 H/O ADENOMATOUS POLYP OF COLON: Status: ACTIVE | Noted: 2022-07-25

## 2022-07-27 LAB
PATH REPORT.COMMENTS IMP SPEC: NORMAL
PATH REPORT.FINAL DX SPEC: NORMAL
PATH REPORT.RELEVANT HX SPEC: NORMAL
PHOTO IMAGE: NORMAL

## 2022-08-02 ENCOUNTER — MYC MEDICAL ADVICE (OUTPATIENT)
Dept: SURGERY | Facility: OTHER | Age: 47
End: 2022-08-02

## 2022-08-03 NOTE — TELEPHONE ENCOUNTER
The recommendation for fiber is just part of routine colon health and nothing in particular is prescribed  for this patient. If she wants a fiber supplement it would be Fiber Con ( or equivalent ) three tablets BID.

## 2022-08-08 DIAGNOSIS — Z86.0101 H/O ADENOMATOUS POLYP OF COLON: Primary | ICD-10-CM

## 2022-08-08 RX ORDER — CALCIUM POLYCARBOPHIL 625 MG 625 MG/1
2 TABLET ORAL 2 TIMES DAILY
Qty: 360 TABLET | Refills: 3 | Status: SHIPPED | OUTPATIENT
Start: 2022-08-08 | End: 2023-08-03

## 2022-09-04 ENCOUNTER — HEALTH MAINTENANCE LETTER (OUTPATIENT)
Age: 47
End: 2022-09-04

## 2022-10-04 NOTE — NURSING NOTE
Patient Information     Patient Name MRN Sex Rosi Mendez [id number] Female 1975      Nursing Note by Ruben Hollis LPN at 2017  2:15 PM     Author:  Ruben Hollis LPN Service:  (none) Author Type:  NURS- Licensed Practical Nurse     Filed:  2017  2:27 PM Encounter Date:  2017 Status:  Signed     :  Ruben Hollis LPN (NURS- Licensed Practical Nurse)            Patient presents to the clinic for hot flashes. Patient states it has been going on for the last year, but feels they are getting worse.  Ruben Hollis LPN ..............2017 2:16 PM           yes

## 2022-10-12 DIAGNOSIS — E53.8 VITAMIN B12 DEFICIENCY: ICD-10-CM

## 2022-10-14 NOTE — TELEPHONE ENCOUNTER
"[id number] of [place] sent Rx request for the following:      Requested Prescriptions   Pending Prescriptions Disp Refills     BD SYRINGE SLIP TIP 25G X 5/8\" 1 ML MISC [Pharmacy Med Name: BD PG 1ML 25G 5/8IN TB SYRINGE]  0     Sig: USE TO INJECT B-12 INTRAMUSCULARLY EVERY 2 WEEKS   Last Prescription Date:   9/8/21  Last Fill Qty/Refills:         50, R-0    Last Office Visit:              4/5/22   Future Office visit:           None    Imelda Leyva RN .............. 10/14/2022  10:51 AM      "
[id number]

## 2022-10-16 RX ORDER — SYRINGE, DISPOSABLE, 1 ML
SYRINGE, EMPTY DISPOSABLE MISCELLANEOUS
Qty: 100 EACH | Refills: 0 | Status: ON HOLD | OUTPATIENT
Start: 2022-10-16 | End: 2022-10-30

## 2022-10-17 ENCOUNTER — TRANSFERRED RECORDS (OUTPATIENT)
Dept: HEALTH INFORMATION MANAGEMENT | Facility: OTHER | Age: 47
End: 2022-10-17

## 2022-10-29 ENCOUNTER — HOSPITAL ENCOUNTER (INPATIENT)
Facility: HOSPITAL | Age: 47
LOS: 10 days | Discharge: HOME | DRG: 885 | End: 2022-11-08
Attending: STUDENT IN AN ORGANIZED HEALTH CARE EDUCATION/TRAINING PROGRAM | Admitting: STUDENT IN AN ORGANIZED HEALTH CARE EDUCATION/TRAINING PROGRAM
Payer: MEDICARE

## 2022-10-29 ENCOUNTER — TELEPHONE (OUTPATIENT)
Dept: BEHAVIORAL HEALTH | Facility: CLINIC | Age: 47
End: 2022-10-29

## 2022-10-29 ENCOUNTER — HOSPITAL ENCOUNTER (EMERGENCY)
Facility: OTHER | Age: 47
Discharge: PSYCHIATRIC OR SUBSTANCE USE FACILITY | End: 2022-10-29
Attending: FAMILY MEDICINE | Admitting: FAMILY MEDICINE
Payer: MEDICARE

## 2022-10-29 VITALS
HEART RATE: 99 BPM | OXYGEN SATURATION: 98 % | RESPIRATION RATE: 18 BRPM | BODY MASS INDEX: 44.32 KG/M2 | TEMPERATURE: 98.2 F | DIASTOLIC BLOOD PRESSURE: 80 MMHG | WEIGHT: 283 LBS | SYSTOLIC BLOOD PRESSURE: 144 MMHG

## 2022-10-29 DIAGNOSIS — F41.1 GENERALIZED ANXIETY DISORDER: Chronic | ICD-10-CM

## 2022-10-29 DIAGNOSIS — F10.21 ALCOHOL USE DISORDER, MODERATE, IN SUSTAINED REMISSION, DEPENDENCE (H): ICD-10-CM

## 2022-10-29 DIAGNOSIS — F60.3 BORDERLINE PERSONALITY DISORDER (H): Chronic | ICD-10-CM

## 2022-10-29 DIAGNOSIS — F19.11 HISTORY OF SUBSTANCE ABUSE (H): Chronic | ICD-10-CM

## 2022-10-29 DIAGNOSIS — F31.32 BIPOLAR I DISORDER, MOST RECENT EPISODE DEPRESSED, MODERATE (H): Chronic | ICD-10-CM

## 2022-10-29 DIAGNOSIS — F31.32 BIPOLAR I DISORDER, MOST RECENT EPISODE DEPRESSED, MODERATE (H): Primary | Chronic | ICD-10-CM

## 2022-10-29 DIAGNOSIS — F31.32 BIPOLAR AFFECTIVE DISORDER, CURRENTLY DEPRESSED, MODERATE (H): ICD-10-CM

## 2022-10-29 DIAGNOSIS — R45.851 SUICIDAL IDEATION: ICD-10-CM

## 2022-10-29 LAB
ALBUMIN UR-MCNC: NEGATIVE MG/DL
AMPHETAMINES UR QL: NOT DETECTED
ANION GAP SERPL CALCULATED.3IONS-SCNC: 5 MMOL/L (ref 3–14)
APAP SERPL-MCNC: <2 MG/L (ref 10–30)
APPEARANCE UR: CLEAR
BACTERIA #/AREA URNS HPF: ABNORMAL /HPF
BARBITURATES UR QL SCN: NOT DETECTED
BASOPHILS # BLD AUTO: 0 10E3/UL (ref 0–0.2)
BASOPHILS NFR BLD AUTO: 0 %
BENZODIAZ UR QL SCN: NOT DETECTED
BILIRUB UR QL STRIP: NEGATIVE
BUN SERPL-MCNC: 11 MG/DL (ref 7–25)
BUPRENORPHINE UR QL: NOT DETECTED
CALCIUM SERPL-MCNC: 9.8 MG/DL (ref 8.6–10.3)
CANNABINOIDS UR QL: NOT DETECTED
CHLORIDE BLD-SCNC: 104 MMOL/L (ref 98–107)
CO2 SERPL-SCNC: 30 MMOL/L (ref 21–31)
COCAINE UR QL SCN: NOT DETECTED
COLOR UR AUTO: ABNORMAL
CREAT SERPL-MCNC: 1.12 MG/DL (ref 0.6–1.2)
D-METHAMPHET UR QL: NOT DETECTED
EOSINOPHIL # BLD AUTO: 0.1 10E3/UL (ref 0–0.7)
EOSINOPHIL NFR BLD AUTO: 1 %
ERYTHROCYTE [DISTWIDTH] IN BLOOD BY AUTOMATED COUNT: 12.9 % (ref 10–15)
ETHANOL SERPL-MCNC: <0.01 G/DL
FLUAV RNA SPEC QL NAA+PROBE: NEGATIVE
FLUBV RNA RESP QL NAA+PROBE: NEGATIVE
GFR SERPL CREATININE-BSD FRML MDRD: 61 ML/MIN/1.73M2
GLUCOSE BLD-MCNC: 105 MG/DL (ref 70–105)
GLUCOSE UR STRIP-MCNC: NEGATIVE MG/DL
HCG UR QL: NEGATIVE
HCT VFR BLD AUTO: 42.8 % (ref 35–47)
HGB BLD-MCNC: 13.8 G/DL (ref 11.7–15.7)
HGB UR QL STRIP: NEGATIVE
HOLD SPECIMEN: NORMAL
IMM GRANULOCYTES # BLD: 0 10E3/UL
IMM GRANULOCYTES NFR BLD: 0 %
KETONES UR STRIP-MCNC: NEGATIVE MG/DL
LEUKOCYTE ESTERASE UR QL STRIP: NEGATIVE
LYMPHOCYTES # BLD AUTO: 1.6 10E3/UL (ref 0.8–5.3)
LYMPHOCYTES NFR BLD AUTO: 21 %
MCH RBC QN AUTO: 30 PG (ref 26.5–33)
MCHC RBC AUTO-ENTMCNC: 32.2 G/DL (ref 31.5–36.5)
MCV RBC AUTO: 93 FL (ref 78–100)
METHADONE UR QL SCN: NOT DETECTED
MONOCYTES # BLD AUTO: 0.6 10E3/UL (ref 0–1.3)
MONOCYTES NFR BLD AUTO: 8 %
MUCOUS THREADS #/AREA URNS LPF: PRESENT /LPF
NEUTROPHILS # BLD AUTO: 5.4 10E3/UL (ref 1.6–8.3)
NEUTROPHILS NFR BLD AUTO: 70 %
NITRATE UR QL: NEGATIVE
NRBC # BLD AUTO: 0 10E3/UL
NRBC BLD AUTO-RTO: 0 /100
OPIATES UR QL SCN: NOT DETECTED
OXYCODONE UR QL SCN: NOT DETECTED
PCP UR QL SCN: NOT DETECTED
PH UR STRIP: 5.5 [PH] (ref 5–9)
PLATELET # BLD AUTO: 238 10E3/UL (ref 150–450)
POTASSIUM BLD-SCNC: 4.1 MMOL/L (ref 3.5–5.1)
PROPOXYPH UR QL: NOT DETECTED
RBC # BLD AUTO: 4.6 10E6/UL (ref 3.8–5.2)
RBC URINE: <1 /HPF
RSV RNA SPEC NAA+PROBE: NEGATIVE
SALICYLATES SERPL-MCNC: <0 MG/DL (ref 15–30)
SARS-COV-2 RNA RESP QL NAA+PROBE: NEGATIVE
SODIUM SERPL-SCNC: 139 MMOL/L (ref 134–144)
SP GR UR STRIP: 1.01 (ref 1–1.03)
TRICYCLICS UR QL SCN: NOT DETECTED
UROBILINOGEN UR STRIP-MCNC: NORMAL MG/DL
WBC # BLD AUTO: 7.7 10E3/UL (ref 4–11)
WBC URINE: 1 /HPF

## 2022-10-29 PROCEDURE — 81001 URINALYSIS AUTO W/SCOPE: CPT | Performed by: FAMILY MEDICINE

## 2022-10-29 PROCEDURE — 124N000001 HC R&B MH

## 2022-10-29 PROCEDURE — 99285 EMERGENCY DEPT VISIT HI MDM: CPT | Performed by: FAMILY MEDICINE

## 2022-10-29 PROCEDURE — 81025 URINE PREGNANCY TEST: CPT | Performed by: FAMILY MEDICINE

## 2022-10-29 PROCEDURE — 250N000013 HC RX MED GY IP 250 OP 250 PS 637: Performed by: NURSE PRACTITIONER

## 2022-10-29 PROCEDURE — 80143 DRUG ASSAY ACETAMINOPHEN: CPT | Performed by: FAMILY MEDICINE

## 2022-10-29 PROCEDURE — 87637 SARSCOV2&INF A&B&RSV AMP PRB: CPT | Performed by: FAMILY MEDICINE

## 2022-10-29 PROCEDURE — 82310 ASSAY OF CALCIUM: CPT | Performed by: FAMILY MEDICINE

## 2022-10-29 PROCEDURE — 250N000013 HC RX MED GY IP 250 OP 250 PS 637: Performed by: FAMILY MEDICINE

## 2022-10-29 PROCEDURE — 99285 EMERGENCY DEPT VISIT HI MDM: CPT | Mod: 25 | Performed by: FAMILY MEDICINE

## 2022-10-29 PROCEDURE — 36415 COLL VENOUS BLD VENIPUNCTURE: CPT | Performed by: FAMILY MEDICINE

## 2022-10-29 PROCEDURE — 85014 HEMATOCRIT: CPT | Performed by: FAMILY MEDICINE

## 2022-10-29 PROCEDURE — 80306 DRUG TEST PRSMV INSTRMNT: CPT | Performed by: FAMILY MEDICINE

## 2022-10-29 PROCEDURE — C9803 HOPD COVID-19 SPEC COLLECT: HCPCS | Performed by: FAMILY MEDICINE

## 2022-10-29 PROCEDURE — 80179 DRUG ASSAY SALICYLATE: CPT | Performed by: FAMILY MEDICINE

## 2022-10-29 PROCEDURE — 82077 ASSAY SPEC XCP UR&BREATH IA: CPT | Performed by: FAMILY MEDICINE

## 2022-10-29 RX ORDER — MAGNESIUM HYDROXIDE/ALUMINUM HYDROXICE/SIMETHICONE 120; 1200; 1200 MG/30ML; MG/30ML; MG/30ML
30 SUSPENSION ORAL EVERY 4 HOURS PRN
Status: DISCONTINUED | OUTPATIENT
Start: 2022-10-29 | End: 2022-11-08 | Stop reason: HOSPADM

## 2022-10-29 RX ORDER — HYDROXYZINE HYDROCHLORIDE 25 MG/1
50 TABLET, FILM COATED ORAL EVERY 4 HOURS PRN
Status: DISCONTINUED | OUTPATIENT
Start: 2022-10-29 | End: 2022-10-29 | Stop reason: HOSPADM

## 2022-10-29 RX ORDER — OLANZAPINE 10 MG/2ML
10 INJECTION, POWDER, FOR SOLUTION INTRAMUSCULAR 3 TIMES DAILY PRN
Status: DISCONTINUED | OUTPATIENT
Start: 2022-10-29 | End: 2022-11-08 | Stop reason: HOSPADM

## 2022-10-29 RX ORDER — LANOLIN ALCOHOL/MO/W.PET/CERES
3 CREAM (GRAM) TOPICAL
Status: DISCONTINUED | OUTPATIENT
Start: 2022-10-29 | End: 2022-10-30

## 2022-10-29 RX ORDER — HYDROXYZINE HYDROCHLORIDE 25 MG/1
50 TABLET, FILM COATED ORAL EVERY 4 HOURS PRN
Status: DISCONTINUED | OUTPATIENT
Start: 2022-10-29 | End: 2022-11-08 | Stop reason: HOSPADM

## 2022-10-29 RX ORDER — OLANZAPINE 10 MG/1
10 TABLET ORAL 3 TIMES DAILY PRN
Status: DISCONTINUED | OUTPATIENT
Start: 2022-10-29 | End: 2022-11-08 | Stop reason: HOSPADM

## 2022-10-29 RX ADMIN — MELATONIN 3 MG: 3 TAB ORAL at 22:18

## 2022-10-29 RX ADMIN — HYDROXYZINE HYDROCHLORIDE 50 MG: 25 TABLET, FILM COATED ORAL at 16:27

## 2022-10-29 RX ADMIN — HYDROXYZINE HYDROCHLORIDE 50 MG: 25 TABLET ORAL at 22:18

## 2022-10-29 ASSESSMENT — ACTIVITIES OF DAILY LIVING (ADL)
DRESS: SCRUBS (BEHAVIORAL HEALTH)
ORAL_HYGIENE: INDEPENDENT
ADLS_ACUITY_SCORE: 35
ADLS_ACUITY_SCORE: 35
ADLS_ACUITY_SCORE: 28
ADLS_ACUITY_SCORE: 35
HYGIENE/GROOMING: INDEPENDENT

## 2022-10-29 ASSESSMENT — LIFESTYLE VARIABLES
SKIP TO QUESTIONS 9-10: 1
AUDIT-C TOTAL SCORE: 0

## 2022-10-29 NOTE — PLAN OF CARE
Rosi MORROW Zainab  October 29, 2022  Plan of Care Hand-off Note     Patient Care Path: Inpatient Mental Health    Plan for Care:     Current thoughts of suicide with plan and intent to act. Unable to engage in safety planning. In response, recommend admit to IP MH unit for Pt immediate safety and stabilization of her sx.     Critical Safety Issues: none    Overview:  This patient is a child/adolescent: No    This patient has additional special visitor precautions: No    Legal Status: Voluntary    Contacts:  Group Home : [facility]   [phone]      Psychiatry Consult: no    Updated Attending Provider regarding plan of care.    Sarah Cotto, LICSW

## 2022-10-29 NOTE — ED TRIAGE NOTES
ED Nursing Triage Note (General)   ________________________________    Rosi ODALYS Lamb is a 47 year old Female that presents to triage via private vehicle with complaints of mental health problems.  Patient states she is suicidal with a plan.  Patient states she has been having increased depression due to loss of her boyfriend in January as well as strain from her family.  Patient states plan to cut her wrists or overdose. Patient currently is being followed by [facility].   Significant symptoms had onset 1 week ago.  Patient appears alert behavior.  GCS-15  Airway: intact  Breathing noted as Normal  Action taken:  2      PRE HOSPITAL PRIOR LIVING SITUATION-home

## 2022-10-29 NOTE — CONSULTS
Diagnostic Evaluation Consultation  Crisis Assessment    Patient was assessed: Renetta  Patient location: GI ED  Was a release of information signed: no     Referral Data and Chief Complaint  Rosi is a 47 year old, who uses she/her pronouns, and presents to the ED via EMS. Patient is referred to the ED by self. Patient is presenting to the ED for the following concerns: depressed and suicidal.      Informed Consent and Assessment Methods     Patient is her own guardian. Writer met with patient and explained the crisis assessment process, including applicable information disclosures and limits to confidentiality, assessed understanding of the process, and obtained consent to proceed with the assessment. Patient was observed to be able to participate in the assessment as evidenced by answering of questions. Assessment methods included conducting a formal interview with patient, review of medical records, collaboration with medical staff, and obtaining relevant collateral information from family and community providers when available..     Over the course of this crisis assessment provided reassurance and offered validation. Patient's response to interventions was open     Summary of Patient Situation     Increased depressive sx (sadness, hopelessness, fatigue, lack of energy) - leading to suicidal ideation with plan to take a knife and cut herself to bleed out or overdose on her prescribed medications. Pt identifies unresolved grief around the death of her s/o in January to be a trigger. Also reports lack of relationship with family.     Brief Psychosocial History     Lives in  for last 5 years. Own guardian. S/O  in January from colon cancer. Lack of relationship with family.     Significant Clinical History     Bipolar I Disorder, AGNIESZKA, Borderline Personality Traits.     Followed by [facility] for med mgmt. Engaged with [facility] for therapy, Counts include 234 beds at the [facility] and case mgmt.     Last IP   admission in 2015.     Suicide attempt by overdose 22 years ago.      Collateral Information    Review of epic.      Risk Assessment  ESS-6  1.a. Over the past 2 weeks, have you had thoughts of killing yourself? Yes  1.b. Have you ever attempted to kill yourself and, if yes, when did this last happen? Yes 22 years ago by OD   2. Recent or current suicide plan? Yes cut self to bleed out or overdose   3. Recent or current intent to act on ideation? Yes  4. Lifetime psychiatric hospitalization? Yes  5. Pattern of excessive substance use? No  6. Current irritability, agitation, or aggression? No  Scoring note: BOTH 1a and 1b must be yes for it to score 1 point, if both are not yes it is zero. All others are 1 point per number. If all questions 1a/1b - 6 are no, risk is negligible. If one of 1a/1b is yes, then risk is mild. If either question 2 or 3, but not both, is yes, then risk is automatically moderate regardless of total score. If both 2 and 3 are yes, risk is automatically high regardless of total score.      Score: 4, moderate risk      Does the patient have access to lethal means? [facility] administers her medications though Pt states she could get ahold of them if she tried - also notes access to knives within the home.      Does the patient engage in non-suicidal self-injurious behavior (NSSI/SIB)? no     Does the patient have thoughts of harming others? No     Is the patient engaging in sexually inappropriate behavior?  no        Current Substance Abuse     Is there recent substance abuse? no     Was a urine drug screen or blood alcohol level obtained: Yes pending       Mental Status Exam     Affect: Flat   Appearance: Appropriate    Attention Span/Concentration: Attentive  Eye Contact: Variable   Fund of Knowledge: Appropriate    Language /Speech Content: Fluent   Language /Speech Volume: Soft    Language /Speech Rate/Productions: Normal    Recent Memory: Intact   Remote Memory: Intact   Mood: Depressed     Orientation to Person: Yes    Orientation to Place: Yes   Orientation to Time of Day: Yes    Orientation to Date: Yes    Situation (Do they understand why they are here?): Yes    Psychomotor Behavior: Normal    Thought Content: Suicidal   Thought Form: Intact      History of commitment: No         Medication    Psychotropic medications: yes: Lamictal, Gabapentin, Viibryd, Vraylar, Hydroxyzine.   Medication changes made in the last two weeks: Yes: Abilify discontinued and started on Vraylar a mos ago.        Current Care Team    Primary Care Provider: Veena KNAPP  Psychiatrist: [facility]  Therapist: [facility]  : [facility]     CTSS or ARMHS:[facility]  ACT Team: No  Other: No      Diagnosis    296.52 Bipolar I Disorder Current or Most Recent Episode Depressed, Moderate   300.02 (F41.1) Generalized Anxiety Disorder - by history   Traits of Borderline Personality Disorder    Clinical Summary and Substantiation of Recommendations    Current thoughts of suicide with plan and intent to act. Unable to engage in safety planning. In response, recommend admit to IP MH unit for Pt immediate safety and stabilization of her sx. Would rec a psychotherapy focus on grief upon discharge.     Admission to Inpatient Level of Care is indicated due to:    1. Patient risk of severity of behavioral health disorder is appropriate to proposed level of care as indicated by:    Imminent Risk of Harm: Current plan for suicide or serious harm to self is present  And/or:  Behavioral health disorder is present and appropriate for inpatient care with both of the following:     Severe psychiatric, behavioral or other comorbid conditions are appropriate for management at inpatient mental health as indicated by at least one of the following:   o Other emotional behavioral or behavioral disturbance     Severe dysfunction in daily living is present as indicated by at  least one of the following:   o Other evidence of severe dysfunction    2. Inpatient mental health services are necessary to meet patient needs and at least one of the following:  Specific condition related to admission diagnosis is present and judged likely to further improve at proposed level of care    3. Situation and expectations are appropriate for inpatient care, as indicated by one of the following:   Voluntary treatment at lower level of care is not feasible    Disposition    Recommended disposition: Inpatient Mental Health       Reviewed case and recommendations with attending provider. Attending Name: Deepak KNAPP       Attending concurs with disposition: Yes       Patient concurs with disposition: Yes       Guardian concurs with disposition: NA      Final disposition: Inpatient mental health .     Inpatient Details (if applicable):   Is patient admitted voluntarily:Yes      Patient aware of potential for transfer if there is not appropriate placement? Yes       Patient is willing to travel outside of the [facility] for placement? Yes      Behavioral Intake Notified? Yes: Date: 10/29/22, 420p       Assessment Details    Patient interview started at: 350p and completed at: 405p.     Total duration spent on the patient case in minutes: 1.25 hrs      CPT code(s) utilized: 98530 - Psychotherapy for Crisis - 60 (30-74*) min       Sarah Cotto, [facility],  [facility]  DEC - Triage & Transition Services  Callback: [phone]

## 2022-10-29 NOTE — TELEPHONE ENCOUNTER
S: [facility] ED, DEC  calling at 4:13pm.  47 year old/Female presenting with SI and plan.     B: Pt arrives via EMS. Pt came from her group home, and will be taken back once stabilized in IP. Pt presents with increased SI and plan to cut herself and bleed out or overdose on Rx. Pt has unresolved grief from a significant other that  in January of cancer. Reports she is estranged from the family.   Pt affect in ED: flat, cooperative, engaged.   Pt Dx: Bipolar Disorder, Anxiety Disorder and BPD  Previous [facility] hx? Yes: .  Pt endorses SI. Pt denies SIB. Pt denies HI.   Hx of suicide attempt? Yes: 22 years ago.   Hx of aggression, or current concerns for aggression this visit? No  Pt is prescribed medication. Pt is medication compliant  [facility]   CD concerns: none  Acute medical concerns: none  Does Pt present with any of the following: assistive devices, insulin pump, J/G tube, catheter, CPAP, continuous IV, continuous O2, bariatric needs, ADA needs? No  Pt is ambulatory  Pt is  able to perform ADLs independently      A: Pt meets criteria for review for [facility] admission. Patient is voluntary. Preferred placement: statewide  COVID: Negative  Utox: Negative  CMP: WNL  CBC: WNL  HCG: Negative     R: Patient cleared and ready for behavioral bed placement: Yes  Pt placed on [facility] worklist, Intake searching for appropriate bed placement for patient review.    Pt accepted for placement to [facility]ón[facility] at [facility].   7:30pm - Unit informed of disposition  7:31pm - ED informed of disposition

## 2022-10-29 NOTE — ED PROVIDER NOTES
History     Chief Complaint   Patient presents with     Mental Health Problem     The history is provided by the patient.     Rosi Lamb is a 47 year old female here with suicidal thoughts and plan. She feels safe in the ED.     Allergies:  Allergies   Allergen Reactions     Clonazepam Other (See Comments)     Causes Violence and aggresssion     Hydrocodone-Acetaminophen      Other reaction(s): Seizures  Can take Percocet without difficulty.     Lorazepam Other (See Comments)     Causes Violence and aggresssion     Sertraline Other (See Comments)     Caused suicidally      Bupropion Other (See Comments)     Caused Major Depression     Dust Mite Extract      Other reaction(s): Asthma symptoms     Lithium Other (See Comments)     Mood alteration     Pollen Extract      Other reaction(s): Asthma symptoms     Risperidone Other (See Comments)     Agitation       Sulfa Drugs Itching     Trichophyton      Other reaction(s): Asthma symptoms     Valproic Acid Other (See Comments)     Weight gain       Problem List:    Patient Active Problem List    Diagnosis Date Noted     H/O adenomatous polyp of colon 07/25/2022     Priority: Medium     Nail dystrophy 02/27/2022     Priority: Medium     Intermittent low back pain 09/08/2021     Priority: Medium     Abnormal Pap smear of cervix 04/11/2018     Priority: Medium     Overview:   had scraping of cervix    Formatting of this note might be different from the original.  had scraping of cervix       Allergic rhinitis due to pollen 02/08/2018     Priority: Medium     Esophageal reflux 02/08/2018     Priority: Medium     History of substance abuse (H) 02/08/2018     Priority: Medium     Bilateral chronic knee pain 04/20/2017     Priority: Medium     Crepitus of joint of right knee 04/20/2017     Priority: Medium     Menorrhagia with irregular cycle 02/16/2017     Priority: Medium     Bilateral foot pain 12/18/2016     Priority: Medium     Elevated random blood glucose level  12/18/2016     Priority: Medium     Peripheral polyneuropathy 12/18/2016     Priority: Medium     Vitamin B12 deficiency 12/18/2016     Priority: Medium     Vitamin D deficiency 12/18/2016     Priority: Medium     Lymphedema of both lower extremities 11/22/2016     Priority: Medium     Chronic venous stasis dermatitis of right lower extremity 08/17/2016     Priority: Medium     Overview:   Updated per 10/1/17 IMO import    Formatting of this note might be different from the original.  Updated per 10/1/17 IMO import       Alcohol use disorder, moderate, in sustained remission, dependence (H) 11/28/2015     Priority: Medium     Generalized anxiety disorder 11/28/2015     Priority: Medium     Bipolar I disorder, most recent episode depressed, moderate (H) 11/28/2015     Priority: Medium     Edema of extremity of unknown cause 01/15/2014     Priority: Medium     Formatting of this note might be different from the original.  1/15/2014: both legs, well controlled on prn lasix       Moderate persistent asthma 08/19/2013     Priority: Medium     AAP completed on 08/19/13  Triggers: pollen, fumes, exercise, URI, warm weather. Peak flow today is 250. Symptomatic: moderate    Formatting of this note might be different from the original.  Overview:   AAP completed on 08/19/13  Triggers: pollen, fumes, exercise, URI, warm weather. Peak flow today is 250. Symptomatic: moderate  Overview:   AAP completed on 08/19/13  Triggers: pollen, fumes, exercise, URI, warm weather. Peak flow today is 250. Symptomatic: moderate  Formatting of this note might be different from the original.  AAP completed on 08/19/13  Triggers: pollen, fumes, exercise, URI, warm weather. Peak flow today is 250. Symptomatic: moderate       Urinary incontinence 03/15/2013     Priority: Medium     Closed dislocation of tarsal joint 02/04/2011     Priority: Medium     Allergic rhinitis due to other allergen 10/02/2003     Priority: Medium     Overview:   [facility] -  Seasonal Allergies  Overview:   Overview:   [facility] - Seasonal Allergies    Formatting of this note might be different from the original.  [facility] - Seasonal Allergies       Borderline personality disorder (H) 07/07/2003     Priority: Medium     Formatting of this note might be different from the original.  Overview:   [facility].  Formatting of this note might be different from the original.  [facility].       Disorder of female genital organ 07/05/2001     Priority: Medium     Overview:   DUB, dysmenorrhea  Overview:   Overview:   DUB, dysmenorrhea    Formatting of this note might be different from the original.  Overview:   DUB, dysmenorrhea          Past Medical History:    Past Medical History:   Diagnosis Date     Abnormal Pap smear of cervix 04/11/2018     Cannabis use disorder, moderate, in sustained remission, dependence (H) 11/28/2015     Closed dislocation of tarsal joint - left 02/04/2011     Edema      Encounter for removal and reinsertion of intrauterine contraceptive device      Excessive and frequent menstruation with irregular cycle      Major depressive disorder, single episode      Personal history of other medical treatment (CODE)      Personal history of other medical treatment (CODE)      Uncomplicated asthma        Past Surgical History:    Past Surgical History:   Procedure Laterality Date     ANKLE SURGERY      fracture, repair with screws     ARTHRODESIS FOOT Left 04/21/2022    Procedure: left foot hardware removaL, fusion of 1st-2nd Tarsal metatarsal;  Surgeon: Anthony Castillo DPM;  Location: [facility] OR     COLONOSCOPY  07/25/2022    F/U 2027 tubular adenoma     CONIZATION LEEP      07/04,Underwent a loop     LAPAROSCOPIC TUBAL LIGATION      2009,tubal ligation       Family History:    Family History   Problem Relation Age of Onset     Osteoporosis Mother      Asthma Father         Asthma,+ Leg edema, knee, hip replacement. + Lymphedema     Heart Failure Father      Other - See  "Comments Paternal Grandmother         Lymphedema     Osteoporosis Brother         Osteoporosis     Other - See Comments Brother         No Known Problems       Social History:  Marital Status:   [4]  Social History     Tobacco Use     Smoking status: Former     Packs/day: 1.00     Years: 3.00     Pack years: 3.00     Types: Cigarettes     Quit date: 2003     Years since quittin.8     Smokeless tobacco: Never   Vaping Use     Vaping Use: Never used   Substance Use Topics     Alcohol use: Not Currently     Alcohol/week: 0.0 standard drinks     Drug use: Never        Medications:    albuterol (VENTOLIN HFA) 108 (90 Base) MCG/ACT inhaler  ARIPiprazole (ABILIFY) 15 MG tablet  aspirin 81 MG EC tablet  BD SYRINGE SLIP TIP 25G X 5/8\" 1 ML MISC  calcium polycarbophil (FIBERCON) 625 MG tablet  cholecalciferol (VITAMIN D3) 125 mcg (5000 units) capsule  cyanocobalamin (CYANOCOBALAMIN) 1000 MCG/ML injection  famotidine (PEPCID) 20 MG tablet  fluticasone (FLONASE) 50 MCG/ACT nasal spray  gabapentin (NEURONTIN) 300 MG capsule  hydrOXYzine (ATARAX) 50 MG tablet  lamoTRIgine (LAMICTAL) 200 MG tablet  lamoTRIgine (LAMICTAL) 25 MG tablet  montelukast (SINGULAIR) 10 MG tablet  oxybutynin ER (DITROPAN-XL) 10 MG 24 hr tablet  SF 1.1 % GEL topical gel  traZODone (DESYREL) 50 MG tablet  vilazodone (VIIBRYD) 40 MG TABS tablet          Review of Systems   Psychiatric/Behavioral: Positive for suicidal ideas.   All other systems reviewed and are negative.      Physical Exam   BP: (!) 144/80  Pulse: 99  Temp: 98.2  F (36.8  C)  Resp: 18  Weight: 128.4 kg (283 lb)  SpO2: 98 %      Physical Exam  Vitals and nursing note reviewed.   Constitutional:       General: She is not in acute distress.     Appearance: She is not ill-appearing, toxic-appearing or diaphoretic.   Cardiovascular:      Rate and Rhythm: Normal rate and regular rhythm.      Pulses: Normal pulses.      Heart sounds: Normal heart sounds. No murmur heard.  Pulmonary: "      Effort: Pulmonary effort is normal. No respiratory distress.      Breath sounds: Normal breath sounds.   Abdominal:      General: Bowel sounds are normal.      Palpations: Abdomen is soft.      Tenderness: There is no abdominal tenderness. There is no guarding.   Skin:     General: Skin is warm and dry.   Neurological:      General: No focal deficit present.      Mental Status: She is alert and oriented to person, place, and time.   Psychiatric:         Mood and Affect: Mood normal.         Behavior: Behavior normal.      Comments: She has good eye contact with me         ED Course     Mental Health Risk Assessment      PSS-3    Date and Time Over the past 2 weeks have you felt down, depressed, or hopeless? Over the past 2 weeks have you had thoughts of killing yourself? Have you ever attempted to kill yourself? When did this last happen? User   10/29/22 1528 yes yes yes more than 6 months ago JOSETTE      C-SSRS ([place])    Date and Time Q1 Wished to be Dead (Past Month) Q2 Suicidal Thoughts (Past Month) Q3 Suicidal Thought Method Q4 Suicidal Intent without Specific Plan Q5 Suicide Intent with Specific Plan Q6 Suicide Behavior (Lifetime) Within the Past 3 Months? RETIRED: Level of Risk per Screen Screening Not Complete User   10/29/22 1545 yes yes yes no yes yes -- -- -- MM   10/29/22 1528 yes yes yes no yes yes -- -- -- KN              Suicide assessment completed by mental health (D.E.C., LCSW, etc.)      Results for orders placed or performed during the hospital encounter of 10/29/22 (from the past 24 hour(s))   CBC with platelets differential    Narrative    The following orders were created for panel order CBC with platelets differential.  Procedure                               Abnormality         Status                     ---------                               -----------         ------                     CBC with platelets and d...[[id number]]                      Final result                 Please  view results for these tests on the individual orders.   Basic metabolic panel   Result Value Ref Range    Sodium 139 134 - 144 mmol/L    Potassium 4.1 3.5 - 5.1 mmol/L    Chloride 104 98 - 107 mmol/L    Carbon Dioxide (CO2) 30 21 - 31 mmol/L    Anion Gap 5 3 - 14 mmol/L    Urea Nitrogen 11 7 - 25 mg/dL    Creatinine 1.12 0.60 - 1.20 mg/dL    Calcium 9.8 8.6 - 10.3 mg/dL    Glucose 105 70 - 105 mg/dL    GFR Estimate 61 >60 mL/min/1.73m2   Ethanol GH   Result Value Ref Range    Alcohol ethyl <0.01 <=0.01 g/dL   Acetaminophen level   Result Value Ref Range    Acetaminophen <2 (L) 10 - 30 mg/L   Salicylate level   Result Value Ref Range    Salicylate <0 (L) 15 - 30 mg/dL   CBC with platelets and differential   Result Value Ref Range    WBC Count 7.7 4.0 - 11.0 10e3/uL    RBC Count 4.60 3.80 - 5.20 10e6/uL    Hemoglobin 13.8 11.7 - 15.7 g/dL    Hematocrit 42.8 35.0 - 47.0 %    MCV 93 78 - 100 fL    MCH 30.0 26.5 - 33.0 pg    MCHC 32.2 31.5 - 36.5 g/dL    RDW 12.9 10.0 - 15.0 %    Platelet Count 238 150 - 450 10e3/uL    % Neutrophils 70 %    % Lymphocytes 21 %    % Monocytes 8 %    % Eosinophils 1 %    % Basophils 0 %    % Immature Granulocytes 0 %    NRBCs per 100 WBC 0 <1 /100    Absolute Neutrophils 5.4 1.6 - 8.3 10e3/uL    Absolute Lymphocytes 1.6 0.8 - 5.3 10e3/uL    Absolute Monocytes 0.6 0.0 - 1.3 10e3/uL    Absolute Eosinophils 0.1 0.0 - 0.7 10e3/uL    Absolute Basophils 0.0 0.0 - 0.2 10e3/uL    Absolute Immature Granulocytes 0.0 <=0.4 10e3/uL    Absolute NRBCs 0.0 10e3/uL   UA with Microscopic reflex to Culture    Specimen: Urine, NOS   Result Value Ref Range    Color Urine Light Yellow Colorless, Straw, Light Yellow, Yellow    Appearance Urine Clear Clear    Glucose Urine Negative Negative mg/dL    Bilirubin Urine Negative Negative    Ketones Urine Negative Negative mg/dL    Specific Gravity Urine 1.013 1.000 - 1.030    Blood Urine Negative Negative    pH Urine 5.5 5.0 - 9.0    Protein Albumin Urine Negative  Negative mg/dL    Urobilinogen Urine Normal Normal, 2.0 mg/dL    Nitrite Urine Negative Negative    Leukocyte Esterase Urine Negative Negative    Bacteria Urine Few (A) None Seen /HPF    Mucus Urine Present (A) None Seen /LPF    RBC Urine <1 <=2 /HPF    WBC Urine 1 <=5 /HPF    Narrative    Urine Culture not indicated   HCG qualitative urine (UPT)   Result Value Ref Range    hCG Urine Qualitative Negative Negative   Urine Drugs of Abuse Screen    Narrative    The following orders were created for panel order Urine Drugs of Abuse Screen.  Procedure                               Abnormality         Status                     ---------                               -----------         ------                     Urine Drugs of Abuse Scr...[[id number]]  Normal              Final result                 Please view results for these tests on the individual orders.   Urine Drugs of Abuse Screen Panel 13   Result Value Ref Range    Cannabinoids (84-sjf-2-carboxy-9-THC) Not Detected Not Detected    Phencyclidine Not Detected Not Detected    Cocaine (Benzoylecgonine) Not Detected Not Detected    Methamphetamine (d-Methamphetamine) Not Detected Not Detected    Opiates (Morphine) Not Detected Not Detected    Amphetamine (d-Amphetamine) Not Detected Not Detected    Benzodiazepines (Nordiazepam) Not Detected Not Detected    Tricyclic Antidepressants (Desipramine) Not Detected Not Detected    Methadone Not Detected Not Detected    Barbiturates (Butalbital) Not Detected Not Detected    Oxycodone Not Detected Not Detected    Propoxyphene (Norpropoxyphene) Not Detected Not Detected    Buprenorphine Not Detected Not Detected       Medications   hydrOXYzine (ATARAX) tablet 50 mg (50 mg Oral Given 10/29/22 1627)       Assessments & Plan (with Medical Decision Making)  Rosi Lamb is a 47 year old female here with suicidal thoughts and plan. She feels safe in the ED.  VS in the ED BP (!) 144/80   Pulse 99   Temp 98.2  F (36.8  C)  (Tympanic)   Resp 18   Wt 128.4 kg (283 lb)   SpO2 98%   BMI 44.32 kg/m    Exam shows no concerns.  She was seen by [facility] and not able to contract for safety so they are looking for an inpatient bed for her. Labs show CBC normal, CMP normal, Tylenol level zero, aspirin level zero, ethanol zero, UA negative for UTI, UPT negative, UDS negative.   7:00 PM  I am signing out her care to Dr Neves at change of shift.      I have reviewed the nursing notes.    I have reviewed the findings, diagnosis, plan and need for follow up with the patient.    Final diagnoses:   Suicidal ideation   Borderline personality disorder (H)   Generalized anxiety disorder   Alcohol use disorder, moderate, in sustained remission, dependence (H)   History of substance abuse (H)   Bipolar I disorder, most recent episode depressed, moderate (H)       10/29/2022   [facility], Jeff Damon MD  10/29/22 1510

## 2022-10-30 PROCEDURE — 99223 1ST HOSP IP/OBS HIGH 75: CPT | Mod: AI | Performed by: NURSE PRACTITIONER

## 2022-10-30 PROCEDURE — 250N000013 HC RX MED GY IP 250 OP 250 PS 637: Performed by: NURSE PRACTITIONER

## 2022-10-30 PROCEDURE — 124N000001 HC R&B MH

## 2022-10-30 RX ORDER — VILAZODONE HYDROCHLORIDE 20 MG/1
40 TABLET ORAL DAILY
Status: DISCONTINUED | OUTPATIENT
Start: 2022-10-30 | End: 2022-11-08 | Stop reason: HOSPADM

## 2022-10-30 RX ORDER — ARIPIPRAZOLE 15 MG/1
15 TABLET ORAL AT BEDTIME
Status: DISCONTINUED | OUTPATIENT
Start: 2022-10-30 | End: 2022-10-31

## 2022-10-30 RX ORDER — CALCIUM CARBONATE 500 MG/1
500 TABLET, CHEWABLE ORAL 2 TIMES DAILY PRN
Status: DISCONTINUED | OUTPATIENT
Start: 2022-10-30 | End: 2022-11-08 | Stop reason: HOSPADM

## 2022-10-30 RX ORDER — CYANOCOBALAMIN 1000 UG/ML
1000 INJECTION, SOLUTION INTRAMUSCULAR; SUBCUTANEOUS
Status: DISCONTINUED | OUTPATIENT
Start: 2022-11-09 | End: 2022-11-08 | Stop reason: HOSPADM

## 2022-10-30 RX ORDER — FAMOTIDINE 20 MG/1
20 TABLET, FILM COATED ORAL 2 TIMES DAILY
Status: DISCONTINUED | OUTPATIENT
Start: 2022-10-30 | End: 2022-11-08 | Stop reason: HOSPADM

## 2022-10-30 RX ORDER — CALCIUM POLYCARBOPHIL 625 MG 625 MG/1
1250 TABLET ORAL 2 TIMES DAILY
Status: DISCONTINUED | OUTPATIENT
Start: 2022-10-30 | End: 2022-11-08 | Stop reason: HOSPADM

## 2022-10-30 RX ORDER — IBUPROFEN 200 MG
200-400 TABLET ORAL EVERY 6 HOURS PRN
COMMUNITY
End: 2024-04-25

## 2022-10-30 RX ORDER — OXYBUTYNIN CHLORIDE 5 MG/1
10 TABLET, EXTENDED RELEASE ORAL DAILY
Status: DISCONTINUED | OUTPATIENT
Start: 2022-10-30 | End: 2022-11-08 | Stop reason: HOSPADM

## 2022-10-30 RX ORDER — METHOCARBAMOL 500 MG/1
500-1000 TABLET, FILM COATED ORAL 4 TIMES DAILY PRN
COMMUNITY
End: 2024-01-25

## 2022-10-30 RX ORDER — FLUCONAZOLE 150 MG/1
150 TABLET ORAL
Status: ON HOLD | COMMUNITY
End: 2022-11-06

## 2022-10-30 RX ORDER — IBUPROFEN 400 MG/1
400 TABLET, FILM COATED ORAL EVERY 6 HOURS PRN
Status: DISCONTINUED | OUTPATIENT
Start: 2022-10-30 | End: 2022-11-08 | Stop reason: HOSPADM

## 2022-10-30 RX ORDER — ALBUTEROL SULFATE 90 UG/1
1-2 AEROSOL, METERED RESPIRATORY (INHALATION) EVERY 4 HOURS PRN
Status: DISCONTINUED | OUTPATIENT
Start: 2022-10-30 | End: 2022-11-08 | Stop reason: HOSPADM

## 2022-10-30 RX ORDER — GABAPENTIN 300 MG/1
300 CAPSULE ORAL 2 TIMES DAILY
Status: DISCONTINUED | OUTPATIENT
Start: 2022-10-30 | End: 2022-11-08 | Stop reason: HOSPADM

## 2022-10-30 RX ORDER — HYDROXYZINE HYDROCHLORIDE 25 MG/1
50 TABLET, FILM COATED ORAL AT BEDTIME
Status: DISCONTINUED | OUTPATIENT
Start: 2022-10-30 | End: 2022-11-08 | Stop reason: HOSPADM

## 2022-10-30 RX ORDER — CALCIUM CARBONATE 500 MG/1
2-4 TABLET, CHEWABLE ORAL PRN
COMMUNITY
End: 2023-11-28

## 2022-10-30 RX ORDER — MONTELUKAST SODIUM 10 MG/1
10 TABLET ORAL AT BEDTIME
Status: DISCONTINUED | OUTPATIENT
Start: 2022-10-30 | End: 2022-11-08 | Stop reason: HOSPADM

## 2022-10-30 RX ORDER — FLUTICASONE PROPIONATE 50 MCG
1 SPRAY, SUSPENSION (ML) NASAL DAILY PRN
Status: DISCONTINUED | OUTPATIENT
Start: 2022-10-30 | End: 2022-11-08 | Stop reason: HOSPADM

## 2022-10-30 RX ORDER — METHOCARBAMOL 500 MG/1
500 TABLET, FILM COATED ORAL 4 TIMES DAILY PRN
Status: DISCONTINUED | OUTPATIENT
Start: 2022-10-30 | End: 2022-11-08 | Stop reason: HOSPADM

## 2022-10-30 RX ORDER — HYDROXYZINE HYDROCHLORIDE 50 MG/1
50 TABLET, FILM COATED ORAL 3 TIMES DAILY PRN
Status: ON HOLD | COMMUNITY
End: 2022-10-31

## 2022-10-30 RX ADMIN — VILAZODONE HYDROCHLORIDE 40 MG: 20 TABLET ORAL at 15:44

## 2022-10-30 RX ADMIN — ALUMINUM HYDROXIDE, MAGNESIUM HYDROXIDE, AND SIMETHICONE 30 ML: 200; 200; 20 SUSPENSION ORAL at 13:25

## 2022-10-30 RX ADMIN — FAMOTIDINE 20 MG: 20 TABLET, FILM COATED ORAL at 20:22

## 2022-10-30 RX ADMIN — LAMOTRIGINE 225 MG: 100 TABLET ORAL at 20:21

## 2022-10-30 RX ADMIN — Medication 125 MCG: at 14:31

## 2022-10-30 RX ADMIN — HYDROXYZINE HYDROCHLORIDE 50 MG: 25 TABLET ORAL at 12:36

## 2022-10-30 RX ADMIN — HYDROXYZINE HYDROCHLORIDE 50 MG: 25 TABLET ORAL at 20:22

## 2022-10-30 RX ADMIN — CARIPRAZINE 3 MG: 3 CAPSULE, GELATIN COATED ORAL at 20:21

## 2022-10-30 RX ADMIN — GABAPENTIN 300 MG: 300 CAPSULE ORAL at 20:22

## 2022-10-30 RX ADMIN — CALCIUM POLYCARBOPHIL 1250 MG: 625 TABLET, FILM COATED ORAL at 20:22

## 2022-10-30 RX ADMIN — MONTELUKAST 10 MG: 10 TABLET, FILM COATED ORAL at 20:22

## 2022-10-30 RX ADMIN — OXYBUTYNIN CHLORIDE 10 MG: 5 TABLET, EXTENDED RELEASE ORAL at 14:31

## 2022-10-30 ASSESSMENT — ACTIVITIES OF DAILY LIVING (ADL)
ADLS_ACUITY_SCORE: 28

## 2022-10-30 NOTE — PROGRESS NOTES
10/29/22 4757   Patient Belongings   Did you bring any home meds/supplements to the hospital?  No   Patient Belongings remains with patient;locker;sent to security per site process   Patient Belongings Remaining with Patient body jewelry;ring   Patient Belongings Put in Hospital Secure Location (Security or Locker, etc.) bracelet;clothing;necklace;shoes   Belongings Search Yes   Clothing Search Yes   List items sent to safe: Silver necklace with a heart  All other belongings put in assigned cubby in belongings room: 3 bracelets, sneakers, orange shorts, pink pants, pair of purple socks, tie dye t-shirt, black hoodie      I have reviewed my belongings list on admission and verify that it is correct.     Patient signature_______________________________    Second staff witness (if patient unable to sign) ______________________________       I have received all my belongings at discharge.    Patient signature________________________________    Corinne   10/29/2022  10:16 PM

## 2022-10-30 NOTE — H&P
"[facility] PSYCHIATRY   HISTORY AND PHYSICAL     ADMISSION DATA     Rosi Lamb MRN# [id number]   Age: 47 year old YOB: 1975     Date of Admission: 10/29/2022  Primary Physician: Lucio Curiel        CHIEF COMPLAINT   \"lot of depression and suicidal thoughts\"       HISTORY OF PRESENT ILLNESS     Rosi is a 47 year old   female with a PMH of bipolar disorder, AGNIESZKA, borderline personality traits who presented to the ED vis EMS from her group home for evaluation of suicidal thoughts with plan to cut herself and bleed out or overdose on medications. She reported unresolved grief from a significant other that passed away last January from colon cancer. Reported increasing depressive symptoms such as fatigue, sadness, hopelessness, lack of energy. She was unable to engage in safety planning and was agreeable to admission. She was voluntarily transferred to behavioral health for further evaluation and treatment.     Rosi is easily engaged in conversation today. She reports that she went to the ED yesterday because she was feeling a :lot of depression and suicidal thoughts\". She notes that it will be coming up on the one year anniversary of her boyfriend's death from cancer, and his birthday was on October 25th. \"It's just a bad time of year\". She does report some continued suicidal thoughts today, though reports they are improved from yesterday. She states that she does not believe that her medications are as helpful as they could be. She reports that she has been compliant with medications, lives at a group home where they do administer her medications. She denies any use of alcohol or drugs. Does currently work with a psych provider, therapist, and [employer] worker.        PSYCHIATRIC HISTORY     Patient reports her last hospitalization was in 2015 at [facility] in [place]. She reports one previous suicide attempt 22 years ago by overdose. Records show historical diagnoses of " bipolar 1 disorder, AGNIESZKA, borderline personality traits, remote mention of alcohol use disorder. Previous medications noted from brief record review include Klonopin, Ativan, Zoloft, Wellbutrin, Lithium, Risperdal, Depakote (all previous meds listed as having SE), Abilify, Gabapentin, Atarax, Lamictal, Trazodone, Viibryd, Vraylar as well as likely others.        SUBSTANCE USE HISTORY   History   Drug Use Unknown       Social History    Substance and Sexual Activity      Alcohol use: Not Currently        Alcohol/week: 0.0 standard drinks      History   Smoking Status     Former     Packs/day: 1.00     Years: 3.00     Types: Cigarettes     Quit date: 2003   Smokeless Tobacco     Never     Patient denies any use of alcohol or drugs. Urine drug screen was negative.       SOCIAL HISTORY   Social History     Socioeconomic History     Marital status:      Spouse name: Not on file     Number of children: Not on file     Years of education: Not on file     Highest education level: Not on file   Occupational History     Not on file   Tobacco Use     Smoking status: Former     Packs/day: 1.00     Years: 3.00     Pack years: 3.00     Types: Cigarettes     Quit date: 2003     Years since quittin.8     Smokeless tobacco: Never   Vaping Use     Vaping Use: Never used   Substance and Sexual Activity     Alcohol use: Not Currently     Alcohol/week: 0.0 standard drinks     Drug use: Never     Sexual activity: Yes     Partners: Male     Birth control/protection: Female Surgical   Other Topics Concern     Parent/sibling w/ CABG, MI or angioplasty before 65F 55M? Not Asked   Social History Narrative    No longer in adult foster care lived with Charley Godwin.    .   2 sons - age 12 and 7 - as of 2016.   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     Social Determinants of Health     Financial Resource Strain: Not on file   Food Insecurity: Not on file   Transportation Needs: Not on file    Physical Activity: Not on file   Stress: Not on file   Social Connections: Not on file   Intimate Partner Violence: Not on file   Housing Stability: Not on file     From [place], has two younger brothers that both live in [place]. Graduated high school, attended [employer] and completed 21 credits towards a degree in childhood development and psychology. Not employed, on disability. . Has two kids that were adopted out, she states she does get occasional updates on how they are doing though does not have contact with them. Denies any legal issues. Has been living at [facility] in [place] for about 5 years, states that she likes living here.        FAMILY HISTORY   Family History   Problem Relation Age of Onset     Osteoporosis Mother      Asthma Father         Asthma,+ Leg edema, knee, hip replacement. + Lymphedema     Heart Failure Father      Other - See Comments Paternal Grandmother         Lymphedema     Osteoporosis Brother         Osteoporosis     Other - See Comments Brother         No Known Problems      Patient reports mother has bipolar disorder.       PAST MEDICAL HISTORY   Past Medical History:   Diagnosis Date     Abnormal Pap smear of cervix 2018    Overview:  had scraping of cervix     Cannabis use disorder, moderate, in sustained remission, dependence (H) 2015     Closed dislocation of tarsal joint - left 2011     Edema     No Comments Provided     Encounter for removal and reinsertion of intrauterine contraceptive device     05,IUD placement, Removed     Excessive and frequent menstruation with irregular cycle     2017     Major depressive disorder, single episode     No Comments Provided     Personal history of other medical treatment (CODE)     G3, P2-0-1-2 with history of spontaneous      Personal history of other medical treatment (CODE)     No Comments Provided     Uncomplicated asthma     No Comments Provided       Past Surgical History:  "  Procedure Laterality Date     ANKLE SURGERY      fracture, repair with screws     ARTHRODESIS FOOT Left 04/21/2022    Procedure: left foot hardware removaL, fusion of 1st-2nd Tarsal metatarsal;  Surgeon: Anthony Castillo DPM;  Location: [facility] OR     COLONOSCOPY  07/25/2022    F/U 2027 tubular adenoma     CONIZATION LEEP      07/04,Underwent a loop     LAPAROSCOPIC TUBAL LIGATION      2009,tubal ligation       Clonazepam, Hydrocodone-acetaminophen, Lorazepam, Sertraline, Bupropion, Dust mite extract, Lithium, Pollen extract, Risperidone, Sulfa drugs, Trichophyton, and Valproic acid     MEDICATIONS   Prior to Admission medications    Medication Sig Start Date End Date Taking? Authorizing Provider   ARIPiprazole (ABILIFY) 15 MG tablet Take 15 mg by mouth daily [place] 3/11/20  Yes Liz Diaz PA-C   cariprazine (VRAYLAR) 3 MG capsule Take 3 mg by mouth daily   Yes Reported, Patient   hydrOXYzine (ATARAX) 50 MG tablet Take 50 mg by mouth daily [facility] and PRN 11/20/17  Yes Reported, Patient   albuterol (VENTOLIN HFA) 108 (90 Base) MCG/ACT inhaler Inhale 1-2 puffs into the lungs every 4 hours as needed for shortness of breath / dyspnea or wheezing 9/8/21   Lucio Curiel MD   aspirin 81 MG EC tablet Take 1 tablet (81 mg) by mouth 2 times daily 4/21/22   Anthony Castillo DPM   BD SYRINGE SLIP TIP 25G X 5/8\" 1 ML MISC USE TO INJECT B-12 INTRAMUSCULARLY EVERY 2 WEEKS 10/16/22   Lucio Curiel MD   calcium polycarbophil (FIBERCON) 625 MG tablet Take 2 tablets (1,250 mg) by mouth 2 times daily for 360 days 8/8/22 8/3/23  Leonel Hu MD   cholecalciferol (VITAMIN D3) 125 mcg (5000 units) capsule Take 1 capsule (125 mcg) by mouth daily 3/18/22   Lucio Curiel MD   cyanocobalamin (CYANOCOBALAMIN) 1000 MCG/ML injection INJECT 1ML INTRAMUSCULARLY EVERY 2 WEEKS 6/22/22   Lucio Curiel MD   famotidine (PEPCID) 20 MG tablet Take 1 tablet (20 mg) by mouth 2 times daily - For Heartburn 3/18/22   Lucio Curiel MD "   fluticasone (FLONASE) 50 MCG/ACT nasal spray Spray 1 spray into both nostrils daily as needed for rhinitis or allergies 1/25/22   Cristina Loja APRN CNP   gabapentin (NEURONTIN) 300 MG capsule 300 mg po BID and 300 mg po PRN at Noon -- Rx by Psych -- Fabienne Duncan 7/10/20   Lucio Curiel MD   lamoTRIgine (LAMICTAL) 200 MG tablet [place] at bedtime 2/12/18   Reported, Patient   lamoTRIgine (LAMICTAL) 25 MG tablet Take 1 tablet by mouth At Bedtime With 200 mg  [place] 5/3/18   Reported, Patient   montelukast (SINGULAIR) 10 MG tablet Take 1 tablet (10 mg) by mouth At Bedtime 3/18/22   Lucio Curiel MD   oxybutynin ER (DITROPAN-XL) 10 MG 24 hr tablet Take 1 tablet (10 mg) by mouth daily 3/18/22   Lucio Curiel MD   SF 1.1 % GEL topical gel BRUSH, SWISH TOOTHPASTE FOR 30 SECONDS AND SPIT BEFORE BED. DO NOT RINSE, EAT OR DRINK FOR 30 MINUTES 8/12/20   Reported, Patient   traZODone (DESYREL) 50 MG tablet TAKE 1/2 TABLET TO 1 TABLET BY MOUTH NIGHTLY AT BEDTIME AS NEEDED FOR SLEEP 8/18/21   Reported, Patient   vilazodone (VIIBRYD) 40 MG TABS tablet Take 40 mg by mouth daily with food [place]    Reported, Patient        PHYSICAL EXAM/ROS     I have reviewed the physical exam as documented by ED provider Dr. Sotelo and agree with findings and assessment and have no additional findings to add at this time. The review of systems is negative other than noted in the HPI.       LABS   Recent Results (from the past 24 hour(s))   Basic metabolic panel    Collection Time: 10/29/22  4:00 PM   Result Value Ref Range    Sodium 139 134 - 144 mmol/L    Potassium 4.1 3.5 - 5.1 mmol/L    Chloride 104 98 - 107 mmol/L    Carbon Dioxide (CO2) 30 21 - 31 mmol/L    Anion Gap 5 3 - 14 mmol/L    Urea Nitrogen 11 7 - 25 mg/dL    Creatinine 1.12 0.60 - 1.20 mg/dL    Calcium 9.8 8.6 - 10.3 mg/dL    Glucose 105 70 - 105 mg/dL    GFR Estimate 61 >60 mL/min/1.73m2   Ethanol GH    Collection Time: 10/29/22  4:00 PM   Result Value Ref  Range    Alcohol ethyl <0.01 <=0.01 g/dL   Acetaminophen level    Collection Time: 10/29/22  4:00 PM   Result Value Ref Range    Acetaminophen <2 (L) 10 - 30 mg/L   Salicylate level    Collection Time: 10/29/22  4:00 PM   Result Value Ref Range    Salicylate <0 (L) 15 - 30 mg/dL   CBC with platelets and differential    Collection Time: 10/29/22  4:00 PM   Result Value Ref Range    WBC Count 7.7 4.0 - 11.0 10e3/uL    RBC Count 4.60 3.80 - 5.20 10e6/uL    Hemoglobin 13.8 11.7 - 15.7 g/dL    Hematocrit 42.8 35.0 - 47.0 %    MCV 93 78 - 100 fL    MCH 30.0 26.5 - 33.0 pg    MCHC 32.2 31.5 - 36.5 g/dL    RDW 12.9 10.0 - 15.0 %    Platelet Count 238 150 - 450 10e3/uL    % Neutrophils 70 %    % Lymphocytes 21 %    % Monocytes 8 %    % Eosinophils 1 %    % Basophils 0 %    % Immature Granulocytes 0 %    NRBCs per 100 WBC 0 <1 /100    Absolute Neutrophils 5.4 1.6 - 8.3 10e3/uL    Absolute Lymphocytes 1.6 0.8 - 5.3 10e3/uL    Absolute Monocytes 0.6 0.0 - 1.3 10e3/uL    Absolute Eosinophils 0.1 0.0 - 0.7 10e3/uL    Absolute Basophils 0.0 0.0 - 0.2 10e3/uL    Absolute Immature Granulocytes 0.0 <=0.4 10e3/uL    Absolute NRBCs 0.0 10e3/uL   UA with Microscopic reflex to Culture    Collection Time: 10/29/22  4:20 PM    Specimen: Urine, NOS   Result Value Ref Range    Color Urine Light Yellow Colorless, Straw, Light Yellow, Yellow    Appearance Urine Clear Clear    Glucose Urine Negative Negative mg/dL    Bilirubin Urine Negative Negative    Ketones Urine Negative Negative mg/dL    Specific Gravity Urine 1.013 1.000 - 1.030    Blood Urine Negative Negative    pH Urine 5.5 5.0 - 9.0    Protein Albumin Urine Negative Negative mg/dL    Urobilinogen Urine Normal Normal, 2.0 mg/dL    Nitrite Urine Negative Negative    Leukocyte Esterase Urine Negative Negative    Bacteria Urine Few (A) None Seen /HPF    Mucus Urine Present (A) None Seen /LPF    RBC Urine <1 <=2 /HPF    WBC Urine 1 <=5 /HPF   HCG qualitative urine (UPT)    Collection  "Time: 10/29/22  4:20 PM   Result Value Ref Range    hCG Urine Qualitative Negative Negative   Urine Drugs of Abuse Screen Panel 13    Collection Time: 10/29/22  4:20 PM   Result Value Ref Range    Cannabinoids (97-qlc-0-carboxy-9-THC) Not Detected Not Detected    Phencyclidine Not Detected Not Detected    Cocaine (Benzoylecgonine) Not Detected Not Detected    Methamphetamine (d-Methamphetamine) Not Detected Not Detected    Opiates (Morphine) Not Detected Not Detected    Amphetamine (d-Amphetamine) Not Detected Not Detected    Benzodiazepines (Nordiazepam) Not Detected Not Detected    Tricyclic Antidepressants (Desipramine) Not Detected Not Detected    Methadone Not Detected Not Detected    Barbiturates (Butalbital) Not Detected Not Detected    Oxycodone Not Detected Not Detected    Propoxyphene (Norpropoxyphene) Not Detected Not Detected    Buprenorphine Not Detected Not Detected   Extra Serum Separator Tube (SST)    Collection Time: 10/29/22  6:03 PM   Result Value Ref Range    Hold Specimen JIC    Asymptomatic Influenza A/B & SARS-CoV2 (COVID-19) Virus PCR Multiplex Nose    Collection Time: 10/29/22  6:19 PM    Specimen: Nose; Swab   Result Value Ref Range    Influenza A PCR Negative Negative    Influenza B PCR Negative Negative    RSV PCR Negative Negative    SARS CoV2 PCR Negative Negative         MENTAL STATUS EXAM   Vitals: /64   Pulse 91   Temp 98.4  F (36.9  C) (Temporal)   Resp 16   Ht 1.702 m (5' 7\")   Wt 128.4 kg (283 lb 1.6 oz)   SpO2 100%   BMI 44.34 kg/m      Appearance:  awake, alert, dressed in hospital scrubs and appeared as age stated  Attitude:  cooperative, pleasant  Eye Contact:  good  Mood:  anxious and depressed  Affect:  mood congruent  Speech:  clear, coherent  Psychomotor Behavior:  no evidence of tardive dyskinesia, dystonia, or tics  Thought Process:  linear and goal oriented  Associations:  no loose associations  Thought Content:  Reports vague suicidal thoughts. Denies any " hallucinations  Insight:  fair  Judgment:  fair  Oriented to:  time, person, and place  Attention Span and Concentration:  fair  Recent and Remote Memory:  fair  Language: Able to name objects, Able to repeat phrases and Able to read and write  Fund of Knowledge: low normal  Muscle Strength and Tone: normal  Gait and Station: Normal       ASSESSMENT     This is a 47 year old female with a PMH of bipolar disorder, AGNIESZKA, borderline personality traits who presented to the [facility] ED for evaluation of suicidal ideation with plan. Patient reported a plan to cut herself and bleed out or to overdose on medications. She identified unresolved grief as a big stressor. She reports that her boyfriend  of cancer in January and his birthday would have been a few days ago. Today she does continue to report vague SI, though feels better than she did yesterday. She reports feeling more depressed, is unsure if medications are working. She reports that Vraylar was added a few months ago and she has felt no change in her mood, so will likely end up stopping this. She is also on Abilify 15 mg daily, could look at possible increase in dose. Is currently taking Lamictal 225 mg which could also be increased for bipolar depression. Will wait for nursing to get a verified medication list from , likely start adjustments tomorrow. She does have a psych provider at [facility] and has a therapist and [facility] worker through [facility].         DIAGNOSIS     1. Bipolar 1 disorder, most recent episode depressed  2. AGNIESZKA (by history)  3. Borderline personality traits       PLAN     Location: Unit 5  Legal Status: Orders Placed This Encounter      Voluntary    Safety Assessment:    Behavioral Orders   Procedures     Code 1 - Restrict to Unit     Routine Programming     As clinically indicated     Status 15     Every 15 minutes.      PTA psychotropic medications held:     -None    PTA psychotropic medications  continued/changed:     -Abilify 15 mg at bedtime- will discuss increasing this to target mood/depression  -Vraylar 3 mg at bedtime- likely stop this d/t ineffective  -Gabapentin 300 mg BID  -Hydroyxine 50 mg at bedtime  -Lamictal 225 mg at bedtime- consider increase in dose  -Viibryd 40 mg daily  -Cyanocobalamin 1,000 mcg/mL- inject 1 mL IM every 2 weeks (last dose 10/26)      New medications initiated:     -None today, nurse to verify med list and will discuss medication adjustments tomorrow with pt    Programming: Patient will be treated in a therapeutic milieu with appropriate individual and group therapies. Education will be provided on diagnoses, medications, and treatments.     Medical diagnoses:  Per medicine    Consult: None  Tests: Vit B12, Vit D- has hx of deficiencies    Anticipated LOS: 3-5 days  Disposition: back to  with outpatient appointments    Justification for hospitalization: reasons for hospitalization include potential safety risk to self or others within the last week, decreased functioning in outpatient setting and in the setting of no outpatient management, need for highly structured inpatient management for stabilization of psychiatric symptoms, need for psychiatric medication initiation and stabilization.       ATTESTATION      Isa Coats NP

## 2022-10-30 NOTE — PHARMACY
Calcium-Vit D-Vit K (500mg -500 unit -40 mcg) discontinued at this time per none essential med policy.  Not stocked in pharmacy.   Please call pharmacy to re-order alternatives if needed.  Thank you.

## 2022-10-30 NOTE — PLAN OF CARE
"ADMISSION NOTE    Reason for admission suicidal ideation.  Safety concerns no.  Risk for or history of violence no.   Full skin assessment: Yes--WDL    Patient arrived on unit from [facility] ED accompanied by EMT staff and [facility]  on 10/29/2022  9:41 PM.   Status on arrival: A&O, calm, denies pain. Gait steady-denies alls or dizziness.   Cooperative with changing into unit scrubs and admission questions.   States she is still having suicidal thoughts and that her significant others birthday was on Oct 25th and that has been a trigger for her thoughts and increased depression. pt agrees to contact staff if feeling unsafe. Maintains good eye contact and has pleasant , polite demeanor.   Clear and linear conversations. Able to let needs be known. Requests prn Hydroxyzine for anxiety-50 mg rec'd. Pt also takes Melatonin 3 mg for sleep.   Pt snoring on 15 minute rounds.    Pleasant demeanor.   /57 (BP Location: Left arm)   Pulse 82   Temp 97  F (36.1  C) (Tympanic)   Resp 16   Ht 1.702 m (5' 7\")   Wt 128.4 kg (283 lb 1.6 oz)   SpO2 96%   BMI 44.34 kg/m    Patient given tour of unit and Welcome to  unit papers given to patient, wanding completed, belongings inventoried, and admission assessment completed.   Patient's legal status on arrival is voluntary. Appropriate legal rights discussed with and copy given to patient. Patient Bill of Rights discussed with and copy given to patient.   Patient denies SI, HI, and thoughts of self harm and contracts for safety while on unit.      Maryjo Cortes RN  10/29/2022  10:41 PM      Problem: Adult Behavioral Health Plan of Care  Goal: Patient-Specific Goal (Individualization)  Description: You can add care plan individualizations to a care plan. Examples of Individualization might be:  \"Parent requests to be called daily at 9am for status\", \"I have a hard time hearing out of my right ear\", or \"Do not touch me to wake me up as it " "startles me\".  Outcome: Progressing     Problem: Suicidal Behavior  Goal: Suicidal Behavior is Absent or Managed  Outcome: Progressing     Problem: Suicide Risk  Goal: Absence of Self-Harm  Outcome: Progressing   Goal Outcome Evaluation:    Plan of Care Reviewed With: patient          Face to face end of shift report communicated to oncoming shift.     Maryjo Cortes RN  10/29/2022  10:47 PM               "

## 2022-10-30 NOTE — PLAN OF CARE
Problem: Adult Behavioral Health Plan of Care  Goal: Patient-Specific Goal (Individualization)  Description: Patient will be free from suicidal thinking during hospitalization.  Patient will contract for safety during hospitalization.  Patient will accept medications and attend groups while hospitalization.  Outcome: Progressing  Note:     0030 Patient in bed with eyes closed and regular resps.    0600 Patient continues to rest in bed with eyes closed for a total of 7 hours of sleep this shift.    Face to face end of shift report communicated to 7-3 shift RN.     Kimberlee Marquez RN  10/30/2022  6:12 AM           Problem: Suicide Risk  Goal: Absence of Self-Harm  Outcome: Progressing     Problem: Suicidal Behavior  Goal: Suicidal Behavior is Absent or Managed  Outcome: Progressing   Goal Outcome Evaluation:

## 2022-10-30 NOTE — ED PROVIDER NOTES
10/29/22   7:00 PM    I am accepting the care of this patient from Dr Simone Sotelo at change of shift.  Rosi Lamb is a 46 yo female presenting with SI. She is pending DEC assessment.    ED Course  Patient remained hemodynamically stable during her ED course.  Her urine drug screen was negative and tox labs negative.    DEC recommended inpatient psych admission.  Patient was accepted at [facility] and she was transferred by protective transport.    Diagnosis  Suicidal ideations     DO Edinson Giang Alec, MD  Resident  10/30/22 0328

## 2022-10-30 NOTE — PLAN OF CARE
"  Problem: Adult Behavioral Health Plan of Care  Goal: Patient-Specific Goal (Individualization)  Description: You can add care plan individualizations to a care plan. Examples of Individualization might be:  \"Parent requests to be called daily at 9am for status\", \"I have a hard time hearing out of my right ear\", or \"Do not touch me to wake me up as it startles me\".  Outcome: Progressing  Note: 0030 Patient in bed with eyes closed and regular resps.     Problem: Suicide Risk  Goal: Absence of Self-Harm  Outcome: Progressing     Problem: Suicidal Behavior  Goal: Suicidal Behavior is Absent or Managed  Outcome: Progressing   Goal Outcome Evaluation:                        "

## 2022-10-30 NOTE — PLAN OF CARE
"Face to face report received from Kimberlee MONROY. Pt. Observed.     Problem: Adult Behavioral Health Plan of Care  Goal: Patient-Specific Goal (Individualization)  Description: Patient will be free from suicidal thinking during hospitalization.  Patient will contract for safety during hospitalization.  Patient will accept medications and attend groups while hospitalization.  10/30/2022 1058 by Dona Haque, RN  Outcome: Progressing  Pt. Denying criteria this am. Pt. Is isolating to room out for meals. \"I'm tired.\" Pt. Eating WDL. Offers no c/o issues with bowel and bladder. Pt. In agreement to update staff to thoughts feelings of wanting to harm self or others. Pt. Cooperative with nursing assessment.   Requesting her medications. PTAs not finished at this time. [facility] in  is closed until Monday. [facility] that is open can not print out MAR from outside pharmacy.  called and Lexii is in agreement to Fax an updated MAR.    1236 PRN ATARAX 50 mg po administered per pt. Request for anxiety.    This writer continues pt. Care into sukh shift. Pt. Refusing scheduled Abilify 15 mg @ HS. Per pt. This medication was discontinued and she has not taken it in a month. Per  MAR pt. Has not been administered this medication since 10/4/22. On-call provider called with update.      Face to face end of shift report to be communicated to oncoming RN.     Dona Haque RN  10/30/2022         "

## 2022-10-30 NOTE — ED NOTES
Protective transport will be here in 20 mins to bring patient to [facility]. Nurse to nurse to PORFIRIO harper at [place]

## 2022-10-31 PROCEDURE — 124N000001 HC R&B MH

## 2022-10-31 PROCEDURE — 99233 SBSQ HOSP IP/OBS HIGH 50: CPT | Performed by: NURSE PRACTITIONER

## 2022-10-31 PROCEDURE — 250N000013 HC RX MED GY IP 250 OP 250 PS 637: Performed by: NURSE PRACTITIONER

## 2022-10-31 RX ORDER — HYDROXYZINE PAMOATE 50 MG/1
50 CAPSULE ORAL 2 TIMES DAILY PRN
Status: ON HOLD | COMMUNITY
End: 2022-11-06

## 2022-10-31 RX ORDER — ARIPIPRAZOLE 10 MG/1
10 TABLET ORAL AT BEDTIME
Status: DISCONTINUED | OUTPATIENT
Start: 2022-10-31 | End: 2022-11-01

## 2022-10-31 RX ORDER — HYDROXYZINE PAMOATE 50 MG/1
50 CAPSULE ORAL AT BEDTIME
Status: ON HOLD | COMMUNITY
End: 2022-11-06

## 2022-10-31 RX ORDER — ACETAMINOPHEN 500 MG
500-1000 TABLET ORAL EVERY 6 HOURS PRN
COMMUNITY
End: 2024-04-25

## 2022-10-31 RX ADMIN — Medication 125 MCG: at 08:43

## 2022-10-31 RX ADMIN — MONTELUKAST 10 MG: 10 TABLET, FILM COATED ORAL at 20:21

## 2022-10-31 RX ADMIN — HYDROXYZINE HYDROCHLORIDE 50 MG: 25 TABLET ORAL at 20:21

## 2022-10-31 RX ADMIN — ARIPIPRAZOLE 10 MG: 10 TABLET ORAL at 20:21

## 2022-10-31 RX ADMIN — LAMOTRIGINE 225 MG: 100 TABLET ORAL at 20:21

## 2022-10-31 RX ADMIN — HYDROXYZINE HYDROCHLORIDE 50 MG: 25 TABLET ORAL at 16:42

## 2022-10-31 RX ADMIN — TRAZODONE HYDROCHLORIDE 25 MG: 50 TABLET ORAL at 20:22

## 2022-10-31 RX ADMIN — FAMOTIDINE 20 MG: 20 TABLET, FILM COATED ORAL at 08:43

## 2022-10-31 RX ADMIN — CALCIUM POLYCARBOPHIL 1250 MG: 625 TABLET, FILM COATED ORAL at 08:42

## 2022-10-31 RX ADMIN — CALCIUM POLYCARBOPHIL 1250 MG: 625 TABLET, FILM COATED ORAL at 20:21

## 2022-10-31 RX ADMIN — VILAZODONE HYDROCHLORIDE 40 MG: 20 TABLET ORAL at 08:44

## 2022-10-31 RX ADMIN — GABAPENTIN 300 MG: 300 CAPSULE ORAL at 08:43

## 2022-10-31 RX ADMIN — GABAPENTIN 300 MG: 300 CAPSULE ORAL at 20:21

## 2022-10-31 RX ADMIN — OXYBUTYNIN CHLORIDE 10 MG: 5 TABLET, EXTENDED RELEASE ORAL at 08:43

## 2022-10-31 RX ADMIN — FAMOTIDINE 20 MG: 20 TABLET, FILM COATED ORAL at 20:21

## 2022-10-31 ASSESSMENT — ACTIVITIES OF DAILY LIVING (ADL)
DRESS: INDEPENDENT;SCRUBS (BEHAVIORAL HEALTH)
ADLS_ACUITY_SCORE: 28
HYGIENE/GROOMING: INDEPENDENT;SHOWER

## 2022-10-31 NOTE — CARE PLAN
Prior to Admission Medication Reconciliation:     Medications added:   [] None  [x] As listed below:    Apap, biofreeze (PRN's with specific instructions)    Medications deleted:   [] None  [x] As listed below:    abilify- discontinued 10/5/22    Medications marked for review/removal by attending:  [x] None  [] As listed below:    Changes made to existing medications:   [] None  [x] Updated strengths and frequencies to most current.   [x] As listed below:    Hydroxyzine from tablet to capsules.     Pertinent notes/medications patient takes different than prescribed:     none    Last times/dates taken verified with patient:  [x] Yes- completed myself  [] Prepared PTA medlist for review only. (will not be available to review personally)  [] Did not review with patient. Rx verification only. Review completed by nursing.    [] Nurse completed no changes made (double checked entries)  [] Unable to review with patient at this time:    Allergies listed at another location:  []Allergies match allergies listed in Epic  []Other allergies listed:    Allergy review:    [x]Did not review: reviewed by nursing  []Did not review: pt unable at this time  []Verified current existing allergies or NKDA: no changes made  []Patient confirmed current existing allergies and new allergies added:    Medication reconciliation sources:   []Patient  []Patient family member/emergency contact: **  [][facility] Report Review  []Epic Chart Review  []Care Everywhere review  []Pharmacy med list/phone call: **  [x]Outside meds dispense report  [x]Nursing home or Assisted Living MAR:    Name Strength Instructions DATE TAKEN TIME TAKEN Notes   [x] oxybutynin 10 mg 1 tab qAM 10/29/22 AM    [x] viibryd  40 mg 1 tab qAM 10/29/22 AM    [x] Vitamin d  5000 units  1 cap qAM 10/29/22 AM    [x] Gabapentin  300 mg 1 cap BID 10/29/22 AM    [x] viactiv calcium chew  1 chew qAM 10/29/22 AM    [x] Famotidine  20 mg  1 tab BID  10/29/22 AM    [x] Fiber con  625 mg 2  tabs BID  10/29/22 AM    [x] b12  1000 units  [id number] IM 10/26/22 AM    [x] vraylar 3 mg 1 cap HS 10/28/22 HS    [x] lamictal 200 mg At bedtime + 25 mg 10/28/22 HS    [x] lamictal  25 mg At bedtime +200 mg 10/28/22  HS    [x] Montelukast  10 mg  At bedtime  10/28/22 HS    [x] Famotidine  20 mg  BID  10/28/22 HS    [x] prevident   Use an approp amt on toothbrush  10/28/22 HS    [x] Hydroxyzine  50 mg At bedtime  10/28/22 HS    PRN's/ as directed medications will not be added to PTA meds.   [x]apap 500 mg 1-2 tabs q6h prn   [x]ibu 200 mg 1-2 tabs q6h prn   Vapo-rub rodrigo use as directed  Preparation H use OTC directions  [x]tums PRN   [x]Gabapentin 300 mg 1 capsule at noon prn   pepto bismol use as directed  Cough drops as directed  cortizone 10 gel as directed   Neosporin as directed  [x]Hydroxyzine 50 mg BID prn must be 4 hours apart from scheduled dose  Dayquil/nyquil as directed  Ketoconazole 2% daily prn   Milk of mag as directed  [x]proair 90 mcg 1-2 puffs q4h prn   [x]Methocarbamol 500 mg 1-2 tabs QID PRN   [x]Fluconazole 150 mg once prn yeast infection  Benadryl as directed  [x]biofreeze qid prn   anbesol as directed  [x]Trazodone 50 mg 1/2-1 tab at bedtime- must be taken no later than 11 pm  [x]flonase 1 spray both nostrils daily PRN  midol as directed  asprecreme as directed  Azo as directed  []Other: **    Pharmacy desired at discharge: [facility]    Is patient on coumadin?   [x]No  []Yes    Requests for consultation by provider or pharmacist:   [] Patient understands why all of their meds were prescribed and how to take them. No questions.   [x] Managing party has no questions.   [] Patient/ managing party has questions about the following:  [] Did not review with patient. Cannot assess.     Fill dates and reported compliancy:  [x] Fill dates coincide with reported compliancy for all/most maintenance meds.   [] Not applicable. Patient is not taking any maintenance medications at this time.   [] Fill dates do  not coincide with compliancy with maintenance meds. See notes in PTA medlist and in comments.    [] Fill dates do not coincide with the following medications but pt reports compliancy:  [] Did not review with patient. Cannot assess.     Historian accuracy:  [] Excellent- alert and oriented, understands why meds were prescribed and how to take, able to answer specifics  [] Good- alert and oriented, understands why meds were prescribed and how to take, some confusion   [] Fair- alert and oriented, doesn't know medications without list, cannot answer specifics about medications, but has a decent process for which to take at home  [] Poor- does not know medications, may not have a process to take at home, may be cognitively unable to review at this time  []Medication management done by family member or facility, no concerns about historian accuracy.   [] Did not review with patient. Cannot assess.     Medication Management:  [] Manages meds independently  [] Family member/ other party manages meds/assists:  [x] Meds managed by staff at facility  [] Meds set up by home care, family/other party helps administer  [] Meds set up by home care, self administers  [] Did not review with patient. Cannot assess.     Other medications aside from PTA:  [x] Denies taking any medications aside from those listed in PTA meds  [] Reports taking another medication(s) but cannot recall the name(s)  [] Refuses to say.  [] Did not review with patient. Cannot assess.     Comments: none    Fely Andrade on 10/31/2022 at 11:45 AM       Notifying appropriate party of changes/additions/discrepancies:  []No pertinent changes made, notification not necessary.   [x] Notified attending provider via sticky note  [] Notified attending provider in person  [] Notified pharmacy  [] Notified nurse  [] Medications have not been reconciled by a provider yet, notification not necessary  [] Pt is not admitted to floor yet, PTA meds completed before admission.        Medications Prior to Admission   Medication Sig Dispense Refill Last Dose     acetaminophen (TYLENOL) 500 MG tablet Take 500-1,000 mg by mouth every 6 hours as needed   Unknown     acetaminophen-caff-pyrilamine (MIDOL MENSTRUAL) 500-60-15 MG TABS per tablet Take 2 tablets by mouth every 6 hours as needed for mild pain . Use as directed.   10/9/2022     albuterol (VENTOLIN HFA) 108 (90 Base) MCG/ACT inhaler Inhale 1-2 puffs into the lungs every 4 hours as needed for shortness of breath / dyspnea or wheezing 18 g 5 More than a month     calcium carbonate (TUMS) 500 MG chewable tablet Take 2-4 chew tab by mouth as needed as directed.   Unknown     calcium-vitamin D-vitamin K (VIACTIV) 500-500-40 MG-UNT-MCG CHEW Take 1 tablet by mouth every morning   10/29/2022 at AM     cariprazine (VRAYLAR) 3 MG capsule Take 3 mg by mouth At Bedtime   10/28/2022 at HS     cholecalciferol (VITAMIN D3) 125 mcg (5000 units) capsule Take 1 capsule (125 mcg) by mouth daily 100 capsule 4 10/29/2022 at AM     fluconazole (DIFLUCAN) 150 MG tablet Take 150 mg by mouth once as needed (yeast infection symptoms)   Unknown     fluticasone (FLONASE) 50 MCG/ACT nasal spray Spray 1 spray into both nostrils daily as needed for rhinitis or allergies 16 g 0 Unknown     hydrOXYzine (VISTARIL) 50 MG capsule Take 50 mg by mouth At Bedtime   10/28/2022 at HS     hydrOXYzine (VISTARIL) 50 MG capsule Take 50 mg by mouth 2 times daily as needed . Must be given 4 hours apart from scheduled dosing.   Unknown     ibuprofen (ADVIL/MOTRIN) 200 MG tablet Take 200-400 mg by mouth every 6 hours as needed 1 to 2 tabs Q6 PRN   10/18/2022     Menthol, Topical Analgesic, (BIOFREEZE EX) Apply topically 4 times daily as needed   Unknown     methocarbamol (ROBAXIN) 500 MG tablet Take 500-1,000 mg by mouth 4 times daily as needed for muscle spasms   Unknown     oxybutynin ER (DITROPAN-XL) 10 MG 24 hr tablet Take 1 tablet (10 mg) by mouth daily 90 tablet 4 10/29/2022 at  AM     SF 1.1 % GEL topical gel Brush and swish toothpaste for 30 seconds and spit. Use before bed. Do not rinse, eat or drink for 30 minutes.   Past Week     traZODone (DESYREL) 50 MG tablet Take 25-50 mg by mouth nightly as needed (racing thoughts/ trouble sleeping) . Must be taken no later than 11 pm.   Unknown     vilazodone (VIIBRYD) 40 MG TABS tablet Take 40 mg by mouth every morning   10/29/2022 at AM     calcium polycarbophil (FIBERCON) 625 MG tablet Take 2 tablets (1,250 mg) by mouth 2 times daily for 360 days 360 tablet 3 10/29/2022 at AM     cyanocobalamin (CYANOCOBALAMIN) 1000 MCG/ML injection INJECT 1ML INTRAMUSCULARLY EVERY 2 WEEKS 6 mL 11 10/26/2022     famotidine (PEPCID) 20 MG tablet Take 1 tablet (20 mg) by mouth 2 times daily - For Heartburn 180 tablet 4 10/29/2022 at AM     gabapentin (NEURONTIN) 300 MG capsule Take 300 mg by mouth 2 times daily . May take an additional capsule at noon IF needed.   10/28/2022 at AM     lamoTRIgine (LAMICTAL) 200 MG tablet Take 200 mg by mouth At Bedtime . Take along with a 25 mg tablet for a total nightly dose of 225 mg.  2 10/28/2022 at HS     lamoTRIgine (LAMICTAL) 25 MG tablet Take 1 tablet by mouth At Bedtime . Take along with a 200 mg tablet for a total nightly dose of 225 mg.  2 10/28/2022 at HS     montelukast (SINGULAIR) 10 MG tablet Take 1 tablet (10 mg) by mouth At Bedtime 90 tablet 4 10/28/2022 at HS

## 2022-10-31 NOTE — PLAN OF CARE
Discharge Plan: Back to group home  Services: Therapy, med management , case manger.   Placement: Group home   Referrals made: NA  Court status: NA  Gomez/SDM: MIGUEL

## 2022-10-31 NOTE — PLAN OF CARE
Face to face shift report received from Sabrina MONROY. Rounding completed, pt observed sleeping in bed at the start of the shift.    Patient in and out of room throughout the day. Napped this morning after stating she did not get much sleep last night. Attending groups. Alert and making needs known. Ate 100% of breakfast and lunch. Endorses anxiety and depression this morning. Patient states she had thoughts of hurting herself upon admission that has decreased over the past few days. Patient is still michelle for safety while on the unit and agreed to notify nursing staff of any changes. Denies hallucinations and pain.     Problem: Adult Behavioral Health Plan of Care  Goal: Patient-Specific Goal (Individualization)  Description: Patient will be free from suicidal thinking during hospitalization.  Patient will contract for safety during hospitalization.  Patient will accept medications and attend groups while hospitalization.  Outcome: Progressing     Problem: Suicidal Behavior  Goal: Suicidal Behavior is Absent or Managed  Description: Patient will be free of self harm while on the unit.  Patient will contract for safety while on the unit.    Outcome: Progressing    Face to face report will be communicated to oncoming RN.    Ninoska Aquino RN  10/31/2022

## 2022-10-31 NOTE — PLAN OF CARE
Social Service Psychosocial Assessment  Presenting Problem: Pt arrived from [facility] to our unit with suicidal ideation with plan to take a knife and cut herself to bleed out or overdose on her prescribed medications. Pt identifies unresolved grief around the death of her s/o in January to be a trigger.  Marital Status:     Spouse / Children: Two children that were adopted out/  Psychiatric TX HX: This is the pt's first time on our unit. Last IP  admission in  @ at [facility] in [place].  Suicide Risk Assessment: Pt admits with SI and a plan to cut herself to bleed out or overdose on her medications. Pt has hx of SI and a suicide attempt by overdose 22 years ago. Pt denies SI today.   Access to Lethal Means (explain): Denies     Family Psych HX: Patient reports mother has bipolar disorder.   A & Ox: x4    Medication Adherence: See H&P    Medical Issues: See H&P     Visual -Motor Functioning: Good     Communication Skills /Needs: Good     Ethnicity: White   Spirituality/Confucianist Affiliation: None reported   Clergy Request: No   History: Denies      Living Situation: Lives in [facility] in [place] for the last 5 years.   ADL s: Independent     Education: Graduated highschool, attended [employer] and completed 21 credits towards a degree in childhood development and psychology.  Financial Situation: SSID   Occupation: Unemployed  Leisure & Recreation: Unknown   Childhood History: From [place], has two younger brothers that both live in [place].    Trauma Abuse HX: Unknown    Relationship / Sexuality: Single/ Straight    Substance Use/ Abuse: Utox negative. Pt denies Substance or alcohol use.   Chemical Dependency Treatment HX: Denies      Legal Issues: Denies     Significant Life Events: S/O  in January from colon cancer.  Strengths: Ability to communicate needs, in a safe environment, connected with services.     Challenges /Limitation: Poor coping skills, current mental health symptoms.      Patient Support Contact (Include name, relationship, number, and summary of conversation): Pt has an BEE signed for Rogelio Iqbal (staff) [phone], Amie Gomez (friend) [phone], Leonel puckett Tamikojulio césar Lamb (parents) [phone]  Interventions:      Community-Based Programs- [facility]    Medical/Dental Care- PCP Lucio Curiel @ [facility]    Medication Management- [facility]     Individual Therapy- [facility]    Clergy Request- No    Housing/Placement- Group Home     Case Management- [facility]    Insurance Coverage- Medicare     Financial Assistance- SSDI     Suicide Risk Assessment-  Pt admits with SI and a plan to cut herself to bleed out or overdose on her medications. Pt has hx of SI and a suicide attempt by overdose 22 years ago. Pt denies SI today.     High Risk Safety Plan- Talk to supports; Call crisis lines; Go to local ER if feeling suicidal.  JOSE ALEJANDRO ADAMS  10/31/2022  6:42 AM

## 2022-10-31 NOTE — PLAN OF CARE
Problem: Adult Behavioral Health Plan of Care  Goal: Patient-Specific Goal (Individualization)  Description: Patient will be free from suicidal thinking during hospitalization.  Patient will contract for safety during hospitalization.  Patient will accept medications and attend groups while hospitalization.  Outcome: Progressing   Patient slept through the night with regular respirations and position changes noted.  Face to face end of shift report communicated to day shift RN.     Sabrina Zelaya RN  10/31/2022  5:46 AM

## 2022-10-31 NOTE — DISCHARGE INSTRUCTIONS
Behavioral Discharge Planning and Instructions    Summary: Pt arrived from [facility] to our unit with suicidal ideation with plan to take a knife and cut herself to bleed out or overdose on her prescribed medications. Pt identifies unresolved grief around the death of her s/o in January to be a trigger    Main Diagnosis: 1. Bipolar 1 disorder, most recent episode depressed  2. AGNIESZKA (by history)  3. Borderline personality traits    Health Care Follow-up:     [facility]  [address] [address]  Phone: [phone]     Fax: [phone]  Med Records Fax: [phone]    [facility] [place]  Outpatient Substance Use Services & Therapy Office  [address] [place] [address]   Phone: [phone] Fax: [phone]      Attend all scheduled appointments with your outpatient providers. Call at least 24 hours in advance if you need to reschedule an appointment to ensure continued access to your outpatient providers.     Major Treatments, Procedures and Findings:  You were provided with: a psychiatric assessment, assessed for medical stability, medication evaluation and/or management, group therapy, family therapy, individual therapy, CD evaluation/assessment, milieu management, and medical interventions    Symptoms to Report: feeling more aggressive, increased confusion, losing more sleep, mood getting worse, or thoughts of suicide    Early warning signs can include: increased depression or anxiety sleep disturbances increased thoughts or behaviors of suicide or self-harm  increased unusual thinking, such as paranoia or hearing voices    Safety and Wellness:  Take all medicines as directed.  Make no changes unless your doctor suggests them.      Follow treatment recommendations.  Refrain from alcohol and non-prescribed drugs.  Ask your support system to help you reduce your access to items that could harm yourself or  "others. Items could include:  Firearms  Medicines (both prescribed and over-the-counter)  Knives and other sharp objects  Ropes and like materials  Car keys  If there is a concern for safety, call 911. If there is a concern for safety, call 911.    Resources:   Crisis Intervention: 146.789.6374 or 381-167-7164 (TTY: 431.587.5353).  Call anytime for help.  National [place] on Mental Illness (www.[place].lazarus.org): 772.500.4544 or 031-665-2437.  Alcoholics Anonymous (www.alcoholics-anonymous.org): Check your phone book for your local chapter.  Suicide Awareness Voices of Education (SAVE) (www.save.org): 729-042-GLQM (3110)  National Suicide Prevention Line (www.mentalhealthmn.org): 638-722-GFTG (1911)  Mental Health Consumer/Survivor Network of [place] (www.mhcsn.net): 872.386.1090 or 197-914-9644  Mental Health Association of [place] (www.mentalhealth.org): 709.373.8795 or 008-832-9833  Self- Management and Recovery Training., [employer]-- Toll free: 763.507.3987  www.[employer]  Text 4 Life: txt \"LIFE\" to 57777 for immediate support and crisis intervention  Crisis text line: Text \"[place]\" to 804996. Free, confidential, 24/7.  Crisis Intervention: 716.873.7446 or 749-549-3022. Call anytime for help.     General Medication Instructions:   See your medication sheet(s) for instructions.   Take all medicines as directed.  Make no changes unless your doctor suggests them.   Go to all your doctor visits.  Be sure to have all your required lab tests. This way, your medicines can be refilled on time.  Do not use any drugs not prescribed by your doctor.  Avoid alcohol.    Advance Directives:   Scanned document on file with [facility]? Yes, scanned ACP docmt  Is document scanned? No. Copy Requested.  Honoring Choices Your Rights Handout: Informed and given  Was more information offered? Pt declined    The Treatment team has appreciated the opportunity to work with you. If you have any questions or concerns about your recent admission, you can " contact the unit which can receive your call 24 hours a day, 7 days a week. They will be able to get in touch with a Provider if needed. The unit number is [phone] .

## 2022-10-31 NOTE — PROGRESS NOTES
"[facility] PSYCHIATRY  PROGRESS NOTE     SUBJECTIVE     Rosi is up in the lounge when I see her today. Her affect seems bright though she reports feeling \"so-so\" and still depressed. We discussed stopping the Vraylar, as she has noticed no benefit from this and has been on it for at least a month. She does report feeling better when she was taking Abilify, has never taken more than 15 mg daily. We discussed restarting Abilify and increasing the dose to see if this helps. Patient is in agreement, states that prior to her current psychosocial stressors and med change she has been going very well. She has been up going to group programming. She reports vague suicidal thoughts though no plan. Will continue to adjust medications and look at discharge back to  when stable.       MEDICATIONS   Scheduled Meds:    ARIPiprazole  15 mg Oral At Bedtime     calcium polycarbophil  1,250 mg Oral BID     cariprazine  3 mg Oral At Bedtime     cholecalciferol  125 mcg Oral Daily     [START ON 11/9/2022] cyanocobalamin  1,000 mcg Intramuscular Q14 Days     famotidine  20 mg Oral BID     gabapentin  300 mg Oral BID     hydrOXYzine  50 mg Oral At Bedtime     lamoTRIgine  225 mg Oral At Bedtime     montelukast  10 mg Oral At Bedtime     oxybutynin ER  10 mg Oral Daily     vilazodone  40 mg Oral Daily     PRN Meds:.albuterol, alum & mag hydroxide-simethicone, calcium carbonate, fluticasone, hydrOXYzine, ibuprofen, methocarbamol, nicotine, OLANZapine **OR** OLANZapine, traZODone     ALLERGIES   Allergies   Allergen Reactions     Clonazepam Other (See Comments)     Causes Violence and aggresssion     Hydrocodone-Acetaminophen      Other reaction(s): Seizures  Can take Percocet without difficulty.     Lorazepam Other (See Comments)     Causes Violence and aggresssion     Sertraline Other (See Comments)     Caused suicidally      Bupropion Other (See Comments)     Caused Major Depression     Dust Mite Extract      Other reaction(s): " "Asthma symptoms     Lithium Other (See Comments)     Mood alteration     Pollen Extract      Other reaction(s): Asthma symptoms     Risperidone Other (See Comments)     Agitation       Sulfa Drugs Itching     Trichophyton      Other reaction(s): Asthma symptoms     Valproic Acid Other (See Comments)     Weight gain        MENTAL STATUS EXAM   Vitals: /68   Pulse 83   Temp 98  F (36.7  C) (Temporal)   Resp 12   Ht 1.702 m (5' 7\")   Wt 128.4 kg (283 lb 1.6 oz)   SpO2 98%   BMI 44.34 kg/m      Appearance:  awake, alert, adequately groomed, dressed in hospital scrubs and appeared as age stated  Attitude:  cooperative, pleasant  Eye Contact:  good  Mood:  depressed  Affect: mood incongruent, affect appeared bright  Speech:  clear, coherent  Psychomotor Behavior:  no evidence of tardive dyskinesia, dystonia, or tics  Thought Process:  linear and goal oriented  Associations:  no loose associations  Thought Content:  no evidence of psychotic thought, reports vague suicidal thoughts  Insight:  limited  Judgment:  fair  Oriented to:  time, person, and place  Attention Span and Concentration:  intact  Recent and Remote Memory:  intact  Language: Able to name objects, Able to repeat phrases and Able to read and write  Fund of Knowledge: low-normal  Muscle Strength and Tone: normal  Gait and Station: Normal       LABS   No results found for this or any previous visit (from the past 24 hour(s)).      IMPRESSION     This is a 47 year old female with a PMH of bipolar disorder, AGNIESZKA, borderline personality traits who presented to the [facility] ED for evaluation of suicidal ideation with plan. Patient reported a plan to cut herself and bleed out or to overdose on medications. She identified unresolved grief as a big stressor. She reports that her boyfriend  of cancer in January and his birthday would have been a few days ago. Today she does continue to report vague SI, though feels better than she did yesterday. " She reports feeling more depressed, is unsure if medications are working. She reports that Vraylar was added a few months ago and she has felt no change in her mood, so will likely end up stopping this. She is also on Abilify 15 mg daily, could look at possible increase in dose. Is currently taking Lamictal 225 mg which could also be increased for bipolar depression. Will wait for nursing to get a verified medication list from , likely start adjustments tomorrow. She does have a psych provider at [facility] and has a therapist and [facility] worker through [facility].       DIAGNOSES     1. Bipolar 1 disorder, most recent episode depressed  2. AGNIESZKA (by history)  3. Borderline personality traits       PLAN     Location: Unit 5  Legal Status: Orders Placed This Encounter      Voluntary    Safety Assessment:    Behavioral Orders   Procedures     Code 1 - Restrict to Unit     Routine Programming     As clinically indicated     Status 15     Every 15 minutes.      PTA psychotropic medications stopped:     -None    PTA psychotropic medications continued/changed:     -Vraylar 3 mg at bedtime- stop on 10/31 due to being ineffective and Abilify working better  -Gabapentin 300 mg BID  -Hydroyxine 50 mg at bedtime  -Lamictal 225 mg at bedtime- consider increase in dose  -Viibryd 40 mg daily  -Cyanocobalamin 1,000 mcg/mL- inject 1 mL IM every 2 weeks (last dose 10/26)    New medications tried and stopped:     -None    New medications initiated:     -Abilify 10 mg at bedtime started 10/31- patient would like to try dosing over 15 mg which has been max dose she has tried    Today's Changes:    -Stop Vraylar  -Start Abilify- pt reports good response from this medication in the past though dose was never higher than 15 mg    Programming: Patient will be treated in a therapeutic milieu with appropriate individual and group therapies. Education will be provided on diagnoses, medications, and treatments.     Medical  diagnoses:  Per medicine    Consult: None  Tests: TSH, vit D, vit B12    Anticipated LOS: 3-5 days  Disposition: back to group home with outpatient services       TREATMENT TEAM CARE PLAN     Progress: Continued symptoms. Vague SI and depression    Continued Stay Criteria/Rationale: Continued symptoms without sufficient improvement/resolution.    Medical/Physical: See above.    Precautions: See above.     Plan: Continue inpatient care with unit support and medication management.     Rationale for change in precautions or plan: NA due to no change.    Participants: Isa Coats NP, Nursing, SW, OT.    The patient's care was discussed with the treatment team and chart notes were reviewed.       ATTESTATION      Isa Coats NP

## 2022-11-01 LAB — TSH SERPL DL<=0.005 MIU/L-ACNC: 2.08 UIU/ML (ref 0.3–4.2)

## 2022-11-01 PROCEDURE — 36415 COLL VENOUS BLD VENIPUNCTURE: CPT | Performed by: NURSE PRACTITIONER

## 2022-11-01 PROCEDURE — 250N000013 HC RX MED GY IP 250 OP 250 PS 637: Performed by: NURSE PRACTITIONER

## 2022-11-01 PROCEDURE — 99232 SBSQ HOSP IP/OBS MODERATE 35: CPT | Performed by: NURSE PRACTITIONER

## 2022-11-01 PROCEDURE — 82306 VITAMIN D 25 HYDROXY: CPT | Performed by: NURSE PRACTITIONER

## 2022-11-01 PROCEDURE — 82607 VITAMIN B-12: CPT | Performed by: NURSE PRACTITIONER

## 2022-11-01 PROCEDURE — 84443 ASSAY THYROID STIM HORMONE: CPT | Performed by: NURSE PRACTITIONER

## 2022-11-01 PROCEDURE — 124N000001 HC R&B MH

## 2022-11-01 RX ORDER — ARIPIPRAZOLE 15 MG/1
15 TABLET ORAL AT BEDTIME
Status: DISCONTINUED | OUTPATIENT
Start: 2022-11-02 | End: 2022-11-08 | Stop reason: HOSPADM

## 2022-11-01 RX ORDER — ARIPIPRAZOLE 10 MG/1
10 TABLET ORAL AT BEDTIME
Status: COMPLETED | OUTPATIENT
Start: 2022-11-01 | End: 2022-11-01

## 2022-11-01 RX ADMIN — ARIPIPRAZOLE 10 MG: 10 TABLET ORAL at 20:19

## 2022-11-01 RX ADMIN — CALCIUM POLYCARBOPHIL 1250 MG: 625 TABLET, FILM COATED ORAL at 20:20

## 2022-11-01 RX ADMIN — MONTELUKAST 10 MG: 10 TABLET, FILM COATED ORAL at 20:19

## 2022-11-01 RX ADMIN — CALCIUM POLYCARBOPHIL 1250 MG: 625 TABLET, FILM COATED ORAL at 08:13

## 2022-11-01 RX ADMIN — GABAPENTIN 300 MG: 300 CAPSULE ORAL at 20:19

## 2022-11-01 RX ADMIN — FAMOTIDINE 20 MG: 20 TABLET, FILM COATED ORAL at 08:13

## 2022-11-01 RX ADMIN — TRAZODONE HYDROCHLORIDE 25 MG: 50 TABLET ORAL at 20:57

## 2022-11-01 RX ADMIN — Medication 125 MCG: at 08:13

## 2022-11-01 RX ADMIN — HYDROXYZINE HYDROCHLORIDE 50 MG: 25 TABLET ORAL at 20:19

## 2022-11-01 RX ADMIN — GABAPENTIN 300 MG: 300 CAPSULE ORAL at 08:13

## 2022-11-01 RX ADMIN — FAMOTIDINE 20 MG: 20 TABLET, FILM COATED ORAL at 20:20

## 2022-11-01 RX ADMIN — VILAZODONE HYDROCHLORIDE 40 MG: 20 TABLET ORAL at 08:13

## 2022-11-01 RX ADMIN — LAMOTRIGINE 225 MG: 100 TABLET ORAL at 20:19

## 2022-11-01 RX ADMIN — OXYBUTYNIN CHLORIDE 10 MG: 5 TABLET, EXTENDED RELEASE ORAL at 08:12

## 2022-11-01 ASSESSMENT — ACTIVITIES OF DAILY LIVING (ADL)
ADLS_ACUITY_SCORE: 28
HYGIENE/GROOMING: INDEPENDENT;SHOWER
ADLS_ACUITY_SCORE: 28
DRESS: INDEPENDENT;SCRUBS (BEHAVIORAL HEALTH)

## 2022-11-01 NOTE — PLAN OF CARE
Problem: Adult Behavioral Health Plan of Care  Goal: Patient-Specific Goal (Individualization)  Description: Patient will be free from suicidal thinking during hospitalization.  Patient will contract for safety during hospitalization.  Patient will accept medications and attend groups while hospitalization.  Outcome: Progressing     Goal Outcome Evaluation:      Pt up on the unit and attending group. Reported continued thoughts to hurt self, although now intermittent. Denied hallucinations or racing thoughts, does have ongoing depression and rated anxiety 5/10. Pt requested and rcv'd PRN Atarax at 1642 and reported adequate relief from anxiety within the hour. No physical complaints this evening, ate 100% of supper meal. [facility] 'Health Coordinator' called to this writer earlier, shared concern R/T medication changes being made and is hoping to share her input with provider. Pt given 25 mg PRN Trazadone at 2022 with poor sleep noted last night.     Problem: Suicide Risk  Goal: Absence of Self-Harm  Outcome: Progressing  Intermittent thoughts continue.     Problem: Suicidal Behavior  Goal: Suicidal Behavior is Absent or Managed  Description: Patient will be free of self harm while on the unit.  Patient will contract for safety while on the unit.  Outcome: Progressing     2330 - Face to face end of shift report to be communicated to oncoming shift RN.     Charley Curtis RN  10/31/2022  8:46 PM

## 2022-11-01 NOTE — PLAN OF CARE
Face to face end of shift report recieved from evening shift RN. Rounding completed. Pt observed in their own bed with notable respirations. Will continue to support the needs of the patient.     Pt slept approximately 6 hours.     Face to face report will be communicated with oncoming RN.    Teressa Winkler RN  11/1/2022  12:15 AM   Problem: Adult Behavioral Health Plan of Care  Goal: Patient-Specific Goal (Individualization)  Description: Patient will be free from suicidal thinking during hospitalization.  Patient will contract for safety during hospitalization.  Patient will accept medications and attend groups while hospitalization.  Outcome: Progressing     Problem: Suicide Risk  Goal: Absence of Self-Harm  Outcome: Progressing     Problem: Suicidal Behavior  Goal: Suicidal Behavior is Absent or Managed  Description: Patient will be free of self harm while on the unit.  Patient will contract for safety while on the unit.    Outcome: Progressing

## 2022-11-01 NOTE — PLAN OF CARE
Face to face shift report received from Teressa MONROY. Rounding completed, pt observed sleeping in room at the start of the shift.     Patient in and out of room majority of the day. Alert and making needs known. Patient had nursing student this morning. Compliant with scheduled medications. Attending groups. Ate 100% of breakfast and lunch. Polite with nursing staff during conversation. Patient reports she is still having anxiety and depression this morning but states she has been feeling better today. Denies SI/HI and hallucinations. No reports of suicidal thoughts. Patient participating in puzzle out in lounge with other peers to keep herself busy.     Problem: Adult Behavioral Health Plan of Care  Goal: Patient-Specific Goal (Individualization)  Description: Patient will be free from suicidal thinking during hospitalization.  Patient will contract for safety during hospitalization.  Patient will accept medications and attend groups while hospitalization.  Outcome: Progressing     Problem: Suicidal Behavior  Goal: Suicidal Behavior is Absent or Managed  Description: Patient will be free of self harm while on the unit.  Patient will contract for safety while on the unit.    Outcome: Progressing    Face to face report will be communicated to oncoming RN.    Ninoska Aquino RN  11/1/2022

## 2022-11-02 LAB — DEPRECATED CALCIDIOL+CALCIFEROL SERPL-MC: 49 UG/L (ref 20–75)

## 2022-11-02 PROCEDURE — 250N000013 HC RX MED GY IP 250 OP 250 PS 637: Performed by: NURSE PRACTITIONER

## 2022-11-02 PROCEDURE — 99233 SBSQ HOSP IP/OBS HIGH 50: CPT | Performed by: NURSE PRACTITIONER

## 2022-11-02 PROCEDURE — 124N000001 HC R&B MH

## 2022-11-02 RX ORDER — RAMELTEON 8 MG/1
8 TABLET ORAL
Status: DISCONTINUED | OUTPATIENT
Start: 2022-11-02 | End: 2022-11-03

## 2022-11-02 RX ADMIN — VILAZODONE HYDROCHLORIDE 40 MG: 20 TABLET ORAL at 08:30

## 2022-11-02 RX ADMIN — RAMELTEON 8 MG: 8 TABLET, FILM COATED ORAL at 20:46

## 2022-11-02 RX ADMIN — LAMOTRIGINE 250 MG: 100 TABLET ORAL at 20:46

## 2022-11-02 RX ADMIN — ARIPIPRAZOLE 15 MG: 15 TABLET ORAL at 20:46

## 2022-11-02 RX ADMIN — HYDROXYZINE HYDROCHLORIDE 50 MG: 25 TABLET ORAL at 20:46

## 2022-11-02 RX ADMIN — CALCIUM POLYCARBOPHIL 1250 MG: 625 TABLET, FILM COATED ORAL at 20:46

## 2022-11-02 RX ADMIN — GABAPENTIN 300 MG: 300 CAPSULE ORAL at 20:47

## 2022-11-02 RX ADMIN — CALCIUM POLYCARBOPHIL 1250 MG: 625 TABLET, FILM COATED ORAL at 08:32

## 2022-11-02 RX ADMIN — Medication 125 MCG: at 08:31

## 2022-11-02 RX ADMIN — FAMOTIDINE 20 MG: 20 TABLET, FILM COATED ORAL at 20:47

## 2022-11-02 RX ADMIN — OXYBUTYNIN CHLORIDE 10 MG: 5 TABLET, EXTENDED RELEASE ORAL at 08:31

## 2022-11-02 RX ADMIN — GABAPENTIN 300 MG: 300 CAPSULE ORAL at 08:31

## 2022-11-02 RX ADMIN — MONTELUKAST 10 MG: 10 TABLET, FILM COATED ORAL at 20:47

## 2022-11-02 RX ADMIN — FAMOTIDINE 20 MG: 20 TABLET, FILM COATED ORAL at 08:31

## 2022-11-02 RX ADMIN — IBUPROFEN 400 MG: 400 TABLET ORAL at 13:25

## 2022-11-02 ASSESSMENT — ACTIVITIES OF DAILY LIVING (ADL)
ADLS_ACUITY_SCORE: 28
HYGIENE/GROOMING: INDEPENDENT
ADLS_ACUITY_SCORE: 28
DRESS: SCRUBS (BEHAVIORAL HEALTH);INDEPENDENT
ADLS_ACUITY_SCORE: 28
LAUNDRY: UNABLE TO COMPLETE
ORAL_HYGIENE: INDEPENDENT
ADLS_ACUITY_SCORE: 28

## 2022-11-02 NOTE — PROGRESS NOTES
Spoke with pt this afternoon. Pt was found in the hallway. She states she would still like PHP but that she was not ready for discharge yet. Validated pt and let her know someone from [facility] will come see her and talk with her about PHP.           Discharge Plan: Back to group home with [facility] services  Services: Therapy, med management , case manger.   Placement: Group home   Referrals made: NA  Court status: NA  Jason/SDM: MIGUEL

## 2022-11-02 NOTE — PLAN OF CARE
Face to face end of shift report recieved from evening shift RN. Rounding completed. Pt observed in their own bed with notable respirations. Will continue to support the needs of the patient.   Pt slept approximately 6 hours.  Face to face shift report will be communicated with oncoming RN.     Teressa Winkler RN  11/2/2022  11:45 PM   Problem: Adult Behavioral Health Plan of Care  Goal: Patient-Specific Goal (Individualization)  Description: Patient will be free from suicidal thinking during hospitalization.  Patient will contract for safety during hospitalization.  Patient will accept medications and attend groups while hospitalization.  Outcome: Progressing     Problem: Suicide Risk  Goal: Absence of Self-Harm  Outcome: Progressing     Problem: Suicidal Behavior  Goal: Suicidal Behavior is Absent or Managed  Description: Patient will be free of self harm while on the unit.  Patient will contract for safety while on the unit.  Outcome: Progressing

## 2022-11-02 NOTE — PLAN OF CARE
"Problem: Adult Behavioral Health Plan of Care  Goal: Patient-Specific Goal (Individualization)  Description: Patient will be free from suicidal thinking during hospitalization.  Patient will contract for safety during hospitalization.  Patient will accept medications and attend groups while hospitalization.  Outcome: Progressing     Goal Outcome Evaluation:        Pt up and more interactive with peers on the unit this evening, played cards and went to group. Pt enjoyed having a student nurse working with her this evening. Continues to have suicidal thoughts, and stated \"but not as much.\" Does contract for safety while on the unit. Anxiety is \"good\" and depression better today. Denied physical complaint, took 25 mg Trazadone at 2057.     Problem: Suicide Risk  Goal: Absence of Self-Harm  Outcome: Progressing    Problem: Suicidal Behavior  Goal: Suicidal Behavior is Absent or Managed  Description: Patient will be free of self harm while on the unit.  Patient will contract for safety while on the unit.  Outcome: Progressing    2330 - Face to face end of shift report to be communicated to oncoming shift RN.     Charley Curtis RN  11/1/2022  10:23 PM             "

## 2022-11-02 NOTE — PROGRESS NOTES
"[facility] PSYCHIATRY  PROGRESS NOTE     SUBJECTIVE     Rosi is up in the lounge when I see her today. She has been attending groups throughout the day and states that she finds these helpful. Did discuss possibility of PHP with patient being very interested in this, will send referral. She reports feeling \"okay\" today. When observed up in the lounge she does appear brighter, though when I go to meet her her affect becomes more subdued and somewhat incongruent. She continues to report vague suicidal thoughts though these are \"better\" and she has no plan or intent. Discussed how PHP might be a good step down from the hospital and could provide the support she needs during this difficult time. She is in agreement to increase Abilify back to 15 mg daily, which patient states was an effective dose in the past. She is denying any side effects from medications.       MEDICATIONS   Scheduled Meds:    ARIPiprazole  15 mg Oral At Bedtime     calcium polycarbophil  1,250 mg Oral BID     cholecalciferol  125 mcg Oral Daily     [START ON 11/9/2022] cyanocobalamin  1,000 mcg Intramuscular Q14 Days     famotidine  20 mg Oral BID     gabapentin  300 mg Oral BID     hydrOXYzine  50 mg Oral At Bedtime     lamoTRIgine  225 mg Oral At Bedtime     montelukast  10 mg Oral At Bedtime     oxybutynin ER  10 mg Oral Daily     vilazodone  40 mg Oral Daily     PRN Meds:.albuterol, alum & mag hydroxide-simethicone, calcium carbonate, fluticasone, hydrOXYzine, ibuprofen, methocarbamol, nicotine, OLANZapine **OR** OLANZapine, traZODone     ALLERGIES   Allergies   Allergen Reactions     Clonazepam Other (See Comments)     Causes Violence and aggresssion     Hydrocodone-Acetaminophen      Other reaction(s): Seizures  Can take Percocet without difficulty.     Lorazepam Other (See Comments)     Causes Violence and aggresssion     Sertraline Other (See Comments)     Caused suicidally      Bupropion Other (See Comments)     Caused Major " "Depression     Dust Mite Extract      Other reaction(s): Asthma symptoms     Lithium Other (See Comments)     Mood alteration     Pollen Extract      Other reaction(s): Asthma symptoms     Risperidone Other (See Comments)     Agitation       Sulfa Drugs Itching     Trichophyton      Other reaction(s): Asthma symptoms     Valproic Acid Other (See Comments)     Weight gain        MENTAL STATUS EXAM   Vitals: /57 (BP Location: Right arm)   Pulse 88   Temp (!) 96.6  F (35.9  C) (Temporal)   Resp 16   Ht 1.702 m (5' 7\")   Wt 128.4 kg (283 lb 1.6 oz)   SpO2 98%   BMI 44.34 kg/m      Appearance:  awake, alert, adequately groomed, dressed in hospital scrubs and appeared as age stated  Attitude:  cooperative, pleasant  Eye Contact:  good  Mood: \"okay\"  Affect: appears brighter when interacting with staff, more subdued when speaking with me, incongruent  Speech:  clear, coherent  Psychomotor Behavior:  no evidence of tardive dyskinesia, dystonia, or tics  Thought Process:  linear and goal oriented  Associations:  no loose associations  Thought Content:  no evidence of psychotic thought, reports vague suicidal thoughts with no plan or intent, states they are getting \"better\"  Insight:  limited  Judgment:  fair  Oriented to:  time, person, and place  Attention Span and Concentration:  intact  Recent and Remote Memory:  intact  Language: Able to name objects, Able to repeat phrases and Able to read and write  Fund of Knowledge: low-normal  Muscle Strength and Tone: normal  Gait and Station: Normal       LABS   No results found for this or any previous visit (from the past 24 hour(s)).      IMPRESSION     This is a 47 year old female with a PMH of bipolar disorder, AGNIESZKA, borderline personality traits who presented to the [facility] ED for evaluation of suicidal ideation with plan. Patient reported a plan to cut herself and bleed out or to overdose on medications. She identified unresolved grief as a big stressor. " She reports that her boyfriend  of cancer in January and his birthday would have been a few days ago. Today she does continue to report vague SI, though feels better than she did yesterday. She reports feeling more depressed, is unsure if medications are working. She reports that Vraylar was added a few months ago and she has felt no change in her mood, so will likely end up stopping this. She is also on Abilify 15 mg daily, could look at possible increase in dose. Is currently taking Lamictal 225 mg which could also be increased for bipolar depression. Will wait for nursing to get a verified medication list from , likely start adjustments tomorrow. She does have a psych provider at [facility] and has a therapist and [facility] worker through [facility].       DIAGNOSES     1. Bipolar 1 disorder, most recent episode depressed  2. AGNIESZKA (by history)  3. Borderline personality traits       PLAN     Location: Unit 5  Legal Status: Orders Placed This Encounter      Voluntary    Safety Assessment:    Behavioral Orders   Procedures     Code 1 - Restrict to Unit     Routine Programming     As clinically indicated     Status 15     Every 15 minutes.      PTA psychotropic medications stopped:     -None    PTA psychotropic medications continued/changed:     -Vraylar 3 mg at bedtime- stop on 10/31 due to being ineffective and Abilify working better  -Gabapentin 300 mg BID  -Hydroyxine 50 mg at bedtime  -Lamictal 225 mg at bedtime- consider increase in dose  -Viibryd 40 mg daily  -Cyanocobalamin 1,000 mcg/mL- inject 1 mL IM every 2 weeks (last dose 10/26)    New medications tried and stopped:     -None    New medications initiated:     -Abilify 10 mg at bedtime started 10/31- increase to 15 mg on     Today's Changes:    -Increase Abilify- pt reports good response from this medication in the past  -Will place PHP referral    Programming: Patient will be treated in a therapeutic milieu with appropriate  individual and group therapies. Education will be provided on diagnoses, medications, and treatments.     Medical diagnoses:  Per medicine    Consult: None  Tests: Vit D, vit B12 pending    Anticipated LOS: 3-5 days  Disposition: back to [facility] home with outpatient services, referral for PHP       TREATMENT TEAM CARE PLAN     Progress: Continued symptoms. Vague SI and depression    Continued Stay Criteria/Rationale: Continued symptoms without sufficient improvement/resolution.    Medical/Physical: See above.    Precautions: See above.     Plan: Continue inpatient care with unit support and medication management.     Rationale for change in precautions or plan: NA due to no change.    Participants: Isa Coats NP, Nursing, SW, OT.    The patient's care was discussed with the treatment team and chart notes were reviewed.       ATTESTATION      Isa Coats NP

## 2022-11-02 NOTE — PROGRESS NOTES
"[facility] PSYCHIATRY  PROGRESS NOTE     SUBJECTIVE     This is my first time meeting with Rosi.  Her affect is abnormally restricted throughout the majority of her conversation she has a smile that is incongruent with with her reports of how depressed she is.  When I am meeting with her she is not smiling or laughing as if she is happy though is restricted with an odd smirk/smile with very minimal emotion.  She does states she is still feeling depressed though she is feeling better than she was prior to coming in.  She states that she was on a higher dose of Abilify in the past and became \"really impulsive and it was spending all of my money so they lowered the Abilify down to 15\" she then started seeing a new provider who lowered the Abilify and changed to Vraylar and she states she believes her depression was very situational due to the anniversary of her significant other's death coming up and a recent birthday that he would have had.  She states she was having a plan to harm herself with a knife or else she was trying to get into her medications and the medication cart at the group home though she did tell staff that she wanted help prior to acting on this.  She is attending groups here.  She is social on the unit.  She is fairly easy to engage with in conversation and does seem to have good insight.  She has had 1 significant suicide attempt in the past at the age of 22 but has not had one since and has not been hospitalized in quite some time.  She states \"I am feeling better but not quite ready to go yet\" she had just had her dose of Abilify increased last night to 15 mg.  She does take trazodone occasionally and I did tell her that it might be more beneficial to try Rozerem as it would not trigger any type of leonel or mixed episodes as she does have a diagnosis of bipolar disorder.  She has not tried Rozerem in the past though is willing to do so.  She is also on 225 mg of Lamictal for depression we " "discussed increasing that dose to target depression and she does not have any side effects on it     MEDICATIONS   Scheduled Meds:    ARIPiprazole  15 mg Oral At Bedtime     calcium polycarbophil  1,250 mg Oral BID     cholecalciferol  125 mcg Oral Daily     [START ON 11/9/2022] cyanocobalamin  1,000 mcg Intramuscular Q14 Days     famotidine  20 mg Oral BID     gabapentin  300 mg Oral BID     hydrOXYzine  50 mg Oral At Bedtime     lamoTRIgine  250 mg Oral At Bedtime     montelukast  10 mg Oral At Bedtime     oxybutynin ER  10 mg Oral Daily     vilazodone  40 mg Oral Daily     PRN Meds:.albuterol, alum & mag hydroxide-simethicone, calcium carbonate, fluticasone, hydrOXYzine, ibuprofen, methocarbamol, nicotine, OLANZapine **OR** OLANZapine, ramelteon     ALLERGIES   Allergies   Allergen Reactions     Clonazepam Other (See Comments)     Causes Violence and aggresssion     Hydrocodone-Acetaminophen      Other reaction(s): Seizures  Can take Percocet without difficulty.     Lorazepam Other (See Comments)     Causes Violence and aggresssion     Sertraline Other (See Comments)     Caused suicidally      Bupropion Other (See Comments)     Caused Major Depression     Dust Mite Extract      Other reaction(s): Asthma symptoms     Lithium Other (See Comments)     Mood alteration     Pollen Extract      Other reaction(s): Asthma symptoms     Risperidone Other (See Comments)     Agitation       Sulfa Drugs Itching     Trichophyton      Other reaction(s): Asthma symptoms     Valproic Acid Other (See Comments)     Weight gain        MENTAL STATUS EXAM   Vitals: /57   Pulse 78   Temp 97.6  F (36.4  C) (Temporal)   Resp 18   Ht 1.702 m (5' 7\")   Wt 128.4 kg (283 lb 1.6 oz)   SpO2 100%   BMI 44.34 kg/m      Appearance:  awake, alert, adequately groomed, dressed in hospital scrubs and appeared as age stated  Attitude:  cooperative, pleasant  Eye Contact:  good  Mood: Better but still depressed.  No suicidal " "thoughts  Affect: Fairly restrictive and incongruent  Speech:  clear, coherent  Psychomotor Behavior:  no evidence of tardive dyskinesia, dystonia, or tics  Thought Process:  linear and goal oriented  Associations:  no loose associations  Thought Content:  no evidence of psychotic thought, reports vague suicidal thoughts with no plan or intent, states they are getting \"better\"  Insight:  limited  Judgment:  fair  Oriented to:  time, person, and place  Attention Span and Concentration:  intact  Recent and Remote Memory:  intact  Language: Able to name objects, Able to repeat phrases and Able to read and write  Fund of Knowledge: low-normal  Muscle Strength and Tone: normal  Gait and Station: Normal       LABS   No results found for this or any previous visit (from the past 24 hour(s)).      IMPRESSION     This is a 47 year old female with a PMH of bipolar disorder, AGNIESZKA, borderline personality traits who presented to the [facility] ED for evaluation of suicidal ideation with plan. Patient reported a plan to cut herself and bleed out or to overdose on medications. She identified unresolved grief as a big stressor. She reports that her boyfriend  of cancer in January and his birthday would have been a few days ago. Today she does continue to report vague SI, though feels better than she did yesterday. She reports feeling more depressed, is unsure if medications are working. She reports that Vraylar was added a few months ago and she has felt no change in her mood, so will likely end up stopping this. She is also on Abilify 15 mg daily, could look at possible increase in dose. Is currently taking Lamictal 225 mg which could also be increased for bipolar depression. Will wait for nursing to get a verified medication list from , likely start adjustments tomorrow. She does have a psych provider at [facility] and has a therapist and [facility] worker through [facility].       DIAGNOSES     1. Bipolar 1 " disorder, most recent episode depressed  2. AGNIESZKA (by history)  3. Borderline personality traits       PLAN     Location: Unit 5  Legal Status: Orders Placed This Encounter      Voluntary    Safety Assessment:    Behavioral Orders   Procedures     Code 1 - Restrict to Unit     Routine Programming     As clinically indicated     Status 15     Every 15 minutes.      PTA psychotropic medications stopped:     -None    PTA psychotropic medications continued/changed:     -Vraylar 3 mg at bedtime- stop on 10/31 due to being ineffective and Abilify working better  -Gabapentin 300 mg BID  -Hydroyxine 50 mg at bedtime  -Lamictal 225 mg at bedtime- consider increase in dose  -Viibryd 40 mg daily  -Cyanocobalamin 1,000 mcg/mL- inject 1 mL IM every 2 weeks (last dose 10/26)    New medications tried and stopped:     -None  -Discontinue trazodone and start Rozerem due to risk of leonel with trazodone  New medications initiated:     -Abilify 10 mg at bedtime started 10/31- increase to 15 mg on 11/2    Today's Changes:    -Discontinue trazodone  -Start Rozerem 8 mg nightly as needed  -Increase Lamictal to 250 mg    Programming: Patient will be treated in a therapeutic milieu with appropriate individual and group therapies. Education will be provided on diagnoses, medications, and treatments.     Medical diagnoses:  Per medicine    Consult: None  Tests: Vit D, vit B12 pending    Anticipated LOS: 3-5 days  Disposition: back to group home with outpatient services, referral for [facility]       TREATMENT TEAM CARE PLAN     Progress: Continued symptoms. Vague SI and depression    Continued Stay Criteria/Rationale: Continued symptoms without sufficient improvement/resolution.    Medical/Physical: See above.    Precautions: See above.     Plan: Continue inpatient care with unit support and medication management.     Rationale for change in precautions or plan: NA due to no change.    Participants: Rebecca Saeed, NP  , Nursing, SW,  OT.    The patient's care was discussed with the treatment team and chart notes were reviewed.       ATTESTATION      April Lilliana Saeed NP

## 2022-11-02 NOTE — PLAN OF CARE
Problem: Adult Behavioral Health Plan of Care  Goal: Patient-Specific Goal (Individualization)  Description: Patient will be free from suicidal thinking during hospitalization.  Patient will contract for safety during hospitalization.  Patient will accept medications and attend groups while hospitalization.  Outcome: Progressing        Problem: Suicidal Behavior  Goal: Suicidal Behavior is Absent or Managed  Description: Patient will be free of self harm while on the unit.  Patient will contract for safety while on the unit.    Outcome: Progressing   Goal Outcome Evaluation:          Pt in the lounge at the start of the shift. Pt socialized with peers, attended groups and ate her meal in the lounge. Pt denied pain, anxiety, SI, HI and hallucinations.     Pt states she is changing her sleep med tonight and will start taking rozerem tonight,     Face to face end of shift report communicated to night shift RN. Reported that pt is a risk for suicide.     Kaya Carrizales, PORFIRIO  11/2/2022  5:21 PM

## 2022-11-02 NOTE — PLAN OF CARE
Face to face shift report received from Teressa MONROY. Rounding completed, pt observed in bed at the start of the shift.    Patient in and out of room throughout the day. Alert and making needs known. Ate 100% of breakfast and lunch. Pleasant during conversation with nursing staff. Compliant with scheduled medications and nursing assessment. Reports anxiety and depression stating she is having a hard time today thinking about her family who lives in [place] and her boyfriend who passed away. Patient walked the halls and took a shower to relieve some anxiety. Denies hallucinations and SI/HI this morning. Patient is michelle for safety while on the unit and agreeable to tell nursing staff if this changes. Denies pain. Feeling unrested this morning stating she did not sleep well last night. Attending groups and working on puzzles in day room.     Problem: Adult Behavioral Health Plan of Care  Goal: Patient-Specific Goal (Individualization)  Description: Patient will be free from suicidal thinking during hospitalization.  Patient will contract for safety during hospitalization.  Patient will accept medications and attend groups while hospitalization.  Outcome: Progressing     Problem: Suicidal Behavior  Goal: Suicidal Behavior is Absent or Managed  Description: Patient will be free of self harm while on the unit.  Patient will contract for safety while on the unit.    Outcome: Progressing    Face to face report will be communicated to lane MONROY.    Ninoska Aquino RN  11/2/2022

## 2022-11-03 PROCEDURE — 124N000001 HC R&B MH

## 2022-11-03 PROCEDURE — 99232 SBSQ HOSP IP/OBS MODERATE 35: CPT | Performed by: NURSE PRACTITIONER

## 2022-11-03 PROCEDURE — 250N000013 HC RX MED GY IP 250 OP 250 PS 637: Performed by: NURSE PRACTITIONER

## 2022-11-03 RX ORDER — BENZTROPINE MESYLATE 0.5 MG/1
0.5 TABLET ORAL ONCE
Status: COMPLETED | OUTPATIENT
Start: 2022-11-03 | End: 2022-11-03

## 2022-11-03 RX ORDER — RAMELTEON 8 MG/1
8 TABLET ORAL AT BEDTIME
Status: DISCONTINUED | OUTPATIENT
Start: 2022-11-03 | End: 2022-11-04

## 2022-11-03 RX ADMIN — CALCIUM POLYCARBOPHIL 1250 MG: 625 TABLET, FILM COATED ORAL at 08:35

## 2022-11-03 RX ADMIN — ARIPIPRAZOLE 15 MG: 15 TABLET ORAL at 20:39

## 2022-11-03 RX ADMIN — CALCIUM POLYCARBOPHIL 1250 MG: 625 TABLET, FILM COATED ORAL at 20:39

## 2022-11-03 RX ADMIN — BENZTROPINE MESYLATE 0.5 MG: 0.5 TABLET ORAL at 14:41

## 2022-11-03 RX ADMIN — Medication 125 MCG: at 08:35

## 2022-11-03 RX ADMIN — MONTELUKAST 10 MG: 10 TABLET, FILM COATED ORAL at 20:39

## 2022-11-03 RX ADMIN — GABAPENTIN 300 MG: 300 CAPSULE ORAL at 08:35

## 2022-11-03 RX ADMIN — OXYBUTYNIN CHLORIDE 10 MG: 5 TABLET, EXTENDED RELEASE ORAL at 08:35

## 2022-11-03 RX ADMIN — VILAZODONE HYDROCHLORIDE 40 MG: 20 TABLET ORAL at 08:35

## 2022-11-03 RX ADMIN — HYDROXYZINE HYDROCHLORIDE 50 MG: 25 TABLET ORAL at 20:39

## 2022-11-03 RX ADMIN — RAMELTEON 8 MG: 8 TABLET, FILM COATED ORAL at 20:39

## 2022-11-03 RX ADMIN — LAMOTRIGINE 250 MG: 100 TABLET ORAL at 20:39

## 2022-11-03 RX ADMIN — FAMOTIDINE 20 MG: 20 TABLET, FILM COATED ORAL at 08:35

## 2022-11-03 RX ADMIN — FAMOTIDINE 20 MG: 20 TABLET, FILM COATED ORAL at 20:39

## 2022-11-03 RX ADMIN — GABAPENTIN 300 MG: 300 CAPSULE ORAL at 20:40

## 2022-11-03 ASSESSMENT — ACTIVITIES OF DAILY LIVING (ADL)
ADLS_ACUITY_SCORE: 28
DRESS: SCRUBS (BEHAVIORAL HEALTH);INDEPENDENT
ORAL_HYGIENE: INDEPENDENT
ORAL_HYGIENE: INDEPENDENT
HYGIENE/GROOMING: INDEPENDENT
DRESS: SCRUBS (BEHAVIORAL HEALTH);INDEPENDENT
HYGIENE/GROOMING: INDEPENDENT
LAUNDRY: UNABLE TO COMPLETE
ADLS_ACUITY_SCORE: 28
LAUNDRY: UNABLE TO COMPLETE
ADLS_ACUITY_SCORE: 28

## 2022-11-03 NOTE — PROGRESS NOTES
[facility] PSYCHIATRY  PROGRESS NOTE     SUBJECTIVE     camilo is much brighter today. Her affect is not incongruent as it was yesterday. It is not restricted. She states she has not been having any suicidal thoughts. She states she slept well though felt groggy this am when wakingup. She states she did take rozerem yesterday and did find it helpful. No delusions or hallucinations. Doesn't appear preoccupied. She has been attending groups.      MEDICATIONS   Scheduled Meds:    ARIPiprazole  15 mg Oral At Bedtime     calcium polycarbophil  1,250 mg Oral BID     cholecalciferol  125 mcg Oral Daily     [START ON 11/9/2022] cyanocobalamin  1,000 mcg Intramuscular Q14 Days     famotidine  20 mg Oral BID     gabapentin  300 mg Oral BID     hydrOXYzine  50 mg Oral At Bedtime     lamoTRIgine  250 mg Oral At Bedtime     montelukast  10 mg Oral At Bedtime     oxybutynin ER  10 mg Oral Daily     ramelteon  8 mg Oral At Bedtime     vilazodone  40 mg Oral Daily     PRN Meds:.albuterol, alum & mag hydroxide-simethicone, calcium carbonate, fluticasone, hydrOXYzine, ibuprofen, methocarbamol, nicotine, OLANZapine **OR** OLANZapine     ALLERGIES   Allergies   Allergen Reactions     Clonazepam Other (See Comments)     Causes Violence and aggresssion     Hydrocodone-Acetaminophen      Other reaction(s): Seizures  Can take Percocet without difficulty.     Lorazepam Other (See Comments)     Causes Violence and aggresssion     Sertraline Other (See Comments)     Caused suicidally      Bupropion Other (See Comments)     Caused Major Depression     Dust Mite Extract      Other reaction(s): Asthma symptoms     Lithium Other (See Comments)     Mood alteration     Pollen Extract      Other reaction(s): Asthma symptoms     Risperidone Other (See Comments)     Agitation       Sulfa Drugs Itching     Trichophyton      Other reaction(s): Asthma symptoms     Valproic Acid Other (See Comments)     Weight gain        MENTAL STATUS EXAM   Vitals:  "/57 (BP Location: Left arm)   Pulse 89   Temp 98.6  F (37  C) (Temporal)   Resp 14   Ht 1.702 m (5' 7\")   Wt 128.4 kg (283 lb 1.6 oz)   SpO2 98%   BMI 44.34 kg/m      Appearance:  awake, alert, adequately groomed, dressed in hospital scrubs and appeared as age stated  Attitude:  cooperative, pleasant  Eye Contact:  good  Mood: Better but still depressed.  No suicidal thoughts  Affect: Fairly restrictive and incongruent  Speech:  clear, coherent  Psychomotor Behavior:  no evidence of tardive dyskinesia, dystonia, or tics  Thought Process:  linear and goal oriented  Associations:  no loose associations  Thought Content:  no evidence of psychotic thought, reports vague suicidal thoughts with no plan or intent, states they are getting \"better\"  Insight:  limited  Judgment:  fair  Oriented to:  time, person, and place  Attention Span and Concentration:  intact  Recent and Remote Memory:  intact  Language: Able to name objects, Able to repeat phrases and Able to read and write  Fund of Knowledge: low-normal  Muscle Strength and Tone: normal  Gait and Station: Normal       LABS   No results found for this or any previous visit (from the past 24 hour(s)).      IMPRESSION     This is a 47 year old female with a PMH of bipolar disorder, AGNIESZKA, borderline personality traits who presented to the [facility] ED for evaluation of suicidal ideation with plan. Patient reported a plan to cut herself and bleed out or to overdose on medications. She identified unresolved grief as a big stressor. She reports that her boyfriend  of cancer in January and his birthday would have been a few days ago. Today she does continue to report vague SI, though feels better than she did yesterday. She reports feeling more depressed, is unsure if medications are working. She reports that Vraylar was added a few months ago and she has felt no change in her mood, so will likely end up stopping this. She is also on Abilify 15 mg daily, " could look at possible increase in dose. Is currently taking Lamictal 225 mg which could also be increased for bipolar depression. Will wait for nursing to get a verified medication list from , likely start adjustments tomorrow. She does have a psych provider at [facility] and has a therapist and [facility] worker through [facility].       DIAGNOSES     1. Bipolar 1 disorder, most recent episode depressed  2. AGNIESZKA (by history)  3. Borderline personality traits       PLAN     Location: Unit 5  Legal Status: Orders Placed This Encounter      Voluntary    Safety Assessment:    Behavioral Orders   Procedures     Code 1 - Restrict to Unit     Routine Programming     As clinically indicated     Status 15     Every 15 minutes.      PTA psychotropic medications stopped:     -None    PTA psychotropic medications continued/changed:     -Vraylar 3 mg at bedtime- stop on 10/31 due to being ineffective and Abilify working better  -Gabapentin 300 mg BID  -Hydroyxine 50 mg at bedtime  -Lamictal 225 mg at bedtime- consider increase in dose  -Viibryd 40 mg daily  -Cyanocobalamin 1,000 mcg/mL- inject 1 mL IM every 2 weeks (last dose 10/26)    New medications tried and stopped:     -None  -Discontinue trazodone and start Rozerem due to risk of leonel with trazodone  New medications initiated:     -Abilify 10 mg at bedtime started 10/31- increase to 15 mg on 11/2      Today's Changes:  Change rozerem to 8 mg hs scheduled .    Programming: Patient will be treated in a therapeutic milieu with appropriate individual and group therapies. Education will be provided on diagnoses, medications, and treatments.     Medical diagnoses:  Per medicine    Consult: None  Tests: none    Anticipated LOS: 3-5 days  Disposition: [email or web address] with outpatient services, referral for [facility]       TREATMENT TEAM CARE PLAN     Progress: Continued symptoms: continues to have depression.     Continued Stay Criteria/Rationale: Continued symptoms  without sufficient improvement/resolution.    Medical/Physical: See above.    Precautions: See above.     Plan: Continue inpatient care with unit support and medication management.     Rationale for change in precautions or plan: NA due to no change.    Participants: Rebecca Saeed NP  , Nursing, SW, OT.    The patient's care was discussed with the treatment team and chart notes were reviewed.       ATTESTATION      Rebecca Saeed NP

## 2022-11-03 NOTE — PLAN OF CARE
Face to face end of shift report recieved from evening shift RN. Rounding completed. Pt observed in their own bed with notable respirations. Will continue to support the needs of the patient. Pt slept approximately 7 hours.    Face to face shift report will be communicated with oncoming RN.     Teressa Winkler RN  11/3/2022  12:00 AM   Problem: Adult Behavioral Health Plan of Care  Goal: Patient-Specific Goal (Individualization)  Description: Patient will be free from suicidal thinking during hospitalization.  Patient will contract for safety during hospitalization.  Patient will accept medications and attend groups while hospitalization.  Outcome: Progressing     Problem: Suicide Risk  Goal: Absence of Self-Harm  Outcome: Progressing     Problem: Suicidal Behavior  Goal: Suicidal Behavior is Absent or Managed  Description: Patient will be free of self harm while on the unit.  Patient will contract for safety while on the unit.    Outcome: Progressing

## 2022-11-03 NOTE — PLAN OF CARE
Problem: Adult Behavioral Health Plan of Care  Goal: Patient-Specific Goal (Individualization)  Description: Patient will be free from suicidal thinking during hospitalization.  Patient will contract for safety during hospitalization.  Patient will accept medications and attend groups while hospitalization.  Outcome: Progressing       Pt in group at the start of the shift. Nursing staff talked with pt while she was working on a [employer] puzzle. Pt denies pain, anxiety, SI, HI and hallucinations. Pt reports that the prn cogentin she took this afternoon was helpful for her stiff neck. Pt states she believes it helped her eye tic also.      Pt attended evening groups and socialized with peers.       Problem: Suicidal Behavior  Goal: Suicidal Behavior is Absent or Managed  Description: Patient will be free of self harm while on the unit.  Patient will contract for safety while on the unit.    Outcome: Progressing   Goal Outcome Evaluation:    Plan of Care Reviewed With: patient             Face to face end of shift report communicated to night shift RN. Reported that pt is a risk for suicide.     Kaya Carrizales, RN  11/3/2022  5:08 PM

## 2022-11-03 NOTE — PLAN OF CARE
"Spoke with  Danielle at pt's group home and discussed update on pt. Let her know pt was interested in PHP, Danielle stated this might be difficult due to \"[facility] being unreliable with transport recently\" and they don't have transportation for pt. She continues to explain how the pt is currently in a advance DBT group once a week at the facility. This writer stated they would call back with an update on Monday.      Supervisor and coordinator for PHP came and spoke with pt regarding PHP.       Discharge Plan: Back to group home with PHP services  Services: Therapy, med management , case manger.   Placement: Group home   Referrals made: NA  Court status: NA  Gomez/SDM: NA  "

## 2022-11-03 NOTE — PLAN OF CARE
"Face to face end of shift report received from . RN. Rounding completed and patient observed lying in bed awake. No requests at this time. .       Goal Outcome Evaluation: Patient has been pleasant and cooperative. She is clean and neatly dressed. She endorsed both anxiety and depression but denied HI/SI and hallucinations. Her affect is bright but is incongruent with how she describes her mood. She said her anxiety and depression are improved \"but still there.\" Patient attends groups. She denied pain.     Face to face end of shift report given to oncoming RN.         Problem: Adult Behavioral Health Plan of Care  Goal: Patient-Specific Goal (Individualization)  Description: Patient will be free from suicidal thinking during hospitalization.  Patient will contract for safety during hospitalization.  Patient will accept medications and attend groups while hospitalization.  Outcome: Progressing     Problem: Suicidal Behavior  Goal: Suicidal Behavior is Absent or Managed  Description: Patient will be free of self harm while on the unit.  Patient will contract for safety while on the unit.    Outcome: Progressing                         "

## 2022-11-04 LAB — VIT B12 SERPL-MCNC: 948 PG/ML (ref 232–1245)

## 2022-11-04 PROCEDURE — 250N000013 HC RX MED GY IP 250 OP 250 PS 637: Performed by: NURSE PRACTITIONER

## 2022-11-04 PROCEDURE — 124N000001 HC R&B MH

## 2022-11-04 PROCEDURE — 99232 SBSQ HOSP IP/OBS MODERATE 35: CPT | Performed by: NURSE PRACTITIONER

## 2022-11-04 RX ORDER — RAMELTEON 8 MG/1
8 TABLET ORAL AT BEDTIME
Status: DISCONTINUED | OUTPATIENT
Start: 2022-11-04 | End: 2022-11-08 | Stop reason: HOSPADM

## 2022-11-04 RX ADMIN — OXYBUTYNIN CHLORIDE 10 MG: 5 TABLET, EXTENDED RELEASE ORAL at 08:44

## 2022-11-04 RX ADMIN — HYDROXYZINE HYDROCHLORIDE 50 MG: 25 TABLET ORAL at 09:33

## 2022-11-04 RX ADMIN — CALCIUM POLYCARBOPHIL 1250 MG: 625 TABLET, FILM COATED ORAL at 20:48

## 2022-11-04 RX ADMIN — CALCIUM POLYCARBOPHIL 1250 MG: 625 TABLET, FILM COATED ORAL at 08:44

## 2022-11-04 RX ADMIN — Medication 125 MCG: at 08:44

## 2022-11-04 RX ADMIN — MONTELUKAST 10 MG: 10 TABLET, FILM COATED ORAL at 20:48

## 2022-11-04 RX ADMIN — ARIPIPRAZOLE 15 MG: 15 TABLET ORAL at 20:48

## 2022-11-04 RX ADMIN — FAMOTIDINE 20 MG: 20 TABLET, FILM COATED ORAL at 08:44

## 2022-11-04 RX ADMIN — HYDROXYZINE HYDROCHLORIDE 50 MG: 25 TABLET ORAL at 20:48

## 2022-11-04 RX ADMIN — GABAPENTIN 300 MG: 300 CAPSULE ORAL at 08:44

## 2022-11-04 RX ADMIN — RAMELTEON 8 MG: 8 TABLET, FILM COATED ORAL at 20:48

## 2022-11-04 RX ADMIN — GABAPENTIN 300 MG: 300 CAPSULE ORAL at 20:48

## 2022-11-04 RX ADMIN — FAMOTIDINE 20 MG: 20 TABLET, FILM COATED ORAL at 20:48

## 2022-11-04 RX ADMIN — VILAZODONE HYDROCHLORIDE 40 MG: 20 TABLET ORAL at 08:44

## 2022-11-04 RX ADMIN — IBUPROFEN 400 MG: 400 TABLET ORAL at 08:44

## 2022-11-04 RX ADMIN — LAMOTRIGINE 250 MG: 100 TABLET ORAL at 20:47

## 2022-11-04 ASSESSMENT — ACTIVITIES OF DAILY LIVING (ADL)
ORAL_HYGIENE: INDEPENDENT
ADLS_ACUITY_SCORE: 28
ORAL_HYGIENE: INDEPENDENT
ADLS_ACUITY_SCORE: 29
HYGIENE/GROOMING: INDEPENDENT
ADLS_ACUITY_SCORE: 28
ADLS_ACUITY_SCORE: 28
LAUNDRY: UNABLE TO COMPLETE
ADLS_ACUITY_SCORE: 28
ADLS_ACUITY_SCORE: 28
LAUNDRY: UNABLE TO COMPLETE
DRESS: SCRUBS (BEHAVIORAL HEALTH);INDEPENDENT
DRESS: SCRUBS (BEHAVIORAL HEALTH);INDEPENDENT
ADLS_ACUITY_SCORE: 28
ADLS_ACUITY_SCORE: 29
HYGIENE/GROOMING: INDEPENDENT

## 2022-11-04 NOTE — PLAN OF CARE
Problem: Adult Behavioral Health Plan of Care  Goal: Patient-Specific Goal (Individualization)  Description: Patient will be free from suicidal thinking during hospitalization.  Patient will contract for safety during hospitalization.  Patient will accept medications and attend groups while hospitalization.  Outcome: Progressing     Problem: Suicide Risk  Goal: Absence of Self-Harm  Outcome: Progressing     Face to face shift report received from Ronda RN. Rounding completed, pt observed.     Pt appeared to sleep most of this shift. Pt did not have any noted episodes of self harm this shift.     Face to face report will be communicated to oncoming RN.    Deirdre Sotelo RN  11/4/2022  5:45 AM

## 2022-11-04 NOTE — PLAN OF CARE
Face to face shift report received from PORFIRIO Gilmore.       Problem: Adult Behavioral Health Plan of Care  Goal: Patient-Specific Goal (Individualization)  Description: Patient will be free from suicidal thinking during hospitalization.  Patient will contract for safety during hospitalization.  Patient will accept medications and attend groups while hospitalization.  Outcome: Progressing   Calm and cooperative. Medication compliant. Reports anxiety, requested and received Hydroxyzine 50mg at 0930. Reports 6/10 abdominal cramps, received Ibuprofen 400mg @0844. Attends groups.     Problem: Suicide Risk  Goal: Absence of Self-Harm  Outcome: Progressing   Free from self harm or injury this shift.       Face to face end of shift report to be communicated to oncoming RN.

## 2022-11-04 NOTE — PROGRESS NOTES
"[facility] PSYCHIATRY  PROGRESS NOTE     SUBJECTIVE     Rosi states that she did not have an easy time falling asleep yesterday though states she took her Rozerem an hour or so before she actually tried to fall asleep.  I told her I would schedule her Rozerem for a later time when she is close to being ready for bed.  She states she usually goes to bed between 930 and 10.  She states she has not had any further suicidal ideation though is still feeling depressed and quite I am not quite very yet where I feel like I need to be\" her affect has improved over the past couple of days and is more congruent and less restricted.  She is smiling more appropriately.  She does not appear to be preoccupied.  She has been attending all groups and she has been here.     MEDICATIONS   Scheduled Meds:    ARIPiprazole  15 mg Oral At Bedtime     calcium polycarbophil  1,250 mg Oral BID     cholecalciferol  125 mcg Oral Daily     [START ON 11/9/2022] cyanocobalamin  1,000 mcg Intramuscular Q14 Days     famotidine  20 mg Oral BID     gabapentin  300 mg Oral BID     hydrOXYzine  50 mg Oral At Bedtime     lamoTRIgine  250 mg Oral At Bedtime     montelukast  10 mg Oral At Bedtime     oxybutynin ER  10 mg Oral Daily     ramelteon  8 mg Oral At Bedtime     vilazodone  40 mg Oral Daily     PRN Meds:.albuterol, alum & mag hydroxide-simethicone, calcium carbonate, fluticasone, hydrOXYzine, ibuprofen, methocarbamol, nicotine, OLANZapine **OR** OLANZapine     ALLERGIES   Allergies   Allergen Reactions     Clonazepam Other (See Comments)     Causes Violence and aggresssion     Hydrocodone-Acetaminophen      Other reaction(s): Seizures  Can take Percocet without difficulty.     Lorazepam Other (See Comments)     Causes Violence and aggresssion     Sertraline Other (See Comments)     Caused suicidally      Bupropion Other (See Comments)     Caused Major Depression     Dust Mite Extract      Other reaction(s): Asthma symptoms     Lithium " "Other (See Comments)     Mood alteration     Pollen Extract      Other reaction(s): Asthma symptoms     Risperidone Other (See Comments)     Agitation       Sulfa Drugs Itching     Trichophyton      Other reaction(s): Asthma symptoms     Valproic Acid Other (See Comments)     Weight gain        MENTAL STATUS EXAM   Vitals: /52 (BP Location: Right arm)   Pulse 93   Temp 97.8  F (36.6  C) (Temporal)   Resp 14   Ht 1.702 m (5' 7\")   Wt 128.4 kg (283 lb 1.6 oz)   SpO2 99%   BMI 44.34 kg/m      Appearance:  awake, alert, adequately groomed, dressed in hospital scrubs and appeared as age stated  Attitude:  cooperative, pleasant  Eye Contact:  good  Mood: Improving with suicidal ideation but still depressed.  No suicidal thoughts  Affect: Less restricted and smiling more appropriately  Speech:  clear, coherent  Psychomotor Behavior:  no evidence of tardive dyskinesia, dystonia, or tics  Thought Process:  linear and goal oriented  Associations:  no loose associations  Thought Content:  no evidence of psychotic thought denies suicidal thoughts and depression is slowly improving  Insight: Fair  Judgment: Improved  Oriented to:  time, person, and place  Attention Span and Concentration:  intact  Recent and Remote Memory:  intact  Language: Able to name objects, Able to repeat phrases and Able to read and write  Fund of Knowledge: low-normal  Muscle Strength and Tone: normal  Gait and Station: Normal       LABS   No results found for this or any previous visit (from the past 24 hour(s)).      IMPRESSION     This is a 47 year old female with a PMH of bipolar disorder, AGNIESZKA, borderline personality traits who presented to the [facility] ED for evaluation of suicidal ideation with plan. Patient reported a plan to cut herself and bleed out or to overdose on medications. She identified unresolved grief as a big stressor. She reports that her boyfriend  of cancer in January and his birthday would have been a few days " ago. Today she does continue to report vague SI, though feels better than she did yesterday. She reports feeling more depressed, is unsure if medications are working. She reports that Vraylar was added a few months ago and she has felt no change in her mood, so will likely end up stopping this. She is also on Abilify 15 mg daily, could look at possible increase in dose. Is currently taking Lamictal 225 mg which could also be increased for bipolar depression. Will wait for nursing to get a verified medication list from , likely start adjustments tomorrow. She does have a psych provider at [facility] and has a therapist and [facility] worker through [facility].       DIAGNOSES     1. Bipolar 1 disorder, most recent episode depressed  2. AGNIESZKA (by history)  3. Borderline personality traits       PLAN     Location: Unit 5  Legal Status: Orders Placed This Encounter      Voluntary    Safety Assessment:    Behavioral Orders   Procedures     Code 1 - Restrict to Unit     Routine Programming     As clinically indicated     Status 15     Every 15 minutes.      PTA psychotropic medications stopped:     -None    PTA psychotropic medications continued/changed:     -Vraylar 3 mg at bedtime- stop on 10/31 due to being ineffective and Abilify working better  -Gabapentin 300 mg BID  -Hydroyxine 50 mg at bedtime  -Lamictal 225 mg at bedtime- consider increase in dose  -Viibryd 40 mg daily  -Cyanocobalamin 1,000 mcg/mL- inject 1 mL IM every 2 weeks (last dose 10/26)    New medications tried and stopped:     -None  -Discontinue trazodone and start Rozerem due to risk of leonel with trazodone  New medications initiated:     -Abilify 10 mg at bedtime started 10/31- increase to 15 mg on 11/2      Today's Changes:  Change rozerem to 8 mg hs scheduled at 9:30     Programming: Patient will be treated in a therapeutic milieu with appropriate individual and group therapies. Education will be provided on diagnoses, medications, and  treatments.     Medical diagnoses:  Per medicine    Consult: None  Tests: none    Anticipated LOS: 3-5 days  Disposition: back to [facility] home with outpatient services, referral for PHP       TREATMENT TEAM CARE PLAN     Progress: Continued symptoms: continues to have depression.     Continued Stay Criteria/Rationale: Continued symptoms without sufficient improvement/resolution.    Medical/Physical: See above.    Precautions: See above.     Plan: Continue inpatient care with unit support and medication management.     Rationale for change in precautions or plan: NA due to no change.    Participants: Rebecca Saeed NP  , Nursing, SW, OT.    The patient's care was discussed with the treatment team and chart notes were reviewed.       ATTESTATION      Rebecca Saeed NP

## 2022-11-05 PROCEDURE — 250N000013 HC RX MED GY IP 250 OP 250 PS 637: Performed by: NURSE PRACTITIONER

## 2022-11-05 PROCEDURE — 124N000001 HC R&B MH

## 2022-11-05 RX ADMIN — HYDROXYZINE HYDROCHLORIDE 50 MG: 25 TABLET ORAL at 20:44

## 2022-11-05 RX ADMIN — OXYBUTYNIN CHLORIDE 10 MG: 5 TABLET, EXTENDED RELEASE ORAL at 08:55

## 2022-11-05 RX ADMIN — FAMOTIDINE 20 MG: 20 TABLET, FILM COATED ORAL at 08:54

## 2022-11-05 RX ADMIN — MONTELUKAST 10 MG: 10 TABLET, FILM COATED ORAL at 20:44

## 2022-11-05 RX ADMIN — Medication 125 MCG: at 08:55

## 2022-11-05 RX ADMIN — FAMOTIDINE 20 MG: 20 TABLET, FILM COATED ORAL at 20:44

## 2022-11-05 RX ADMIN — CALCIUM POLYCARBOPHIL 1250 MG: 625 TABLET, FILM COATED ORAL at 20:45

## 2022-11-05 RX ADMIN — GABAPENTIN 300 MG: 300 CAPSULE ORAL at 08:54

## 2022-11-05 RX ADMIN — LAMOTRIGINE 250 MG: 100 TABLET ORAL at 20:45

## 2022-11-05 RX ADMIN — GABAPENTIN 300 MG: 300 CAPSULE ORAL at 20:44

## 2022-11-05 RX ADMIN — ARIPIPRAZOLE 15 MG: 15 TABLET ORAL at 20:44

## 2022-11-05 RX ADMIN — CALCIUM POLYCARBOPHIL 1250 MG: 625 TABLET, FILM COATED ORAL at 08:55

## 2022-11-05 RX ADMIN — RAMELTEON 8 MG: 8 TABLET, FILM COATED ORAL at 20:44

## 2022-11-05 RX ADMIN — VILAZODONE HYDROCHLORIDE 40 MG: 20 TABLET ORAL at 08:55

## 2022-11-05 ASSESSMENT — ACTIVITIES OF DAILY LIVING (ADL)
ADLS_ACUITY_SCORE: 29
LAUNDRY: UNABLE TO COMPLETE
ADLS_ACUITY_SCORE: 29
ADLS_ACUITY_SCORE: 29
DRESS: SCRUBS (BEHAVIORAL HEALTH);INDEPENDENT
ADLS_ACUITY_SCORE: 29
ORAL_HYGIENE: INDEPENDENT
ADLS_ACUITY_SCORE: 29
HYGIENE/GROOMING: INDEPENDENT
ADLS_ACUITY_SCORE: 29

## 2022-11-05 NOTE — PLAN OF CARE
Face to face shift report received from PORFIRIO Gilmore.       Problem: Adult Behavioral Health Plan of Care  Goal: Patient-Specific Goal (Individualization)  Description: Patient will be free from suicidal thinking during hospitalization.  Patient will contract for safety during hospitalization.  Patient will accept medications and attend groups while hospitalization.  Outcome: Progressing   Calm and cooperative. Medication compliant. Reports depression. Denies thoughts of harming self or others.  Reports sore throat this shift, pt feels it could be due to allergies or dry air of unit but declined Flonase Nasal spray. Attends groups.   1300: Pt reports sore throat has resolved.     Problem: Suicide Risk  Goal: Absence of Self-Harm  Outcome: Progressing   Free from self harm or injury this shift.       Face to face end of shift report to be communicated to oncoming RN.

## 2022-11-05 NOTE — PLAN OF CARE
Problem: Adult Behavioral Health Plan of Care  Goal: Patient-Specific Goal (Individualization)  Description: Patient will be free from suicidal thinking during hospitalization.  Patient will contract for safety during hospitalization.  Patient will accept medications and attend groups while hospitalization.  Outcome: Progressing     Problem: Suicide Risk  Goal: Absence of Self-Harm  Outcome: Progressing     Face to face shift report received from Orlando MONROY. Rounding completed, pt observed.     Pt appeared to sleep most of this shift. Pt did not have any noted episodes of self harm this shift.    Face to face report will be communicated to oncoming RN.    Deirdre Sotelo RN  11/5/2022  6:27 AM

## 2022-11-05 NOTE — PLAN OF CARE
Problem: Suicidal Behavior  Goal: Suicidal Behavior is Absent or Managed  Description: Patient will be free of self harm while on the unit.  Patient will contract for safety while on the unit.    Outcome: Progressing    Pt denies SI currently stating she feels safe on the unit       Problem: Adult Behavioral Health Plan of Care  Goal: Patient-Specific Goal (Individualization)  Description: Patient will be free from suicidal thinking during hospitalization.  Patient will contract for safety during hospitalization.  Patient will accept medications and attend groups while hospitalization.  Outcome: Progressing   Goal Outcome Evaluation:    Plan of Care Reviewed With: patient      1530 Face to face rounding complete.  Pt introduced to nursing for the shift.     Pt was up and active all shift attending all of the available groups. She has a very bright affect smiling most of the evening. She told me that she is feeling much better now. Pt used the Alpha Stim this evening and said that she had some benefit.  Pt is hopeful about going to [facility] next week.     2300 Face to face end of shift report communicated to Night shift RN along with Pt's fall risk.      Orlando Rodrigez RN  11/4/2022  10:02 PM

## 2022-11-06 LAB — SARS-COV-2 RNA RESP QL NAA+PROBE: NEGATIVE

## 2022-11-06 PROCEDURE — U0003 INFECTIOUS AGENT DETECTION BY NUCLEIC ACID (DNA OR RNA); SEVERE ACUTE RESPIRATORY SYNDROME CORONAVIRUS 2 (SARS-COV-2) (CORONAVIRUS DISEASE [COVID-19]), AMPLIFIED PROBE TECHNIQUE, MAKING USE OF HIGH THROUGHPUT TECHNOLOGIES AS DESCRIBED BY CMS-2020-01-R: HCPCS | Performed by: NURSE PRACTITIONER

## 2022-11-06 PROCEDURE — 124N000001 HC R&B MH

## 2022-11-06 PROCEDURE — 250N000013 HC RX MED GY IP 250 OP 250 PS 637: Performed by: NURSE PRACTITIONER

## 2022-11-06 PROCEDURE — 99232 SBSQ HOSP IP/OBS MODERATE 35: CPT | Performed by: NURSE PRACTITIONER

## 2022-11-06 RX ORDER — LAMOTRIGINE 200 MG/1
TABLET ORAL
Qty: 30 TABLET | Refills: 1 | Status: SHIPPED | OUTPATIENT
Start: 2022-11-06 | End: 2023-03-17

## 2022-11-06 RX ORDER — LAMOTRIGINE 25 MG/1
25 TABLET ORAL DAILY
Qty: 60 TABLET | Refills: 1 | Status: SHIPPED | OUTPATIENT
Start: 2022-11-06 | End: 2023-03-17

## 2022-11-06 RX ORDER — ARIPIPRAZOLE 15 MG/1
15 TABLET ORAL AT BEDTIME
Qty: 30 TABLET | Refills: 1 | Status: SHIPPED | OUTPATIENT
Start: 2022-11-06

## 2022-11-06 RX ORDER — LAMOTRIGINE 25 MG/1
50 TABLET ORAL DAILY
Qty: 60 TABLET | Refills: 1 | Status: SHIPPED | OUTPATIENT
Start: 2022-11-06 | End: 2023-03-17

## 2022-11-06 RX ORDER — RAMELTEON 8 MG/1
8 TABLET ORAL AT BEDTIME
Qty: 30 TABLET | Refills: 1 | Status: SHIPPED | OUTPATIENT
Start: 2022-11-06 | End: 2023-01-10

## 2022-11-06 RX ORDER — HYDROXYZINE HYDROCHLORIDE 50 MG/1
50 TABLET, FILM COATED ORAL AT BEDTIME
Qty: 30 TABLET | Refills: 0 | Status: SHIPPED | OUTPATIENT
Start: 2022-11-06 | End: 2024-04-25

## 2022-11-06 RX ADMIN — GABAPENTIN 300 MG: 300 CAPSULE ORAL at 20:45

## 2022-11-06 RX ADMIN — LAMOTRIGINE 250 MG: 100 TABLET ORAL at 20:44

## 2022-11-06 RX ADMIN — VILAZODONE HYDROCHLORIDE 40 MG: 20 TABLET ORAL at 08:15

## 2022-11-06 RX ADMIN — OXYBUTYNIN CHLORIDE 10 MG: 5 TABLET, EXTENDED RELEASE ORAL at 08:15

## 2022-11-06 RX ADMIN — ARIPIPRAZOLE 15 MG: 15 TABLET ORAL at 20:44

## 2022-11-06 RX ADMIN — CALCIUM POLYCARBOPHIL 1250 MG: 625 TABLET, FILM COATED ORAL at 20:44

## 2022-11-06 RX ADMIN — CALCIUM POLYCARBOPHIL 1250 MG: 625 TABLET, FILM COATED ORAL at 08:15

## 2022-11-06 RX ADMIN — RAMELTEON 8 MG: 8 TABLET, FILM COATED ORAL at 20:44

## 2022-11-06 RX ADMIN — MONTELUKAST 10 MG: 10 TABLET, FILM COATED ORAL at 20:45

## 2022-11-06 RX ADMIN — HYDROXYZINE HYDROCHLORIDE 50 MG: 25 TABLET ORAL at 20:45

## 2022-11-06 RX ADMIN — GABAPENTIN 300 MG: 300 CAPSULE ORAL at 08:15

## 2022-11-06 RX ADMIN — FAMOTIDINE 20 MG: 20 TABLET, FILM COATED ORAL at 08:15

## 2022-11-06 RX ADMIN — Medication 125 MCG: at 08:15

## 2022-11-06 RX ADMIN — FAMOTIDINE 20 MG: 20 TABLET, FILM COATED ORAL at 20:45

## 2022-11-06 ASSESSMENT — ACTIVITIES OF DAILY LIVING (ADL)
ADLS_ACUITY_SCORE: 29
DRESS: INDEPENDENT;SCRUBS (BEHAVIORAL HEALTH)
ADLS_ACUITY_SCORE: 29
ORAL_HYGIENE: INDEPENDENT
ADLS_ACUITY_SCORE: 29
ADLS_ACUITY_SCORE: 29
LAUNDRY: UNABLE TO COMPLETE
ADLS_ACUITY_SCORE: 29
HYGIENE/GROOMING: INDEPENDENT
ADLS_ACUITY_SCORE: 29

## 2022-11-06 NOTE — PLAN OF CARE
Problem: Adult Behavioral Health Plan of Care  Goal: Patient-Specific Goal (Individualization)  Description: Patient will be free from suicidal thinking during hospitalization.  Patient will contract for safety during hospitalization.  Patient will accept medications and attend groups while hospitalization.  Outcome: Progressing     Problem: Suicide Risk  Goal: Absence of Self-Harm  Outcome: Progressing     Face to face shift report received from Orlando MONROY. Rounding completed, pt observed.     Pt appeared to sleep most of this shift. Pt did not have any noted episodes of self harm this shift.    Face to face report will be communicated to oncoming RN.    Deirdre Sotelo RN  11/6/2022  6:35 AM

## 2022-11-06 NOTE — PLAN OF CARE
Problem: Suicidal Behavior  Goal: Suicidal Behavior is Absent or Managed  Description: Patient will be free of self harm while on the unit.  Patient will contract for safety while on the unit.    Outcome: Progressing    Pt denies SI and says her depression is improving    Problem: Adult Behavioral Health Plan of Care  Goal: Patient-Specific Goal (Individualization)  Description: Patient will be free from suicidal thinking during hospitalization.  Patient will contract for safety during hospitalization.  Patient will accept medications and attend groups while hospitalization.  Outcome: Progressing   Goal Outcome Evaluation:    Plan of Care Reviewed With: patient          1530 Face to face rounding complete.  Pt introduced to nursing for the shift.    Pt was up and active all shift.  She attended all of the groups and was social with peers in the dayroom. She spent time working on a puzzle.  Pt used the Alpha Stim in the group after supper. She reported that it helped her and she is interested in seeing if insurance can get her one.  Pt is smiling often and appears to be relaxed. She had no symptoms of dystonia. She is looking forward to [facility] next week.    2300 Face to face end of shift report communicated to Night Shift RN's along with Pt's fall risk.     Orlando Rodrigez RN  11/5/2022

## 2022-11-06 NOTE — PLAN OF CARE
"Face to face shift report received from PORFIRIO Gilmore.     Problem: Adult Behavioral Health Plan of Care  Goal: Patient-Specific Goal (Individualization)  Description: Patient will be free from suicidal thinking during hospitalization.  Patient will contract for safety during hospitalization.  Patient will accept medications and attend groups while hospitalization.  Outcome: Progressing   Calm and cooperative. Medication compliant. Reports depression, but states this has improved. Denies suicidal thoughts or anxiety. Pleasant during conversation. Pt reports that she is looking forward to discharge due to an acquaintance of her's is currently on the unit. Pt reports that this acquaintance is \"not very nice to me.\" Pt did not identify which patient this. Attends groups. No reports of pain.   COVID swab obtained per unit policy.     Problem: Suicide Risk  Goal: Absence of Self-Harm  Outcome: Progressing   Free from self harm or injury this shift.     Problem: Suicidal Behavior  Goal: Suicidal Behavior is Absent or Managed  Description: Patient will be free of self harm while on the unit.  Patient will contract for safety while on the unit.  Outcome: Progressing   Free from self harm or injury this shift.       Face to face end of shift report to be communicated to oncoming RN.       "

## 2022-11-06 NOTE — PROGRESS NOTES
[facility] PSYCHIATRY  PROGRESS NOTE     SUBJECTIVE     Rosi appears to be doing much better.  She does state that she did not sleep that well last night though overall has felt generalized improvement.  She is smiling more.  She is easier to engage with in conversation and her affect and mood are not restricted.  She initiates more conversation and adds more to conversation.  She states she has not had any further suicidal ideation and her depression is much improved.  She talks about how she frequently attends groups at a local drop-in center/mental health clinic and finds them very helpful.  She is quite interested in starting the [facility] program here and the referral has been placed.  She has been talking with staff from her group home and that appears to be going well.     MEDICATIONS   Scheduled Meds:    ARIPiprazole  15 mg Oral At Bedtime     calcium polycarbophil  1,250 mg Oral BID     cholecalciferol  125 mcg Oral Daily     [START ON 11/9/2022] cyanocobalamin  1,000 mcg Intramuscular Q14 Days     famotidine  20 mg Oral BID     gabapentin  300 mg Oral BID     hydrOXYzine  50 mg Oral At Bedtime     lamoTRIgine  250 mg Oral At Bedtime     montelukast  10 mg Oral At Bedtime     oxybutynin ER  10 mg Oral Daily     ramelteon  8 mg Oral At Bedtime     vilazodone  40 mg Oral Daily     PRN Meds:.albuterol, alum & mag hydroxide-simethicone, calcium carbonate, fluticasone, hydrOXYzine, ibuprofen, methocarbamol, nicotine, OLANZapine **OR** OLANZapine     ALLERGIES   Allergies   Allergen Reactions     Clonazepam Other (See Comments)     Causes Violence and aggresssion     Hydrocodone-Acetaminophen      Other reaction(s): Seizures  Can take Percocet without difficulty.     Lorazepam Other (See Comments)     Causes Violence and aggresssion     Sertraline Other (See Comments)     Caused suicidally      Bupropion Other (See Comments)     Caused Major Depression     Dust Mite Extract      Other reaction(s):  "Asthma symptoms     Lithium Other (See Comments)     Mood alteration     Pollen Extract      Other reaction(s): Asthma symptoms     Risperidone Other (See Comments)     Agitation       Sulfa Drugs Itching     Trichophyton      Other reaction(s): Asthma symptoms     Valproic Acid Other (See Comments)     Weight gain        MENTAL STATUS EXAM   Vitals: /73 (BP Location: Right arm)   Pulse 91   Temp 99.3  F (37.4  C) (Temporal)   Resp 16   Ht 1.702 m (5' 7\")   Wt 127.9 kg (282 lb)   SpO2 99%   BMI 44.17 kg/m      Appearance:  awake, alert, adequately groomed, dressed in hospital scrubs and appeared as age stated  Attitude:  cooperative, pleasant  Eye Contact:  good  Mood: Improving with suicidal ideation but still depressed.  No suicidal thoughts  Affect: Brighter and congruent with mood  Speech:  clear, coherent  Psychomotor Behavior:  no evidence of tardive dyskinesia, dystonia, or tics  Thought Process:  linear and goal oriented  Associations:  no loose associations  Thought Content:  no evidence of psychotic thought denies suicidal thoughts and depression is slowly improving  Insight: Fair  Judgment: Improved  Oriented to:  time, person, and place  Attention Span and Concentration:  intact  Recent and Remote Memory:  intact  Language: Able to name objects, Able to repeat phrases and Able to read and write  Fund of Knowledge: low-normal  Muscle Strength and Tone: normal  Gait and Station: Normal       LABS   No results found for this or any previous visit (from the past 24 hour(s)).      IMPRESSION     This is a 47 year old female with a PMH of bipolar disorder, AGNIESZKA, borderline personality traits who presented to the [facility] ED for evaluation of suicidal ideation with plan. Patient reported a plan to cut herself and bleed out or to overdose on medications. She identified unresolved grief as a big stressor. She reports that her boyfriend  of cancer in January and his birthday would have been a " few days ago. Today she does continue to report vague SI, though feels better than she did yesterday. She reports feeling more depressed, is unsure if medications are working. She reports that Vraylar was added a few months ago and she has felt no change in her mood, so will likely end up stopping this. She is also on Abilify 15 mg daily, could look at possible increase in dose. Is currently taking Lamictal 225 mg which could also be increased for bipolar depression. Will wait for nursing to get a verified medication list from , likely start adjustments tomorrow. She does have a psych provider at [facility] and has a therapist and [facility] worker through [facility].       DIAGNOSES     1. Bipolar 1 disorder, most recent episode depressed  2. AGNIESZKA (by history)  3. Borderline personality traits       PLAN     Location: Unit 5  Legal Status: Orders Placed This Encounter      Voluntary    Safety Assessment:    Behavioral Orders   Procedures     Code 1 - Restrict to Unit     Routine Programming     As clinically indicated     Status 15     Every 15 minutes.      PTA psychotropic medications stopped:     -None    PTA psychotropic medications continued/changed:     -Vraylar 3 mg at bedtime- stop on 10/31 due to being ineffective and Abilify working better  -Gabapentin 300 mg BID  -Hydroyxine 50 mg at bedtime  -Lamictal 225 mg at bedtime- consider increase in dose  -Viibryd 40 mg daily  -Cyanocobalamin 1,000 mcg/mL- inject 1 mL IM every 2 weeks (last dose 10/26)    New medications tried and stopped:     -None  -Discontinue trazodone and start Rozerem due to risk of leonel with trazodone  New medications initiated:     -Abilify 10 mg at bedtime started 10/31- increase to 15 mg on 11/2      Today's Changes:    No changes in medications today  Discharge Tuesday    Programming: Patient will be treated in a therapeutic milieu with appropriate individual and group therapies. Education will be provided on diagnoses,  medications, and treatments.     Medical diagnoses:  Per medicine    Consult: None  Tests: none    Anticipated LOS: likely discharge tuesday  Disposition: back to group home with outpatient services, referral for PHP         ATTESTATION      April Lilliana Saeed NP

## 2022-11-07 PROCEDURE — 250N000013 HC RX MED GY IP 250 OP 250 PS 637: Performed by: NURSE PRACTITIONER

## 2022-11-07 PROCEDURE — 124N000001 HC R&B MH

## 2022-11-07 PROCEDURE — 99239 HOSP IP/OBS DSCHRG MGMT >30: CPT | Mod: 95 | Performed by: PSYCHIATRY & NEUROLOGY

## 2022-11-07 RX ADMIN — ARIPIPRAZOLE 15 MG: 15 TABLET ORAL at 20:59

## 2022-11-07 RX ADMIN — CALCIUM POLYCARBOPHIL 1250 MG: 625 TABLET, FILM COATED ORAL at 20:58

## 2022-11-07 RX ADMIN — MONTELUKAST 10 MG: 10 TABLET, FILM COATED ORAL at 20:58

## 2022-11-07 RX ADMIN — GABAPENTIN 300 MG: 300 CAPSULE ORAL at 20:59

## 2022-11-07 RX ADMIN — Medication 125 MCG: at 08:20

## 2022-11-07 RX ADMIN — FAMOTIDINE 20 MG: 20 TABLET, FILM COATED ORAL at 20:58

## 2022-11-07 RX ADMIN — CALCIUM POLYCARBOPHIL 1250 MG: 625 TABLET, FILM COATED ORAL at 08:20

## 2022-11-07 RX ADMIN — RAMELTEON 8 MG: 8 TABLET, FILM COATED ORAL at 20:59

## 2022-11-07 RX ADMIN — OXYBUTYNIN CHLORIDE 10 MG: 5 TABLET, EXTENDED RELEASE ORAL at 08:20

## 2022-11-07 RX ADMIN — HYDROXYZINE HYDROCHLORIDE 50 MG: 25 TABLET ORAL at 20:59

## 2022-11-07 RX ADMIN — GABAPENTIN 300 MG: 300 CAPSULE ORAL at 08:20

## 2022-11-07 RX ADMIN — VILAZODONE HYDROCHLORIDE 40 MG: 20 TABLET ORAL at 08:20

## 2022-11-07 RX ADMIN — FAMOTIDINE 20 MG: 20 TABLET, FILM COATED ORAL at 08:20

## 2022-11-07 RX ADMIN — LAMOTRIGINE 250 MG: 100 TABLET ORAL at 20:58

## 2022-11-07 RX ADMIN — IBUPROFEN 400 MG: 400 TABLET ORAL at 17:13

## 2022-11-07 ASSESSMENT — ACTIVITIES OF DAILY LIVING (ADL)
ORAL_HYGIENE: INDEPENDENT
ADLS_ACUITY_SCORE: 29
DRESS: INDEPENDENT
ADLS_ACUITY_SCORE: 29
HYGIENE/GROOMING: INDEPENDENT
ADLS_ACUITY_SCORE: 29

## 2022-11-07 NOTE — DISCHARGE SUMMARY
"[facility] PSYCHIATRY  DISCHARGE SUMMARY     DISCHARGE DATA     Rosi Lamb MRN# [id number]   Age: 47 year old YOB: 1975     Date of Admission:  10/29/2022  Date of Discharge:  November 8, 2022  Discharge Provider:  Rebecca Saeed NP       REASON FOR ADMISSION     Rosi is a 47 year old   female with a PMH of bipolar disorder, AGNIESZKA, borderline personality traits who presented to the ED vis EMS from her group home for evaluation of suicidal thoughts with plan to cut herself and bleed out or overdose on medications. She reported unresolved grief from a significant other that passed away last January from colon cancer. Reported increasing depressive symptoms such as fatigue, sadness, hopelessness, lack of energy. She was unable to engage in safety planning and was agreeable to admission. She was voluntarily transferred to behavioral health for further evaluation and treatment.           DISCHARGE DIAGNOSIS   1. Bipolar 1 disorder, most recent episode depressed  2. AGNIESZKA (by history)  3. Borderline personality traits          CONSULTS     n/a       HOSPITAL COURSE     Legal status: Orders Placed This Encounter      Voluntary    Patient was admitted to unit 5 due to the aforementioned presentation. The patient was placed under 15 minute checks to ensure patient safety. The patient participated in unit programming and groups as able.    The following changes to the patient's prior to admission medications were made:  vrylar was stopped . She felt like she was  The following medication changes were made  during hospitalization: vrylar was stopped. She states her outpatient provider had been concerned her depression had increased and decided to change medications and stop abilify and change to vrylar. She tells me that when she had gone above 15 mg of abilify, she \"became really impulsive and spent tons of money and shopped like crazy\". She states her provider was " "concerned that this was a side effect of compulsivity from abilify and did not believe it was a manic episode. camilo states she believes her recent depressie state was largely due to unresolved grief over the loss of her significant other last year. She states \"I think I wouldve been fine on the 15 mg of abilfy as long as I had more counseling for my loss\". abilify was restarted and increased to 15 mg. Her affect and mood improved rather quickly after starting it. She states she believes the vryalar \"actyually made me worse\". She has not had any suicidal thougths through her stay. Her sleep has improved. Denies any self injurious thoughts.     With these changes and supports the patient noticed improvement in their symptoms and felt sufficiently ready for discharge. As a result, Camilo Lamb was discharged. At the time of discharge, Camilo Lamb was determined to not be a danger to self or others. At the current time of discharge, the patient does not meet criteria for involuntary hospitalization. On the day of discharge, the patient reports that they do not have suicidal or homicidal ideation. Steps taken to minimize risk include: assessing patient s behavior and thought process daily during hospital stay, discharging patient with adequate plan for follow up for mental and physical health and discussing safety plan of returning to the hospital should the patient ever have thoughts of harming themselves or others. Therefore, based on all available evidence including the factors cited above, the patient does not appear to be at imminent risk for self-harm, and is appropriate for outpatient level of care. However, if patient uses substances or is medication non-adherent, their risk of decompensation and SI will be elevated. This was discussed with the patient.       DISCHARGE MEDICATIONS     Current Discharge Medication List      START taking these medications    Details   ARIPiprazole (ABILIFY) 15 MG " tablet Take 1 tablet (15 mg) by mouth At Bedtime  Qty: 30 tablet, Refills: 1    Associated Diagnoses: Bipolar affective disorder, currently depressed, moderate (H)      ramelteon (ROZEREM) 8 MG tablet Take 1 tablet (8 mg) by mouth At Bedtime  Qty: 30 tablet, Refills: 1    Associated Diagnoses: Bipolar affective disorder, currently depressed, moderate (H)         CONTINUE these medications which have CHANGED    Details   hydrOXYzine (ATARAX) 50 MG tablet Take 1 tablet (50 mg) by mouth At Bedtime  Qty: 30 tablet, Refills: 0    Associated Diagnoses: Bipolar affective disorder, currently depressed, moderate (H)      !! lamoTRIgine (LAMICTAL) 200 MG tablet Take 200 mg tablet with 50 mg tablet to equal 250 mg at HS.  Qty: 30 tablet, Refills: 1    Comments: Take 200 mg tablet with 50 mg tablet to equal 250 mg at night.  Associated Diagnoses: Bipolar affective disorder, currently depressed, moderate (H)      !! lamoTRIgine (LAMICTAL) 25 MG tablet Take 2 tablets (50 mg) by mouth daily  Qty: 60 tablet, Refills: 1    Comments: Take 50 mg along with 200 mg tablet to equal 250 mg  Associated Diagnoses: Bipolar affective disorder, currently depressed, moderate (H)      !! lamoTRIgine (LAMICTAL) 25 MG tablet Take 1 tablet (25 mg) by mouth daily  Qty: 60 tablet, Refills: 1    Comments: Take two tablets along with the 200 mg tablet to equal 250 mg  Associated Diagnoses: Bipolar affective disorder, currently depressed, moderate (H)       !! - Potential duplicate medications found. Please discuss with provider.      CONTINUE these medications which have NOT CHANGED    Details   acetaminophen (TYLENOL) 500 MG tablet Take 500-1,000 mg by mouth every 6 hours as needed      albuterol (VENTOLIN HFA) 108 (90 Base) MCG/ACT inhaler Inhale 1-2 puffs into the lungs every 4 hours as needed for shortness of breath / dyspnea or wheezing  Qty: 18 g, Refills: 5    Comments: Pharmacy may dispense brand covered by insurance (Proair, or proventil or  ventolin or generic albuterol inhaler)  Associated Diagnoses: Moderate persistent asthma without complication      calcium carbonate (TUMS) 500 MG chewable tablet Take 2-4 chew tab by mouth as needed as directed.      cholecalciferol (VITAMIN D3) 125 mcg (5000 units) capsule Take 1 capsule (125 mcg) by mouth daily  Qty: 100 capsule, Refills: 4    Associated Diagnoses: Vitamin D deficiency      fluticasone (FLONASE) 50 MCG/ACT nasal spray Spray 1 spray into both nostrils daily as needed for rhinitis or allergies  Qty: 16 g, Refills: 0    Associated Diagnoses: Otalgia, right      ibuprofen (ADVIL/MOTRIN) 200 MG tablet Take 200-400 mg by mouth every 6 hours as needed 1 to 2 tabs Q6 PRN      Menthol, Topical Analgesic, (BIOFREEZE EX) Apply topically 4 times daily as needed      methocarbamol (ROBAXIN) 500 MG tablet Take 500-1,000 mg by mouth 4 times daily as needed for muscle spasms      oxybutynin ER (DITROPAN-XL) 10 MG 24 hr tablet Take 1 tablet (10 mg) by mouth daily  Qty: 90 tablet, Refills: 4    Associated Diagnoses: Urge incontinence      vilazodone (VIIBRYD) 40 MG TABS tablet Take 40 mg by mouth every morning      calcium polycarbophil (FIBERCON) 625 MG tablet Take 2 tablets (1,250 mg) by mouth 2 times daily for 360 days  Qty: 360 tablet, Refills: 3    Associated Diagnoses: H/O adenomatous polyp of colon      cyanocobalamin (CYANOCOBALAMIN) 1000 MCG/ML injection INJECT 1ML INTRAMUSCULARLY EVERY 2 WEEKS  Qty: 6 mL, Refills: 11    Associated Diagnoses: Vitamin B12 deficiency      famotidine (PEPCID) 20 MG tablet Take 1 tablet (20 mg) by mouth 2 times daily - For Heartburn  Qty: 180 tablet, Refills: 4    Associated Diagnoses: Gastroesophageal reflux disease without esophagitis      gabapentin (NEURONTIN) 300 MG capsule Take 300 mg by mouth 2 times daily . May take an additional capsule at noon IF needed.      montelukast (SINGULAIR) 10 MG tablet Take 1 tablet (10 mg) by mouth At Bedtime  Qty: 90 tablet, Refills: 4  "   Associated Diagnoses: Moderate persistent asthma without complication         STOP taking these medications       acetaminophen-caff-pyrilamine (MIDOL MENSTRUAL) 500-60-15 MG TABS per tablet Comments:   Reason for Stopping:         calcium-vitamin D-vitamin K (VIACTIV) 500-500-40 MG-UNT-MCG CHEW Comments:   Reason for Stopping:         cariprazine (VRAYLAR) 3 MG capsule Comments:   Reason for Stopping:         fluconazole (DIFLUCAN) 150 MG tablet Comments:   Reason for Stopping:         hydrOXYzine (VISTARIL) 50 MG capsule Comments:   Reason for Stopping:         hydrOXYzine (VISTARIL) 50 MG capsule Comments:   Reason for Stopping:         SF 1.1 % GEL topical gel Comments:   Reason for Stopping:         traZODone (DESYREL) 50 MG tablet Comments:   Reason for Stopping:                    MENTAL STATUS EXAM     Vitals: /77   Pulse 80   Temp 97.9  F (36.6  C) (Tympanic)   Resp 14   Ht 1.702 m (5' 7\")   Wt 127.9 kg (282 lb)   SpO2 100%   BMI 44.17 kg/m      Appearance: Alert, oriented, dressed in hospital scrubs, appears stated age   Attitude: Cooperative   Eye Contact: Good  Mood: \"Better\"  Affect: Full range of affect  Speech: Normal rate and rhythm   Psychomotor Behavior: No tremor, rigidity, or psychomotor abnormality   Thought Process: Logical, goal directed   Associations: No loose associations   Thought Content: Denies SI or plan. No SIB. Denies A/V hallucinations. No evidence of delusional thought.  Insight: Good  Judgment: Good  Oriented to: Person, place, and time  Attention Span and Concentration: Intact  Recent and Remote Memory: Intact  Language: English with appropriate syntax and vocabulary  Fund of Knowledge: Average  Muscle Strength and Tone: Grossly normal  Gait and Station: Grossly normal       DISCHARGE PLAN     1.  Education given regarding diagnostic and treatment options with risks, benefits and alternatives with adequate verbalization of understanding.  2.  Discharge to group " home. Upon detailed review of risk factors, patient amenable for release.   3.  Continue aforementioned medications and associated medication changes with follow-up by outpatient provider.  4.  Crisis management planning in place.    5.  Nursing and  to review further discharge recommendations.   6.  Patient is being discharged to home with the following appointments as detailed below.    Is discharging and starting [facility] upon discharge       DISCHARGE SERVICES PROVIDED     40 minutes spent on discharge services, including:  Final examination of patient.  Review and discussion of hospital stay.  Instructions for continued outpatient care/goals.  Preparation of discharge records.  Preparation of medications refills and new prescriptions.  Preparation of applicable referral forms.        ATTESTATION     April Lilliana Saeed NP    This note was created with help of Dragon dictation system. Grammatical / typing errors are not intentional.

## 2022-11-07 NOTE — PLAN OF CARE
Face to face shift report received from Deirdre BRANNON RN. Rounding completed, pt observed.    Problem: Adult Behavioral Health Plan of Care  Goal: Patient-Specific Goal (Individualization)  Description: Patient will be free from suicidal thinking during hospitalization.  Patient will contract for safety during hospitalization.  Patient will accept medications and attend groups while hospitalization.  Outcome: Progressing  Note: Shift Summary:   Patient was prompted up for breakfast.  Cooperative and social.  Brighter affect.  States she was tired this morning and chose to return to bed.  Compliant with medications.  Denies pain or rachid=wanted side effects.  States she had prune juice yesterday but still had not had a BM yet.  Will continue to monitor.  Gait is balanced and steady.      Problem: Suicidal Behavior  Goal: Suicidal Behavior is Absent or Managed  Description: Patient will be free of self harm while on the unit.  Patient will contract for safety while on the unit.    Outcome: Progressing    Face to face report will be communicated to oncoming RN.    Latricia Espinal RN  11/7/2022

## 2022-11-07 NOTE — PLAN OF CARE
Problem: Adult Behavioral Health Plan of Care  Goal: Patient-Specific Goal (Individualization)  Description: Patient will be free from suicidal thinking during hospitalization.  Patient will contract for safety during hospitalization.  Patient will accept medications and attend groups while hospitalization.  Outcome: Progressing     Problem: Suicide Risk  Goal: Absence of Self-Harm  Outcome: Progressing     Face to face shift report received from Orlando MONROY. Rounding completed, pt observed.     Pt appeared to sleep most of this shift. Pt did not have any noted episodes of self harm this shift.    Face to face report will be communicated to oncoming RN.    Deirdre Sotelo RN  11/7/2022  6:05 AM

## 2022-11-07 NOTE — PLAN OF CARE
Pt is discharging to [facility] tomorrow 11/8 @ 10:30 AM.   Scheduled insurance ride with [facility] [employer] for tomorrow 11/8 @ 11:30 AM for pt after her intake with [facility]. PHP notified.     Called and spoke with pt's group home. Explained the discharge plan. Pt's group home stated they will be expecting her.     Discharge Plan: Back to group home with [facility] services   Services: Therapy, med management , case manger.    Placement: Group home    Referrals made: [facility] intake 11/8 @ 10:30 AM   Court status: MIGUEL Gomez/SDM: MIGUEL

## 2022-11-07 NOTE — PLAN OF CARE
Problem: Suicidal Behavior  Goal: Suicidal Behavior is Absent or Managed  Description: Patient will be free of self harm while on the unit.  Patient will contract for safety while on the unit.    Outcome: Progressing    Pt denies SI and reports her depression is improving     Problem: Adult Behavioral Health Plan of Care  Goal: Patient-Specific Goal (Individualization)  Description: Patient will be free from suicidal thinking during hospitalization.  Patient will contract for safety during hospitalization.  Patient will accept medications and attend groups while hospitalization.  Outcome: Progressing   Goal Outcome Evaluation:    Plan of Care Reviewed With: patient      1530 Face to face rounding complete.  Pt introduced to nursing for the shift.    Pt was up and active all shift attending groups and is social with peers in the dayroom.  Pt told me that she is still having anxiety and depression but is not having any suicidal thoughts. She is hopeful about going to [facility] this week and going home. She used the Alpha Stim in group again and said it helped her.  Her affect is fairly bright.    2300 Face to face end of shift report communicated to Night Shift RN's.     Orlando Rodrigez RN  11/6/2022

## 2022-11-07 NOTE — PLAN OF CARE
"  Problem: Adult Behavioral Health Plan of Care  Goal: Patient-Specific Goal (Individualization)  Description: Patient will be free from suicidal thinking during hospitalization.  Patient will contract for safety during hospitalization.  Patient will accept medications and attend groups while hospitalization.  Outcome: Progressing     Pt in group at the start of the shift. Pt in lounge for dinner this shift. Pt states she has some aches and pains and was wondering if her provider ordered \"those patches that turn hot and cold'. Pt states she talked to someone about these patches and would like them started. Pt encouraged to talk to provider about patches.   Pt given ibuprofen 400mg at 1713 for all over body aches rated 6/10.     Pt asked to talk to nursing, pt wanted to go to her room. Pt stated that there was a man she knows from the outside talking about her. Pt stated that he was saying \"there's a girl I know from the outside, she's a real piece of work\" pt states she thinks its her because she knows him from outside. Pt reminded that he may know other pt's on the outside also. Pt was still upset stating \"even if it's not me he is talking about, it's still wrong to talk about people when they are here,     Pt worked on a jiLoopaw puzzle in the lounge after dinner and attended group. Pt reported that although the ibuprofen helped her pain she feels she has a knot on her left shoulder. Pt given a warm pack to use while in group.    Pt offered a prn robaxin for her pain, pt stated that she wanted to go to group first and take it before bed.     Pt refused robaxin at bedtime stating that the warm pack worked well and she no longer needs it.          Problem: Suicidal Behavior  Goal: Suicidal Behavior is Absent or Managed  Description: Patient will be free of self harm while on the unit.  Patient will contract for safety while on the unit.    Outcome: Progressing   Goal Outcome Evaluation:       Face to face end of shift " report communicated to night shift RN. Reported that pt is a risk for suicide.     Kaya Carrizales RN  11/7/2022  3:52 PM

## 2022-11-08 ENCOUNTER — TELEPHONE (OUTPATIENT)
Dept: BEHAVIORAL HEALTH | Facility: HOSPITAL | Age: 47
End: 2022-11-08

## 2022-11-08 ENCOUNTER — HOSPITAL ENCOUNTER (OUTPATIENT)
Dept: BEHAVIORAL HEALTH | Facility: HOSPITAL | Age: 47
Discharge: HOME | End: 2022-11-08
Attending: NURSE PRACTITIONER | Admitting: SOCIAL WORKER
Payer: MEDICARE

## 2022-11-08 VITALS
DIASTOLIC BLOOD PRESSURE: 78 MMHG | SYSTOLIC BLOOD PRESSURE: 127 MMHG | HEART RATE: 84 BPM | BODY MASS INDEX: 44.26 KG/M2 | RESPIRATION RATE: 18 BRPM | WEIGHT: 282 LBS | HEIGHT: 67 IN | TEMPERATURE: 98.3 F | OXYGEN SATURATION: 99 %

## 2022-11-08 DIAGNOSIS — F31.32 BIPOLAR I DISORDER, MOST RECENT EPISODE DEPRESSED, MODERATE (H): Primary | ICD-10-CM

## 2022-11-08 DIAGNOSIS — F31.32 BIPOLAR I DISORDER, MOST RECENT EPISODE DEPRESSED, MODERATE (H): Chronic | ICD-10-CM

## 2022-11-08 PROCEDURE — 90791 PSYCH DIAGNOSTIC EVALUATION: CPT | Performed by: SOCIAL WORKER

## 2022-11-08 PROCEDURE — 250N000013 HC RX MED GY IP 250 OP 250 PS 637: Performed by: NURSE PRACTITIONER

## 2022-11-08 RX ORDER — HYDROXYZINE HYDROCHLORIDE 50 MG/1
50 TABLET, FILM COATED ORAL EVERY 4 HOURS PRN
Qty: 120 TABLET | Refills: 0 | Status: SHIPPED | OUTPATIENT
Start: 2022-11-08 | End: 2022-12-08

## 2022-11-08 RX ADMIN — Medication 125 MCG: at 08:04

## 2022-11-08 RX ADMIN — CALCIUM POLYCARBOPHIL 1250 MG: 625 TABLET, FILM COATED ORAL at 08:04

## 2022-11-08 RX ADMIN — FAMOTIDINE 20 MG: 20 TABLET, FILM COATED ORAL at 08:04

## 2022-11-08 RX ADMIN — VILAZODONE HYDROCHLORIDE 40 MG: 20 TABLET ORAL at 08:04

## 2022-11-08 RX ADMIN — OXYBUTYNIN CHLORIDE 10 MG: 5 TABLET, EXTENDED RELEASE ORAL at 08:04

## 2022-11-08 RX ADMIN — GABAPENTIN 300 MG: 300 CAPSULE ORAL at 08:04

## 2022-11-08 RX ADMIN — HYDROXYZINE HYDROCHLORIDE 50 MG: 25 TABLET ORAL at 09:41

## 2022-11-08 ASSESSMENT — ACTIVITIES OF DAILY LIVING (ADL)
HYGIENE/GROOMING: INDEPENDENT
DRESS: INDEPENDENT
ADLS_ACUITY_SCORE: 29
ORAL_HYGIENE: INDEPENDENT
LAUNDRY: UNABLE TO COMPLETE
ADLS_ACUITY_SCORE: 29
ADLS_ACUITY_SCORE: 29

## 2022-11-08 ASSESSMENT — PATIENT HEALTH QUESTIONNAIRE - PHQ9: SUM OF ALL RESPONSES TO PHQ QUESTIONS 1-9: 4

## 2022-11-08 ASSESSMENT — ANXIETY QUESTIONNAIRES
7. FEELING AFRAID AS IF SOMETHING AWFUL MIGHT HAPPEN: SEVERAL DAYS
3. WORRYING TOO MUCH ABOUT DIFFERENT THINGS: NOT AT ALL
4. TROUBLE RELAXING: SEVERAL DAYS
2. NOT BEING ABLE TO STOP OR CONTROL WORRYING: SEVERAL DAYS
1. FEELING NERVOUS, ANXIOUS, OR ON EDGE: NOT AT ALL
5. BEING SO RESTLESS THAT IT IS HARD TO SIT STILL: NOT AT ALL
GAD7 TOTAL SCORE: 4
GAD7 TOTAL SCORE: 4
IF YOU CHECKED OFF ANY PROBLEMS ON THIS QUESTIONNAIRE, HOW DIFFICULT HAVE THESE PROBLEMS MADE IT FOR YOU TO DO YOUR WORK, TAKE CARE OF THINGS AT HOME, OR GET ALONG WITH OTHER PEOPLE: SOMEWHAT DIFFICULT
6. BECOMING EASILY ANNOYED OR IRRITABLE: SEVERAL DAYS

## 2022-11-08 NOTE — PLAN OF CARE
Pt is discharging at the recommendation of the treatment team. Pt is discharging to [facility] then home transported by [facility]. Pt denies having any thoughts of hurting themself or anyone else. Pt denies anxiety or depression. Pt has follow up with [facility] and [facility] home. Discharge instructions, including; demographic sheet, psychiatric evaluation, discharge summary, and AVS were faxed to these next level of care providers.

## 2022-11-08 NOTE — PLAN OF CARE
Face to face end of shift report recieved from evening shift RN. Rounding completed. Pt observed in their own bed with notable respirations. Will continue to support the needs of the patient. Pt slept approximately 7 hours.    Face to face shift report will be communicated with oncoming RN.     Teressa Winkler RN  11/7/2022  11:36 PM   Problem: Adult Behavioral Health Plan of Care  Goal: Patient-Specific Goal (Individualization)  Description: Patient will be free from suicidal thinking during hospitalization.  Patient will contract for safety during hospitalization.  Patient will accept medications and attend groups while hospitalization.  Outcome: Progressing     Problem: Suicide Risk  Goal: Absence of Self-Harm  Outcome: Progressing     Problem: Suicidal Behavior  Goal: Suicidal Behavior is Absent or Managed  Description: Patient will be free of self harm while on the unit.  Patient will contract for safety while on the unit.    Outcome: Progressing

## 2022-11-08 NOTE — PLAN OF CARE
"[facility] Partial Hospitalization Program  Ainsley Webber, [facility]          PATIENT'S NAME: Rosi Lamb  PREFERRED NAME: Rosi  PRONOUNS:       She/Her/Hers/Herself  MRN: [id number]  : 1975  ADDRESS: [address] [address]  ACCT. NUMBER:  [id number]  DATE OF SERVICE: 22  START TIME: 10:30am  END TIME: 11:30am  PREFERRED PHONE: [phone]  May we leave a program related message: Yes  SERVICE MODALITY:  In-person    UNIVERSAL ADULT Mental Health DIAGNOSTIC ASSESSMENT    Yes, the patient has been informed that any other mental health professional providing mental health services to me will need access to this Diagnostic Assessment in order to develop a treatment plan and receive payment.     Identifying Information:  Patient is a 47 year old,  .  The pronoun use throughout this assessment reflects the patient's chosen pronoun.  Patient was referred for an assessment by her treatment team at [facility]. .  Patient attended the session alone. Patient presented as calm and cooperative, she was very pleasant and engaged during the interview.     Reason for Referral: Rosi reports the reason for referral at this time is clarify behavioral health diagnosis, determine behavioral health treatment options, assess client readiness and motivation to change and assess client social situation.    Rosi was admitted to [facility] 10/29/22-22. Per H&P by Isa Coats NP, oRsi was admitted due to the following:   \"Rosi is a 47 year old   female with a PMH of bipolar disorder, AGNIESZKA, borderline personality traits who presented to the ED vis EMS from her group home for evaluation of suicidal thoughts with plan to cut herself and bleed out or overdose on medications. She reported unresolved grief from a significant other that passed away last January from colon cancer. " "Reported increasing depressive symptoms such as fatigue, sadness, hopelessness, lack of energy. She was unable to engage in safety planning and was agreeable to admission. She was voluntarily transferred to behavioral health for further evaluation and treatment.    Camilo is easily engaged in conversation today. She reports that she went to the ED yesterday because she was feeling a :lot of depression and suicidal thoughts\". She notes that it will be coming up on the one year anniversary of her boyfriend's death from cancer, and his birthday was on October [birth year]. \"It's just a bad time of year\". She does report some continued suicidal thoughts today, though reports they are improved from yesterday. She states that she does not believe that her medications are as helpful as they could be. She reports that she has been compliant with medications, lives at a group home where they do administer her medications. She denies any use of alcohol or drugs. Does currently work with a psych provider, therapist, and ARMPixtr worker.\"    Per Discharge report on 11/7//22:   \"Patient was admitted to unit 5 due to the aforementioned presentation. The patient was placed under 15 minute checks to ensure patient safety. The patient participated in unit programming and groups as able.  The following changes to the patient's prior to admission medications were made:  vrylar was stopped . She felt like she was  The following medication changes were made  during hospitalization: vrylar was stopped. She states her outpatient provider had been concerned her depression had increased and decided to change medications and stop abilify and change to vrylar. She tells me that when she had gone above 15 mg of abilify, she \"became really impulsive and spent tons of money and shopped like crazy\". She states her provider was concerned that this was a side effect of compulsivity from abilify and did not believe it was a manic episode. camilo states she " "believes her recent depressie state was largely due to unresolved grief over the loss of her significant other last year. She states \"I think I wouldve been fine on the 15 mg of abilfy as long as I had more counseling for my loss\". abilify was restarted and increased to 15 mg. Her affect and mood improved rather quickly after starting it. She states she believes the vryalar \"actyually made me worse\". She has not had any suicidal thougths through her stay. Her sleep has improved. Denies any self injurious thoughts.    With these changes and supports the patient noticed improvement in their symptoms and felt sufficiently ready for discharge. As a result, Rosi Lamb was discharged. At the time of discharge, Rosi Lamb was determined to not be a danger to self or others. At the current time of discharge, the patient does not meet criteria for involuntary hospitalization. On the day of discharge, the patient reports that they do not have suicidal or homicidal ideation. Steps taken to minimize risk include: assessing patient s behavior and thought process daily during hospital stay, discharging patient with adequate plan for follow up for mental and physical health and discussing safety plan of returning to the hospital should the patient ever have thoughts of harming themselves or others. Therefore, based on all available evidence including the factors cited above, the patient does not appear to be at imminent risk for self-harm, and is appropriate for outpatient level of care. However, if patient uses substances or is medication non-adherent, their risk of decompensation and SI will be elevated. This was discussed with the patient.\"     Patient's Statement of Presenting Concern & Functional impairments:   Rosi verbalizes the following treatment/discharge goals: \"to improve where I am currently at with my life\".  Patient stated that her symptoms have resulted in the following functional impairments: " "management of the household and or completion of tasks, money management, organization, relationship(s), self-care, social interactions and use of public transportation.     Rosi states that she has been struggling with mental health symptoms for many years. She states that she was an adolescent and had been struggling with anxiety and mood difficulties. Rosi was vague regarding historic information but was focused on present symptoms and states that has been struggling with increased anxiety and mood difficulties which do impact her ability to function in all life domains. She states that she is presently participating in [facility], therapy, case management and services provided at [facility]. She has history of diagnosis including Bipolar, Generalized Anxiety Disorder, alcohol dependence in remission and borderline features.      Current Stressors/Losses/Disappointments:   Rosi reports that primary current stressor is related to this month being the 1 year  anniversary of her boyfriend's death and that she \"didn't deal with it well\",   family stressors; socially isolative; several adult placements; divorce in 2013; change in Anglican-raised as [employer] and has decided to leave that Restoration format and become Restorationism.     Mental Health History:  Rosi reports first onset of mental health symptoms late adolescence.   Rosi was first diagnosed as an adult with bipolar disorder.Patient reports her last hospitalization was in 10/29/22- 11/7/22 at [facility] for suicidal ideation, she reports prior to this she was admitted in 2015 at [facility] in [place]. She reports one previous suicide attempt 22 years ago by overdose. Records show historical diagnoses of bipolar 1 disorder, AGNIESZKA, borderline personality traits, remote mention of alcohol use disorder  Have you ever thought about hurting yourself (SIB) now or in the past? Yes. Per chart review most recent " "hospitalization was on 10/29/22 -\"Pt arrived from [facility] to our unit with suicidal ideation with plan to take a knife and cut herself to bleed out or overdose on her prescribed medications. Pt identifies unresolved grief around the death of her s/o in January to be a trigger.\"  Have you ever thought about suicide now or in the past? What were your thoughts about suicide? I didn't want to burden people anymore  Are you having thoughts of suicide now? No  Do you have a gun, weapons or other means (including medications) to harm yourself available to you?  No  Have you currently or in the past had trouble with physical aggression (If yes, describe)? no  Have you ever heard voices? No    From whom do you receive support? (family/friends/agency) family, therapist and outpatient mental health supports    Is there anything in your life (current or history) that is satisfying to you (include leisure interests/hobbies)? yes    Rosi  is currently receiving the following services: ARM, case management, counseling, physician / PCP and psychiatry.  Current providers are: Medication Management through [facility]; [facility], case management, Individual therapy  through [facility] in [place], [place].   Psychiatric Hospitalizations: [facility] 10/22/23-11/7/22; [facility] in 2015.  Rosi denies a history of civil commitment.        Onset/Duration/Pattern of Symptoms noted above:   Rosi states that she is able to identify when her symptoms are becoming problematic, she states that she has had significant difficulties with anxiety and notes that she will isolate and stay in her apartment for days at a time. She states that this has improved somewhat but that she still struggles with being in large groups and in stores such as [employer] and grocery stores.  Rosi states that she has been hospitalized several years ago for suicidal ideation and has " not experienced this for at least one year.       Rating Scales:    PHQ9:    PHQ-9 SCORE 12/7/2021 3/18/2022 11/8/2022   PHQ-9 Total Score MyChart 0 0 -   PHQ-9 Total Score 0 0 4   ;    GAD7:    AGNIESZKA-7 SCORE 12/7/2021 3/18/2022 11/8/2022   Total Score 0 (minimal anxiety) 0 (minimal anxiety) -   Total Score 0 0 4          Personal safety summary:  After gathering the above information, Rosi  presents the following high risk factors for suicide: poor sleep, panic,extreme anxiety and hopelessness, worthlessness.  Rosi denies current fears or concerns for personal safety.    Rosi has the following Protective Factors: Religiosity, Life Satisfaction, Reality testing ability, Positive coping skills, Positive social support and Positive therapeutic releationships.     Upon review of the patient interview and identification of high risk factors determine individualized safety strategies alternatives and treatment plan interventions. Patient voluntarily presented to ED for evaluation.       Chemical Health History:     Substance Use:  Patient did not report a family history of substance use concerns; see medical history section for details.  Patient has received chemical dependency treatment in the past at inpatient  treatment-does not elaborate as to where..  Patient has not ever been to detox.      Patient is not currently receiving any chemical dependency treatment.     CAGE- AID:   Have you ever felt you ought to cut down on your drinking or drug use?   No     Have people annoyed you by criticizing your drinking or drug use?   No     Have you ever felt bad or guilty about your drinking or drug use?   No     Have you ever had a drink or used drugs first thing in the morning to steady your nerves or to get rid of a hangover?  No     Do you feel these issues have been adequately addressed?   Yes. How? Was in treatment for alcoholism 7 years ago and has maintained sobriety     Chemical Dependency Assessment  Recommended?  No        Based on the negative CAGE score and clinical interview there  are not indications of drug or alcohol abuse.    Legal History:    Rosi reports that she has not been involved with the legal system.     Life Situation (Employment/School/Finances/Basic Needs):  Rosi  is currently living at the [facility] in [place], [place].  The safety/stability of this environment is described as: Stable     Rosi is currently disabled.  Rosi describes a work Hx of some part time work, has been on disability due to mental health and medical for several years.    Rosi reports [facility].  Rosi does not identify her finances as a current stressor.  Rosi denies a history of gambling and denies a history of gambling treatment.      Rosi reports her highest level of education is high school graduate and some college,  Rosi states that she was home schooled until 8th grade and then went to public school. She states she took 21 credits at [employer].  Rosi did not identify any learning problems   Rosi describes academic performance as: good  Rosi describes school social experience as: experienced some bullying     Rosi denies concerns regarding her current ability to meet basic needs.     Social/Family History:  Rosi  reports she grew up in [place], [place]. She states that her family was Baptism so she did have some differences compared to other children as related to belief system, family traditions and upbringing.   Rosi was the first of 3 children. Rosi states she has 2 younger brothers.  Rosi describes her childhood as good.  Rosi describes her current relationships with her family of origin as fair.     Rosi identifies her relationship status as: .    Rosi denies sexual health concerns.      Rosi reports giving birth to two children, she states that she voluntarily gave up both children for adoption due to  her mental health and substance issues. She states that her children were ages 1 yr old and 6 yr old when they were adopted. She states that she does receive pictures of her oldest child (now age 14) but does not have contact with either child.     Rosi describes the quantity/quality of her social relationships as positive, she states that she does enjoy being social and getting out when she feels well.   Rosi denies personal  experience.      Rosi reported no family history of mental health issues-she states she is not sure about her parent's mental health history    Significant Losses / Trauma / Abuse / Neglect Issues / Developmental Incidents:  Rosi denies significant loss/trauma/abuse/neglect issues/developmental incidents.  Rosi reports changes in child custody rights children being taken and adopted and divorce / relational changes in 2013 divorce from .   Rosi has addressed the above concerns in previous therapy/treatment     Confucianism Preference/Spiritual Beliefs/Cultural Considerations: Bridger BRANNON Ethnic Self-Identification:  Rosi self-identifies her race/ethnicities as:  and her preferred language to be English.   Rosi reports she does not need the assistance of an . Rosi  reports she does not need other support or modifications involved in therapy.      Strengths/Vulnerabilities:   Rosi identifies her personal strengths as: caring, committed to sobriety, creative, empathetic, goal-focused, good listener, has a previous history of therapy, insightful, intelligent, motivated, open to learning, open to suggestions / feedback and support of family, friends and providers .   Things that may interfere with the clients success in treatment include: does not identify any interferences.   Other identified areas of vulnerability include: Suicidal Ideation  Anxiety with/without panic attacks  Depressive symptoms  Physical/medical.     Medical  History / Physical Health Screen:     Primary Care Physician: Rosi has a [facility] Provider, who is named Lucio Curiel..   Last Physical Exam: within the past year. Client was encouraged to follow up with PCP if symptoms were to develop.    Mental Health Medication Management Provider / Psychiatrist: Rosi has a psychiatrist whose name and location are: at [facility] .       Current medications including prescription, non-prescription, herbals, dietary aids and vitamins:  Per client report     Rosi reports current medications are: Effective.   Rosi describes taking her medications as: Independent.  Rosi reports taking prescribed medications as prescribed.        Medical:  Past Medical History:   Diagnosis Date     Abnormal Pap smear of cervix 2018    Overview:  had scraping of cervix     Cannabis use disorder, moderate, in sustained remission, dependence (H) 2015     Closed dislocation of tarsal joint - left 2011     Edema     No Comments Provided     Encounter for removal and reinsertion of intrauterine contraceptive device     05,IUD placement, Removed     Excessive and frequent menstruation with irregular cycle     2017     Major depressive disorder, single episode     No Comments Provided     Personal history of other medical treatment (CODE)     G3, P2-0-1-2 with history of spontaneous      Personal history of other medical treatment (CODE)     No Comments Provided     Uncomplicated asthma     No Comments Provided       Surgical:  Past Surgical History:   Procedure Laterality Date     ANKLE SURGERY      fracture, repair with screws     ARTHRODESIS FOOT Left 2022    Procedure: left foot hardware removaL, fusion of 1st-2nd Tarsal metatarsal;  Surgeon: Anthony Castillo DPM;  Location: [facility] OR     COLONOSCOPY  2022    F/U  tubular adenoma     CONIZATION LEEP      ,Underwent a loop     LAPAROSCOPIC TUBAL LIGATION       2009,tubal ligation     Allergy:   Rosi reports   Allergies   Allergen Reactions     Clonazepam Other (See Comments)     Causes Violence and aggresssion     Hydrocodone-Acetaminophen      Other reaction(s): Seizures  Can take Percocet without difficulty.     Lorazepam Other (See Comments)     Causes Violence and aggresssion     Sertraline Other (See Comments)     Caused suicidally      Bupropion Other (See Comments)     Caused Major Depression     Dust Mite Extract      Other reaction(s): Asthma symptoms     Lithium Other (See Comments)     Mood alteration     Pollen Extract      Other reaction(s): Asthma symptoms     Risperidone Other (See Comments)     Agitation       Sulfa Drugs Itching     Trichophyton      Other reaction(s): Asthma symptoms     Valproic Acid Other (See Comments)     Weight gain        Family History of Medical, Mental Health and/or Substance Use problems:  Per client report:   Family History   Problem Relation Age of Onset     Osteoporosis Mother      Asthma Father         Asthma,+ Leg edema, knee, hip replacement. + Lymphedema     Heart Failure Father      Other - See Comments Paternal Grandmother         Lymphedema     Osteoporosis Brother         Osteoporosis     Other - See Comments Brother         No Known Problems       Rosi reports no current medical concerns.        Per completion of the Medical History / Physical Health Screen, is there a recommendation to see / follow up with a primary care physician/clinic?    No.    Clinical Findings      Current Mental Status Exam:   Appearance:  Appropriate    Eye Contact:  Good   Psychomotor:  Normal       Gait / station:  no problem  Attitude / Demeanor: Cooperative  Pleasant  Speech      Rate / Production: Normal/ Responsive      Volume:  Normal  volume      Language:  no problems  Mood:   Anxious   Affect:   Appropriate    Thought Content: Clear  Referential Thinking   Thought Process: Coherent  Circumstantial      Associations: No  loosening of associations  Insight:   Fair   Judgment:  fair   Orientation:  Person Place Time Situation  Attention/concentration: Good      Safety Assessment:   Current Safety Concerns:  [place]ath Suicide Severity Rating Scale (Short Version)  [place]ath Suicide Severity Rating (Short Version) 4/21/2022 7/25/2022 10/29/2022 10/29/2022 10/29/2022   Over the past 2 weeks have you felt down, depressed, or hopeless? no no yes - -   Over the past 2 weeks have you had thoughts of killing yourself? no no yes - -   Have you ever attempted to kill yourself? yes - yes - -   When did this last happen? more than 6 months ago - more than 6 months ago - -   Q1 Wished to be Dead (Past Month) - - yes yes yes   Q2 Suicidal Thoughts (Past Month) - - yes yes yes   Q3 Suicidal Thought Method - - yes yes yes   Q4 Suicidal Intent without Specific Plan - - no no no   Q5 Suicide Intent with Specific Plan - - yes yes yes   Q6 Suicide Behavior (Lifetime) - - yes yes yes   Within the Past 3 Months? - - yes no yes   Level of Risk per Screen - - high risk high risk high risk   High Risk Required Interventions - - On continuous in person observation Provider notified;On continuous in person observation -   Required Interventions - - - Room searched;Room made safe;Patient searched;Belongings removed -   Interventions - - - DEC consulted -     Patient denies current homicidal ideation and behaviors.  Patient denies current self-injurious ideation and behaviors.    Patient denied risk behaviors associated with substance use.  Patient denies any high risk behaviors associated with mental health symptoms.  Patient reports the following current concerns for their personal safety: None.  Patient reports there are no firearms in the house.    History of Safety Concerns:  Patient denied a history of homicidal ideation.     Patient denied a history of personal safety concerns.    Patient denied a history of assaultive behaviors.    Patient denied a history  of sexual assault behaviors.     Patient denied a history of risk behaviors associated with substance use.  Patient denies any history of high risk behaviors associated with mental health symptoms.  Patient reports the following protective factors: Religiosity, Life Satisfaction, Reality testing ability, Positive problem-solving skills, Positive social support and Positive therapeutic releationships     Safety Issues and Plan for Safety and Risk Management:  Patient has had a history of suicidal ideation: approximately 1 yr ago  Patient denies current fears or concerns for personal safety.  Patient denies current or recent suicidal ideation or behaviors.  Patient denies current or recent homicidal ideation or behaviors.  Patient denies current or recent self injurious behavior or ideation.  Patient denies other safety concerns.  Patient reports there are no firearms in the house  A safety and risk management plan has not been developed at this time, however client was given the after-hours number / 911 should there be a change in any of these risk factors.    Review of Symptoms per patient report:   Depression:     Sleep Interest Guilt Energy Concentration Appetite Psychomotor slowing or agitation Suicide Hopeless  Helpless Worthless Ruminations Irritability   Shannan:             Distractibility Impulsiveness Racing Thoughts Sleepless Pressured Speech   Psychosis:       No symptoms   Anxiety:           Worries Nervousness Usual Unusual   Panic:              Tingling Sense of Impending Doom   Post Traumatic Stress Disorder:        No symptoms   Obsessive Compulsive Disorder:       No symptoms   Eating Disorder:          No symptoms Bingeing  Diagnostic Criteria:  A. A distinct period of abnormally and persistently elevated, expansive, or irritable mood and abnormally and persistently increased activity or energy lasting at least 4 consecutive days and presentmost of the day, nearly every day.  B. During the period of  mood disturbance and increased energy and activity, three (or more) of the following symptoms have persisted (four if the mood is only irritable), represent a nonticeable change from usual behaivor, and have been present to a degree:   - inflated self-esteem or grandiosity    - decreased need for sleep (e.g., feels rested after only 3 hours of sleep)    - more talkative than usual or pressure to keep talking    - flight of ideas or subjective experience that thoughts are racing   - distractibility   - increase in goal-directed activity   - excessive involvement in activities that have a high potential for painful consequences  C. The episode is associated with an unequivocal change in functioning that is uncharacteristic of the person when not symptomatic  D. The disturbance in mood and the change in functioning are observable by others  E. The episode is not severe enough to cause marked impairment in social or occupational functioning or to necessitate hospitalization, and does not have psychotic features.  F. The symptoms are not attributable to the direct physiological effects of a substance or a general medical condition     DSM5 Diagnoses: (Sustained by DSM5 Criteria Listed Above)  Behavioral and Medical Diagnosis: 296.89 Bipolar II Disorder With mixed features;  Mild Per history; Generalized Anxiety Disorder-per history; Alcohol Abuse-In Remission per history  Psychosocial & Contextual Factors: family of origin issues, health issues, limited social support, mental health symptoms and relationship stress      Functional Status:  Patient reports the following functional impairments: health maintenance, home life with group home staff and residents, management of the household and or completion of tasks, money management, organization, relationship(s), self-care and social interactions.     WHODAS:   WHODAS 2.0 Total Score 8/22/2018   Total Score 30     LOCUS: 21-Level 4    Clinical Summary:  1. Reason for  assessment-to identify medical necessity for attending the [facility] PHP program.   2. Psychosocial, Cultural and Contextual Factors: access to healthcare, family of origin issues, limited social support, mental health symptoms and relationship stress .  3. Principal DSM5 Diagnoses  (Sustained by DSM5 Criteria Listed Above):   296.89 Bipolar II Disorder With mixed features.  4. Other Diagnoses that is relevant to services:   300.02 (F41.1) Generalized Anxiety Disorder.  5. Prognosis: Expect Improvement.  6. Likely result of symptoms if not treated:     I certify that Partial Hospitalization (PHP) services are medically necessary to improve or maintain the client's condition and functional level and to prevent relapse or hospitalization.  PHP services will be provided in lieu of psychiatric hospitalization, no less intensive level of care would be sufficient to provide the medically necessary treatment the client requires.  These services will include group therapy and OT group therapy daily and individual therapy and medications management as needed.  Due to the clinical severity of the symptoms reported by the client, functional impairments exist that significantly disrupt functioning.  The client reports these symptoms negatively impact their quality of life.  Without the recommended medically necessary treatments listed, the client's symptoms are likely to increase in severity and functioning may further decline.  If the client participates and complies with recommended treatment, the prognosis if fair.    Recommendations:     1. Attend and complete the Partial Hospitalization Program.    2.  Plan for Safety and Risk Management:   Recommended that patient call 911 or go to the local ED should there be a change in any of these risk factors..            3. Patient's identified mental health concerns with a cultural influence will be addressed by being identified in patient's treatment plan as needed.      4.  Resources/Service Plan:    services are not indicated.   Modifications to assist communication are not indicated.   Additional disability accommodations are not indicated.   The patient  MAY utilize the Alpha Stimulator therapy.   Patient Does not have a pacemaker.       5. Collaboration:   Collaboration / coordination of treatment will be initiated with the following  support professionals: case management.      6.  Referrals:  Qualifies for ARM (Adult Rehabilitative Mental Health Services) to rehabilitate the areas of functional impairments.    It is my professional opinion that patient:     1. Has symptoms of mental illness that impair function in the following areas:       Mental health symptoms, Mental health service needs, Interpersonal skills, Community integration and Self-care / Independent living     2. Rehabilitative mental health services would reduce symptoms to allow regulated, restored or improved functioning.  Maintain stability, function, preventing risk of significant functional decompensation or more restrictive service setting.      3. Has the cognitive capacity to benefit from rehabilitative mental health techniques and methods.     The following referral(s) will be initiated: No referrals at this time..     A Release of Information has been obtained for the following: case management and outpatient therapist.    7. ROCÍO:    No ROCÍO concerns    8. Records:   These were reviewed at time of assessment.   Information in this assessment was obtained from the medical record and  provided by patient who is a good historian.    Patient will have open access to their mental health medical record.      Provider Name/ Credentials:  Ainsley Webber, [facility]   November 8, 2022

## 2022-11-08 NOTE — PLAN OF CARE
Face to face end of shift report received from Teressa NEWSOME RN. Rounding completed. Patient observed in bed, awake.     Pt has been calm, cooperative this morning. She is a little withdrawn and keeps to her room for some of the morning. She denied SI/HI and AH/VH. Reports anxiety over discharging, but is hopeful. She denied pain. Pt anxious while going over AVS. Offered PRN Atarax prior to discharge. She accepted this and reported a decrease in anxiety. She went to groups up until she discharged. She is able to make her needs known.       Problem: Adult Behavioral Health Plan of Care  Goal: Patient-Specific Goal (Individualization)  Description: Patient will be free from suicidal thinking during hospitalization.  Patient will contract for safety during hospitalization.  Patient will accept medications and attend groups while hospitalization.  Outcome: Met     Problem: Suicide Risk  Goal: Absence of Self-Harm  Outcome: Met     Problem: Suicidal Behavior  Goal: Suicidal Behavior is Absent or Managed  Description: Patient will be free of self harm while on the unit.  Patient will contract for safety while on the unit.    Outcome: Met       Maribell Robles RN  11/8/2022  8:52 AM

## 2022-11-08 NOTE — PLAN OF CARE
Discharge Note    Patient Discharged to [facility] then will be transported to [facility] on 11/8/2022 10:27 AM via Health plan transportation accompanied by unit assistant.     Patient informed of discharge instructions in AVS. patient verbalizes understanding and denies having any questions pertaining to AVS. Patient stable at time of discharge. Patient denies SI, HI, and thoughts of self harm at time of discharge. All personal belongings returned to patient. Discharge prescriptions sent to [facility] via electronic communication. Psych evaluation, history and physical, AVS, and discharge summary faxed to next level of care- per .     Maribell Robles RN  11/8/2022  10:45 AM

## 2022-11-08 NOTE — TELEPHONE ENCOUNTER
Writer attempted to reach  Karina Crum at [facility] ([phone]) to inquire about transportation to/from [facility] for patient.     Karina did not answer, and the voice mail box is full. Will attempt to reach again before end of day today.

## 2022-11-08 NOTE — DISCHARGE SUMMARY
"  [facility] PSYCHIATRY  DISCHARGE SUMMARY     DISCHARGE DATA     Rosi Lamb MRN# [id number]   Age: 47 year old YOB: 1975     Date of Admission: 10/29/2022  Date of Discharge: November 8, 2022  Discharge Provider: Elmer Mathews MD       REASON FOR ADMISSION   Rosi is a 47 year old   female with a PMH of bipolar disorder, AGNIESZKA, borderline personality traits who presented to the ED vis EMS from her group home for evaluation of suicidal thoughts with plan to cut herself and bleed out or overdose on medications. She reported unresolved grief from a significant other that passed away last January from colon cancer. Reported increasing depressive symptoms such as fatigue, sadness, hopelessness, lack of energy. She was unable to engage in safety planning and was agreeable to admission. She was voluntarily transferred to behavioral health for further evaluation and treatment.      DISCHARGE DIAGNOSES   1. Bipolar 1 disorder, most recent episode depressed  2. AGNIESZKA (by history)  3. Borderline personality traits     HOSPITAL COURSE   Patient was admitted to unit 5 due to the aforementioned presentation. The patient was placed under 15 minute checks to ensure patient safety. The patient participated in unit programming and groups as able.    Legal status during hospitalization was voluntary .Ms. Lamb did not require seclusion/restraint during hospitalization.     The following changes to the patient's prior to admission medications were made:  vrylar was stopped . She felt like she was  The following medication changes were made  during hospitalization: vrylar was stopped. She states her outpatient provider had been concerned her depression had increased and decided to change medications and stop abilify and change to vrylar. She tells me that when she had gone above 15 mg of abilify, she \"became really impulsive and spent tons of money and shopped like crazy\". She states her " "provider was concerned that this was a side effect of compulsivity from abilify and did not believe it was a manic episode. camilo states she believes her recent depressie state was largely due to unresolved grief over the loss of her significant other last year. She states \"I think I wouldve been fine on the 15 mg of abilfy as long as I had more counseling for my loss\". abilify was restarted and increased to 15 mg. Her affect and mood improved rather quickly after starting it. She states she believes the vryalar \"actyually made me worse\". She has not had any suicidal thougths through her stay. Her sleep has improved. Denies any self injurious thoughts.      With these changes and supports the patient noticed improvement in their symptoms and felt sufficiently ready for discharge. As a result, Camilo Lamb was discharged. At the time of discharge, Camilo Lamb was determined to not be a danger to self or others. At the current time of discharge, the patient does not meet criteria for involuntary hospitalization. On the day of discharge, the patient reports that they do not have suicidal or homicidal ideation. Steps taken to minimize risk include: assessing patient s behavior and thought process daily during hospital stay, discharging patient with adequate plan for follow up for mental and physical health and discussing safety plan of returning to the hospital should the patient ever have thoughts of harming themselves or others. Therefore, based on all available evidence including the factors cited above, the patient does not appear to be at imminent risk for self-harm, and is appropriate for outpatient level of care. However, if patient uses substances or is medication non-adherent, their risk of decompensation and SI will be elevated. This was discussed with the patient.     DISCHARGE MEDICATIONS     Current Discharge Medication List      START taking these medications    Details   ARIPiprazole " (ABILIFY) 15 MG tablet Take 1 tablet (15 mg) by mouth At Bedtime  Qty: 30 tablet, Refills: 1    Associated Diagnoses: Bipolar affective disorder, currently depressed, moderate (H)      ramelteon (ROZEREM) 8 MG tablet Take 1 tablet (8 mg) by mouth At Bedtime  Qty: 30 tablet, Refills: 1    Associated Diagnoses: Bipolar affective disorder, currently depressed, moderate (H)         CONTINUE these medications which have CHANGED    Details   hydrOXYzine (ATARAX) 50 MG tablet Take 1 tablet (50 mg) by mouth At Bedtime  Qty: 30 tablet, Refills: 0    Associated Diagnoses: Bipolar affective disorder, currently depressed, moderate (H)      !! lamoTRIgine (LAMICTAL) 200 MG tablet Take 200 mg tablet with 50 mg tablet to equal 250 mg at HS.  Qty: 30 tablet, Refills: 1    Comments: Take 200 mg tablet with 50 mg tablet to equal 250 mg at night.  Associated Diagnoses: Bipolar affective disorder, currently depressed, moderate (H)      !! lamoTRIgine (LAMICTAL) 25 MG tablet Take 2 tablets (50 mg) by mouth daily  Qty: 60 tablet, Refills: 1    Comments: Take 50 mg along with 200 mg tablet to equal 250 mg  Associated Diagnoses: Bipolar affective disorder, currently depressed, moderate (H)      !! lamoTRIgine (LAMICTAL) 25 MG tablet Take 1 tablet (25 mg) by mouth daily  Qty: 60 tablet, Refills: 1    Comments: Take two tablets along with the 200 mg tablet to equal 250 mg  Associated Diagnoses: Bipolar affective disorder, currently depressed, moderate (H)       !! - Potential duplicate medications found. Please discuss with provider.      CONTINUE these medications which have NOT CHANGED    Details   acetaminophen (TYLENOL) 500 MG tablet Take 500-1,000 mg by mouth every 6 hours as needed      albuterol (VENTOLIN HFA) 108 (90 Base) MCG/ACT inhaler Inhale 1-2 puffs into the lungs every 4 hours as needed for shortness of breath / dyspnea or wheezing  Qty: 18 g, Refills: 5    Comments: Pharmacy may dispense brand covered by insurance (Proair, or  proventil or ventolin or generic albuterol inhaler)  Associated Diagnoses: Moderate persistent asthma without complication      calcium carbonate (TUMS) 500 MG chewable tablet Take 2-4 chew tab by mouth as needed as directed.      cholecalciferol (VITAMIN D3) 125 mcg (5000 units) capsule Take 1 capsule (125 mcg) by mouth daily  Qty: 100 capsule, Refills: 4    Associated Diagnoses: Vitamin D deficiency      fluticasone (FLONASE) 50 MCG/ACT nasal spray Spray 1 spray into both nostrils daily as needed for rhinitis or allergies  Qty: 16 g, Refills: 0    Associated Diagnoses: Otalgia, right      ibuprofen (ADVIL/MOTRIN) 200 MG tablet Take 200-400 mg by mouth every 6 hours as needed 1 to 2 tabs Q6 PRN      Menthol, Topical Analgesic, (BIOFREEZE EX) Apply topically 4 times daily as needed      methocarbamol (ROBAXIN) 500 MG tablet Take 500-1,000 mg by mouth 4 times daily as needed for muscle spasms      oxybutynin ER (DITROPAN-XL) 10 MG 24 hr tablet Take 1 tablet (10 mg) by mouth daily  Qty: 90 tablet, Refills: 4    Associated Diagnoses: Urge incontinence      vilazodone (VIIBRYD) 40 MG TABS tablet Take 40 mg by mouth every morning      calcium polycarbophil (FIBERCON) 625 MG tablet Take 2 tablets (1,250 mg) by mouth 2 times daily for 360 days  Qty: 360 tablet, Refills: 3    Associated Diagnoses: H/O adenomatous polyp of colon      cyanocobalamin (CYANOCOBALAMIN) 1000 MCG/ML injection INJECT 1ML INTRAMUSCULARLY EVERY 2 WEEKS  Qty: 6 mL, Refills: 11    Associated Diagnoses: Vitamin B12 deficiency      famotidine (PEPCID) 20 MG tablet Take 1 tablet (20 mg) by mouth 2 times daily - For Heartburn  Qty: 180 tablet, Refills: 4    Associated Diagnoses: Gastroesophageal reflux disease without esophagitis      gabapentin (NEURONTIN) 300 MG capsule Take 300 mg by mouth 2 times daily . May take an additional capsule at noon IF needed.      montelukast (SINGULAIR) 10 MG tablet Take 1 tablet (10 mg) by mouth At Bedtime  Qty: 90 tablet,  "Refills: 4    Associated Diagnoses: Moderate persistent asthma without complication         STOP taking these medications       acetaminophen-caff-pyrilamine (MIDOL MENSTRUAL) 500-60-15 MG TABS per tablet Comments:   Reason for Stopping:         calcium-vitamin D-vitamin K (VIACTIV) 500-500-40 MG-UNT-MCG CHEW Comments:   Reason for Stopping:         cariprazine (VRAYLAR) 3 MG capsule Comments:   Reason for Stopping:         fluconazole (DIFLUCAN) 150 MG tablet Comments:   Reason for Stopping:         hydrOXYzine (VISTARIL) 50 MG capsule Comments:   Reason for Stopping:         hydrOXYzine (VISTARIL) 50 MG capsule Comments:   Reason for Stopping:         SF 1.1 % GEL topical gel Comments:   Reason for Stopping:         traZODone (DESYREL) 50 MG tablet Comments:   Reason for Stopping:                  VITALS   Vitals: /61 (BP Location: Right arm)   Pulse 82   Temp 97.5  F (36.4  C) (Temporal)   Resp 16   Ht 1.702 m (5' 7\")   Wt 127.9 kg (282 lb)   SpO2 96%   BMI 44.17 kg/m       MENTAL STATUS EXAM   Appearance: Alert, oriented, dressed in hospital scrubs, appears stated age   Attitude: Cooperative   Eye Contact: Good  Mood: \"Better\"  Affect: Full range of affect  Speech: Normal rate and rhythm   Psychomotor Behavior: No tremor, rigidity, or psychomotor abnormality   Thought Process: Logical, goal directed   Associations: No loose associations   Thought Content: Denies SI or plan. No SIB. Denies A/V hallucinations. No evidence of delusional thought.  Insight: Good  Judgment: Good  Oriented to: Person, place, and time  Attention Span and Concentration: Intact  Recent and Remote Memory: Intact  Language: English with appropriate syntax and vocabulary  Fund of Knowledge: Average  Muscle Strength and Tone: Grossly normal  Gait and Station: Grossly normal       DISCHARGE PLAN        1.  Education given regarding diagnostic and treatment options with risks, benefits and alternatives with adequate verbalization " of understanding.  2.  Discharge to group home. Upon detailed review of risk factors, patient amenable for release.   3.  Continue aforementioned medications and associated medication changes with follow-up by outpatient provider.  4.  Crisis management planning in place.    5.  Nursing and  to review further discharge recommendations.   6.  Patient is being discharged to home with the following appointments as detailed below.     Is discharging and starting [facility] upon discharge       DISCHARGE SERVICES PROVIDED     80 minutes spent on discharge services, including:  Final examination of patient.  Review and discussion of hospital stay.  Instructions for continued outpatient care/goals.  Preparation of discharge records.  Preparation of medications refills and new prescriptions.  Preparation of applicable referral forms.        ATTESTATION   Elmer Mathews MD  [facility]   Psychiatry    Telehealth video visit that took place via an interactive audio and video telecommunications system using secure connection. Patient identity was verified.  Patient verbalized agreement and understanding of test ordered, diagnosis, treatment plan, education, and side effects of medications, if prescribed. Start: 13:00 end: 14:00     LABS THIS ADMISSION     Results for orders placed or performed during the hospital encounter of 10/29/22   TSH with free T4 reflex     Status: Normal   Result Value Ref Range    TSH 2.08 0.30 - 4.20 uIU/mL   Vitamin D Deficiency     Status: Normal   Result Value Ref Range    Vitamin D, Total (25-Hydroxy) 49 20 - 75 ug/L    Narrative    Season, race, dietary intake, and treatment affect the concentration of 25-hydroxy-Vitamin D. Values may decrease during winter months and increase during summer months. Values 20-29 ug/L may indicate Vitamin D insufficiency and values <20 ug/L may indicate Vitamin D deficiency.    Vitamin D determination is routinely performed by an  immunoassay specific for 25 hydroxyvitamin D3.  If an individual is on vitamin D2(ergocalciferol) supplementation, please specify 25 OH vitamin D2 and D3 level determination by LCMSMS test [id number].     Vitamin B12     Status: Normal   Result Value Ref Range    Vitamin B12 948 232 - 1,245 pg/mL   Asymptomatic COVID-19 Virus (Coronavirus) by PCR Nasopharyngeal     Status: Normal    Specimen: Nasopharyngeal; Swab   Result Value Ref Range    SARS CoV2 PCR Negative Negative    Narrative    Testing was performed using the [employer] Xpress SARS-CoV-2 Assay on the   [employer]. Additional information about   this Emergency Use Authorization (EUA) assay can be found via the Lab   Guide. This test should be ordered for the detection of SARS-CoV-2 in   individuals who meet SARS-CoV-2 clinical and/or epidemiological   criteria. Test performance is unknown in asymptomatic patients. This   test is for in vitro diagnostic use under the FDA EUA for   laboratories certified under CLIA to perform high complexity testing.   This test has not been FDA cleared or approved. A negative result   does not rule out the presence of PCR inhibitors in the specimen or   target RNA in concentration below the limit of detection for the   assay. The possibility of a false negative should be considered if   the patient's recent exposure or clinical presentation suggests   COVID-19. This test was validated by [facility]. This laboratory is certified under the Clinical Laboratory Improve  ment Amendments (CLIA) as qualified to perform high complexity testing.

## 2022-11-10 NOTE — TELEPHONE ENCOUNTER
Patient called stating she is unable to secure transportation to/from [facility] - she states several vendors were only able to offer transportation 1-2 days a week. She is unable to complete the program at this time.

## 2022-11-10 NOTE — TELEPHONE ENCOUNTER
Third attempt to reach patient unsuccessful - writer attempted to contact the mobile number listed in chart, however, patient did not answer. A voicemail was left advising patient we have been trying to contact Karina at [facility] to discuss transportation and start date, but have not been able to reach her as her voicemail box is full. Requested a call back from either Karina or patient to discuss. Awaiting return call.

## 2022-12-20 ENCOUNTER — OFFICE VISIT (OUTPATIENT)
Dept: FAMILY MEDICINE | Facility: OTHER | Age: 47
End: 2022-12-20
Attending: PHYSICIAN ASSISTANT
Payer: MEDICARE

## 2022-12-20 VITALS
TEMPERATURE: 99.6 F | BODY MASS INDEX: 43.9 KG/M2 | WEIGHT: 280.3 LBS | RESPIRATION RATE: 16 BRPM | OXYGEN SATURATION: 95 % | HEART RATE: 103 BPM | DIASTOLIC BLOOD PRESSURE: 68 MMHG | SYSTOLIC BLOOD PRESSURE: 126 MMHG

## 2022-12-20 DIAGNOSIS — N30.00 ACUTE CYSTITIS WITHOUT HEMATURIA: ICD-10-CM

## 2022-12-20 DIAGNOSIS — R30.0 DYSURIA: Primary | ICD-10-CM

## 2022-12-20 LAB
ALBUMIN UR-MCNC: 10 MG/DL
APPEARANCE UR: CLEAR
BACTERIA #/AREA URNS HPF: ABNORMAL /HPF
BILIRUB UR QL STRIP: NEGATIVE
COLOR UR AUTO: YELLOW
GLUCOSE UR STRIP-MCNC: NEGATIVE MG/DL
HGB UR QL STRIP: NEGATIVE
KETONES UR STRIP-MCNC: NEGATIVE MG/DL
LEUKOCYTE ESTERASE UR QL STRIP: ABNORMAL
MUCOUS THREADS #/AREA URNS LPF: PRESENT /LPF
NITRATE UR QL: NEGATIVE
PH UR STRIP: 6 [PH] (ref 5–9)
RBC URINE: 1 /HPF
SP GR UR STRIP: 1.02 (ref 1–1.03)
SQUAMOUS EPITHELIAL: 1 /HPF
UROBILINOGEN UR STRIP-MCNC: NORMAL MG/DL
WBC URINE: 1 /HPF

## 2022-12-20 PROCEDURE — G0463 HOSPITAL OUTPT CLINIC VISIT: HCPCS

## 2022-12-20 PROCEDURE — 81001 URINALYSIS AUTO W/SCOPE: CPT | Mod: ZL | Performed by: PHYSICIAN ASSISTANT

## 2022-12-20 PROCEDURE — 99214 OFFICE O/P EST MOD 30 MIN: CPT | Performed by: PHYSICIAN ASSISTANT

## 2022-12-20 PROCEDURE — 87086 URINE CULTURE/COLONY COUNT: CPT | Mod: ZL | Performed by: PHYSICIAN ASSISTANT

## 2022-12-20 RX ORDER — NITROFURANTOIN 25; 75 MG/1; MG/1
100 CAPSULE ORAL 2 TIMES DAILY
Qty: 10 CAPSULE | Refills: 0 | Status: SHIPPED | OUTPATIENT
Start: 2022-12-20 | End: 2022-12-25

## 2022-12-20 ASSESSMENT — PAIN SCALES - GENERAL: PAINLEVEL: MODERATE PAIN (5)

## 2022-12-20 NOTE — NURSING NOTE
"Chief Complaint   Patient presents with     Bladder Problems     Burning upon urination   cramping   started 12-18-22       Medication reconciliation completed.    FOOD SECURITY SCREENING QUESTIONS:    The next two questions are to help us understand your food security.  If you are feeling you need any assistance in this area, we have resources available to support you today.    Hunger Vital Signs:  Within the past 12 months we worried whether our food would run out before we got money to buy more. Never  Within the past 12 months the food we bought just didn't last and we didn't have money to get more. Never    Initial /68 (BP Location: Left arm, Patient Position: Sitting, Cuff Size: Adult Large)   Pulse 103   Temp 99.6  F (37.6  C) (Temporal)   Resp 16   Wt 127.1 kg (280 lb 4.8 oz)   SpO2 95%   Breastfeeding No   BMI 43.90 kg/m   Estimated body mass index is 43.9 kg/m  as calculated from the following:    Height as of 10/29/22: 1.702 m (5' 7\").    Weight as of this encounter: 127.1 kg (280 lb 4.8 oz).       Luana Trevizo LPN .......  12/20/2022  3:34 PM  "

## 2022-12-20 NOTE — PROGRESS NOTES
"ASSESSMENT/PLAN:    I have reviewed the nursing notes.  I have reviewed the findings, diagnosis, plan and need for follow up with the patient.    1. Acute cystitis without hematuria  2. Dysuria  - UA with Microscopic reflex to Culture  - Urine Culture Aerobic Bacterial  - nitroFURantoin macrocrystal-monohydrate (MACROBID) 100 MG capsule; Take 1 capsule (100 mg) by mouth 2 times daily for 5 days  Dispense: 10 capsule; Refill: 0  - Vitals stable. PE available for review below. UA results: see below. Based off UA results, patient does meet criteria for antibiotic therapy. I did discuss with patient that if a urine culture was performed, that we will inform patient if a change to their treatment plan needs to occur based off culture results - with urine cultures typically returning in 1-3 days. In the meantime, recommend, alternating tylenol and ibuprofen every 4-6 hours as needed, warm heating pad, pushing fluids/hydration, cranberry juice/pills, urinating when urge arises. May also try over the counter remedies such as Azo (as long as pyridium not already prescribed). Patient aware that if they do take Azo, that this medication can change color of urine to an \"orange\" color. If fevers, chills, flank pain/back pain, inability to urinate/struggle to urinate, signs of dehydration or other worrisome signs occur, patient agreeable to follow up for reevaluation. Patient is in agreement and understanding of the above treatment plan. All questions and concerns were addressed and answered to patient's satisfaction. AVS reviewed with patient.     Discussed warning signs/symptoms indicative of need to f/u    Follow up if symptoms persist or worsen or concerns    I explained my diagnostic considerations and recommendations to the patient, who voiced understanding and agreement with the treatment plan. All questions were answered. We discussed potential side effects of any prescribed or recommended therapies, as well as " expectations for response to treatments.    Ena Estrada PA-C  2022  3:54 PM    HPI:    Rosi Lamb is a 47 year old female  who presents to [facility] today for concerns of possible UTI    Symptoms: dysuria, urgency, frequency, burning, hesitancy and suprapubic pain and pressure  No back or side pain  No hematuria/blood in urine  Last Urination: [facility]   No able to completely void/empty  No fevers, chills, nausea, vomiting  No change in bowel habits (diarrhea, constipation, etc.)  No vaginal/penile symptoms (discharge, pain, itching, sores, etc.)  No exposures to STIs/STDs  No recent swimming/use of hot tubs/swimming pools/lakes    YES: + history of UTIs    LMP: ablation     Treatments tried: none    Denies CP, SOB, calf tenderness. No new medications. Denies exposure to ill or COVID positive patients.     Allergies: up to date    PCP: MD Veena    Past Medical History:   Diagnosis Date     Abnormal Pap smear of cervix 2018    Overview:  had scraping of cervix     Cannabis use disorder, moderate, in sustained remission, dependence (H) 2015     Closed dislocation of tarsal joint - left 2011     Edema     No Comments Provided     Encounter for removal and reinsertion of intrauterine contraceptive device     05,IUD placement, Removed     Excessive and frequent menstruation with irregular cycle     2017     Major depressive disorder, single episode     No Comments Provided     Personal history of other medical treatment (CODE)     G3, P2-0-1-2 with history of spontaneous      Personal history of other medical treatment (CODE)     No Comments Provided     Uncomplicated asthma     No Comments Provided     Past Surgical History:   Procedure Laterality Date     ANKLE SURGERY      fracture, repair with screws     ARTHRODESIS FOOT Left 2022    Procedure: left foot hardware removaL, fusion of 1st-2nd Tarsal metatarsal;  Surgeon: Anthony Castillo DPM;   Location: [facility] OR     COLONOSCOPY  2022    F/U  tubular adenoma     CONIZATION LEEP      ,Underwent a loop     LAPAROSCOPIC TUBAL LIGATION      ,tubal ligation     Social History     Tobacco Use     Smoking status: Former     Packs/day: 1.00     Years: 3.00     Pack years: 3.00     Types: Cigarettes     Quit date: 2003     Years since quittin.9     Smokeless tobacco: Never   Substance Use Topics     Alcohol use: Not Currently     Alcohol/week: 0.0 standard drinks     Current Outpatient Medications   Medication Sig Dispense Refill     acetaminophen (TYLENOL) 500 MG tablet Take 500-1,000 mg by mouth every 6 hours as needed       albuterol (VENTOLIN HFA) 108 (90 Base) MCG/ACT inhaler Inhale 1-2 puffs into the lungs every 4 hours as needed for shortness of breath / dyspnea or wheezing 18 g 5     ARIPiprazole (ABILIFY) 15 MG tablet Take 1 tablet (15 mg) by mouth At Bedtime 30 tablet 1     calcium carbonate (TUMS) 500 MG chewable tablet Take 2-4 chew tab by mouth as needed as directed.       calcium polycarbophil (FIBERCON) 625 MG tablet Take 2 tablets (1,250 mg) by mouth 2 times daily for 360 days 360 tablet 3     cholecalciferol (VITAMIN D3) 125 mcg (5000 units) capsule Take 1 capsule (125 mcg) by mouth daily 100 capsule 4     cyanocobalamin (CYANOCOBALAMIN) 1000 MCG/ML injection INJECT 1ML INTRAMUSCULARLY EVERY 2 WEEKS 6 mL 11     famotidine (PEPCID) 20 MG tablet Take 1 tablet (20 mg) by mouth 2 times daily - For Heartburn 180 tablet 4     gabapentin (NEURONTIN) 300 MG capsule Take 300 mg by mouth 2 times daily . May take an additional capsule at noon IF needed.       hydrOXYzine (ATARAX) 50 MG tablet Take 1 tablet (50 mg) by mouth At Bedtime 30 tablet 0     ibuprofen (ADVIL/MOTRIN) 200 MG tablet Take 200-400 mg by mouth every 6 hours as needed 1 to 2 tabs Q6 PRN       lamoTRIgine (LAMICTAL) 200 MG tablet Take 200 mg tablet with 50 mg tablet to equal 250 mg at HS. 30 tablet 1     lamoTRIgine  (LAMICTAL) 25 MG tablet Take 2 tablets (50 mg) by mouth daily 60 tablet 1     lamoTRIgine (LAMICTAL) 25 MG tablet Take 1 tablet (25 mg) by mouth daily 60 tablet 1     Menthol, Topical Analgesic, (BIOFREEZE EX) Apply topically 4 times daily as needed       methocarbamol (ROBAXIN) 500 MG tablet Take 500-1,000 mg by mouth 4 times daily as needed for muscle spasms       montelukast (SINGULAIR) 10 MG tablet Take 1 tablet (10 mg) by mouth At Bedtime 90 tablet 4     oxybutynin ER (DITROPAN-XL) 10 MG 24 hr tablet Take 1 tablet (10 mg) by mouth daily 90 tablet 4     ramelteon (ROZEREM) 8 MG tablet Take 1 tablet (8 mg) by mouth At Bedtime 30 tablet 1     vilazodone (VIIBRYD) 40 MG TABS tablet Take 40 mg by mouth every morning       fluticasone (FLONASE) 50 MCG/ACT nasal spray Spray 1 spray into both nostrils daily as needed for rhinitis or allergies (Patient not taking: Reported on 12/20/2022) 16 g 0     Allergies   Allergen Reactions     Clonazepam Other (See Comments)     Causes Violence and aggresssion     Hydrocodone-Acetaminophen      Other reaction(s): Seizures  Can take Percocet without difficulty.     Lorazepam Other (See Comments)     Causes Violence and aggresssion     Sertraline Other (See Comments)     Caused suicidally      Bupropion Other (See Comments)     Caused Major Depression     Dust Mite Extract      Other reaction(s): Asthma symptoms     Lithium Other (See Comments)     Mood alteration     Pollen Extract      Other reaction(s): Asthma symptoms     Risperidone Other (See Comments)     Agitation       Sulfa Drugs Itching     Trichophyton      Other reaction(s): Asthma symptoms     Valproic Acid Other (See Comments)     Weight gain     Past medical history, past surgical history, current medications and allergies reviewed and accurate to the best of my knowledge.      ROS:  Refer to HPI    /68 (BP Location: Left arm, Patient Position: Sitting, Cuff Size: Adult Large)   Pulse 103   Temp 99.6  F (37.6   C) (Temporal)   Resp 16   Wt 127.1 kg (280 lb 4.8 oz)   SpO2 95%   Breastfeeding No   BMI 43.90 kg/m      EXAM:  General Appearance: Well appearing 47 year old female, appropriate appearance for age. No acute distress   Respiratory: normal chest wall and respirations.  Normal effort.  Clear to auscultation bilaterally, no wheezing, crackles or rhonchi.  No increased work of breathing.  No cough appreciated.  Cardiac: RRR with no murmurs  Abdomen: soft, nontender, no rigidity, no rebound tenderness or guarding, normal bowel sounds present  :  No suprapubic tenderness to palpation.  Absent CVA tenderness to palpation.    Musculoskeletal:  Equal movement of bilateral upper extremities.  Equal movement of bilateral lower extremities.  Normal gait.    Dermatological: no rashes noted of exposed skin  Neuro: Alert and oriented to person, place, and time.  Cranial nerves II-XII grossly intact with no focal or lateralizing deficits.  Muscle tone normal.  Gait normal. No tremor.   Psychological: normal affect, alert, oriented, and pleasant.     Labs:  Results for orders placed or performed in visit on 12/20/22   UA with Microscopic reflex to Culture     Status: Abnormal    Specimen: Urine, Clean Catch   Result Value Ref Range    Color Urine Yellow Colorless, Straw, Light Yellow, Yellow    Appearance Urine Clear Clear    Glucose Urine Negative Negative mg/dL    Bilirubin Urine Negative Negative    Ketones Urine Negative Negative mg/dL    Specific Gravity Urine 1.024 1.000 - 1.030    Blood Urine Negative Negative    pH Urine 6.0 5.0 - 9.0    Protein Albumin Urine 10 (A) Negative mg/dL    Urobilinogen Urine Normal Normal, 2.0 mg/dL    Nitrite Urine Negative Negative    Leukocyte Esterase Urine Small (A) Negative    Bacteria Urine Few (A) None Seen /HPF    Mucus Urine Present (A) None Seen /LPF    RBC Urine 1 <=2 /HPF    WBC Urine 1 <=5 /HPF    Squamous Epithelials Urine 1 <=1 /HPF    Narrative    Urine Culture not  indicated     Xray:  None

## 2022-12-20 NOTE — PATIENT INSTRUCTIONS
You were prescribed an antibiotic, please take into consideration the following information:  - Take entire course of antibiotic even if you start to feel better.  - Antibiotics can cause stomach upset including nausea and diarrhea. Read your bottle or ask the pharmacist if antibiotic can be taken with food to help prevent nausea. If you have symptoms of diarrhea you can take an over-the-counter probiotic and/or increase foods with probiotics such as yogurt, Raul, sauerkraut.  -Use caution in sunlight as can lead to increased risk of sunburn while on ABX (antibiotics).     Please refer to your AVS for follow up and pain/symptoms management recommendations (I.e.: medications, helpful conservative treatment modalities, appropriate follow up if need to a specialist or family practice, etc.). Please return to urgent care if your symptoms change or worsen.     Discharge instructions:  -Follow up with persistent symptoms with primary care (or ER if severe worsening)  -If you were prescribed a medication(s), please take this as prescribed/directed  -Monitor your symptoms, if changing/worsening, return to UC/ER or PCP for follow up    Urinary Tract Infection (UTI):  -If you were prescribed an antibiotic, please take this as directed and until completion.     -If your urine was sent for a culture, please note this takes on average 1-3 days to return. Urine cultures will show us if there is/if what bacteria grows from your urine. If a change to your treatment plan (antibiotics, etc.) is needed based off your urine culture we will contact you.     -For pain control (if applicable), alternating Tylenol and Ibuprofen is recommended  -Alternate every 4 hours as needed. I.e.: Ibuprofen at 8am, Tylenol 12pm, Ibuprofen 4pm   -Daily maximum of Tylenol is 4000 mg (recommend staying under 3000mg)  -Daily maximum of Ibuprofen is 3200 mg    -A warm heating pad may help as well.     -Rest/relaxation and keeping hydrated with clear  liquids (ie: water or cranberry juice - do not do cranberry juice if on Coumadin/Warfarin).     -Empty your bladder when you feel the urge versus holding urine, after urinating you can bear-down to empty bladder completely, after peeing wipe front to back to avoid introducing bacteria towards vaginal area.     -AZO/Pyridium - may take up to 3 times a day, note that this may turn your urine orange    -Avoid sexual intercourse until you have completed your medication.     -Return to ER if any of the following occur: Worsening of symptoms. Fever over 101 F (38.3 C), No improvement by the third day of treatment, Increasing back or abdominal pain, vomiting, unable to keep fluids or medicine down, weakness, dizziness, or fainting, vaginal discharge, pain, redness, or swelling of the vaginal area

## 2022-12-22 ENCOUNTER — TELEPHONE (OUTPATIENT)
Dept: INTERNAL MEDICINE | Facility: OTHER | Age: 47
End: 2022-12-22

## 2022-12-22 LAB — BACTERIA UR CULT: NORMAL

## 2022-12-22 NOTE — TELEPHONE ENCOUNTER
Patient had a call from  to stop antibiotics as labs came back neg. She needs order to stop faxed to [facility] fax #[phone]

## 2023-01-08 ENCOUNTER — MYC MEDICAL ADVICE (OUTPATIENT)
Dept: INTERNAL MEDICINE | Facility: OTHER | Age: 48
End: 2023-01-08

## 2023-01-08 DIAGNOSIS — F31.32 BIPOLAR AFFECTIVE DISORDER, CURRENTLY DEPRESSED, MODERATE (H): ICD-10-CM

## 2023-01-10 RX ORDER — RAMELTEON 8 MG/1
8 TABLET ORAL AT BEDTIME
Qty: 30 TABLET | Refills: 1 | Status: SHIPPED | OUTPATIENT
Start: 2023-01-10 | End: 2024-04-04

## 2023-02-23 NOTE — PROGRESS NOTES
Addended by: VU KASPER on: 2/23/2023 12:38 PM     Modules accepted: Orders     HPI:    Rosi Lamb is a 46 year old female who presents to clinic today for calluses on feet. Callus is present near the MTP joint of the 5th toe on right foot and has become painful. Callus is also present on the left foot near the MTP joint of the 1st and 3rd toes, which are not painful. Callus on right foot has become painful while standing 6 hours a day at work and makes it difficult to wear shoes. Believes it has progressively grown. Has tried callus pads that do not stay in place. Has tried IBU for pain relief, but does not work. Currently on gabapentin for neuropathy in bilateral feet. Lymphedema in bilateral lower extremities. No concern for diabetes in self or parents, grandparent on paternal and maternal side were both diabetic. Has not had history of calluses in the past, or been treated before.     Past Medical History:   Diagnosis Date     Edema     No Comments Provided     Encounter for removal and reinsertion of intrauterine contraceptive device     05,IUD placement, Removed     Excessive and frequent menstruation with irregular cycle     2017     Major depressive disorder, single episode     No Comments Provided     Personal history of other medical treatment (CODE)     G3, P2-0-1-2 with history of spontaneous      Personal history of other medical treatment (CODE)     No Comments Provided     Uncomplicated asthma     No Comments Provided         Current Outpatient Medications   Medication Sig Dispense Refill     acetaminophen (TYLENOL) 500 MG tablet Take 1 tablet (500 mg) by mouth every 6 hours as needed for fever or pain 100 tablet 5     albuterol (VENTOLIN HFA) 108 (90 Base) MCG/ACT inhaler Inhale 1-2 puffs into the lungs every 4 hours as needed for shortness of breath / dyspnea or wheezing 18 g 5     ARIPiprazole (ABILIFY) 15 MG tablet Take 15 mg by mouth daily [place]       cholecalciferol (VITAMIN D3) 5000 units (125 mcg) capsule Take 1 capsule (5,000 Units) by mouth  "daily 100 capsule 3     cyanocobalamin (CYANOCOBALAMIN) 1000 MCG/ML injection INJECT 1ML INTRAMUSCULARLY EVERY 2 WEEKS 6 mL 2     diclofenac (VOLTAREN) 1 % topical gel Apply 4 g topically 4 times daily 150 g 3     famotidine (PEPCID) 20 MG tablet Take 1 tablet (20 mg) by mouth 2 times daily 180 tablet 3     gabapentin (NEURONTIN) 300 MG capsule 300 mg po BID and 300 mg po PRN at Noon -- Rx by Psych -- Fabienne Duncan       hydrOXYzine (ATARAX) 50 MG tablet Take 50 mg by mouth 2 times daily as needed [place]       hydrOXYzine (VISTARIL) 50 MG capsule Take 50 mg by mouth 2 times daily as needed [place]       ibuprofen (ADVIL/MOTRIN) 200 MG tablet Take 600 mg by mouth every 6 hours as needed for pain       lamoTRIgine (LAMICTAL) 200 MG tablet [place]  2     lamoTRIgine (LAMICTAL) 25 MG tablet Take 1 tablet by mouth At Bedtime With 200 mg  [place]  2     methocarbamol (ROBAXIN) 500 MG tablet Take 1 tablet (500 mg) by mouth 4 times daily as needed for muscle spasms       montelukast (SINGULAIR) 10 MG tablet TAKE 1 TABLET (10 MG) BY MOUTH AT BEDTIME 90 tablet 3     oxybutynin ER (DITROPAN-XL) 10 MG 24 hr tablet TAKE 1 TABLET (10 MG) BY MOUTH DAILY 90 tablet 2     SF 1.1 % GEL topical gel BRUSH, SWISH TOOTHPASTE FOR 30 SECONDS AND SPIT BEFORE BED. DO NOT RINSE, EAT OR DRINK FOR 30 MINUTES       traZODone (DESYREL) 50 MG tablet TAKE 1/2 TABLET TO 1 TABLET BY MOUTH NIGHTLY AT BEDTIME AS NEEDED FOR SLEEP       Tuberculin-Allergy Syringes (B-D TB SYRINGE) 25G X 5/8\" 1 ML MISC USE TO INJECT B-12 INTRAMUSCULARLY EVERY 2 WEEKS 50 each 0     vilazodone (VIIBRYD) 40 MG TABS tablet Take 40 mg by mouth daily with food [place]         Allergies   Allergen Reactions     Clonazepam Other (See Comments)     Causes Violence and aggresssion     Hydrocodone-Acetaminophen      Other reaction(s): Seizures  Can take Percocet without difficulty.     Lorazepam Other (See Comments)     Causes Violence and aggresssion     Sertraline Other " "(See Comments)     Caused suicidally      Bupropion Other (See Comments)     Caused Major Depression     Dust Mite Extract      Other reaction(s): Asthma symptoms     Lithium Other (See Comments)     Mood alteration     Pollen Extract      Other reaction(s): Asthma symptoms     Risperidone Other (See Comments)     Agitation       Sulfa Drugs Itching     Trichophyton      Other reaction(s): Asthma symptoms     Valproic Acid Other (See Comments)     Weight gain       ROS:  Pertinent positives and negatives are noted in HPI.    EXAM:  /80   Pulse 68   Temp 97.7  F (36.5  C)   Resp 16   Ht 1.683 m (5' 6.25\")   Wt 133.1 kg (293 lb 6.4 oz)   SpO2 99%   BMI 47.00 kg/m    General appearance: well appearing obese female, in no acute distress  Musculoskeletal: Bilateral flat foot.   Dermatological: Callus present near 5th MTP joint of right foot. Tender to direct palpation, but not surrounding area. Sensation of foot intact. Calluses on on left foot near MTP joint of 1st and 3rd toe. Non-tender to palpation. Sensation intact.   Psychological: normal affect, alert and pleasant    ASSESSMENT AND PLAN:    1. Pes planus of both feet    2. Callus of foot    3. Bilateral foot pain        Discussed and agreed upon with patient for a referral for footcare with Maria Del Rosario Cox NP for treatment of calluses. Discussed the use of epsom salt foot soaks to help with dry and rough skin. Advised that she can file calluses after soaks to help with rough skin. Also referred to orthotics specialist to help with bilateral pes planus.     DASHA Lowry CNP..................12/3/2021 10:27 AM    I was present with the physician assistant student who participated in the service and in the documentation of this note.  I have verified the history and personally performed a physical exam and medical decision making, and have verified the content of the note, which accurately reflects my assessment of the patient and the plan of " care.

## 2023-03-10 DIAGNOSIS — J45.40 MODERATE PERSISTENT ASTHMA WITHOUT COMPLICATION: ICD-10-CM

## 2023-03-12 RX ORDER — ALBUTEROL SULFATE 90 UG/1
AEROSOL, METERED RESPIRATORY (INHALATION)
Qty: 18 G | Refills: 5 | Status: SHIPPED | OUTPATIENT
Start: 2023-03-12 | End: 2023-03-17

## 2023-03-14 ENCOUNTER — TELEPHONE (OUTPATIENT)
Dept: INTERNAL MEDICINE | Facility: OTHER | Age: 48
End: 2023-03-14
Payer: MEDICARE

## 2023-03-14 DIAGNOSIS — N18.2 CKD (CHRONIC KIDNEY DISEASE) STAGE 2, GFR 60-89 ML/MIN: ICD-10-CM

## 2023-03-14 DIAGNOSIS — E66.01 MORBID OBESITY (H): ICD-10-CM

## 2023-03-14 DIAGNOSIS — R73.9 ELEVATED RANDOM BLOOD GLUCOSE LEVEL: Primary | ICD-10-CM

## 2023-03-14 PROBLEM — E66.9 OBESITY: Status: ACTIVE | Noted: 2023-03-14

## 2023-03-14 NOTE — TELEPHONE ENCOUNTER
Patient requesting a referral be placed for her to see a nutritionist.  She does have a physical on 3/17 with Dr. Curiel but she wanted referral placed before then.  Amy Berkowitz on 3/14/2023 at 10:09 AM

## 2023-03-17 ENCOUNTER — OFFICE VISIT (OUTPATIENT)
Dept: INTERNAL MEDICINE | Facility: OTHER | Age: 48
End: 2023-03-17
Attending: INTERNAL MEDICINE
Payer: MEDICARE

## 2023-03-17 VITALS
TEMPERATURE: 97.2 F | WEIGHT: 281 LBS | OXYGEN SATURATION: 100 % | HEART RATE: 68 BPM | HEIGHT: 67 IN | SYSTOLIC BLOOD PRESSURE: 112 MMHG | DIASTOLIC BLOOD PRESSURE: 74 MMHG | BODY MASS INDEX: 44.1 KG/M2

## 2023-03-17 DIAGNOSIS — F31.32 BIPOLAR I DISORDER, MOST RECENT EPISODE DEPRESSED, MODERATE (H): Chronic | ICD-10-CM

## 2023-03-17 DIAGNOSIS — F31.32 BIPOLAR AFFECTIVE DISORDER, CURRENTLY DEPRESSED, MODERATE (H): ICD-10-CM

## 2023-03-17 DIAGNOSIS — E55.9 VITAMIN D DEFICIENCY: Chronic | ICD-10-CM

## 2023-03-17 DIAGNOSIS — Z71.85 VACCINE COUNSELING: ICD-10-CM

## 2023-03-17 DIAGNOSIS — N39.41 URGE INCONTINENCE: ICD-10-CM

## 2023-03-17 DIAGNOSIS — F60.3 BORDERLINE PERSONALITY DISORDER (H): Chronic | ICD-10-CM

## 2023-03-17 DIAGNOSIS — Z00.00 ENCOUNTER FOR MEDICARE ANNUAL WELLNESS EXAM: ICD-10-CM

## 2023-03-17 DIAGNOSIS — E66.01 MORBID OBESITY (H): ICD-10-CM

## 2023-03-17 DIAGNOSIS — I89.0 LYMPHEDEMA OF BOTH LOWER EXTREMITIES: Chronic | ICD-10-CM

## 2023-03-17 DIAGNOSIS — J45.40 MODERATE PERSISTENT ASTHMA WITHOUT COMPLICATION: ICD-10-CM

## 2023-03-17 DIAGNOSIS — F10.21 ALCOHOL USE DISORDER, MODERATE, IN SUSTAINED REMISSION, DEPENDENCE (H): ICD-10-CM

## 2023-03-17 DIAGNOSIS — R87.619 ABNORMAL CERVICAL PAPANICOLAOU SMEAR, UNSPECIFIED ABNORMAL PAP FINDING: ICD-10-CM

## 2023-03-17 DIAGNOSIS — K21.9 GASTROESOPHAGEAL REFLUX DISEASE WITHOUT ESOPHAGITIS: ICD-10-CM

## 2023-03-17 DIAGNOSIS — E53.8 VITAMIN B12 DEFICIENCY: Primary | ICD-10-CM

## 2023-03-17 DIAGNOSIS — N18.2 CKD (CHRONIC KIDNEY DISEASE) STAGE 2, GFR 60-89 ML/MIN: ICD-10-CM

## 2023-03-17 PROBLEM — M23.8X1 CREPITUS OF JOINT OF RIGHT KNEE: Status: RESOLVED | Noted: 2017-04-20 | Resolved: 2023-03-17

## 2023-03-17 PROCEDURE — 99214 OFFICE O/P EST MOD 30 MIN: CPT | Mod: 25 | Performed by: INTERNAL MEDICINE

## 2023-03-17 PROCEDURE — 99207 PR ANNUAL WELLNESS VISIT, PPS, SUBSEQUENT STAT: CPT | Performed by: INTERNAL MEDICINE

## 2023-03-17 PROCEDURE — G0463 HOSPITAL OUTPT CLINIC VISIT: HCPCS

## 2023-03-17 RX ORDER — OXYBUTYNIN CHLORIDE 10 MG/1
10 TABLET, EXTENDED RELEASE ORAL DAILY
Qty: 90 TABLET | Refills: 4 | Status: SHIPPED | OUTPATIENT
Start: 2023-03-17 | End: 2023-06-09

## 2023-03-17 RX ORDER — BUSPIRONE HYDROCHLORIDE 10 MG/1
1 TABLET ORAL
COMMUNITY
Start: 2023-02-27

## 2023-03-17 RX ORDER — MONTELUKAST SODIUM 10 MG/1
10 TABLET ORAL AT BEDTIME
Qty: 90 TABLET | Refills: 4 | Status: SHIPPED | OUTPATIENT
Start: 2023-03-17 | End: 2024-04-04

## 2023-03-17 RX ORDER — FAMOTIDINE 20 MG/1
20 TABLET, FILM COATED ORAL 2 TIMES DAILY
Qty: 180 TABLET | Refills: 4 | Status: SHIPPED | OUTPATIENT
Start: 2023-03-17 | End: 2024-03-30

## 2023-03-17 RX ORDER — LAMOTRIGINE 100 MG/1
100 TABLET ORAL 3 TIMES DAILY
COMMUNITY
Start: 2023-03-17

## 2023-03-17 RX ORDER — CYANOCOBALAMIN 1000 UG/ML
INJECTION, SOLUTION INTRAMUSCULAR; SUBCUTANEOUS
Qty: 6 ML | Refills: 11 | Status: SHIPPED | OUTPATIENT
Start: 2023-03-17 | End: 2024-03-15

## 2023-03-17 RX ORDER — ALBUTEROL SULFATE 90 UG/1
1-2 AEROSOL, METERED RESPIRATORY (INHALATION) EVERY 4 HOURS PRN
Qty: 18 G | Refills: 11 | Status: SHIPPED | OUTPATIENT
Start: 2023-03-17 | End: 2024-04-18

## 2023-03-17 RX ORDER — ATOMOXETINE 25 MG/1
CAPSULE ORAL
COMMUNITY
Start: 2023-03-08

## 2023-03-17 RX ORDER — PRAZOSIN HYDROCHLORIDE 1 MG/1
CAPSULE ORAL PRN
COMMUNITY
Start: 2023-03-08

## 2023-03-17 ASSESSMENT — ASTHMA QUESTIONNAIRES
ACT_TOTALSCORE: 19
QUESTION_5 LAST FOUR WEEKS HOW WOULD YOU RATE YOUR ASTHMA CONTROL: SOMEWHAT CONTROLLED
QUESTION_1 LAST FOUR WEEKS HOW MUCH OF THE TIME DID YOUR ASTHMA KEEP YOU FROM GETTING AS MUCH DONE AT WORK, SCHOOL OR AT HOME: A LITTLE OF THE TIME
QUESTION_2 LAST FOUR WEEKS HOW OFTEN HAVE YOU HAD SHORTNESS OF BREATH: ONCE OR TWICE A WEEK
QUESTION_4 LAST FOUR WEEKS HOW OFTEN HAVE YOU USED YOUR RESCUE INHALER OR NEBULIZER MEDICATION (SUCH AS ALBUTEROL): ONCE A WEEK OR LESS
QUESTION_3 LAST FOUR WEEKS HOW OFTEN DID YOUR ASTHMA SYMPTOMS (WHEEZING, COUGHING, SHORTNESS OF BREATH, CHEST TIGHTNESS OR PAIN) WAKE YOU UP AT NIGHT OR EARLIER THAN USUAL IN THE MORNING: ONCE OR TWICE
ACT_TOTALSCORE: 19

## 2023-03-17 ASSESSMENT — ENCOUNTER SYMPTOMS
SORE THROAT: 0
ABDOMINAL PAIN: 0
NAUSEA: 0
NERVOUS/ANXIOUS: 1
DYSURIA: 0
FREQUENCY: 0
PARESTHESIAS: 0
BREAST MASS: 0
PALPITATIONS: 0
FEVER: 0
COUGH: 0
MYALGIAS: 0
HEARTBURN: 1
SHORTNESS OF BREATH: 1
CHILLS: 0
ARTHRALGIAS: 1
EYE PAIN: 0
HEMATURIA: 0
JOINT SWELLING: 0
CONSTIPATION: 0
BRUISES/BLEEDS EASILY: 1
WEAKNESS: 0
DIZZINESS: 0
DIARRHEA: 0
HEMATOCHEZIA: 0
HEADACHES: 0

## 2023-03-17 ASSESSMENT — PAIN SCALES - GENERAL: PAINLEVEL: NO PAIN (0)

## 2023-03-17 ASSESSMENT — ACTIVITIES OF DAILY LIVING (ADL)
CURRENT_FUNCTION: MONEY MANAGEMENT REQUIRES ASSISTANCE
CURRENT_FUNCTION: MEDICATION ADMINISTRATION REQUIRES ASSISTANCE

## 2023-03-17 NOTE — PATIENT INSTRUCTIONS
Blood pressure is well controlled.     Medications refilled.   Last labs were stable.     Return in approximately 1 year, or sooner as needed for follow-up with Dr. Curiel.  - Annual Follow-up / Physical - Medicare Annual Wellness Visit     Clinic : [phone]  Appointment line: [phone]   Patient Education   Personalized Prevention Plan  You are due for the preventive services outlined below.  Your care team is available to assist you in scheduling these services.  If you have already completed any of these items, please share that information with your care team to update in your medical record.  Health Maintenance Due   Topic Date Due     Kidney Microalbumin Urine Test  Never done     Colorectal Cancer Screening  Never done     COVID-19 Vaccine (4 - Booster for Moderna series) 01/14/2022     Flu Vaccine (1) 09/01/2022     Asthma Action Plan - yearly  11/04/2022     HPV Screening  03/11/2023     PAP Smear  03/11/2023     Cholesterol Lab  03/18/2023     Preventive Health Recommendations    See your health care provider every year to    Review health changes.     Discuss preventive care.      Review your medicines if your doctor has prescribed any.    You no longer need a yearly Pap test unless you've had an abnormal Pap test in the past 10 years. If you have vaginal symptoms, such as bleeding or discharge, be sure to talk with your provider about a Pap test.    Every 1 to 2 years, have a mammogram.  If you are over 69, talk with your health care provider about whether or not you want to continue having screening mammograms.    Every 10 years, have a colonoscopy. Or, have a yearly FIT test (stool test). These exams will check for colon cancer.     Have a cholesterol test every 5 years, or more often if your doctor advises it.     Have a diabetes test (fasting glucose) every three years. If you are at risk for diabetes, you should have this test more often.     At age 65, have a bone density scan  (DEXA) to check for osteoporosis (brittle bone disease).    Shots:    Get a flu shot each year.    Get a tetanus shot every 10 years.    Talk to your doctor about your pneumonia vaccines. There are now two you should receive - Pneumovax (PPSV 23) and Prevnar (PCV 13).    Talk to your pharmacist about the shingles vaccine.    Talk to your doctor about the hepatitis B vaccine.    Nutrition:     Eat at least 5 servings of fruits and vegetables each day.    Eat whole-grain bread, whole-wheat pasta and brown rice instead of white grains and rice.    Get adequate Calcium and Vitamin D.     Lifestyle    Exercise at least 150 minutes a week (30 minutes a day, 5 days a week). This will help you control your weight and prevent disease.    Limit alcohol to one drink per day.    No smoking.     Wear sunscreen to prevent skin cancer.     See your dentist twice a year for an exam and cleaning.    See your eye doctor every 1 to 2 years to screen for conditions such as glaucoma, macular degeneration and cataracts.    Personalized Prevention Plan  You are due for the preventive services outlined below.  Your care team is available to assist you in scheduling these services.  If you have already completed any of these items, please share that information with your care team to update in your medical record.  Health Maintenance   Topic Date Due     MICROALBUMIN  Never done     COLORECTAL CANCER SCREENING  Never done     COVID-19 Vaccine (4 - Booster for Moderna series) 01/14/2022     INFLUENZA VACCINE (1) 09/01/2022     ASTHMA ACTION PLAN  11/04/2022     HPV TEST  03/11/2023     PAP  03/11/2023     LIPID  03/18/2023     MAMMO SCREENING  07/19/2023     ASTHMA CONTROL TEST  09/17/2023     BMP  10/29/2023     URINE DRUG SCREEN  10/29/2023     MEDICARE ANNUAL WELLNESS VISIT  03/17/2024     DTAP/TDAP/TD IMMUNIZATION (5 - Td or Tdap) 01/05/2026     ADVANCE CARE PLANNING  08/02/2026     Pneumococcal Vaccine: Pediatrics (0 to 5 Years) and  At-Risk Patients (6 to 64 Years) (3 - PPSV23 if available, else PCV20) 01/14/2042     HEPATITIS C SCREENING  Completed     HIV SCREENING  Completed     PHQ-2 (once per calendar year)  Completed     URINALYSIS  Completed     HEPATITIS B IMMUNIZATION  Completed     IPV IMMUNIZATION  Aged Out     MENINGITIS IMMUNIZATION  Aged Out

## 2023-03-17 NOTE — PROGRESS NOTES
"   SUBJECTIVE:   CC: Rosi is an 48 year old who presents for preventive health visit.     Patient has been advised of split billing requirements and indicates understanding: Yes  Healthy Habits:     In general, how would you rate your overall health?  Good    Frequency of exercise:  2-3 days/week    Duration of exercise:  30-45 minutes    Do you usually eat at least 4 servings of fruit and vegetables a day, include whole grains    & fiber and avoid regularly eating high fat or \"junk\" foods?  Yes    Taking medications regularly:  Yes    Barriers to taking medications:  None    Medication side effects:  Not applicable    Ability to successfully perform activities of daily living:  Medication administration requires assistance and money management requires assistance    Home Safety:  No safety concerns identified    Hearing Impairment:  No hearing concerns    In the past 6 months, have you been bothered by leaking of urine? Yes    In general, how would you rate your overall mental or emotional health?  Fair      PHQ-2 Total Score: 0    Additional concerns today:  Yes    Today's PHQ-2 Score:   PHQ-2 (  [employer]) 3/17/2023   Q1: Little interest or pleasure in doing things 0   Q2: Feeling down, depressed or hopeless 0   PHQ-2 Score 0   PHQ-2 Total Score (12-17 Years)- Positive if 3 or more points; Administer PHQ-A if positive -   Q1: Little interest or pleasure in doing things Several days   Q2: Feeling down, depressed or hopeless Not at all   PHQ-2 Score 1     Social History     Tobacco Use     Smoking status: Former     Packs/day: 1.00     Years: 3.00     Pack years: 3.00     Types: Cigarettes     Quit date: 2003     Years since quittin.2     Smokeless tobacco: Never   Substance Use Topics     Alcohol use: Not Currently     Alcohol/week: 0.0 standard drinks     Alcohol Use 3/17/2023   Prescreen: >3 drinks/day or >7 drinks/week? No   No flowsheet data found.    Reviewed orders with patient.  Reviewed health " maintenance and updated orders accordingly - Yes    Breast Cancer Screening:  Any new diagnosis of family breast, ovarian, or bowel cancer? No    FHS-7:   Breast CA Risk Assessment (FHS-7) 7/19/2022   Did any of your first-degree relatives have breast or ovarian cancer? No   Did any of your relatives have bilateral breast cancer? No   Did any man in your family have breast cancer? No   Did any woman in your family have breast and ovarian cancer? No   Did any woman in your family have breast cancer before age 50 y? No   Do you have 2 or more relatives with breast and/or ovarian cancer? Yes   Do you have 2 or more relatives with breast and/or bowel cancer? No     Mammogram Screening: Recommended annual mammography  Pertinent mammograms are reviewed under the imaging tab.    History of abnormal Pap smear: YES - other categories - see link Cervical Cytology Screening Guidelines  PAP / HPV Latest Ref Rng & Units 3/11/2020   PAP (Historical) - NIL   HPV16 NEG:Negative Negative   HPV18 NEG:Negative Negative   HRHPV NEG:Negative Negative     Reviewed and updated as needed this visit by clinical staff   Tobacco  Allergies  Meds  Problems  Med Hx  Surg Hx  Fam Hx          Reviewed and updated as needed this visit by Provider   Tobacco  Allergies  Meds  Problems  Med Hx  Surg Hx  Fam Hx           Review of Systems   Constitutional: Negative for chills and fever.   HENT: Positive for congestion and ear pain. Negative for hearing loss and sore throat.    Eyes: Negative for pain and visual disturbance.   Respiratory: Positive for shortness of breath. Negative for cough.    Cardiovascular: Positive for peripheral edema. Negative for chest pain and palpitations.   Gastrointestinal: Positive for heartburn. Negative for abdominal pain, constipation, diarrhea, hematochezia and nausea.   Breasts:  Negative for tenderness, breast mass and discharge.   Genitourinary: Positive for pelvic pain and urgency. Negative for  "dysuria, frequency, genital sores, hematuria, vaginal bleeding and vaginal discharge.   Musculoskeletal: Positive for arthralgias. Negative for joint swelling and myalgias.   Skin: Negative for rash.   Allergic/Immunologic: Negative for immunocompromised state.   Neurological: Negative for dizziness, weakness, headaches and paresthesias.   Hematological: Bruises/bleeds easily.   Psychiatric/Behavioral: Positive for mood changes. The patient is nervous/anxious.         OBJECTIVE:   /74   Pulse 68   Temp 97.2  F (36.2  C) (Tympanic)   Ht 1.689 m (5' 6.5\")   Wt 127.5 kg (281 lb)   SpO2 100%   Breastfeeding No   BMI 44.68 kg/m    Physical Exam  Constitutional:       Appearance: She is obese.   Eyes:      General: No scleral icterus.     Conjunctiva/sclera: Conjunctivae normal.   Cardiovascular:      Rate and Rhythm: Normal rate and regular rhythm.   Pulmonary:      Effort: Pulmonary effort is normal.   Abdominal:      Palpations: Abdomen is soft.      Tenderness: There is no abdominal tenderness.   Musculoskeletal:         General: No deformity. Normal range of motion.      Comments: Lymphedema of legs   Skin:     General: Skin is warm and dry.      Findings: No rash.   Neurological:      Mental Status: She is alert. Mental status is at baseline.   Psychiatric:         Mood and Affect: Mood normal.         Behavior: Behavior normal.       Diagnostic Test Results:  Labs reviewed in Epic    ASSESSMENT/PLAN:       ICD-10-CM    1. Vitamin B12 deficiency  E53.8 cyanocobalamin (CYANOCOBALAMIN) 1000 MCG/ML injection      2. Gastroesophageal reflux disease without esophagitis  K21.9 famotidine (PEPCID) 20 MG tablet      3. Vitamin D deficiency  E55.9 cholecalciferol (VITAMIN D3) 125 mcg (5000 units) capsule      4. Bipolar affective disorder, currently depressed, moderate (H)  F31.32 lamoTRIgine (LAMICTAL) 100 MG tablet      5. Moderate persistent asthma without complication  J45.40 montelukast (SINGULAIR) 10 MG " tablet     albuterol (VENTOLIN HFA) 108 (90 Base) MCG/ACT inhaler      6. Urge incontinence  N39.41 oxybutynin ER (DITROPAN XL) 10 MG 24 hr tablet      7. Abnormal cervical Papanicolaou smear, unspecified abnormal pap finding  R87.619       8. Alcohol use disorder, moderate, in sustained remission, dependence (H)  F10.21       9. Bipolar I disorder, most recent episode depressed, moderate (H)  F31.32       10. Borderline personality disorder (H)  F60.3       11. CKD (chronic kidney disease) stage 2, GFR 60-89 ml/min  N18.2       12. Lymphedema of both lower extremities  I89.0       13. Morbid obesity (H)  E66.01       14. Vaccine counseling  Z71.85       15. Encounter for Medicare annual wellness exam  Z00.00       Patient presents for Medicare annual wellness visit as well as follow-up multiple issues.    Vitamin B12 deficiency.  Ongoing.  She would like updated orders of her B12 shots.  Home care gives them to her.    Heartburn and reflux, has been doing much better with regular use of famotidine.  Needs refills.    Vitamin D deficiency.  Ongoing.  Continues with oral replacement.  Needs refills of vitamin D supplements.    Bipolar disorder, chronic.  Ongoing.  Continues with Lamictal.  Had her dosing adjusted through her mental health provider    Moderate persistent asthma, currently well controlled.  Doing well with Singulair, albuterol inhaler.  Needs refills.    Urge incontinence.  Ongoing problems.  Oxybutynin has been reasonably effective.  She would like refills.    History of abnormal Pap smear.  Appears that her last 1 was normal.  Uncertain when she would need an additional Pap smear.  Recommend follow-up with gynecology.    History of alcohol use disorder, currently in remission.    Borderline personality disorder, currently stable.    Stage II chronic kidney disease.  Kidney function had been slowly declining.  Encouraged NSAID avoidance.    Lower extremity lymphedema, chronic, stable.    Obesity,  "Ongoing. Discussed need for reduced oral caloric intake, weight loss, regular exercise and reduce carbohydrate/sugar intake.  -Has been working to lose weight, eat a healthy diet.  Nutrition/dietary referral placed.    Patient has been advised of split billing requirements and indicates understanding: Yes    COUNSELING:  Reviewed preventive health counseling, as reflected in patient instructions  Special attention given to:        Regular exercise       Healthy diet/nutrition       Vision screening       Hearing screening       Immunizations    BMI:   Estimated body mass index is 44.68 kg/m  as calculated from the following:    Height as of this encounter: 1.689 m (5' 6.5\").    Weight as of this encounter: 127.5 kg (281 lb).   Weight management plan: Discussed healthy diet and exercise guidelines      She reports that she quit smoking about 20 years ago. Her smoking use included cigarettes. She has a 3.00 pack-year smoking history. She has never used smokeless tobacco.          Lucio Curiel MD  [facility] AND [facility]  "

## 2023-03-20 ENCOUNTER — OFFICE VISIT (OUTPATIENT)
Dept: FAMILY MEDICINE | Facility: OTHER | Age: 48
End: 2023-03-20
Attending: PHYSICIAN ASSISTANT
Payer: MEDICARE

## 2023-03-20 VITALS
TEMPERATURE: 98 F | BODY MASS INDEX: 44.36 KG/M2 | DIASTOLIC BLOOD PRESSURE: 76 MMHG | OXYGEN SATURATION: 96 % | WEIGHT: 279 LBS | HEART RATE: 84 BPM | RESPIRATION RATE: 16 BRPM | SYSTOLIC BLOOD PRESSURE: 120 MMHG

## 2023-03-20 DIAGNOSIS — Z12.4 CERVICAL CANCER SCREENING: Primary | ICD-10-CM

## 2023-03-20 DIAGNOSIS — Z01.419 WELL WOMAN EXAM: ICD-10-CM

## 2023-03-20 PROCEDURE — 99213 OFFICE O/P EST LOW 20 MIN: CPT | Performed by: PHYSICIAN ASSISTANT

## 2023-03-20 PROCEDURE — 87624 HPV HI-RISK TYP POOLED RSLT: CPT | Mod: ZL | Performed by: PHYSICIAN ASSISTANT

## 2023-03-20 PROCEDURE — G0123 SCREEN CERV/VAG THIN LAYER: HCPCS | Performed by: PHYSICIAN ASSISTANT

## 2023-03-20 PROCEDURE — G0463 HOSPITAL OUTPT CLINIC VISIT: HCPCS

## 2023-03-20 ASSESSMENT — ANXIETY QUESTIONNAIRES
6. BECOMING EASILY ANNOYED OR IRRITABLE: NOT AT ALL
3. WORRYING TOO MUCH ABOUT DIFFERENT THINGS: NOT AT ALL
8. IF YOU CHECKED OFF ANY PROBLEMS, HOW DIFFICULT HAVE THESE MADE IT FOR YOU TO DO YOUR WORK, TAKE CARE OF THINGS AT HOME, OR GET ALONG WITH OTHER PEOPLE?: SOMEWHAT DIFFICULT
7. FEELING AFRAID AS IF SOMETHING AWFUL MIGHT HAPPEN: NOT AT ALL
2. NOT BEING ABLE TO STOP OR CONTROL WORRYING: NOT AT ALL
1. FEELING NERVOUS, ANXIOUS, OR ON EDGE: SEVERAL DAYS
7. FEELING AFRAID AS IF SOMETHING AWFUL MIGHT HAPPEN: NOT AT ALL
IF YOU CHECKED OFF ANY PROBLEMS ON THIS QUESTIONNAIRE, HOW DIFFICULT HAVE THESE PROBLEMS MADE IT FOR YOU TO DO YOUR WORK, TAKE CARE OF THINGS AT HOME, OR GET ALONG WITH OTHER PEOPLE: SOMEWHAT DIFFICULT
GAD7 TOTAL SCORE: 1
5. BEING SO RESTLESS THAT IT IS HARD TO SIT STILL: NOT AT ALL
4. TROUBLE RELAXING: NOT AT ALL
GAD7 TOTAL SCORE: 1
GAD7 TOTAL SCORE: 1

## 2023-03-20 ASSESSMENT — PATIENT HEALTH QUESTIONNAIRE - PHQ9
SUM OF ALL RESPONSES TO PHQ QUESTIONS 1-9: 1
10. IF YOU CHECKED OFF ANY PROBLEMS, HOW DIFFICULT HAVE THESE PROBLEMS MADE IT FOR YOU TO DO YOUR WORK, TAKE CARE OF THINGS AT HOME, OR GET ALONG WITH OTHER PEOPLE: SOMEWHAT DIFFICULT
SUM OF ALL RESPONSES TO PHQ QUESTIONS 1-9: 1

## 2023-03-20 NOTE — NURSING NOTE
Patient presents today for pap smear.    Medication Reconciliation Complete    Maricarmen Gutierrez LPN  3/20/2023 3:19 PM

## 2023-03-20 NOTE — PROGRESS NOTES
Assessment & Plan     1. Well woman exam  2. Cervical cancer screening  - Pap Screen with HPV - recommended age 30 - 65 years  -Vital signs are stable.  Pleasant, alert 48-year-old female presented for well woman examination today.  Breast examination was performed, no palpable lumps/nodules or abnormalities.  Recommend to proceed with mammogram in July 2023, she will be contacted to schedule this.  HPV and Pap cotesting were performed today, anticipate this will take approximately 1 week to return, we will update patient on results once this is available, as well as recommendation for future Pap screening guideline recommendations based on results.  Discussed that she may experience a small amount of bleeding after Pap smear, this is normal.  Strict return precautions reviewed. Patient is in agreement and understanding of the above treatment plan. All questions and concerns were addressed and answered to patient's satisfaction. AVS reviewed with patient.     Return if symptoms worsen or fail to improve.    Ena Estrada PA-C  [facility] AND [facility]   Rosi is a 48 year old, presenting for the following health issues:  Gyn Exam      History of Present Illness     Asthma:  She presents for follow up of asthma.  She has no cough, no wheezing, and some shortness of breath. She is using a relief medication a few times a month. She does not miss any doses of her controller medication throughout the week.Patient is aware of the following triggers: cold air, exercise or sports, humidity, mold and pollens. The patient has not had a visit to the Emergency Room, Urgent Care or Hospital due to asthma since the last clinic visit.     Mental Health Follow-up:  Patient presents to follow-up on Depression & Anxiety.Patient's depression since last visit has been:  No change  The patient is not having other symptoms associated with depression.  Patient's anxiety since last visit has been:  Better  The  patient is not having other symptoms associated with anxiety.  Any significant life events: grief or loss  Patient is not feeling anxious or having panic attacks.  Patient has no concerns about alcohol or drug use.    She eats 2-3 servings of fruits and vegetables daily.She consumes 0 sweetened beverage(s) daily.She exercises with enough effort to increase her heart rate 20 to 29 minutes per day.  She exercises with enough effort to increase her heart rate 4 days per week.   She is taking medications regularly.    Today's PHQ-9         PHQ-9 Total Score: 1    PHQ-9 Q9 Thoughts of better off dead/self-harm past 2 weeks :   Not at all    How difficult have these problems made it for you to do your work, take care of things at home, or get along with other people: Somewhat difficult  Today's AGNIESZKA-7 Score: 1     Rosi is a 48-year-old female who presents today for a well woman examination (PAP and HPV testing, breast examination). She had her Medicare wellness visit with her primary care provider on 3/17/2023.    She states she had an abnormal Pap smear approximately 18 years ago, since this time her Pap smears have been normal.  She had an laparoscopic tubal ligation/ablation in 2009, since this timeframe she has not had a menstrual cycle.  She declines any irregular bleeding, vaginal discharge,.  She denies any urinary symptoms such as urgency, frequency, hematuria, dysuria, etc.     She declines any new breast lumps or bumps or skin lesions. She is due for mammogram in July, she will schedule this. No prior mammogram abnormalities to report.     Review of Systems   Constitutional, HEENT, cardiovascular, pulmonary, GI, , musculoskeletal, neuro, skin, endocrine and psych systems are negative, except as otherwise noted.      Objective    /76   Pulse 84   Temp 98  F (36.7  C)   Resp 16   Wt 126.6 kg (279 lb)   SpO2 96%   BMI 44.36 kg/m    Body mass index is 44.36 kg/m .  Physical Exam   GENERAL: healthy,  alert and no distress  NECK: no adenopathy, no asymmetry, masses, or scars and thyroid normal to palpation  RESP: lungs clear to auscultation - no rales, rhonchi or wheezes  BREAST: normal without masses, tenderness or nipple discharge and no palpable axillary masses or adenopathy  CV: regular rate and rhythm, normal S1 S2, no S3 or S4, no murmur, click or rub, no peripheral edema and peripheral pulses strong  ABDOMEN: soft, nontender, no hepatosplenomegaly, no masses and bowel sounds normal   (female): normal female external genitalia, normal urethral meatus , vaginal mucosa pink, moist, well rugated, normal cervix, adnexae, and uterus without masses. and chaperone present - PAP and HPV testing obtained.   MS: no gross musculoskeletal defects noted, no edema  SKIN: no suspicious lesions or rashes  PSYCH: mentation appears normal, affect normal/bright    Diagnostic testing: HPV and Pap smear testing performed today.

## 2023-03-24 LAB
BKR LAB AP GYN ADEQUACY: NORMAL
BKR LAB AP GYN INTERPRETATION: NORMAL
BKR LAB AP HPV REFLEX: NORMAL
BKR LAB AP PREVIOUS ABNORMAL: NORMAL
PATH REPORT.COMMENTS IMP SPEC: NORMAL
PATH REPORT.COMMENTS IMP SPEC: NORMAL
PATH REPORT.RELEVANT HX SPEC: NORMAL

## 2023-03-27 LAB
HUMAN PAPILLOMA VIRUS 16 DNA: NEGATIVE
HUMAN PAPILLOMA VIRUS 18 DNA: NEGATIVE
HUMAN PAPILLOMA VIRUS FINAL DIAGNOSIS: NORMAL
HUMAN PAPILLOMA VIRUS OTHER HR: NEGATIVE

## 2023-04-24 ENCOUNTER — OFFICE VISIT (OUTPATIENT)
Dept: FAMILY MEDICINE | Facility: OTHER | Age: 48
End: 2023-04-24
Attending: INTERNAL MEDICINE
Payer: MEDICARE

## 2023-04-24 VITALS — HEIGHT: 67 IN | WEIGHT: 279 LBS | BODY MASS INDEX: 43.79 KG/M2

## 2023-04-24 DIAGNOSIS — E66.01 MORBID OBESITY (H): ICD-10-CM

## 2023-04-24 PROCEDURE — 97802 MEDICAL NUTRITION INDIV IN: CPT | Mod: PN | Performed by: DIETITIAN, REGISTERED

## 2023-04-24 NOTE — PROGRESS NOTES
Rosi is here for MNT related to BMI 44.    She is here today with a staff member of the group home she lives at.     Rosi has lost over 100# in the past 7 years. Last MNT visit with MARGUERITE was 2019.     She reports she is doing well with the pattern of meals and support at her group home. She is hopeful to lose weight until her weight is 200#.     Body mass index is 44.36 kg/m .     Estimated needs:  Calories: 1700  CHO: 10 choices per day or 150 grams.   Pro: 70 grams    Rosi counts calories and carbs and this has helped her with accountability.     [facility] Also walks more in the summer.    Dx: BMI 44. Improving with weight loss over time, desired.    Education today: Discussed portion sizes and plans for balanced meals and snacks. Her meal plan:    B-2 CHO choices  L-3-4 CHO choices  D-3-4 CHO choices  Sn-0-1 CHO choices    Gave meal suggestions and portion size guidelines.     GOALS:  Continued weight loss 1-2# per week  Exercise 5-7 days per week    Rosi verbalized understanding of plan and goals. Compliance is expected. No f/u scheduled at this time but she will come in if she needs supports.    Time spent: 60 min    KRISTIN K. KLINEFELTER, RD on 4/24/2023 at 3:11 PM

## 2023-05-02 ENCOUNTER — TELEPHONE (OUTPATIENT)
Dept: INTERNAL MEDICINE | Facility: OTHER | Age: 48
End: 2023-05-02
Payer: MEDICARE

## 2023-05-02 DIAGNOSIS — N39.41 URGE INCONTINENCE: Primary | ICD-10-CM

## 2023-05-02 NOTE — TELEPHONE ENCOUNTER
Date of birth and last name verified.     did explain that Dr. Curiel is out of th office this afternoon.    He will address her wanting a referral tomorrow when he is back in the office.  I did explain if she hadn't heard anything by late Friday to call the clinic and ask to speak to someone in referrals  When I asked she has no questions at this time.

## 2023-05-02 NOTE — TELEPHONE ENCOUNTER
Patient would like a referral sent to [facility] in [place] for urology. She has incontinence issues.     Thanks,  Gisel Quintana on 5/2/2023 at 2:23 PM

## 2023-05-30 ENCOUNTER — TELEPHONE (OUTPATIENT)
Dept: INTERNAL MEDICINE | Facility: OTHER | Age: 48
End: 2023-05-30
Payer: MEDICARE

## 2023-05-30 NOTE — TELEPHONE ENCOUNTER
Patient was hoping to have some labs done as she has been having a lot of bruising. She would like DJS to order. Please advise.    Dorothea Edwards on 5/30/2023 at 4:24 PM

## 2023-05-31 NOTE — TELEPHONE ENCOUNTER
Date of birth and last name verified.  Is having bruising of her arms and legs for about a month.  Is having some pain at the sites.  Is tired  and having some emotional issues.  She would like to have her blood checked for different vitamins and iron that feel would be appropriate.    Please advise.    Okay with patient to leave a detailed message on her voice mail if she does not answer.    Camilo Murillo .......  5/31/2023  1:54 PM

## 2023-06-01 NOTE — TELEPHONE ENCOUNTER
Please call patient, schedule clinic appointment with an available provider.    Will be able to review potential labs to order and then place orders/collect blood work at the same visit.     Electronically signed by:  Lucio Curiel MD  on May 31, 2023 8:09 PM

## 2023-06-09 ENCOUNTER — OFFICE VISIT (OUTPATIENT)
Dept: FAMILY MEDICINE | Facility: OTHER | Age: 48
End: 2023-06-09
Attending: NURSE PRACTITIONER
Payer: MEDICARE

## 2023-06-09 ENCOUNTER — DOCUMENTATION ONLY (OUTPATIENT)
Dept: OTHER | Facility: CLINIC | Age: 48
End: 2023-06-09

## 2023-06-09 VITALS
RESPIRATION RATE: 20 BRPM | OXYGEN SATURATION: 97 % | TEMPERATURE: 97.7 F | BODY MASS INDEX: 42.35 KG/M2 | HEART RATE: 96 BPM | DIASTOLIC BLOOD PRESSURE: 82 MMHG | WEIGHT: 269.8 LBS | HEIGHT: 67 IN | SYSTOLIC BLOOD PRESSURE: 116 MMHG

## 2023-06-09 DIAGNOSIS — E53.8 VITAMIN B12 DEFICIENCY: Chronic | ICD-10-CM

## 2023-06-09 DIAGNOSIS — Z13.220 LIPID SCREENING: ICD-10-CM

## 2023-06-09 DIAGNOSIS — N18.2 CKD (CHRONIC KIDNEY DISEASE) STAGE 2, GFR 60-89 ML/MIN: ICD-10-CM

## 2023-06-09 DIAGNOSIS — T14.8XXA BRUISING: Primary | ICD-10-CM

## 2023-06-09 LAB
BASOPHILS # BLD AUTO: 0 10E3/UL (ref 0–0.2)
BASOPHILS NFR BLD AUTO: 1 %
CHOLEST SERPL-MCNC: 161 MG/DL
CREAT UR-MCNC: 45.5 MG/DL
EOSINOPHIL # BLD AUTO: 0.1 10E3/UL (ref 0–0.7)
EOSINOPHIL NFR BLD AUTO: 1 %
ERYTHROCYTE [DISTWIDTH] IN BLOOD BY AUTOMATED COUNT: 12.9 % (ref 10–15)
HCT VFR BLD AUTO: 43.7 % (ref 35–47)
HDLC SERPL-MCNC: 62 MG/DL
HGB BLD-MCNC: 14.2 G/DL (ref 11.7–15.7)
HOLD SPECIMEN: NORMAL
IMM GRANULOCYTES # BLD: 0 10E3/UL
IMM GRANULOCYTES NFR BLD: 0 %
IRON BINDING CAPACITY (ROCHE): 279 UG/DL (ref 240–430)
IRON SATN MFR SERPL: 50 % (ref 15–46)
IRON SERPL-MCNC: 139 UG/DL (ref 37–145)
LDLC SERPL CALC-MCNC: 83 MG/DL
LYMPHOCYTES # BLD AUTO: 1.5 10E3/UL (ref 0.8–5.3)
LYMPHOCYTES NFR BLD AUTO: 22 %
MCH RBC QN AUTO: 30.1 PG (ref 26.5–33)
MCHC RBC AUTO-ENTMCNC: 32.5 G/DL (ref 31.5–36.5)
MCV RBC AUTO: 93 FL (ref 78–100)
MICROALBUMIN UR-MCNC: <12 MG/L
MICROALBUMIN/CREAT UR: NORMAL MG/G{CREAT}
MONOCYTES # BLD AUTO: 0.6 10E3/UL (ref 0–1.3)
MONOCYTES NFR BLD AUTO: 9 %
NEUTROPHILS # BLD AUTO: 4.6 10E3/UL (ref 1.6–8.3)
NEUTROPHILS NFR BLD AUTO: 67 %
NONHDLC SERPL-MCNC: 99 MG/DL
NRBC # BLD AUTO: 0 10E3/UL
NRBC BLD AUTO-RTO: 0 /100
PLATELET # BLD AUTO: 271 10E3/UL (ref 150–450)
RBC # BLD AUTO: 4.72 10E6/UL (ref 3.8–5.2)
TRIGL SERPL-MCNC: 82 MG/DL
WBC # BLD AUTO: 6.8 10E3/UL (ref 4–11)

## 2023-06-09 PROCEDURE — 83550 IRON BINDING TEST: CPT | Mod: ZL | Performed by: NURSE PRACTITIONER

## 2023-06-09 PROCEDURE — 82570 ASSAY OF URINE CREATININE: CPT | Mod: ZL | Performed by: NURSE PRACTITIONER

## 2023-06-09 PROCEDURE — 80061 LIPID PANEL: CPT | Mod: ZL | Performed by: NURSE PRACTITIONER

## 2023-06-09 PROCEDURE — 99213 OFFICE O/P EST LOW 20 MIN: CPT | Performed by: NURSE PRACTITIONER

## 2023-06-09 PROCEDURE — 36415 COLL VENOUS BLD VENIPUNCTURE: CPT | Mod: ZL | Performed by: NURSE PRACTITIONER

## 2023-06-09 PROCEDURE — 85025 COMPLETE CBC W/AUTO DIFF WBC: CPT | Mod: ZL | Performed by: NURSE PRACTITIONER

## 2023-06-09 PROCEDURE — G0463 HOSPITAL OUTPT CLINIC VISIT: HCPCS

## 2023-06-09 RX ORDER — MIRABEGRON 25 MG/1
1 TABLET, FILM COATED, EXTENDED RELEASE ORAL DAILY
COMMUNITY
Start: 2023-06-08 | End: 2023-10-30

## 2023-06-09 ASSESSMENT — ASTHMA QUESTIONNAIRES
ACT_TOTALSCORE: 22
QUESTION_4 LAST FOUR WEEKS HOW OFTEN HAVE YOU USED YOUR RESCUE INHALER OR NEBULIZER MEDICATION (SUCH AS ALBUTEROL): ONCE A WEEK OR LESS
QUESTION_5 LAST FOUR WEEKS HOW WOULD YOU RATE YOUR ASTHMA CONTROL: WELL CONTROLLED
ACT_TOTALSCORE: 22
QUESTION_1 LAST FOUR WEEKS HOW MUCH OF THE TIME DID YOUR ASTHMA KEEP YOU FROM GETTING AS MUCH DONE AT WORK, SCHOOL OR AT HOME: NONE OF THE TIME
QUESTION_3 LAST FOUR WEEKS HOW OFTEN DID YOUR ASTHMA SYMPTOMS (WHEEZING, COUGHING, SHORTNESS OF BREATH, CHEST TIGHTNESS OR PAIN) WAKE YOU UP AT NIGHT OR EARLIER THAN USUAL IN THE MORNING: NOT AT ALL
QUESTION_2 LAST FOUR WEEKS HOW OFTEN HAVE YOU HAD SHORTNESS OF BREATH: ONCE OR TWICE A WEEK

## 2023-06-09 ASSESSMENT — PAIN SCALES - GENERAL: PAINLEVEL: NO PAIN (0)

## 2023-06-09 NOTE — PROGRESS NOTES
Assessment & Plan   Problem List Items Addressed This Visit        Digestive    Vitamin B12 deficiency (Chronic)       Urinary    CKD (chronic kidney disease) stage 2, GFR 60-89 ml/min    Relevant Orders    Iron & Iron Binding Capacity (Completed)    Albumin Random Urine Quantitative with Creat Ratio (Completed)   Other Visit Diagnoses     Bruising    -  Primary    Relevant Orders    CBC and Differential (Completed)    Iron & Iron Binding Capacity (Completed)    Lipid screening        Relevant Orders    Lipid Panel (Completed)      Iron level and CBC were obtained, no signs of low platelets or concerns for iron deficiency.  Reassurance was provided, bruising is likely related to minor trauma that she is not noticing.  She was also due for lipid panel and albumin urine, these were updated today and stable.    Review of the result(s) of each unique test - CBC, iron panel  Ordering of each unique test  24 minutes spent by me on the date of the encounter doing chart review, history and exam, documentation and further activities per the note           No follow-ups on file.    DASHA Lowry [facility] AND [facility]   Rosi is a 48 year old, presenting for the following health issues:  Consult    History of Present Illness       Reason for visit:  Check on vitamin in body  Symptom onset:  3-4 weeks ago  Symptom intensity:  Moderate  Symptom progression:  Worsening  Had these symptoms before:  Yes  Has tried/received treatment for these symptoms:  Yes  Previous treatment was successful:  Yes  Prior treatment description:  Iron  What makes it worse:  No  What makes it better:  No    She eats 2-3 servings of fruits and vegetables daily.She consumes 0 sweetened beverage(s) daily.She exercises with enough effort to increase her heart rate 20 to 29 minutes per day.  She exercises with enough effort to increase her heart rate 4 days per week.   She is taking medications regularly.     She  "comes in today for concerns about bruising.  She has noticed a couple bruises on her forearms and hand.  She does not remember bumping or hitting these.  She is wondering if she could have a deficiency in any labs.  She has not noticed any bleeding concerns, no blood in her urine, no blood in stool.  No nausea or vomiting.  She has not noticed any blood when she brushes her teeth.      Review of Systems   See above      Objective    /82   Pulse 96   Temp 97.7  F (36.5  C)   Resp 20   Ht 1.689 m (5' 6.5\")   Wt 122.4 kg (269 lb 12.8 oz)   SpO2 97%   BMI 42.89 kg/m    Body mass index is 42.89 kg/m .  Physical Exam   GENERAL: healthy, alert and no distress  EYES: Eyes grossly normal to inspection  RESP: lungs clear to auscultation - no rales, rhonchi or wheezes  CV: regular rate and rhythm, normal S1 S2  SKIN:  she has 3 small purplish green bruises to bilateral hands and forearm, no other bruising or signs of bleeding  NEURO: Normal strength and tone, mentation intact and speech normal  PSYCH: mentation appears normal, affect normal/bright    Results for orders placed or performed in visit on 06/09/23 (from the past 24 hour(s))   CBC and Differential    Narrative    The following orders were created for panel order CBC and Differential.  Procedure                               Abnormality         Status                     ---------                               -----------         ------                     CBC with platelets and d...[[id number]]                      Final result                 Please view results for these tests on the individual orders.   Iron & Iron Binding Capacity   Result Value Ref Range    Iron 139 37 - 145 ug/dL    Iron Binding Capacity 279 240 - 430 ug/dL    Iron Sat Index 50 (H) 15 - 46 %   Lipid Panel   Result Value Ref Range    Cholesterol 161 <200 mg/dL    Triglycerides 82 <150 mg/dL    Direct Measure HDL 62 >=50 mg/dL    LDL Cholesterol Calculated 83 <=100 mg/dL    Non HDL " Cholesterol 99 <130 mg/dL    Narrative    Cholesterol  Desirable:  <200 mg/dL    Triglycerides  Normal:  Less than 150 mg/dL  Borderline High:  150-199 mg/dL  High:  200-499 mg/dL  Very High:  Greater than or equal to 500 mg/dL    Direct Measure HDL  Female:  Greater than or equal to 50 mg/dL   Male:  Greater than or equal to 40 mg/dL    LDL Cholesterol  Desirable:  <100mg/dL  Above Desirable:  100-129 mg/dL   Borderline High:  130-159 mg/dL   High:  160-189 mg/dL   Very High:  >= 190 mg/dL    Non HDL Cholesterol  Desirable:  130 mg/dL  Above Desirable:  130-159 mg/dL  Borderline High:  160-189 mg/dL  High:  190-219 mg/dL  Very High:  Greater than or equal to 220 mg/dL   Albumin Random Urine Quantitative with Creat Ratio   Result Value Ref Range    Creatinine Urine mg/dL 45.5 mg/dL    Albumin Urine mg/L <12.0 mg/L    Albumin Urine mg/g Cr     Extra Tube    Narrative    The following orders were created for panel order Extra Tube.  Procedure                               Abnormality         Status                     ---------                               -----------         ------                     Extra Serum Separator Tu...[[id number]]                      In process                   Please view results for these tests on the individual orders.   CBC with platelets and differential   Result Value Ref Range    WBC Count 6.8 4.0 - 11.0 10e3/uL    RBC Count 4.72 3.80 - 5.20 10e6/uL    Hemoglobin 14.2 11.7 - 15.7 g/dL    Hematocrit 43.7 35.0 - 47.0 %    MCV 93 78 - 100 fL    MCH 30.1 26.5 - 33.0 pg    MCHC 32.5 31.5 - 36.5 g/dL    RDW 12.9 10.0 - 15.0 %    Platelet Count 271 150 - 450 10e3/uL    % Neutrophils 67 %    % Lymphocytes 22 %    % Monocytes 9 %    % Eosinophils 1 %    % Basophils 1 %    % Immature Granulocytes 0 %    NRBCs per 100 WBC 0 <1 /100    Absolute Neutrophils 4.6 1.6 - 8.3 10e3/uL    Absolute Lymphocytes 1.5 0.8 - 5.3 10e3/uL    Absolute Monocytes 0.6 0.0 - 1.3 10e3/uL    Absolute Eosinophils 0.1  0.0 - 0.7 10e3/uL    Absolute Basophils 0.0 0.0 - 0.2 10e3/uL    Absolute Immature Granulocytes 0.0 <=0.4 10e3/uL    Absolute NRBCs 0.0 10e3/uL

## 2023-06-09 NOTE — LETTER
My Asthma Action Plan    Name: Rosi Lamb   YOB: 1975  Date: 6/9/2023   My doctor: DASHA Lowry CNP   My clinic: [facility] AND [facility]        My Rescue Medicine:   Albuterol inhaler (Proair/Ventolin/Proventil HFA)  2-4 puffs EVERY 4 HOURS as needed. Use a spacer if recommended by your provider.   My Asthma Severity:   Intermittent / Exercise Induced  Know your asthma triggers: humidity and exercise or sports             GREEN ZONE   Good Control  I feel good  No cough or wheeze  Can work, sleep and play without asthma symptoms       Take your asthma control medicine every day.     If exercise triggers your asthma, take your rescue medication  15 minutes before exercise or sports, and  During exercise if you have asthma symptoms  Spacer to use with inhaler: If you have a spacer, make sure to use it with your inhaler             YELLOW ZONE Getting Worse  I have ANY of these:  I do not feel good  Cough or wheeze  Chest feels tight  Wake up at night   Keep taking your Green Zone medications  Start taking your rescue medicine:  every 20 minutes for up to 1 hour. Then every 4 hours for 24-48 hours.  If you stay in the Yellow Zone for more than 12-24 hours, contact your doctor.  If you do not return to the Green Zone in 12-24 hours or you get worse, start taking your oral steroid medicine if prescribed by your provider.           RED ZONE Medical Alert - Get Help  I have ANY of these:  I feel awful  Medicine is not helping  Breathing getting harder  Trouble walking or talking  Nose opens wide to breathe       Take your rescue medicine NOW  If your provider has prescribed an oral steroid medicine, start taking it NOW  Call your doctor NOW  If you are still in the Red Zone after 20 minutes and you have not reached your doctor:  Take your rescue medicine again and  Call 911 or go to the emergency room right away    See your regular doctor within 2 weeks of an Emergency Room or Urgent  Care visit for follow-up treatment.          Annual Reminders:  Meet with Asthma Educator,  Flu Shot in the Fall, consider Pneumonia Vaccination for patients with asthma (aged 19 and older).    Pharmacy:  [id number] - [place], [place] - [address]    Electronically signed by DASHA Lowry CNP   Date: 06/09/23                    Asthma Triggers  How To Control Things That Make Your Asthma Worse    Triggers are things that make your asthma worse.  Look at the list below to help you find your triggers and   what you can do about them. You can help prevent asthma flare-ups by staying away from your triggers.      Trigger                                                          What you can do   Cigarette Smoke  Tobacco smoke can make asthma worse. Do not allow smoking in your home, car or around you.  Be sure no one smokes at a child s day care or school.  If you smoke, ask your health care provider for ways to help you quit.  Ask family members to quit too.  Ask your health care provider for a referral to Quit Plan to help you quit smoking, or call 6-755-915-PLAN.     Colds, Flu, Bronchitis  These are common triggers of asthma. Wash your hands often.  Don t touch your eyes, nose or mouth.  Get a flu shot every year.     Dust Mites  These are tiny bugs that live in cloth or carpet. They are too small to see. Wash sheets and blankets in hot water every week.   Encase pillows and mattress in dust mite proof covers.  Avoid having carpet if you can. If you have carpet, vacuum weekly.   Use a dust mask and HEPA vacuum.   Pollen and Outdoor Mold  Some people are allergic to trees, grass, or weed pollen, or molds. Try to keep your windows closed.  Limit time out doors when pollen count is high.   Ask you health care provider about taking medicine during allergy season.     Animal Dander  Some people are allergic to skin flakes, urine or saliva from pets with fur or feathers. Keep pets with fur or  feathers out of your home.    If you can t keep the pet outdoors, then keep the pet out of your bedroom.  Keep the bedroom door closed.  Keep pets off cloth furniture and away from stuffed toys.     Mice, Rats, and Cockroaches  Some people are allergic to the waste from these pests.   Cover food and garbage.  Clean up spills and food crumbs.  Store grease in the refrigerator.   Keep food out of the bedroom.   Indoor Mold  This can be a trigger if your home has high moisture. Fix leaking faucets, pipes, or other sources of water.   Clean moldy surfaces.  Dehumidify basement if it is damp and smelly.   Smoke, Strong Odors, and Sprays  These can reduce air quality. Stay away from strong odors and sprays, such as perfume, powder, hair spray, paints, smoke incense, paint, cleaning products, candles and new carpet.   Exercise or Sports  Some people with asthma have this trigger. Be active!  Ask your doctor about taking medicine before sports or exercise to prevent symptoms.    Warm up for 5-10 minutes before and after sports or exercise.     Other Triggers of Asthma  Cold air:  Cover your nose and mouth with a scarf.  Sometimes laughing or crying can be a trigger.  Some medicines and food can trigger asthma.

## 2023-06-09 NOTE — NURSING NOTE
Patient presents today for bruising concerns.    Medication Reconciliation Complete    Maricarmen Gutierrez LPN  6/9/2023 10:17 AM

## 2023-07-31 ENCOUNTER — HOSPITAL ENCOUNTER (OUTPATIENT)
Dept: MAMMOGRAPHY | Facility: OTHER | Age: 48
Discharge: HOME | End: 2023-07-31
Attending: INTERNAL MEDICINE | Admitting: INTERNAL MEDICINE
Payer: MEDICARE

## 2023-07-31 DIAGNOSIS — Z12.31 VISIT FOR SCREENING MAMMOGRAM: ICD-10-CM

## 2023-07-31 PROCEDURE — 77067 SCR MAMMO BI INCL CAD: CPT

## 2023-08-09 ENCOUNTER — OFFICE VISIT (OUTPATIENT)
Dept: INTERNAL MEDICINE | Facility: OTHER | Age: 48
End: 2023-08-09
Attending: STUDENT IN AN ORGANIZED HEALTH CARE EDUCATION/TRAINING PROGRAM
Payer: MEDICARE

## 2023-08-09 VITALS
RESPIRATION RATE: 17 BRPM | HEIGHT: 66 IN | BODY MASS INDEX: 44.07 KG/M2 | WEIGHT: 274.2 LBS | TEMPERATURE: 97.7 F | OXYGEN SATURATION: 100 % | DIASTOLIC BLOOD PRESSURE: 82 MMHG | SYSTOLIC BLOOD PRESSURE: 118 MMHG | HEART RATE: 93 BPM

## 2023-08-09 DIAGNOSIS — R10.2 PELVIC PAIN IN FEMALE: Primary | ICD-10-CM

## 2023-08-09 LAB
ALBUMIN UR-MCNC: NEGATIVE MG/DL
APPEARANCE UR: CLEAR
BILIRUB UR QL STRIP: NEGATIVE
COLOR UR AUTO: ABNORMAL
GLUCOSE UR STRIP-MCNC: NEGATIVE MG/DL
HCG UR QL: NEGATIVE
HGB UR QL STRIP: NEGATIVE
KETONES UR STRIP-MCNC: NEGATIVE MG/DL
LEUKOCYTE ESTERASE UR QL STRIP: NEGATIVE
MUCOUS THREADS #/AREA URNS LPF: PRESENT /LPF
NITRATE UR QL: NEGATIVE
PH UR STRIP: 6 [PH] (ref 5–9)
RBC URINE: 0 /HPF
SP GR UR STRIP: 1.02 (ref 1–1.03)
UROBILINOGEN UR STRIP-MCNC: NORMAL MG/DL
WBC URINE: 0 /HPF

## 2023-08-09 PROCEDURE — G0463 HOSPITAL OUTPT CLINIC VISIT: HCPCS

## 2023-08-09 PROCEDURE — 99214 OFFICE O/P EST MOD 30 MIN: CPT | Performed by: STUDENT IN AN ORGANIZED HEALTH CARE EDUCATION/TRAINING PROGRAM

## 2023-08-09 PROCEDURE — 81001 URINALYSIS AUTO W/SCOPE: CPT | Mod: ZL | Performed by: STUDENT IN AN ORGANIZED HEALTH CARE EDUCATION/TRAINING PROGRAM

## 2023-08-09 PROCEDURE — 81025 URINE PREGNANCY TEST: CPT | Mod: ZL | Performed by: STUDENT IN AN ORGANIZED HEALTH CARE EDUCATION/TRAINING PROGRAM

## 2023-08-09 ASSESSMENT — ANXIETY QUESTIONNAIRES
4. TROUBLE RELAXING: NOT AT ALL
GAD7 TOTAL SCORE: 0
5. BEING SO RESTLESS THAT IT IS HARD TO SIT STILL: NOT AT ALL
6. BECOMING EASILY ANNOYED OR IRRITABLE: NOT AT ALL
3. WORRYING TOO MUCH ABOUT DIFFERENT THINGS: NOT AT ALL
7. FEELING AFRAID AS IF SOMETHING AWFUL MIGHT HAPPEN: NOT AT ALL
2. NOT BEING ABLE TO STOP OR CONTROL WORRYING: NOT AT ALL
1. FEELING NERVOUS, ANXIOUS, OR ON EDGE: NOT AT ALL
IF YOU CHECKED OFF ANY PROBLEMS ON THIS QUESTIONNAIRE, HOW DIFFICULT HAVE THESE PROBLEMS MADE IT FOR YOU TO DO YOUR WORK, TAKE CARE OF THINGS AT HOME, OR GET ALONG WITH OTHER PEOPLE: NOT DIFFICULT AT ALL
GAD7 TOTAL SCORE: 0

## 2023-08-09 ASSESSMENT — PAIN SCALES - GENERAL: PAINLEVEL: MODERATE PAIN (5)

## 2023-08-09 NOTE — PROGRESS NOTES
Assessment & Plan         ICD-10-CM    1. Pelvic pain in female  R10.2 UA with Microscopic reflex to Culture     Pregnancy, Urine (HCG)     Pregnancy, Urine (HCG)     UA with Microscopic reflex to Culture     US Pelvis Complete without Transvaginal        Pelvic pain appears to be cyclical in nature with increased spotting.  Recommend that she use acetaminophen and ibuprofen as needed for pain management.  Will also obtain a pelvic ultrasound to ensure she does not have ovarian torsion and to evaluate her endometrium status post ablation.  Likely refer to gynecology if she does have a return of her endometrium.    No follow-ups on file.    Zhang Santiago MD  [facility]   Rosi is a 48 year old, presenting for the following health issues:  Pelvic Pressure        8/9/2023     3:57 PM   Additional Questions   Roomed by Isabel INFANTE       History of Present Illness       Reason for visit:  Pelvic pressure and spotting  Symptom onset:  3-4 weeks ago  Symptoms include:  Pain and prsssure  Symptom intensity:  Moderate  Symptom progression:  Staying the same  Had these symptoms before:  No  What makes it worse:  No  What makes it better:  Heat    She eats 2-3 servings of fruits and vegetables daily.She consumes 2 sweetened beverage(s) daily.She exercises with enough effort to increase her heart rate 20 to 29 minutes per day.  She exercises with enough effort to increase her heart rate 3 or less days per week.   She is taking medications regularly.       History of ablation.   Cyclical spotting currently.   Pelvic pain at times.   Cramping.   Radiates to bottom of pelvic bone.   Lasts up to a couple hours.   Sometimes is assocated with bleeding.   Heat helps.   Passing gas and stool   No blood in stool.   Ablation 2017.   No dysuria.   No abdominal surgeries.   History of tubal ligation.         Review of Systems         Objective    /82 (BP Location: Right arm, Patient Position:  "Sitting, Cuff Size: Adult Large)   Pulse 93   Temp 97.7  F (36.5  C) (Tympanic)   Resp 17   Ht 1.68 m (5' 6.14\")   Wt 124.4 kg (274 lb 3.2 oz)   LMP 01/01/2017 (Approximate)   SpO2 100%   BMI 44.07 kg/m    Body mass index is 44.07 kg/m .  Physical Exam   General: Pleasant 48-year-old woman coming by her  sitting clinic no acute distress  GI: Soft nontender abdomen, bowel sounds present    Reviewed laboratory results with the patient today.              "

## 2023-08-09 NOTE — NURSING NOTE
"Chief Complaint   Patient presents with    Pelvic Pressure       FOOD SECURITY SCREENING QUESTIONS  Hunger Vital Signs:  Within the past 12 months we worried whether our food would run out before we got money to buy more. Never  Within the past 12 months the food we bought just didn't last and we didn't have money to get more. Never  Isabel Calderon LPN 8/9/2023 4:00 PM      Initial /82 (BP Location: Right arm, Patient Position: Sitting, Cuff Size: Adult Large)   Pulse 93   Temp 97.7  F (36.5  C) (Tympanic)   Resp 17   Ht 1.68 m (5' 6.14\")   Wt 124.4 kg (274 lb 3.2 oz)   LMP 01/01/2017 (Approximate)   SpO2 100%   BMI 44.07 kg/m   Estimated body mass index is 44.07 kg/m  as calculated from the following:    Height as of this encounter: 1.68 m (5' 6.14\").    Weight as of this encounter: 124.4 kg (274 lb 3.2 oz).  Medication Reconciliation: complete    Isabel Calderon LPN    "

## 2023-08-15 ENCOUNTER — HOSPITAL ENCOUNTER (OUTPATIENT)
Dept: ULTRASOUND IMAGING | Facility: OTHER | Age: 48
Discharge: HOME | End: 2023-08-15
Attending: STUDENT IN AN ORGANIZED HEALTH CARE EDUCATION/TRAINING PROGRAM | Admitting: STUDENT IN AN ORGANIZED HEALTH CARE EDUCATION/TRAINING PROGRAM
Payer: MEDICARE

## 2023-08-15 DIAGNOSIS — R10.2 PELVIC PAIN IN FEMALE: ICD-10-CM

## 2023-08-15 PROCEDURE — 76830 TRANSVAGINAL US NON-OB: CPT

## 2023-08-17 DIAGNOSIS — R10.2 PELVIC PAIN IN FEMALE: Primary | ICD-10-CM

## 2023-08-31 ENCOUNTER — OFFICE VISIT (OUTPATIENT)
Dept: OBGYN | Facility: OTHER | Age: 48
End: 2023-08-31
Attending: STUDENT IN AN ORGANIZED HEALTH CARE EDUCATION/TRAINING PROGRAM
Payer: MEDICARE

## 2023-08-31 VITALS
DIASTOLIC BLOOD PRESSURE: 72 MMHG | HEART RATE: 106 BPM | WEIGHT: 270.3 LBS | SYSTOLIC BLOOD PRESSURE: 124 MMHG | BODY MASS INDEX: 43.44 KG/M2

## 2023-08-31 DIAGNOSIS — N85.8 UTERINE CYST: ICD-10-CM

## 2023-08-31 DIAGNOSIS — R10.2 PELVIC PAIN IN FEMALE: Primary | ICD-10-CM

## 2023-08-31 PROCEDURE — 57500 BIOPSY OF CERVIX: CPT | Performed by: OBSTETRICS & GYNECOLOGY

## 2023-08-31 PROCEDURE — G0463 HOSPITAL OUTPT CLINIC VISIT: HCPCS

## 2023-08-31 PROCEDURE — 88305 TISSUE EXAM BY PATHOLOGIST: CPT

## 2023-08-31 PROCEDURE — 99203 OFFICE O/P NEW LOW 30 MIN: CPT | Mod: 25 | Performed by: OBSTETRICS & GYNECOLOGY

## 2023-08-31 NOTE — NURSING NOTE
Pt presents to clinic today for follow up ultrasound.      Medication Reconciliation: complete  Anjali Loja LPN

## 2023-08-31 NOTE — PROGRESS NOTES
CC:bleeding, pain, and endometrial cyst  HPI:  Rosi is a 48 year old female who presents for follow up of evaluation for vaginal bleeding, pelvic pain, and an endometrial cyst.  She has history of endometrial ablation in 2016 with Dr. Fatima. She had an US ordered showing a 3 mm endometrial cyst, and some free fluid in the pelvis. She has history of breast cancer in her family as well as throat cancer, but no endometrial cancer. She had a history of abnormal pap smears remotely but have been normal for over a decade.  She has had 1 or 2 episodes of spotting recently with some pain. The pain is now gone.    Patient's last menstrual period was 2017 (approximate).  OB History    Para Term  AB Living   3 0 0 0 0 2   SAB IAB Ectopic Multiple Live Births   0 0 0 0 0     Past Medical History:   Diagnosis Date    Abnormal Pap smear of cervix 2018    Overview:  had scraping of cervix    Cannabis use disorder, moderate, in sustained remission, dependence (H) 2015    Closed dislocation of tarsal joint 2011    Closed dislocation of tarsal joint - left 2011    Edema     No Comments Provided    Encounter for removal and reinsertion of intrauterine contraceptive device     05,IUD placement, Removed    Excessive and frequent menstruation with irregular cycle     2017    Major depressive disorder, single episode     No Comments Provided    Personal history of other medical treatment (CODE)     G3, P2-0-1-2 with history of spontaneous     Personal history of other medical treatment (CODE)     No Comments Provided    Uncomplicated asthma     No Comments Provided     Past Surgical History:   Procedure Laterality Date    ANKLE SURGERY      fracture, repair with screws    ARTHRODESIS FOOT Left 2022    Procedure: left foot hardware removaL, fusion of 1st-2nd Tarsal metatarsal;  Surgeon: Anthony Castillo DPM;  Location: [facility] OR    COLONOSCOPY  2022    F/U   tubular adenoma    CONIZATION [facility]      ,Underwent a loop    LAPAROSCOPIC TUBAL LIGATION      ,tubal ligation     Social History     Socioeconomic History    Marital status:      Spouse name: Not on file    Number of children: Not on file    Years of education: Not on file    Highest education level: Not on file   Occupational History    Not on file   Tobacco Use    Smoking status: Former     Packs/day: 1.00     Years: 3.00     Pack years: 3.00     Types: Cigarettes     Quit date: 2003     Years since quittin.6    Smokeless tobacco: Never   Vaping Use    Vaping Use: Never used   Substance and Sexual Activity    Alcohol use: Not Currently     Alcohol/week: 0.0 standard drinks of alcohol    Drug use: Never    Sexual activity: Yes     Partners: Male     Birth control/protection: Female Surgical   Other Topics Concern    Parent/sibling w/ CABG, MI or angioplasty before 65F 55M? Not Asked   Social History Narrative    No longer in adult foster care lived with Charley Godwin.    .   2 sons - age 12 and 7 - as of 2016.   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     Social Determinants of Health     Financial Resource Strain: Not on file   Food Insecurity: Not on file   Transportation Needs: Not on file   Physical Activity: Not on file   Stress: Not on file   Social Connections: Not on file   Intimate Partner Violence: Not on file   Housing Stability: Not on file     Family History   Problem Relation Age of Onset    Osteoporosis Mother     Asthma Father         Asthma,+ Leg edema, knee, hip replacement. + Lymphedema    Heart Failure Father     Other - See Comments Paternal Grandmother         Lymphedema    Osteoporosis Brother         Osteoporosis    Other - See Comments Brother         No Known Problems       Current Outpatient Medications   Medication    acetaminophen (TYLENOL) 500 MG tablet    albuterol (VENTOLIN HFA) 108 (90 Base) MCG/ACT inhaler    ARIPiprazole  (ABILIFY) 15 MG tablet    atomoxetine (STRATTERA) 25 MG capsule    busPIRone (BUSPAR) 10 MG tablet    calcium carbonate (TUMS) 500 MG chewable tablet    cholecalciferol (VITAMIN D3) 125 mcg (5000 units) capsule    cyanocobalamin (CYANOCOBALAMIN) 1000 MCG/ML injection    famotidine (PEPCID) 20 MG tablet    fluticasone (FLONASE) 50 MCG/ACT nasal spray    gabapentin (NEURONTIN) 300 MG capsule    hydrOXYzine (ATARAX) 50 MG tablet    ibuprofen (ADVIL/MOTRIN) 200 MG tablet    lamoTRIgine (LAMICTAL) 100 MG tablet    Menthol, Topical Analgesic, (BIOFREEZE EX)    methocarbamol (ROBAXIN) 500 MG tablet    mirabegron (MYRBETRIQ) 25 MG 24 hr tablet    montelukast (SINGULAIR) 10 MG tablet    prazosin (MINIPRESS) 1 MG capsule    ramelteon (ROZEREM) 8 MG tablet    vilazodone (VIIBRYD) 40 MG TABS tablet     No current facility-administered medications for this visit.     Allergies   Allergen Reactions    Clonazepam Other (See Comments)     Causes Violence and aggresssion    Hydrocodone-Acetaminophen      Other reaction(s): Seizures  Can take Percocet without difficulty.    Lorazepam Other (See Comments)     Causes Violence and aggresssion    Sertraline Other (See Comments)     Caused suicidally     Bupropion Other (See Comments)     Caused Major Depression    Dust Mite Extract      Other reaction(s): Asthma symptoms    Lithium Other (See Comments)     Mood alteration    Pollen Extract      Other reaction(s): Asthma symptoms    Risperidone Other (See Comments)     Agitation      Sulfa Antibiotics Itching    Trichophyton      Other reaction(s): Asthma symptoms    Valproic Acid Other (See Comments)     Weight gain     /72   Pulse 106   Wt 122.6 kg (270 lb 4.8 oz)   LMP 01/01/2017 (Approximate)   BMI 43.44 kg/m      REVIEW OF SYSTEMS  : urgency but treated well with myrbetriq    Exam:  Constitutional: healthy, alert, active, and no distress  Pelvic: Normal BUSE, vulva appears normal, vagina appears , cervix appears normal.  After verbal consent a pipelle biopsy was performed passing to 6 cm with use of a tenaculum and os finder. Chaperone was present. Patient tolerated procedure well.    Significant lymphedema noteed in her legs and buttocks.      Lab: No results found for any visits on 08/31/23.    ASSESSMENT/PLAN :  1. Pelvic pain in female    2. Uterine cyst      Suspect benign nature of endometrial cyst or entrapped fluid following ablation. WIll recheck US in 3 months if biopsy is benign.    Sunny Alcala MD FACOG  8:48 AM 8/31/2023

## 2023-09-05 DIAGNOSIS — N93.9 ABNORMAL UTERINE BLEEDING (AUB): Primary | ICD-10-CM

## 2023-09-05 LAB
PATH REPORT.COMMENTS IMP SPEC: NORMAL
PATH REPORT.FINAL DX SPEC: NORMAL
PHOTO IMAGE: NORMAL

## 2023-09-14 ENCOUNTER — TRANSFERRED RECORDS (OUTPATIENT)
Dept: HEALTH INFORMATION MANAGEMENT | Facility: OTHER | Age: 48
End: 2023-09-14
Payer: MEDICARE

## 2023-09-20 ENCOUNTER — TELEPHONE (OUTPATIENT)
Dept: INTERNAL MEDICINE | Facility: OTHER | Age: 48
End: 2023-09-20
Payer: MEDICARE

## 2023-09-20 DIAGNOSIS — I89.0 LYMPHEDEMA: Primary | ICD-10-CM

## 2023-09-20 NOTE — TELEPHONE ENCOUNTER
Reason for call: Request for a referral.    Referral requested for what concern?  Physical therapy with [facility] for lymphedema    Have you already been seen by the specialty you need the referral for?  no    If no,  Where do you want to go?   [facility]    Preferred method for responding to this message: Telephone Call    Phone number patient can be reached at? Cell number on file:    Telephone Information:   Mobile [phone]       If we can't reach you directly, may we leave a detailed response at the number you provided? Yes    Dorothea Edwards on 9/20/2023 at 12:04 PM

## 2023-10-25 NOTE — PROGRESS NOTES
Assessment & Plan     1. Elevated random blood glucose level  A1c pending. Random glucose yesterday was 100. Patient requesting following up A1c.   - Hemoglobin A1c; Future    2. Lymphedema of both lower extremities  Chronic, recent flare in related pain. Is scheduled to restart with PT on 11/02.     3. Intermittent low back pain  Prescription for ibuprofen 800 mg to use sparingly for as needed low back pain management. If symptoms persist recommend follow up with updated imaging and consider PT.   - ibuprofen (ADVIL/MOTRIN) 800 MG tablet; Take 1 tablet (800 mg) by mouth every 8 hours as needed for moderate pain  Dispense: 90 tablet; Refill: 1    4. Milk intolerance  Reports 6 months of diarrhea and abdominal cramping and pain with cow's milk. Patient wanted noted in her chart. Tolerates additional dairy products and soy milk fine.       No follow-ups on file.    Victoria Garibay PA-C  [facility]   Rosi is a 48 year old, presenting for the following health issues:  History of Present Illness      History of Present Illness     Asthma:  She presents for follow up of asthma.  She has no cough, no wheezing, and no shortness of breath.  She is using a relief medication a few times a week. She does not miss any doses of her controller medication throughout the week. Patient is aware of the following triggers: cold air, dust mites, exercise or sports, humidity, mold, pollens, strong odors and fumes and upper respiratory infections. The patient has not had a visit to the Emergency Room, Urgent Care or Hospital due to asthma since the last clinic visit.     Back Pain:  She presents for follow up of back pain. Patient's back pain is a recurring problem.  Location of back pain:  Right upper back and left upper back  Description of back pain: cramping, gnawing and shooting  Back pain spreads: left buttocks and right thigh    Since patient first noticed back pain, pain is: gradually  worsening  Does back pain interfere with her job:  No       Mental Health Follow-up:  Patient presents to follow-up on Depression & Anxiety.Patient's depression since last visit has been:  Better  The patient is not having other symptoms associated with depression.  Patient's anxiety since last visit has been:  Better  The patient is not having other symptoms associated with anxiety.  Any significant life events: grief or loss  Patient is not feeling anxious or having panic attacks.  Patient has no concerns about alcohol or drug use.    She eats 2-3 servings of fruits and vegetables daily.She consumes 2 sweetened beverage(s) daily.She exercises with enough effort to increase her heart rate 20 to 29 minutes per day.  She exercises with enough effort to increase her heart rate 4 days per week.   She is taking medications regularly.     Here for evaluation of multiple concerns.     Blood sugar:  Concerned with her blood sugar. At preop yesterday blood sugar was 100. Had an episode where her sugar was checked after consuming a sugary drink and was 81. She is requesting an A1c be checked.     Legs:  Longstanding difficulties with lymphedema. Had done well with PT previously. Struggling some with leg pain again. Already set up to restart therapy on 11/2. Also has intermittent low back pain. Has not had updated imaging regarding this. Requesting prescription for ibuprofen 800 mg to take as needed for back pain.     Milk:  Reports 6 months of difficulties tolerating cow's milk. Tolerates other dairy products fine. When drinking cow milk she experiences diarrhea and abdominal pain/cramping. Switched to soy milk and tolerates fine.     PAST MEDICAL HISTORY:   Past Medical History:   Diagnosis Date    Abnormal Pap smear of cervix 04/11/2018    Overview:  had scraping of cervix    Cannabis use disorder, moderate, in sustained remission, dependence (H) 11/28/2015    Closed dislocation of tarsal joint 2/4/2011    Closed  dislocation of tarsal joint - left 2011    Edema     No Comments Provided    Encounter for removal and reinsertion of intrauterine contraceptive device     05,IUD placement, Removed    Excessive and frequent menstruation with irregular cycle     2017    Major depressive disorder, single episode     No Comments Provided    Personal history of other medical treatment (CODE)     G3, P2-0-1-2 with history of spontaneous     Personal history of other medical treatment (CODE)     No Comments Provided    Uncomplicated asthma     No Comments Provided       PAST SURGICAL HISTORY:   Past Surgical History:   Procedure Laterality Date    ANKLE SURGERY      fracture, repair with screws    ARTHRODESIS FOOT Left 2022    Procedure: left foot hardware removaL, fusion of 1st-2nd Tarsal metatarsal;  Surgeon: Anthony Castillo DPM;  Location: [facility] OR    COLONOSCOPY  2022    F/U  tubular adenoma    CONIZATION LEEP      ,Underwent a loop    IMPLANT STIMULATOR AND LEADS SACRAL NERVE (STAGE ONE AND TWO)      scheduled 23 with Dr. Sheth at [place]    LAPAROSCOPIC TUBAL LIGATION      ,tubal ligation       FAMILY HISTORY:   Family History   Problem Relation Age of Onset    Osteoporosis Mother     Asthma Father         Asthma,+ Leg edema, knee, hip replacement. + Lymphedema    Heart Failure Father     Other - See Comments Paternal Grandmother         Lymphedema    Osteoporosis Brother         Osteoporosis    Other - See Comments Brother         No Known Problems       SOCIAL HISTORY:   Social History     Tobacco Use    Smoking status: Former     Packs/day: 1.00     Years: 3.00     Additional pack years: 0.00     Total pack years: 3.00     Types: Cigarettes     Quit date: 2003     Years since quittin.8    Smokeless tobacco: Never   Substance Use Topics    Alcohol use: Not Currently     Alcohol/week: 0.0 standard drinks of alcohol        Allergies   Allergen Reactions    Clonazepam  "Other (See Comments)     Causes Violence and aggresssion    Hydrocodone-Acetaminophen      Other reaction(s): Seizures  Can take Percocet without difficulty.    Lorazepam Other (See Comments)     Causes Violence and aggresssion    Sertraline Other (See Comments)     Caused suicidally     Bupropion Other (See Comments)     Caused Major Depression    Dust Mite Extract      Other reaction(s): Asthma symptoms    Lithium Other (See Comments)     Mood alteration    Pollen Extract      Other reaction(s): Asthma symptoms    Risperidone Other (See Comments)     Agitation      Sulfa Antibiotics Itching    Trichophyton      Other reaction(s): Asthma symptoms    Valproic Acid Other (See Comments)     Weight gain     Current Outpatient Medications   Medication    ibuprofen (ADVIL/MOTRIN) 800 MG tablet    acetaminophen (TYLENOL) 500 MG tablet    albuterol (VENTOLIN HFA) 108 (90 Base) MCG/ACT inhaler    ARIPiprazole (ABILIFY) 15 MG tablet    atomoxetine (STRATTERA) 25 MG capsule    B-D SYRINGE/NEEDLE 25G X 5/8\" 1 ML MISC    busPIRone (BUSPAR) 10 MG tablet    calcium carbonate (TUMS) 500 MG chewable tablet    cholecalciferol (VITAMIN D3) 125 mcg (5000 units) capsule    cyanocobalamin (CYANOCOBALAMIN) 1000 MCG/ML injection    famotidine (PEPCID) 20 MG tablet    fluticasone (FLONASE) 50 MCG/ACT nasal spray    gabapentin (NEURONTIN) 300 MG capsule    hydrOXYzine (ATARAX) 50 MG tablet    hydrOXYzine (VISTARIL) 50 MG capsule    ibuprofen (ADVIL/MOTRIN) 200 MG tablet    lamoTRIgine (LAMICTAL) 100 MG tablet    Menthol, Topical Analgesic, (BIOFREEZE EX)    methocarbamol (ROBAXIN) 500 MG tablet    montelukast (SINGULAIR) 10 MG tablet    prazosin (MINIPRESS) 1 MG capsule    ramelteon (ROZEREM) 8 MG tablet    vilazodone (VIIBRYD) 40 MG TABS tablet     No current facility-administered medications for this visit.         Review of Systems   Per HPI        Objective    /88 (BP Location: Left arm, Patient Position: Sitting, Cuff Size: Adult " "Large)   Pulse 90   Temp 97.6  F (36.4  C) (Tympanic)   Resp 20   Ht 1.676 m (5' 6\")   Wt 123.8 kg (273 lb)   LMP 01/01/2017 (Approximate)   SpO2 100%   BMI 44.06 kg/m    Body mass index is 44.06 kg/m .  Physical Exam   General: Pleasant, in no apparent distress.  Musculoskeletal: Back is straight, no tenderness to palpation of paraspinal and paravertebral muscles. Full ROM of back, neck, upper and lower extremities.  Skin: No jaundice, pallor, rashes, or lesions on exposed skin.   Psych: Appropriate mood and affect.        "

## 2023-10-26 NOTE — PROGRESS NOTES
[facility]  [address]  [place] [address]  Phone: [phone]  Fax: [phone]  Primary Provider: Lucio Curiel  Pre-op Performing Provider: LUIS A MCKEON      PREOPERATIVE EVALUATION:  Today's date: 10/30/2023    Rosi is a 48 year old female who presents for a preoperative evaluation.      10/30/2023    12:46 PM   Additional Questions   Roomed by Noemy Rodgers       Surgical Information:  Surgery/Procedure: Bladder Pacemaker   Surgery Location: [place]   Surgeon: Dr. Sheth  Surgery Date: 11/8/2023  Time of Surgery: TBD  Where patient plans to recover: At home with family  Fax number for surgical facility: Fax: [phone]     Assessment & Plan     The proposed surgical procedure is considered INTERMEDIATE risk.    Preop general physical exam  She is cleared for surgery as scheduled.  She tested positive for covid on 10/23/23 but according to patient has tested negative today and symptoms have resolved.  She will be >2 weeks out from diagnosis at that point.  - Basic Metabolic Panel; Future  - CBC with Platelets & Differential ([facility] Only); Future  - UA w/reflex; Future  - UA w/reflex  - CBC with Platelets & Differential ([facility] Only)  - Basic Metabolic Panel    Urinary incontinence, unspecified type  See above.  - Basic Metabolic Panel; Future  - CBC with Platelets & Differential ([facility] Only); Future  - UA w/reflex; Future  - UA w/reflex  - CBC with Platelets & Differential ([facility] Only)  - Basic Metabolic Panel    Needs flu shot  Flu shot updated today.  - INFLUENZA VACCINE >6 MONTHS (AFLURIA/FLUZONE)            - No identified additional risk factors other than previously addressed    Antiplatelet or Anticoagulation Medication Instructions:   - Patient is on no antiplatelet or anticoagulation medications.    Additional Medication Instructions:  No ibuprofen, aleve or aspirin for at least 7 days prior to surgery.  No vitamins or supplements for at least 7  days prior to surgery.  The morning of surgery, take only strattera, buspar, pepcid, gabapentin, lamictal, viibryd with a small sip of water.    RECOMMENDATION:  APPROVAL GIVEN to proceed with proposed procedure, without further diagnostic evaluation.          Subjective       HPI related to upcoming procedure: has had a chronic history of over active bladder.  Has tried numerous medications without success.  Has had chronic accidents.  Might have to use the bathroom up to 20 times in a 5 hour period.          10/23/2023     9:17 AM   Preop Questions   1. Have you ever had a heart attack or stroke? No   2. Have you ever had surgery on your heart or blood vessels, such as a stent placement, a coronary artery bypass, or surgery on an artery in your head, neck, heart, or legs? No   3. Do you have chest pain with activity? No   4. Do you have a history of  heart failure? No   5. Do you currently have a cold, bronchitis or symptoms of other infection? YES - getting over covid right now.  Tested positive on 10/23/23.   6. Do you have a cough, shortness of breath, or wheezing? No, had a little cough with covid, but feels back to her baseline now.   7. Do you or anyone in your family have previous history of blood clots? No   8. Do you or does anyone in your family have a serious bleeding problem such as prolonged bleeding following surgeries or cuts? No   9. Have you ever had problems with anemia or been told to take iron pills? YES - as a teen.   10. Have you had any abnormal blood loss such as black, tarry or bloody stools, or abnormal vaginal bleeding? No   11. Have you ever had a blood transfusion? No   12. Are you willing to have a blood transfusion if it is medically needed before, during, or after your surgery? Yes   13. Have you or any of your relatives ever had problems with anesthesia? No   14. Do you have sleep apnea, excessive snoring or daytime drowsiness? No   15. Do you have any artifical heart valves or  "other implanted medical devices like a pacemaker, defibrillator, or continuous glucose monitor? No   16. Do you have artificial joints? No   17. Are you allergic to latex? No   18. Is there any chance that you may be pregnant? No       Health Care Directive:  Patient has a Health Care Directive on file      Preoperative Review of :   reviewed - controlled substances reflected in medication list.          Review of Systems  CONSTITUTIONAL: NEGATIVE for fever, chills, change in weight  INTEGUMENTARY/SKIN: NEGATIVE for worrisome rashes, moles or lesions  EYES: NEGATIVE for vision changes or irritation  ENT/MOUTH: NEGATIVE for ear, mouth and throat problems  RESP: NEGATIVE for significant cough or SOB  CV: NEGATIVE for chest pain, palpitations or peripheral edema  GI: NEGATIVE for nausea, abdominal pain, heartburn, or change in bowel habits  : NEGATIVE for frequency, dysuria, or hematuria  MUSCULOSKELETAL: NEGATIVE for significant arthralgias or myalgia  NEURO: NEGATIVE for weakness, dizziness or paresthesias  ENDOCRINE: NEGATIVE for temperature intolerance, skin/hair changes  HEME: NEGATIVE for bleeding problems  PSYCHIATRIC: NEGATIVE for changes in mood or affect    Patient Active Problem List    Diagnosis Date Noted    Morbid obesity (H) 03/14/2023     Priority: Medium       Estimated body mass index is 43.9 kg/m  as calculated from the following:    Height as of 10/29/22: 1.702 m (5' 7\").    Weight as of 12/20/22: 127.1 kg (280 lb 4.8 oz).    Complete \"Weight Managment Plan\" in the progress note from the Adult Preventative or Medicare smartsets, use phrase .WEIGHTPLAN, or choose an option from Weight Management Resources smartset below.        CKD (chronic kidney disease) stage 2, GFR 60-89 ml/min 03/14/2023     Priority: Medium    H/O adenomatous polyp of colon 07/25/2022     Priority: Medium    Nail dystrophy 02/27/2022     Priority: Medium    Intermittent low back pain 09/08/2021     Priority: Medium    " Abnormal Pap smear of cervix 04/11/2018     Priority: Medium     Overview:   had scraping of cervix    Formatting of this note might be different from the original.  had scraping of cervix      Allergic rhinitis due to pollen 02/08/2018     Priority: Medium    Esophageal reflux 02/08/2018     Priority: Medium    History of substance abuse (H) 02/08/2018     Priority: Medium    Bilateral chronic knee pain 04/20/2017     Priority: Medium    Menorrhagia with irregular cycle 02/16/2017     Priority: Medium    Bilateral foot pain 12/18/2016     Priority: Medium    Elevated random blood glucose level 12/18/2016     Priority: Medium    Peripheral polyneuropathy 12/18/2016     Priority: Medium    Vitamin B12 deficiency 12/18/2016     Priority: Medium    Vitamin D deficiency 12/18/2016     Priority: Medium    Lymphedema of both lower extremities 11/22/2016     Priority: Medium    Chronic venous stasis dermatitis of right lower extremity 08/17/2016     Priority: Medium     Overview:   Updated per 10/1/17 IMO import    Formatting of this note might be different from the original.  Updated per 10/1/17 IMO import      Alcohol use disorder, moderate, in sustained remission, dependence (H) 11/28/2015     Priority: Medium    Generalized anxiety disorder 11/28/2015     Priority: Medium    Bipolar I disorder, most recent episode depressed, moderate (H) 11/28/2015     Priority: Medium    Edema of extremity of unknown cause 01/15/2014     Priority: Medium     Formatting of this note might be different from the original.  1/15/2014: both legs, well controlled on prn lasix      Moderate persistent asthma 08/19/2013     Priority: Medium     AAP completed on 08/19/13  Triggers: pollen, fumes, exercise, URI, warm weather. Peak flow today is 250. Symptomatic: moderate    Formatting of this note might be different from the original.  Overview:   AAP completed on 08/19/13  Triggers: pollen, fumes, exercise, URI, warm weather. Peak flow today  is 250. Symptomatic: moderate  Overview:   AAP completed on 13  Triggers: pollen, fumes, exercise, URI, warm weather. Peak flow today is 250. Symptomatic: moderate  Formatting of this note might be different from the original.  AAP completed on 13  Triggers: pollen, fumes, exercise, URI, warm weather. Peak flow today is 250. Symptomatic: moderate      Urinary incontinence 03/15/2013     Priority: Medium    Allergic rhinitis due to other allergen 10/02/2003     Priority: Medium     Overview:   [facility] - Seasonal Allergies  Overview:   Overview:   [facility] - Seasonal Allergies    Formatting of this note might be different from the original.  [facility] - Seasonal Allergies      Borderline personality disorder (H) 2003     Priority: Medium     Formatting of this note might be different from the original.  Overview:   [facility].  Formatting of this note might be different from the original.  [facility].      Disorder of female genital organ 2001     Priority: Medium     Overview:   DUB, dysmenorrhea  Overview:   Overview:   DUB, dysmenorrhea    Formatting of this note might be different from the original.  Overview:   DUB, dysmenorrhea        Past Medical History:   Diagnosis Date    Abnormal Pap smear of cervix 2018    Overview:  had scraping of cervix    Cannabis use disorder, moderate, in sustained remission, dependence (H) 2015    Closed dislocation of tarsal joint 2011    Closed dislocation of tarsal joint - left 2011    Edema     No Comments Provided    Encounter for removal and reinsertion of intrauterine contraceptive device     05,IUD placement, Removed    Excessive and frequent menstruation with irregular cycle     2017    Major depressive disorder, single episode     No Comments Provided    Personal history of other medical treatment (CODE)     G3, P2-0-1-2 with history of spontaneous     Personal history of other medical treatment  "(CODE)     No Comments Provided    Uncomplicated asthma     No Comments Provided     Past Surgical History:   Procedure Laterality Date    ANKLE SURGERY      fracture, repair with screws    ARTHRODESIS FOOT Left 04/21/2022    Procedure: left foot hardware removaL, fusion of 1st-2nd Tarsal metatarsal;  Surgeon: Anthony Castillo DPM;  Location: [facility] OR    COLONOSCOPY  07/25/2022    F/U 2027 tubular adenoma    CONIZATION LEEP      07/04,Underwent a loop    LAPAROSCOPIC TUBAL LIGATION      2009,tubal ligation     Current Outpatient Medications   Medication Sig Dispense Refill    acetaminophen (TYLENOL) 500 MG tablet Take 500-1,000 mg by mouth every 6 hours as needed      albuterol (VENTOLIN HFA) 108 (90 Base) MCG/ACT inhaler Inhale 1-2 puffs into the lungs every 4 hours as needed for shortness of breath or wheezing 18 g 11    ARIPiprazole (ABILIFY) 15 MG tablet Take 1 tablet (15 mg) by mouth At Bedtime 30 tablet 1    atomoxetine (STRATTERA) 25 MG capsule TAKE 1 CAPSULE BY MOUTH DAILY AT 8AM      B-D SYRINGE/NEEDLE 25G X 5/8\" 1 ML MISC USE TO INJECT B-12 INTRAMUSCULARLY EVERY 2 WEEKS      busPIRone (BUSPAR) 10 MG tablet Take 1 tablet by mouth 3 times daily Am and 2 pm      calcium carbonate (TUMS) 500 MG chewable tablet Take 2-4 chew tab by mouth as needed as directed.      cholecalciferol (VITAMIN D3) 125 mcg (5000 units) capsule Take 1 capsule (125 mcg) by mouth daily 100 capsule 4    cyanocobalamin (CYANOCOBALAMIN) 1000 MCG/ML injection INJECT 1ML INTRAMUSCULARLY EVERY 2 WEEKS 6 mL 11    famotidine (PEPCID) 20 MG tablet Take 1 tablet (20 mg) by mouth 2 times daily - For Heartburn 180 tablet 4    fluticasone (FLONASE) 50 MCG/ACT nasal spray Spray 1 spray into both nostrils daily as needed for rhinitis or allergies 16 g 0    gabapentin (NEURONTIN) 300 MG capsule Take 300 mg by mouth 2 times daily . May take an additional capsule at noon IF needed.      hydrOXYzine (ATARAX) 50 MG tablet Take 1 tablet (50 mg) by mouth At " Bedtime 30 tablet 0    hydrOXYzine (VISTARIL) 50 MG capsule TAKE 1 CAPSULE BY MOUTH FOUR TIMES A DAY AS NEEDED FOR ANXIETY      ibuprofen (ADVIL/MOTRIN) 200 MG tablet Take 200-400 mg by mouth every 6 hours as needed 1 to 2 tabs Q6 PRN      lamoTRIgine (LAMICTAL) 100 MG tablet Take 1 tablet (100 mg) by mouth 3 times daily -- Managed by Psych      Menthol, Topical Analgesic, (BIOFREEZE EX) Apply topically 4 times daily as needed      methocarbamol (ROBAXIN) 500 MG tablet Take 500-1,000 mg by mouth 4 times daily as needed for muscle spasms      montelukast (SINGULAIR) 10 MG tablet Take 1 tablet (10 mg) by mouth At Bedtime 90 tablet 4    prazosin (MINIPRESS) 1 MG capsule Take by mouth as needed      ramelteon (ROZEREM) 8 MG tablet Take 1 tablet (8 mg) by mouth At Bedtime 30 tablet 1    vilazodone (VIIBRYD) 40 MG TABS tablet Take 40 mg by mouth every morning         Allergies   Allergen Reactions    Clonazepam Other (See Comments)     Causes Violence and aggresssion    Hydrocodone-Acetaminophen      Other reaction(s): Seizures  Can take Percocet without difficulty.    Lorazepam Other (See Comments)     Causes Violence and aggresssion    Sertraline Other (See Comments)     Caused suicidally     Bupropion Other (See Comments)     Caused Major Depression    Dust Mite Extract      Other reaction(s): Asthma symptoms    Lithium Other (See Comments)     Mood alteration    Pollen Extract      Other reaction(s): Asthma symptoms    Risperidone Other (See Comments)     Agitation      Sulfa Antibiotics Itching and Unknown    Trichophyton      Other reaction(s): Asthma symptoms    Valproic Acid Other (See Comments)     Weight gain        Social History     Tobacco Use    Smoking status: Former     Packs/day: 1.00     Years: 3.00     Additional pack years: 0.00     Total pack years: 3.00     Types: Cigarettes     Quit date: 2003     Years since quittin.8    Smokeless tobacco: Never   Substance Use Topics    Alcohol use: Not  "Currently     Alcohol/week: 0.0 standard drinks of alcohol     Family History   Problem Relation Age of Onset    Osteoporosis Mother     Asthma Father         Asthma,+ Leg edema, knee, hip replacement. + Lymphedema    Heart Failure Father     Other - See Comments Paternal Grandmother         Lymphedema    Osteoporosis Brother         Osteoporosis    Other - See Comments Brother         No Known Problems     History   Drug Use Unknown         Objective     /80   Pulse 100   Temp 96.9  F (36.1  C) (Tympanic)   Resp 16   Ht 1.676 m (5' 6\")   Wt 121.8 kg (268 lb 9.6 oz)   SpO2 97%   Breastfeeding No   BMI 43.35 kg/m      Physical Exam    GENERAL APPEARANCE: healthy, alert and no distress     EYES: EOMI, PERRL     HENT: ear canals and TM's normal     NECK: no adenopathy, no asymmetry, masses, or scars and thyroid normal to palpation     RESP: lungs clear to auscultation - no rales, rhonchi or wheezes     CV: regular rates and rhythm, normal S1 S2, no S3 or S4 and no murmur, click or rub     ABDOMEN:  soft, nontender, no HSM or masses and bowel sounds normal     MS: extremities normal- no gross deformities noted, no evidence of inflammation in joints, FROM in all extremities.     SKIN: no suspicious lesions or rashes     NEURO: Normal strength and tone, sensory exam grossly normal, mentation intact and speech normal     PSYCH: mentation appears normal. and affect normal/bright     LYMPHATICS: No cervical adenopathy    Recent Labs   Lab Test 06/09/23  1039 10/29/22  1600 04/05/22  1607 03/18/22  1131   HGB 14.2 13.8 13.2 13.2    238 248 210   NA  --  139 141 137   POTASSIUM  --  4.1 4.0 4.0   CR  --  1.12 1.03 1.00   A1C  --   --   --  5.2        Diagnostics:  Results for orders placed or performed in visit on 10/30/23   UA w/reflex     Status: Normal    Specimen: Urine, Midstream   Result Value Ref Range    Color Urine Light Yellow Colorless, Straw, Light Yellow, Yellow    Appearance Urine Clear Clear "    Glucose Urine Negative Negative mg/dL    Bilirubin Urine Negative Negative    Ketones Urine Negative Negative mg/dL    Specific Gravity Urine 1.009 1.000 - 1.030    Blood Urine Negative Negative    pH Urine 6.0 5.0 - 9.0    Protein Albumin Urine Negative Negative mg/dL    Urobilinogen Urine Normal Normal, 2.0 mg/dL    Nitrite Urine Negative Negative    Leukocyte Esterase Urine Negative Negative    Narrative    Microscopic not indicated   Basic Metabolic Panel     Status: Abnormal   Result Value Ref Range    Sodium 140 135 - 145 mmol/L    Potassium 4.1 3.4 - 5.3 mmol/L    Chloride 100 98 - 107 mmol/L    Carbon Dioxide (CO2) 30 (H) 22 - 29 mmol/L    Anion Gap 10 7 - 15 mmol/L    Urea Nitrogen 11.1 6.0 - 20.0 mg/dL    Creatinine 0.83 0.51 - 0.95 mg/dL    GFR Estimate 88 >60 mL/min/1.73m2    Calcium 9.6 8.6 - 10.0 mg/dL    Glucose 100 (H) 70 - 99 mg/dL   CBC with platelets and differential     Status: None   Result Value Ref Range    WBC Count 7.8 4.0 - 11.0 10e3/uL    RBC Count 4.59 3.80 - 5.20 10e6/uL    Hemoglobin 13.7 11.7 - 15.7 g/dL    Hematocrit 42.3 35.0 - 47.0 %    MCV 92 78 - 100 fL    MCH 29.8 26.5 - 33.0 pg    MCHC 32.4 31.5 - 36.5 g/dL    RDW 12.3 10.0 - 15.0 %    Platelet Count 309 150 - 450 10e3/uL    % Neutrophils 64 %    % Lymphocytes 25 %    % Monocytes 10 %    % Eosinophils 1 %    % Basophils 0 %    % Immature Granulocytes 0 %    NRBCs per 100 WBC 0 <1 /100    Absolute Neutrophils 5.0 1.6 - 8.3 10e3/uL    Absolute Lymphocytes 1.9 0.8 - 5.3 10e3/uL    Absolute Monocytes 0.8 0.0 - 1.3 10e3/uL    Absolute Eosinophils 0.1 0.0 - 0.7 10e3/uL    Absolute Basophils 0.0 0.0 - 0.2 10e3/uL    Absolute Immature Granulocytes 0.0 <=0.4 10e3/uL    Absolute NRBCs 0.0 10e3/uL   Extra Tube     Status: None    Narrative    The following orders were created for panel order Extra Tube.  Procedure                               Abnormality         Status                     ---------                                -----------         ------                     Extra Serum Separator Tu...[[id number]]                      Final result                 Please view results for these tests on the individual orders.   Extra Serum Separator Tube (SST)     Status: None   Result Value Ref Range    Hold Specimen JI    CBC with Platelets & Differential ([facility] Only)     Status: None    Narrative    The following orders were created for panel order CBC with Platelets & Differential ([facility] Only).  Procedure                               Abnormality         Status                     ---------                               -----------         ------                     CBC with platelets and d...[[id number]]                      Final result                 Please view results for these tests on the individual orders.        Revised Cardiac Risk Index (RCRI):  The patient has the following serious cardiovascular risks for perioperative complications:   - No serious cardiac risks = 0 points     RCRI Interpretation: 0 points: Class I (very low risk - 0.4% complication rate)         Signed Electronically by: Charisse Jeter MD  Copy of this evaluation report is provided to requesting physician.

## 2023-10-30 ENCOUNTER — OFFICE VISIT (OUTPATIENT)
Dept: FAMILY MEDICINE | Facility: OTHER | Age: 48
End: 2023-10-30
Attending: FAMILY MEDICINE
Payer: MEDICARE

## 2023-10-30 VITALS
HEART RATE: 100 BPM | BODY MASS INDEX: 43.17 KG/M2 | DIASTOLIC BLOOD PRESSURE: 80 MMHG | OXYGEN SATURATION: 97 % | RESPIRATION RATE: 16 BRPM | HEIGHT: 66 IN | WEIGHT: 268.6 LBS | SYSTOLIC BLOOD PRESSURE: 126 MMHG | TEMPERATURE: 96.9 F

## 2023-10-30 DIAGNOSIS — R73.9 ELEVATED RANDOM BLOOD GLUCOSE LEVEL: ICD-10-CM

## 2023-10-30 DIAGNOSIS — R32 URINARY INCONTINENCE, UNSPECIFIED TYPE: ICD-10-CM

## 2023-10-30 DIAGNOSIS — Z23 NEEDS FLU SHOT: ICD-10-CM

## 2023-10-30 DIAGNOSIS — Z01.818 PREOP GENERAL PHYSICAL EXAM: Primary | ICD-10-CM

## 2023-10-30 LAB
ALBUMIN UR-MCNC: NEGATIVE MG/DL
ANION GAP SERPL CALCULATED.3IONS-SCNC: 10 MMOL/L (ref 7–15)
APPEARANCE UR: CLEAR
BASOPHILS # BLD AUTO: 0 10E3/UL (ref 0–0.2)
BASOPHILS NFR BLD AUTO: 0 %
BILIRUB UR QL STRIP: NEGATIVE
BUN SERPL-MCNC: 11.1 MG/DL (ref 6–20)
CALCIUM SERPL-MCNC: 9.6 MG/DL (ref 8.6–10)
CHLORIDE SERPL-SCNC: 100 MMOL/L (ref 98–107)
COLOR UR AUTO: NORMAL
CREAT SERPL-MCNC: 0.83 MG/DL (ref 0.51–0.95)
DEPRECATED HCO3 PLAS-SCNC: 30 MMOL/L (ref 22–29)
EGFRCR SERPLBLD CKD-EPI 2021: 88 ML/MIN/1.73M2
EOSINOPHIL # BLD AUTO: 0.1 10E3/UL (ref 0–0.7)
EOSINOPHIL NFR BLD AUTO: 1 %
ERYTHROCYTE [DISTWIDTH] IN BLOOD BY AUTOMATED COUNT: 12.3 % (ref 10–15)
GLUCOSE SERPL-MCNC: 100 MG/DL (ref 70–99)
GLUCOSE UR STRIP-MCNC: NEGATIVE MG/DL
HCT VFR BLD AUTO: 42.3 % (ref 35–47)
HGB BLD-MCNC: 13.7 G/DL (ref 11.7–15.7)
HGB UR QL STRIP: NEGATIVE
HOLD SPECIMEN: NORMAL
IMM GRANULOCYTES # BLD: 0 10E3/UL
IMM GRANULOCYTES NFR BLD: 0 %
KETONES UR STRIP-MCNC: NEGATIVE MG/DL
LEUKOCYTE ESTERASE UR QL STRIP: NEGATIVE
LYMPHOCYTES # BLD AUTO: 1.9 10E3/UL (ref 0.8–5.3)
LYMPHOCYTES NFR BLD AUTO: 25 %
MCH RBC QN AUTO: 29.8 PG (ref 26.5–33)
MCHC RBC AUTO-ENTMCNC: 32.4 G/DL (ref 31.5–36.5)
MCV RBC AUTO: 92 FL (ref 78–100)
MONOCYTES # BLD AUTO: 0.8 10E3/UL (ref 0–1.3)
MONOCYTES NFR BLD AUTO: 10 %
NEUTROPHILS # BLD AUTO: 5 10E3/UL (ref 1.6–8.3)
NEUTROPHILS NFR BLD AUTO: 64 %
NITRATE UR QL: NEGATIVE
NRBC # BLD AUTO: 0 10E3/UL
NRBC BLD AUTO-RTO: 0 /100
PH UR STRIP: 6 [PH] (ref 5–9)
PLATELET # BLD AUTO: 309 10E3/UL (ref 150–450)
POTASSIUM SERPL-SCNC: 4.1 MMOL/L (ref 3.4–5.3)
RBC # BLD AUTO: 4.59 10E6/UL (ref 3.8–5.2)
SODIUM SERPL-SCNC: 140 MMOL/L (ref 135–145)
SP GR UR STRIP: 1.01 (ref 1–1.03)
UROBILINOGEN UR STRIP-MCNC: NORMAL MG/DL
WBC # BLD AUTO: 7.8 10E3/UL (ref 4–11)

## 2023-10-30 PROCEDURE — 83036 HEMOGLOBIN GLYCOSYLATED A1C: CPT | Mod: ZL | Performed by: FAMILY MEDICINE

## 2023-10-30 PROCEDURE — 80048 BASIC METABOLIC PNL TOTAL CA: CPT | Mod: ZL | Performed by: FAMILY MEDICINE

## 2023-10-30 PROCEDURE — 99214 OFFICE O/P EST MOD 30 MIN: CPT | Performed by: FAMILY MEDICINE

## 2023-10-30 PROCEDURE — 85025 COMPLETE CBC W/AUTO DIFF WBC: CPT | Mod: ZL | Performed by: FAMILY MEDICINE

## 2023-10-30 PROCEDURE — 36415 COLL VENOUS BLD VENIPUNCTURE: CPT | Mod: ZL | Performed by: FAMILY MEDICINE

## 2023-10-30 PROCEDURE — G0463 HOSPITAL OUTPT CLINIC VISIT: HCPCS | Mod: 25

## 2023-10-30 PROCEDURE — G0008 ADMIN INFLUENZA VIRUS VAC: HCPCS

## 2023-10-30 PROCEDURE — 90686 IIV4 VACC NO PRSV 0.5 ML IM: CPT

## 2023-10-30 PROCEDURE — 81003 URINALYSIS AUTO W/O SCOPE: CPT | Mod: ZL | Performed by: FAMILY MEDICINE

## 2023-10-30 RX ORDER — HYDROXYZINE PAMOATE 50 MG/1
CAPSULE ORAL
COMMUNITY
Start: 2023-09-13

## 2023-10-30 RX ORDER — NEEDLES, SAFETY 22GX1 1/2"
NEEDLE, DISPOSABLE MISCELLANEOUS
COMMUNITY
Start: 2023-10-11 | End: 2023-11-14

## 2023-10-30 ASSESSMENT — PAIN SCALES - GENERAL: PAINLEVEL: NO PAIN (0)

## 2023-10-30 NOTE — PATIENT INSTRUCTIONS
Preparing for Your Surgery  Getting started  A nurse will call you to review your health history and instructions. They will give you an arrival time based on your scheduled surgery time. Please be ready to share:  Your doctor's clinic name and phone number  Your medical, surgical, and anesthesia history  A list of allergies and sensitivities  A list of medicines, including herbal treatments and over-the-counter drugs  Whether the patient has a legal guardian (ask how to send us the papers in advance)  Please tell us if you're pregnant--or if there's any chance you might be pregnant. Some surgeries may injure a fetus (unborn baby), so they require a pregnancy test. Surgeries that are safe for a fetus don't always need a test, and you can choose whether to have one.   If you have a child who's having surgery, please ask for a copy of Preparing for Your Child's Surgery.    Preparing for surgery  Within 10 to 30 days of surgery: Have a pre-op exam (sometimes called an H&P, or History and Physical). This can be done at a clinic or pre-operative center.  If you're having a , you may not need this exam. Talk to your care team.  At your pre-op exam, talk to your care team about all medicines you take. If you need to stop any medicines before surgery, ask when to start taking them again.  We do this for your safety. Many medicines can make you bleed too much during surgery. Some change how well surgery (anesthesia) drugs work.  Call your insurance company to let them know you're having surgery. (If you don't have insurance, call [phone].)  Call your clinic if there's any change in your health. This includes signs of a cold or flu (sore throat, runny nose, cough, rash, fever). It also includes a scrape or scratch near the surgery site.  If you have questions on the day of surgery, call your hospital or surgery center.  Eating and drinking guidelines  For your safety: Unless your surgeon tells you otherwise,  follow the guidelines below.  Eat and drink as usual until 8 hours before you arrive for surgery. After that, no food or milk.  Drink clear liquids until 2 hours before you arrive. These are liquids you can see through, like water, Gatorade, and Propel Water. They also include plain black coffee and tea (no cream or milk), candy, and breath mints. You can spit out gum when you arrive.  If you drink alcohol: Stop drinking it the night before surgery.  If your care team tells you to take medicine on the morning of surgery, it's okay to take it with a sip of water.  Preventing infection  Shower or bathe the night before and morning of your surgery. Follow the instructions your clinic gave you. (If no instructions, use regular soap.)  Don't shave or clip hair near your surgery site. We'll remove the hair if needed.  Don't smoke or vape the morning of surgery. You may chew nicotine gum up to 2 hours before surgery. A nicotine patch is okay.  Note: Some surgeries require you to completely quit smoking and nicotine. Check with your surgeon.  Your care team will make every effort to keep you safe from infection. We will:  Clean our hands often with soap and water (or an alcohol-based hand rub).  Clean the skin at your surgery site with a special soap that kills germs.  Give you a special gown to keep you warm. (Cold raises the risk of infection.)  Wear special hair covers, masks, gowns and gloves during surgery.  Give antibiotic medicine, if prescribed. Not all surgeries need antibiotics.  What to bring on the day of surgery  Photo ID and insurance card  Copy of your health care directive, if you have one  Glasses and hearing aids (bring cases)  You can't wear contacts during surgery  Inhaler and eye drops, if you use them (tell us about these when you arrive)  CPAP machine or breathing device, if you use them  A few personal items, if spending the night  If you have . . .  A pacemaker, ICD (cardiac defibrillator) or other  implant: Bring the ID card.  An implanted stimulator: Bring the remote control.  A legal guardian: Bring a copy of the certified (court-stamped) guardianship papers.  Please remove any jewelry, including body piercings. Leave jewelry and other valuables at home.  If you're going home the day of surgery  You must have a responsible adult drive you home. They should stay with you overnight as well.  If you don't have someone to stay with you, and you aren't safe to go home alone, we may keep you overnight. Insurance often won't pay for this.  After surgery  If it's hard to control your pain or you need more pain medicine, please call your surgeon's office.  Questions?   If you have any questions for your care team, list them here: _________________________________________________________________________________________________________________________________________________________________________ ____________________________________ ____________________________________ ____________________________________  For informational purposes only. Not to replace the advice of your health care provider. Copyright   2003, 2019 [facility]. All rights reserved. Clinically reviewed by Linda Jaimes MD. SMARTworks 236911 - REV 12/22.    How to Take Your Medication Before Surgery  No ibuprofen, aleve or aspirin for at least 7 days prior to surgery.  No vitamins or supplements for at least 7 days prior to surgery.  The morning of surgery, take only strattera, buspar, pepcid, gabapentin, lamictal, viibryd with a small sip of water.

## 2023-10-31 ENCOUNTER — OFFICE VISIT (OUTPATIENT)
Dept: FAMILY MEDICINE | Facility: OTHER | Age: 48
End: 2023-10-31
Attending: PHYSICIAN ASSISTANT
Payer: MEDICARE

## 2023-10-31 VITALS
WEIGHT: 273 LBS | OXYGEN SATURATION: 100 % | DIASTOLIC BLOOD PRESSURE: 88 MMHG | SYSTOLIC BLOOD PRESSURE: 134 MMHG | TEMPERATURE: 97.6 F | BODY MASS INDEX: 43.87 KG/M2 | HEIGHT: 66 IN | RESPIRATION RATE: 20 BRPM | HEART RATE: 90 BPM

## 2023-10-31 DIAGNOSIS — K90.49 MILK INTOLERANCE: ICD-10-CM

## 2023-10-31 DIAGNOSIS — R73.9 ELEVATED RANDOM BLOOD GLUCOSE LEVEL: Primary | ICD-10-CM

## 2023-10-31 DIAGNOSIS — I89.0 LYMPHEDEMA OF BOTH LOWER EXTREMITIES: Chronic | ICD-10-CM

## 2023-10-31 DIAGNOSIS — M54.50 INTERMITTENT LOW BACK PAIN: ICD-10-CM

## 2023-10-31 LAB — HBA1C MFR BLD: 5.2 % (ref 4–6.2)

## 2023-10-31 PROCEDURE — 99214 OFFICE O/P EST MOD 30 MIN: CPT | Performed by: PHYSICIAN ASSISTANT

## 2023-10-31 PROCEDURE — G0463 HOSPITAL OUTPT CLINIC VISIT: HCPCS | Mod: 25

## 2023-10-31 PROCEDURE — G0463 HOSPITAL OUTPT CLINIC VISIT: HCPCS

## 2023-10-31 RX ORDER — IBUPROFEN 800 MG/1
800 TABLET, FILM COATED ORAL EVERY 8 HOURS PRN
Qty: 90 TABLET | Refills: 1 | Status: SHIPPED | OUTPATIENT
Start: 2023-10-31 | End: 2023-11-28

## 2023-10-31 ASSESSMENT — ANXIETY QUESTIONNAIRES
2. NOT BEING ABLE TO STOP OR CONTROL WORRYING: NOT AT ALL
3. WORRYING TOO MUCH ABOUT DIFFERENT THINGS: NOT AT ALL
GAD7 TOTAL SCORE: 0
IF YOU CHECKED OFF ANY PROBLEMS ON THIS QUESTIONNAIRE, HOW DIFFICULT HAVE THESE PROBLEMS MADE IT FOR YOU TO DO YOUR WORK, TAKE CARE OF THINGS AT HOME, OR GET ALONG WITH OTHER PEOPLE: NOT DIFFICULT AT ALL
7. FEELING AFRAID AS IF SOMETHING AWFUL MIGHT HAPPEN: NOT AT ALL
5. BEING SO RESTLESS THAT IT IS HARD TO SIT STILL: NOT AT ALL
1. FEELING NERVOUS, ANXIOUS, OR ON EDGE: NOT AT ALL
8. IF YOU CHECKED OFF ANY PROBLEMS, HOW DIFFICULT HAVE THESE MADE IT FOR YOU TO DO YOUR WORK, TAKE CARE OF THINGS AT HOME, OR GET ALONG WITH OTHER PEOPLE?: NOT DIFFICULT AT ALL
GAD7 TOTAL SCORE: 0
GAD7 TOTAL SCORE: 0
7. FEELING AFRAID AS IF SOMETHING AWFUL MIGHT HAPPEN: NOT AT ALL
4. TROUBLE RELAXING: NOT AT ALL
6. BECOMING EASILY ANNOYED OR IRRITABLE: NOT AT ALL

## 2023-10-31 ASSESSMENT — PAIN SCALES - GENERAL: PAINLEVEL: NO PAIN (0)

## 2023-10-31 NOTE — NURSING NOTE
"Patient presents to the clinic for multiple concerns.    FOOD SECURITY SCREENING QUESTIONS:    The next two questions are to help us understand your food security.  If you are feeling you need any assistance in this area, we have resources available to support you today.    Hunger Vital Signs:  Within the past 12 months we worried whether our food would run out before we got money to buy more. Never  Within the past 12 months the food we bought just didn't last and we didn't have money to get more. Never    Advance Care Directive on file? yes  Advance Care Directive provided to patient? Yes.    Chief Complaint   Patient presents with    History of Present Illness       Initial /88 (BP Location: Left arm, Patient Position: Sitting, Cuff Size: Adult Large)   Pulse 90   Temp 97.6  F (36.4  C) (Tympanic)   Resp 20   Ht 1.676 m (5' 6\")   Wt 123.8 kg (273 lb)   LMP 01/01/2017 (Approximate)   SpO2 100%   BMI 44.06 kg/m   Estimated body mass index is 44.06 kg/m  as calculated from the following:    Height as of this encounter: 1.676 m (5' 6\").    Weight as of this encounter: 123.8 kg (273 lb).  Medication Reconciliation: complete        Deirdre Sanabria LPN     "

## 2023-11-04 ENCOUNTER — HOSPITAL ENCOUNTER (EMERGENCY)
Facility: OTHER | Age: 48
Discharge: HOME | End: 2023-11-04
Attending: INTERNAL MEDICINE | Admitting: INTERNAL MEDICINE
Payer: MEDICARE

## 2023-11-04 VITALS
TEMPERATURE: 97 F | SYSTOLIC BLOOD PRESSURE: 126 MMHG | HEART RATE: 95 BPM | OXYGEN SATURATION: 100 % | DIASTOLIC BLOOD PRESSURE: 75 MMHG | RESPIRATION RATE: 20 BRPM

## 2023-11-04 DIAGNOSIS — I89.0 LYMPHEDEMA OF BOTH LOWER EXTREMITIES: Chronic | ICD-10-CM

## 2023-11-04 DIAGNOSIS — L03.115 CELLULITIS OF RIGHT LOWER EXTREMITY: Primary | ICD-10-CM

## 2023-11-04 DIAGNOSIS — E66.01 MORBID OBESITY (H): ICD-10-CM

## 2023-11-04 DIAGNOSIS — N18.2 CKD (CHRONIC KIDNEY DISEASE) STAGE 2, GFR 60-89 ML/MIN: ICD-10-CM

## 2023-11-04 LAB
ALBUMIN SERPL BCG-MCNC: 4.4 G/DL (ref 3.5–5.2)
ALP SERPL-CCNC: 78 U/L (ref 35–104)
ALT SERPL W P-5'-P-CCNC: 21 U/L (ref 0–50)
ANION GAP SERPL CALCULATED.3IONS-SCNC: 11 MMOL/L (ref 7–15)
AST SERPL W P-5'-P-CCNC: 24 U/L (ref 0–45)
BASOPHILS # BLD AUTO: 0 10E3/UL (ref 0–0.2)
BASOPHILS NFR BLD AUTO: 0 %
BILIRUB SERPL-MCNC: 0.5 MG/DL
BUN SERPL-MCNC: 11 MG/DL (ref 6–20)
CALCIUM SERPL-MCNC: 9.7 MG/DL (ref 8.6–10)
CHLORIDE SERPL-SCNC: 103 MMOL/L (ref 98–107)
CREAT SERPL-MCNC: 0.96 MG/DL (ref 0.51–0.95)
CRP SERPL-MCNC: <3 MG/L
DEPRECATED HCO3 PLAS-SCNC: 26 MMOL/L (ref 22–29)
EGFRCR SERPLBLD CKD-EPI 2021: 74 ML/MIN/1.73M2
EOSINOPHIL # BLD AUTO: 0.1 10E3/UL (ref 0–0.7)
EOSINOPHIL NFR BLD AUTO: 1 %
ERYTHROCYTE [DISTWIDTH] IN BLOOD BY AUTOMATED COUNT: 12.8 % (ref 10–15)
GLUCOSE SERPL-MCNC: 102 MG/DL (ref 70–99)
HCT VFR BLD AUTO: 38.2 % (ref 35–47)
HGB BLD-MCNC: 12.4 G/DL (ref 11.7–15.7)
HOLD SPECIMEN: NORMAL
IMM GRANULOCYTES # BLD: 0 10E3/UL
IMM GRANULOCYTES NFR BLD: 0 %
LYMPHOCYTES # BLD AUTO: 1.5 10E3/UL (ref 0.8–5.3)
LYMPHOCYTES NFR BLD AUTO: 17 %
MCH RBC QN AUTO: 29.7 PG (ref 26.5–33)
MCHC RBC AUTO-ENTMCNC: 32.5 G/DL (ref 31.5–36.5)
MCV RBC AUTO: 91 FL (ref 78–100)
MONOCYTES # BLD AUTO: 0.9 10E3/UL (ref 0–1.3)
MONOCYTES NFR BLD AUTO: 10 %
NEUTROPHILS # BLD AUTO: 6.1 10E3/UL (ref 1.6–8.3)
NEUTROPHILS NFR BLD AUTO: 72 %
NRBC # BLD AUTO: 0 10E3/UL
NRBC BLD AUTO-RTO: 0 /100
PLATELET # BLD AUTO: 255 10E3/UL (ref 150–450)
POTASSIUM SERPL-SCNC: 4.1 MMOL/L (ref 3.4–5.3)
PROT SERPL-MCNC: 7.3 G/DL (ref 6.4–8.3)
RBC # BLD AUTO: 4.18 10E6/UL (ref 3.8–5.2)
SODIUM SERPL-SCNC: 140 MMOL/L (ref 135–145)
WBC # BLD AUTO: 8.5 10E3/UL (ref 4–11)

## 2023-11-04 PROCEDURE — 96375 TX/PRO/DX INJ NEW DRUG ADDON: CPT | Performed by: INTERNAL MEDICINE

## 2023-11-04 PROCEDURE — 86140 C-REACTIVE PROTEIN: CPT | Performed by: INTERNAL MEDICINE

## 2023-11-04 PROCEDURE — 258N000003 HC RX IP 258 OP 636: Performed by: INTERNAL MEDICINE

## 2023-11-04 PROCEDURE — 36415 COLL VENOUS BLD VENIPUNCTURE: CPT | Performed by: INTERNAL MEDICINE

## 2023-11-04 PROCEDURE — 250N000011 HC RX IP 250 OP 636: Mod: JZ | Performed by: INTERNAL MEDICINE

## 2023-11-04 PROCEDURE — 85014 HEMATOCRIT: CPT | Performed by: INTERNAL MEDICINE

## 2023-11-04 PROCEDURE — 80053 COMPREHEN METABOLIC PANEL: CPT | Performed by: INTERNAL MEDICINE

## 2023-11-04 PROCEDURE — 99284 EMERGENCY DEPT VISIT MOD MDM: CPT | Performed by: INTERNAL MEDICINE

## 2023-11-04 PROCEDURE — 96365 THER/PROPH/DIAG IV INF INIT: CPT | Performed by: INTERNAL MEDICINE

## 2023-11-04 PROCEDURE — 99284 EMERGENCY DEPT VISIT MOD MDM: CPT | Mod: 25 | Performed by: INTERNAL MEDICINE

## 2023-11-04 RX ORDER — CEPHALEXIN 500 MG/1
500 CAPSULE ORAL 4 TIMES DAILY
Qty: 28 CAPSULE | Refills: 0 | Status: SHIPPED | OUTPATIENT
Start: 2023-11-04 | End: 2023-11-11

## 2023-11-04 RX ORDER — KETOROLAC TROMETHAMINE 15 MG/ML
10 INJECTION, SOLUTION INTRAMUSCULAR; INTRAVENOUS ONCE
Status: COMPLETED | OUTPATIENT
Start: 2023-11-04 | End: 2023-11-04

## 2023-11-04 RX ORDER — CEFTRIAXONE 1 G/1
1 INJECTION, POWDER, FOR SOLUTION INTRAMUSCULAR; INTRAVENOUS ONCE
Status: DISCONTINUED | OUTPATIENT
Start: 2023-11-04 | End: 2023-11-04 | Stop reason: DRUGHIGH

## 2023-11-04 RX ORDER — CEFTRIAXONE 2 G/1
2 INJECTION, POWDER, FOR SOLUTION INTRAMUSCULAR; INTRAVENOUS ONCE
Status: COMPLETED | OUTPATIENT
Start: 2023-11-04 | End: 2023-11-04

## 2023-11-04 RX ADMIN — CEFTRIAXONE 2 G: 2 INJECTION, POWDER, FOR SOLUTION INTRAMUSCULAR; INTRAVENOUS at 10:28

## 2023-11-04 RX ADMIN — KETOROLAC TROMETHAMINE 10 MG: 15 INJECTION, SOLUTION INTRAMUSCULAR; INTRAVENOUS at 10:23

## 2023-11-04 RX ADMIN — SODIUM CHLORIDE 1000 ML: 9 INJECTION, SOLUTION INTRAVENOUS at 10:23

## 2023-11-04 ASSESSMENT — ACTIVITIES OF DAILY LIVING (ADL): ADLS_ACUITY_SCORE: 35

## 2023-11-04 NOTE — DISCHARGE INSTRUCTIONS
Cellulitis of right lower extremity    START:   - cephALEXin (KEFLEX) 500 MG capsule; Take 1 capsule (500 mg) by mouth 4 times daily for 7 days      Okay to use Tylenol or ibuprofen if needed for pain or fever.      Lymphedema of both lower extremities  Morbid obesity (H)  CKD (chronic kidney disease) stage 2, GFR 60-89 ml/min    Congratulations on the weight loss!  Keep up the hard work!

## 2023-11-04 NOTE — ED TRIAGE NOTES
"Pt arrives via private car with concerns for cellulitis of the right leg. Pt has lymphedema and states she gets frequent \"leg infections.\" Swelling and redness began yesterday, denies fevers.     Triage Assessment (Adult)       Row Name 11/04/23 1006          Triage Assessment    Airway WDL WDL        Respiratory WDL    Respiratory WDL WDL        Skin Circulation/Temperature WDL    Skin Circulation/Temperature WDL WDL        Cardiac WDL    Cardiac WDL WDL        Peripheral/Neurovascular WDL    Peripheral Neurovascular WDL X;neurovascular assessment lower        RLE Neurovascular Assessment    Temperature RLE hot     Color RLE red     Sensation RLE tenderness present                     "

## 2023-11-04 NOTE — ED PROVIDER NOTES
Emergency Department Provider Note  : 1975 Age: 48 year old Sex: female MRN: [id number]    Chief Complaint   Patient presents with    Leg Swelling       Medical Decision Making / Assessment / Plan   48 year old female presenting with right lower extremity cellulitis, lymphedema of lower extremities    ED Course as of 23 1131   Sat 2023   1013 Patient evaluated.  Right lower extremity cellulitis associated with lymphedema.  Check lab work, IV, IV fluids.    Start IV antibiotics.     1051 CBC is normal.  1 g IV Rocephin ordered and IV Toradol ordered.   1116 CMP shows mildly elevated creatinine and glucose.  Liver enzymes normal.  CRP is less than 3.0.   1131 Patient is feeling better after fluids and IV antibiotics.  Discharged home with Keflex 500 mg 4 times daily x7 days.  Outpatient follow-up as needed for new or worsening symptoms        A shared decision making model was used. Plan and all results were discussed  Time was taken to answer all questions. Patient and/or associated parties understood and were agreeable to treatment plan.  Strict return to Emergency Department precautions as well as appropriate follow up instructions were provided. The patient was discharged in stable condition.    New Prescriptions    CEPHALEXIN (KEFLEX) 500 MG CAPSULE    Take 1 capsule (500 mg) by mouth 4 times daily for 7 days       Final diagnoses:   Cellulitis of right lower extremity   Lymphedema of both lower extremities   Morbid obesity (H)   CKD (chronic kidney disease) stage 2, GFR 60-89 ml/min       Lucio Curiel MD  2023   Emergency Department    Tawanda Aranda is a 48 year old female who presents at 10:00 AM with chronic lower extremity lymphedema who presents with right leg swelling and redness.  Distally involving the lower shin area.  More medial than lateral.  Area is warm, starting to get red and tender.    States that she needs to be healthy and infection free due to upcoming  bladder surgery.  Redness and symptoms started yesterday.    Denies fevers or chills.  Was concerned due to symptoms and presented for evaluation and treatment.  History of recurrent cellulitis in the past due to her lymphedema.  States that she is feeling somewhat weak/tired/fatigued last night and this morning.      I have reviewed the Medications, Allergies, Past Medical and Surgical History, and Social History in the Epic System and with family.    Review of Systems:  Please see Subjective / HPI for pertinent positives and negatives. All other systems reviewed and found to be negative.      Objective   Patient Vitals for the past 24 hrs:   BP Temp Temp src Pulse Resp SpO2   11/04/23 1030 126/75 -- -- 95 -- 100 %   11/04/23 1015 130/83 -- -- 89 -- 100 %   11/04/23 1005 124/74 97  F (36.1  C) Tympanic 91 20 100 %     Physical Exam:     General: Awake, alert, in no acute respiratory distress.  Head: Normocephalic, atraumatic.  Eyes: Conjugate gaze.  ENT: Moist membranes, external ear appears normal.   Chest/Respiratory: Equal chest rise.  Cardiovascular: Peripheral pulses present, regular rate and rhythm.  Extremities: No obvious long bone deformity.   Neurological: GCS 15, extremities without gross deficit.  Skin: Warm, mild warmth and erythema of right lower extremity associated with lymphedema  Psychiatric: Appropriate affect.     Procedures / Critical Care   Procedures    Aggregate Critical Care Time: None.     Orders Placed This Encounter   Procedures    [place] Draw    Extra Blue Top Tube    Extra Red Top Tube    Extra Green Top (Lithium Heparin) Tube    Extra Purple Top Tube    Extra Green Top (Lithium Heparin) ON ICE    Comprehensive metabolic panel    CRP inflammation    CBC with platelets and differential    Peripheral IV catheter    CBC with platelets differential       RESULTS: As noted above.            Medical/Surgical History:  Past Medical History:   Diagnosis Date    Abnormal Pap smear of cervix  "2018    Overview:  had scraping of cervix    Cannabis use disorder, moderate, in sustained remission, dependence (H) 2015    Closed dislocation of tarsal joint 2011    Closed dislocation of tarsal joint - left 2011    Edema     No Comments Provided    Encounter for removal and reinsertion of intrauterine contraceptive device     05,IUD placement, Removed    Excessive and frequent menstruation with irregular cycle     2017    Major depressive disorder, single episode     No Comments Provided    Personal history of other medical treatment (CODE)     G3, P2-0-1-2 with history of spontaneous     Personal history of other medical treatment (CODE)     No Comments Provided    Uncomplicated asthma     No Comments Provided     Past Surgical History:   Procedure Laterality Date    ANKLE SURGERY      fracture, repair with screws    ARTHRODESIS FOOT Left 2022    Procedure: left foot hardware removaL, fusion of 1st-2nd Tarsal metatarsal;  Surgeon: Anthony Castillo DPM;  Location: [facility] OR    COLONOSCOPY  2022    F/U  tubular adenoma    CONIZATION LEEP      ,Underwent a loop    IMPLANT STIMULATOR AND LEADS SACRAL NERVE (STAGE ONE AND TWO)      scheduled 23 with Dr. Sheth at [place]    LAPAROSCOPIC TUBAL LIGATION      ,tubal ligation       Medications:  No current facility-administered medications for this encounter.     Current Outpatient Medications   Medication    cephALEXin (KEFLEX) 500 MG capsule    acetaminophen (TYLENOL) 500 MG tablet    albuterol (VENTOLIN HFA) 108 (90 Base) MCG/ACT inhaler    ARIPiprazole (ABILIFY) 15 MG tablet    atomoxetine (STRATTERA) 25 MG capsule    B-D SYRINGE/NEEDLE 25G X 5\" 1 ML MISC    busPIRone (BUSPAR) 10 MG tablet    calcium carbonate (TUMS) 500 MG chewable tablet    cholecalciferol (VITAMIN D3) 125 mcg (5000 units) capsule    cyanocobalamin (CYANOCOBALAMIN) 1000 MCG/ML injection    famotidine (PEPCID) 20 MG tablet    " fluticasone (FLONASE) 50 MCG/ACT nasal spray    gabapentin (NEURONTIN) 300 MG capsule    hydrOXYzine (ATARAX) 50 MG tablet    hydrOXYzine (VISTARIL) 50 MG capsule    ibuprofen (ADVIL/MOTRIN) 200 MG tablet    ibuprofen (ADVIL/MOTRIN) 800 MG tablet    lamoTRIgine (LAMICTAL) 100 MG tablet    Menthol, Topical Analgesic, (BIOFREEZE EX)    methocarbamol (ROBAXIN) 500 MG tablet    montelukast (SINGULAIR) 10 MG tablet    prazosin (MINIPRESS) 1 MG capsule    ramelteon (ROZEREM) 8 MG tablet    vilazodone (VIIBRYD) 40 MG TABS tablet       Allergies:  Clonazepam, Hydrocodone-acetaminophen, Lorazepam, Sertraline, Bupropion, Dust mite extract, Lithium, Pollen extract, Risperidone, Sulfa antibiotics, Trichophyton, and Valproic acid    Relevant labs, images, EKGs, Epic and outside hospital (if applicable) charts were reviewed. The findings, diagnosis, plan, and need for follow up were discussed with the patient/family. Nursing notes were reviewed.      Lucio Curiel MD  11/04/23 3212

## 2023-11-08 DIAGNOSIS — E53.8 VITAMIN B12 DEFICIENCY: Primary | ICD-10-CM

## 2023-11-13 ENCOUNTER — OFFICE VISIT (OUTPATIENT)
Dept: FAMILY MEDICINE | Facility: OTHER | Age: 48
End: 2023-11-13
Payer: MEDICARE

## 2023-11-13 ENCOUNTER — HOSPITAL ENCOUNTER (OUTPATIENT)
Dept: GENERAL RADIOLOGY | Facility: OTHER | Age: 48
Discharge: HOME | End: 2023-11-13
Payer: MEDICARE

## 2023-11-13 VITALS
BODY MASS INDEX: 43.63 KG/M2 | RESPIRATION RATE: 20 BRPM | HEART RATE: 83 BPM | WEIGHT: 278 LBS | SYSTOLIC BLOOD PRESSURE: 132 MMHG | DIASTOLIC BLOOD PRESSURE: 78 MMHG | TEMPERATURE: 97 F | HEIGHT: 67 IN | OXYGEN SATURATION: 100 %

## 2023-11-13 DIAGNOSIS — M25.561 ACUTE PAIN OF RIGHT KNEE: Primary | ICD-10-CM

## 2023-11-13 DIAGNOSIS — M17.10 ARTHRITIS OF KNEE: ICD-10-CM

## 2023-11-13 PROCEDURE — 73562 X-RAY EXAM OF KNEE 3: CPT | Mod: RT

## 2023-11-13 PROCEDURE — G0463 HOSPITAL OUTPT CLINIC VISIT: HCPCS

## 2023-11-13 PROCEDURE — G0463 HOSPITAL OUTPT CLINIC VISIT: HCPCS | Mod: 25

## 2023-11-13 PROCEDURE — 99213 OFFICE O/P EST LOW 20 MIN: CPT

## 2023-11-13 RX ORDER — GABAPENTIN 100 MG/1
100 CAPSULE ORAL 3 TIMES DAILY
COMMUNITY
Start: 2023-11-09

## 2023-11-13 ASSESSMENT — PAIN SCALES - GENERAL: PAINLEVEL: SEVERE PAIN (7)

## 2023-11-13 NOTE — NURSING NOTE
"Chief Complaint   Patient presents with    Knee Pain     R knee x 3 days     Patient tx with topical/biofreeze and ibuprofen with some relief.  Recently tx for cellulitis and would like to be sure it is resolving.    Initial /78 (BP Location: Right arm, Patient Position: Sitting, Cuff Size: Adult Large)   Pulse 83   Temp 97  F (36.1  C) (Tympanic)   Resp 20   Ht 1.689 m (5' 6.5\")   Wt 126.1 kg (278 lb)   LMP 01/01/2017 (Approximate)   SpO2 100%   BMI 44.20 kg/m   Estimated body mass index is 44.2 kg/m  as calculated from the following:    Height as of this encounter: 1.689 m (5' 6.5\").    Weight as of this encounter: 126.1 kg (278 lb).     Advance Care Directive on file? yes    FOOD SECURITY SCREENING QUESTIONS:    The next two questions are to help us understand your food security.  If you are feeling you need any assistance in this area, we have resources available to support you today.    Hunger Vital Signs:  Within the past 12 months we worried whether our food would run out before we got money to buy more. Never  Within the past 12 months the food we bought just didn't last and we didn't have money to get more. Never  Aspen Brewer LPN,NARESH on 11/13/2023 at 5:56 PM      Aspen Brewer LPN     "

## 2023-11-13 NOTE — PROGRESS NOTES
ASSESSMENT/PLAN:    I have reviewed the nursing notes.  I have reviewed the findings, diagnosis, plan and need for follow up with the patient.    1. Acute pain of right knee  2. Arthritis of knee  - XR Knee Right 3 Views  - Orthopedic  Referral; Future    Patient presents with acute right knee pain with no known injury.  Patient does have a history of osteoarthritis and lymphedema in her right knee and lower extremity.  Right knee x-ray shows moderately severe to severe medial compartment degenerative changes with large osteophytes and possible calcified joint bodies.  Discussed else with patient in clinic.  Recommended that patient follow RICE and may use over-the-counter Tylenol or ibuprofen PRN.  Referred patient to orthopedics for follow-up and further evaluation.    Discussed warning signs/symptoms indicative of need to f/u    Follow up if symptoms persist or worsen or concerns    I explained my diagnostic considerations and recommendations to the patient, who voiced understanding and agreement with the treatment plan. All questions were answered. We discussed potential side effects of any prescribed or recommended therapies, as well as expectations for response to treatments.    Oscar Dykes, DASHA CNP  11/13/2023  5:59 PM    HPI:    Rosi Lamb is a 48 year old female  who presents to [facility] today for concerns of right knee pain    Patient states that she was walking down the stairs 3 days ago and started noticing right knee pain. She states that she has lymphedema both legs and osteoarthritis in her right knee.  She denies any known injury.  She states that the pain is worse when she is walking up or down stairs and with ambulation.  She has tried ibuprofen.  She states that there is increased swelling in the medial aspect of her right knee.  She denies any numbness, tingling, erythema, or ecchymosis.  She was recently treated for cellulitis of her right lower leg and she states that  this has resolved.  Sensation is intact.  She is able to ambulate with the use of a cane.    Past Medical History:   Diagnosis Date    Abnormal Pap smear of cervix 2018    Overview:  had scraping of cervix    Cannabis use disorder, moderate, in sustained remission, dependence (H) 2015    Closed dislocation of tarsal joint 2011    Closed dislocation of tarsal joint - left 2011    Edema     No Comments Provided    Encounter for removal and reinsertion of intrauterine contraceptive device     05,IUD placement, Removed    Excessive and frequent menstruation with irregular cycle     2017    Major depressive disorder, single episode     No Comments Provided    Personal history of other medical treatment (CODE)     G3, P2-0-1-2 with history of spontaneous     Personal history of other medical treatment (CODE)     No Comments Provided    Uncomplicated asthma     No Comments Provided     Past Surgical History:   Procedure Laterality Date    ANKLE SURGERY      fracture, repair with screws    ARTHRODESIS FOOT Left 2022    Procedure: left foot hardware removaL, fusion of 1st-2nd Tarsal metatarsal;  Surgeon: Anthony Castillo DPM;  Location: [facility] OR    COLONOSCOPY  2022    F/U  tubular adenoma    CONIZATION LEEP      ,Underwent a loop    IMPLANT STIMULATOR AND LEADS SACRAL NERVE (STAGE ONE AND TWO)      scheduled 23 with Dr. Sheth at [facility]    LAPAROSCOPIC TUBAL LIGATION      ,tubal ligation     Social History     Tobacco Use    Smoking status: Former     Packs/day: 1.00     Years: 3.00     Additional pack years: 0.00     Total pack years: 3.00     Types: Cigarettes     Quit date: 2003     Years since quittin.8    Smokeless tobacco: Never   Substance Use Topics    Alcohol use: Not Currently     Alcohol/week: 0.0 standard drinks of alcohol     Current Outpatient Medications   Medication Sig Dispense Refill    acetaminophen (TYLENOL) 500 MG tablet  "Take 500-1,000 mg by mouth every 6 hours as needed      albuterol (VENTOLIN HFA) 108 (90 Base) MCG/ACT inhaler Inhale 1-2 puffs into the lungs every 4 hours as needed for shortness of breath or wheezing 18 g 11    ARIPiprazole (ABILIFY) 15 MG tablet Take 1 tablet (15 mg) by mouth At Bedtime 30 tablet 1    atomoxetine (STRATTERA) 25 MG capsule TAKE 1 CAPSULE BY MOUTH DAILY AT 8AM      B-D SYRINGE/NEEDLE 25G X 5/8\" 1 ML MISC USE TO INJECT B-12 INTRAMUSCULARLY EVERY 2 WEEKS      busPIRone (BUSPAR) 10 MG tablet Take 1 tablet by mouth 3 times daily Am and 2 pm      calcium carbonate (TUMS) 500 MG chewable tablet Take 2-4 chew tab by mouth as needed as directed.      cholecalciferol (VITAMIN D3) 125 mcg (5000 units) capsule Take 1 capsule (125 mcg) by mouth daily 100 capsule 4    cyanocobalamin (CYANOCOBALAMIN) 1000 MCG/ML injection INJECT 1ML INTRAMUSCULARLY EVERY 2 WEEKS 6 mL 11    famotidine (PEPCID) 20 MG tablet Take 1 tablet (20 mg) by mouth 2 times daily - For Heartburn 180 tablet 4    fluticasone (FLONASE) 50 MCG/ACT nasal spray Spray 1 spray into both nostrils daily as needed for rhinitis or allergies 16 g 0    gabapentin (NEURONTIN) 100 MG capsule Take 100 mg by mouth 3 times daily      hydrOXYzine (ATARAX) 50 MG tablet Take 1 tablet (50 mg) by mouth At Bedtime 30 tablet 0    hydrOXYzine (VISTARIL) 50 MG capsule TAKE 1 CAPSULE BY MOUTH FOUR TIMES A DAY AS NEEDED FOR ANXIETY      ibuprofen (ADVIL/MOTRIN) 200 MG tablet Take 200-400 mg by mouth every 6 hours as needed 1 to 2 tabs Q6 PRN      lamoTRIgine (LAMICTAL) 100 MG tablet Take 1 tablet (100 mg) by mouth 3 times daily -- Managed by Psych      Menthol, Topical Analgesic, (BIOFREEZE EX) Apply topically 4 times daily as needed      methocarbamol (ROBAXIN) 500 MG tablet Take 500-1,000 mg by mouth 4 times daily as needed for muscle spasms      montelukast (SINGULAIR) 10 MG tablet Take 1 tablet (10 mg) by mouth At Bedtime 90 tablet 4    prazosin (MINIPRESS) 1 MG " "capsule Take by mouth as needed      ramelteon (ROZEREM) 8 MG tablet Take 1 tablet (8 mg) by mouth At Bedtime 30 tablet 1    vilazodone (VIIBRYD) 40 MG TABS tablet Take 40 mg by mouth every morning      gabapentin (NEURONTIN) 300 MG capsule Take 100 mg by mouth 3 times daily . May take an additional capsule at noon IF needed. (Patient not taking: Reported on 11/13/2023)      ibuprofen (ADVIL/MOTRIN) 800 MG tablet Take 1 tablet (800 mg) by mouth every 8 hours as needed for moderate pain (Patient not taking: Reported on 11/13/2023) 90 tablet 1     Allergies   Allergen Reactions    Clonazepam Other (See Comments)     Causes Violence and aggresssion    Hydrocodone-Acetaminophen      Other reaction(s): Seizures  Can take Percocet without difficulty.    Lorazepam Other (See Comments)     Causes Violence and aggresssion    Sertraline Other (See Comments)     Caused suicidally     Bupropion Other (See Comments)     Caused Major Depression    Dust Mite Extract      Other reaction(s): Asthma symptoms    Lithium Other (See Comments)     Mood alteration    Pollen Extract      Other reaction(s): Asthma symptoms    Risperidone Other (See Comments)     Agitation      Sulfa Antibiotics Itching    Trichophyton      Other reaction(s): Asthma symptoms    Valproic Acid Other (See Comments)     Weight gain     Past medical history, past surgical history, current medications and allergies reviewed and accurate to the best of my knowledge.      ROS:  Refer to HPI    /78 (BP Location: Right arm, Patient Position: Sitting, Cuff Size: Adult Large)   Pulse 83   Temp 97  F (36.1  C) (Tympanic)   Resp 20   Ht 1.689 m (5' 6.5\")   Wt 126.1 kg (278 lb)   LMP 01/01/2017 (Approximate)   SpO2 100%   BMI 44.20 kg/m      EXAM:  General Appearance: Well appearing 48 year old female, appropriate appearance for age. No acute distress   Respiratory: normal chest wall and respirations.  Normal effort. No increased work of breathing.  No cough " appreciated.  Cardiac: RRR with no murmurs  Musculoskeletal:    Gait: normal gait with no signs of guarding or compensation. Patient is able to get up and go from a seated position, in order to ambulate.    Appearance of the RIGHT knee: Skin is clean, dry, and non-ecchymotic. Effusion mild. Tenderness to palpation medial joint line.  Patellar tracking is normal. ROM: flexion, extension full with some mild discomfort. Stable to varus, valgus, Lachman, A&P drawer ligamentous stressing. Opal negative. Extension strength 5/5. Neurovascular status, distal pulses and sensation intact.     Dermatological: no rashes noted of exposed skin  Neuro: Alert and oriented to person, place, and time.    Psychological: normal affect, alert, oriented, and pleasant.     Xray:  Results for orders placed or performed in visit on 11/13/23   XR Knee Right 3 Views     Status: None    Narrative    Exam: XR KNEE RIGHT 3 VIEWS     History:Female, age 48 years, right medial knee pain; Acute pain of  right knee    Comparison:  11/13/2018    Technique: Three views are submitted.    Findings: Bones are normally mineralized. No evidence of acute or  subacute fracture.  No evidence of dislocation.           Impression    Impression:  No evidence of acute or subacute bony abnormality.     Moderately severe to severe medial compartment degenerative change  with large osteophytes and possible calcified joint bodies.    SHIRLEY TEJEDA MD         SYSTEM ID:  [id number]

## 2023-11-14 RX ORDER — NEEDLES, SAFETY 22GX1 1/2"
NEEDLE, DISPOSABLE MISCELLANEOUS
Qty: 94 EACH | Refills: 4 | Status: SHIPPED | OUTPATIENT
Start: 2023-11-14 | End: 2024-03-15

## 2023-11-14 NOTE — TELEPHONE ENCOUNTER
"[facility] sent Rx request for the following:      Requested Prescriptions   Pending Prescriptions Disp Refills    B-D SYRINGE/NEEDLE 25G X 5/8\" 1 ML MISC [Pharmacy Med Name: BD S-G 1ML 25G 5/8IN SYRINGE] 94 each 0     Sig: USE TO INJECT B-12 INTRAMUSCULARLY EVERY 2 WEEKS       There is no refill protocol information for this order          Last Prescription Date:   10/16/22  Last Fill Qty/Refills:         100, R-0    Last Office Visit:              10/31/23   Future Office visit:           11/28/23    10/30/22 1140 Discontinue Dona Haque RN Reason:Med Rec(No AVS / No eCancel)       Discontinued- needs for B12 injection. Should we be sending 1\" needles not 5/8\"??    Ena Alexander RN on 11/14/2023 at 11:21 AM   "

## 2023-11-17 ENCOUNTER — HOSPITAL ENCOUNTER (OUTPATIENT)
Dept: ULTRASOUND IMAGING | Facility: OTHER | Age: 48
Discharge: HOME | End: 2023-11-17
Attending: OBSTETRICS & GYNECOLOGY | Admitting: OBSTETRICS & GYNECOLOGY
Payer: MEDICARE

## 2023-11-17 DIAGNOSIS — N85.8 UTERINE CYST: ICD-10-CM

## 2023-11-17 PROCEDURE — 76856 US EXAM PELVIC COMPLETE: CPT

## 2023-11-28 ENCOUNTER — OFFICE VISIT (OUTPATIENT)
Dept: INTERNAL MEDICINE | Facility: OTHER | Age: 48
End: 2023-11-28
Attending: INTERNAL MEDICINE
Payer: MEDICARE

## 2023-11-28 VITALS
OXYGEN SATURATION: 100 % | DIASTOLIC BLOOD PRESSURE: 70 MMHG | HEIGHT: 66 IN | SYSTOLIC BLOOD PRESSURE: 122 MMHG | TEMPERATURE: 98.4 F | WEIGHT: 277.2 LBS | HEART RATE: 83 BPM | RESPIRATION RATE: 16 BRPM | BODY MASS INDEX: 44.55 KG/M2

## 2023-11-28 DIAGNOSIS — K59.09 INTERMITTENT CONSTIPATION: Primary | ICD-10-CM

## 2023-11-28 DIAGNOSIS — G89.29 CHRONIC PAIN OF RIGHT KNEE: ICD-10-CM

## 2023-11-28 DIAGNOSIS — Z00.00 HEALTHCARE MAINTENANCE: ICD-10-CM

## 2023-11-28 DIAGNOSIS — M25.561 CHRONIC PAIN OF RIGHT KNEE: ICD-10-CM

## 2023-11-28 PROCEDURE — 99213 OFFICE O/P EST LOW 20 MIN: CPT | Performed by: INTERNAL MEDICINE

## 2023-11-28 PROCEDURE — G0463 HOSPITAL OUTPT CLINIC VISIT: HCPCS

## 2023-11-28 RX ORDER — ASPIRIN 81 MG
200 TABLET, DELAYED RELEASE (ENTERIC COATED) ORAL 2 TIMES DAILY
Qty: 120 TABLET | Refills: 11 | Status: SHIPPED | OUTPATIENT
Start: 2023-11-28 | End: 2024-01-17

## 2023-11-28 RX ORDER — HYDROCORTISONE ACETATE 0.5 %
1 CREAM (GRAM) TOPICAL DAILY
COMMUNITY
Start: 2023-11-28 | End: 2024-02-19

## 2023-11-28 RX ORDER — EAR PLUGS
1 EACH OTIC (EAR) DAILY
COMMUNITY
Start: 2023-11-28 | End: 2024-02-19

## 2023-11-28 ASSESSMENT — ENCOUNTER SYMPTOMS
JOINT SWELLING: 0
CONSTIPATION: 1
BREAST MASS: 0
SHORTNESS OF BREATH: 0
CHILLS: 0
SORE THROAT: 0
EYE PAIN: 0
ABDOMINAL PAIN: 0
NERVOUS/ANXIOUS: 1
PARESTHESIAS: 0
DYSURIA: 0
ARTHRALGIAS: 1
DIARRHEA: 0
PALPITATIONS: 0
DIZZINESS: 0
COUGH: 0
HEADACHES: 0
MYALGIAS: 0
FREQUENCY: 0
HEMATOCHEZIA: 0
HEARTBURN: 0
FEVER: 0
HEMATURIA: 0
WEAKNESS: 0
NAUSEA: 0
BRUISES/BLEEDS EASILY: 0

## 2023-11-28 ASSESSMENT — ASTHMA QUESTIONNAIRES: ACT_TOTALSCORE: 23

## 2023-11-28 ASSESSMENT — PAIN SCALES - GENERAL: PAINLEVEL: NO PAIN (0)

## 2023-11-28 NOTE — PATIENT INSTRUCTIONS
Intermittent constipation  START:   - docusate sodium (COLACE) 100 MG tablet; Take 2 tablets (200 mg) by mouth 2 times daily -Adjust dose as needed to maintain soft stools    Healthcare maintenance  START:   - Calcium-Vitamin D-Vitamin K (CHEWABLE CALCIUM) 500-200-40 MG-UNT-MCG CHEW; Take 1 chew tab by mouth daily -- Gets OTC    Chronic pain of right knee  START:   - Glucosamine-Chondroit-Vit C-Mn (GLUCOSAMINE CHONDROITIN 1500 COMPLEX) CAPS; Take 1 capsule by mouth daily - For Knee pain -- Gets OTC        Return in approximately 4 months, or sooner as needed for follow-up with Dr. Curiel.  - Annual Follow-up / Physical - Medicare Annual Wellness Visit     Clinic : [phone]  Appointment line: [phone]

## 2023-11-28 NOTE — PROGRESS NOTES
Assessment & Plan     ICD-10-CM    1. Intermittent constipation  K59.09 docusate sodium (COLACE) 100 MG tablet      2. Healthcare maintenance  Z00.00 Calcium-Vitamin D-Vitamin K (CHEWABLE CALCIUM) 500-200-40 MG-UNT-MCG CHEW      3. Chronic pain of right knee  M25.561 Glucosamine-Chondroit-Vit C-Mn (GLUCOSAMINE CHONDROITIN 1500 COMPLEX) CAPS    G89.29         Patient presents for follow-up multiple issues.    Reports intermittent constipation, she would like to start some Colace/docusate.  Prescription sent to pharmacy.    Healthcare maintenance, previously taking calcium supplements, she would like to go back on these.  Medicine updated.    Having some chronic right knee pain, discussed a few options.  She would like to continue with therapy and exercise.  Has been doing well with weight loss.  Knee has been bothering her more recently again.  She would like to start trial of glucosamine/chondroitin tablets.  Recommend 1500 mg daily.    Return in about 18 weeks (around 4/2/2024) for Annual Medicare Wellness Visit.    Lucio Curiel MD  [facility]     [facility]   Rosi is a 48 year old, presenting for the following health issues:  Follow up medications (Calcium Chews/Vitamin questions/Change in Gabentine dose)        11/28/2023     3:42 PM   Additional Questions   Roomed by Camilo Patel LPN   Accompanied by n/a       History of Present Illness       Reason for visit:  Calcium check and some other issues    She eats 2-3 servings of fruits and vegetables daily.She consumes 2 sweetened beverage(s) daily.She exercises with enough effort to increase her heart rate 20 to 29 minutes per day.  She exercises with enough effort to increase her heart rate 5 days per week. She is missing 7 dose(s) of medications per week.  She is not taking prescribed medications regularly due to other.       Review of Systems   Constitutional:  Negative for chills and fever.   HENT:  Negative for congestion, ear pain,  "hearing loss and sore throat.    Eyes:  Negative for pain and visual disturbance.   Respiratory:  Negative for cough and shortness of breath.    Cardiovascular:  Negative for chest pain, palpitations and peripheral edema.   Gastrointestinal:  Positive for constipation. Negative for abdominal pain, diarrhea, heartburn (better with famotidine), hematochezia and nausea.   Breasts:  Negative for tenderness, breast mass and discharge.   Genitourinary:  Positive for pelvic pain and urgency. Negative for dysuria, frequency, genital sores, hematuria, vaginal bleeding and vaginal discharge.        + Urinary frequency, better with interstim   Musculoskeletal:  Positive for arthralgias (Right > left knee pain) and gait problem. Negative for joint swelling and myalgias.   Skin:  Negative for rash.   Allergic/Immunologic: Negative for immunocompromised state.   Neurological:  Negative for dizziness, weakness, headaches and paresthesias.   Hematological:  Does not bruise/bleed easily.   Psychiatric/Behavioral:  Positive for mood changes. The patient is nervous/anxious.             Objective    /70 (BP Location: Right arm, Patient Position: Sitting, Cuff Size: Adult Large)   Pulse 83   Temp 98.4  F (36.9  C) (Temporal)   Resp 16   Ht 1.683 m (5' 6.25\")   Wt 125.7 kg (277 lb 3.2 oz)   LMP 01/01/2017   SpO2 100%   Breastfeeding No   BMI 44.40 kg/m    Body mass index is 44.4 kg/m .  Physical Exam  Constitutional:       Appearance: She is obese.   Eyes:      General: No scleral icterus.     Conjunctiva/sclera: Conjunctivae normal.   Cardiovascular:      Rate and Rhythm: Normal rate and regular rhythm.   Pulmonary:      Effort: Pulmonary effort is normal.   Abdominal:      Palpations: Abdomen is soft.      Tenderness: There is no abdominal tenderness.   Musculoskeletal:         General: No deformity. Normal range of motion.      Comments: Lymphedema of legs   Skin:     General: Skin is warm and dry.      Findings: No " rash.   Neurological:      Mental Status: She is alert. Mental status is at baseline.   Psychiatric:         Mood and Affect: Mood normal.         Behavior: Behavior normal.

## 2023-11-28 NOTE — NURSING NOTE
"Chief Complaint   Patient presents with    Follow up medications     Calcium Chews  Vitamin questions  Change in Gabentine dose       Initial /70 (BP Location: Right arm, Patient Position: Sitting, Cuff Size: Adult Large)   Pulse 83   Temp 98.4  F (36.9  C) (Temporal)   Resp 16   Ht 1.683 m (5' 6.25\")   Wt 125.7 kg (277 lb 3.2 oz)   LMP 01/01/2017   SpO2 100%   Breastfeeding No   BMI 44.40 kg/m   Estimated body mass index is 44.4 kg/m  as calculated from the following:    Height as of this encounter: 1.683 m (5' 6.25\").    Weight as of this encounter: 125.7 kg (277 lb 3.2 oz).  Medication Review: complete    The next two questions are to help us understand your food security.  If you are feeling you need any assistance in this area, we have resources available to support you today.          11/21/2023   SDOH- Food Insecurity   Within the past 12 months, did you worry that your food would run out before you got money to buy more? N    N   Within the past 12 months, did the food you bought just not last and you didn t have money to get more? N    N         Health Care Directive:  Patient has a Health Care Directive on file      Camilo Murillo      "

## 2023-12-14 ENCOUNTER — TELEPHONE (OUTPATIENT)
Dept: INTERNAL MEDICINE | Facility: OTHER | Age: 48
End: 2023-12-14
Payer: MEDICARE

## 2023-12-14 NOTE — TELEPHONE ENCOUNTER
Can this patient be worked in with [facility] tomorrow Friday 12/15/2023, Monday 12/18/2023, or Tuesday 12/19/2023? Patient surgery is Wednesday 12/20/2023.     Leann Funk LPN on 12/14/2023 at 4:42 PM   Ext. [phone]

## 2023-12-14 NOTE — TELEPHONE ENCOUNTER
The patient needs a preop on Friday, Monday or Tuesday.  She has surgery Wednesday morning at [facility].  Please advise if anyone can fit her in.

## 2023-12-15 NOTE — TELEPHONE ENCOUNTER
After verifying patient name and , patient notified of work in pre-op appointment with [facility] 2023 at 1300. Patient is agreeable with this appointment.     Leann Funk LPN on 12/15/2023 at 9:35 AM   Ext. [phone]

## 2023-12-15 NOTE — TELEPHONE ENCOUNTER
Patient contacted in regarding their preop.  Patient needs to get that appt as soon as possible please assist    Uriel Choi on 12/15/2023 at 8:08 AM

## 2023-12-18 ENCOUNTER — OFFICE VISIT (OUTPATIENT)
Dept: INTERNAL MEDICINE | Facility: OTHER | Age: 48
End: 2023-12-18
Payer: MEDICARE

## 2023-12-18 VITALS
RESPIRATION RATE: 16 BRPM | HEART RATE: 92 BPM | BODY MASS INDEX: 43.62 KG/M2 | HEIGHT: 66 IN | DIASTOLIC BLOOD PRESSURE: 58 MMHG | WEIGHT: 271.4 LBS | OXYGEN SATURATION: 99 % | SYSTOLIC BLOOD PRESSURE: 134 MMHG | TEMPERATURE: 97.2 F

## 2023-12-18 DIAGNOSIS — E78.2 MIXED HYPERLIPIDEMIA: ICD-10-CM

## 2023-12-18 DIAGNOSIS — I89.0 LYMPHEDEMA OF BOTH LOWER EXTREMITIES: Chronic | ICD-10-CM

## 2023-12-18 DIAGNOSIS — J45.40 MODERATE PERSISTENT ASTHMA WITHOUT COMPLICATION: Chronic | ICD-10-CM

## 2023-12-18 DIAGNOSIS — N39.41 URGE INCONTINENCE: ICD-10-CM

## 2023-12-18 DIAGNOSIS — F41.1 GENERALIZED ANXIETY DISORDER: Chronic | ICD-10-CM

## 2023-12-18 DIAGNOSIS — F10.21 ALCOHOL USE DISORDER, MODERATE, IN SUSTAINED REMISSION, DEPENDENCE (H): ICD-10-CM

## 2023-12-18 DIAGNOSIS — Z01.818 PREOP GENERAL PHYSICAL EXAM: Primary | ICD-10-CM

## 2023-12-18 DIAGNOSIS — E66.01 MORBID OBESITY (H): ICD-10-CM

## 2023-12-18 DIAGNOSIS — N18.2 CKD (CHRONIC KIDNEY DISEASE) STAGE 2, GFR 60-89 ML/MIN: ICD-10-CM

## 2023-12-18 DIAGNOSIS — F60.3 BORDERLINE PERSONALITY DISORDER (H): Chronic | ICD-10-CM

## 2023-12-18 DIAGNOSIS — F19.11 HISTORY OF SUBSTANCE ABUSE (H): Chronic | ICD-10-CM

## 2023-12-18 PROCEDURE — G0463 HOSPITAL OUTPT CLINIC VISIT: HCPCS

## 2023-12-18 PROCEDURE — 99214 OFFICE O/P EST MOD 30 MIN: CPT

## 2023-12-18 ASSESSMENT — PAIN SCALES - GENERAL: PAINLEVEL: NO PAIN (0)

## 2023-12-18 NOTE — NURSING NOTE
"Chief Complaint   Patient presents with    Pre-Op Exam     Bladder stimulator   Patient presents to the clinic today for a pre op for a bladder stimulator    Initial LMP 01/01/2017  Estimated body mass index is 44.4 kg/m  as calculated from the following:    Height as of 11/28/23: 1.683 m (5' 6.25\").    Weight as of 11/28/23: 125.7 kg (277 lb 3.2 oz).  Meds Reconciled: complete      Chelsea Cam LPN,LPN on 12/18/2023 at 1:01 PM  Ext. [phone]        Chelsea Cam LPN  "

## 2023-12-18 NOTE — PROGRESS NOTES
[facility] [place]  [address]  [place] [address]  Phone: [phone]  Primary Provider: Lucio Curiel  Pre-op Performing Provider: EUSEBIO YEPEZ      PREOPERATIVE EVALUATION:  Today's date: 12/18/2023    Rosi is a 48 year old, presenting for the following:  Pre-Op Exam (Bladder stimulator)      Surgical Information:  Surgery/Procedure: Bladder stimulator  Surgery Location: [facility]  Surgeon: Dr. Sheth  Surgery Date: 12/20/23  Time of Surgery: TBD  Where patient plans to recover: At home with family  Fax number for surgical facility: [phone]    Assessment & Plan     The proposed surgical procedure is considered INTERMEDIATE risk.      ICD-10-CM    1. Preop general physical exam  Z01.818       2. Urge incontinence  N39.41       3. Morbid obesity (H)  E66.01       4. Alcohol use disorder, moderate, in sustained remission, dependence (H)  F10.21       5. Generalized anxiety disorder  F41.1       6. Borderline personality disorder (H)  F60.3       7. Moderate persistent asthma without complication  J45.40       8. History of substance abuse (H)  F19.11       9. CKD (chronic kidney disease) stage 2, GFR 60-89 ml/min  N18.2       10. Mixed hyperlipidemia  E78.2       11. Lymphedema of both lower extremities  I89.0                   Risks and Recommendations:  The patient has the following additional risks and recommendations for perioperative complications:   - Morbid obesity (BMI >40)    Antiplatelet or Anticoagulation Medication Instructions:   - Patient is on no antiplatelet or anticoagulation medications.    Additional Medication Instructions:  Patient is to take all scheduled medications on the day of surgery    RECOMMENDATION:  APPROVAL GIVEN to proceed with proposed procedure, without further diagnostic evaluation.        Subjective       HPI related to upcoming procedure: Patient presents for preoperative evaluation for InterStim placement scheduled on  12/20/2023 by Dr. Sheth.  She has had ongoing issues with overactive bladder/incontinence.  She is looking forward to upcoming procedure.  She denies having any difficulty with anesthesia in the past, is able to meet greater than 4 METS.        12/18/2023    12:46 PM   Preop Questions   1. Have you ever had a heart attack or stroke? No   2. Have you ever had surgery on your heart or blood vessels, such as a stent placement, a coronary artery bypass, or surgery on an artery in your head, neck, heart, or legs? No   3. Do you have chest pain with activity? No   4. Do you have a history of  heart failure? No   5. Do you currently have a cold, bronchitis or symptoms of other infection? No   6. Do you have a cough, shortness of breath, or wheezing? No   7. Do you or anyone in your family have previous history of blood clots? No   8. Do you or does anyone in your family have a serious bleeding problem such as prolonged bleeding following surgeries or cuts? No   9. Have you ever had problems with anemia or been told to take iron pills? No   10. Have you had any abnormal blood loss such as black, tarry or bloody stools, or abnormal vaginal bleeding? No   11. Have you ever had a blood transfusion? No   12. Are you willing to have a blood transfusion if it is medically needed before, during, or after your surgery? Yes   13. Have you or any of your relatives ever had problems with anesthesia? No   14. Do you have sleep apnea, excessive snoring or daytime drowsiness? No   15. Do you have any artifical heart valves or other implanted medical devices like a pacemaker, defibrillator, or continuous glucose monitor? No   16. Do you have artificial joints? No   17. Are you allergic to latex? No   18. Is there any chance that you may be pregnant? No       Health Care Directive:  Patient has a Health Care Directive on file      Preoperative Review of :   reviewed - controlled substances reflected in medication list.      Status  "of Chronic Conditions:  See problem list for active medical problems.  Problems all longstanding and stable, except as noted/documented.  See ROS for pertinent symptoms related to these conditions.    ASTHMA - Patient has a longstanding history of moderate-severe Asthma . Patient has been doing well overall noting NO SYMPTOMS and continues on medication regimen consisting of albuterol without adverse reactions or side effects.     HYPERLIPIDEMIA - Patient has a long history of significant Hyperlipidemia requiring medication for treatment with recent good control. Patient reports no problems or side effects with the medication.     HYPERTENSION - Patient has longstanding history of HTN , currently denies any symptoms referable to elevated blood pressure. Specifically denies chest pain, palpitations, dyspnea, orthopnea, PND or peripheral edema. Blood pressure readings have been in normal range. Current medication regimen is as listed below. Patient denies any side effects of medication.     Review of Systems  CONSTITUTIONAL: NEGATIVE for fever, chills, change in weight  ENT/MOUTH: NEGATIVE for ear, mouth and throat problems  RESP: NEGATIVE for significant cough or SOB  CV: NEGATIVE for chest pain, palpitations or peripheral edema  : Negative for dysuria      Patient Active Problem List    Diagnosis Date Noted    Morbid obesity (H) 03/14/2023     Priority: Medium       Estimated body mass index is 43.9 kg/m  as calculated from the following:    Height as of 10/29/22: 1.702 m (5' 7\").    Weight as of 12/20/22: 127.1 kg (280 lb 4.8 oz).    Complete \"Weight Managment Plan\" in the progress note from the Adult Preventative or Medicare smartsets, use phrase .WEIGHTPLAN, or choose an option from Weight Management Resources smartset below.        CKD (chronic kidney disease) stage 2, GFR 60-89 ml/min 03/14/2023     Priority: Medium    H/O adenomatous polyp of colon 07/25/2022     Priority: Medium    Nail dystrophy 02/27/2022 "     Priority: Medium    Intermittent low back pain 09/08/2021     Priority: Medium    Abnormal Pap smear of cervix 04/11/2018     Priority: Medium     Overview:   had scraping of cervix    Formatting of this note might be different from the original.  had scraping of cervix      Allergic rhinitis due to pollen 02/08/2018     Priority: Medium    Esophageal reflux 02/08/2018     Priority: Medium    History of substance abuse (H) 02/08/2018     Priority: Medium    Chronic pain of right knee 04/20/2017     Priority: Medium    Menorrhagia with irregular cycle 02/16/2017     Priority: Medium    Bilateral foot pain 12/18/2016     Priority: Medium    Elevated random blood glucose level 12/18/2016     Priority: Medium    Peripheral polyneuropathy 12/18/2016     Priority: Medium    Vitamin B12 deficiency 12/18/2016     Priority: Medium    Vitamin D deficiency 12/18/2016     Priority: Medium    Lymphedema of both lower extremities 11/22/2016     Priority: Medium    Chronic venous stasis dermatitis of right lower extremity 08/17/2016     Priority: Medium     Overview:   Updated per 10/1/17 IMO import    Formatting of this note might be different from the original.  Updated per 10/1/17 IMO import      Alcohol use disorder, moderate, in sustained remission, dependence (H) 11/28/2015     Priority: Medium    Generalized anxiety disorder 11/28/2015     Priority: Medium    Bipolar I disorder, most recent episode depressed, moderate (H) 11/28/2015     Priority: Medium    Edema of extremity of unknown cause 01/15/2014     Priority: Medium     Formatting of this note might be different from the original.  1/15/2014: both legs, well controlled on prn lasix      Moderate persistent asthma 08/19/2013     Priority: Medium     AAP completed on 08/19/13  Triggers: pollen, fumes, exercise, URI, warm weather. Peak flow today is 250. Symptomatic: moderate    Formatting of this note might be different from the original.  Overview:   AAP completed  on 08/19/13  Triggers: pollen, fumes, exercise, URI, warm weather. Peak flow today is 250. Symptomatic: moderate  Overview:   AAP completed on 08/19/13  Triggers: pollen, fumes, exercise, URI, warm weather. Peak flow today is 250. Symptomatic: moderate  Formatting of this note might be different from the original.  AAP completed on 08/19/13  Triggers: pollen, fumes, exercise, URI, warm weather. Peak flow today is 250. Symptomatic: moderate      Urinary incontinence 03/15/2013     Priority: Medium    Allergic rhinitis due to other allergen 10/02/2003     Priority: Medium     Overview:   [facility] - Seasonal Allergies  Overview:   Overview:   [facility] - Seasonal Allergies    Formatting of this note might be different from the original.  [facility] - Seasonal Allergies      Borderline personality disorder (H) 07/07/2003     Priority: Medium     Formatting of this note might be different from the original.  Overview:   [facility].  Formatting of this note might be different from the original.  [facility].      Disorder of female genital organ 07/05/2001     Priority: Medium     Overview:   DUB, dysmenorrhea  Overview:   Overview:   DUB, dysmenorrhea    Formatting of this note might be different from the original.  Overview:   DUB, dysmenorrhea        Past Medical History:   Diagnosis Date    Abnormal Pap smear of cervix 04/11/2018    Overview:  had scraping of cervix    Cannabis use disorder, moderate, in sustained remission, dependence (H) 11/28/2015    Closed dislocation of tarsal joint 2/4/2011    Closed dislocation of tarsal joint - left 02/04/2011    Edema     No Comments Provided    Encounter for removal and reinsertion of intrauterine contraceptive device     05/16/05,IUD placement, Removed    Excessive and frequent menstruation with irregular cycle     2/16/2017    Major depressive disorder, single episode     No Comments Provided    Personal history of other medical treatment (CODE)     G3, P2-0-1-2 with  history of spontaneous     Personal history of other medical treatment (CODE)     No Comments Provided    Uncomplicated asthma     No Comments Provided     Past Surgical History:   Procedure Laterality Date    ANKLE SURGERY      fracture, repair with screws    ARTHRODESIS FOOT Left 2022    Procedure: left foot hardware removaL, fusion of 1st-2nd Tarsal metatarsal;  Surgeon: Anthony Castillo DPM;  Location: [facility] OR    COLONOSCOPY  2022    F/U  tubular adenoma    CONIZATION LEEP      ,Underwent a loop    IMPLANT STIMULATOR AND LEADS SACRAL NERVE (STAGE ONE AND TWO)      scheduled 23 with Dr. Sheth at [place]    LAPAROSCOPIC TUBAL LIGATION      ,tubal ligation     Current Outpatient Medications   Medication Sig Dispense Refill    acetaminophen (TYLENOL) 500 MG tablet Take 500-1,000 mg by mouth every 6 hours as needed      albuterol (VENTOLIN HFA) 108 (90 Base) MCG/ACT inhaler Inhale 1-2 puffs into the lungs every 4 hours as needed for shortness of breath or wheezing 18 g 11    ARIPiprazole (ABILIFY) 15 MG tablet Take 1 tablet (15 mg) by mouth At Bedtime 30 tablet 1    atomoxetine (STRATTERA) 25 MG capsule TAKE 1 CAPSULE BY MOUTH DAILY AT 8AM      busPIRone (BUSPAR) 10 MG tablet Take 1 tablet by mouth 3 times daily Am and 2 pm      Calcium-Vitamin D-Vitamin K (CHEWABLE CALCIUM) 500-200-40 MG-UNT-MCG CHEW Take 1 chew tab by mouth daily -- Gets OTC      cholecalciferol (VITAMIN D3) 125 mcg (5000 units) capsule Take 1 capsule (125 mcg) by mouth daily 100 capsule 4    cyanocobalamin (CYANOCOBALAMIN) 1000 MCG/ML injection INJECT 1ML INTRAMUSCULARLY EVERY 2 WEEKS 6 mL 11    docusate sodium (COLACE) 100 MG tablet Take 2 tablets (200 mg) by mouth 2 times daily -Adjust dose as needed to maintain soft stools 120 tablet 11    famotidine (PEPCID) 20 MG tablet Take 1 tablet (20 mg) by mouth 2 times daily - For Heartburn 180 tablet 4    gabapentin (NEURONTIN) 100 MG capsule Take 100 mg by mouth  "3 times daily      Glucosamine-Chondroit-Vit C-Mn (GLUCOSAMINE CHONDROITIN 1500 COMPLEX) CAPS Take 1 capsule by mouth daily - For Knee pain -- Gets OTC      hydrOXYzine (ATARAX) 50 MG tablet Take 1 tablet (50 mg) by mouth At Bedtime 30 tablet 0    hydrOXYzine (VISTARIL) 50 MG capsule TAKE 1 CAPSULE BY MOUTH FOUR TIMES A DAY AS NEEDED FOR ANXIETY      ibuprofen (ADVIL/MOTRIN) 200 MG tablet Take 200-400 mg by mouth every 6 hours as needed 1 to 2 tabs Q6 PRN      lamoTRIgine (LAMICTAL) 100 MG tablet Take 1 tablet (100 mg) by mouth 3 times daily -- Managed by Psych      Menthol, Topical Analgesic, (BIOFREEZE EX) Apply topically 4 times daily as needed      methocarbamol (ROBAXIN) 500 MG tablet Take 500-1,000 mg by mouth 4 times daily as needed for muscle spasms      montelukast (SINGULAIR) 10 MG tablet Take 1 tablet (10 mg) by mouth At Bedtime 90 tablet 4    prazosin (MINIPRESS) 1 MG capsule Take by mouth as needed      ramelteon (ROZEREM) 8 MG tablet Take 1 tablet (8 mg) by mouth At Bedtime 30 tablet 1    syringe/needle, sisp, (B-D SYRINGE/NEEDLE) 25G X 5/8\" 1 ML MISC USE TO INJECT B-12 INTRAMUSCULARLY EVERY 2 WEEKS 94 each 4    vilazodone (VIIBRYD) 40 MG TABS tablet Take 40 mg by mouth every morning         Allergies   Allergen Reactions    Clonazepam Other (See Comments)     Causes Violence and aggresssion    Hydrocodone-Acetaminophen      Other reaction(s): Seizures  Can take Percocet without difficulty.    Lorazepam Other (See Comments)     Causes Violence and aggresssion    Sertraline Other (See Comments)     Caused suicidally     Bupropion Other (See Comments)     Caused Major Depression    Dust Mite Extract      Other reaction(s): Asthma symptoms    Lithium Other (See Comments)     Mood alteration    Pollen Extract      Other reaction(s): Asthma symptoms    Risperidone Other (See Comments)     Agitation      Sulfa Antibiotics Itching    Trichophyton      Other reaction(s): Asthma symptoms    Valproic Acid Other " "(See Comments)     Weight gain        Social History     Tobacco Use    Smoking status: Former     Packs/day: 1.00     Years: 3.00     Additional pack years: 0.00     Total pack years: 3.00     Types: Cigarettes     Quit date: 2003     Years since quittin.9    Smokeless tobacco: Never   Substance Use Topics    Alcohol use: Not Currently     Alcohol/week: 0.0 standard drinks of alcohol       History   Drug Use Unknown         Objective     /58 (BP Location: Right arm, Patient Position: Sitting, Cuff Size: Adult Large)   Pulse 92   Temp 97.2  F (36.2  C) (Temporal)   Resp 16   Ht 1.676 m (5' 6\")   Wt 123.1 kg (271 lb 6.4 oz)   LMP 2017   SpO2 99%   BMI 43.81 kg/m      Physical Exam  Constitutional:       Appearance: She is obese.   Eyes:      General: No scleral icterus.     Conjunctiva/sclera: Conjunctivae normal.   Cardiovascular:      Rate and Rhythm: Normal rate and regular rhythm.      Pulses: Normal pulses.      Heart sounds: Normal heart sounds. No murmur heard.  Pulmonary:      Effort: Pulmonary effort is normal. No respiratory distress.      Breath sounds: No wheezing or rhonchi.   Abdominal:      Palpations: Abdomen is soft.      Tenderness: There is no abdominal tenderness.   Musculoskeletal:         General: No deformity. Normal range of motion.      Comments: Lymphedema of legs   Skin:     General: Skin is warm and dry.      Findings: No rash.   Neurological:      Mental Status: She is alert. Mental status is at baseline.   Psychiatric:         Mood and Affect: Mood normal.         Behavior: Behavior normal.       Recent Labs   Lab Test 23  1014 10/30/23  1326 22  1607 22  1131   HGB 12.4 13.7   < > 13.2    309   < > 210    140   < > 137   POTASSIUM 4.1 4.1   < > 4.0   CR 0.96* 0.83   < > 1.00   A1C  --  5.2  --  5.2    < > = values in this interval not displayed.        Diagnostics:  No labs were ordered during this visit.   No EKG required, no " history of coronary heart disease, significant arrhythmia, peripheral arterial disease or other structural heart disease.    Revised Cardiac Risk Index (RCRI):  The patient has the following serious cardiovascular risks for perioperative complications:   - No serious cardiac risks = 0 points     RCRI Interpretation: 0 points: Class I (very low risk - 0.4% complication rate)         Signed Electronically by: DASHA Rico CNP  Copy of this evaluation report is provided to requesting physician.

## 2023-12-18 NOTE — PATIENT INSTRUCTIONS
Preparing for Your Surgery  Getting started  A nurse will call you to review your health history and instructions. They will give you an arrival time based on your scheduled surgery time. Please be ready to share:  Your doctor's clinic name and phone number  Your medical, surgical, and anesthesia history  A list of allergies and sensitivities  A list of medicines, including herbal treatments and over-the-counter drugs  Whether the patient has a legal guardian (ask how to send us the papers in advance)  Please tell us if you're pregnant--or if there's any chance you might be pregnant. Some surgeries may injure a fetus (unborn baby), so they require a pregnancy test. Surgeries that are safe for a fetus don't always need a test, and you can choose whether to have one.   If you have a child who's having surgery, please ask for a copy of Preparing for Your Child's Surgery.    Preparing for surgery  Within 10 to 30 days of surgery: Have a pre-op exam (sometimes called an H&P, or History and Physical). This can be done at a clinic or pre-operative center.  If you're having a , you may not need this exam. Talk to your care team.  At your pre-op exam, talk to your care team about all medicines you take. If you need to stop any medicines before surgery, ask when to start taking them again.  We do this for your safety. Many medicines can make you bleed too much during surgery. Some change how well surgery (anesthesia) drugs work.  Call your insurance company to let them know you're having surgery. (If you don't have insurance, call [phone].)  Call your clinic if there's any change in your health. This includes signs of a cold or flu (sore throat, runny nose, cough, rash, fever). It also includes a scrape or scratch near the surgery site.  If you have questions on the day of surgery, call your hospital or surgery center.  Eating and drinking guidelines  For your safety: Unless your surgeon tells you otherwise,  follow the guidelines below.  Eat and drink as usual until 8 hours before you arrive for surgery. After that, no food or milk.  Drink clear liquids until 2 hours before you arrive. These are liquids you can see through, like water, Gatorade, and Propel Water. They also include plain black coffee and tea (no cream or milk), candy, and breath mints. You can spit out gum when you arrive.  If you drink alcohol: Stop drinking it the night before surgery.  If your care team tells you to take medicine on the morning of surgery, it's okay to take it with a sip of water.  Preventing infection  Shower or bathe the night before and morning of your surgery. Follow the instructions your clinic gave you. (If no instructions, use regular soap.)  Don't shave or clip hair near your surgery site. We'll remove the hair if needed.  Don't smoke or vape the morning of surgery. You may chew nicotine gum up to 2 hours before surgery. A nicotine patch is okay.  Note: Some surgeries require you to completely quit smoking and nicotine. Check with your surgeon.  Your care team will make every effort to keep you safe from infection. We will:  Clean our hands often with soap and water (or an alcohol-based hand rub).  Clean the skin at your surgery site with a special soap that kills germs.  Give you a special gown to keep you warm. (Cold raises the risk of infection.)  Wear special hair covers, masks, gowns and gloves during surgery.  Give antibiotic medicine, if prescribed. Not all surgeries need antibiotics.  What to bring on the day of surgery  Photo ID and insurance card  Copy of your health care directive, if you have one  Glasses and hearing aids (bring cases)  You can't wear contacts during surgery  Inhaler and eye drops, if you use them (tell us about these when you arrive)  CPAP machine or breathing device, if you use them  A few personal items, if spending the night  If you have . . .  A pacemaker, ICD (cardiac defibrillator) or other  implant: Bring the ID card.  An implanted stimulator: Bring the remote control.  A legal guardian: Bring a copy of the certified (court-stamped) guardianship papers.  Please remove any jewelry, including body piercings. Leave jewelry and other valuables at home.  If you're going home the day of surgery  You must have a responsible adult drive you home. They should stay with you overnight as well.  If you don't have someone to stay with you, and you aren't safe to go home alone, we may keep you overnight. Insurance often won't pay for this.  After surgery  If it's hard to control your pain or you need more pain medicine, please call your surgeon's office.  Questions?   If you have any questions for your care team, list them here: _________________________________________________________________________________________________________________________________________________________________________ ____________________________________ ____________________________________ ____________________________________  For informational purposes only. Not to replace the advice of your health care provider. Copyright   2003, 2019 [employer] [facility]. All rights reserved. Clinically reviewed by Linda Jaimes MD. [employer] 717997 - REV 12/22.    How to Take Your Medication Before Surgery  - Take all of your medications before surgery as usual

## 2024-01-08 RX ORDER — DOCUSATE SODIUM 100 MG/1
CAPSULE, LIQUID FILLED ORAL
Qty: 60 CAPSULE | Refills: 0 | OUTPATIENT
Start: 2024-01-08

## 2024-01-08 NOTE — TELEPHONE ENCOUNTER
[id number] sent Rx request for the following:      Requested Prescriptions   Pending Prescriptions Disp Refills    docusate sodium (COLACE) 100 MG capsule [Pharmacy Med Name: DOCUSATE SODIUM 100MG SOFTGEL] 60 capsule 0     Sig: TAKE 1 CAPSULE (100 MG) BY MOUTH 2 TIMES DAILY IF NEEDED FOR CONSTIPATION (WHILE TAKING PAIN MEDICATIONS).       [facility] Protocol Passed - 1/5/2024 11:46 AM        Passed - Patient is age 6 or older        Passed - Medication is active on med list        Passed - Medication indicated for associated diagnosis     The medication is prescribed for one or more of the following conditions:     Constipation   Preparation of bowel for procedure   Fecal impaction   Dysfunctional voiding   Narcotic induced constipation            Passed - Recent (12 mo) or future (90 days) visit within the authorizing provider's specialty     The patient must have completed an in-person or virtual visit within the past 12 months or has a future visit scheduled within the next 90 days with the authorizing provider s specialty.  Urgent care and e-visits do not quality as an office visit for this protocol.               Last Prescription Date:   11/28/2023  Last Fill Qty/Refills:         120, R-11    Last Office Visit:              12/18/2023 Libra   Future Office visit:           4/4/2024 Veena    Patient should have refills on file.    Merly Pascual RN on 1/8/2024 at 11:32 AM

## 2024-01-17 DIAGNOSIS — K59.09 INTERMITTENT CONSTIPATION: ICD-10-CM

## 2024-01-17 RX ORDER — DOCUSATE SODIUM 100 MG/1
CAPSULE, LIQUID FILLED ORAL
Qty: 60 CAPSULE | Refills: 0 | OUTPATIENT
Start: 2024-01-17

## 2024-01-17 RX ORDER — ASPIRIN 81 MG
200 TABLET, DELAYED RELEASE (ENTERIC COATED) ORAL 2 TIMES DAILY
Qty: 120 TABLET | Refills: 9 | Status: SHIPPED | OUTPATIENT
Start: 2024-01-17

## 2024-01-17 RX ORDER — ASPIRIN 81 MG
200 TABLET, DELAYED RELEASE (ENTERIC COATED) ORAL 2 TIMES DAILY
Qty: 120 TABLET | Refills: 11 | Status: CANCELLED | OUTPATIENT
Start: 2024-01-17

## 2024-01-17 NOTE — TELEPHONE ENCOUNTER
[facility] sent Rx request for the following:      Requested Prescriptions   Pending Prescriptions Disp Refills    docusate sodium (COLACE) 100 MG capsule [Pharmacy Med Name: DOCUSATE SODIUM 100MG SOFTGEL] 60 capsule 0     Sig: TAKE 1 CAPSULE (100 MG) BY MOUTH 2 TIMES DAILY IF NEEDED FOR CONSTIPATION (WHILE TAKING PAIN MEDICATIONS).       [facility] Protocol Passed - 1/15/2024  5:53 PM   Last Prescription Date:   11/28/23  Last Fill Qty/Refills:         120, R-11    Last Office Visit:              11/28/23   Future Office visit:           1/23/24    Redundant refill request refused: Too soon:has refills on.  Ena Alexander, RN on 1/17/2024 at 1:13 PM

## 2024-01-17 NOTE — TELEPHONE ENCOUNTER
docusate sodium (COLACE) 100 MG tablet 120 tablet 11 2023 -- --   Sig - Route: Take 2 tablets (200 mg) by mouth 2 times daily -Adjust dose as needed to maintain soft stools - Oral   Sent to pharmacy as: Docusate Sodium 100 MG Oral Tablet (COLACE)   Class: E-Prescribe   Order: [id number]   E-Prescribing Status: Receipt confirmed by pharmacy (2023  4:11 PM CST)     [id number] - [place] [id number]     Called and spoke to Patient after verifying last name and date of birth. Pt states she talked to pharmacy staff, and they denied receipt.    [facility], after verifying Pt's last name and . She confirmed receipt, stating it was voided out by the following order:    docusate (COLACE) 100 mg capsule   Sig: Take 1 Capsule (100 mg) by mouth 2 times daily if needed for Constipation (while taking pain medications).   Disp: 60 Capsule Refills: 0   Start: 1/3/2024   Class: eRx   For: Urinary incontinence, unspecified type   Last ordered: 2 weeks ago (2023) by Jewell Rodney PA-C   Last refill: 2023   Rx #: [id number]     They need a new order. Imelda Leyva RN .............. 2024  4:33 PM        23

## 2024-01-17 NOTE — TELEPHONE ENCOUNTER
Reason for call: Medication or medication refill    Name of medication requested: docusate    How many days of medication do you have left? none    What pharmacy do you use[facility]     Preferred method for responding to this message: Telephone Call    Phone number patient can be reached at: Cell number on file:    Telephone Information:   Mobile [phone]       If we cannot reach you directly, may we leave a detailed response at the number you provided? Yes

## 2024-01-23 ENCOUNTER — OFFICE VISIT (OUTPATIENT)
Dept: INTERNAL MEDICINE | Facility: OTHER | Age: 49
End: 2024-01-23
Attending: INTERNAL MEDICINE
Payer: MEDICARE

## 2024-01-23 VITALS
TEMPERATURE: 98.1 F | HEIGHT: 67 IN | OXYGEN SATURATION: 98 % | RESPIRATION RATE: 20 BRPM | WEIGHT: 273 LBS | HEART RATE: 92 BPM | SYSTOLIC BLOOD PRESSURE: 128 MMHG | DIASTOLIC BLOOD PRESSURE: 74 MMHG | BODY MASS INDEX: 42.85 KG/M2

## 2024-01-23 DIAGNOSIS — D64.9 ANEMIA, UNSPECIFIED TYPE: ICD-10-CM

## 2024-01-23 DIAGNOSIS — E55.9 VITAMIN D DEFICIENCY: ICD-10-CM

## 2024-01-23 DIAGNOSIS — E53.8 VITAMIN B12 DEFICIENCY: ICD-10-CM

## 2024-01-23 DIAGNOSIS — G47.62 NOCTURNAL LEG CRAMPS: Primary | ICD-10-CM

## 2024-01-23 LAB
ALBUMIN SERPL BCG-MCNC: 4.4 G/DL (ref 3.5–5.2)
ALP SERPL-CCNC: 70 U/L (ref 40–150)
ALT SERPL W P-5'-P-CCNC: 27 U/L (ref 0–50)
ANION GAP SERPL CALCULATED.3IONS-SCNC: 7 MMOL/L (ref 7–15)
AST SERPL W P-5'-P-CCNC: 32 U/L (ref 0–45)
BASOPHILS # BLD AUTO: 0 10E3/UL (ref 0–0.2)
BASOPHILS NFR BLD AUTO: 0 %
BILIRUB SERPL-MCNC: 0.5 MG/DL
BUN SERPL-MCNC: 10.8 MG/DL (ref 6–20)
CALCIUM SERPL-MCNC: 9.5 MG/DL (ref 8.6–10)
CHLORIDE SERPL-SCNC: 101 MMOL/L (ref 98–107)
CREAT SERPL-MCNC: 0.92 MG/DL (ref 0.51–0.95)
DEPRECATED HCO3 PLAS-SCNC: 29 MMOL/L (ref 22–29)
EGFRCR SERPLBLD CKD-EPI 2021: 77 ML/MIN/1.73M2
EOSINOPHIL # BLD AUTO: 0.1 10E3/UL (ref 0–0.7)
EOSINOPHIL NFR BLD AUTO: 1 %
ERYTHROCYTE [DISTWIDTH] IN BLOOD BY AUTOMATED COUNT: 13.7 % (ref 10–15)
GLUCOSE SERPL-MCNC: 94 MG/DL (ref 70–99)
HCT VFR BLD AUTO: 36.5 % (ref 35–47)
HGB BLD-MCNC: 11.5 G/DL (ref 11.7–15.7)
IMM GRANULOCYTES # BLD: 0 10E3/UL
IMM GRANULOCYTES NFR BLD: 0 %
LYMPHOCYTES # BLD AUTO: 1.5 10E3/UL (ref 0.8–5.3)
LYMPHOCYTES NFR BLD AUTO: 21 %
MAGNESIUM SERPL-MCNC: 2 MG/DL (ref 1.7–2.3)
MCH RBC QN AUTO: 29.4 PG (ref 26.5–33)
MCHC RBC AUTO-ENTMCNC: 31.5 G/DL (ref 31.5–36.5)
MCV RBC AUTO: 93 FL (ref 78–100)
MONOCYTES # BLD AUTO: 0.6 10E3/UL (ref 0–1.3)
MONOCYTES NFR BLD AUTO: 8 %
NEUTROPHILS # BLD AUTO: 4.9 10E3/UL (ref 1.6–8.3)
NEUTROPHILS NFR BLD AUTO: 70 %
NRBC # BLD AUTO: 0 10E3/UL
NRBC BLD AUTO-RTO: 0 /100
PLATELET # BLD AUTO: 237 10E3/UL (ref 150–450)
POTASSIUM SERPL-SCNC: 4.1 MMOL/L (ref 3.4–5.3)
PROT SERPL-MCNC: 7.3 G/DL (ref 6.4–8.3)
RBC # BLD AUTO: 3.91 10E6/UL (ref 3.8–5.2)
SODIUM SERPL-SCNC: 137 MMOL/L (ref 135–145)
VIT B12 SERPL-MCNC: 1222 PG/ML (ref 232–1245)
VIT D+METAB SERPL-MCNC: 49 NG/ML (ref 20–50)
WBC # BLD AUTO: 7 10E3/UL (ref 4–11)

## 2024-01-23 PROCEDURE — 83735 ASSAY OF MAGNESIUM: CPT | Mod: ZL | Performed by: INTERNAL MEDICINE

## 2024-01-23 PROCEDURE — 82607 VITAMIN B-12: CPT | Mod: ZL | Performed by: INTERNAL MEDICINE

## 2024-01-23 PROCEDURE — 85025 COMPLETE CBC W/AUTO DIFF WBC: CPT | Mod: ZL | Performed by: INTERNAL MEDICINE

## 2024-01-23 PROCEDURE — 99214 OFFICE O/P EST MOD 30 MIN: CPT | Performed by: INTERNAL MEDICINE

## 2024-01-23 PROCEDURE — 80053 COMPREHEN METABOLIC PANEL: CPT | Mod: ZL | Performed by: INTERNAL MEDICINE

## 2024-01-23 PROCEDURE — 82306 VITAMIN D 25 HYDROXY: CPT | Mod: ZL | Performed by: INTERNAL MEDICINE

## 2024-01-23 PROCEDURE — 36415 COLL VENOUS BLD VENIPUNCTURE: CPT | Mod: ZL | Performed by: INTERNAL MEDICINE

## 2024-01-23 PROCEDURE — G0463 HOSPITAL OUTPT CLINIC VISIT: HCPCS

## 2024-01-23 RX ORDER — SODIUM FLUORIDE 5 MG/ML
PASTE, DENTIFRICE DENTAL
COMMUNITY
Start: 2023-12-14

## 2024-01-23 RX ORDER — MAGNESIUM CHLORIDE 71.5 G/G
1-2 TABLET ORAL DAILY
Qty: 180 TABLET | Refills: 4 | Status: SHIPPED | OUTPATIENT
Start: 2024-01-23 | End: 2024-02-19

## 2024-01-23 ASSESSMENT — ENCOUNTER SYMPTOMS
MYALGIAS: 1
SLEEP DISTURBANCE: 1

## 2024-01-23 ASSESSMENT — PAIN SCALES - GENERAL: PAINLEVEL: NO PAIN (0)

## 2024-01-23 NOTE — NURSING NOTE
"Chief Complaint   Patient presents with    Leg cramps, right worse, for about a month       Initial /74 (BP Location: Right arm, Patient Position: Sitting, Cuff Size: Adult Large)   Pulse 92   Temp 98.1  F (36.7  C) (Temporal)   Resp 20   Ht 1.689 m (5' 6.5\")   Wt 123.8 kg (273 lb)   LMP 01/01/2017 (Approximate)   SpO2 98%   Breastfeeding No   BMI 43.40 kg/m   Estimated body mass index is 43.4 kg/m  as calculated from the following:    Height as of this encounter: 1.689 m (5' 6.5\").    Weight as of this encounter: 123.8 kg (273 lb).  Medication Review: complete    The next two questions are to help us understand your food security.  If you are feeling you need any assistance in this area, we have resources available to support you today.          1/23/2024   SDOH- Food Insecurity   Within the past 12 months, did you worry that your food would run out before you got money to buy more? N   Within the past 12 months, did the food you bought just not last and you didn t have money to get more? N         Health Care Directive:  Patient has a Health Care Directive on file      Camilo Murillo      "

## 2024-01-23 NOTE — PATIENT INSTRUCTIONS
Blood pressure is well controlled.     Labs are pending.       To help with leg cramps....     Consider electrolyte power pack -- mixed with water -- 1-2 x daily.   --- Propel vs Liquid IV    Start magnesium supplement.         Return as needed for follow-up for new / worsening symptoms.    Clinic : [phone]  Appointment line: [phone]

## 2024-01-23 NOTE — COMMUNITY RESOURCES LIST (ENGLISH)
01/23/2024    [employer]  N/A  For questions about this resource list or additional care needs, please contact your primary care clinic or care manager.  Phone: [phone]   Email: N/A   Address: [address] [place] [id number]   Hours: N/A        Financial Stability       Utility payment assistance  1  Utilize Health  Main Office - Energy Assistance Program Distance: 0.77 miles      Phone/Virtual   [address] [facility] [address] [place] [address]  Language: English  Hours: Mon - Thu 8:00 AM - 4:30 PM , Fri 8:00 AM - 12:00 PM  Fees: Free   Phone: (705) 260-3664 Email: teto@[employer] Website: http://www.[employer]          Important Numbers & Websites       Emergency Services   911  City Services   311  Poison Control   (440) 609-2939  Suicide Prevention Lifeline   (235) 920-1882 (TALK)  Child Abuse Hotline   (170) 613-6761 (4-A-Child)  Sexual Assault Hotline   (437) 659-9902 (HOPE)  National Runaway Safeline   (514) 617-9363 (RUNAWAY)  All-Options Talkline   (411) 790-5387  Substance Abuse Referral   (630) 980-4070 (HELP)

## 2024-01-23 NOTE — PROGRESS NOTES
Assessment & Plan     ICD-10-CM    1. Nocturnal leg cramps  G47.62 CBC with Platelets & Differential     Comprehensive metabolic panel     Magnesium     Magnesium Cl-Calcium Carbonate (SLOW-MAG) 71.5-119 MG TBEC     Magnesium     Comprehensive metabolic panel     CBC with Platelets & Differential      2. Vitamin B12 deficiency  E53.8 CBC with Platelets & Differential     Comprehensive metabolic panel     Vitamin B12     Vitamin B12     Comprehensive metabolic panel     CBC with Platelets & Differential      3. Vitamin D deficiency  E55.9 Vitamin D Deficiency     Vitamin D Deficiency      4. Anemia, unspecified type  D64.9 ferrous fumarate 65 mg, [place]. FE,-Vitamin C 125 mg (VITRON C)  MG TABS tablet        Patient presents for nocturnal leg cramps.  She is wondering what may be causing these.  Check lab work today.  -Start trial of magnesium supplement replacement, 1 tablet daily.    Vitamin B12 deficiency.  Has been taking oral replacement.  Check lab work.    Vitamin D deficiency.  Noted with previous lab work.  Taking oral supplement.  Check labs today.      Unspecified anemia, likely iron deficiency.  New diagnosis.  Start oral iron supplement.  Consider follow-up labs in 4 to 6 weeks.    Return for follow-up as needed for new or worsening symptoms, Appointments: [phone].    Lucio Curiel MD  [facility]   Rosi is a 49 year old, presenting for the following health issues:  Leg cramps, right worse, for about a month        1/23/2024    10:49 AM   Additional Questions   Roomed by Camilo NEWSOME LPN   Accompanied by n/a     History of Present Illness       Reason for visit:  Severe  leg cramps  Symptom onset:  3-4 weeks ago  Symptom intensity:  Moderate  Symptom progression:  Worsening  Had these symptoms before:  No  Prior treatment description:  No  What makes it worse:  Flexing, or trying to walk  What makes it better:  Ibuprofen, heat    She eats 2-3 servings of  "fruits and vegetables daily.She consumes 2 sweetened beverage(s) daily.She exercises with enough effort to increase her heart rate 30 to 60 minutes per day.  She exercises with enough effort to increase her heart rate 4 days per week.   She is taking medications regularly.     Review of Systems   Cardiovascular:  Positive for leg swelling (Lymphedema).   Musculoskeletal:  Positive for myalgias.   Psychiatric/Behavioral:  Positive for sleep disturbance (3-4 weeks - severe leg cramps. Heat pad, rice sock, ibuprofen helped some - but still severe pain for 5 minutes after getting up. Wakes her up 1-2 AM - worse in right leg).           Objective    /74 (BP Location: Right arm, Patient Position: Sitting, Cuff Size: Adult Large)   Pulse 92   Temp 98.1  F (36.7  C) (Temporal)   Resp 20   Ht 1.689 m (5' 6.5\")   Wt 123.8 kg (273 lb)   LMP 01/01/2017 (Approximate)   SpO2 98%   Breastfeeding No   BMI 43.40 kg/m    Body mass index is 43.4 kg/m .  Physical Exam  Constitutional:       Appearance: Normal appearance.   Eyes:      General: No scleral icterus.     Conjunctiva/sclera: Conjunctivae normal.   Cardiovascular:      Rate and Rhythm: Normal rate and regular rhythm.   Pulmonary:      Effort: Pulmonary effort is normal.   Musculoskeletal:      Right lower leg: Edema (Lymphedema) present.      Left lower leg: Edema (Lymphedema) present.   Skin:     General: Skin is warm and dry.      Findings: No rash.   Neurological:      Mental Status: She is alert. Mental status is at baseline.          Results for orders placed or performed in visit on 01/23/24   Vitamin B12     Status: Normal   Result Value Ref Range    Vitamin B12 1,222 232 - 1,245 pg/mL   Vitamin D Deficiency     Status: Normal   Result Value Ref Range    Vitamin D, Total (25-Hydroxy) 49 20 - 50 ng/mL    Narrative    Season, race, dietary intake, and treatment affect the concentration of 25-hydroxy-Vitamin D. Values may decrease during winter months and " increase during summer months.    Vitamin D determination is routinely performed by an immunoassay specific for 25 hydroxyvitamin D3.  If an individual is on vitamin D2(ergocalciferol) supplementation, please specify 25 OH vitamin D2 and D3 level determination by LCMSMS test VITD23.     Magnesium     Status: Normal   Result Value Ref Range    Magnesium 2.0 1.7 - 2.3 mg/dL   Comprehensive metabolic panel     Status: Normal   Result Value Ref Range    Sodium 137 135 - 145 mmol/L    Potassium 4.1 3.4 - 5.3 mmol/L    Carbon Dioxide (CO2) 29 22 - 29 mmol/L    Anion Gap 7 7 - 15 mmol/L    Urea Nitrogen 10.8 6.0 - 20.0 mg/dL    Creatinine 0.92 0.51 - 0.95 mg/dL    GFR Estimate 77 >60 mL/min/1.73m2    Calcium 9.5 8.6 - 10.0 mg/dL    Chloride 101 98 - 107 mmol/L    Glucose 94 70 - 99 mg/dL    Alkaline Phosphatase 70 40 - 150 U/L    AST 32 0 - 45 U/L    ALT 27 0 - 50 U/L    Protein Total 7.3 6.4 - 8.3 g/dL    Albumin 4.4 3.5 - 5.2 g/dL    Bilirubin Total 0.5 <=1.2 mg/dL   CBC with platelets and differential     Status: Abnormal   Result Value Ref Range    WBC Count 7.0 4.0 - 11.0 10e3/uL    RBC Count 3.91 3.80 - 5.20 10e6/uL    Hemoglobin 11.5 (L) 11.7 - 15.7 g/dL    Hematocrit 36.5 35.0 - 47.0 %    MCV 93 78 - 100 fL    MCH 29.4 26.5 - 33.0 pg    MCHC 31.5 31.5 - 36.5 g/dL    RDW 13.7 10.0 - 15.0 %    Platelet Count 237 150 - 450 10e3/uL    % Neutrophils 70 %    % Lymphocytes 21 %    % Monocytes 8 %    % Eosinophils 1 %    % Basophils 0 %    % Immature Granulocytes 0 %    NRBCs per 100 WBC 0 <1 /100    Absolute Neutrophils 4.9 1.6 - 8.3 10e3/uL    Absolute Lymphocytes 1.5 0.8 - 5.3 10e3/uL    Absolute Monocytes 0.6 0.0 - 1.3 10e3/uL    Absolute Eosinophils 0.1 0.0 - 0.7 10e3/uL    Absolute Basophils 0.0 0.0 - 0.2 10e3/uL    Absolute Immature Granulocytes 0.0 <=0.4 10e3/uL    Absolute NRBCs 0.0 10e3/uL   CBC with Platelets & Differential     Status: Abnormal    Narrative    The following orders were created for panel order  CBC with Platelets & Differential.  Procedure                               Abnormality         Status                     ---------                               -----------         ------                     CBC with platelets and d...[[id number]]  Abnormal            Final result                 Please view results for these tests on the individual orders.      CBC shows mild anemia.  Chemistry panel is normal.  Magnesium normal.  Vitamin D normal.  Vitamin B12 normal.        Signed Electronically by: Lucio Curiel MD

## 2024-02-06 ENCOUNTER — OFFICE VISIT (OUTPATIENT)
Dept: OBGYN | Facility: OTHER | Age: 49
End: 2024-02-06
Attending: OBSTETRICS & GYNECOLOGY
Payer: MEDICARE

## 2024-02-06 VITALS
SYSTOLIC BLOOD PRESSURE: 118 MMHG | BODY MASS INDEX: 42.45 KG/M2 | WEIGHT: 267 LBS | DIASTOLIC BLOOD PRESSURE: 64 MMHG | HEART RATE: 88 BPM

## 2024-02-06 DIAGNOSIS — F39 UNSPECIFIED MOOD (AFFECTIVE) DISORDER (H): ICD-10-CM

## 2024-02-06 DIAGNOSIS — R45.86 MOOD SWINGS: ICD-10-CM

## 2024-02-06 DIAGNOSIS — R23.2 HOT FLASHES: Primary | ICD-10-CM

## 2024-02-06 LAB
ESTRADIOL SERPL-MCNC: 196 PG/ML
FSH SERPL IRP2-ACNC: 4 MIU/ML
LH SERPL-ACNC: 5.1 MIU/ML
TSH SERPL DL<=0.005 MIU/L-ACNC: 1.11 UIU/ML (ref 0.3–4.2)

## 2024-02-06 PROCEDURE — 99213 OFFICE O/P EST LOW 20 MIN: CPT | Performed by: OBSTETRICS & GYNECOLOGY

## 2024-02-06 PROCEDURE — 83001 ASSAY OF GONADOTROPIN (FSH): CPT | Mod: ZL | Performed by: OBSTETRICS & GYNECOLOGY

## 2024-02-06 PROCEDURE — G0463 HOSPITAL OUTPT CLINIC VISIT: HCPCS

## 2024-02-06 PROCEDURE — 82670 ASSAY OF TOTAL ESTRADIOL: CPT | Mod: ZL | Performed by: OBSTETRICS & GYNECOLOGY

## 2024-02-06 PROCEDURE — 36415 COLL VENOUS BLD VENIPUNCTURE: CPT | Mod: ZL | Performed by: OBSTETRICS & GYNECOLOGY

## 2024-02-06 PROCEDURE — 84443 ASSAY THYROID STIM HORMONE: CPT | Mod: ZL | Performed by: OBSTETRICS & GYNECOLOGY

## 2024-02-06 PROCEDURE — 83002 ASSAY OF GONADOTROPIN (LH): CPT | Mod: ZL | Performed by: OBSTETRICS & GYNECOLOGY

## 2024-02-06 RX ORDER — VENLAFAXINE HYDROCHLORIDE 37.5 MG/1
37.5 CAPSULE, EXTENDED RELEASE ORAL DAILY
Qty: 30 CAPSULE | Refills: 1 | Status: SHIPPED | OUTPATIENT
Start: 2024-02-06 | End: 2024-04-04

## 2024-02-06 ASSESSMENT — PAIN SCALES - GENERAL: PAINLEVEL: NO PAIN (0)

## 2024-02-06 NOTE — NURSING NOTE
Chief Complaint   Patient presents with    Consult     Hot flashes and Mood swings    Menopausal symptoms for the last few months      Medication Reconciliation: complete      Emily Knox LPN........................2/6/2024  1:34 PM

## 2024-02-06 NOTE — PROGRESS NOTES
Follow-Up Visit    S: Ms. Rosi Lamb is a 49 year old  here for menopause concerns. She reports hot flashes and moodiness for the past 3 months. Hot flashes seem to occur daily between 5-7PM. Getting worse over time. No night sweats. Has not had any vaginal bleeding since she was evaluated by Dr Alcala 2023.    She is a nonsmoker, no history of VTE or breast cancer. Has migraines without aura.     O:  /64 (BP Location: Right arm, Patient Position: Sitting, Cuff Size: Adult Large)   Pulse 88   Wt 121.1 kg (267 lb)   LMP 2017 (Approximate)   BMI 42.45 kg/m    Gen: Well-appearing, NAD  Pulm: nonlabored  Psych: appropriate mood and affect    A/P:  Ms. Rosi Lamb is a 49 year old  here for hot flashes, mood swings. Recommend obtaining TSH, FSH, LH, estradiol today. Difficult to determine menopause for her given prior ablation. Discussed all management options for vasomotor symptoms of menopause including lifestyle changes, avoidance of triggers, venlafaxine, and estrogen/progesterone HRT. After counseling, she wants to try venlafaxine first. Will have her follow-up in 1 month to reassess    Leticia Wren MD  OB/GYN  2024 2:49 PM

## 2024-02-07 ENCOUNTER — OFFICE VISIT (OUTPATIENT)
Dept: ORTHOPEDICS | Facility: OTHER | Age: 49
End: 2024-02-07
Payer: MEDICARE

## 2024-02-07 ENCOUNTER — TELEPHONE (OUTPATIENT)
Dept: OBGYN | Facility: OTHER | Age: 49
End: 2024-02-07

## 2024-02-07 VITALS — OXYGEN SATURATION: 97 % | HEART RATE: 80 BPM

## 2024-02-07 DIAGNOSIS — M25.561 ACUTE PAIN OF RIGHT KNEE: ICD-10-CM

## 2024-02-07 DIAGNOSIS — M17.10 ARTHRITIS OF KNEE: ICD-10-CM

## 2024-02-07 DIAGNOSIS — R23.2 HOT FLASHES: Primary | ICD-10-CM

## 2024-02-07 PROCEDURE — 99214 OFFICE O/P EST MOD 30 MIN: CPT | Performed by: ORTHOPAEDIC SURGERY

## 2024-02-07 PROCEDURE — G0463 HOSPITAL OUTPT CLINIC VISIT: HCPCS

## 2024-02-07 ASSESSMENT — PAIN SCALES - GENERAL: PAINLEVEL: MODERATE PAIN (5)

## 2024-02-07 NOTE — TELEPHONE ENCOUNTER
Rosi stopped by the unit 5 window and wanted a note sent back to Dr. Wren.  She said that she realized after she looked at her AVS the Effexor is the medication she had a bad reaction to in the past.  She would like to have that medication discontinued on her chart.  Adri Moore on 2/7/2024 at 2:40 PM

## 2024-02-07 NOTE — TELEPHONE ENCOUNTER
Patient stopped at Unit 5 check in window after realizing that venlafaxine was a medication she had a bad reaction to in the past. Looking for another option.    Per LOV note: Ms. Rosi Lamb is a 49 year old  here for hot flashes, mood swings. Recommend obtaining TSH, FSH, LH, estradiol today. Difficult to determine menopause for her given prior ablation. Discussed all management options for vasomotor symptoms of menopause including lifestyle changes, avoidance of triggers, venlafaxine, and estrogen/progesterone HRT. After counseling, she wants to try venlafaxine first. Will have her follow-up in 1 month to reassess.    Merly Pascual RN on 2024 at 3:37 PM

## 2024-02-07 NOTE — PROGRESS NOTES
Surgical Clinic Consult  Primary physician:     Lucio Curiel    Chief complaint:   Right knee pain    History of present illness:  This is a 49 year old female I am seeing in consultation for chronic right knee pain that also had a lockup event here more recently.  Patient is having difficulty weightbearing since that time.  She did have x-rays done which revealed severe arthrosis Selina compartment as well as patellofemoral joint.  There is a question of a loose piece of cartilage present there as well.  Of note patient has been working on weight loss reduction is lost about 100 pounds at this time.  Patient also has severe lymphedema in regards to her lower extremities which certainly would complicate the potential for major surgeries like joint reconstruction.    Past medical history:   Past Medical History:   Diagnosis Date    Abnormal Pap smear of cervix 2018    Overview:  had scraping of cervix    Cannabis use disorder, moderate, in sustained remission, dependence (H) 2015    Closed dislocation of tarsal joint 2011    Closed dislocation of tarsal joint - left 2011    Edema     No Comments Provided    Encounter for removal and reinsertion of intrauterine contraceptive device     05,IUD placement, Removed    Excessive and frequent menstruation with irregular cycle     2017    Major depressive disorder, single episode     No Comments Provided    Personal history of other medical treatment (CODE)     G3, P2-0-1-2 with history of spontaneous     Personal history of other medical treatment (CODE)     No Comments Provided    Uncomplicated asthma     No Comments Provided       Pastsurgical history:  Past Surgical History:   Procedure Laterality Date    ANKLE SURGERY      fracture, repair with screws    ARTHRODESIS FOOT Left 2022    Procedure: left foot hardware removaL, fusion of 1st-2nd Tarsal metatarsal;  Surgeon: Anthony Castillo DPM;  Location: [facility] OR    COLONOSCOPY   07/25/2022    F/U 2027 tubular adenoma    CONIZATION LEEP      07/04,Underwent a loop    IMPLANT STIMULATOR AND LEADS SACRAL NERVE (STAGE ONE AND TWO)      scheduled 11/8/23 with Dr. Sheth at [facility]    LAPAROSCOPIC TUBAL LIGATION      2009,tubal ligation       Current medications:  Current Outpatient Medications   Medication Sig Dispense Refill    acetaminophen (TYLENOL) 500 MG tablet Take 500-1,000 mg by mouth every 6 hours as needed      albuterol (VENTOLIN HFA) 108 (90 Base) MCG/ACT inhaler Inhale 1-2 puffs into the lungs every 4 hours as needed for shortness of breath or wheezing 18 g 11    ARIPiprazole (ABILIFY) 15 MG tablet Take 1 tablet (15 mg) by mouth At Bedtime 30 tablet 1    atomoxetine (STRATTERA) 25 MG capsule TAKE 1 CAPSULE BY MOUTH DAILY AT 8AM      busPIRone (BUSPAR) 10 MG tablet Take 1 tablet by mouth 3 times daily Am and 2 pm      Calcium-Vitamin D-Vitamin K (CHEWABLE CALCIUM) 500-200-40 MG-UNT-MCG CHEW Take 1 chew tab by mouth daily -- Gets OTC (Patient not taking: Reported on 2/6/2024)      cholecalciferol (VITAMIN D3) 125 mcg (5000 units) capsule Take 1 capsule (125 mcg) by mouth daily 100 capsule 4    cyanocobalamin (CYANOCOBALAMIN) 1000 MCG/ML injection INJECT 1ML INTRAMUSCULARLY EVERY 2 WEEKS 6 mL 11    docusate sodium (COLACE) 100 MG tablet Take 2 tablets (200 mg) by mouth 2 times daily -Adjust dose as needed to maintain soft stools 120 tablet 9    famotidine (PEPCID) 20 MG tablet Take 1 tablet (20 mg) by mouth 2 times daily - For Heartburn 180 tablet 4    ferrous fumarate 65 mg, Pala. FE,-Vitamin C 125 mg (VITRON C)  MG TABS tablet Take 1 tablet by mouth daily on empty stomach -for iron deficiency 90 tablet 0    gabapentin (NEURONTIN) 100 MG capsule Take 100 mg by mouth 3 times daily      Glucosamine-Chondroit-Vit C-Mn (GLUCOSAMINE CHONDROITIN 1500 COMPLEX) CAPS Take 1 capsule by mouth daily - For Knee pain -- Gets OTC (Patient not taking: Reported on 2/6/2024)      hydrOXYzine  "(ATARAX) 50 MG tablet Take 1 tablet (50 mg) by mouth At Bedtime 30 tablet 0    hydrOXYzine (VISTARIL) 50 MG capsule TAKE 1 CAPSULE BY MOUTH FOUR TIMES A DAY AS NEEDED FOR ANXIETY      ibuprofen (ADVIL/MOTRIN) 200 MG tablet Take 200-400 mg by mouth every 6 hours as needed 1 to 2 tabs Q6 PRN      lamoTRIgine (LAMICTAL) 100 MG tablet Take 1 tablet (100 mg) by mouth 3 times daily -- Managed by Psych      Magnesium Cl-Calcium Carbonate (SLOW-MAG) 71.5-119 MG TBEC Take 1-2 tablets by mouth daily - for restless leg prevention - OTC okay (Patient not taking: Reported on 2/6/2024) 180 tablet 4    Menthol, Topical Analgesic, (BIOFREEZE EX) Apply topically 4 times daily as needed      montelukast (SINGULAIR) 10 MG tablet Take 1 tablet (10 mg) by mouth At Bedtime 90 tablet 4    prazosin (MINIPRESS) 1 MG capsule Take by mouth as needed      PREVIDENT 5000 PLUS 1.1 % CREA USE IN PLACE OF TOOTHPASTE ONCE DAILY BEFORE BED.BRUSH SWISH TOOTHPASTE FOR 30 SEC AND SPIT. DO NOT RINSE,EAT OR DRINK FOR 30 MINS      ramelteon (ROZEREM) 8 MG tablet Take 1 tablet (8 mg) by mouth At Bedtime 30 tablet 1    syringe/needle, sisp, (B-D SYRINGE/NEEDLE) 25G X 5/8\" 1 ML MISC USE TO INJECT B-12 INTRAMUSCULARLY EVERY 2 WEEKS 94 each 4    venlafaxine (EFFEXOR XR) 37.5 MG 24 hr capsule Take 1 capsule (37.5 mg) by mouth daily 30 capsule 1    vilazodone (VIIBRYD) 40 MG TABS tablet Take 40 mg by mouth every morning         Allergies:  Allergies   Allergen Reactions    Clonazepam Other (See Comments)     Causes Violence and aggresssion    Hydrocodone-Acetaminophen      Other reaction(s): Seizures  Can take Percocet without difficulty.    Lorazepam Other (See Comments)     Causes Violence and aggresssion    Sertraline Other (See Comments)     Caused suicidally     Bupropion Other (See Comments)     Caused Major Depression    Dust Mite Extract      Other reaction(s): Asthma symptoms    Lithium Other (See Comments)     Mood alteration    Pollen Extract      " Other reaction(s): Asthma symptoms    Risperidone Other (See Comments)     Agitation      Sulfa Antibiotics Itching    Trichophyton      Other reaction(s): Asthma symptoms    Valproic Acid Other (See Comments)     Weight gain       Family history:  Family History   Problem Relation Age of Onset    Osteoporosis Mother     Asthma Father         Asthma,+ Leg edema, knee, hip replacement. + Lymphedema    Heart Failure Father     Other - See Comments Paternal Grandmother         Lymphedema    Osteoporosis Brother         Osteoporosis    Other - See Comments Brother         No Known Problems       Social history:  Social History     Socioeconomic History    Marital status:      Spouse name: Not on file    Number of children: Not on file    Years of education: Not on file    Highest education level: Not on file   Occupational History    Not on file   Tobacco Use    Smoking status: Former     Packs/day: 2.00     Years: 3.00     Additional pack years: 0.00     Total pack years: 6.00     Types: Cigarettes     Start date:      Quit date: 2003     Years since quittin.1    Smokeless tobacco: Never   Vaping Use    Vaping Use: Never used   Substance and Sexual Activity    Alcohol use: Not Currently     Alcohol/week: 0.0 standard drinks of alcohol    Drug use: Never    Sexual activity: Not Currently     Partners: Male     Birth control/protection: Female Surgical     Comment: ablation   Other Topics Concern    Parent/sibling w/ CABG, MI or angioplasty before 65F 55M? Not Asked   Social History Narrative    No longer in adult foster care lived with Charley Godwin.    .   2 sons - age 12 and 7 - as of 2016.   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     Social Determinants of Health     Financial Resource Strain: High Risk (2024)    Financial Resource Strain     Within the past 12 months, have you or your family members you live with been unable to get utilities (heat,  electricity) when it was really needed?: Yes   Food Insecurity: Low Risk  (1/23/2024)    Food Insecurity     Within the past 12 months, did you worry that your food would run out before you got money to buy more?: No     Within the past 12 months, did the food you bought just not last and you didn t have money to get more?: No   Transportation Needs: Low Risk  (1/23/2024)    Transportation Needs     Within the past 12 months, has lack of transportation kept you from medical appointments, getting your medicines, non-medical meetings or appointments, work, or from getting things that you need?: No   Physical Activity: Not on file   Stress: Not on file   Social Connections: Not on file   Interpersonal Safety: Low Risk  (1/23/2024)    Interpersonal Safety     Do you feel physically and emotionally safe where you currently live?: Yes     Within the past 12 months, have you been hit, slapped, kicked or otherwise physically hurt by someone?: No     Within the past 12 months, have you been humiliated or emotionally abused in other ways by your partner or ex-partner?: No   Housing Stability: Low Risk  (1/23/2024)    Housing Stability     Do you have housing? : Yes     Are you worried about losing your housing?: No       PROBLEM LIST:  Patient Active Problem List   Diagnosis    Alcohol use disorder, moderate, in sustained remission, dependence (H)    Allergic rhinitis due to pollen    Generalized anxiety disorder    Allergic rhinitis due to other allergen    Chronic pain of right knee    Bilateral foot pain    Borderline personality disorder (H)    Disorder of female genital organ    Elevated random blood glucose level    Esophageal reflux    Lymphedema of both lower extremities    Menorrhagia with irregular cycle    Moderate persistent asthma    Peripheral polyneuropathy    Urinary incontinence    History of substance abuse (H)    Chronic venous stasis dermatitis of right lower extremity    Vitamin B12 deficiency    Vitamin  D deficiency    Abnormal Pap smear of cervix    Bipolar I disorder, most recent episode depressed, moderate (H)    Intermittent low back pain    Nail dystrophy    Edema of extremity of unknown cause    H/O adenomatous polyp of colon    Morbid obesity (H)    CKD (chronic kidney disease) stage 2, GFR 60-89 ml/min    Nocturnal leg cramps       Review of Systems:  COMPLETE 12 point REVIEW OF SYSTEMS is otherwise negative with the exception of which is stated above.    Physical exam: Pulse 80   LMP 01/01/2017 (Approximate)   SpO2 97%     General: this is a pleasant female patient in no acute distress.  Patient is awake alert and oriented x3 .   EXAM:  Chest/Respiratory Exam: Normal - Clear to auscultation without rales, rhonchi, or wheezing.  Cardiovascular Exam: normal  Musculoskeletal: Right knee examination shows varus deformity.  Tenderness across the medial joint line.  Crepitation with flexion extension is present.  Knee Tracking is acceptable.  Decreased quadriceps strength noted.  Significant lymphedema is also present.    Imaging: 3 views right knee show advanced arthrosis medial compartment as well as patellofemoral joint.    Assessment:   Right knee arthrosis possible loose body and meniscal pathology    Plan:    MRI evaluation has been recommended to evaluate for loose body and potential treatment for that.  I do not feel in current state that patient is a good operative candidate for joint reconstruction.  Patient will be contacted once MRI is complete.      Philip Sanchez MD

## 2024-02-08 NOTE — TELEPHONE ENCOUNTER
Does she want to try the hormonal replacement therapy with estrogen patches and progesterone pills?  Leticia Wren MD FACOG  OB/GYN  2/8/2024 11:14 AM

## 2024-02-08 NOTE — TELEPHONE ENCOUNTER
Call to patient, she would like to try the recommended treatment below.    Ena Alexander RN on 2/8/2024 at 11:35 AM

## 2024-02-09 RX ORDER — PROGESTERONE 100 MG/1
100 CAPSULE ORAL DAILY
Qty: 90 CAPSULE | Refills: 3 | Status: SHIPPED | OUTPATIENT
Start: 2024-02-09

## 2024-02-09 RX ORDER — ESTRADIOL 0.05 MG/D
1 PATCH, EXTENDED RELEASE TRANSDERMAL
Qty: 24 PATCH | Refills: 3 | Status: SHIPPED | OUTPATIENT
Start: 2024-02-12 | End: 2024-04-18

## 2024-02-09 NOTE — TELEPHONE ENCOUNTER
Writer spoke with patient who verbalized understanding. States she will call Monday to schedule follow up.  Merly Pascual RN on 2/9/2024 at 10:26 AM

## 2024-02-09 NOTE — TELEPHONE ENCOUNTER
I sent the scripts to her pharmacy. She should follow up with myself or Fani Sandoval NP in 1-2 months to reassess symptoms    Leticia Wren MD FACOG  OB/GYN  2/9/2024 10:16 AM

## 2024-02-27 ENCOUNTER — HOSPITAL ENCOUNTER (OUTPATIENT)
Dept: MRI IMAGING | Facility: OTHER | Age: 49
Discharge: HOME | End: 2024-02-27
Attending: ORTHOPAEDIC SURGERY | Admitting: ORTHOPAEDIC SURGERY
Payer: MEDICARE

## 2024-02-27 DIAGNOSIS — M25.561 ACUTE PAIN OF RIGHT KNEE: ICD-10-CM

## 2024-02-27 PROCEDURE — 73721 MRI JNT OF LWR EXTRE W/O DYE: CPT | Mod: RT,MG

## 2024-03-14 ENCOUNTER — OFFICE VISIT (OUTPATIENT)
Dept: OBGYN | Facility: OTHER | Age: 49
End: 2024-03-14
Attending: OBSTETRICS & GYNECOLOGY
Payer: MEDICARE

## 2024-03-14 VITALS
BODY MASS INDEX: 42.45 KG/M2 | WEIGHT: 267 LBS | SYSTOLIC BLOOD PRESSURE: 120 MMHG | HEART RATE: 76 BPM | DIASTOLIC BLOOD PRESSURE: 66 MMHG

## 2024-03-14 DIAGNOSIS — Z79.890 HORMONE REPLACEMENT THERAPY: ICD-10-CM

## 2024-03-14 DIAGNOSIS — R23.2 HOT FLASHES: Primary | ICD-10-CM

## 2024-03-14 PROCEDURE — 99213 OFFICE O/P EST LOW 20 MIN: CPT | Performed by: OBSTETRICS & GYNECOLOGY

## 2024-03-14 PROCEDURE — G0463 HOSPITAL OUTPT CLINIC VISIT: HCPCS

## 2024-03-14 NOTE — NURSING NOTE
Chief Complaint   Patient presents with    Follow Up     Menopause        Medication Reconciliation: complete        Miguelina Wren, MATHIEUN

## 2024-03-14 NOTE — PROGRESS NOTES
Follow-Up Visit    S: Ms. Rosi Lamb is a 49 year old  here for Hot flashes, HRT follow-up. Is feeling a lot better since starting her patches and progesterone.     O:  /66   Pulse 76   Wt 121.1 kg (267 lb)   LMP 2017 (Approximate)   BMI 42.45 kg/m    Gen: Well-appearing, NAD  Pulm: nonlabored  Psych: appropriate mood and affect    A/P:  Ms. Rosi Lamb is a 49 year old  here for HRT follow-up, doing well on current regimen. RTC annually (can be refilled with PCP) or sooner prn.    Leticia Wren MD  OB/GYN  3/14/2024 9:24 AM

## 2024-03-20 DIAGNOSIS — K21.9 GASTROESOPHAGEAL REFLUX DISEASE WITHOUT ESOPHAGITIS: ICD-10-CM

## 2024-03-22 RX ORDER — FAMOTIDINE 20 MG/1
20 TABLET, FILM COATED ORAL 2 TIMES DAILY
Qty: 180 TABLET | Refills: 4 | OUTPATIENT
Start: 2024-03-22

## 2024-03-22 NOTE — TELEPHONE ENCOUNTER
[id number] sent Rx request for the following:      Requested Prescriptions   Pending Prescriptions Disp Refills    famotidine (PEPCID) 20 MG tablet [Pharmacy Med Name: FAMOTIDINE 20MG TABLET] 180 tablet 4     Sig: TAKE 1 TABLET (20 MG) BY MOUTH 2 TIMES DAILY - FOR HEARTBURN          Last Prescription Date:   3/17/23  Last Fill Qty/Refills:         180, R-4        Duplicate.     CESILIA PAYNE RN on 3/22/2024 at 11:41 AM

## 2024-03-25 ENCOUNTER — TELEPHONE (OUTPATIENT)
Dept: INTERNAL MEDICINE | Facility: OTHER | Age: 49
End: 2024-03-25

## 2024-03-25 NOTE — TELEPHONE ENCOUNTER
Patient is moving. So she needs her MARS sent to a new place. Please call.    Dorothea Edwards on 3/25/2024 at 9:00 AM

## 2024-03-26 ENCOUNTER — TELEPHONE (OUTPATIENT)
Dept: INTERNAL MEDICINE | Facility: OTHER | Age: 49
End: 2024-03-26
Payer: MEDICARE

## 2024-03-26 NOTE — TELEPHONE ENCOUNTER
Patient requests PRNs for inhaler, toothpaste, cold therapy gel, and hydroxyzine be faxed to [facility].    Okay to leave detailed message.     Fax: [phone]            Isabel Nguyen on 3/26/2024 at 11:25 AM

## 2024-03-26 NOTE — TELEPHONE ENCOUNTER
After verifying patient's name and date of birth, patient will be moving into [place] next week and I asked her to have them fax over a request for this information.  Norma J. Gosselin, LPN....3/26/2024 1:31 PM

## 2024-03-29 DIAGNOSIS — K21.9 GASTROESOPHAGEAL REFLUX DISEASE WITHOUT ESOPHAGITIS: ICD-10-CM

## 2024-03-30 ENCOUNTER — MYC REFILL (OUTPATIENT)
Dept: INTERNAL MEDICINE | Facility: OTHER | Age: 49
End: 2024-03-30
Payer: MEDICARE

## 2024-03-30 DIAGNOSIS — K21.9 GASTROESOPHAGEAL REFLUX DISEASE WITHOUT ESOPHAGITIS: ICD-10-CM

## 2024-04-01 RX ORDER — FAMOTIDINE 20 MG/1
20 TABLET, FILM COATED ORAL 2 TIMES DAILY
Qty: 180 TABLET | Refills: 4 | OUTPATIENT
Start: 2024-04-01

## 2024-04-01 RX ORDER — FAMOTIDINE 20 MG/1
20 TABLET, FILM COATED ORAL 2 TIMES DAILY
Qty: 180 TABLET | Refills: 0 | Status: SHIPPED | OUTPATIENT
Start: 2024-04-01 | End: 2024-04-04

## 2024-04-01 NOTE — TELEPHONE ENCOUNTER
Patient comment: [facility] said they faxed a request for this medication and are waiting for a reply. I am completely out of this medication.     Requested Prescriptions   Pending Prescriptions Disp Refills    famotidine (PEPCID) 20 MG tablet 180 tablet 4     Sig: Take 1 tablet (20 mg) by mouth 2 times daily - For Heartburn   Last Prescription Date:   3/17/23  Last Fill Qty/Refills:         180, R-4    Last Office Visit:              1/23/24   Future Office visit:           4/4/24    Prescription refilled per RN Medication Refill Policy.................... Imelda Leyva RN ....................  4/1/2024   8:23 AM

## 2024-04-01 NOTE — TELEPHONE ENCOUNTER
[facility] sent Rx request for the following:      Requested Prescriptions   Pending Prescriptions Disp Refills    famotidine (PEPCID) 20 MG tablet 180 tablet 4     Sig: Take 1 tablet (20 mg) by mouth 2 times daily - For Heartburn     Last Prescription Date:   4/1/24    Medication filled today. Medication request denied.  Lourdes Romero RN on 4/1/2024 at 2:19 PM

## 2024-04-03 ENCOUNTER — OFFICE VISIT (OUTPATIENT)
Dept: ORTHOPEDICS | Facility: OTHER | Age: 49
End: 2024-04-03
Attending: ORTHOPAEDIC SURGERY
Payer: MEDICARE

## 2024-04-03 DIAGNOSIS — M25.561 ACUTE PAIN OF RIGHT KNEE: Primary | ICD-10-CM

## 2024-04-03 PROCEDURE — 250N000009 HC RX 250: Performed by: ORTHOPAEDIC SURGERY

## 2024-04-03 PROCEDURE — 20610 DRAIN/INJ JOINT/BURSA W/O US: CPT | Mod: RT

## 2024-04-03 PROCEDURE — 250N000011 HC RX IP 250 OP 636: Performed by: ORTHOPAEDIC SURGERY

## 2024-04-03 PROCEDURE — 20610 DRAIN/INJ JOINT/BURSA W/O US: CPT | Mod: RT | Performed by: ORTHOPAEDIC SURGERY

## 2024-04-03 PROCEDURE — 96372 THER/PROPH/DIAG INJ SC/IM: CPT | Performed by: ORTHOPAEDIC SURGERY

## 2024-04-03 PROCEDURE — G0463 HOSPITAL OUTPT CLINIC VISIT: HCPCS

## 2024-04-03 RX ORDER — LIDOCAINE HYDROCHLORIDE 10 MG/ML
4 INJECTION, SOLUTION EPIDURAL; INFILTRATION; INTRACAUDAL; PERINEURAL ONCE
Status: COMPLETED | OUTPATIENT
Start: 2024-04-03 | End: 2024-04-03

## 2024-04-03 RX ORDER — TRIAMCINOLONE ACETONIDE 40 MG/ML
40 INJECTION, SUSPENSION INTRA-ARTICULAR; INTRAMUSCULAR ONCE
Status: COMPLETED | OUTPATIENT
Start: 2024-04-03 | End: 2024-04-03

## 2024-04-03 RX ADMIN — TRIAMCINOLONE ACETONIDE 40 MG: 40 INJECTION, SUSPENSION INTRA-ARTICULAR; INTRAMUSCULAR at 14:32

## 2024-04-03 RX ADMIN — LIDOCAINE HYDROCHLORIDE 4 ML: 10 INJECTION, SOLUTION INFILTRATION; PERINEURAL at 14:32

## 2024-04-03 NOTE — PROGRESS NOTES
SUBJECTIVE:  Patient returns in regards to right knee pain.  Patient had MRI scan completed which did reveal moderate arthrosis some mild meniscal fragmentation but significant motion artifact at this time.  Patient is here to consider her options.  Patient is continually working on weight loss and at this point is not a great candidate for total joint reconstruction given her current BMI.    ROS: Musculoskeletal and general review of systems are negative, per review of previous clinic questionnaire.  Denies SOB and calf pain.    EXAM: Right knee examination shows varus deformity.  Tenderness across medial joint line.  Neuro exam intact.  Range of motion 0-1 20.    PROCEDURE NOTE: Right knee injected with 4 cc of 1% lidocaine and 40 mg of Kenalog under sterile conditions    IMAGING: MRI right knee reviewed shows moderate arthrosis medial compartment as well as patellofemoral joint plus meniscal fragmentation both medially and laterally    ASSESSMENT: Right knee degenerative arthrosis with degenerative medial meniscal tearing    PLAN: Injection as stated above continue weight loss regimen.  The right long-term action for patient is to consider moving forward joint reconstruction when conditions are optimal for that.    Philip Sanchez MD

## 2024-04-04 ENCOUNTER — TELEPHONE (OUTPATIENT)
Dept: INTERNAL MEDICINE | Facility: OTHER | Age: 49
End: 2024-04-04
Payer: MEDICARE

## 2024-04-04 ENCOUNTER — OFFICE VISIT (OUTPATIENT)
Dept: INTERNAL MEDICINE | Facility: OTHER | Age: 49
End: 2024-04-04
Attending: REHABILITATION PRACTITIONER
Payer: MEDICARE

## 2024-04-04 ENCOUNTER — OFFICE VISIT (OUTPATIENT)
Dept: FAMILY MEDICINE | Facility: OTHER | Age: 49
End: 2024-04-04
Attending: REHABILITATION PRACTITIONER
Payer: MEDICARE

## 2024-04-04 VITALS
OXYGEN SATURATION: 91 % | WEIGHT: 259 LBS | SYSTOLIC BLOOD PRESSURE: 118 MMHG | BODY MASS INDEX: 40.65 KG/M2 | DIASTOLIC BLOOD PRESSURE: 66 MMHG | RESPIRATION RATE: 17 BRPM | HEIGHT: 67 IN | TEMPERATURE: 97.3 F | HEART RATE: 84 BPM

## 2024-04-04 VITALS
HEART RATE: 92 BPM | WEIGHT: 261 LBS | SYSTOLIC BLOOD PRESSURE: 118 MMHG | OXYGEN SATURATION: 98 % | TEMPERATURE: 98.5 F | HEIGHT: 66 IN | DIASTOLIC BLOOD PRESSURE: 66 MMHG | RESPIRATION RATE: 17 BRPM | BODY MASS INDEX: 41.95 KG/M2

## 2024-04-04 DIAGNOSIS — R21 RASH AND NONSPECIFIC SKIN ERUPTION: ICD-10-CM

## 2024-04-04 DIAGNOSIS — E53.8 VITAMIN B12 DEFICIENCY: Chronic | ICD-10-CM

## 2024-04-04 DIAGNOSIS — Z00.00 MEDICARE ANNUAL WELLNESS VISIT, SUBSEQUENT: ICD-10-CM

## 2024-04-04 DIAGNOSIS — I89.0 LYMPHEDEMA OF BOTH LOWER EXTREMITIES: Chronic | ICD-10-CM

## 2024-04-04 DIAGNOSIS — F41.1 GENERALIZED ANXIETY DISORDER: Primary | Chronic | ICD-10-CM

## 2024-04-04 DIAGNOSIS — M54.50 INTERMITTENT LOW BACK PAIN: ICD-10-CM

## 2024-04-04 DIAGNOSIS — F31.76 BIPOLAR DISORDER, IN FULL REMISSION, MOST RECENT EPISODE DEPRESSED (H): ICD-10-CM

## 2024-04-04 DIAGNOSIS — D50.9 IRON DEFICIENCY ANEMIA, UNSPECIFIED IRON DEFICIENCY ANEMIA TYPE: ICD-10-CM

## 2024-04-04 DIAGNOSIS — F60.3 BORDERLINE PERSONALITY DISORDER (H): Chronic | ICD-10-CM

## 2024-04-04 DIAGNOSIS — E55.9 VITAMIN D DEFICIENCY: Chronic | ICD-10-CM

## 2024-04-04 DIAGNOSIS — K21.9 GASTROESOPHAGEAL REFLUX DISEASE WITHOUT ESOPHAGITIS: ICD-10-CM

## 2024-04-04 DIAGNOSIS — I73.00 RAYNAUD'S PHENOMENON WITHOUT GANGRENE: ICD-10-CM

## 2024-04-04 DIAGNOSIS — L30.0 NUMMULAR ECZEMA: Primary | ICD-10-CM

## 2024-04-04 DIAGNOSIS — M25.561 CHRONIC PAIN OF RIGHT KNEE: ICD-10-CM

## 2024-04-04 DIAGNOSIS — F51.01 PRIMARY INSOMNIA: ICD-10-CM

## 2024-04-04 DIAGNOSIS — Z71.85 VACCINE COUNSELING: ICD-10-CM

## 2024-04-04 DIAGNOSIS — Z23 NEED FOR COVID-19 VACCINE: ICD-10-CM

## 2024-04-04 DIAGNOSIS — J45.40 MODERATE PERSISTENT ASTHMA WITHOUT COMPLICATION: Chronic | ICD-10-CM

## 2024-04-04 DIAGNOSIS — N18.2 CKD (CHRONIC KIDNEY DISEASE) STAGE 2, GFR 60-89 ML/MIN: ICD-10-CM

## 2024-04-04 DIAGNOSIS — E66.01 CLASS 3 SEVERE OBESITY DUE TO EXCESS CALORIES WITH SERIOUS COMORBIDITY AND BODY MASS INDEX (BMI) OF 40.0 TO 44.9 IN ADULT (H): ICD-10-CM

## 2024-04-04 DIAGNOSIS — G89.29 CHRONIC PAIN OF RIGHT KNEE: ICD-10-CM

## 2024-04-04 DIAGNOSIS — L98.9 SKIN LESION: ICD-10-CM

## 2024-04-04 DIAGNOSIS — E66.813 CLASS 3 SEVERE OBESITY DUE TO EXCESS CALORIES WITH SERIOUS COMORBIDITY AND BODY MASS INDEX (BMI) OF 40.0 TO 44.9 IN ADULT (H): ICD-10-CM

## 2024-04-04 LAB
KOH PREPARATION: NORMAL
KOH PREPARATION: NORMAL

## 2024-04-04 PROCEDURE — G0463 HOSPITAL OUTPT CLINIC VISIT: HCPCS | Mod: 25

## 2024-04-04 PROCEDURE — 90480 ADMN SARSCOV2 VAC 1/ONLY CMP: CPT

## 2024-04-04 PROCEDURE — G0463 HOSPITAL OUTPT CLINIC VISIT: HCPCS | Mod: 25,27 | Performed by: INTERNAL MEDICINE

## 2024-04-04 PROCEDURE — 99213 OFFICE O/P EST LOW 20 MIN: CPT | Performed by: NURSE PRACTITIONER

## 2024-04-04 PROCEDURE — 99214 OFFICE O/P EST MOD 30 MIN: CPT | Mod: 25 | Performed by: INTERNAL MEDICINE

## 2024-04-04 PROCEDURE — G0439 PPPS, SUBSEQ VISIT: HCPCS | Performed by: INTERNAL MEDICINE

## 2024-04-04 PROCEDURE — 87220 TISSUE EXAM FOR FUNGI: CPT | Mod: ZL | Performed by: NURSE PRACTITIONER

## 2024-04-04 RX ORDER — RAMELTEON 8 MG/1
8 TABLET ORAL AT BEDTIME
Qty: 90 TABLET | Refills: 4 | Status: SHIPPED | OUTPATIENT
Start: 2024-04-04

## 2024-04-04 RX ORDER — BENZOCAINE/MENTHOL 6 MG-10 MG
LOZENGE MUCOUS MEMBRANE PRN
Qty: 60 G | Refills: 1 | Status: SHIPPED | OUTPATIENT
Start: 2024-04-04 | End: 2024-04-25

## 2024-04-04 RX ORDER — PRENATAL VIT 91/IRON/FOLIC/DHA 28-975-200
COMBINATION PACKAGE (EA) ORAL 2 TIMES DAILY
Qty: 84 G | Refills: 4 | Status: SHIPPED | OUTPATIENT
Start: 2024-04-04 | End: 2024-09-02

## 2024-04-04 RX ORDER — FAMOTIDINE 20 MG/1
20 TABLET, FILM COATED ORAL 2 TIMES DAILY
Qty: 180 TABLET | Refills: 4 | Status: SHIPPED | OUTPATIENT
Start: 2024-04-04

## 2024-04-04 RX ORDER — AMLODIPINE BESYLATE 2.5 MG/1
TABLET ORAL
Qty: 180 TABLET | Refills: 4 | Status: SHIPPED | OUTPATIENT
Start: 2024-04-04 | End: 2024-04-25

## 2024-04-04 RX ORDER — MONTELUKAST SODIUM 10 MG/1
10 TABLET ORAL AT BEDTIME
Qty: 90 TABLET | Refills: 4 | Status: SHIPPED | OUTPATIENT
Start: 2024-04-04

## 2024-04-04 SDOH — HEALTH STABILITY: PHYSICAL HEALTH: ON AVERAGE, HOW MANY DAYS PER WEEK DO YOU ENGAGE IN MODERATE TO STRENUOUS EXERCISE (LIKE A BRISK WALK)?: 5 DAYS

## 2024-04-04 SDOH — HEALTH STABILITY: PHYSICAL HEALTH: ON AVERAGE, HOW MANY MINUTES DO YOU ENGAGE IN EXERCISE AT THIS LEVEL?: 30 MIN

## 2024-04-04 ASSESSMENT — PAIN SCALES - GENERAL
PAINLEVEL: NO PAIN (0)
PAINLEVEL: NO PAIN (0)

## 2024-04-04 ASSESSMENT — ENCOUNTER SYMPTOMS
CONSTIPATION: 1
JOINT SWELLING: 0
DIZZINESS: 0
HEADACHES: 0
ABDOMINAL PAIN: 0
BRUISES/BLEEDS EASILY: 0
ARTHRALGIAS: 1
PALPITATIONS: 0
SHORTNESS OF BREATH: 0
DIARRHEA: 0
COLOR CHANGE: 1
HEMATURIA: 0
SORE THROAT: 0
MYALGIAS: 0
WEAKNESS: 0
BREAST MASS: 0
EYE PAIN: 0
FREQUENCY: 0
DYSURIA: 0
HEARTBURN: 0
NAUSEA: 0
COUGH: 0
HEMATOCHEZIA: 0
CHILLS: 0
NERVOUS/ANXIOUS: 1
FEVER: 0
PARESTHESIAS: 0

## 2024-04-04 ASSESSMENT — SOCIAL DETERMINANTS OF HEALTH (SDOH): HOW OFTEN DO YOU GET TOGETHER WITH FRIENDS OR RELATIVES?: TWICE A WEEK

## 2024-04-04 NOTE — PROGRESS NOTES
Assessment & Plan     ICD-10-CM    1. Generalized anxiety disorder  F41.1       2. Moderate persistent asthma without complication  J45.40 montelukast (SINGULAIR) 10 MG tablet      3. CKD (chronic kidney disease) stage 2, GFR 60-89 ml/min  N18.2       4. Class 3 severe obesity due to excess calories with serious comorbidity and body mass index (BMI) of 40.0 to 44.9 in adult (H)  E66.01     Z68.41       5. Intermittent low back pain  M54.50       6. Lymphedema of both lower extremities  I89.0       7. Vitamin B12 deficiency  E53.8       8. Vitamin D deficiency  E55.9 cholecalciferol (VITAMIN D3) 125 mcg (5000 units) capsule      9. Chronic pain of right knee  M25.561     G89.29       10. Bipolar disorder, in full remission, most recent episode depressed (H24)  F31.76       11. Borderline personality disorder (H) - [facility] for therapy + [facility] [facility] for medication management.  F60.3       12. Gastroesophageal reflux disease without esophagitis  K21.9 famotidine (PEPCID) 20 MG tablet      13. Iron deficiency anemia, unspecified iron deficiency anemia type  D50.9 Elemental iron 65 mg Vitamin C 125 mg (VITRON C)  MG TABS tablet      14. Primary insomnia  F51.01 ramelteon (ROZEREM) 8 MG tablet      15. Vaccine counseling  Z71.85       16. Medicare annual wellness visit, subsequent  Z00.00       17. Need for COVID-19 vaccine  Z23 COVID-19 12+ (2023-24) (PFIZER)      18. Raynaud's phenomenon without gangrene  I73.00 amLODIPine (NORVASC) 2.5 MG tablet      19. Rash and nonspecific skin eruption  R21 terbinafine (LAMISIL) 1 % external cream         Patient presents for Medicare annual wellness visit as well as follow-up multiple issues.    Generalized anxiety disorder, chronic.  Bipolar disorder, borderline personality disorder.  Ongoing.  Continues to follow with counseling and medication management mental health provider.  States that she is doing reasonly well.  No changes for now.    Patient has chronic  "pain in the right knee, chronic low back pain.  Does quite well with regular walking and exercise.  She does have chronic lower extremity lymphedema.  Encouraged healthy diet.  Regular cystitis    Vitamin D and B12 deficiency.  Continues with oral vitamin D replacement.  Continues with B12 shots, recently she has been administering herself.    Hyper and reflux, history of anemia.  Heartburn has been doing much better with regular dosing of famotidine needs refills.    Iron deficiency anemia, improving with oral iron replacement.  Needs refills.    Primary insomnia, ongoing.  Has noted Rozerem at bedtime has been helpful and effective.  Tolerating well needs refills.    Vaccines administered today.    Raynaud's phenomenon.  New diagnosis.  Start low-dose amlodipine 2.5 mg once daily, take additional dose if needed for Raynaud's skin changes to prevent gangrene.    Skin rash, right thigh.  Appears to be ringworm.  Start Lamisil cream.    ASTHMA - Patient has a longstanding history of Moderate Persistent Asthma . Patient has been doing reasonably well overall noting TOM - in humidity and cold and continues on Albuterol medication regimen without adverse reactions or side effects.       OBESITY - Ongoing.  (See Encounter Diagnosis list above for obesity class / severity).  - Encourage continued maintenance / improvement in diet and exercise.   - Consider Nutrition / Dietician appointment.  - Weight loss would improve Cholesterol.      Chronic Kidney Disease, Stage 2 (GFR 60-89), chronic, ongoing.  Kidney function had been slowly declining.  Encourage NSAID avoidance.       Vaccine counseling completed.  Encourage routine / annual vaccinations.            BMI  Estimated body mass index is 41.18 kg/m  as calculated from the following:    Height as of this encounter: 1.689 m (5' 6.5\").    Weight as of this encounter: 117.5 kg (259 lb).       Counseling  Appropriate preventive services were discussed with this patient, " including applicable screening as appropriate for fall prevention, nutrition, physical activity, Tobacco-use cessation, weight loss and cognition.  Checklist reviewing preventive services available has been given to the patient.  Reviewed patient's diet, addressing concerns and/or questions.         Return in about 1 year (around 4/4/2025) for Annual Medicare Wellness Visit.    Review of Systems   Constitutional:  Negative for chills and fever.   HENT:  Negative for congestion, ear pain, hearing loss and sore throat.    Eyes:  Negative for pain and visual disturbance.   Respiratory:  Negative for cough and shortness of breath.    Cardiovascular:  Negative for chest pain, palpitations and peripheral edema.   Gastrointestinal:  Positive for constipation. Negative for abdominal pain, diarrhea, heartburn (better with famotidine), hematochezia and nausea.   Endocrine: Positive for cold intolerance.   Breasts:  Negative for tenderness, breast mass and discharge.   Genitourinary:  Positive for pelvic pain and urgency. Negative for dysuria, frequency, genital sores, hematuria, vaginal bleeding and vaginal discharge.        + Urinary frequency, better with interstim   Musculoskeletal:  Positive for arthralgias (Right > left knee pain) and gait problem. Negative for joint swelling and myalgias.   Skin:  Positive for color change and rash (right thigh).        + Cool hands with Raynaud's changes of skin color.    Allergic/Immunologic: Negative for immunocompromised state.   Neurological:  Negative for dizziness, weakness, headaches and paresthesias.   Hematological:  Does not bruise/bleed easily.   Psychiatric/Behavioral:  Positive for mood changes. The patient is nervous/anxious.        Preventive Care Visit  [facility]  Lucio Curiel MD, Internal Medicine  Apr 4, 2024      Tawanda Aranda is a 49 year old, presenting for the following:  Physical (Medicare wellness )        4/4/2024     1:06 PM    Additional Questions   Roomed by Nidhi BRANDT LPN   Accompanied by Self         Health Care Directive  Patient has a Health Care Directive on file  Advance care planning document is on file but is outdated.  Patient encouraged to updated.    HPI              4/4/2024   General Health   How would you rate your overall physical health? Good   Feel stress (tense, anxious, or unable to sleep) Not at all         4/4/2024   Nutrition   Diet: Carbohydrate counting         4/4/2024   Exercise   Days per week of moderate/strenous exercise 5 days   Average minutes spent exercising at this level 30 min         4/4/2024   Social Factors   Frequency of gathering with friends or relatives Twice a week   Worry food won't last until get money to buy more No   Food not last or not have enough money for food? No   Do you have housing?  Yes   Are you worried about losing your housing? No   Lack of transportation? No   Unable to get utilities (heat,electricity)? No         4/4/2024   Activities of Daily Living- Home Safety   Needs help with the following daily activites None of the above   Safety concerns in the home None of the above         4/4/2024   Dental   Dentist two times every year? Yes         4/4/2024   Hearing Screening   Hearing concerns? None of the above         4/4/2024   Driving Risk Screening   Patient/family members have concerns about driving No         4/4/2024   General Alertness/Fatigue Screening   Have you been more tired than usual lately? No         4/4/2024   Urinary Incontinence Screening   Bothered by leaking urine in past 6 months No         4/4/2024   TB Screening   Were you born outside of the [place]? No         Today's PHQ-2 Score:       4/4/2024    12:51 PM   PHQ-2 ( 1999 Pf[employer])   Q1: Little interest or pleasure in doing things 0   Q2: Feeling down, depressed or hopeless 0   PHQ-2 Score 0   Q1: Little interest or pleasure in doing things Not at all   Q2: Feeling down, depressed or hopeless Not at all    PHQ-2 Score 0           2024   Substance Use   Alcohol more than 3/day or more than 7/wk No   Do you have a current opioid prescription? No   How severe/bad is pain from 1 to 10? 1/10   Do you use any other substances recreationally? No     Social History     Tobacco Use    Smoking status: Former     Packs/day: 2.00     Years: 3.00     Additional pack years: 0.00     Total pack years: 6.00     Types: Cigarettes     Start date:      Quit date: 2003     Years since quittin.2    Smokeless tobacco: Never   Vaping Use    Vaping Use: Never used   Substance Use Topics    Alcohol use: Not Currently     Alcohol/week: 0.0 standard drinks of alcohol    Drug use: Never           2023   LAST FHS-7 RESULTS   1st degree relative breast or ovarian cancer No   Any relative bilateral breast cancer No   Any male have breast cancer No   Any ONE woman have BOTH breast AND ovarian cancer No   Any woman with breast cancer before 50yrs No   2 or more relatives with breast AND/OR ovarian cancer No   2 or more relatives with breast AND/OR bowel cancer Yes        Mammogram Screening - Mammogram every 1-2 years updated in Health Maintenance based on mutual decision making      History of abnormal Pap smear: NO - age 30-65 PAP every 5 years with negative HPV co-testing recommended        Latest Ref Rng & Units 3/20/2023     3:52 PM 3/11/2020    11:10 AM   PAP / HPV   PAP  Negative for Intraepithelial Lesion or Malignancy (NILM)     PAP (Historical)   NIL    HPV 16 DNA Negative Negative  Negative    HPV 18 DNA Negative Negative  Negative    Other HR HPV Negative Negative  Negative      ASCVD Risk   The 10-year ASCVD risk score (Yoko LICONA, et al., 2019) is: 0.5%    Values used to calculate the score:      Age: 49 years      Sex: Female      Is Non- : No      Diabetic: No      Tobacco smoker: No      Systolic Blood Pressure: 118 mmHg      Is BP treated: No      HDL Cholesterol: 62 mg/dL       "Total Cholesterol: 161 mg/dL        Reviewed and updated as needed this visit by Provider   Tobacco  Allergies  Meds  Problems  Med Hx  Surg Hx  Fam Hx            Current providers sharing in care for this patient include:  Patient Care Team:  Lucio Curiel MD as PCP - General (Internal Medicine)  Lucio Curiel MD as Assigned PCP  Philip Sanchez MD as Assigned Musculoskeletal Provider  Leticia Wren MD as Assigned OBGYN Provider    The following health maintenance items are reviewed in Epic and correct as of today:  Health Maintenance   Topic Date Due    ASTHMA CONTROL TEST  05/28/2024    LIPID  06/09/2024    MICROALBUMIN  06/09/2024    ASTHMA ACTION PLAN  06/09/2024    BMP  01/23/2025    MEDICARE ANNUAL WELLNESS VISIT  04/04/2025    MAMMO SCREENING  07/31/2025    DTAP/TDAP/TD IMMUNIZATION (4 - Td or Tdap) 01/05/2026    HPV TEST  03/20/2026    PAP  03/20/2026    GLUCOSE  01/23/2027    COLORECTAL CANCER SCREENING  07/25/2027    ADVANCE CARE PLANNING  04/04/2029    Pneumococcal Vaccine: Pediatrics (0 to 5 Years) and At-Risk Patients (6 to 64 Years) (3 of 3 - PPSV23 or PCV20) 01/14/2042    HEPATITIS C SCREENING  Completed    HIV SCREENING  Completed    PHQ-2 (once per calendar year)  Completed    INFLUENZA VACCINE  Completed    URINALYSIS  Completed    HEPATITIS B IMMUNIZATION  Completed    COVID-19 Vaccine  Completed    IPV IMMUNIZATION  Aged Out    HPV IMMUNIZATION  Aged Out    MENINGITIS IMMUNIZATION  Aged Out    RSV MONOCLONAL ANTIBODY  Aged Out            Objective    Exam  /66   Pulse 84   Temp 97.3  F (36.3  C) (Tympanic)   Resp 17   Ht 1.689 m (5' 6.5\")   Wt 117.5 kg (259 lb)   SpO2 91%   BMI 41.18 kg/m     Estimated body mass index is 41.18 kg/m  as calculated from the following:    Height as of this encounter: 1.689 m (5' 6.5\").    Weight as of this encounter: 117.5 kg (259 lb).    Physical Exam  Constitutional:       Appearance: Normal appearance. She is obese.   HENT:      " Right Ear: Tympanic membrane, ear canal and external ear normal. There is no impacted cerumen.      Left Ear: Tympanic membrane, ear canal and external ear normal. There is no impacted cerumen.   Eyes:      General: No scleral icterus.     Conjunctiva/sclera: Conjunctivae normal.   Neck:      Vascular: No carotid bruit.   Cardiovascular:      Rate and Rhythm: Normal rate and regular rhythm.      Pulses: Normal pulses.      Heart sounds: Normal heart sounds. No murmur heard.  Pulmonary:      Effort: Pulmonary effort is normal. No respiratory distress.      Breath sounds: No wheezing or rhonchi.   Abdominal:      Palpations: Abdomen is soft.      Tenderness: There is no abdominal tenderness.   Musculoskeletal:         General: No deformity. Normal range of motion.      Right lower leg: Edema (Lymphedema) present.      Left lower leg: Edema (Lymphedema) present.   Skin:     General: Skin is dry.      Findings: Rash (Ring worm of right thigh) present.      Comments: + Cool hands with Raynaud's changes of skin color.   No sausage digits or MCP synovitis.    Neurological:      Mental Status: She is alert. Mental status is at baseline.   Psychiatric:         Mood and Affect: Mood normal.         Behavior: Behavior normal.               4/4/2024   Mini Cog   Clock Draw Score 2 Normal   3 Item Recall 2 objects recalled   Mini Cog Total Score 4              Signed Electronically by: Lucio Curiel MD

## 2024-04-04 NOTE — NURSING NOTE
"Chief Complaint   Patient presents with    Physical     Medicare wellness        Initial /66   Pulse 84   Temp 97.3  F (36.3  C) (Tympanic)   Resp 17   Ht 1.689 m (5' 6.5\")   Wt 117.5 kg (259 lb)   SpO2 91%   BMI 41.18 kg/m   Estimated body mass index is 41.18 kg/m  as calculated from the following:    Height as of this encounter: 1.689 m (5' 6.5\").    Weight as of this encounter: 117.5 kg (259 lb).  Medication Reconciliation: complete      Nidhi Wilson LPN on 4/4/2024 at 1:11 PM     "

## 2024-04-04 NOTE — NURSING NOTE
"Chief Complaint   Patient presents with    Derm Problem     Ring worm on right thigh     Patient here for possible ring worm on upper right thigh x3 days. She lives in a group home and is needing this checked out for clearance purposes.     Initial /66   Pulse 92   Temp 98.5  F (36.9  C) (Tympanic)   Resp 17   Ht 1.676 m (5' 6\")   Wt 118.4 kg (261 lb)   SpO2 98%   BMI 42.13 kg/m   Estimated body mass index is 42.13 kg/m  as calculated from the following:    Height as of this encounter: 1.676 m (5' 6\").    Weight as of this encounter: 118.4 kg (261 lb).  Medication Review: complete    The next two questions are to help us understand your food security.  If you are feeling you need any assistance in this area, we have resources available to support you today.          4/4/2024   SDOH- Food Insecurity   Within the past 12 months, did you worry that your food would run out before you got money to buy more? N   Within the past 12 months, did the food you bought just not last and you didn t have money to get more? N         Health Care Directive:  Patient has a Health Care Directive on file      Anne Valencia LPN      "

## 2024-04-04 NOTE — PATIENT INSTRUCTIONS
Blood pressure is well controlled.     Medications refilled.   Last labs were stable.       COVID Shot today.       Start Lamisil Cream -- to the right thigh rash. Twice daily.       Raynaud's phenomenon without gangrene    START:   - amLODIPine (NORVASC) 2.5 MG tablet; Take 1 tablet (2.5 mg) by mouth every morning -- Scheduled.   --- May also take 1 tablet (2.5 mg) every evening as needed (-- for Raynaud's / Hand color changes --).  May be most needed / helpful when it's cold.         Blood pressure checks at home - check some in AM, some in Afternoon, some in Evening and record   -- bring these with you to your next appointment.     Goal blood pressures -- less than 140 and less than 90.    -- Ideally would like the numbers about 110-130 and 70-80.  -- If running higher or lower than this on regular basis, will need to adjust your medications.          Return in approximately 1 year, or sooner as needed for follow-up with Dr. Curiel.  - Annual Follow-up / Physical - Medicare Annual Wellness Visit     Clinic : [phone]  Appointment line: [phone]

## 2024-04-04 NOTE — TELEPHONE ENCOUNTER
Patient requests a call back regarding if she has ringworm or not.      Okay to leave detailed message.                    Isabel Nguyen on 4/4/2024 at 2:47 PM

## 2024-04-04 NOTE — TELEPHONE ENCOUNTER
After proper verification, patient was informed that if she wanted to be checked for ringworm she would have to come into [facility] or call at 7 am to see if she could get an appointment.  Chelsea Cam LPN on 4/4/2024 at 3:09 PM  EXT. [phone]

## 2024-04-04 NOTE — PROGRESS NOTES
ASSESSMENT/PLAN:     I have reviewed the nursing notes.  I have reviewed the findings, diagnosis, plan and need for follow up with the patient.      1. Skin lesion     - KOH prep (skin, hair or nails only)  Negative for fungal elements. Called patient and instructed to discontinue Lamisil cream as ordered by her primary care provider today.    2. Nummular eczema  May use hydrocortisone 1% cream up to three times daily to affected area until resolved.         I explained my diagnostic considerations and recommendations to the patient, who voiced understanding and agreement with the treatment plan. All questions were answered. We discussed potential side effects of any prescribed or recommended therapies, as well as expectations for response to treatments.    Mehdi Bridges NP Student with [employer]   I was present with the nurse practitioner student who participated in the service and in the documentation of the note. I have verified the history and personally performed the physical exam and medical decision making. I agree with the assessment and plan of care as documented in the note.  Sarah Acosta NP  [facility]      SUBJECTIVE:   Rosi Lamb is a 49 year old female who presents to clinic today for the following health issues:  Possible ringworm      HPI  Patient reports a 3-day history of single red scaly patch to right thigh area. Patient has not had any new environmental or food exposures. The patch does not itch and has not spread to other areas of the body. No fever, chills, or night sweats. Has a history of eczema up to age 14, but no additional occurrences. The patient was seen by internal medicine for physical today where provider provided topical Lamisil to treat rash, but patient lives in a group home setting and the group home is requesting that the patch be tested to confirm ring-worm as they are are concerned about spread to others.         Past Medical History:   Diagnosis  Date    Abnormal Pap smear of cervix 2018    Overview:  had scraping of cervix    Cannabis use disorder, moderate, in sustained remission, dependence (H) 2015    Closed dislocation of tarsal joint 2011    Closed dislocation of tarsal joint - left 2011    Edema     No Comments Provided    Encounter for removal and reinsertion of intrauterine contraceptive device     05,IUD placement, Removed    Excessive and frequent menstruation with irregular cycle     2017    Major depressive disorder, single episode     No Comments Provided    Personal history of other medical treatment (CODE)     G3, P2-0-1-2 with history of spontaneous     Personal history of other medical treatment (CODE)     No Comments Provided    Uncomplicated asthma     No Comments Provided     Past Surgical History:   Procedure Laterality Date    ANKLE SURGERY      fracture, repair with screws    ARTHRODESIS FOOT Left 2022    Procedure: left foot hardware removaL, fusion of 1st-2nd Tarsal metatarsal;  Surgeon: Anthony Castillo DPM;  Location: [facility] OR    COLONOSCOPY  2022    F/U  tubular adenoma    CONIZATION LEEP      ,Underwent a loop    IMPLANT STIMULATOR AND LEADS SACRAL NERVE (STAGE ONE AND TWO)      scheduled 23 with Dr. Sheth at [facility]    LAPAROSCOPIC TUBAL LIGATION      ,tubal ligation     Social History     Tobacco Use    Smoking status: Former     Packs/day: 2.00     Years: 3.00     Additional pack years: 0.00     Total pack years: 6.00     Types: Cigarettes     Start date:      Quit date: 2003     Years since quittin.2    Smokeless tobacco: Never   Substance Use Topics    Alcohol use: Not Currently     Alcohol/week: 0.0 standard drinks of alcohol     Current Outpatient Medications   Medication Sig Dispense Refill    acetaminophen (TYLENOL) 500 MG tablet Take 500-1,000 mg by mouth every 6 hours as needed      albuterol (VENTOLIN HFA) 108 (90 Base) MCG/ACT  inhaler Inhale 1-2 puffs into the lungs every 4 hours as needed for shortness of breath or wheezing 18 g 11    amLODIPine (NORVASC) 2.5 MG tablet Take 1 tablet (2.5 mg) by mouth every morning. May also take 1 tablet (2.5 mg) every evening as needed (-- for Raynaud's / Hand color changes --). 180 tablet 4    ARIPiprazole (ABILIFY) 15 MG tablet Take 1 tablet (15 mg) by mouth At Bedtime 30 tablet 1    atomoxetine (STRATTERA) 25 MG capsule TAKE 1 CAPSULE BY MOUTH DAILY AT 8AM      busPIRone (BUSPAR) 10 MG tablet Take 1 tablet by mouth 3 times daily Am and 2 pm      cholecalciferol (VITAMIN D3) 125 mcg (5000 units) capsule Take 1 capsule (125 mcg) by mouth daily 100 capsule 4    cyanocobalamin (CYANOCOBALAMIN) 1000 MCG/ML injection INJECT 1ML INTRAMUSCULARLY EVERY 2 WEEKS 6 mL 11    docusate sodium (COLACE) 100 MG tablet Take 2 tablets (200 mg) by mouth 2 times daily -Adjust dose as needed to maintain soft stools 120 tablet 9    Elemental iron 65 mg Vitamin C 125 mg (VITRON C)  MG TABS tablet Take 1 tablet by mouth daily on empty stomach -for iron deficiency 90 tablet 4    estradiol (VIVELLE-DOT) 0.05 MG/24HR bi-weekly patch Place 1 patch onto the skin twice a week 24 patch 3    famotidine (PEPCID) 20 MG tablet Take 1 tablet (20 mg) by mouth 2 times daily - For Heartburn 180 tablet 4    gabapentin (NEURONTIN) 100 MG capsule Take 100 mg by mouth 3 times daily      hydrOXYzine (ATARAX) 50 MG tablet Take 1 tablet (50 mg) by mouth At Bedtime 30 tablet 0    hydrOXYzine (VISTARIL) 50 MG capsule TAKE 1 CAPSULE BY MOUTH FOUR TIMES A DAY AS NEEDED FOR ANXIETY      ibuprofen (ADVIL/MOTRIN) 200 MG tablet Take 200-400 mg by mouth every 6 hours as needed 1 to 2 tabs Q6 PRN      lamoTRIgine (LAMICTAL) 100 MG tablet Take 1 tablet (100 mg) by mouth 3 times daily -- Managed by Psych      Menthol, Topical Analgesic, (BIOFREEZE EX) Apply topically 4 times daily as needed      montelukast (SINGULAIR) 10 MG tablet Take 1 tablet (10 mg)  "by mouth at bedtime 90 tablet 4    prazosin (MINIPRESS) 1 MG capsule Take by mouth as needed      PREVIDENT 5000 PLUS 1.1 % CREA USE IN PLACE OF TOOTHPASTE ONCE DAILY BEFORE BED.BRUSH SWISH TOOTHPASTE FOR 30 SEC AND SPIT. DO NOT RINSE,EAT OR DRINK FOR 30 MINS      progesterone (PROMETRIUM) 100 MG capsule Take 1 capsule (100 mg) by mouth daily 90 capsule 3    ramelteon (ROZEREM) 8 MG tablet Take 1 tablet (8 mg) by mouth at bedtime 90 tablet 4    syringe/needle, sisp, (B-D SYRINGE/NEEDLE) 25G X 5/8\" 1 ML MISC USE TO INJECT B-12 INTRAMUSCULARLY EVERY 2 WEEKS 94 each 4    terbinafine (LAMISIL) 1 % external cream Apply topically 2 times daily To skin rash on thigh 84 g 4    vilazodone (VIIBRYD) 40 MG TABS tablet Take 40 mg by mouth every morning       Allergies   Allergen Reactions    Clonazepam Other (See Comments)     Causes Violence and aggresssion    Hydrocodone-Acetaminophen      Other reaction(s): Seizures  Can take Percocet without difficulty.    Lorazepam Other (See Comments)     Causes Violence and aggresssion    Sertraline Other (See Comments)     Caused suicidally     Bupropion Other (See Comments)     Caused Major Depression    Dust Mite Extract      Other reaction(s): Asthma symptoms    Lithium Other (See Comments)     Mood alteration    Pollen Extract      Other reaction(s): Asthma symptoms    Risperidone Other (See Comments)     Agitation      Sulfa Antibiotics Itching    Trichophyton      Other reaction(s): Asthma symptoms    Valproic Acid Other (See Comments)     Weight gain         Past medical history, past surgical history, current medications and allergies reviewed and accurate to the best of my knowledge.        OBJECTIVE:     /66   Pulse 92   Temp 98.5  F (36.9  C) (Tympanic)   Resp 17   Ht 1.676 m (5' 6\")   Wt 118.4 kg (261 lb)   SpO2 98%   BMI 42.13 kg/m    Body mass index is 42.13 kg/m .        Physical Exam  General Appearance: Well appearing female, appropriate appearance for age. " No acute distress  Respiratory: normal chest wall and respirations.  Normal effort.    Musculoskeletal:  Equal movement of bilateral upper extremities.  Equal movement of bilateral lower extremities.  Normal gait.    Dermatological: Erythematous circular scaly flat lesion to right outer thigh, no ringed border or central clearing (consistent with nummular eczema).  Psychological: normal affect, alert, oriented, and pleasant.       Labs:  Results for orders placed or performed in visit on 04/04/24   KOH prep (skin, hair or nails only)     Status: None    Specimen: Thigh, Right; Skin   Result Value Ref Range    KOH Preparation No fungal elements seen     KOH Preparation Reference Range: No fungal elements seen.

## 2024-04-08 ENCOUNTER — PATIENT OUTREACH (OUTPATIENT)
Dept: GASTROENTEROLOGY | Facility: CLINIC | Age: 49
End: 2024-04-08
Payer: MEDICARE

## 2024-04-08 DIAGNOSIS — E53.8 VITAMIN B12 DEFICIENCY: ICD-10-CM

## 2024-04-10 ENCOUNTER — TELEPHONE (OUTPATIENT)
Dept: INTERNAL MEDICINE | Facility: OTHER | Age: 49
End: 2024-04-10
Payer: MEDICARE

## 2024-04-10 DIAGNOSIS — E53.8 VITAMIN B12 DEFICIENCY: ICD-10-CM

## 2024-04-10 RX ORDER — CYANOCOBALAMIN 1000 UG/ML
INJECTION, SOLUTION INTRAMUSCULAR; SUBCUTANEOUS
Qty: 2 ML | Refills: 0 | OUTPATIENT
Start: 2024-04-10

## 2024-04-10 RX ORDER — NEEDLES, SAFETY 22GX1 1/2"
NEEDLE, DISPOSABLE MISCELLANEOUS
Qty: 94 EACH | Refills: 4 | Status: SHIPPED | OUTPATIENT
Start: 2024-04-10

## 2024-04-10 RX ORDER — CYANOCOBALAMIN 1000 UG/ML
INJECTION, SOLUTION INTRAMUSCULAR; SUBCUTANEOUS
Qty: 6 ML | Refills: 11 | Status: SHIPPED | OUTPATIENT
Start: 2024-04-10

## 2024-04-10 NOTE — TELEPHONE ENCOUNTER
The patient states that arnulfo ta stated there was no prescription for the B12 and syringes.  Patient stated that she just discussed this with Dr Curiel at her physical.  Please advise.

## 2024-04-10 NOTE — TELEPHONE ENCOUNTER
Appears orders did not go thru to pharmacy.  Please send Vitamin B-12 with syringes to [facility] pharmacy.    Imelda Carrillo LPN............4/10/2024 9:08 AM

## 2024-04-12 RX ORDER — SYRINGE, DISPOSABLE, 1 ML
SYRINGE, EMPTY DISPOSABLE MISCELLANEOUS
Qty: 2 EACH | Refills: 0 | OUTPATIENT
Start: 2024-04-12

## 2024-04-12 NOTE — TELEPHONE ENCOUNTER
"[id number] of [place] sent Rx request for the following:      Requested Prescriptions   Pending Prescriptions Disp Refills    BD SYRINGE SLIP TIP 25G X 5/8\" 1 ML MISC [Pharmacy Med Name: BD PG 1ML 25G 5/8IN TB SYRINGE] 2 each 0     Sig: USE TO INJECT B12 INJECTIONEVERY 2 WEEKS       There is no refill protocol information for this order     Last Prescription Date:   4/10/24  Last Fill Qty/Refills:         94, R-4    Last Office Visit:              4/4/24 Veena   Future Office visit:           5/2/24   Call made to pharmacy to verify need for this request. Per pharmacy ok to disregard this request.   Maricarmen Coates RN ....................  4/12/2024   10:47 AM    "

## 2024-04-17 ENCOUNTER — TELEPHONE (OUTPATIENT)
Dept: INTERNAL MEDICINE | Facility: OTHER | Age: 49
End: 2024-04-17
Payer: MEDICARE

## 2024-04-17 DIAGNOSIS — J45.40 MODERATE PERSISTENT ASTHMA WITHOUT COMPLICATION: ICD-10-CM

## 2024-04-17 DIAGNOSIS — R23.2 HOT FLASHES: ICD-10-CM

## 2024-04-17 NOTE — TELEPHONE ENCOUNTER
Need order fax over for patient to self administer albuterol inhaler estradil patches. Please fax to [phone] attn: Areli Purvis on 4/17/2024 at 2:41 PM

## 2024-04-18 RX ORDER — ALBUTEROL SULFATE 90 UG/1
1-2 AEROSOL, METERED RESPIRATORY (INHALATION) EVERY 4 HOURS PRN
Qty: 18 G | Refills: 11 | Status: SHIPPED | OUTPATIENT
Start: 2024-04-18 | End: 2024-04-25

## 2024-04-18 RX ORDER — ESTRADIOL 0.05 MG/D
1 PATCH, EXTENDED RELEASE TRANSDERMAL
Qty: 24 PATCH | Refills: 3 | Status: SHIPPED | OUTPATIENT
Start: 2024-04-18 | End: 2024-05-20

## 2024-04-24 ENCOUNTER — OFFICE VISIT (OUTPATIENT)
Dept: FAMILY MEDICINE | Facility: OTHER | Age: 49
End: 2024-04-24
Attending: PHYSICIAN ASSISTANT
Payer: MEDICARE

## 2024-04-24 VITALS
WEIGHT: 286.8 LBS | TEMPERATURE: 97.8 F | DIASTOLIC BLOOD PRESSURE: 76 MMHG | HEART RATE: 63 BPM | BODY MASS INDEX: 46.29 KG/M2 | SYSTOLIC BLOOD PRESSURE: 124 MMHG | OXYGEN SATURATION: 100 %

## 2024-04-24 DIAGNOSIS — L03.115 CELLULITIS OF RIGHT LOWER LEG: Primary | ICD-10-CM

## 2024-04-24 PROCEDURE — 99213 OFFICE O/P EST LOW 20 MIN: CPT | Performed by: PHYSICIAN ASSISTANT

## 2024-04-24 PROCEDURE — G0463 HOSPITAL OUTPT CLINIC VISIT: HCPCS

## 2024-04-24 RX ORDER — CEPHALEXIN 500 MG/1
500 CAPSULE ORAL 3 TIMES DAILY
Qty: 30 CAPSULE | Refills: 0 | Status: SHIPPED | OUTPATIENT
Start: 2024-04-24 | End: 2024-04-25

## 2024-04-24 NOTE — NURSING NOTE
"Chief Complaint   Patient presents with    Derm Problem     Lower right leg cellulitis   Patient noticed redness in leg a few days ago. She has had cellulitis before.    Initial /76   Pulse 63   Temp 97.8  F (36.6  C) (Tympanic)   Wt 130.1 kg (286 lb 12.8 oz)   SpO2 100%   BMI 46.29 kg/m   Estimated body mass index is 46.29 kg/m  as calculated from the following:    Height as of 4/4/24: 1.676 m (5' 6\").    Weight as of this encounter: 130.1 kg (286 lb 12.8 oz).  Medication Review: complete    The next two questions are to help us understand your food security.  If you are feeling you need any assistance in this area, we have resources available to support you today.          4/4/2024   SDOH- Food Insecurity   Within the past 12 months, did you worry that your food would run out before you got money to buy more? N    N   Within the past 12 months, did the food you bought just not last and you didn t have money to get more? N    N         Health Care Directive:  Patient has a Health Care Directive on file      Hortencia Garcia MA      "
10-Sep-2020 17:04:55

## 2024-04-24 NOTE — PATIENT INSTRUCTIONS
Cellulitis - Take the antibiotic as prescribed. Antibiotic has been sent to pharmacy. Please take full course of the antibiotic even if symptoms have completely resolved. This helps prevent against antibiotic resistance.     Watch for any signs of increasing infection (redness, pus, increased pain (may be along the tendon and back of hand if the hand is involved), increased swelling or fever). Call if any of these signs occur. Monitor for fevers or chills.    You may draw a line around the area of redness to monitor the area. Please return to clinic if redness is continuing to spread after 2-3 days.    Call or return to clinic as needed if your symptoms worsen or fail to improve as anticipated.    Wash twice daily with warm soapy water twice daily.     Elevated legs up the level of your heart 10 minutes at a time 4 times daily.

## 2024-04-24 NOTE — PROGRESS NOTES
"  Assessment & Plan   Problem List Items Addressed This Visit    None  Visit Diagnoses       Cellulitis of right lower leg    -  Primary    Relevant Medications    cephALEXin (KEFLEX) 500 MG capsule           Cellulitis of right lower leg: Patient was given Keflex for treatment of cellulitis.  Gave warning signs and symptoms.    Take the antibiotic as prescribed. Antibiotic has been sent to pharmacy. Please take full course of the antibiotic even if symptoms have completely resolved. This helps prevent against antibiotic resistance.     Watch for any signs of increasing infection (redness, pus, increased pain (may be along the tendon and back of hand if the hand is involved), increased swelling or fever). Call if any of these signs occur. Monitor for fevers or chills.    You may draw a line around the area of redness to monitor the area. Please return to clinic if redness is continuing to spread after 2-3 days.    Call or return to clinic as needed if your symptoms worsen or fail to improve as anticipated.    Wash twice daily with warm soapy water twice daily.     Elevated legs up the level of your heart 10 minutes at a time 4 times daily.      Gave two ace wraps to wrap her lower extremities with for compression therapy.    Prescription drug management       BMI  Estimated body mass index is 46.29 kg/m  as calculated from the following:    Height as of 4/4/24: 1.676 m (5' 6\").    Weight as of this encounter: 130.1 kg (286 lb 12.8 oz).   Weight management plan: Discussed healthy diet and exercise guidelines    See Patient Instructions    No follow-ups on file.      Tawanda Aranda is a 49 year old, presenting for the following health issues:  Derm Problem (Lower right leg cellulitis)        4/24/2024     2:01 PM   Additional Questions   Roomed by Hortencia GALLEGOS CMA     History of Present Illness       Reason for visit:  Leg infection  Symptom onset:  1-3 days ago  Symptoms include:  Redness in leg and hot to " touch  Symptom intensity:  Moderate  Symptom progression:  Staying the same  Had these symptoms before:  Yes  Has tried/received treatment for these symptoms:  Yes  Previous treatment was successful:  Yes  Prior treatment description:  Oral antibiotcs  What makes it worse:  No  What makes it better:  Rest    She eats 2-3 servings of fruits and vegetables daily.She consumes 2 sweetened beverage(s) daily.She exercises with enough effort to increase her heart rate 20 to 29 minutes per day.  She exercises with enough effort to increase her heart rate 4 days per week.   She is taking medications regularly.       Rash  Onset/Duration: a few days ago  Description  Location: right lower leg  Character: red  Itching: mild  Intensity:  mild  Progression of Symptoms:  worsening  Accompanying signs and symptoms:   Fever: No  Body aches or joint pain: No  Sore throat symptoms: No  Recent cold symptoms: No  History:           Previous episodes of similar rash: yes, has had cellulitis before  New exposures:  None  Recent travel: No  Exposure to similar rash: No  Precipitating or alleviating factors: none  Therapies tried and outcome: none    Right lower leg red for 2-3 days.  Tx coconut oil and hydrocortisone cream.  Not helping.  No drainage.  No fevers, chills.     Patient has had mild redness on the right lower anterior leg over the last 2 to 3 days.  Tried using coconut oil and hydrocortisone cream however this did not help.  No drainage.  No fevers or chills.  History of cellulitis in the past.  Wondering about getting on an antibiotic for treatment.  Has been working on losing weight and exercising.  Low-carb diet.      Review of Systems  Constitutional, HEENT, cardiovascular, pulmonary, gi and gu systems are negative, except as otherwise noted.      Objective    /76   Pulse 63   Temp 97.8  F (36.6  C) (Tympanic)   Wt 130.1 kg (286 lb 12.8 oz)   SpO2 100%   BMI 46.29 kg/m    Body mass index is 46.29  kg/m .  Physical Exam  Vitals and nursing note reviewed.   Constitutional:       Appearance: Normal appearance.   HENT:      Head: Normocephalic and atraumatic.   Eyes:      Extraocular Movements: Extraocular movements intact.      Conjunctiva/sclera: Conjunctivae normal.   Cardiovascular:      Rate and Rhythm: Normal rate and regular rhythm.      Heart sounds: Normal heart sounds.   Pulmonary:      Effort: Pulmonary effort is normal.      Breath sounds: Normal breath sounds.   Musculoskeletal:         General: Normal range of motion.      Cervical back: Normal range of motion.      Right lower leg: Edema present.      Left lower leg: Edema present.      Comments: 3+ pedal edema bilaterally.   Skin:     General: Skin is warm and dry.      Comments: Mild erythema appreciated right medial and anterior lower leg approximately 10 x 15 cm in diameter.  No open wound.  Light amount of clear drainage appreciated.   Neurological:      General: No focal deficit present.      Mental Status: She is alert and oriented to person, place, and time.   Psychiatric:         Mood and Affect: Mood normal.         Behavior: Behavior normal.          No results found for any visits on 04/24/24.        Signed Electronically by: Liz Diaz PA-C

## 2024-04-25 ENCOUNTER — TELEPHONE (OUTPATIENT)
Dept: INTERNAL MEDICINE | Facility: OTHER | Age: 49
End: 2024-04-25
Payer: MEDICARE

## 2024-04-25 ENCOUNTER — HOSPITAL ENCOUNTER (EMERGENCY)
Facility: OTHER | Age: 49
Discharge: HOME | End: 2024-04-25
Attending: FAMILY MEDICINE | Admitting: FAMILY MEDICINE
Payer: MEDICARE

## 2024-04-25 VITALS
DIASTOLIC BLOOD PRESSURE: 54 MMHG | WEIGHT: 286 LBS | TEMPERATURE: 98.5 F | RESPIRATION RATE: 20 BRPM | HEIGHT: 66 IN | OXYGEN SATURATION: 97 % | BODY MASS INDEX: 45.96 KG/M2 | HEART RATE: 82 BPM | SYSTOLIC BLOOD PRESSURE: 108 MMHG

## 2024-04-25 DIAGNOSIS — L03.115 CELLULITIS OF RIGHT LOWER EXTREMITY: ICD-10-CM

## 2024-04-25 LAB
ANION GAP SERPL CALCULATED.3IONS-SCNC: 8 MMOL/L (ref 7–15)
BASOPHILS # BLD AUTO: 0 10E3/UL (ref 0–0.2)
BASOPHILS NFR BLD AUTO: 0 %
BUN SERPL-MCNC: 16.5 MG/DL (ref 6–20)
CALCIUM SERPL-MCNC: 8.7 MG/DL (ref 8.6–10)
CHLORIDE SERPL-SCNC: 102 MMOL/L (ref 98–107)
CREAT SERPL-MCNC: 0.87 MG/DL (ref 0.51–0.95)
CRP SERPL-MCNC: 3.97 MG/L
DEPRECATED HCO3 PLAS-SCNC: 27 MMOL/L (ref 22–29)
EGFRCR SERPLBLD CKD-EPI 2021: 82 ML/MIN/1.73M2
EOSINOPHIL # BLD AUTO: 0.3 10E3/UL (ref 0–0.7)
EOSINOPHIL NFR BLD AUTO: 4 %
ERYTHROCYTE [DISTWIDTH] IN BLOOD BY AUTOMATED COUNT: 14 % (ref 10–15)
GLUCOSE SERPL-MCNC: 85 MG/DL (ref 70–99)
HCT VFR BLD AUTO: 36.2 % (ref 35–47)
HGB BLD-MCNC: 11.7 G/DL (ref 11.7–15.7)
HOLD SPECIMEN: NORMAL
IMM GRANULOCYTES # BLD: 0 10E3/UL
IMM GRANULOCYTES NFR BLD: 0 %
LYMPHOCYTES # BLD AUTO: 1.8 10E3/UL (ref 0.8–5.3)
LYMPHOCYTES NFR BLD AUTO: 24 %
MCH RBC QN AUTO: 30.7 PG (ref 26.5–33)
MCHC RBC AUTO-ENTMCNC: 32.3 G/DL (ref 31.5–36.5)
MCV RBC AUTO: 95 FL (ref 78–100)
MONOCYTES # BLD AUTO: 0.9 10E3/UL (ref 0–1.3)
MONOCYTES NFR BLD AUTO: 12 %
NEUTROPHILS # BLD AUTO: 4.5 10E3/UL (ref 1.6–8.3)
NEUTROPHILS NFR BLD AUTO: 59 %
NRBC # BLD AUTO: 0 10E3/UL
NRBC BLD AUTO-RTO: 0 /100
PLATELET # BLD AUTO: 217 10E3/UL (ref 150–450)
POTASSIUM SERPL-SCNC: 3.8 MMOL/L (ref 3.4–5.3)
PROCALCITONIN SERPL IA-MCNC: 0.03 NG/ML
RBC # BLD AUTO: 3.81 10E6/UL (ref 3.8–5.2)
SODIUM SERPL-SCNC: 137 MMOL/L (ref 135–145)
WBC # BLD AUTO: 7.6 10E3/UL (ref 4–11)

## 2024-04-25 PROCEDURE — 87641 MR-STAPH DNA AMP PROBE: CPT | Performed by: FAMILY MEDICINE

## 2024-04-25 PROCEDURE — 99284 EMERGENCY DEPT VISIT MOD MDM: CPT | Mod: 25 | Performed by: FAMILY MEDICINE

## 2024-04-25 PROCEDURE — 80048 BASIC METABOLIC PNL TOTAL CA: CPT | Performed by: FAMILY MEDICINE

## 2024-04-25 PROCEDURE — 87040 BLOOD CULTURE FOR BACTERIA: CPT | Performed by: FAMILY MEDICINE

## 2024-04-25 PROCEDURE — 84145 PROCALCITONIN (PCT): CPT | Performed by: FAMILY MEDICINE

## 2024-04-25 PROCEDURE — 36415 COLL VENOUS BLD VENIPUNCTURE: CPT | Performed by: FAMILY MEDICINE

## 2024-04-25 PROCEDURE — 250N000011 HC RX IP 250 OP 636: Performed by: FAMILY MEDICINE

## 2024-04-25 PROCEDURE — 96365 THER/PROPH/DIAG IV INF INIT: CPT | Performed by: FAMILY MEDICINE

## 2024-04-25 PROCEDURE — 85025 COMPLETE CBC W/AUTO DIFF WBC: CPT | Performed by: FAMILY MEDICINE

## 2024-04-25 PROCEDURE — 99284 EMERGENCY DEPT VISIT MOD MDM: CPT | Performed by: FAMILY MEDICINE

## 2024-04-25 PROCEDURE — 86140 C-REACTIVE PROTEIN: CPT | Performed by: FAMILY MEDICINE

## 2024-04-25 RX ORDER — PIPERACILLIN SODIUM, TAZOBACTAM SODIUM 4; .5 G/20ML; G/20ML
4.5 INJECTION, POWDER, LYOPHILIZED, FOR SOLUTION INTRAVENOUS EVERY 6 HOURS
Status: DISCONTINUED | OUTPATIENT
Start: 2024-04-25 | End: 2024-04-26 | Stop reason: HOSPADM

## 2024-04-25 RX ADMIN — PIPERACILLIN AND TAZOBACTAM 4.5 G: 4; .5 INJECTION, POWDER, LYOPHILIZED, FOR SOLUTION INTRAVENOUS at 22:47

## 2024-04-25 ASSESSMENT — COLUMBIA-SUICIDE SEVERITY RATING SCALE - C-SSRS
2. HAVE YOU ACTUALLY HAD ANY THOUGHTS OF KILLING YOURSELF IN THE PAST MONTH?: NO
1. IN THE PAST MONTH, HAVE YOU WISHED YOU WERE DEAD OR WISHED YOU COULD GO TO SLEEP AND NOT WAKE UP?: NO
6. HAVE YOU EVER DONE ANYTHING, STARTED TO DO ANYTHING, OR PREPARED TO DO ANYTHING TO END YOUR LIFE?: NO

## 2024-04-25 ASSESSMENT — ACTIVITIES OF DAILY LIVING (ADL)
ADLS_ACUITY_SCORE: 33
ADLS_ACUITY_SCORE: 35

## 2024-04-25 NOTE — TELEPHONE ENCOUNTER
Areli at [facility] called regarding the Albuterol inhaler and the Estradiol that Victoria Smyth prescribed for the patient. They need an order sent over stating that the patient  may self administer so that she can keep medications in her room. Please fax order to: [phone].    Aurora Purvis on 4/25/2024 at 10:45 AM

## 2024-04-25 NOTE — LETTER
April 25, 2024      Rosi MORROW Zainab  [address] [place] [id number]        To Whom It May Concern,     Patient has multiple medications that she uses as needed. She is able to keep these medications in her room and take them as prescribed.           Sincerely,        DASHA Rico CNP

## 2024-04-26 LAB
MRSA DNA SPEC QL NAA+PROBE: NEGATIVE
SA TARGET DNA: POSITIVE

## 2024-04-26 ASSESSMENT — ENCOUNTER SYMPTOMS
FEVER: 0
CHILLS: 0

## 2024-04-26 NOTE — ED TRIAGE NOTES
"ED Nursing Triage Note (General)   ________________________________    Rosi Lamb is a 49 year old Female that presents to triage via private vehicle with complaints of an infection in the R) leg.  Patient states she was seen in the clinic yesterday and was dx with cellulitis and began on keflex.  Patient states the infection has gotten worse today regardless of starting on medication. Patient states redness has spread and skin, \"made an indent with the wrapping\". Patient states hx of cellulitis in affected leg in the past as well.   Significant symptoms had onset 4 day(s) ago.  /83   Pulse 90   Temp 98.5  F (36.9  C) (Tympanic)   Resp 20   Ht 1.676 m (5' 6\")   Wt 129.7 kg (286 lb)   SpO2 100%   BMI 46.16 kg/m     PRE HOSPITAL PRIOR LIVING SITUATION Alone      Triage Assessment (Adult)       Row Name 04/25/24 2121          Triage Assessment    Airway WDL WDL        Respiratory WDL    Respiratory WDL WDL        Skin Circulation/Temperature WDL    Skin Circulation/Temperature WDL X        Cardiac WDL    Cardiac WDL WDL     Cardiac Rhythm NSR        Peripheral/Neurovascular WDL    Peripheral Neurovascular WDL WDL     Capillary Refill, General less than/equal to 3 secs        Cognitive/Neuro/Behavioral WDL    Cognitive/Neuro/Behavioral WDL WDL        Davion Coma Scale    Best Eye Response 4-->(E4) spontaneous     Best Motor Response 6-->(M6) obeys commands     Best Verbal Response 5-->(V5) oriented     Davion Coma Scale Score 15                     "

## 2024-04-26 NOTE — ED PROVIDER NOTES
History     Chief Complaint   Patient presents with    Leg Pain     The history is provided by the patient and medical records.     Rosi Lamb is a 49 year old female who has redness of the RLE. She was seen in clinic yesterday and has taken three doses of Keflex. The area is red, painful and warm to touch. She has not had fever or chills.     She has lymphedema of bilateral LE and has had cellulitis several times. Usually Keflex works well but at times she ends up in the hospital.     Allergies:  Allergies   Allergen Reactions    Clonazepam Other (See Comments)     Causes Violence and aggresssion    Hydrocodone-Acetaminophen      Other reaction(s): Seizures  Can take Percocet without difficulty.    Lorazepam Other (See Comments)     Causes Violence and aggresssion    Sertraline Other (See Comments)     Caused suicidally     Bupropion Other (See Comments)     Caused Major Depression    Dust Mite Extract      Other reaction(s): Asthma symptoms    Lithium Other (See Comments)     Mood alteration    Pollen Extract      Other reaction(s): Asthma symptoms    Risperidone Other (See Comments)     Agitation      Sulfa Antibiotics Itching    Trichophyton      Other reaction(s): Asthma symptoms    Valproic Acid Other (See Comments)     Weight gain       Problem List:    Patient Active Problem List    Diagnosis Date Noted    Primary insomnia 04/04/2024     Priority: Medium    Nocturnal leg cramps 01/23/2024     Priority: Medium    CKD (chronic kidney disease) stage 2, GFR 60-89 ml/min 03/14/2023     Priority: Medium    H/O adenomatous polyp of colon 07/25/2022     Priority: Medium    Nail dystrophy 02/27/2022     Priority: Medium    Intermittent low back pain 09/08/2021     Priority: Medium    Abnormal Pap smear of cervix 04/11/2018     Priority: Medium     Overview:   had scraping of cervix    Formatting of this note might be different from the original.  had scraping of cervix      Allergic rhinitis due to pollen  02/08/2018     Priority: Medium    Esophageal reflux 02/08/2018     Priority: Medium    History of substance abuse (H) 02/08/2018     Priority: Medium    Chronic pain of right knee 04/20/2017     Priority: Medium    Menorrhagia with irregular cycle 02/16/2017     Priority: Medium    Bilateral foot pain 12/18/2016     Priority: Medium    Elevated random blood glucose level 12/18/2016     Priority: Medium    Peripheral polyneuropathy 12/18/2016     Priority: Medium    Vitamin B12 deficiency 12/18/2016     Priority: Medium    Vitamin D deficiency 12/18/2016     Priority: Medium    Lymphedema of both lower extremities 11/22/2016     Priority: Medium    Chronic venous stasis dermatitis of right lower extremity 08/17/2016     Priority: Medium     Overview:   Updated per 10/1/17 IMO import    Formatting of this note might be different from the original.  Updated per 10/1/17 IMO import      Alcohol use disorder, moderate, in sustained remission, dependence (H) 11/28/2015     Priority: Medium    Generalized anxiety disorder 11/28/2015     Priority: Medium    Bipolar disorder, in full remission, most recent episode depressed (H24) 11/28/2015     Priority: Medium    Class 3 severe obesity due to excess calories with serious comorbidity and body mass index (BMI) of 40.0 to 44.9 in adult (H) 03/23/2015     Priority: Medium    Edema of extremity of unknown cause 01/15/2014     Priority: Medium     Formatting of this note might be different from the original.  1/15/2014: both legs, well controlled on prn lasix      Moderate persistent asthma 08/19/2013     Priority: Medium     AAP completed on 08/19/13  Triggers: pollen, fumes, exercise, URI, warm weather. Peak flow today is 250. Symptomatic: moderate    Formatting of this note might be different from the original.  Overview:   AAP completed on 08/19/13  Triggers: pollen, fumes, exercise, URI, warm weather. Peak flow today is 250. Symptomatic: moderate  Overview:   AAP completed  on 08/19/13  Triggers: pollen, fumes, exercise, URI, warm weather. Peak flow today is 250. Symptomatic: moderate  Formatting of this note might be different from the original.  AAP completed on 08/19/13  Triggers: pollen, fumes, exercise, URI, warm weather. Peak flow today is 250. Symptomatic: moderate      Urinary incontinence 03/15/2013     Priority: Medium    Allergic rhinitis due to other allergen 10/02/2003     Priority: Medium     Overview:   [facility] - Seasonal Allergies  Overview:   Overview:   [facility] - Seasonal Allergies    Formatting of this note might be different from the original.  [facility] - Seasonal Allergies      Borderline personality disorder (H) - [facility] for therapy + [facility] for medication management. 07/07/2003     Priority: Medium     [facility] for therapy + [facility] for medication management.      Disorder of female genital organ 07/05/2001     Priority: Medium     Overview:   DUB, dysmenorrhea  Overview:   Overview:   DUB, dysmenorrhea    Formatting of this note might be different from the original.  Overview:   DUB, dysmenorrhea          Past Medical History:    Past Medical History:   Diagnosis Date    Abnormal Pap smear of cervix 04/11/2018    Cannabis use disorder, moderate, in sustained remission, dependence (H) 11/28/2015    Closed dislocation of tarsal joint 2/4/2011    Closed dislocation of tarsal joint - left 02/04/2011    Edema     Encounter for removal and reinsertion of intrauterine contraceptive device     Excessive and frequent menstruation with irregular cycle     Major depressive disorder, single episode     Personal history of other medical treatment (CODE)     Personal history of other medical treatment (CODE)     Uncomplicated asthma        Past Surgical History:    Past Surgical History:   Procedure Laterality Date    ANKLE SURGERY      fracture, repair with screws    ARTHRODESIS FOOT Left 04/21/2022    Procedure: left foot hardware removaL, fusion of  1st-2nd Tarsal metatarsal;  Surgeon: Anthony Castillo DPM;  Location: [facility] OR    COLONOSCOPY  2022    F/U  tubular adenoma    CONIZATION LEEP      ,Underwent a loop    IMPLANT STIMULATOR AND LEADS SACRAL NERVE (STAGE ONE AND TWO)      scheduled 23 with Dr. Sheth at [place]    LAPAROSCOPIC TUBAL LIGATION      ,tubal ligation       Family History:    Family History   Problem Relation Age of Onset    Osteoporosis Mother     Asthma Father         Asthma,+ Leg edema, knee, hip replacement. + Lymphedema    Heart Failure Father     Other - See Comments Paternal Grandmother         Lymphedema    Osteoporosis Brother         Osteoporosis    Other - See Comments Brother         No Known Problems       Social History:  Marital Status:   [4]  Social History     Tobacco Use    Smoking status: Former     Current packs/day: 0.00     Average packs/day: 2.0 packs/day for 3.0 years (6.0 ttl pk-yrs)     Types: Cigarettes     Start date:      Quit date: 2003     Years since quittin.3     Passive exposure: Past    Smokeless tobacco: Never   Vaping Use    Vaping status: Never Used   Substance Use Topics    Alcohol use: Not Currently     Alcohol/week: 0.0 standard drinks of alcohol    Drug use: Never        Medications:    acetaminophen (TYLENOL) 500 MG tablet  albuterol (VENTOLIN HFA) 108 (90 Base) MCG/ACT inhaler  amLODIPine (NORVASC) 2.5 MG tablet  ARIPiprazole (ABILIFY) 15 MG tablet  atomoxetine (STRATTERA) 25 MG capsule  busPIRone (BUSPAR) 10 MG tablet  cephALEXin (KEFLEX) 500 MG capsule  cholecalciferol (VITAMIN D3) 125 mcg (5000 units) capsule  cyanocobalamin (CYANOCOBALAMIN) 1000 MCG/ML injection  docusate sodium (COLACE) 100 MG tablet  Elemental iron 65 mg Vitamin C 125 mg (VITRON C)  MG TABS tablet  estradiol (VIVELLE-DOT) 0.05 MG/24HR bi-weekly patch  famotidine (PEPCID) 20 MG tablet  gabapentin (NEURONTIN) 100 MG capsule  hydrocortisone (CORTAID) 1 % external  "cream  hydrOXYzine (VISTARIL) 50 MG capsule  hydrOXYzine HCl (ATARAX) 50 MG tablet  ibuprofen (ADVIL/MOTRIN) 200 MG tablet  lamoTRIgine (LAMICTAL) 100 MG tablet  Menthol, Topical Analgesic, (BIOFREEZE EX)  Menthol, Topical Analgesic, (BIOFREEZE) 4 % GEL  montelukast (SINGULAIR) 10 MG tablet  prazosin (MINIPRESS) 1 MG capsule  PREVIDENT 5000 PLUS 1.1 % CREA  progesterone (PROMETRIUM) 100 MG capsule  ramelteon (ROZEREM) 8 MG tablet  syringe/needle, sisp, (B-D SYRINGE/NEEDLE) 25G X 5/8\" 1 ML MISC  terbinafine (LAMISIL) 1 % external cream  vilazodone (VIIBRYD) 40 MG TABS tablet          Review of Systems   Constitutional:  Negative for chills and fever.   Skin:  Positive for rash.   All other systems reviewed and are negative.      Physical Exam   BP: 131/83  Pulse: 90  Temp: 98.5  F (36.9  C)  Resp: 20  Height: 167.6 cm (5' 6\")  Weight: 129.7 kg (286 lb)  SpO2: 100 %      Physical Exam  Vitals and nursing note reviewed.   Constitutional:       General: She is not in acute distress.     Appearance: Normal appearance. She is not ill-appearing.   Musculoskeletal:      Right lower leg: Edema present.      Left lower leg: Edema present.   Skin:     Comments: She has an area of erythema, warmth, tenderness on the distal RLE.   Neurological:      Mental Status: She is alert.         Results for orders placed or performed during the hospital encounter of 04/25/24 (from the past 24 hour(s))   CBC with platelets differential    Narrative    The following orders were created for panel order CBC with platelets differential.  Procedure                               Abnormality         Status                     ---------                               -----------         ------                     CBC with platelets and d...[[id number]]                      Final result                 Please view results for these tests on the individual orders.   Basic metabolic panel   Result Value Ref Range    Sodium 137 135 - 145 mmol/L    " Potassium 3.8 3.4 - 5.3 mmol/L    Chloride 102 98 - 107 mmol/L    Carbon Dioxide (CO2) 27 22 - 29 mmol/L    Anion Gap 8 7 - 15 mmol/L    Urea Nitrogen 16.5 6.0 - 20.0 mg/dL    Creatinine 0.87 0.51 - 0.95 mg/dL    GFR Estimate 82 >60 mL/min/1.73m2    Calcium 8.7 8.6 - 10.0 mg/dL    Glucose 85 70 - 99 mg/dL   Procalcitonin   Result Value Ref Range    Procalcitonin 0.03 <0.50 ng/mL   CRP inflammation   Result Value Ref Range    CRP Inflammation 3.97 <5.00 mg/L   [place] Draw    Narrative    The following orders were created for panel order [place] Draw.  Procedure                               Abnormality         Status                     ---------                               -----------         ------                     Extra Blue Top Tube[[id number]]                              Final result               Extra Red Top Tube[[id number]]                               Final result               Extra Green Top (Lithium...[[id number]]                      Final result                 Please view results for these tests on the individual orders.   CBC with platelets and differential   Result Value Ref Range    WBC Count 7.6 4.0 - 11.0 10e3/uL    RBC Count 3.81 3.80 - 5.20 10e6/uL    Hemoglobin 11.7 11.7 - 15.7 g/dL    Hematocrit 36.2 35.0 - 47.0 %    MCV 95 78 - 100 fL    MCH 30.7 26.5 - 33.0 pg    MCHC 32.3 31.5 - 36.5 g/dL    RDW 14.0 10.0 - 15.0 %    Platelet Count 217 150 - 450 10e3/uL    % Neutrophils 59 %    % Lymphocytes 24 %    % Monocytes 12 %    % Eosinophils 4 %    % Basophils 0 %    % Immature Granulocytes 0 %    NRBCs per 100 WBC 0 <1 /100    Absolute Neutrophils 4.5 1.6 - 8.3 10e3/uL    Absolute Lymphocytes 1.8 0.8 - 5.3 10e3/uL    Absolute Monocytes 0.9 0.0 - 1.3 10e3/uL    Absolute Eosinophils 0.3 0.0 - 0.7 10e3/uL    Absolute Basophils 0.0 0.0 - 0.2 10e3/uL    Absolute Immature Granulocytes 0.0 <=0.4 10e3/uL    Absolute NRBCs 0.0 10e3/uL   Extra Blue Top Tube   Result Value Ref Range    Hold Specimen x   "  Extra Red Top Tube   Result Value Ref Range    Hold Specimen x    Extra Green Top (Lithium Heparin) Tube   Result Value Ref Range    Hold Specimen x        Medications - No data to display      Assessments & Plan (with Medical Decision Making)  Rosi Lamb is a 49 year old female who has redness of the RLE. She was seen in clinic yesterday and has taken three doses of Keflex. The area is red, painful and warm to touch. She has not had fever or chills.   She has lymphedema of bilateral LE and has had cellulitis several times. Usually Keflex works well but at times she ends up in the hospital.   VS in the ED /54   Pulse 82   Temp 98.5  F (36.9  C) (Tympanic)   Resp 20   Ht 1.676 m (5' 6\")   Wt 129.7 kg (286 lb)   SpO2 97%   BMI 46.16 kg/m    Exam shows cellulitis of the RLE.   We gave IV Zosyn once the cultures were drawn.   Labs show CBC normal, BMP normal, PCT normal, CRP normal, MRSA swab pending, BC x 2 pending.   Given her normal labs I think we can get her home on the Keflex with follow up.      I have reviewed the nursing notes.    I have reviewed the findings, diagnosis, plan and need for follow up with the patient.  Medical Decision Making  The patient's presentation was of moderate complexity (an acute illness with systemic symptoms).    The patient's evaluation involved:  an assessment requiring an independent historian (see separate area of note for details)  ordering and/or review of 3+ test(s) in this encounter (see separate area of note for details)    The patient's management necessitated moderate risk (prescription drug management including medications given in the ED).      Discharge Medication List as of 4/25/2024 11:19 PM          Final diagnoses:   Cellulitis of right lower extremity       4/25/2024   [facility] MD  04/26/24 0312    "

## 2024-04-26 NOTE — DISCHARGE INSTRUCTIONS
Rosi    Your labs are all normal.  There are two blood cultures and a culture for MRSA pending.     Continue the Keflex.     Thank you for choosing our Emergency Department for your care.     You may receive a phone call or letter for a survey about your care in our ED.  Please complete this as this is how we improve care for our patients.     If you have any questions after leaving the ED you can call or text me on my cell phone at [phone].  This does not mean that I am on call, but I will get back to you.  If you are not doing well please return to the ED.     Sincerely,    Dr Sylvain Sotelo M.D.

## 2024-04-30 LAB
BACTERIA BLD CULT: NO GROWTH
BACTERIA BLD CULT: NO GROWTH

## 2024-05-07 ENCOUNTER — OFFICE VISIT (OUTPATIENT)
Dept: FAMILY MEDICINE | Facility: OTHER | Age: 49
End: 2024-05-07
Attending: PHYSICIAN ASSISTANT
Payer: MEDICARE

## 2024-05-07 VITALS
WEIGHT: 288 LBS | TEMPERATURE: 97.2 F | DIASTOLIC BLOOD PRESSURE: 78 MMHG | SYSTOLIC BLOOD PRESSURE: 122 MMHG | BODY MASS INDEX: 46.48 KG/M2 | HEART RATE: 99 BPM | OXYGEN SATURATION: 100 %

## 2024-05-07 DIAGNOSIS — R19.7 DIARRHEA, UNSPECIFIED TYPE: Primary | ICD-10-CM

## 2024-05-07 PROCEDURE — 99213 OFFICE O/P EST LOW 20 MIN: CPT | Performed by: PHYSICIAN ASSISTANT

## 2024-05-07 PROCEDURE — G0463 HOSPITAL OUTPT CLINIC VISIT: HCPCS

## 2024-05-07 NOTE — PATIENT INSTRUCTIONS
Use imodium over the next few days if needed - 1 cap with each episode up to 4 per day.     Yogurt daily or a probiotic pill daily.     Try small amounts of food and drink frequently. Offer a bland diet. Advance as tolerated. Avoid dairy products the first day. You may add yogurt tomorrow as the first dairy product.    Try the BRAT diet (bread, bananas, rice, applesauce, tea, toast).  This is a very bland diet which should not irritate your colon.  Hold off on spicy foods, red sauces, mexican or chinese food.    Call or return to clinic as needed if your symptoms worsen or fail to improve as anticipated.     If the pain does not begin improving, localizes to the right lower belly, there is increased fever, or other progression of symptoms, return for reassessment.    Should I see a doctor or nurse about my stomach ache? -- Most people do not need to see a doctor or nurse for a stomach ache. But you should see your doctor or nurse if:  ?You have bloody bowel movements, diarrhea, or vomiting  ?Your pain is severe and lasts more than an hour or comes and goes for more than 24 hours  ?You cannot eat or drink for hours  ?You have a fever higher than 102 F (39 C)  ?You lose a lot of weight without trying to, or lose interest in food

## 2024-05-07 NOTE — PROGRESS NOTES
Assessment & Plan   Problem List Items Addressed This Visit    None  Visit Diagnoses       Diarrhea, unspecified type    -  Primary           Diarrhea:   Use imodium over the next few days if needed - 1 cap with each diarrhea episode up to 4 per day.     Offered stool testing at this time however patient declined.  Can call over the next 1-2 weeks if diarrhea is persistent or worsening for a stool kit if needed.    Yogurt daily or a probiotic pill daily.     Try small amounts of food and drink frequently. Offer a bland diet. Advance as tolerated. Avoid dairy products the first day. You may add yogurt tomorrow as the first dairy product.    Try the BRAT diet (bread, bananas, rice, applesauce, tea, toast).  This is a very bland diet which should not irritate your colon.  Hold off on spicy foods, red sauces, mexican or chinese food.    Call or return to clinic as needed if your symptoms worsen or fail to improve as anticipated.     If the pain does not begin improving, localizes to the right lower belly, there is increased fever, or other progression of symptoms, return for reassessment.    Should I see a doctor or nurse about my stomach ache? -- Most people do not need to see a doctor or nurse for a stomach ache. But you should see your doctor or nurse if:  ?You have bloody bowel movements, diarrhea, or vomiting  ?Your pain is severe and lasts more than an hour or comes and goes for more than 24 hours  ?You cannot eat or drink for hours  ?You have a fever higher than 102 F (39 C)  ?You lose a lot of weight without trying to, or lose interest in food         See Patient Instructions    No follow-ups on file.      Tawanda Aranda is a 49 year old, presenting for the following health issues:  Gastrointestinal Problem        5/7/2024     1:47 PM   Additional Questions   Roomed by Hortencia GALLEGOS CMA     HPI     Diarrhea  Onset/Duration: 1 week ago  Description:       Consistency of stool: runny and yellow       Blood in  stool: No       Number of loose stools past 24 hours: 4  Progression of Symptoms: same  Accompanying signs and symptoms:       Fever: No       Nausea/Vomiting: No       Abdominal pain: No       Weight loss: No       Episodes of constipation: No  History   Ill contacts: No  Recent use of antibiotics: YES  Recent travels: No  Recent medication-new or changes(Rx or OTC): No  Precipitating or alleviating factors: None  Therapies tried and outcome: none    Patient has had diarrhea over the last week.  5-6 episodes per day.  No stomach pain, nausea, vomiting, constipation, blood in stool or urine, black tarry stools, cough, cold symptoms, fevers, body aches.  Watery diarrhea.  Did have Keflex for an infection 2 weeks ago.    Review of Systems  Constitutional, HEENT, cardiovascular, pulmonary, gi and gu systems are negative, except as otherwise noted.      Objective    /78   Pulse 99   Temp 97.2  F (36.2  C) (Tympanic)   Wt 130.6 kg (288 lb)   SpO2 100%   BMI 46.48 kg/m    Body mass index is 46.48 kg/m .  Physical Exam  Vitals and nursing note reviewed.   Constitutional:       General: She is not in acute distress.     Appearance: Normal appearance. She is well-developed.   Cardiovascular:      Rate and Rhythm: Normal rate and regular rhythm.      Heart sounds: Normal heart sounds, S1 normal and S2 normal. No murmur heard.  Pulmonary:      Effort: Pulmonary effort is normal. No respiratory distress.      Breath sounds: Normal breath sounds. No wheezing or rales.   Abdominal:      General: Abdomen is flat. Bowel sounds are normal.      Palpations: Abdomen is soft. There is no mass.      Tenderness: There is no right CVA tenderness, left CVA tenderness, guarding or rebound.      Hernia: No hernia is present.      Comments: Mild lower abdominal pain with palpation.   Musculoskeletal:         General: Normal range of motion.   Skin:     General: Skin is warm and dry.      Findings: No rash.   Neurological:       General: No focal deficit present.      Mental Status: She is alert and oriented to person, place, and time.   Psychiatric:         Mood and Affect: Mood normal.         Behavior: Behavior normal.         Thought Content: Thought content normal.         Judgment: Judgment normal.          No results found for any visits on 05/07/24.        Signed Electronically by: Liz Diaz PA-C

## 2024-05-07 NOTE — NURSING NOTE
"Chief Complaint   Patient presents with    Gastrointestinal Problem     Patient states having diarrhea for about a week, no other symptoms.    Initial /78   Pulse 99   Temp 97.2  F (36.2  C) (Tympanic)   Wt 130.6 kg (288 lb)   SpO2 100%   BMI 46.48 kg/m   Estimated body mass index is 46.48 kg/m  as calculated from the following:    Height as of 4/25/24: 1.676 m (5' 6\").    Weight as of this encounter: 130.6 kg (288 lb).  Medication Review: complete    The next two questions are to help us understand your food security.  If you are feeling you need any assistance in this area, we have resources available to support you today.          4/4/2024   SDOH- Food Insecurity   Within the past 12 months, did you worry that your food would run out before you got money to buy more? N    N   Within the past 12 months, did the food you bought just not last and you didn t have money to get more? N    N         Health Care Directive:  Patient has a Health Care Directive on file      Hortencia Garcia MA      "

## 2024-05-13 ENCOUNTER — TELEPHONE (OUTPATIENT)
Dept: OBGYN | Facility: OTHER | Age: 49
End: 2024-05-13
Payer: MEDICARE

## 2024-05-13 DIAGNOSIS — R23.2 HOT FLASHES: ICD-10-CM

## 2024-05-13 NOTE — TELEPHONE ENCOUNTER
Pt is aware of this. She has already taken pregnancy test and received negative result. Pt has no other questions or concerns.    Imelda Brewer on 5/13/2024 at 4:33 PM

## 2024-05-13 NOTE — TELEPHONE ENCOUNTER
Please call patient  She should take an over the counter pregnancy test  IF positive then call OBGYN department  IF negative then no further action needed unless she is having other symptoms/concerns  Sarah Acosta NP on 5/13/2024 at 10:45 AM

## 2024-05-13 NOTE — TELEPHONE ENCOUNTER
Patient called regarding diet patch that she is currently on and needing a new prescription sent. Please call for details.     Aurora Purvis on 5/13/2024 at 10:16 AM

## 2024-05-13 NOTE — TELEPHONE ENCOUNTER
Left message to call back. We need to know the name of it.     Noemy Caceres, CMA on 5/13/2024 at 1:43 PM

## 2024-05-13 NOTE — TELEPHONE ENCOUNTER
Patient states that she has had her tubes ties and an oblation and states that she thinks she is pregnant and wants direction      Please call and advise    Thank You    Halle Galvan on 5/13/2024 at 10:34 AM

## 2024-05-20 RX ORDER — ESTRADIOL 0.05 MG/D
1 PATCH, EXTENDED RELEASE TRANSDERMAL
Qty: 24 PATCH | Refills: 3 | Status: SHIPPED | OUTPATIENT
Start: 2024-05-20

## 2024-05-20 NOTE — TELEPHONE ENCOUNTER
Patient requesting Rx for the following:      Requested Prescriptions   Pending Prescriptions Disp Refills    estradiol (VIVELLE-DOT) 0.05 MG/24HR bi-weekly patch 24 patch 3     Sig: Place 1 patch onto the skin twice a week       Contraceptives Protocol Failed - 5/20/2024  8:33 AM        Failed - Medication indicated for associated diagnosis     Medication is associated with one or more of the following diagnoses:  Contraception  Acne  Dysmenorrhea  Menorrhagia  Amenorrhea  PCOS  Premenstrual Dysphoric Disorder         Hormone Replacement Therapy Failed - 5/20/2024  8:33 AM        Failed - Medication indicated for associated diagnosis     The medication is prescribed for one or more of the following conditions:    Menopause   Vulva/Vaginal atrophy   Low Estrogen   Gender Dysphoria   Male to Female transgender         Hormone Replacement Therapy (vaginal) Failed - 5/20/2024  8:33 AM        Failed - Medication indicated for associated diagnosis     Medication is associated with one or more of the following diagnoses:     Menopause   Vulva/Vaginal atrophy   UTI prophylaxis     Last Prescription Date:   04/18/2024  Last Fill Qty/Refills:         24, R-3  Last Office Visit:              03/14/2024   Future Office visit:             Next 5 appointments (look out 90 days)      May 20, 2024  2:20 PM  (Arrive by 2:05 PM)  Provider Visit with Zhang Santiago MD  [facility] ([facility] ) [address]  [place] [address] [phone]       Called and spoke to Patient after verifying last name and date of birth. Patient stated that she had her medication filled at [facility] but would like the medication sent to [facility].   Call placed to [facility] pharmacy who stated that the prescription had been cancelled. New order pending for review.     Kajal Guevara RN on 5/20/2024 at 10:24 AM

## 2024-05-20 NOTE — TELEPHONE ENCOUNTER
Incoming call from call center, patient is requesting a refill of estradiol (VIVELLE-DOT) 0.05 MG/24HR bi-weekly patch .  Routing to OBGYN department.    Ena Alexander RN on 5/20/2024 at 8:32 AM

## 2024-05-20 NOTE — TELEPHONE ENCOUNTER
Patient states that the name of the medication is estradiol patient states that she is out of medication

## 2024-05-23 ENCOUNTER — OFFICE VISIT (OUTPATIENT)
Dept: FAMILY MEDICINE | Facility: OTHER | Age: 49
End: 2024-05-23
Attending: PHYSICIAN ASSISTANT
Payer: MEDICARE

## 2024-05-23 ENCOUNTER — TELEPHONE (OUTPATIENT)
Dept: INTERNAL MEDICINE | Facility: OTHER | Age: 49
End: 2024-05-23

## 2024-05-23 VITALS
WEIGHT: 293 LBS | BODY MASS INDEX: 45.99 KG/M2 | OXYGEN SATURATION: 99 % | SYSTOLIC BLOOD PRESSURE: 134 MMHG | DIASTOLIC BLOOD PRESSURE: 80 MMHG | HEART RATE: 94 BPM | TEMPERATURE: 98.2 F | HEIGHT: 67 IN | RESPIRATION RATE: 16 BRPM

## 2024-05-23 DIAGNOSIS — M54.6 ACUTE BILATERAL THORACIC BACK PAIN: Primary | ICD-10-CM

## 2024-05-23 PROCEDURE — G0463 HOSPITAL OUTPT CLINIC VISIT: HCPCS

## 2024-05-23 PROCEDURE — 99213 OFFICE O/P EST LOW 20 MIN: CPT | Performed by: PHYSICIAN ASSISTANT

## 2024-05-23 RX ORDER — LIDOCAINE 4 G/G
1 PATCH TOPICAL EVERY 24 HOURS
Qty: 30 PATCH | Refills: 0 | Status: SHIPPED | OUTPATIENT
Start: 2024-05-23 | End: 2024-09-02

## 2024-05-23 RX ORDER — CYCLOBENZAPRINE HCL 5 MG
5 TABLET ORAL 3 TIMES DAILY PRN
Qty: 30 TABLET | Refills: 0 | Status: SHIPPED | OUTPATIENT
Start: 2024-05-23 | End: 2024-09-02

## 2024-05-23 ASSESSMENT — ANXIETY QUESTIONNAIRES
4. TROUBLE RELAXING: NOT AT ALL
3. WORRYING TOO MUCH ABOUT DIFFERENT THINGS: NOT AT ALL
8. IF YOU CHECKED OFF ANY PROBLEMS, HOW DIFFICULT HAVE THESE MADE IT FOR YOU TO DO YOUR WORK, TAKE CARE OF THINGS AT HOME, OR GET ALONG WITH OTHER PEOPLE?: NOT DIFFICULT AT ALL
GAD7 TOTAL SCORE: 0
GAD7 TOTAL SCORE: 0
IF YOU CHECKED OFF ANY PROBLEMS ON THIS QUESTIONNAIRE, HOW DIFFICULT HAVE THESE PROBLEMS MADE IT FOR YOU TO DO YOUR WORK, TAKE CARE OF THINGS AT HOME, OR GET ALONG WITH OTHER PEOPLE: NOT DIFFICULT AT ALL
1. FEELING NERVOUS, ANXIOUS, OR ON EDGE: NOT AT ALL
7. FEELING AFRAID AS IF SOMETHING AWFUL MIGHT HAPPEN: NOT AT ALL
5. BEING SO RESTLESS THAT IT IS HARD TO SIT STILL: NOT AT ALL
6. BECOMING EASILY ANNOYED OR IRRITABLE: NOT AT ALL
7. FEELING AFRAID AS IF SOMETHING AWFUL MIGHT HAPPEN: NOT AT ALL
2. NOT BEING ABLE TO STOP OR CONTROL WORRYING: NOT AT ALL

## 2024-05-23 ASSESSMENT — ASTHMA QUESTIONNAIRES
QUESTION_2 LAST FOUR WEEKS HOW OFTEN HAVE YOU HAD SHORTNESS OF BREATH: NOT AT ALL
QUESTION_3 LAST FOUR WEEKS HOW OFTEN DID YOUR ASTHMA SYMPTOMS (WHEEZING, COUGHING, SHORTNESS OF BREATH, CHEST TIGHTNESS OR PAIN) WAKE YOU UP AT NIGHT OR EARLIER THAN USUAL IN THE MORNING: NOT AT ALL
QUESTION_5 LAST FOUR WEEKS HOW WOULD YOU RATE YOUR ASTHMA CONTROL: WELL CONTROLLED
QUESTION_4 LAST FOUR WEEKS HOW OFTEN HAVE YOU USED YOUR RESCUE INHALER OR NEBULIZER MEDICATION (SUCH AS ALBUTEROL): NOT AT ALL
QUESTION_1 LAST FOUR WEEKS HOW MUCH OF THE TIME DID YOUR ASTHMA KEEP YOU FROM GETTING AS MUCH DONE AT WORK, SCHOOL OR AT HOME: ALL OF THE TIME
ACT_TOTALSCORE: 20
ACT_TOTALSCORE: 20

## 2024-05-23 ASSESSMENT — PAIN SCALES - GENERAL: PAINLEVEL: SEVERE PAIN (7)

## 2024-05-23 NOTE — TELEPHONE ENCOUNTER
After proper verification, patient was informed that forms were faxed to [facility].  Chelsea Cam LPN on 5/23/2024 at 3:06 PM  EXT. [phone]

## 2024-05-23 NOTE — NURSING NOTE
"Chief Complaint   Patient presents with    Back Pain   Patient presents to the Clinic today with upper back pain.    Initial /80 (BP Location: Right arm, Patient Position: Sitting, Cuff Size: Adult Large)   Pulse 94   Temp 98.2  F (36.8  C) (Tympanic)   Resp 16   Ht 1.689 m (5' 6.5\")   Wt 133 kg (293 lb 3.2 oz)   SpO2 99%   BMI 46.61 kg/m   Estimated body mass index is 46.61 kg/m  as calculated from the following:    Height as of this encounter: 1.689 m (5' 6.5\").    Weight as of this encounter: 133 kg (293 lb 3.2 oz).  Medication Review: complete    The next two questions are to help us understand your food security.  If you are feeling you need any assistance in this area, we have resources available to support you today.          4/4/2024   SDOH- Food Insecurity   Within the past 12 months, did you worry that your food would run out before you got money to buy more? N    N   Within the past 12 months, did the food you bought just not last and you didn t have money to get more? N    N         Health Care Directive:  Patient has a Health Care Directive on file      Mercedez Brewer      "

## 2024-05-23 NOTE — PROGRESS NOTES
Thoracic back pain after a couple falls while camping last week.  Assessment & Plan     1. Acute bilateral thoracic back pain  Thoracic back pain after falls last week.  Symptoms and exam consistent with muscle strain and muscle spasming.  Prescription for muscle relaxer and topical lidocaine patches to be used as needed.  Okay to use over-the-counter ibuprofen, icing, heating as tolerated.  If symptoms persist or worsen follow-up for additional evaluation.  - cyclobenzaprine (FLEXERIL) 5 MG tablet; Take 1 tablet (5 mg) by mouth 3 times daily as needed for muscle spasms  Dispense: 30 tablet; Refill: 0  - Lidocaine (LIDOCARE) 4 % Patch; Place 1 patch onto the skin every 24 hours To prevent lidocaine toxicity, patient should be patch free for 12 hrs daily.  Dispense: 30 patch; Refill: 0        No follow-ups on file.    Tawanda Aranda is a 49 year old, presenting for the following health issues:  Back Pain    History of Present Illness       Back Pain:  She presents for follow up of back pain. Patient's back pain is a new problem.    Original cause of back pain: a fall  First noticed back pain: in the last week  Patient feels back pain: comes and goesLocation of back pain:  Other  Description of back pain: cramping, sharp and stabbing  Back pain spreads: nowhere    Since patient first noticed back pain, pain is: gradually worsening  Does back pain interfere with her job:  Not applicable  On a scale of 1-10 (10 being the worst), patient describes pain as:  6  What makes back pain worse: bending, coughing, certain positions and twisting   Acupuncture: not tried  Activity or exercise: not helpful  Chiropractor:  Not tried  Cold: helpful  Heat: not tried  Massage: not tried  Muscle relaxants: not tried  NSAIDS: helpful  Opioids: not tried  Physical Therapy: not tried  Rest: helpful  Steroid Injection: not tried  Stretching: helpful  Surgery: not tried  TENS unit: not tried  Topical pain relievers: not tried  Other  healthcare providers patient is seeing for back pain: None    She eats 2-3 servings of fruits and vegetables daily.She consumes 2 sweetened beverage(s) daily.She exercises with enough effort to increase her heart rate 30 to 60 minutes per day.  She exercises with enough effort to increase her heart rate 4 days per week.   She is taking medications regularly.     Thoracic back pain after a couple falls while camping last week.  She landed falling forward on her hands.  She has since been struggling with some spasm in her thoracic back.  Has been managing with ibuprofen, icing, hot showers with very short-term improvement.  She has Biofreeze that has not been particularly helpful.  She feels most of the pain is coming from muscle spasms.  No radiation of pain, upper extremity symptoms, numbness or tingling, weakness.      PAST MEDICAL HISTORY:   Past Medical History:   Diagnosis Date    Abnormal Pap smear of cervix 2018    Overview:  had scraping of cervix    Cannabis use disorder, moderate, in sustained remission, dependence (H) 2015    Closed dislocation of tarsal joint 2011    Closed dislocation of tarsal joint - left 2011    Edema     No Comments Provided    Encounter for removal and reinsertion of intrauterine contraceptive device     05,IUD placement, Removed    Excessive and frequent menstruation with irregular cycle     2017    Major depressive disorder, single episode     No Comments Provided    Personal history of other medical treatment (CODE)     G3, P2-0-1-2 with history of spontaneous     Personal history of other medical treatment (CODE)     No Comments Provided    Uncomplicated asthma     No Comments Provided       PAST SURGICAL HISTORY:   Past Surgical History:   Procedure Laterality Date    ANKLE SURGERY      fracture, repair with screws    ARTHRODESIS FOOT Left 2022    Procedure: left foot hardware removaL, fusion of 1st-2nd Tarsal metatarsal;  Surgeon:  Anthony Castillo DPM;  Location: [facility] OR    COLONOSCOPY  2022    F/U  tubular adenoma    CONIZATION LEEP      ,Underwent a loop    IMPLANT STIMULATOR AND LEADS SACRAL NERVE (STAGE ONE AND TWO)      scheduled 23 with Dr. Sheth at [place]    LAPAROSCOPIC TUBAL LIGATION      ,tubal ligation       FAMILY HISTORY:   Family History   Problem Relation Age of Onset    Osteoporosis Mother     Asthma Father         Asthma,+ Leg edema, knee, hip replacement. + Lymphedema    Heart Failure Father     Other - See Comments Paternal Grandmother         Lymphedema    Osteoporosis Brother         Osteoporosis    Other - See Comments Brother         No Known Problems       SOCIAL HISTORY:   Social History     Tobacco Use    Smoking status: Former     Current packs/day: 0.00     Average packs/day: 2.0 packs/day for 3.0 years (6.0 ttl pk-yrs)     Types: Cigarettes     Start date:      Quit date: 2003     Years since quittin.4     Passive exposure: Past    Smokeless tobacco: Never   Substance Use Topics    Alcohol use: Not Currently     Alcohol/week: 0.0 standard drinks of alcohol        Allergies   Allergen Reactions    Clonazepam Other (See Comments)     Causes Violence and aggresssion    Hydrocodone-Acetaminophen      Other reaction(s): Seizures  Can take Percocet without difficulty.    Lorazepam Other (See Comments)     Causes Violence and aggresssion    Sertraline Other (See Comments)     Caused suicidally     Bupropion Other (See Comments)     Caused Major Depression    Dust Mite Extract      Other reaction(s): Asthma symptoms    Lithium Other (See Comments)     Mood alteration    Pollen Extract      Other reaction(s): Asthma symptoms    Risperidone Other (See Comments)     Agitation      Sulfa Antibiotics Itching    Trichophyton      Other reaction(s): Asthma symptoms    Valproic Acid Other (See Comments)     Weight gain     Current Outpatient Medications   Medication Sig Dispense Refill     acetaminophen (TYLENOL) 500 MG tablet Take 1-2 tablets (500-1,000 mg) by mouth every 6 hours as needed for mild pain 90 tablet 4    albuterol (VENTOLIN HFA) 108 (90 Base) MCG/ACT inhaler Inhale 1-2 puffs into the lungs every 4 hours as needed for shortness of breath or wheezing 18 g 11    amLODIPine (NORVASC) 2.5 MG tablet Take 1 tablet (2.5 mg) by mouth every morning. May also take 1 tablet (2.5 mg) every evening as needed (-- for Raynaud's / Hand color changes --). 180 tablet 4    ARIPiprazole (ABILIFY) 15 MG tablet Take 1 tablet (15 mg) by mouth At Bedtime 30 tablet 1    atomoxetine (STRATTERA) 25 MG capsule TAKE 1 CAPSULE BY MOUTH DAILY AT 8AM      busPIRone (BUSPAR) 10 MG tablet Take 1 tablet by mouth 3 times daily Am and 2 pm      cholecalciferol (VITAMIN D3) 125 mcg (5000 units) capsule Take 1 capsule (125 mcg) by mouth daily 100 capsule 4    cyanocobalamin (CYANOCOBALAMIN) 1000 MCG/ML injection INJECT 1ML INTRAMUSCULARLY EVERY 2 WEEKS 6 mL 11    cyclobenzaprine (FLEXERIL) 5 MG tablet Take 1 tablet (5 mg) by mouth 3 times daily as needed for muscle spasms 30 tablet 0    docusate sodium (COLACE) 100 MG tablet Take 2 tablets (200 mg) by mouth 2 times daily -Adjust dose as needed to maintain soft stools 120 tablet 9    Elemental iron 65 mg Vitamin C 125 mg (VITRON C)  MG TABS tablet Take 1 tablet by mouth daily on empty stomach -for iron deficiency 90 tablet 4    estradiol (VIVELLE-DOT) 0.05 MG/24HR bi-weekly patch Place 1 patch onto the skin twice a week 24 patch 3    famotidine (PEPCID) 20 MG tablet Take 1 tablet (20 mg) by mouth 2 times daily - For Heartburn 180 tablet 4    gabapentin (NEURONTIN) 100 MG capsule Take 100 mg by mouth 3 times daily      hydrocortisone (CORTAID) 1 % external cream Apply topically as needed for rash Apply topically to affected area up to three times daily as needed until resolved. 60 g 1    hydrOXYzine (VISTARIL) 50 MG capsule TAKE 1 CAPSULE BY MOUTH FOUR TIMES A DAY AS  "NEEDED FOR ANXIETY      hydrOXYzine HCl (ATARAX) 50 MG tablet Take 1 tablet (50 mg) by mouth at bedtime 30 tablet 0    ibuprofen (ADVIL/MOTRIN) 200 MG tablet Take 1-2 tablets (200-400 mg) by mouth every 6 hours as needed for moderate pain 1 to 2 tabs Q6 PRN 90 tablet 0    lamoTRIgine (LAMICTAL) 100 MG tablet Take 1 tablet (100 mg) by mouth 3 times daily -- Managed by Psych      Lidocaine (LIDOCARE) 4 % Patch Place 1 patch onto the skin every 24 hours To prevent lidocaine toxicity, patient should be patch free for 12 hrs daily. 30 patch 0    Menthol, Topical Analgesic, (BIOFREEZE EX) Apply topically 4 times daily as needed      Menthol, Topical Analgesic, (BIOFREEZE) 4 % GEL Externally apply topically 2 times daily as needed (For pain) 150 mL 3    montelukast (SINGULAIR) 10 MG tablet Take 1 tablet (10 mg) by mouth at bedtime 90 tablet 4    prazosin (MINIPRESS) 1 MG capsule Take by mouth as needed      PREVIDENT 5000 PLUS 1.1 % CREA USE IN PLACE OF TOOTHPASTE ONCE DAILY BEFORE BED.BRUSH SWISH TOOTHPASTE FOR 30 SEC AND SPIT. DO NOT RINSE,EAT OR DRINK FOR 30 MINS      progesterone (PROMETRIUM) 100 MG capsule Take 1 capsule (100 mg) by mouth daily 90 capsule 3    ramelteon (ROZEREM) 8 MG tablet Take 1 tablet (8 mg) by mouth at bedtime 90 tablet 4    syringe/needle, sisp, (B-D SYRINGE/NEEDLE) 25G X 5/8\" 1 ML MISC USE TO INJECT B-12 INTRAMUSCULARLY EVERY 2 WEEKS 94 each 4    terbinafine (LAMISIL) 1 % external cream Apply topically 2 times daily To skin rash on thigh 84 g 4    vilazodone (VIIBRYD) 40 MG TABS tablet Take 40 mg by mouth every morning       No current facility-administered medications for this visit.           Objective    /80 (BP Location: Right arm, Patient Position: Sitting, Cuff Size: Adult Large)   Pulse 94   Temp 98.2  F (36.8  C) (Tympanic)   Resp 16   Ht 1.689 m (5' 6.5\")   Wt 133 kg (293 lb 3.2 oz)   SpO2 99%   BMI 46.61 kg/m    Body mass index is 46.61 kg/m .  Physical Exam   General: " Pleasant, in no apparent distress.  Musculoskeletal: Back is straight, tenderness to palpation of paraspinal and paravertebral muscles in bilateral thoracic region. Full ROM of back, neck, upper and lower extremities.  Skin: No jaundice, pallor, rashes, or lesions.  Psych: Appropriate mood and affect.      Signed Electronically by: Victoria Garibay PA-C

## 2024-05-23 NOTE — TELEPHONE ENCOUNTER
Patient presented to counter inquiring about a [employer] form she had dropped off a few weeks ago.     Sara Laboy on 5/23/2024 at 2:44 PM

## 2024-05-23 NOTE — PATIENT INSTRUCTIONS
Your back pain is likely due to your back being ynes with your most recent falls.  I do not think this is a bony injury rather muscle related. I sent in a prescription for a muscle relaxer called Flexeril to be used up to 3 times daily.  I also sent in lidocaine patches to be used daily.  You can also alternate icing, heating, and ibuprofen as needed as well.  Stretching your upper back might also help.  If your pain persists or worsens please follow-up for additional evaluation.

## 2024-05-24 ENCOUNTER — TELEPHONE (OUTPATIENT)
Dept: FAMILY MEDICINE | Facility: OTHER | Age: 49
End: 2024-05-24
Payer: MEDICARE

## 2024-05-24 NOTE — TELEPHONE ENCOUNTER
Copy of RX's printed and faxed to number provided below     Noemy Caceres CMA on 5/24/2024 at 11:49 AM

## 2024-05-24 NOTE — TELEPHONE ENCOUNTER
[facility]patient facility needs a copy of new medication directions     Please call and advise    Thank You     Halle Galvan on 5/24/2024 at 8:13 AM

## 2024-05-24 NOTE — TELEPHONE ENCOUNTER
Only the lidocaine patch and flexeril orders were to be faxed to [place] and not the whole list.  Just those two . Please fax again to   [phone]

## 2024-05-24 NOTE — TELEPHONE ENCOUNTER
[facility] needs her updated med list with directions in order to give her her meds. Did print off her med list and faxed it to the number she gave me ([phone]).  Kandice Whittaker LPN  5/24/2024  8:42 AM

## 2024-06-05 ENCOUNTER — TELEPHONE (OUTPATIENT)
Dept: INTERNAL MEDICINE | Facility: OTHER | Age: 49
End: 2024-06-05
Payer: MEDICARE

## 2024-06-05 DIAGNOSIS — E53.8 VITAMIN B12 DEFICIENCY: ICD-10-CM

## 2024-06-05 RX ORDER — CYANOCOBALAMIN 1000 UG/ML
INJECTION, SOLUTION INTRAMUSCULAR; SUBCUTANEOUS
Qty: 6 ML | Refills: 11 | Status: CANCELLED | OUTPATIENT
Start: 2024-06-05

## 2024-06-05 NOTE — TELEPHONE ENCOUNTER
Maryjo from [facility] is requesitng an order for self administration for the following PRN medications:    Biofreeze  Prevadent 5000 plus 1.1% cream  Cyanocobalamin 1000 micrograms/ml IM  Fiber therapy    Please fax the order to [phone]  Must say ok to self administer and list these specific medications.    Beronica Sylvester on 6/5/2024 at 10:50 AM

## 2024-07-12 ENCOUNTER — OFFICE VISIT (OUTPATIENT)
Dept: FAMILY MEDICINE | Facility: OTHER | Age: 49
End: 2024-07-12
Payer: MEDICARE

## 2024-07-12 VITALS
SYSTOLIC BLOOD PRESSURE: 134 MMHG | DIASTOLIC BLOOD PRESSURE: 80 MMHG | HEIGHT: 67 IN | OXYGEN SATURATION: 96 % | RESPIRATION RATE: 16 BRPM | HEART RATE: 102 BPM | BODY MASS INDEX: 45.16 KG/M2 | WEIGHT: 287.7 LBS | TEMPERATURE: 97.3 F

## 2024-07-12 DIAGNOSIS — Z72.89 OTHER PROBLEMS RELATED TO LIFESTYLE: ICD-10-CM

## 2024-07-12 DIAGNOSIS — N76.0 BACTERIAL VAGINITIS: ICD-10-CM

## 2024-07-12 DIAGNOSIS — Z11.59 ENCOUNTER FOR SCREENING FOR OTHER VIRAL DISEASES: ICD-10-CM

## 2024-07-12 DIAGNOSIS — R35.0 URINE FREQUENCY: ICD-10-CM

## 2024-07-12 DIAGNOSIS — Z11.3 SCREEN FOR STD (SEXUALLY TRANSMITTED DISEASE): ICD-10-CM

## 2024-07-12 DIAGNOSIS — N89.8 VAGINAL DISCHARGE: Primary | ICD-10-CM

## 2024-07-12 DIAGNOSIS — B96.89 BACTERIAL VAGINITIS: ICD-10-CM

## 2024-07-12 DIAGNOSIS — B37.31 YEAST INFECTION OF THE VAGINA: ICD-10-CM

## 2024-07-12 LAB
ALBUMIN UR-MCNC: NEGATIVE MG/DL
APPEARANCE UR: CLEAR
BACTERIAL VAGINOSIS VAG-IMP: POSITIVE
BILIRUB UR QL STRIP: NEGATIVE
C TRACH DNA SPEC QL PROBE+SIG AMP: NEGATIVE
CANDIDA DNA VAG QL NAA+PROBE: DETECTED
CANDIDA GLABRATA / CANDIDA KRUSEI DNA: NOT DETECTED
COLOR UR AUTO: NORMAL
GLUCOSE UR STRIP-MCNC: NEGATIVE MG/DL
HGB UR QL STRIP: NEGATIVE
KETONES UR STRIP-MCNC: NEGATIVE MG/DL
LEUKOCYTE ESTERASE UR QL STRIP: NEGATIVE
N GONORRHOEA DNA SPEC QL NAA+PROBE: NEGATIVE
NITRATE UR QL: NEGATIVE
PH UR STRIP: 5.5 [PH] (ref 5–9)
RBC URINE: 0 /HPF
SP GR UR STRIP: 1.02 (ref 1–1.03)
SQUAMOUS EPITHELIAL: 1 /HPF
T VAGINALIS DNA VAG QL NAA+PROBE: NOT DETECTED
UROBILINOGEN UR STRIP-MCNC: NORMAL MG/DL
WBC URINE: <1 /HPF

## 2024-07-12 PROCEDURE — 36415 COLL VENOUS BLD VENIPUNCTURE: CPT | Mod: ZL | Performed by: NURSE PRACTITIONER

## 2024-07-12 PROCEDURE — 86803 HEPATITIS C AB TEST: CPT | Mod: ZL | Performed by: NURSE PRACTITIONER

## 2024-07-12 PROCEDURE — 86695 HERPES SIMPLEX TYPE 1 TEST: CPT | Mod: ZL | Performed by: NURSE PRACTITIONER

## 2024-07-12 PROCEDURE — 0352U MULTIPLEX VAGINAL PANEL BY PCR: CPT | Mod: ZL | Performed by: NURSE PRACTITIONER

## 2024-07-12 PROCEDURE — G0463 HOSPITAL OUTPT CLINIC VISIT: HCPCS

## 2024-07-12 PROCEDURE — 99214 OFFICE O/P EST MOD 30 MIN: CPT | Performed by: NURSE PRACTITIONER

## 2024-07-12 PROCEDURE — 86706 HEP B SURFACE ANTIBODY: CPT | Mod: ZL | Performed by: NURSE PRACTITIONER

## 2024-07-12 PROCEDURE — 86780 TREPONEMA PALLIDUM: CPT | Mod: ZL | Performed by: NURSE PRACTITIONER

## 2024-07-12 PROCEDURE — 81001 URINALYSIS AUTO W/SCOPE: CPT | Mod: ZL | Performed by: NURSE PRACTITIONER

## 2024-07-12 PROCEDURE — 87491 CHLMYD TRACH DNA AMP PROBE: CPT | Mod: ZL | Performed by: NURSE PRACTITIONER

## 2024-07-12 PROCEDURE — 87389 HIV-1 AG W/HIV-1&-2 AB AG IA: CPT | Mod: ZL,GZ | Performed by: NURSE PRACTITIONER

## 2024-07-12 RX ORDER — METRONIDAZOLE 500 MG/1
500 TABLET ORAL 2 TIMES DAILY
Qty: 14 TABLET | Refills: 0 | Status: SHIPPED | OUTPATIENT
Start: 2024-07-12 | End: 2024-07-19

## 2024-07-12 RX ORDER — FLUCONAZOLE 150 MG/1
150 TABLET ORAL
Qty: 3 TABLET | Refills: 0 | Status: SHIPPED | OUTPATIENT
Start: 2024-07-12 | End: 2024-07-19

## 2024-07-12 RX ORDER — SYRINGE, DISPOSABLE, 1 ML
SYRINGE, EMPTY DISPOSABLE MISCELLANEOUS
COMMUNITY
Start: 2024-05-07

## 2024-07-12 ASSESSMENT — ENCOUNTER SYMPTOMS
EYES NEGATIVE: 1
ENDOCRINE NEGATIVE: 1
RESPIRATORY NEGATIVE: 1
GASTROINTESTINAL NEGATIVE: 1
PSYCHIATRIC NEGATIVE: 1
CARDIOVASCULAR NEGATIVE: 1
MUSCULOSKELETAL NEGATIVE: 1
NEUROLOGICAL NEGATIVE: 1
CONSTITUTIONAL NEGATIVE: 1
DYSURIA: 1

## 2024-07-12 ASSESSMENT — PAIN SCALES - GENERAL: PAINLEVEL: MODERATE PAIN (4)

## 2024-07-12 NOTE — NURSING NOTE
"Chief Complaint   Patient presents with    Vaginal Problem     Burning and pain when urinating, frequent urination/ urgency     Patient presents today with vaginal burning and pain when urinating, abnormal odor, and frequent urination. This began 3 days ago, is worsening since.       Initial /80 (BP Location: Left arm, Patient Position: Sitting, Cuff Size: Adult Regular)   Pulse 102   Temp 97.3  F (36.3  C) (Tympanic)   Resp 16   Ht 1.689 m (5' 6.5\")   Wt 130.5 kg (287 lb 11.2 oz)   SpO2 96%   BMI 45.74 kg/m   Estimated body mass index is 45.74 kg/m  as calculated from the following:    Height as of this encounter: 1.689 m (5' 6.5\").    Weight as of this encounter: 130.5 kg (287 lb 11.2 oz).     FOOD SECURITY SCREENING QUESTIONS:    The next two questions are to help us understand your food security.  If you are feeling you need any assistance in this area, we have resources available to support you today.    Hunger Vital Signs:  Within the past 12 months we worried whether our food would run out before we got money to buy more. Never  Within the past 12 months the food we bought just didn't last and we didn't have money to get more. Never      Kaye Saeed    "

## 2024-07-12 NOTE — PROGRESS NOTES
Rosi Lamb  1975    ASSESSMENT/PLAN    Presents to rapid clinic with vaginal discharge and dysuria.  Urinalysis negative.  Multiplex positive for bacterial vaginosis and Candida.  Patient STD screening negative to date, pending results at time of visit. Patient's vitals are stable and she appears nontoxic.          1. Vaginal discharge    - Multiplex Vaginal Panel by PCR - positive   - GC/Chlamydia by PCR - negative     2. Screen for STD (sexually transmitted disease)    - Hepatitis B Surface Antibody  - Hepatitis C Screen Reflex to HCV RNA Quant and Genotype  - Herpes Simplex Virus 1 and 2 IgG  - HIV Antigen Antibody Combo  - Treponema Ab w Reflex to RPR and Titer    3. Urine frequency    - UA with Microscopic reflex to Culture - negative     4. Bacterial vaginitis    - metroNIDAZOLE (FLAGYL) 500 MG tablet; Take 1 tablet (500 mg) by mouth 2 times daily for 7 days  Dispense: 14 tablet; Refill: 0    5. Yeast infection of the vagina    - fluconazole (DIFLUCAN) 150 MG tablet; Take 1 tablet (150 mg) by mouth every 3 days for 3 doses  Dispense: 3 tablet; Refill: 0   - May use over-the-counter Tylenol or ibuprofen PRN  - Follow up as needed for new or worsening symptoms      *Explanation of diagnosis, treatment options and risk and benefits of medications reviewed with patient. Patient agrees with plan of care.  *All questions were answered.    *Red flags symptoms were discussed and patient was advised when they should return for reevaluation or for prompt emergency evaluation.   *Patient was given verbal and written instructions on plan of care. Instructions were printed or are available on [employer] on electronic AVS.   *We discussed potential side effects of any prescribed or recommended therapies, as well as expectations for response to treatments.  *Patient discharged in stable condition    Callie Conde CNP  [facility]    SUBJECTIVE  CHIEF COMPLAINT/ REASON FOR VISIT  Patient  "presents with:  Vaginal Problem: Burning and pain when urinating, frequent urination/ urgency     HISTORY OF PRESENT ILLNESS  Rosi Lamb is a pleasant 49 year old female presents to rapid clinic today with vaginal discharge and redness.  No fever, chills, abdominal pain.  Patient has noted some dysuria but unclear if this is related to vaginal changes versus urinary changes.  Patient is sexually active, new partner recently.  Patient does not feel this is a high risk of STD but would like screening.     I have reviewed the nursing notes.  I have reviewed allergies, medication list, problem list, and past medical history.    REVIEW OF SYSTEMS  Review of Systems   Constitutional: Negative.    HENT: Negative.     Eyes: Negative.    Respiratory: Negative.     Cardiovascular: Negative.    Gastrointestinal: Negative.    Endocrine: Negative.    Genitourinary:  Positive for dysuria and vaginal discharge.   Musculoskeletal: Negative.    Neurological: Negative.    Psychiatric/Behavioral: Negative.     All other systems reviewed and are negative.       VITAL SIGNS  Vitals:    07/12/24 1521   BP: 134/80   BP Location: Left arm   Patient Position: Sitting   Cuff Size: Adult Regular   Pulse: 102   Resp: 16   Temp: 97.3  F (36.3  C)   TempSrc: Tympanic   SpO2: 96%   Weight: 130.5 kg (287 lb 11.2 oz)   Height: 1.689 m (5' 6.5\")      Body mass index is 45.74 kg/m .      OBJECTIVE  PHYSICAL EXAM  Physical Exam  Vitals and nursing note reviewed.   Constitutional:       Appearance: Normal appearance.   HENT:      Head: Normocephalic.      Nose: Nose normal.      Mouth/Throat:      Mouth: Mucous membranes are moist.   Eyes:      Pupils: Pupils are equal, round, and reactive to light.   Pulmonary:      Effort: Pulmonary effort is normal.   Abdominal:      Palpations: Abdomen is soft.   Genitourinary:     General: Normal vulva.      Vagina: Vaginal discharge present.   Musculoskeletal:         General: Normal range of motion. "   Skin:     General: Skin is warm and dry.      Capillary Refill: Capillary refill takes less than 2 seconds.   Neurological:      General: No focal deficit present.      Mental Status: She is alert.            DIAGNOSTICS  Results for orders placed or performed in visit on 07/12/24   UA with Microscopic reflex to Culture     Status: Normal    Specimen: Urine, Clean Catch   Result Value Ref Range    Color Urine Light Yellow Colorless, Straw, Light Yellow, Yellow    Appearance Urine Clear Clear    Glucose Urine Negative Negative mg/dL    Bilirubin Urine Negative Negative    Ketones Urine Negative Negative mg/dL    Specific Gravity Urine 1.022 1.000 - 1.030    Blood Urine Negative Negative    pH Urine 5.5 5.0 - 9.0    Protein Albumin Urine Negative Negative mg/dL    Urobilinogen Urine Normal Normal, 2.0 mg/dL    Nitrite Urine Negative Negative    Leukocyte Esterase Urine Negative Negative    RBC Urine 0 <=2 /HPF    WBC Urine <1 <=5 /HPF    Squamous Epithelials Urine 1 <=1 /HPF    Narrative    Urine Culture not indicated   Multiplex Vaginal Panel by PCR     Status: Abnormal    Specimen: Vagina; Swab   Result Value Ref Range    Bacterial Vaginosis Organism DNA Positive (A) Negative    Candida Group DNA Detected (A) Not Detected    Candida glabrata / Rajani krusei DNA Not Detected Not Detected    Trichomonas vaginalis DNA Not Detected Not Detected    Narrative    The Xpert  Xpress MVP test, performed on the [employer], is an automated, qualitative in vitro diagnostic test for the detection of DNA targets from anaerobic bacteria associated with bacterial vaginosis, Candida species associated with vulvovaginal candidiasis, and Trichomonas vaginalis. The assay uses clinician-collected and self-collected vaginal swabs from patients who are symptomatic for vaginitis/ vaginosis. The Xpert  Xpress MVP test utilizes real-time polymerase chain reaction (PCR) for the amplification of specific DNA targets and  utilizes fluorogenic target-specific hybridization probes to detect and differentiate DNA. It is intended to aid in the diagnosis of vaginal infections in women with a clinical presentation consistent with bacterial vaginosis, vulvovaginal candidiasis, or trichomoniasis.   The assay targets three anaerobic microorgansims that are associated with bacterial vaginosis (BV). Other organisms that are not detected by the Xpert  Xpress MVP test have also been reported to be associated with BV. The BV organism and Candida species targets of the Xpert  Xpress MVP test can be commensal in women; positive results must be considered in conjunction with other clinical and patient information to determine the disease status.   GC/Chlamydia by PCR     Status: Normal    Specimen: Urine, Voided   Result Value Ref Range    Chlamydia Trachomatis Negative Negative    Neisseria gonorrhoeae Negative Negative    Narrative    Assay performed using [employer] real-time, reverse-transcriptase PCR.

## 2024-07-13 LAB
HBV SURFACE AB SERPL IA-ACNC: <3.5 M[IU]/ML
HBV SURFACE AB SERPL IA-ACNC: NONREACTIVE M[IU]/ML
HCV AB SERPL QL IA: NONREACTIVE
HIV 1+2 AB+HIV1 P24 AG SERPL QL IA: NONREACTIVE

## 2024-07-14 LAB — T PALLIDUM AB SER QL: NONREACTIVE

## 2024-07-15 ENCOUNTER — TELEPHONE (OUTPATIENT)
Dept: FAMILY MEDICINE | Facility: OTHER | Age: 49
End: 2024-07-15
Payer: MEDICARE

## 2024-07-15 LAB
HSV1 IGG SERPL QL IA: 50.6 INDEX
HSV1 IGG SERPL QL IA: ABNORMAL
HSV2 IGG SERPL QL IA: 0.05 INDEX
HSV2 IGG SERPL QL IA: ABNORMAL

## 2024-07-15 NOTE — TELEPHONE ENCOUNTER
Called and discussed results of HSV antibody testing with patient.  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  Discussed that this most often causes cold sores.  Without any symptoms, no intervention is needed.  Many people have these antibodies without symptoms.  She should follow-up as needed.

## 2024-07-22 ENCOUNTER — OFFICE VISIT (OUTPATIENT)
Dept: FAMILY MEDICINE | Facility: OTHER | Age: 49
End: 2024-07-22
Payer: MEDICARE

## 2024-07-22 VITALS
RESPIRATION RATE: 16 BRPM | HEART RATE: 84 BPM | TEMPERATURE: 98.5 F | WEIGHT: 290.1 LBS | BODY MASS INDEX: 45.53 KG/M2 | SYSTOLIC BLOOD PRESSURE: 110 MMHG | HEIGHT: 67 IN | DIASTOLIC BLOOD PRESSURE: 70 MMHG | OXYGEN SATURATION: 98 %

## 2024-07-22 DIAGNOSIS — H60.501 ACUTE OTITIS EXTERNA OF RIGHT EAR, UNSPECIFIED TYPE: Primary | ICD-10-CM

## 2024-07-22 PROCEDURE — G0463 HOSPITAL OUTPT CLINIC VISIT: HCPCS

## 2024-07-22 PROCEDURE — 99213 OFFICE O/P EST LOW 20 MIN: CPT

## 2024-07-22 RX ORDER — CIPROFLOXACIN AND DEXAMETHASONE 3; 1 MG/ML; MG/ML
4 SUSPENSION/ DROPS AURICULAR (OTIC) 2 TIMES DAILY
Qty: 7.5 ML | Refills: 0 | Status: SHIPPED | OUTPATIENT
Start: 2024-07-22 | End: 2024-07-29

## 2024-07-22 ASSESSMENT — PAIN SCALES - GENERAL: PAINLEVEL: MODERATE PAIN (5)

## 2024-07-22 NOTE — PROGRESS NOTES
ASSESSMENT/PLAN:    I have reviewed the nursing notes.  I have reviewed the findings, diagnosis, plan and need for follow up with the patient.    1. Acute otitis externa of right ear, unspecified type  - ciprofloxacin-dexAMETHasone (CIPRODEX) 0.3-0.1 % otic suspension; Place 4 drops into the right ear 2 times daily for 7 days  Dispense: 7.5 mL; Refill: 0    Patient presents with right ear pain.  Patient's vitals are stable and she appears nontoxic.  Physical exam indicates moderate erythema and edema of the ear canal.  Will treat patient for otitis externa of the right ear with Ciprodex attic suspension.  Advised patient to avoid getting water in her ear until her symptoms resolved. May use over-the-counter Tylenol or ibuprofen PRN.    Discussed warning signs/symptoms indicative of need to f/u    Follow up if symptoms persist or worsen or concerns    I explained my diagnostic considerations and recommendations to the patient, who voiced understanding and agreement with the treatment plan. All questions were answered. We discussed potential side effects of any prescribed or recommended therapies, as well as expectations for response to treatments.    Oscar Dykes, DASHA CNP  7/22/2024  11:59 AM    HPI:    Rosi Lamb is a 49 year old female  who presents to [facility] today for concerns of ear pain    right ear pain x 2 days duration.     Presence of the following:   No fevers or chills.  No sore throat/pharyngitis/tonsillitis.   No allergy/URI Symptoms  Yes Muffled Sounds/Change in Hearing  Yes Sensation of Fullness in Ear(s)  Yes Ringing in Ears/Tinnitus  Yes Balance Changes  Yes Dizziness  No Headache   No Ear Drainage  Additional Symptoms: No  Denies persistent hearing loss, foul smelling odor from ear, changes in vision, nausea, vomiting, diarrhea, chest pain, shortness of breath.     No Recent swimming/hot tub  No submerging of head in shower/bathtub.     No Recent URI or other illness  History of  otitis media: Yes  History of HEENT surgery (PE tubes, tonsillectomy/adenoidectomy, etc.): No  Recent Course of ABX: Yes: Flagyl for BV 10 days ago    Treatments Tried: none  Prior History of Similar Symptoms: Yes    PCP - Veena    Past Medical History:   Diagnosis Date    Abnormal Pap smear of cervix 2018    Overview:  had scraping of cervix    Cannabis use disorder, moderate, in sustained remission, dependence (H) 2015    Closed dislocation of tarsal joint 2011    Closed dislocation of tarsal joint - left 2011    Edema     No Comments Provided    Encounter for removal and reinsertion of intrauterine contraceptive device     05,IUD placement, Removed    Excessive and frequent menstruation with irregular cycle     2017    Major depressive disorder, single episode     No Comments Provided    Personal history of other medical treatment (CODE)     G3, P2-0-1-2 with history of spontaneous     Personal history of other medical treatment (CODE)     No Comments Provided    Uncomplicated asthma     No Comments Provided     Past Surgical History:   Procedure Laterality Date    ANKLE SURGERY      fracture, repair with screws    ARTHRODESIS FOOT Left 2022    Procedure: left foot hardware removaL, fusion of 1st-2nd Tarsal metatarsal;  Surgeon: Anthony Castillo DPM;  Location: [facility] OR    COLONOSCOPY  2022    F/U  tubular adenoma    CONIZATION LEEP      ,Underwent a loop    IMPLANT STIMULATOR AND LEADS SACRAL NERVE (STAGE ONE AND TWO)      scheduled 23 with Dr. Sheth at [place]    LAPAROSCOPIC TUBAL LIGATION      ,tubal ligation     Social History     Tobacco Use    Smoking status: Former     Current packs/day: 0.00     Average packs/day: 2.0 packs/day for 3.0 years (6.0 ttl pk-yrs)     Types: Cigarettes     Start date:      Quit date: 2003     Years since quittin.5     Passive exposure: Past    Smokeless tobacco: Never   Substance Use Topics  "   Alcohol use: Not Currently     Alcohol/week: 0.0 standard drinks of alcohol     Current Outpatient Medications   Medication Sig Dispense Refill    acetaminophen (TYLENOL) 500 MG tablet Take 1-2 tablets (500-1,000 mg) by mouth every 6 hours as needed for mild pain 90 tablet 4    albuterol (VENTOLIN HFA) 108 (90 Base) MCG/ACT inhaler Inhale 1-2 puffs into the lungs every 4 hours as needed for shortness of breath or wheezing 18 g 11    amLODIPine (NORVASC) 2.5 MG tablet Take 1 tablet (2.5 mg) by mouth every morning. May also take 1 tablet (2.5 mg) every evening as needed (-- for Raynaud's / Hand color changes --). 180 tablet 4    ARIPiprazole (ABILIFY) 15 MG tablet Take 1 tablet (15 mg) by mouth At Bedtime 30 tablet 1    atomoxetine (STRATTERA) 25 MG capsule TAKE 1 CAPSULE BY MOUTH DAILY AT 8AM      BD SYRINGE SLIP TIP 25G X 5/8\" 1 ML MISC USE TO INJECT B-12 INTRAMUSCULARLY EVERY 2 WEEKS.      busPIRone (BUSPAR) 10 MG tablet Take 1 tablet by mouth 3 times daily Am and 2 pm      cholecalciferol (VITAMIN D3) 125 mcg (5000 units) capsule Take 1 capsule (125 mcg) by mouth daily 100 capsule 4    cyanocobalamin (CYANOCOBALAMIN) 1000 MCG/ML injection INJECT 1ML INTRAMUSCULARLY EVERY 2 WEEKS 6 mL 11    cyclobenzaprine (FLEXERIL) 5 MG tablet Take 1 tablet (5 mg) by mouth 3 times daily as needed for muscle spasms 30 tablet 0    docusate sodium (COLACE) 100 MG tablet Take 2 tablets (200 mg) by mouth 2 times daily -Adjust dose as needed to maintain soft stools 120 tablet 9    Elemental iron 65 mg Vitamin C 125 mg (VITRON C)  MG TABS tablet Take 1 tablet by mouth daily on empty stomach -for iron deficiency 90 tablet 4    estradiol (VIVELLE-DOT) 0.05 MG/24HR bi-weekly patch Place 1 patch onto the skin twice a week 24 patch 3    famotidine (PEPCID) 20 MG tablet Take 1 tablet (20 mg) by mouth 2 times daily - For Heartburn 180 tablet 4    gabapentin (NEURONTIN) 100 MG capsule Take 100 mg by mouth 3 times daily      " "hydrocortisone (CORTAID) 1 % external cream Apply topically as needed for rash Apply topically to affected area up to three times daily as needed until resolved. 60 g 1    hydrOXYzine (VISTARIL) 50 MG capsule TAKE 1 CAPSULE BY MOUTH FOUR TIMES A DAY AS NEEDED FOR ANXIETY      hydrOXYzine HCl (ATARAX) 50 MG tablet Take 1 tablet (50 mg) by mouth at bedtime 30 tablet 0    ibuprofen (ADVIL/MOTRIN) 200 MG tablet Take 1-2 tablets (200-400 mg) by mouth every 6 hours as needed for moderate pain 1 to 2 tabs Q6 PRN 90 tablet 0    lamoTRIgine (LAMICTAL) 100 MG tablet Take 1 tablet (100 mg) by mouth 3 times daily -- Managed by Psych      Lidocaine (LIDOCARE) 4 % Patch Place 1 patch onto the skin every 24 hours To prevent lidocaine toxicity, patient should be patch free for 12 hrs daily. 30 patch 0    Menthol, Topical Analgesic, (BIOFREEZE EX) Apply topically 4 times daily as needed May self administer      Menthol, Topical Analgesic, (BIOFREEZE) 4 % GEL Externally apply topically 2 times daily as needed (For pain) 150 mL 3    montelukast (SINGULAIR) 10 MG tablet Take 1 tablet (10 mg) by mouth at bedtime 90 tablet 4    prazosin (MINIPRESS) 1 MG capsule Take by mouth as needed      PREVIDENT 5000 PLUS 1.1 % CREA May self administer      progesterone (PROMETRIUM) 100 MG capsule Take 1 capsule (100 mg) by mouth daily 90 capsule 3    ramelteon (ROZEREM) 8 MG tablet Take 1 tablet (8 mg) by mouth at bedtime 90 tablet 4    syringe/needle, sisp, (B-D SYRINGE/NEEDLE) 25G X 5/8\" 1 ML MISC USE TO INJECT B-12 INTRAMUSCULARLY EVERY 2 WEEKS 94 each 4    terbinafine (LAMISIL) 1 % external cream Apply topically 2 times daily To skin rash on thigh 84 g 4    vilazodone (VIIBRYD) 40 MG TABS tablet Take 40 mg by mouth every morning       Allergies   Allergen Reactions    Clonazepam Other (See Comments)     Causes Violence and aggresssion    Hydrocodone-Acetaminophen      Other reaction(s): Seizures  Can take Percocet without difficulty.    " "Lorazepam Other (See Comments)     Causes Violence and aggresssion    Sertraline Other (See Comments)     Caused suicidally     Bupropion Other (See Comments)     Caused Major Depression    Dust Mite Extract      Other reaction(s): Asthma symptoms    Lithium Other (See Comments)     Mood alteration    Pollen Extract      Other reaction(s): Asthma symptoms    Risperidone Other (See Comments)     Agitation      Sulfa Antibiotics Itching    Trichophyton      Other reaction(s): Asthma symptoms    Valproic Acid Other (See Comments)     Weight gain     Past medical history, past surgical history, current medications and allergies reviewed and accurate to the best of my knowledge.      ROS:  Refer to HPI    /70 (BP Location: Left arm, Patient Position: Sitting, Cuff Size: Adult Large)   Pulse 84   Temp 98.5  F (36.9  C) (Temporal)   Resp 16   Ht 1.689 m (5' 6.5\")   Wt 131.6 kg (290 lb 1.6 oz)   SpO2 98%   BMI 46.12 kg/m      EXAM:  General Appearance: Well appearing 49 year old female, appropriate appearance for age. No acute distress   Ears: Left TM intact, translucent with bony landmarks appreciated, no erythema, no effusion, no bulging, no purulence.  Right TM intact, translucent with bony landmarks appreciated, no erythema, no effusion, no bulging, no purulence.  Left auditory canal clear.  Right auditory canal with erythema and mild edema.  Normal external ears, non tender.  Eyes: conjunctivae normal without erythema or irritation, corneas clear, no drainage or crusting, no eyelid swelling, pupils equal   Nose:  Bilateral nares: no erythema, no edema, no drainage or congestion   Neck: supple without adenopathy  Musculoskeletal:  Equal movement of bilateral upper extremities.  Equal movement of bilateral lower extremities.  Normal gait.    Dermatological: no rashes noted of exposed skin  Neuro: Alert and oriented to person, place, and time.   Psychological: normal affect, alert, oriented, and pleasant. "

## 2024-07-22 NOTE — NURSING NOTE
"Chief Complaint   Patient presents with    Ear Problem     Right ear.        Initial /70 (BP Location: Left arm, Patient Position: Sitting, Cuff Size: Adult Large)   Pulse 84   Temp 98.5  F (36.9  C) (Temporal)   Resp 16   Ht 1.689 m (5' 6.5\")   Wt 131.6 kg (290 lb 1.6 oz)   SpO2 98%   BMI 46.12 kg/m   Estimated body mass index is 46.12 kg/m  as calculated from the following:    Height as of this encounter: 1.689 m (5' 6.5\").    Weight as of this encounter: 131.6 kg (290 lb 1.6 oz).  Medication Review: complete    The next two questions are to help us understand your food security.  If you are feeling you need any assistance in this area, we have resources available to support you today.          5/23/2024   SDOH- Food Insecurity   Within the past 12 months, did you worry that your food would run out before you got money to buy more? N   Within the past 12 months, did the food you bought just not last and you didn t have money to get more? N            Health Care Directive:  Patient has a Health Care Directive on file      Beronica Leslie LPN      "

## 2024-07-30 ENCOUNTER — TELEPHONE (OUTPATIENT)
Dept: INTERNAL MEDICINE | Facility: OTHER | Age: 49
End: 2024-07-30
Payer: MEDICARE

## 2024-07-30 NOTE — TELEPHONE ENCOUNTER
DJS-patient is looking for clarification on a medication amlodipine    Please call and advise    Thank You    Halle Galvan on 7/30/2024 at 10:33 AM

## 2024-07-30 NOTE — TELEPHONE ENCOUNTER
Faxed patient's amlodipine details to [facility] at [phone].  Gem Arrington on 7/30/2024 at 2:10 PM

## 2024-07-30 NOTE — TELEPHONE ENCOUNTER
After proper verification, patient stated that where she lives [facility] fax number [phone] needs a copy of the amlodipine order. Order Faxed.  Chelsea Cam LPN on 7/30/2024 at 10:46 AM  EXT. [phone]

## 2024-07-30 NOTE — TELEPHONE ENCOUNTER
Faxed patient's amlodipine prescription details to [facility] at [phone].  *** HELP TEXT ***    To use this SmartLink, enter a list of procedure IDs  by commas. These normally correspond to the matching CPT codes. The SmartLink shows only a single result: the most recent result available linked to any listed procedure.  Example: .SINGLERESULT[[id number]

## 2024-08-07 DIAGNOSIS — M79.672 LEFT FOOT PAIN: Primary | ICD-10-CM

## 2024-08-08 ENCOUNTER — OFFICE VISIT (OUTPATIENT)
Dept: ORTHOPEDICS | Facility: OTHER | Age: 49
End: 2024-08-08
Attending: PODIATRIST
Payer: MEDICARE

## 2024-08-08 ENCOUNTER — HOSPITAL ENCOUNTER (OUTPATIENT)
Dept: GENERAL RADIOLOGY | Facility: OTHER | Age: 49
Discharge: HOME | End: 2024-08-08
Attending: PODIATRIST
Payer: MEDICARE

## 2024-08-08 DIAGNOSIS — T85.848S PAIN FROM IMPLANTED HARDWARE, SEQUELA: ICD-10-CM

## 2024-08-08 DIAGNOSIS — M79.672 LEFT FOOT PAIN: ICD-10-CM

## 2024-08-08 DIAGNOSIS — T84.84XA PAINFUL ORTHOPAEDIC HARDWARE (H): Primary | ICD-10-CM

## 2024-08-08 PROCEDURE — 99213 OFFICE O/P EST LOW 20 MIN: CPT | Performed by: PODIATRIST

## 2024-08-08 PROCEDURE — 73630 X-RAY EXAM OF FOOT: CPT | Mod: LT

## 2024-08-08 PROCEDURE — G0463 HOSPITAL OUTPT CLINIC VISIT: HCPCS | Mod: 25

## 2024-08-08 PROCEDURE — G0463 HOSPITAL OUTPT CLINIC VISIT: HCPCS

## 2024-08-08 NOTE — PROGRESS NOTES
SUBJECTIVE:  Rosi is a patient known to me she had a multiple midfoot fusion, Lisfranc repair.  She has pain associate with hardware she has a prominence medially which she has noticed recently.  She wants this evaluated.    ROS: Musculoskeletal and general review of systems are negative, per review of previous clinic questionnaire.  Denies SOB and calf pain.    EXAM:   PHYSICAL EXAMINATION:   CONSTITUTIONAL:  The patient is alert and oriented x 3, well appearing and in no apparent distress.  Affect is pleasant and answers questions appropriately.  VASCULAR:  Circulation is intact with palpable pedal pulses and adequate capillary fill time to all digits.  Hair growth is present and appropriate to mid foot and digits. Calf nontender.  NEUROLOGIC:  Light touch sensation is intact to digits.  There is a negative Tinel sign.    INTEGUMENT:  No abnormal dermatologic lesions are noted.  Skin has normal texture and turgor.    MUSCULOSKELETAL: Fusions are stable and well aligned she does have some prominence associated with the screw head on medial foot.    IMAGING: X-rays demonstrate the broken screw in the third TMT medial screw through the plate has backed out partially and is causing a prominence.    ASSESSMENT: Painful hardware    PLAN OF CARE: Discussed condition and treatment patient today.  Discussed the screw being painful and prominent I did recommend removal which she can schedule at her discretion discussed this in detail including surgery cover associated risk and potential complication.    Anthony Castillo DPM

## 2024-08-13 ENCOUNTER — HOSPITAL ENCOUNTER (OUTPATIENT)
Facility: OTHER | Age: 49
End: 2024-08-13
Attending: PODIATRIST | Admitting: PODIATRIST
Payer: MEDICARE

## 2024-08-15 ENCOUNTER — HOSPITAL ENCOUNTER (OUTPATIENT)
Dept: MAMMOGRAPHY | Facility: OTHER | Age: 49
Discharge: HOME | End: 2024-08-15
Attending: INTERNAL MEDICINE | Admitting: INTERNAL MEDICINE
Payer: MEDICARE

## 2024-08-15 DIAGNOSIS — Z12.31 VISIT FOR SCREENING MAMMOGRAM: ICD-10-CM

## 2024-08-15 PROCEDURE — 77063 BREAST TOMOSYNTHESIS BI: CPT

## 2024-08-20 ENCOUNTER — TRANSFERRED RECORDS (OUTPATIENT)
Dept: HEALTH INFORMATION MANAGEMENT | Facility: OTHER | Age: 49
End: 2024-08-20

## 2024-08-20 ENCOUNTER — OFFICE VISIT (OUTPATIENT)
Dept: INTERNAL MEDICINE | Facility: OTHER | Age: 49
End: 2024-08-20
Payer: MEDICARE

## 2024-08-20 VITALS
WEIGHT: 293 LBS | HEIGHT: 66 IN | HEART RATE: 80 BPM | DIASTOLIC BLOOD PRESSURE: 78 MMHG | BODY MASS INDEX: 47.09 KG/M2 | SYSTOLIC BLOOD PRESSURE: 136 MMHG | OXYGEN SATURATION: 100 % | TEMPERATURE: 98.6 F | RESPIRATION RATE: 24 BRPM

## 2024-08-20 DIAGNOSIS — Z01.818 PREOP GENERAL PHYSICAL EXAM: Primary | ICD-10-CM

## 2024-08-20 DIAGNOSIS — N18.2 CKD (CHRONIC KIDNEY DISEASE) STAGE 2, GFR 60-89 ML/MIN: ICD-10-CM

## 2024-08-20 DIAGNOSIS — E78.2 MIXED HYPERLIPIDEMIA: ICD-10-CM

## 2024-08-20 DIAGNOSIS — E66.01 CLASS 3 SEVERE OBESITY DUE TO EXCESS CALORIES WITH SERIOUS COMORBIDITY AND BODY MASS INDEX (BMI) OF 45.0 TO 49.9 IN ADULT (H): ICD-10-CM

## 2024-08-20 DIAGNOSIS — J45.40 MODERATE PERSISTENT ASTHMA WITHOUT COMPLICATION: Chronic | ICD-10-CM

## 2024-08-20 DIAGNOSIS — E66.813 CLASS 3 SEVERE OBESITY DUE TO EXCESS CALORIES WITH SERIOUS COMORBIDITY AND BODY MASS INDEX (BMI) OF 45.0 TO 49.9 IN ADULT (H): ICD-10-CM

## 2024-08-20 DIAGNOSIS — I89.0 LYMPHEDEMA OF BOTH LOWER EXTREMITIES: ICD-10-CM

## 2024-08-20 DIAGNOSIS — T84.84XA PAINFUL ORTHOPAEDIC HARDWARE (H): ICD-10-CM

## 2024-08-20 DIAGNOSIS — F31.76 BIPOLAR DISORDER, IN FULL REMISSION, MOST RECENT EPISODE DEPRESSED (H): ICD-10-CM

## 2024-08-20 LAB
ALBUMIN SERPL BCG-MCNC: 4.4 G/DL (ref 3.5–5.2)
ALP SERPL-CCNC: 86 U/L (ref 40–150)
ALT SERPL W P-5'-P-CCNC: 23 U/L (ref 0–50)
ANION GAP SERPL CALCULATED.3IONS-SCNC: 6 MMOL/L (ref 7–15)
AST SERPL W P-5'-P-CCNC: 27 U/L (ref 0–45)
BASOPHILS # BLD AUTO: 0 10E3/UL (ref 0–0.2)
BASOPHILS NFR BLD AUTO: 0 %
BILIRUB SERPL-MCNC: 0.6 MG/DL
BUN SERPL-MCNC: 15 MG/DL (ref 6–20)
CALCIUM SERPL-MCNC: 9.4 MG/DL (ref 8.8–10.4)
CHLORIDE SERPL-SCNC: 103 MMOL/L (ref 98–107)
CREAT SERPL-MCNC: 0.9 MG/DL (ref 0.51–0.95)
EGFRCR SERPLBLD CKD-EPI 2021: 79 ML/MIN/1.73M2
EOSINOPHIL # BLD AUTO: 0.1 10E3/UL (ref 0–0.7)
EOSINOPHIL NFR BLD AUTO: 1 %
ERYTHROCYTE [DISTWIDTH] IN BLOOD BY AUTOMATED COUNT: 13.2 % (ref 10–15)
GLUCOSE SERPL-MCNC: 96 MG/DL (ref 70–99)
HCO3 SERPL-SCNC: 30 MMOL/L (ref 22–29)
HCT VFR BLD AUTO: 44.4 % (ref 35–47)
HGB BLD-MCNC: 14.2 G/DL (ref 11.7–15.7)
IMM GRANULOCYTES # BLD: 0 10E3/UL
IMM GRANULOCYTES NFR BLD: 0 %
LYMPHOCYTES # BLD AUTO: 1.5 10E3/UL (ref 0.8–5.3)
LYMPHOCYTES NFR BLD AUTO: 17 %
MCH RBC QN AUTO: 30.3 PG (ref 26.5–33)
MCHC RBC AUTO-ENTMCNC: 32 G/DL (ref 31.5–36.5)
MCV RBC AUTO: 95 FL (ref 78–100)
MONOCYTES # BLD AUTO: 0.9 10E3/UL (ref 0–1.3)
MONOCYTES NFR BLD AUTO: 10 %
NEUTROPHILS # BLD AUTO: 6.5 10E3/UL (ref 1.6–8.3)
NEUTROPHILS NFR BLD AUTO: 72 %
NRBC # BLD AUTO: 0 10E3/UL
NRBC BLD AUTO-RTO: 0 /100
PLATELET # BLD AUTO: 237 10E3/UL (ref 150–450)
POTASSIUM SERPL-SCNC: 4.5 MMOL/L (ref 3.4–5.3)
PROT SERPL-MCNC: 8 G/DL (ref 6.4–8.3)
RBC # BLD AUTO: 4.69 10E6/UL (ref 3.8–5.2)
SODIUM SERPL-SCNC: 139 MMOL/L (ref 135–145)
WBC # BLD AUTO: 9.1 10E3/UL (ref 4–11)

## 2024-08-20 PROCEDURE — 36415 COLL VENOUS BLD VENIPUNCTURE: CPT | Mod: ZL

## 2024-08-20 PROCEDURE — 80053 COMPREHEN METABOLIC PANEL: CPT | Mod: ZL

## 2024-08-20 PROCEDURE — G0463 HOSPITAL OUTPT CLINIC VISIT: HCPCS

## 2024-08-20 PROCEDURE — 85025 COMPLETE CBC W/AUTO DIFF WBC: CPT | Mod: ZL

## 2024-08-20 PROCEDURE — G2211 COMPLEX E/M VISIT ADD ON: HCPCS

## 2024-08-20 PROCEDURE — 99214 OFFICE O/P EST MOD 30 MIN: CPT

## 2024-08-20 ASSESSMENT — PAIN SCALES - GENERAL: PAINLEVEL: NO PAIN (0)

## 2024-08-20 NOTE — PROGRESS NOTES
Preoperative Evaluation  [facility]  [address]  [place] [address]  Phone: [phone]  Fax: [phone]  Primary Provider: Lucio Curiel MD  Pre-op Performing Provider: DASHA Rico CNP  Aug 20, 2024             8/20/2024   Surgical Information   What procedure is being done? Left foot hardware removal   Facility or Hospital where procedure/surgery will be performed: [facility] [place]   Who is doing the procedure / surgery? Dr. Castillo   Date of surgery / procedure: Sept 5, 2024   Time of surgery / procedure: dont know yet   Where do you plan to recover after surgery? at home alone        Fax number for surgical facility: [facility]    Assessment & Plan     The proposed surgical procedure is considered LOW risk.      ICD-10-CM    1. Preop general physical exam  Z01.818 Comprehensive Metabolic Panel     CBC with Platelets & Differential ([facility] Only)     Comprehensive Metabolic Panel     CBC with Platelets & Differential ([facility] Only)      2. Painful orthopaedic hardware (H24)  T84.84XA       3. Class 3 severe obesity due to excess calories with serious comorbidity and body mass index (BMI) of 45.0 to 49.9 in adult (H)  E66.01     Z68.42       4. Moderate persistent asthma without complication  J45.40       5. CKD (chronic kidney disease) stage 2, GFR 60-89 ml/min  N18.2       6. Bipolar disorder, in full remission, most recent episode depressed (H24)  F31.76       7. Lymphedema of both lower extremities  I89.0       8. Mixed hyperlipidemia  E78.2               Risks and Recommendations  The patient has the following additional risks and recommendations for perioperative complications:   - Morbid obesity (BMI >40)    Preoperative Medication Instructions  Antiplatelet or Anticoagulation Medication Instructions   - Patient is on no antiplatelet or anticoagulation medications.    Additional Medication Instructions   - ibuprofen (Advil, Motrin): DO NOT TAKE 1 day before surgery.      Recommendation  Approval given to proceed with proposed procedure, without further diagnostic evaluation.    Tawanda Aranda is a 49 year old, presenting for the following:  Pre-Op Exam (Left Foot hardware removal)        HPI related to upcoming procedure: Patient presents to clinic for preoperative evaluation for left foot hardware removal.  She has had a multiple midfoot fusion, Lisfranc repair and now has pain due to her hardware. Patient denies having any difficulties with anesthesia in the past, and is able to meet > 4 METS without symptoms.         8/20/2024   Pre-Op Questionnaire   Have you ever had a heart attack or stroke? No   Have you ever had surgery on your heart or blood vessels, such as a stent placement, a coronary artery bypass, or surgery on an artery in your head, neck, heart, or legs? No   Do you have chest pain with activity? No   Do you have a history of heart failure? No   Do you currently have a cold, bronchitis or symptoms of other infection? No   Do you have a cough, shortness of breath, or wheezing? No   Do you or anyone in your family have previous history of blood clots? No   Do you or does anyone in your family have a serious bleeding problem such as prolonged bleeding following surgeries or cuts? No   Have you ever had problems with anemia or been told to take iron pills? No   Have you had any abnormal blood loss such as black, tarry or bloody stools, or abnormal vaginal bleeding? No   Have you ever had a blood transfusion? No   Are you willing to have a blood transfusion if it is medically needed before, during, or after your surgery? Yes   Have you or any of your relatives ever had problems with anesthesia? No   Do you have sleep apnea, excessive snoring or daytime drowsiness? No   Do you have any artifical heart valves or other implanted medical devices like a pacemaker, defibrillator, or continuous glucose monitor? No   Do you have artificial joints? No   Are you allergic  to latex? No        Health Care Directive  Patient has a Health Care Directive on file      Preoperative Review of    reviewed - controlled substances reflected in medication list.      Status of Chronic Conditions:  See problem list for active medical problems.  Problems all longstanding and stable, except as noted/documented.  See ROS for pertinent symptoms related to these conditions.    ASTHMA - Patient has a longstanding history of moderate-severe Asthma . Patient has been doing well overall noting NO SYMPTOMS and continues on medication regimen consisting of albuterol, singulair without adverse reactions or side effects.     HYPERLIPIDEMIA - Patient has a long history of significant Hyperlipidemia requiring medication for treatment with recent good control. Patient reports no problems or side effects with the medication.     HYPERTENSION - Patient has longstanding history of HTN , currently denies any symptoms referable to elevated blood pressure. Specifically denies chest pain, palpitations, dyspnea, orthopnea, PND or peripheral edema. Blood pressure readings have been in normal range. Current medication regimen is as listed below. Patient denies any side effects of medication.     Patient Active Problem List    Diagnosis Date Noted    Primary insomnia 04/04/2024     Priority: Medium    Nocturnal leg cramps 01/23/2024     Priority: Medium    CKD (chronic kidney disease) stage 2, GFR 60-89 ml/min 03/14/2023     Priority: Medium    H/O adenomatous polyp of colon 07/25/2022     Priority: Medium    Nail dystrophy 02/27/2022     Priority: Medium    Intermittent low back pain 09/08/2021     Priority: Medium    Abnormal Pap smear of cervix 04/11/2018     Priority: Medium     Overview:   had scraping of cervix    Formatting of this note might be different from the original.  had scraping of cervix      Allergic rhinitis due to pollen 02/08/2018     Priority: Medium    Esophageal reflux 02/08/2018     Priority:  Medium    History of substance abuse (H) 02/08/2018     Priority: Medium    Chronic pain of right knee 04/20/2017     Priority: Medium    Menorrhagia with irregular cycle 02/16/2017     Priority: Medium    Bilateral foot pain 12/18/2016     Priority: Medium    Elevated random blood glucose level 12/18/2016     Priority: Medium    Peripheral polyneuropathy 12/18/2016     Priority: Medium    Vitamin B12 deficiency 12/18/2016     Priority: Medium    Vitamin D deficiency 12/18/2016     Priority: Medium    Lymphedema of both lower extremities 11/22/2016     Priority: Medium    Chronic venous stasis dermatitis of right lower extremity 08/17/2016     Priority: Medium     Overview:   Updated per 10/1/17 IMO import    Formatting of this note might be different from the original.  Updated per 10/1/17 IMO import      Alcohol use disorder, moderate, in sustained remission, dependence (H) 11/28/2015     Priority: Medium    Generalized anxiety disorder 11/28/2015     Priority: Medium    Bipolar disorder, in full remission, most recent episode depressed (H24) 11/28/2015     Priority: Medium    Class 3 severe obesity due to excess calories with serious comorbidity and body mass index (BMI) of 40.0 to 44.9 in adult (H) 03/23/2015     Priority: Medium    Edema of extremity of unknown cause 01/15/2014     Priority: Medium     Formatting of this note might be different from the original.  1/15/2014: both legs, well controlled on prn lasix      Moderate persistent asthma 08/19/2013     Priority: Medium     AAP completed on 08/19/13  Triggers: pollen, fumes, exercise, URI, warm weather. Peak flow today is 250. Symptomatic: moderate    Formatting of this note might be different from the original.  Overview:   AAP completed on 08/19/13  Triggers: pollen, fumes, exercise, URI, warm weather. Peak flow today is 250. Symptomatic: moderate  Overview:   AAP completed on 08/19/13  Triggers: pollen, fumes, exercise, URI, warm weather. Peak flow  today is 250. Symptomatic: moderate  Formatting of this note might be different from the original.  AAP completed on 13  Triggers: pollen, fumes, exercise, URI, warm weather. Peak flow today is 250. Symptomatic: moderate      Urinary incontinence 03/15/2013     Priority: Medium    Allergic rhinitis due to other allergen 10/02/2003     Priority: Medium     Overview:   [facility] - Seasonal Allergies  Overview:   Overview:   [facility] - Seasonal Allergies    Formatting of this note might be different from the original.  [facility] - Seasonal Allergies      Borderline personality disorder (H) - [facility] for therapy + [facility] for medication management. 2003     Priority: Medium     [facility] for therapy + [facility] for medication management.      Disorder of female genital organ 2001     Priority: Medium     Overview:   DUB, dysmenorrhea  Overview:   Overview:   DUB, dysmenorrhea    Formatting of this note might be different from the original.  Overview:   DUB, dysmenorrhea        Past Medical History:   Diagnosis Date    Abnormal Pap smear of cervix 2018    Overview:  had scraping of cervix    Cannabis use disorder, moderate, in sustained remission, dependence (H) 2015    Closed dislocation of tarsal joint 2011    Closed dislocation of tarsal joint - left 2011    Edema     No Comments Provided    Encounter for removal and reinsertion of intrauterine contraceptive device     05,IUD placement, Removed    Excessive and frequent menstruation with irregular cycle     2017    Major depressive disorder, single episode     No Comments Provided    Personal history of other medical treatment (CODE)     G3, P2-0-1-2 with history of spontaneous     Personal history of other medical treatment (CODE)     No Comments Provided    Uncomplicated asthma     No Comments Provided     Past Surgical History:   Procedure Laterality Date    ANKLE SURGERY      fracture, repair  "with screws    ARTHRODESIS FOOT Left 04/21/2022    Procedure: left foot hardware removaL, fusion of 1st-2nd Tarsal metatarsal;  Surgeon: Anthony Castillo DPM;  Location: [facility] OR    COLONOSCOPY  07/25/2022    F/U 2027 tubular adenoma    CONIZATION LEEP      07/04,Underwent a loop    IMPLANT STIMULATOR AND LEADS SACRAL NERVE (STAGE ONE AND TWO)      scheduled 11/8/23 with Dr. Sheth at [place]    LAPAROSCOPIC TUBAL LIGATION      2009,tubal ligation     Current Outpatient Medications   Medication Sig Dispense Refill    acetaminophen (TYLENOL) 500 MG tablet Take 1-2 tablets (500-1,000 mg) by mouth every 6 hours as needed for mild pain 90 tablet 4    albuterol (VENTOLIN HFA) 108 (90 Base) MCG/ACT inhaler Inhale 1-2 puffs into the lungs every 4 hours as needed for shortness of breath or wheezing 18 g 11    amLODIPine (NORVASC) 2.5 MG tablet Take 1 tablet (2.5 mg) by mouth every morning. May also take 1 tablet (2.5 mg) every evening as needed (-- for Raynaud's / Hand color changes --). 180 tablet 4    ARIPiprazole (ABILIFY) 15 MG tablet Take 1 tablet (15 mg) by mouth At Bedtime 30 tablet 1    atomoxetine (STRATTERA) 25 MG capsule TAKE 1 CAPSULE BY MOUTH DAILY AT 8AM      BD SYRINGE SLIP TIP 25G X 5/8\" 1 ML MISC USE TO INJECT B-12 INTRAMUSCULARLY EVERY 2 WEEKS.      busPIRone (BUSPAR) 10 MG tablet Take 1 tablet by mouth 3 times daily Am and 2 pm      cholecalciferol (VITAMIN D3) 125 mcg (5000 units) capsule Take 1 capsule (125 mcg) by mouth daily 100 capsule 4    cyanocobalamin (CYANOCOBALAMIN) 1000 MCG/ML injection INJECT 1ML INTRAMUSCULARLY EVERY 2 WEEKS 6 mL 11    cyclobenzaprine (FLEXERIL) 5 MG tablet Take 1 tablet (5 mg) by mouth 3 times daily as needed for muscle spasms 30 tablet 0    docusate sodium (COLACE) 100 MG tablet Take 2 tablets (200 mg) by mouth 2 times daily -Adjust dose as needed to maintain soft stools 120 tablet 9    Elemental iron 65 mg Vitamin C 125 mg (VITRON C)  MG TABS tablet Take 1 tablet by " "mouth daily on empty stomach -for iron deficiency 90 tablet 4    estradiol (VIVELLE-DOT) 0.05 MG/24HR bi-weekly patch Place 1 patch onto the skin twice a week 24 patch 3    famotidine (PEPCID) 20 MG tablet Take 1 tablet (20 mg) by mouth 2 times daily - For Heartburn 180 tablet 4    gabapentin (NEURONTIN) 100 MG capsule Take 100 mg by mouth 3 times daily      hydrocortisone (CORTAID) 1 % external cream Apply topically as needed for rash Apply topically to affected area up to three times daily as needed until resolved. 60 g 1    hydrOXYzine (VISTARIL) 50 MG capsule TAKE 1 CAPSULE BY MOUTH FOUR TIMES A DAY AS NEEDED FOR ANXIETY      hydrOXYzine HCl (ATARAX) 50 MG tablet Take 1 tablet (50 mg) by mouth at bedtime 30 tablet 0    ibuprofen (ADVIL/MOTRIN) 200 MG tablet Take 1-2 tablets (200-400 mg) by mouth every 6 hours as needed for moderate pain 1 to 2 tabs Q6 PRN 90 tablet 0    lamoTRIgine (LAMICTAL) 100 MG tablet Take 1 tablet (100 mg) by mouth 3 times daily -- Managed by Psych      Lidocaine (LIDOCARE) 4 % Patch Place 1 patch onto the skin every 24 hours To prevent lidocaine toxicity, patient should be patch free for 12 hrs daily. 30 patch 0    Menthol, Topical Analgesic, (BIOFREEZE EX) Apply topically 4 times daily as needed May self administer      Menthol, Topical Analgesic, (BIOFREEZE) 4 % GEL Externally apply topically 2 times daily as needed (For pain) 150 mL 3    montelukast (SINGULAIR) 10 MG tablet Take 1 tablet (10 mg) by mouth at bedtime 90 tablet 4    prazosin (MINIPRESS) 1 MG capsule Take by mouth as needed      PREVIDENT 5000 PLUS 1.1 % CREA May self administer      progesterone (PROMETRIUM) 100 MG capsule Take 1 capsule (100 mg) by mouth daily 90 capsule 3    ramelteon (ROZEREM) 8 MG tablet Take 1 tablet (8 mg) by mouth at bedtime 90 tablet 4    syringe/needle, sisp, (B-D SYRINGE/NEEDLE) 25G X 5/8\" 1 ML MISC USE TO INJECT B-12 INTRAMUSCULARLY EVERY 2 WEEKS 94 each 4    terbinafine (LAMISIL) 1 % external " "cream Apply topically 2 times daily To skin rash on thigh 84 g 4    vilazodone (VIIBRYD) 40 MG TABS tablet Take 40 mg by mouth every morning         Allergies   Allergen Reactions    Clonazepam Other (See Comments)     Causes Violence and aggresssion    Hydrocodone-Acetaminophen      Other reaction(s): Seizures  Can take Percocet without difficulty.    Lorazepam Other (See Comments)     Causes Violence and aggresssion    Sertraline Other (See Comments)     Caused suicidally     Bupropion Other (See Comments)     Caused Major Depression    Dust Mite Extract      Other reaction(s): Asthma symptoms    Lithium Other (See Comments)     Mood alteration    Pollen Extract      Other reaction(s): Asthma symptoms    Risperidone Other (See Comments)     Agitation      Sulfa Antibiotics Itching    Trichophyton      Other reaction(s): Asthma symptoms    Valproic Acid Other (See Comments)     Weight gain        Social History     Tobacco Use    Smoking status: Former     Current packs/day: 0.00     Average packs/day: 2.0 packs/day for 3.0 years (6.0 ttl pk-yrs)     Types: Cigarettes     Start date:      Quit date: 2003     Years since quittin.6     Passive exposure: Past    Smokeless tobacco: Never   Substance Use Topics    Alcohol use: Not Currently     Alcohol/week: 0.0 standard drinks of alcohol       History   Drug Use Unknown             Review of Systems  CONSTITUTIONAL: NEGATIVE for fever, chills, change in weight  ENT/MOUTH: NEGATIVE for ear, mouth and throat problems  RESP: NEGATIVE for significant cough or SOB  CV: NEGATIVE for chest pain, palpitations or peripheral edema    Objective    /78 (BP Location: Right arm, Patient Position: Sitting, Cuff Size: Adult Regular)   Pulse 80   Temp 98.6  F (37  C) (Tympanic)   Resp 24   Ht 1.676 m (5' 6\")   Wt 134.4 kg (296 lb 6.4 oz)   SpO2 100%   Breastfeeding No   BMI 47.84 kg/m     Estimated body mass index is 47.84 kg/m  as calculated from the " "following:    Height as of this encounter: 1.676 m (5' 6\").    Weight as of this encounter: 134.4 kg (296 lb 6.4 oz).  Physical Exam  Constitutional:       Appearance: She is obese.   Eyes:      General: No scleral icterus.     Conjunctiva/sclera: Conjunctivae normal.   Cardiovascular:      Rate and Rhythm: Normal rate and regular rhythm.      Pulses: Normal pulses.      Heart sounds: Normal heart sounds. No murmur heard.  Pulmonary:      Effort: Pulmonary effort is normal. No respiratory distress.      Breath sounds: No wheezing or rhonchi.   Abdominal:      Palpations: Abdomen is soft.      Tenderness: There is no abdominal tenderness.   Musculoskeletal:         General: No deformity. Normal range of motion.      Comments: Lymphedema of legs   Skin:     General: Skin is warm and dry.      Findings: No rash.   Neurological:      Mental Status: She is alert. Mental status is at baseline.   Psychiatric:         Mood and Affect: Mood normal.         Behavior: Behavior normal.         Diagnostics  Recent Results (from the past 24 hour(s))   Comprehensive Metabolic Panel    Collection Time: 08/20/24  1:04 PM   Result Value Ref Range    Sodium 139 135 - 145 mmol/L    Potassium 4.5 3.4 - 5.3 mmol/L    Carbon Dioxide (CO2) 30 (H) 22 - 29 mmol/L    Anion Gap 6 (L) 7 - 15 mmol/L    Urea Nitrogen 15.0 6.0 - 20.0 mg/dL    Creatinine 0.90 0.51 - 0.95 mg/dL    GFR Estimate 79 >60 mL/min/1.73m2    Calcium 9.4 8.8 - 10.4 mg/dL    Chloride 103 98 - 107 mmol/L    Glucose 96 70 - 99 mg/dL    Alkaline Phosphatase 86 40 - 150 U/L    AST 27 0 - 45 U/L    ALT 23 0 - 50 U/L    Protein Total 8.0 6.4 - 8.3 g/dL    Albumin 4.4 3.5 - 5.2 g/dL    Bilirubin Total 0.6 <=1.2 mg/dL   CBC with platelets and differential    Collection Time: 08/20/24  1:04 PM   Result Value Ref Range    WBC Count 9.1 4.0 - 11.0 10e3/uL    RBC Count 4.69 3.80 - 5.20 10e6/uL    Hemoglobin 14.2 11.7 - 15.7 g/dL    Hematocrit 44.4 35.0 - 47.0 %    MCV 95 78 - 100 fL    " MCH 30.3 26.5 - 33.0 pg    MCHC 32.0 31.5 - 36.5 g/dL    RDW 13.2 10.0 - 15.0 %    Platelet Count 237 150 - 450 10e3/uL    % Neutrophils 72 %    % Lymphocytes 17 %    % Monocytes 10 %    % Eosinophils 1 %    % Basophils 0 %    % Immature Granulocytes 0 %    NRBCs per 100 WBC 0 <1 /100    Absolute Neutrophils 6.5 1.6 - 8.3 10e3/uL    Absolute Lymphocytes 1.5 0.8 - 5.3 10e3/uL    Absolute Monocytes 0.9 0.0 - 1.3 10e3/uL    Absolute Eosinophils 0.1 0.0 - 0.7 10e3/uL    Absolute Basophils 0.0 0.0 - 0.2 10e3/uL    Absolute Immature Granulocytes 0.0 <=0.4 10e3/uL    Absolute NRBCs 0.0 10e3/uL      No EKG required, no history of coronary heart disease, significant arrhythmia, peripheral arterial disease or other structural heart disease.    Revised Cardiac Risk Index (RCRI)  The patient has the following serious cardiovascular risks for perioperative complications:   - No serious cardiac risks = 0 points     RCRI Interpretation: 0 points: Class I (very low risk - 0.4% complication rate)         Signed Electronically by: DASHA Rico CNP  A copy of this evaluation report is provided to the requesting physician.    The longitudinal plan of care for the diagnosis(es)/condition(s) as documented were addressed during this visit. Due to the added complexity in care, I will continue to support Rosi in the subsequent management and with ongoing continuity of care.

## 2024-08-20 NOTE — PATIENT INSTRUCTIONS

## 2024-08-20 NOTE — NURSING NOTE
"Chief Complaint   Patient presents with    Pre-Op Exam     Left Foot hardware removal       Initial /78 (BP Location: Right arm, Patient Position: Sitting, Cuff Size: Adult Regular)   Pulse 80   Temp 98.6  F (37  C) (Tympanic)   Resp 24   Ht 1.676 m (5' 6\")   Wt 134.4 kg (296 lb 6.4 oz)   SpO2 100%   Breastfeeding No   BMI 47.84 kg/m   Estimated body mass index is 47.84 kg/m  as calculated from the following:    Height as of this encounter: 1.676 m (5' 6\").    Weight as of this encounter: 134.4 kg (296 lb 6.4 oz).  Medication Review: complete    The next two questions are to help us understand your food security.  If you are feeling you need any assistance in this area, we have resources available to support you today.          5/23/2024   SDOH- Food Insecurity   Within the past 12 months, did you worry that your food would run out before you got money to buy more? N   Within the past 12 months, did the food you bought just not last and you didn t have money to get more? N            Health Care Directive:  Patient has a Health Care Directive on file      Gem Arrington      "

## 2024-08-22 ENCOUNTER — HOSPITAL ENCOUNTER (EMERGENCY)
Facility: OTHER | Age: 49
Discharge: HOME | End: 2024-08-22
Attending: EMERGENCY MEDICINE | Admitting: EMERGENCY MEDICINE
Payer: MEDICARE

## 2024-08-22 ENCOUNTER — APPOINTMENT (OUTPATIENT)
Dept: GENERAL RADIOLOGY | Facility: OTHER | Age: 49
End: 2024-08-22
Attending: EMERGENCY MEDICINE
Payer: MEDICARE

## 2024-08-22 VITALS
DIASTOLIC BLOOD PRESSURE: 65 MMHG | OXYGEN SATURATION: 100 % | BODY MASS INDEX: 47.09 KG/M2 | TEMPERATURE: 98.7 F | SYSTOLIC BLOOD PRESSURE: 118 MMHG | RESPIRATION RATE: 20 BRPM | HEART RATE: 80 BPM | HEIGHT: 66 IN | WEIGHT: 293 LBS

## 2024-08-22 DIAGNOSIS — I48.0 PAROXYSMAL ATRIAL FIBRILLATION (H): ICD-10-CM

## 2024-08-22 LAB
ALBUMIN SERPL BCG-MCNC: 4.1 G/DL (ref 3.5–5.2)
ALP SERPL-CCNC: 81 U/L (ref 40–150)
ALT SERPL W P-5'-P-CCNC: 20 U/L (ref 0–50)
ANION GAP SERPL CALCULATED.3IONS-SCNC: 8 MMOL/L (ref 7–15)
AST SERPL W P-5'-P-CCNC: 26 U/L (ref 0–45)
ATRIAL RATE - MUSE: 141 BPM
ATRIAL RATE - MUSE: 84 BPM
BASOPHILS # BLD AUTO: 0 10E3/UL (ref 0–0.2)
BASOPHILS NFR BLD AUTO: 0 %
BILIRUB SERPL-MCNC: 0.4 MG/DL
BUN SERPL-MCNC: 19.6 MG/DL (ref 6–20)
CALCIUM SERPL-MCNC: 9.5 MG/DL (ref 8.8–10.4)
CHLORIDE SERPL-SCNC: 104 MMOL/L (ref 98–107)
CREAT SERPL-MCNC: 0.95 MG/DL (ref 0.51–0.95)
DIASTOLIC BLOOD PRESSURE - MUSE: NORMAL MMHG
DIASTOLIC BLOOD PRESSURE - MUSE: NORMAL MMHG
EGFRCR SERPLBLD CKD-EPI 2021: 74 ML/MIN/1.73M2
EOSINOPHIL # BLD AUTO: 0.2 10E3/UL (ref 0–0.7)
EOSINOPHIL NFR BLD AUTO: 2 %
ERYTHROCYTE [DISTWIDTH] IN BLOOD BY AUTOMATED COUNT: 13.1 % (ref 10–15)
GLUCOSE SERPL-MCNC: 80 MG/DL (ref 70–99)
HCO3 SERPL-SCNC: 28 MMOL/L (ref 22–29)
HCT VFR BLD AUTO: 43.2 % (ref 35–47)
HGB BLD-MCNC: 13.8 G/DL (ref 11.7–15.7)
HOLD SPECIMEN: NORMAL
IMM GRANULOCYTES # BLD: 0 10E3/UL
IMM GRANULOCYTES NFR BLD: 0 %
INTERPRETATION ECG - MUSE: NORMAL
INTERPRETATION ECG - MUSE: NORMAL
LYMPHOCYTES # BLD AUTO: 2.1 10E3/UL (ref 0.8–5.3)
LYMPHOCYTES NFR BLD AUTO: 23 %
MAGNESIUM SERPL-MCNC: 1.9 MG/DL (ref 1.7–2.3)
MCH RBC QN AUTO: 30.5 PG (ref 26.5–33)
MCHC RBC AUTO-ENTMCNC: 31.9 G/DL (ref 31.5–36.5)
MCV RBC AUTO: 96 FL (ref 78–100)
MONOCYTES # BLD AUTO: 0.9 10E3/UL (ref 0–1.3)
MONOCYTES NFR BLD AUTO: 10 %
NEUTROPHILS # BLD AUTO: 5.8 10E3/UL (ref 1.6–8.3)
NEUTROPHILS NFR BLD AUTO: 64 %
NRBC # BLD AUTO: 0 10E3/UL
NRBC BLD AUTO-RTO: 0 /100
P AXIS - MUSE: 57 DEGREES
P AXIS - MUSE: NORMAL DEGREES
PLATELET # BLD AUTO: 228 10E3/UL (ref 150–450)
POTASSIUM SERPL-SCNC: 4.2 MMOL/L (ref 3.4–5.3)
PR INTERVAL - MUSE: 194 MS
PR INTERVAL - MUSE: NORMAL MS
PROT SERPL-MCNC: 7.5 G/DL (ref 6.4–8.3)
QRS DURATION - MUSE: 90 MS
QRS DURATION - MUSE: 96 MS
QT - MUSE: 300 MS
QT - MUSE: 378 MS
QTC - MUSE: 446 MS
QTC - MUSE: 466 MS
R AXIS - MUSE: 57 DEGREES
R AXIS - MUSE: 57 DEGREES
RBC # BLD AUTO: 4.52 10E6/UL (ref 3.8–5.2)
SODIUM SERPL-SCNC: 140 MMOL/L (ref 135–145)
SYSTOLIC BLOOD PRESSURE - MUSE: NORMAL MMHG
SYSTOLIC BLOOD PRESSURE - MUSE: NORMAL MMHG
T AXIS - MUSE: 46 DEGREES
T AXIS - MUSE: 52 DEGREES
TROPONIN T SERPL HS-MCNC: <6 NG/L
VENTRICULAR RATE- MUSE: 145 BPM
VENTRICULAR RATE- MUSE: 84 BPM
WBC # BLD AUTO: 9 10E3/UL (ref 4–11)

## 2024-08-22 PROCEDURE — 93005 ELECTROCARDIOGRAM TRACING: CPT | Mod: 76 | Performed by: EMERGENCY MEDICINE

## 2024-08-22 PROCEDURE — 96374 THER/PROPH/DIAG INJ IV PUSH: CPT | Performed by: EMERGENCY MEDICINE

## 2024-08-22 PROCEDURE — 250N000013 HC RX MED GY IP 250 OP 250 PS 637: Performed by: EMERGENCY MEDICINE

## 2024-08-22 PROCEDURE — 250N000011 HC RX IP 250 OP 636: Performed by: EMERGENCY MEDICINE

## 2024-08-22 PROCEDURE — 99291 CRITICAL CARE FIRST HOUR: CPT | Mod: 25 | Performed by: EMERGENCY MEDICINE

## 2024-08-22 PROCEDURE — 36415 COLL VENOUS BLD VENIPUNCTURE: CPT | Performed by: EMERGENCY MEDICINE

## 2024-08-22 PROCEDURE — 93005 ELECTROCARDIOGRAM TRACING: CPT | Performed by: EMERGENCY MEDICINE

## 2024-08-22 PROCEDURE — 71045 X-RAY EXAM CHEST 1 VIEW: CPT

## 2024-08-22 PROCEDURE — 85048 AUTOMATED LEUKOCYTE COUNT: CPT | Performed by: EMERGENCY MEDICINE

## 2024-08-22 PROCEDURE — 96376 TX/PRO/DX INJ SAME DRUG ADON: CPT | Performed by: EMERGENCY MEDICINE

## 2024-08-22 PROCEDURE — 99284 EMERGENCY DEPT VISIT MOD MDM: CPT | Performed by: EMERGENCY MEDICINE

## 2024-08-22 PROCEDURE — 82247 BILIRUBIN TOTAL: CPT | Performed by: EMERGENCY MEDICINE

## 2024-08-22 PROCEDURE — 93010 ELECTROCARDIOGRAM REPORT: CPT | Performed by: INTERNAL MEDICINE

## 2024-08-22 PROCEDURE — 84484 ASSAY OF TROPONIN QUANT: CPT | Performed by: EMERGENCY MEDICINE

## 2024-08-22 PROCEDURE — 83735 ASSAY OF MAGNESIUM: CPT | Performed by: EMERGENCY MEDICINE

## 2024-08-22 RX ORDER — DILTIAZEM HYDROCHLORIDE 30 MG/1
30 TABLET, FILM COATED ORAL EVERY 6 HOURS SCHEDULED
Status: DISCONTINUED | OUTPATIENT
Start: 2024-08-22 | End: 2024-08-22

## 2024-08-22 RX ORDER — DILTIAZEM HYDROCHLORIDE EXTENDED-RELEASE TABLETS 120 MG/1
120 TABLET, EXTENDED RELEASE ORAL DAILY
Qty: 30 TABLET | Refills: 0 | Status: SHIPPED | OUTPATIENT
Start: 2024-08-22 | End: 2024-09-03

## 2024-08-22 RX ORDER — DILTIAZEM HYDROCHLORIDE 120 MG/1
120 CAPSULE, COATED, EXTENDED RELEASE ORAL ONCE
Status: COMPLETED | OUTPATIENT
Start: 2024-08-22 | End: 2024-08-22

## 2024-08-22 RX ORDER — DILTIAZEM HYDROCHLORIDE 5 MG/ML
25 INJECTION INTRAVENOUS ONCE
Status: COMPLETED | OUTPATIENT
Start: 2024-08-22 | End: 2024-08-22

## 2024-08-22 RX ORDER — METOPROLOL SUCCINATE 25 MG/1
25 TABLET, EXTENDED RELEASE ORAL ONCE
Status: DISCONTINUED | OUTPATIENT
Start: 2024-08-22 | End: 2024-08-22

## 2024-08-22 RX ORDER — DILTIAZEM HYDROCHLORIDE 5 MG/ML
20 INJECTION INTRAVENOUS ONCE
Status: COMPLETED | OUTPATIENT
Start: 2024-08-22 | End: 2024-08-22

## 2024-08-22 RX ADMIN — DILTIAZEM HYDROCHLORIDE 20 MG: 5 INJECTION, SOLUTION INTRAVENOUS at 16:40

## 2024-08-22 RX ADMIN — DILTIAZEM HYDROCHLORIDE 120 MG: 120 CAPSULE, COATED, EXTENDED RELEASE ORAL at 18:39

## 2024-08-22 RX ADMIN — DILTIAZEM HYDROCHLORIDE 25 MG: 5 INJECTION, SOLUTION INTRAVENOUS at 17:14

## 2024-08-22 ASSESSMENT — ENCOUNTER SYMPTOMS
CHEST TIGHTNESS: 0
FEVER: 0
DYSURIA: 0
ARTHRALGIAS: 0
AGITATION: 0
PALPITATIONS: 1
DIAPHORESIS: 0
NAUSEA: 0
CHILLS: 0
LIGHT-HEADEDNESS: 1
VOMITING: 0
SHORTNESS OF BREATH: 1

## 2024-08-22 ASSESSMENT — ACTIVITIES OF DAILY LIVING (ADL)
ADLS_ACUITY_SCORE: 35
ADLS_ACUITY_SCORE: 35

## 2024-08-22 ASSESSMENT — COLUMBIA-SUICIDE SEVERITY RATING SCALE - C-SSRS
1. IN THE PAST MONTH, HAVE YOU WISHED YOU WERE DEAD OR WISHED YOU COULD GO TO SLEEP AND NOT WAKE UP?: NO
6. HAVE YOU EVER DONE ANYTHING, STARTED TO DO ANYTHING, OR PREPARED TO DO ANYTHING TO END YOUR LIFE?: NO
2. HAVE YOU ACTUALLY HAD ANY THOUGHTS OF KILLING YOURSELF IN THE PAST MONTH?: NO

## 2024-08-22 NOTE — DISCHARGE INSTRUCTIONS
I would call your doctor to schedule follow-up appointment to discuss this.  In the meantime if you experience any of these symptoms again you should return to the ER.

## 2024-08-22 NOTE — ED TRIAGE NOTES
"Pt presents to ED via EMS with c/o feeling like her heart was racing and feeling like she was going to pass out. Pt also c/o shortness of breath and sweating. /57   Pulse (!) 146   Temp 98.7  F (37.1  C) (Tympanic)   Resp 20   Ht 1.676 m (5' 6\")   Wt 134.3 kg (296 lb)   SpO2 100%   BMI 47.78 kg/m         Triage Assessment (Adult)       Row Name 08/22/24 1630          Triage Assessment    Airway WDL WDL        Respiratory WDL    Respiratory WDL X;rhythm/pattern     Rhythm/Pattern, Respiratory dyspnea upon exertion;shortness of breath        Skin Circulation/Temperature WDL    Skin Circulation/Temperature WDL WDL        Cardiac WDL    Cardiac WDL X;rhythm     Pulse Rate & Regularity apical pulse irregular;tachycardic        Peripheral/Neurovascular WDL    Peripheral Neurovascular WDL WDL        Cognitive/Neuro/Behavioral WDL    Cognitive/Neuro/Behavioral WDL WDL                     "

## 2024-08-22 NOTE — ED PROVIDER NOTES
History     Chief Complaint   Patient presents with    Palpitations    Shortness of Breath     HPI  Rosi Lamb is a 49 year old female who comes in by ambulance from her home.  Approximately 2 hours ago she suddenly felt her heart racing.  She continues to feel at this time.  At 1 point she tried to get up and felt very lightheaded and dizzy and saw spots like she might pass out.  It also happened 1 time while she was sitting at the table playing cards.  Does feel short of breath with this.  Still feeling palpitations.  No diaphoresis no nausea no chest pain.  Has not been ill recently.  No change in diet or medications.  Denies alcohol or other drug use.  No history of arrhythmias in the past.  Paramedics reported an irregular heart rate about 150 bpm.    Allergies:  Allergies   Allergen Reactions    Clonazepam Other (See Comments)     Causes Violence and aggresssion    Hydrocodone-Acetaminophen      Other reaction(s): Seizures  Can take Percocet without difficulty.    Lorazepam Other (See Comments)     Causes Violence and aggresssion    Sertraline Other (See Comments)     Caused suicidally     Bupropion Other (See Comments)     Caused Major Depression    Dust Mite Extract      Other reaction(s): Asthma symptoms    Lithium Other (See Comments)     Mood alteration    Pollen Extract      Other reaction(s): Asthma symptoms    Risperidone Other (See Comments)     Agitation      Sulfa Antibiotics Itching    Trichophyton      Other reaction(s): Asthma symptoms    Valproic Acid Other (See Comments)     Weight gain       Problem List:    Patient Active Problem List    Diagnosis Date Noted    Primary insomnia 04/04/2024     Priority: Medium    Nocturnal leg cramps 01/23/2024     Priority: Medium    CKD (chronic kidney disease) stage 2, GFR 60-89 ml/min 03/14/2023     Priority: Medium    H/O adenomatous polyp of colon 07/25/2022     Priority: Medium    Nail dystrophy 02/27/2022     Priority: Medium    Intermittent  low back pain 09/08/2021     Priority: Medium    Abnormal Pap smear of cervix 04/11/2018     Priority: Medium     Overview:   had scraping of cervix    Formatting of this note might be different from the original.  had scraping of cervix      Allergic rhinitis due to pollen 02/08/2018     Priority: Medium    Esophageal reflux 02/08/2018     Priority: Medium    History of substance abuse (H) 02/08/2018     Priority: Medium    Chronic pain of right knee 04/20/2017     Priority: Medium    Menorrhagia with irregular cycle 02/16/2017     Priority: Medium    Bilateral foot pain 12/18/2016     Priority: Medium    Elevated random blood glucose level 12/18/2016     Priority: Medium    Peripheral polyneuropathy 12/18/2016     Priority: Medium    Vitamin B12 deficiency 12/18/2016     Priority: Medium    Vitamin D deficiency 12/18/2016     Priority: Medium    Lymphedema of both lower extremities 11/22/2016     Priority: Medium    Chronic venous stasis dermatitis of right lower extremity 08/17/2016     Priority: Medium     Overview:   Updated per 10/1/17 IMO import    Formatting of this note might be different from the original.  Updated per 10/1/17 IMO import      Alcohol use disorder, moderate, in sustained remission, dependence (H) 11/28/2015     Priority: Medium    Generalized anxiety disorder 11/28/2015     Priority: Medium    Bipolar disorder, in full remission, most recent episode depressed (H24) 11/28/2015     Priority: Medium    Class 3 severe obesity due to excess calories with serious comorbidity and body mass index (BMI) of 40.0 to 44.9 in adult (H) 03/23/2015     Priority: Medium    Edema of extremity of unknown cause 01/15/2014     Priority: Medium     Formatting of this note might be different from the original.  1/15/2014: both legs, well controlled on prn lasix      Moderate persistent asthma 08/19/2013     Priority: Medium     AAP completed on 08/19/13  Triggers: pollen, fumes, exercise, URI, warm weather. Peak  flow today is 250. Symptomatic: moderate    Formatting of this note might be different from the original.  Overview:   AAP completed on 08/19/13  Triggers: pollen, fumes, exercise, URI, warm weather. Peak flow today is 250. Symptomatic: moderate  Overview:   AAP completed on 08/19/13  Triggers: pollen, fumes, exercise, URI, warm weather. Peak flow today is 250. Symptomatic: moderate  Formatting of this note might be different from the original.  AAP completed on 08/19/13  Triggers: pollen, fumes, exercise, URI, warm weather. Peak flow today is 250. Symptomatic: moderate      Urinary incontinence 03/15/2013     Priority: Medium    Allergic rhinitis due to other allergen 10/02/2003     Priority: Medium     Overview:   [facility] - Seasonal Allergies  Overview:   Overview:   [facility] - Seasonal Allergies    Formatting of this note might be different from the original.  [facility] - Seasonal Allergies      Borderline personality disorder (H) - [facility] for therapy + [facility] for medication management. 07/07/2003     Priority: Medium     [facility] for therapy + [facility] for medication management.      Disorder of female genital organ 07/05/2001     Priority: Medium     Overview:   DUB, dysmenorrhea  Overview:   Overview:   DUB, dysmenorrhea    Formatting of this note might be different from the original.  Overview:   DUB, dysmenorrhea          Past Medical History:    Past Medical History:   Diagnosis Date    Abnormal Pap smear of cervix 04/11/2018    Cannabis use disorder, moderate, in sustained remission, dependence (H) 11/28/2015    Closed dislocation of tarsal joint 2/4/2011    Closed dislocation of tarsal joint - left 02/04/2011    Edema     Encounter for removal and reinsertion of intrauterine contraceptive device     Excessive and frequent menstruation with irregular cycle     Major depressive disorder, single episode     Personal history of other medical treatment (CODE)     Personal history of other  "medical treatment (CODE)     Uncomplicated asthma        Past Surgical History:    Past Surgical History:   Procedure Laterality Date    ANKLE SURGERY      fracture, repair with screws    ARTHRODESIS FOOT Left 2022    Procedure: left foot hardware removaL, fusion of 1st-2nd Tarsal metatarsal;  Surgeon: Anthony Castillo DPM;  Location: [facility] OR    COLONOSCOPY  2022    F/U  tubular adenoma    CONIZATION LEEP      ,Underwent a loop    IMPLANT STIMULATOR AND LEADS SACRAL NERVE (STAGE ONE AND TWO)      scheduled 23 with Dr. Sheth at [facility]    LAPAROSCOPIC TUBAL LIGATION      ,tubal ligation       Family History:    Family History   Problem Relation Age of Onset    Osteoporosis Mother     Asthma Father         Asthma,+ Leg edema, knee, hip replacement. + Lymphedema    Heart Failure Father     Other - See Comments Paternal Grandmother         Lymphedema    Osteoporosis Brother         Osteoporosis    Other - See Comments Brother         No Known Problems       Social History:  Marital Status:   [4]  Social History     Tobacco Use    Smoking status: Former     Current packs/day: 0.00     Average packs/day: 2.0 packs/day for 3.0 years (6.0 ttl pk-yrs)     Types: Cigarettes     Start date:      Quit date: 2003     Years since quittin.6     Passive exposure: Past    Smokeless tobacco: Never   Vaping Use    Vaping status: Never Used   Substance Use Topics    Alcohol use: Not Currently     Alcohol/week: 0.0 standard drinks of alcohol    Drug use: Never        Medications:    diltiazem ER (CARDIAZEM LA) 120 MG 24 hr tablet  acetaminophen (TYLENOL) 500 MG tablet  albuterol (VENTOLIN HFA) 108 (90 Base) MCG/ACT inhaler  amLODIPine (NORVASC) 2.5 MG tablet  ARIPiprazole (ABILIFY) 15 MG tablet  atomoxetine (STRATTERA) 25 MG capsule  BD SYRINGE SLIP TIP 25G X \" 1 ML MISC  busPIRone (BUSPAR) 10 MG tablet  cholecalciferol (VITAMIN D3) 125 mcg (5000 units) capsule  cyanocobalamin " "(CYANOCOBALAMIN) 1000 MCG/ML injection  cyclobenzaprine (FLEXERIL) 5 MG tablet  docusate sodium (COLACE) 100 MG tablet  Elemental iron 65 mg Vitamin C 125 mg (VITRON C)  MG TABS tablet  estradiol (VIVELLE-DOT) 0.05 MG/24HR bi-weekly patch  famotidine (PEPCID) 20 MG tablet  gabapentin (NEURONTIN) 100 MG capsule  hydrocortisone (CORTAID) 1 % external cream  hydrOXYzine (VISTARIL) 50 MG capsule  hydrOXYzine HCl (ATARAX) 50 MG tablet  ibuprofen (ADVIL/MOTRIN) 200 MG tablet  lamoTRIgine (LAMICTAL) 100 MG tablet  Lidocaine (LIDOCARE) 4 % Patch  Menthol, Topical Analgesic, (BIOFREEZE EX)  Menthol, Topical Analgesic, (BIOFREEZE) 4 % GEL  montelukast (SINGULAIR) 10 MG tablet  prazosin (MINIPRESS) 1 MG capsule  PREVIDENT 5000 PLUS 1.1 % CREA  progesterone (PROMETRIUM) 100 MG capsule  ramelteon (ROZEREM) 8 MG tablet  syringe/needle, sisp, (B-D SYRINGE/NEEDLE) 25G X 5/8\" 1 ML MISC  terbinafine (LAMISIL) 1 % external cream  vilazodone (VIIBRYD) 40 MG TABS tablet          Review of Systems   Constitutional:  Negative for chills, diaphoresis and fever.   HENT:  Negative for congestion.    Eyes:  Negative for visual disturbance.   Respiratory:  Positive for shortness of breath. Negative for chest tightness.    Cardiovascular:  Positive for palpitations. Negative for chest pain.   Gastrointestinal:  Negative for nausea and vomiting.   Genitourinary:  Negative for dysuria.   Musculoskeletal:  Negative for arthralgias.   Skin:  Negative for rash.   Neurological:  Positive for light-headedness.   Psychiatric/Behavioral:  Negative for agitation.        Physical Exam   BP: 133/57  Pulse: (!) 146  Temp: 98.7  F (37.1  C)  Resp: 20  Height: 167.6 cm (5' 6\")  Weight: 134.3 kg (296 lb)  SpO2: 100 %      Physical Exam  Vitals and nursing note reviewed.   Constitutional:       Appearance: She is well-developed.   HENT:      Head: Normocephalic and atraumatic.      Mouth/Throat:      Mouth: Mucous membranes are moist.   Eyes:      " Conjunctiva/sclera: Conjunctivae normal.   Cardiovascular:      Rate and Rhythm: Tachycardia present. Rhythm irregular.      Heart sounds: Normal heart sounds.   Pulmonary:      Effort: Pulmonary effort is normal.      Breath sounds: Normal breath sounds.   Abdominal:      General: Abdomen is flat.   Skin:     General: Skin is warm and dry.   Neurological:      Mental Status: She is alert and oriented to person, place, and time.   Psychiatric:         Behavior: Behavior normal.         ED Course     ED Course as of 08/22/24 1829   Thu Aug 22, 2024   1709 Absolute Eosinophils: 0.2   1814 Patient here with new onset atrial fibrillation.  Blood work including troponin metabolic panel CBC and magnesium all completely normal.  Chest x-ray also unremarkable.  She has been given 2 separate doses of IV Cardizem with good lowering of her heart rate.  Heart rate however still appears to be in atrial fibrillation.  Went in and spoke with the patient more about the atrial fibrillation and different options which would include cardioversion, admission with IV diltiazem if we cannot control her rate, or if I am able to keep her rate controlled with oral medications sending her home potentially on some anticoagulation.  She does not wish to do electrical cardioversion, and although her history does seem pretty clear that it just began today, I really do not know for sure when her atrial fibrillation began so I would like to avoid this as well.  She is comfortable at this time and would prefer to go home if possible.   I will therefore give her some oral Cardizem and we will continue to monitor.  If we are able to keep her heart rate controlled and she continues to do well we will consider discharge with close follow-up.     Procedures         EKG shows atrial fibrillation 145 bpm.  No acute ST segment or T wave changes.       Results for orders placed or performed during the hospital encounter of 08/22/24 (from the past 24  hour(s))   [facility] Draw    Narrative    The following orders were created for panel order [facility].  Procedure                               Abnormality         Status                     ---------                               -----------         ------                     Extra Blue Top Tube[[id number]]                              Final result               Extra Red Top Tube[[id number]]                               Final result               Extra Green Top (Lithium...[[id number]]                      Final result               Extra Purple Top Tube[[id number]]                            Final result               Extra Green Top (Lithium...[[id number]]                      Final result                 Please view results for these tests on the individual orders.   Extra Blue Top Tube   Result Value Ref Range    Hold Specimen x    Extra Red Top Tube   Result Value Ref Range    Hold Specimen hold    Extra Green Top (Lithium Heparin) Tube   Result Value Ref Range    Hold Specimen x    Extra Purple Top Tube   Result Value Ref Range    Hold Specimen x    Extra Green Top (Lithium Heparin) ON ICE   Result Value Ref Range    Hold Specimen x    CBC with platelets differential    Narrative    The following orders were created for panel order CBC with platelets differential.  Procedure                               Abnormality         Status                     ---------                               -----------         ------                     CBC with platelets and d...[[id number]]                      Final result                 Please view results for these tests on the individual orders.   Troponin T, High Sensitivity   Result Value Ref Range    Troponin T, High Sensitivity <6 <=14 ng/L   Comprehensive metabolic panel   Result Value Ref Range    Sodium 140 135 - 145 mmol/L    Potassium 4.2 3.4 - 5.3 mmol/L    Carbon Dioxide (CO2) 28 22 - 29 mmol/L    Anion Gap 8 7 - 15 mmol/L    Urea Nitrogen 19.6 6.0 - 20.0 mg/dL     Creatinine 0.95 0.51 - 0.95 mg/dL    GFR Estimate 74 >60 mL/min/1.73m2    Calcium 9.5 8.8 - 10.4 mg/dL    Chloride 104 98 - 107 mmol/L    Glucose 80 70 - 99 mg/dL    Alkaline Phosphatase 81 40 - 150 U/L    AST 26 0 - 45 U/L    ALT 20 0 - 50 U/L    Protein Total 7.5 6.4 - 8.3 g/dL    Albumin 4.1 3.5 - 5.2 g/dL    Bilirubin Total 0.4 <=1.2 mg/dL   Magnesium   Result Value Ref Range    Magnesium 1.9 1.7 - 2.3 mg/dL   CBC with platelets and differential   Result Value Ref Range    WBC Count 9.0 4.0 - 11.0 10e3/uL    RBC Count 4.52 3.80 - 5.20 10e6/uL    Hemoglobin 13.8 11.7 - 15.7 g/dL    Hematocrit 43.2 35.0 - 47.0 %    MCV 96 78 - 100 fL    MCH 30.5 26.5 - 33.0 pg    MCHC 31.9 31.5 - 36.5 g/dL    RDW 13.1 10.0 - 15.0 %    Platelet Count 228 150 - 450 10e3/uL    % Neutrophils 64 %    % Lymphocytes 23 %    % Monocytes 10 %    % Eosinophils 2 %    % Basophils 0 %    % Immature Granulocytes 0 %    NRBCs per 100 WBC 0 <1 /100    Absolute Neutrophils 5.8 1.6 - 8.3 10e3/uL    Absolute Lymphocytes 2.1 0.8 - 5.3 10e3/uL    Absolute Monocytes 0.9 0.0 - 1.3 10e3/uL    Absolute Eosinophils 0.2 0.0 - 0.7 10e3/uL    Absolute Basophils 0.0 0.0 - 0.2 10e3/uL    Absolute Immature Granulocytes 0.0 <=0.4 10e3/uL    Absolute NRBCs 0.0 10e3/uL   XR Chest Port 1 View    Narrative    PROCEDURE:  XR CHEST PORT 1 VIEW    HISTORY: new onset a fib    COMPARISON: No relevant priors available for comparison    FINDINGS: Single view chest radiograph    Cardiomediastinal silhouette is within normal limits.  No focal consolidation, effusion or pneumothorax.    No suspicious osseous lesion or subdiaphragmatic free air.      Impression    IMPRESSION:    No acute findings.    GLENNA LORA MD         SYSTEM ID:  [id number]       Medications   diltiazem ER COATED BEADS (CARDIZEM CD/CARTIA XT) 24 hr capsule 120 mg (has no administration in time range)   diltiazem (CARDIZEM) injection 20 mg (20 mg Intravenous $Given 8/22/24 [facility])   diltiazem (CARDIZEM)  injection 25 mg (25 mg Intravenous $Given 8/22/24 8131)       Assessments & Plan (with Medical Decision Making)     I have reviewed the nursing notes.    I have reviewed the findings, diagnosis, plan and need for follow up with the patient.  Shortly after I spoke with the patient, she had to get up and go to the bathroom.  She got up to bedside commode and when she got back into bed she had converted to sinus rhythm.  At this point she will be discharged home.  I recommended following up in the near future with her primary provider.  Will start her on low dose long-acting Cardizem as well.  Return if she has any return of her symptoms.  We did discuss risks of atrial fibrillation including increased risk of stroke and heart strain potentially leading to failure.      New Prescriptions    DILTIAZEM ER (CARDIAZEM LA) 120 MG 24 HR TABLET    Take 1 tablet (120 mg) by mouth daily.       Final diagnoses:   Paroxysmal atrial fibrillation (H)       8/22/2024   [facility]       Juvenal Ruffin MD  08/22/24 8703

## 2024-08-23 ENCOUNTER — TELEPHONE (OUTPATIENT)
Dept: INTERNAL MEDICINE | Facility: OTHER | Age: 49
End: 2024-08-23
Payer: MEDICARE

## 2024-08-23 NOTE — TELEPHONE ENCOUNTER
Reason for call: Patient wanting a work in appointment.    Is the appointment for a Hospital Follow up?  yes     Patient is having the following symptoms and/or what is the appt for:      Patient was seen in ED on 8/22/24. Patient will needs a follow up appointment in 7/10 days of visit.     The patient is requesting an appointment with  [facility]    Was an appointment offered for this call? Yes    If Yes, what is the date of the appointment?  9/19/24     Preferred method for responding to this message: Telephone Call    Phone number patient can be reached at? Cell number on file:    Telephone Information:   Mobile [phone]       If we can't reach you directly, may we leave a detailed response at the number you provided? Yes    Can this message wait until your PCP/provider returns if unavailable today? Yes     Beronica Sylvester on 8/23/2024 at 7:57 AM

## 2024-09-02 PROBLEM — I48.0 PAROXYSMAL ATRIAL FIBRILLATION (H): Status: ACTIVE | Noted: 2024-09-02

## 2024-09-03 ENCOUNTER — OFFICE VISIT (OUTPATIENT)
Dept: INTERNAL MEDICINE | Facility: OTHER | Age: 49
End: 2024-09-03
Attending: INTERNAL MEDICINE
Payer: MEDICARE

## 2024-09-03 VITALS
SYSTOLIC BLOOD PRESSURE: 110 MMHG | OXYGEN SATURATION: 95 % | WEIGHT: 293 LBS | HEIGHT: 66 IN | BODY MASS INDEX: 47.09 KG/M2 | DIASTOLIC BLOOD PRESSURE: 68 MMHG | TEMPERATURE: 98.7 F | RESPIRATION RATE: 16 BRPM | HEART RATE: 103 BPM

## 2024-09-03 DIAGNOSIS — R53.81 MALAISE AND FATIGUE: ICD-10-CM

## 2024-09-03 DIAGNOSIS — N18.2 CKD (CHRONIC KIDNEY DISEASE) STAGE 2, GFR 60-89 ML/MIN: ICD-10-CM

## 2024-09-03 DIAGNOSIS — E66.01 CLASS 3 SEVERE OBESITY DUE TO EXCESS CALORIES WITH SERIOUS COMORBIDITY AND BODY MASS INDEX (BMI) OF 45.0 TO 49.9 IN ADULT (H): ICD-10-CM

## 2024-09-03 DIAGNOSIS — I48.91 ATRIAL FIBRILLATION WITH RAPID VENTRICULAR RESPONSE (H): ICD-10-CM

## 2024-09-03 DIAGNOSIS — R06.83 SNORES: ICD-10-CM

## 2024-09-03 DIAGNOSIS — E66.813 CLASS 3 SEVERE OBESITY DUE TO EXCESS CALORIES WITH SERIOUS COMORBIDITY AND BODY MASS INDEX (BMI) OF 45.0 TO 49.9 IN ADULT (H): ICD-10-CM

## 2024-09-03 DIAGNOSIS — R53.83 MALAISE AND FATIGUE: ICD-10-CM

## 2024-09-03 DIAGNOSIS — I48.0 PAROXYSMAL ATRIAL FIBRILLATION (H): Primary | ICD-10-CM

## 2024-09-03 PROCEDURE — G0463 HOSPITAL OUTPT CLINIC VISIT: HCPCS

## 2024-09-03 PROCEDURE — G2211 COMPLEX E/M VISIT ADD ON: HCPCS | Performed by: INTERNAL MEDICINE

## 2024-09-03 PROCEDURE — 99214 OFFICE O/P EST MOD 30 MIN: CPT | Performed by: INTERNAL MEDICINE

## 2024-09-03 RX ORDER — DILTIAZEM HYDROCHLORIDE EXTENDED-RELEASE TABLETS 180 MG/1
180 TABLET, EXTENDED RELEASE ORAL DAILY
Qty: 90 TABLET | Refills: 1 | Status: SHIPPED | OUTPATIENT
Start: 2024-09-03

## 2024-09-03 ASSESSMENT — ENCOUNTER SYMPTOMS
SHORTNESS OF BREATH: 0
DYSURIA: 0
HEMATURIA: 0
HEADACHES: 0
DIARRHEA: 0
COLOR CHANGE: 1
DIZZINESS: 0
ABDOMINAL PAIN: 0
HEMATOCHEZIA: 0
BRUISES/BLEEDS EASILY: 0
BREAST MASS: 0
PALPITATIONS: 1
PARESTHESIAS: 0
ARTHRALGIAS: 1
NERVOUS/ANXIOUS: 1
NAUSEA: 0
FREQUENCY: 0
CHILLS: 0
COUGH: 0
EYE PAIN: 0
HEARTBURN: 0
JOINT SWELLING: 0
MYALGIAS: 0
WEAKNESS: 0
SORE THROAT: 0
CONSTIPATION: 1
FEVER: 0

## 2024-09-03 ASSESSMENT — PAIN SCALES - GENERAL: PAINLEVEL: NO PAIN (0)

## 2024-09-03 NOTE — PATIENT INSTRUCTIONS
Paroxysmal atrial fibrillation (H) - New Dx 8/22/2024    Heart ultrasound ordered  - they will call with date/time of appointment.  -- - Echocardiogram Complete; Future    - Adult Sleep Eval & Management  Referral; Future   -- Sleep study ordered  - they will call with date/time of appointment.      Dose change:  - diltiazem ER (CARDIAZEM LA) 180 MG 24 hr tablet; Take 1 tablet (180 mg) by mouth daily. -- Dose Increase 9/3/2024        + Stop 120 mg Diltiazem tablet    Okay for surgery as scheduled.     Return in approximately 2 month(s), or sooner as needed for follow-up with Dr. Curiel.  - follow-up Atrial Fibrillation -- Schedule with Dr. Curiel, for AFTER ECHO completed.     Clinic : [phone]  Appointment line: [phone]

## 2024-09-03 NOTE — PROGRESS NOTES
Assessment & Plan     ICD-10-CM    1. Paroxysmal atrial fibrillation (H) - New Dx 8/22/2024  I48.0 Echocardiogram Complete     Adult Sleep Eval & Management  Referral     diltiazem ER (CARDIAZEM LA) 180 MG 24 hr tablet      2. CKD (chronic kidney disease) stage 2, GFR 60-89 ml/min  N18.2       3. Class 3 severe obesity due to excess calories with serious comorbidity and body mass index (BMI) of 45.0 to 49.9 in adult (H)  E66.01     Z68.42       4. Atrial fibrillation with rapid ventricular response (H)  I48.91 Echocardiogram Complete     Adult Sleep Eval & Management  Referral     diltiazem ER (CARDIAZEM LA) 180 MG 24 hr tablet      5. Malaise and fatigue  R53.81 Adult Sleep Eval & Management  Referral    R53.83       6. Snores  R06.83 Adult Sleep Eval & Management  Referral         Patient presents for follow-up multiple issues    Recently in the emergency room.  Diagnosed with atrial fibrillation on August 22.  Started on diltiazem.  No blood thinners at this time.  Heart rates are only marginally controlled on 104 mg diltiazem.  Recommend dose increase up to 180 mg of diltiazem daily.    - Check echocardiogram to evaluate for valvular disease.   -Consider warfarin versus DOAC for anticoagulation, pending echo  results.    Thyroid levels have been checked and are within normal limits.  Given the new diagnosis of atrial fibrillation, she snores and has noted some issues with fatigue and malaise, recommend sleep study.  Orders placed.      ECHO ordered  - they will call with date/time of appointment.  -- - Echocardiogram Complete; Future    - Adult Sleep Eval & Management  Referral; Future   -- Sleep study ordered  - they will call with date/time of appointment.      Dose change:  - diltiazem ER (CARDIAZEM LA) 180 MG 24 hr tablet; Take 1 tablet (180 mg) by mouth daily. -- Dose Increase 9/3/2024        + Stop 120 mg Diltiazem tablet    --- Okay for surgery as scheduled.        OBESITY - Ongoing.  (See Encounter Diagnosis list above for obesity class / severity).  - Encourage continued maintenance / improvement in diet and exercise.   - Consider Nutrition / Dietician appointment.  - Weight loss would improve Hypertension, Cholesterol.      Chronic Kidney Disease, Stage 2 (GFR 60-89), chronic, ongoing.  Kidney function had been slowly declining.  Encourage NSAID avoidance.       Vaccine counseling completed.  Encourage routine / annual vaccinations.    The longitudinal plan of care for the diagnosis(es)/condition(s) as documented were addressed during this visit. Due to the added complexity in care, I will continue to support Rosi in the subsequent management and with ongoing continuity of care.               MED REC REQUIRED  Post Medication Reconciliation Status: discharge medications reconciled and changed, per note/orders      Return in about 2 months (around 11/3/2024) for - follow-up Atrial Fibrillation -- Schedule with Dr. Curiel, for AFTER ECHO completed. .      Lucio Curiel MD  [facility] AND [facility]    Review of Systems   Constitutional:  Negative for chills and fever.   HENT:  Negative for congestion, ear pain, hearing loss and sore throat.    Eyes:  Negative for pain and visual disturbance.   Respiratory:  Negative for cough and shortness of breath.    Cardiovascular:  Positive for palpitations (Heart racing - felt like throat was squeezing when HR were in 150-160s - went to ER). Negative for chest pain and peripheral edema.   Gastrointestinal:  Positive for constipation. Negative for abdominal pain, diarrhea, heartburn (better with famotidine), hematochezia and nausea.   Endocrine: Positive for cold intolerance.   Breasts:  Negative for tenderness, breast mass and discharge.   Genitourinary:  Positive for pelvic pain and urgency. Negative for dysuria, frequency, genital sores, hematuria, vaginal bleeding and vaginal discharge.        + Urinary frequency,  "better with interstim   Musculoskeletal:  Positive for arthralgias (Right > left knee pain) and gait problem. Negative for joint swelling and myalgias.   Skin:  Positive for color change and rash (right thigh).        + Cool hands with Raynaud's changes of skin color.    Allergic/Immunologic: Negative for immunocompromised state.   Neurological:  Negative for dizziness, weakness, headaches and paresthesias.   Hematological:  Does not bruise/bleed easily.   Psychiatric/Behavioral:  Positive for mood changes. The patient is nervous/anxious.          Tawanda Aranda is a 49 year old, presenting for the following health issues:  Follow Up        9/3/2024     1:51 PM   Additional Questions   Roomed by Maryjo Funk LPN     [facility]       ED/UC Followup:    Facility:  [facility]  Date of visit: 8/22/2024  Reason for visit: Afib  Current Status: Stable / improved            Objective    /68   Pulse 103   Temp 98.7  F (37.1  C) (Temporal)   Resp 16   Ht 1.676 m (5' 6\")   Wt 136.2 kg (300 lb 3.2 oz)   LMP  (LMP Unknown)   SpO2 95%   Breastfeeding No   BMI 48.45 kg/m    Body mass index is 48.45 kg/m .  Physical Exam  Constitutional:       Appearance: She is obese.   Eyes:      General: No scleral icterus.     Conjunctiva/sclera: Conjunctivae normal.   Neck:      Vascular: No carotid bruit.   Cardiovascular:      Rate and Rhythm: Tachycardia present. Rhythm irregularly irregular.      Pulses: Normal pulses.      Heart sounds: Normal heart sounds. No murmur heard.  Pulmonary:      Effort: Pulmonary effort is normal. No respiratory distress.      Breath sounds: No wheezing or rhonchi.   Abdominal:      Palpations: Abdomen is soft.      Tenderness: There is no abdominal tenderness.   Musculoskeletal:         General: No deformity. Normal range of motion.      Right lower leg: Edema present.      Left lower leg: Edema present.      Comments: Lymphedema of legs   Skin:     General: Skin is warm and dry.      Findings: No " rash.   Neurological:      Mental Status: She is alert. Mental status is at baseline.   Psychiatric:         Mood and Affect: Mood normal.         Behavior: Behavior normal.                    Signed Electronically by: Lucio Curiel MD

## 2024-09-03 NOTE — NURSING NOTE
"Chief Complaint   Patient presents with    Follow Up       Initial /68   Pulse 103   Temp 98.7  F (37.1  C) (Temporal)   Resp 16   Ht 1.676 m (5' 6\")   Wt 136.2 kg (300 lb 3.2 oz)   LMP  (LMP Unknown)   SpO2 95%   Breastfeeding No   BMI 48.45 kg/m   Estimated body mass index is 48.45 kg/m  as calculated from the following:    Height as of this encounter: 1.676 m (5' 6\").    Weight as of this encounter: 136.2 kg (300 lb 3.2 oz).  Medication Review: complete    The next two questions are to help us understand your food security.  If you are feeling you need any assistance in this area, we have resources available to support you today.          5/23/2024   SDOH- Food Insecurity   Within the past 12 months, did you worry that your food would run out before you got money to buy more? N   Within the past 12 months, did the food you bought just not last and you didn t have money to get more? N            Health Care Directive:  Patient has a Health Care Directive on file      Leann Funk LPN      "

## 2024-09-04 ENCOUNTER — TELEPHONE (OUTPATIENT)
Dept: INTERNAL MEDICINE | Facility: OTHER | Age: 49
End: 2024-09-04
Payer: MEDICARE

## 2024-09-04 DIAGNOSIS — I48.0 PAROXYSMAL ATRIAL FIBRILLATION (H): Primary | ICD-10-CM

## 2024-09-04 NOTE — TELEPHONE ENCOUNTER
[facility] changed patients dosage for Cardiazem. [facility] needs a new order stating this change faxed to them at [phone].          Chana Connolly on 9/4/2024 at 8:54 AM

## 2024-09-04 NOTE — TELEPHONE ENCOUNTER
Spoke with Dorita at [facility]- per anesthesia pt needs to have echo completed and cardiac clearance prior to having surgery- case cancelled for 9/5/24.

## 2024-09-10 ENCOUNTER — HOSPITAL ENCOUNTER (OUTPATIENT)
Dept: CARDIOLOGY | Facility: OTHER | Age: 49
Discharge: HOME | End: 2024-09-10
Attending: INTERNAL MEDICINE | Admitting: INTERNAL MEDICINE
Payer: MEDICARE

## 2024-09-10 DIAGNOSIS — I48.91 ATRIAL FIBRILLATION WITH RAPID VENTRICULAR RESPONSE (H): ICD-10-CM

## 2024-09-10 DIAGNOSIS — I48.0 PAROXYSMAL ATRIAL FIBRILLATION (H): ICD-10-CM

## 2024-09-10 LAB — LVEF ECHO: NORMAL

## 2024-09-10 PROCEDURE — 93306 TTE W/DOPPLER COMPLETE: CPT | Mod: 26 | Performed by: INTERNAL MEDICINE

## 2024-09-10 PROCEDURE — 93306 TTE W/DOPPLER COMPLETE: CPT

## 2024-10-04 ENCOUNTER — TELEPHONE (OUTPATIENT)
Dept: INTERNAL MEDICINE | Facility: OTHER | Age: 49
End: 2024-10-04
Payer: MEDICARE

## 2024-10-04 NOTE — TELEPHONE ENCOUNTER
I do not see lidocaine on her current medication list.  Please fax current medication list to fax number listed below.

## 2024-10-04 NOTE — TELEPHONE ENCOUNTER
Patient needs a note stating she does not use lydocaine anymore.  Needs it faxed to [facility].  Fax# [phone]      [place] on 10/4/2024 at 7:54 AM

## 2024-10-07 ENCOUNTER — TELEPHONE (OUTPATIENT)
Dept: INTERNAL MEDICINE | Facility: OTHER | Age: 49
End: 2024-10-07
Payer: MEDICARE

## 2024-10-07 DIAGNOSIS — R23.2 HOT FLASHES: ICD-10-CM

## 2024-10-07 NOTE — TELEPHONE ENCOUNTER
Left message on machine to call back.  Yuly Beasley CMA([facility])..................10/7/2024   9:38 AM

## 2024-10-08 NOTE — TELEPHONE ENCOUNTER
After proper verification, patient stated that she wanted the Lidocaine patch discontinued. Medication has been discontinued since 08/24.  Chelsea Cam LPN on 10/8/2024 at 2:17 PM  EXT. [phone]

## 2024-10-14 ENCOUNTER — APPOINTMENT (OUTPATIENT)
Dept: GENERAL RADIOLOGY | Facility: OTHER | Age: 49
End: 2024-10-14
Attending: FAMILY MEDICINE
Payer: MEDICARE

## 2024-10-14 ENCOUNTER — HOSPITAL ENCOUNTER (EMERGENCY)
Facility: OTHER | Age: 49
Discharge: HOME | End: 2024-10-14
Attending: FAMILY MEDICINE | Admitting: FAMILY MEDICINE
Payer: MEDICARE

## 2024-10-14 VITALS
WEIGHT: 293 LBS | BODY MASS INDEX: 47.09 KG/M2 | HEART RATE: 98 BPM | TEMPERATURE: 98 F | DIASTOLIC BLOOD PRESSURE: 81 MMHG | HEIGHT: 66 IN | SYSTOLIC BLOOD PRESSURE: 132 MMHG | RESPIRATION RATE: 16 BRPM | OXYGEN SATURATION: 97 %

## 2024-10-14 DIAGNOSIS — R07.89 RIGHT-SIDED CHEST WALL PAIN: ICD-10-CM

## 2024-10-14 PROCEDURE — 99283 EMERGENCY DEPT VISIT LOW MDM: CPT | Performed by: FAMILY MEDICINE

## 2024-10-14 PROCEDURE — 71046 X-RAY EXAM CHEST 2 VIEWS: CPT

## 2024-10-14 PROCEDURE — 250N000013 HC RX MED GY IP 250 OP 250 PS 637: Performed by: FAMILY MEDICINE

## 2024-10-14 PROCEDURE — 99283 EMERGENCY DEPT VISIT LOW MDM: CPT | Mod: 25 | Performed by: FAMILY MEDICINE

## 2024-10-14 RX ORDER — HYDROCODONE BITARTRATE AND ACETAMINOPHEN 5; 325 MG/1; MG/1
1 TABLET ORAL
Qty: 4 TABLET | Refills: 0 | Status: SHIPPED | OUTPATIENT
Start: 2024-10-14

## 2024-10-14 RX ORDER — ACETAMINOPHEN 500 MG
1000 TABLET ORAL ONCE
Status: COMPLETED | OUTPATIENT
Start: 2024-10-14 | End: 2024-10-14

## 2024-10-14 RX ADMIN — ACETAMINOPHEN 1000 MG: 500 TABLET, FILM COATED ORAL at 11:11

## 2024-10-14 ASSESSMENT — ACTIVITIES OF DAILY LIVING (ADL): ADLS_ACUITY_SCORE: 35

## 2024-10-14 NOTE — DISCHARGE INSTRUCTIONS
Rosi    I did send a prescription for Vicodin to the [facility] pharmacy.    Thank you for choosing our Emergency Department for your care.     You may receive a phone call or letter for a survey about your care in our ED.  Please complete this as this is how we improve care for our patients.     If you have any questions after leaving the ED you can call or text me on my cell phone at [phone].  I am not on call so if I do not answer my phone please leave a message- I will get back to you.  If you are not doing well please return to the ED.     Sincerely,    Dr Sylvain Sotelo M.D.  Medical Director  [facility] Emergency Department

## 2024-10-14 NOTE — ED TRIAGE NOTES
Pt presents to ED with c/o rib pain since this weekend. Pt states she was in bed and rolled over and felt a pop and has had severe pain since.    Kelsy Dias RN on 10/14/2024 at 10:34 AM       Triage Assessment (Adult)       Row Name 10/14/24 1033          Triage Assessment    Airway WDL WDL        Respiratory WDL    Respiratory WDL X  pain with breathing        Peripheral/Neurovascular WDL    Peripheral Neurovascular WDL WDL
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
## 2024-10-14 NOTE — LETTER
October 14, 2024      To Whom It May Concern:      Rosi Lamb was seen in our Emergency Department today, 10/14/24.  I expect her condition to improve over the next few days.  She may return to work when improved.      Sincerely,              Jeff Sotelo MD

## 2024-10-23 NOTE — PROGRESS NOTES
[facility] HEART CARE   CARDIOLOGY PROGRESS NOTE     Chief Complaint   Patient presents with    Consult For     PAROXYSMAL ATRIAL FIB    Flu Shot    Imm/Inj     Flu Shot          Diagnosis:  1.  A-fib, new on 8/22/2024.   -Diltiazem.   -EKG A-fib with RVR to 145 bpm.  2.  Obesity.   -BMI of 47 on 10/14/2024.  3.  TAWNYA.   -Concern for.  4.  History of tobacco abuse.   -Quitting in 1/1/2003.  5.  CKD-2.  6.  Vitamin D deficiency.  7.  B12 deficiency.  8.  History of drug abuse.  9.  History of alcohol abuse.        Assessment/Plan:    1.  A-fib: New onset on 8/22/2024.  Patient had an episode of palpitations where she felt lightheaded and describes near syncope.  She presented to the ER.  EKG at the ER showed A-fib with a heart rate of 145 bpm.  Thankfully, after an hour, she spontaneously converted.  She has had x1 episode since then lasting 5-10 minutes.  She was on the bus during the second episode.  This was a new diagnosis.  [id number] score is 0.  Discussed potential need for anticoagulation in the future.  Also, discussed cardioversion and potentially an ablation.  She is currently on diltiazem 180 mg once daily.  Will increase this to twice a day.  Will hold off on anticoagulation.  She is not interested in an ablation at this point.  This is something we will readdress in the future.  If she has increasing episodes.  Suggest that she follow-up in 3 months or sooner if she is having reoccurring/increasing symptoms.  2.  TAWNYA: High likelihood.  Does snore at night.  Referred to sleep medicine.  Discussed the importance of treating sleep apnea if found to have TAWNYA.  3.  CKD-2.  Patient aware and following.  4.  History of tobacco abuse: Not currently smoking.  5.  History of drug/alcohol abuse.  Not currently using.  6.  Follow-up in 3 months or sooner with issues.      Interval history:  See above.          Relevant testing:  Echocardiogram on 9/10/2024:  Global and regional left ventricular function is normal  with an EF of 60-65%.  Left ventricular diastolic function is normal.   Right ventricular function, chamber size, wall motion, and thickness are  normal.  Trace aortic insufficiency is present.  Mean RA pressure is normal at 3 mmHg.  No pericardial effusion present.  This study was compared with the study from 11/2/2016. No significant changes noted.        ICD-10-CM    1. Need for prophylactic vaccination and inoculation against influenza  Z23       2. Paroxysmal atrial fibrillation (H)  I48.0 Adult Cardiology Eval  Referral     EKG 12-lead, tracing only     diltiazem ER (CARDIAZEM LA) 180 MG 24 hr tablet      3. Atrial fibrillation with rapid ventricular response (H)  I48.91 diltiazem ER (CARDIAZEM LA) 180 MG 24 hr tablet      4. New onset a-fib (H)  I48.91 diltiazem ER (CARDIAZEM LA) 180 MG 24 hr tablet      5. Paroxysmal atrial fibrillation (H) - New Dx 8/22/2024  I48.0 diltiazem ER (CARDIAZEM LA) 180 MG 24 hr tablet      6. TAWNYA (obstructive sleep apnea)  G47.33 Adult Sleep Eval & Management Referral      7. Lymphedema of both lower extremities  I89.0       8. Edema of extremity of unknown cause  R60.0       9. History of substance abuse (H)  F19.11       10. Alcohol use disorder, moderate, in sustained remission, dependence (H)  F10.21           Past Medical History:   Diagnosis Date    Abnormal Pap smear of cervix 04/11/2018    Overview:  had scraping of cervix    Cannabis use disorder, moderate, in sustained remission, dependence (H) 11/28/2015    Closed dislocation of tarsal joint 2/4/2011    Closed dislocation of tarsal joint - left 02/04/2011    Edema     No Comments Provided    Encounter for removal and reinsertion of intrauterine contraceptive device     05/16/05,IUD placement, Removed    Excessive and frequent menstruation with irregular cycle     2/16/2017    Major depressive disorder, single episode     No Comments Provided    Personal history of other medical treatment (CODE)     G3,  P2-0-1-2 with history of spontaneous     Personal history of other medical treatment (CODE)     No Comments Provided    Uncomplicated asthma     No Comments Provided       Past Surgical History:   Procedure Laterality Date    ANKLE SURGERY      fracture, repair with screws    ARTHRODESIS FOOT Left 2022    Procedure: left foot hardware removaL, fusion of 1st-2nd Tarsal metatarsal;  Surgeon: Anthony Castillo DPM;  Location: [facility] OR    COLONOSCOPY  2022    F/U  tubular adenoma    CONIZATION LEEP      ,Underwent a loop    IMPLANT STIMULATOR AND LEADS SACRAL NERVE (STAGE ONE AND TWO)      scheduled 23 with Dr. Sheth at [place]    LAPAROSCOPIC TUBAL LIGATION      ,tubal ligation       Allergies   Allergen Reactions    Clonazepam Other (See Comments)     Causes Violence and aggresssion    Hydrocodone-Acetaminophen      Other reaction(s): Seizures  Can take Percocet without difficulty.    Lorazepam Other (See Comments)     Causes Violence and aggresssion    Sertraline Other (See Comments)     Caused suicidally     Bupropion Other (See Comments)     Caused Major Depression    Dust Mite Extract      Other reaction(s): Asthma symptoms    Lithium Other (See Comments)     Mood alteration    Pollen Extract      Other reaction(s): Asthma symptoms    Risperidone Other (See Comments)     Agitation      Sulfa Antibiotics Itching    Trichophyton      Other reaction(s): Asthma symptoms    Valproic Acid Other (See Comments)     Weight gain       Current Outpatient Medications   Medication Sig Dispense Refill    acetaminophen (TYLENOL) 500 MG tablet Take 1-2 tablets (500-1,000 mg) by mouth every 6 hours as needed for mild pain 90 tablet 4    albuterol (VENTOLIN HFA) 108 (90 Base) MCG/ACT inhaler Inhale 1-2 puffs into the lungs every 4 hours as needed for shortness of breath or wheezing 18 g 11    amLODIPine (NORVASC) 2.5 MG tablet Take 1 tablet (2.5 mg) by mouth every morning. May also take 1  "tablet (2.5 mg) every evening as needed (-- for Raynaud's / Hand color changes --). 180 tablet 4    ARIPiprazole (ABILIFY) 15 MG tablet Take 1 tablet (15 mg) by mouth At Bedtime 30 tablet 1    atomoxetine (STRATTERA) 25 MG capsule TAKE 1 CAPSULE BY MOUTH DAILY AT 8AM      BD SYRINGE SLIP TIP 25G X 5/8\" 1 ML MISC USE TO INJECT B-12 INTRAMUSCULARLY EVERY 2 WEEKS.      busPIRone (BUSPAR) 10 MG tablet Take 1 tablet by mouth 3 times daily Am and 2 pm      cholecalciferol (VITAMIN D3) 125 mcg (5000 units) capsule Take 1 capsule (125 mcg) by mouth daily 100 capsule 4    cyanocobalamin (CYANOCOBALAMIN) 1000 MCG/ML injection INJECT 1ML INTRAMUSCULARLY EVERY 2 WEEKS 6 mL 11    diltiazem ER (CARDIAZEM LA) 180 MG 24 hr tablet Take 1 tablet (180 mg) by mouth 2 times daily. -- Dose Increase 9/3/2024    + Stop 120 mg Diltiazem tablet 180 tablet 3    docusate sodium (COLACE) 100 MG tablet Take 2 tablets (200 mg) by mouth 2 times daily -Adjust dose as needed to maintain soft stools 120 tablet 9    Elemental iron 65 mg Vitamin C 125 mg (VITRON C)  MG TABS tablet Take 1 tablet by mouth daily on empty stomach -for iron deficiency 90 tablet 4    famotidine (PEPCID) 20 MG tablet Take 1 tablet (20 mg) by mouth 2 times daily - For Heartburn 180 tablet 4    gabapentin (NEURONTIN) 100 MG capsule Take 100 mg by mouth 3 times daily      HYDROcodone-acetaminophen (NORCO) 5-325 MG tablet Take 1 tablet by mouth nightly as needed for severe pain. 4 tablet 0    hydrOXYzine (VISTARIL) 50 MG capsule TAKE 1 CAPSULE BY MOUTH FOUR TIMES A DAY AS NEEDED FOR ANXIETY      hydrOXYzine HCl (ATARAX) 50 MG tablet Take 1 tablet (50 mg) by mouth at bedtime 30 tablet 0    ibuprofen (ADVIL/MOTRIN) 200 MG tablet Take 1-2 tablets (200-400 mg) by mouth every 6 hours as needed for moderate pain 1 to 2 tabs Q6 PRN 90 tablet 0    lamoTRIgine (LAMICTAL) 100 MG tablet Take 1 tablet (100 mg) by mouth 3 times daily -- Managed by Psych      Menthol, Topical Analgesic, " "(BIOFREEZE EX) Apply topically 4 times daily as needed May self administer      Menthol, Topical Analgesic, (BIOFREEZE) 4 % GEL Externally apply topically 2 times daily as needed (For pain) 150 mL 3    montelukast (SINGULAIR) 10 MG tablet Take 1 tablet (10 mg) by mouth at bedtime 90 tablet 4    prazosin (MINIPRESS) 1 MG capsule Take by mouth as needed      PREVIDENT 5000 PLUS 1.1 % CREA May self administer      progesterone (PROMETRIUM) 100 MG capsule Take 1 capsule (100 mg) by mouth daily 90 capsule 3    ramelteon (ROZEREM) 8 MG tablet Take 1 tablet (8 mg) by mouth at bedtime 90 tablet 4    syringe/needle, sisp, (B-D SYRINGE/NEEDLE) 25G X 5/8\" 1 ML MISC USE TO INJECT B-12 INTRAMUSCULARLY EVERY 2 WEEKS 94 each 4    vilazodone (VIIBRYD) 40 MG TABS tablet Take 40 mg by mouth every morning         Social History     Socioeconomic History    Marital status:      Spouse name: Not on file    Number of children: Not on file    Years of education: Not on file    Highest education level: Not on file   Occupational History    Not on file   Tobacco Use    Smoking status: Former     Current packs/day: 0.00     Average packs/day: 2.0 packs/day for 3.0 years (6.0 ttl pk-yrs)     Types: Cigarettes     Start date:      Quit date: 2003     Years since quittin.8     Passive exposure: Past    Smokeless tobacco: Never   Vaping Use    Vaping status: Never Used   Substance and Sexual Activity    Alcohol use: Not Currently     Alcohol/week: 0.0 standard drinks of alcohol    Drug use: Never    Sexual activity: Not Currently     Partners: Male     Birth control/protection: Female Surgical     Comment: ablation   Other Topics Concern    Parent/sibling w/ CABG, MI or angioplasty before 65F 55M? Not Asked   Social History Narrative    No longer in adult foster care lived with Charley Citlali.    .   2 sons - age 12 and 7 - as of 2016.   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     Social " Drivers of Health     Financial Resource Strain: Low Risk  (4/4/2024)    Financial Resource Strain     Within the past 12 months, have you or your family members you live with been unable to get utilities (heat, electricity) when it was really needed?: No   Recent Concern: Financial Resource Strain - High Risk (1/23/2024)    Financial Resource Strain     Within the past 12 months, have you or your family members you live with been unable to get utilities (heat, electricity) when it was really needed?: Yes   Food Insecurity: Low Risk  (5/23/2024)    Food Insecurity     Within the past 12 months, did you worry that your food would run out before you got money to buy more?: No     Within the past 12 months, did the food you bought just not last and you didn t have money to get more?: No   Transportation Needs: Low Risk  (4/4/2024)    Transportation Needs     Within the past 12 months, has lack of transportation kept you from medical appointments, getting your medicines, non-medical meetings or appointments, work, or from getting things that you need?: No   Physical Activity: Sufficiently Active (4/4/2024)    Exercise Vital Sign     Days of Exercise per Week: 5 days     Minutes of Exercise per Session: 30 min   Stress: No Stress Concern Present (4/4/2024)    [employer] [employer] - Occupational Stress Questionnaire     Feeling of Stress : Not at all   Social Connections: Unknown (4/4/2024)    Social Connection and Isolation Panel [NHANES]     Frequency of Communication with Friends and Family: Not on file     Frequency of Social Gatherings with Friends and Family: Twice a week     Attends Restorationist Services: Not on file     Active Member of Clubs or Organizations: Not on file     Attends Club or Organization Meetings: Not on file     Marital Status: Not on file   Interpersonal Safety: Low Risk  (10/24/2024)    Interpersonal Safety     Do you feel physically and emotionally safe where you currently  live?: Yes     Within the past 12 months, have you been hit, slapped, kicked or otherwise physically hurt by someone?: No     Within the past 12 months, have you been humiliated or emotionally abused in other ways by your partner or ex-partner?: No   Housing Stability: Low Risk  (4/4/2024)    Housing Stability     Do you have housing? : Yes     Are you worried about losing your housing?: No       LAB RESULTS:   No visits with results within 2 Month(s) from this visit.   Latest known visit with results is:   Office Visit on 08/20/2024   Component Date Value Ref Range Status    Sodium 08/20/2024 139  135 - 145 mmol/L Final    Potassium 08/20/2024 4.5  3.4 - 5.3 mmol/L Final    Carbon Dioxide (CO2) 08/20/2024 30 (H)  22 - 29 mmol/L Final    Anion Gap 08/20/2024 6 (L)  7 - 15 mmol/L Final    Urea Nitrogen 08/20/2024 15.0  6.0 - 20.0 mg/dL Final    Creatinine 08/20/2024 0.90  0.51 - 0.95 mg/dL Final    GFR Estimate 08/20/2024 79  >60 mL/min/1.73m2 Final    Calcium 08/20/2024 9.4  8.8 - 10.4 mg/dL Final    Chloride 08/20/2024 103  98 - 107 mmol/L Final    Glucose 08/20/2024 96  70 - 99 mg/dL Final    Alkaline Phosphatase 08/20/2024 86  40 - 150 U/L Final    AST 08/20/2024 27  0 - 45 U/L Final    ALT 08/20/2024 23  0 - 50 U/L Final    Protein Total 08/20/2024 8.0  6.4 - 8.3 g/dL Final    Albumin 08/20/2024 4.4  3.5 - 5.2 g/dL Final    Bilirubin Total 08/20/2024 0.6  <=1.2 mg/dL Final    WBC Count 08/20/2024 9.1  4.0 - 11.0 10e3/uL Final    RBC Count 08/20/2024 4.69  3.80 - 5.20 10e6/uL Final    Hemoglobin 08/20/2024 14.2  11.7 - 15.7 g/dL Final    Hematocrit 08/20/2024 44.4  35.0 - 47.0 % Final    MCV 08/20/2024 95  78 - 100 fL Final    MCH 08/20/2024 30.3  26.5 - 33.0 pg Final    MCHC 08/20/2024 32.0  31.5 - 36.5 g/dL Final    RDW 08/20/2024 13.2  10.0 - 15.0 % Final    Platelet Count 08/20/2024 237  150 - 450 10e3/uL Final    % Neutrophils 08/20/2024 72  % Final    % Lymphocytes 08/20/2024 17  % Final    % Monocytes  "08/20/2024 10  % Final    % Eosinophils 08/20/2024 1  % Final    % Basophils 08/20/2024 0  % Final    % Immature Granulocytes 08/20/2024 0  % Final    NRBCs per 100 WBC 08/20/2024 0  <1 /100 Final    Absolute Neutrophils 08/20/2024 6.5  1.6 - 8.3 10e3/uL Final    Absolute Lymphocytes 08/20/2024 1.5  0.8 - 5.3 10e3/uL Final    Absolute Monocytes 08/20/2024 0.9  0.0 - 1.3 10e3/uL Final    Absolute Eosinophils 08/20/2024 0.1  0.0 - 0.7 10e3/uL Final    Absolute Basophils 08/20/2024 0.0  0.0 - 0.2 10e3/uL Final    Absolute Immature Granulocytes 08/20/2024 0.0  <=0.4 10e3/uL Final    Absolute NRBCs 08/20/2024 0.0  10e3/uL Final        Review of systems: Negative except that which was noted in the HPI.    Physical examination:  /76 (BP Location: Right arm, Patient Position: Sitting, Cuff Size: Adult Large)   Pulse 100   Temp 97.6  F (36.4  C) (Temporal)   Resp 16   Ht 1.67 m (5' 5.75\")   Wt 137.4 kg (303 lb)   SpO2 97%   BMI 49.28 kg/m      GENERAL APPEARANCE: healthy, alert and no distress  CHEST: lungs clear to auscultation.  CARDIOVASCULAR: regular rhythm, normal S1 with physiologic split S2, no S3 or S4 and no murmur, click or rub  EXTREMITIES: no clubbing, cyanosis or edema.    Total time spent on day of visit, including review of tests, obtaining/reviewing separately obtained history, ordering medications/tests/procedures, communicating with PCP/consultants, and documenting in electronic medical record: 30 minutes.              Thank you for allowing me to participate in the care of your patient. Please do not hesitate to contact me if you have any questions.     Elmer Ceballos, DO          "

## 2024-10-24 ENCOUNTER — MYC MEDICAL ADVICE (OUTPATIENT)
Dept: PULMONOLOGY | Facility: OTHER | Age: 49
End: 2024-10-24

## 2024-10-24 ENCOUNTER — OFFICE VISIT (OUTPATIENT)
Dept: CARDIOLOGY | Facility: OTHER | Age: 49
End: 2024-10-24
Attending: INTERNAL MEDICINE
Payer: MEDICARE

## 2024-10-24 VITALS
BODY MASS INDEX: 47.09 KG/M2 | RESPIRATION RATE: 16 BRPM | HEIGHT: 66 IN | DIASTOLIC BLOOD PRESSURE: 76 MMHG | SYSTOLIC BLOOD PRESSURE: 120 MMHG | WEIGHT: 293 LBS | OXYGEN SATURATION: 97 % | HEART RATE: 100 BPM | TEMPERATURE: 97.6 F

## 2024-10-24 DIAGNOSIS — F10.21 ALCOHOL USE DISORDER, MODERATE, IN SUSTAINED REMISSION, DEPENDENCE (H): ICD-10-CM

## 2024-10-24 DIAGNOSIS — I48.0 PAROXYSMAL ATRIAL FIBRILLATION (H): ICD-10-CM

## 2024-10-24 DIAGNOSIS — I89.0 LYMPHEDEMA OF BOTH LOWER EXTREMITIES: Chronic | ICD-10-CM

## 2024-10-24 DIAGNOSIS — G47.33 OSA (OBSTRUCTIVE SLEEP APNEA): ICD-10-CM

## 2024-10-24 DIAGNOSIS — R60.0 EDEMA OF EXTREMITY OF UNKNOWN CAUSE: ICD-10-CM

## 2024-10-24 DIAGNOSIS — Z23 NEED FOR PROPHYLACTIC VACCINATION AND INOCULATION AGAINST INFLUENZA: Primary | ICD-10-CM

## 2024-10-24 DIAGNOSIS — I48.91 ATRIAL FIBRILLATION WITH RAPID VENTRICULAR RESPONSE (H): ICD-10-CM

## 2024-10-24 DIAGNOSIS — I48.91 NEW ONSET A-FIB (H): ICD-10-CM

## 2024-10-24 DIAGNOSIS — F19.11 HISTORY OF SUBSTANCE ABUSE (H): Chronic | ICD-10-CM

## 2024-10-24 LAB
ATRIAL RATE - MUSE: 79 BPM
DIASTOLIC BLOOD PRESSURE - MUSE: NORMAL MMHG
INTERPRETATION ECG - MUSE: NORMAL
P AXIS - MUSE: 68 DEGREES
PR INTERVAL - MUSE: 150 MS
QRS DURATION - MUSE: 94 MS
QT - MUSE: 388 MS
QTC - MUSE: 444 MS
R AXIS - MUSE: 60 DEGREES
SYSTOLIC BLOOD PRESSURE - MUSE: NORMAL MMHG
T AXIS - MUSE: 35 DEGREES
VENTRICULAR RATE- MUSE: 79 BPM

## 2024-10-24 PROCEDURE — G0463 HOSPITAL OUTPT CLINIC VISIT: HCPCS | Mod: 25

## 2024-10-24 PROCEDURE — 93005 ELECTROCARDIOGRAM TRACING: CPT | Performed by: INTERNAL MEDICINE

## 2024-10-24 PROCEDURE — 90656 IIV3 VACC NO PRSV 0.5 ML IM: CPT

## 2024-10-24 PROCEDURE — 93010 ELECTROCARDIOGRAM REPORT: CPT | Performed by: INTERNAL MEDICINE

## 2024-10-24 PROCEDURE — 99204 OFFICE O/P NEW MOD 45 MIN: CPT | Performed by: INTERNAL MEDICINE

## 2024-10-24 RX ORDER — DILTIAZEM HYDROCHLORIDE EXTENDED-RELEASE TABLETS 180 MG/1
180 TABLET, EXTENDED RELEASE ORAL 2 TIMES DAILY
Qty: 180 TABLET | Refills: 3 | Status: SHIPPED | OUTPATIENT
Start: 2024-10-24

## 2024-10-24 ASSESSMENT — SLEEP AND FATIGUE QUESTIONNAIRES
HOW LIKELY ARE YOU TO NOD OFF OR FALL ASLEEP WHILE SITTING QUIETLY AFTER LUNCH WITHOUT ALCOHOL: WOULD NEVER DOZE
HOW LIKELY ARE YOU TO NOD OFF OR FALL ASLEEP IN A CAR, WHILE STOPPED FOR A FEW MINUTES IN TRAFFIC: SLIGHT CHANCE OF DOZING
HOW LIKELY ARE YOU TO NOD OFF OR FALL ASLEEP WHILE SITTING INACTIVE IN A PUBLIC PLACE: SLIGHT CHANCE OF DOZING
HOW LIKELY ARE YOU TO NOD OFF OR FALL ASLEEP WHILE SITTING AND READING: SLIGHT CHANCE OF DOZING
HOW LIKELY ARE YOU TO NOD OFF OR FALL ASLEEP WHILE LYING DOWN TO REST IN THE AFTERNOON WHEN CIRCUMSTANCES PERMIT: MODERATE CHANCE OF DOZING
HOW LIKELY ARE YOU TO NOD OFF OR FALL ASLEEP WHILE WATCHING TV: MODERATE CHANCE OF DOZING
HOW LIKELY ARE YOU TO NOD OFF OR FALL ASLEEP WHILE SITTING AND TALKING TO SOMEONE: SLIGHT CHANCE OF DOZING
HOW LIKELY ARE YOU TO NOD OFF OR FALL ASLEEP WHEN YOU ARE A PASSENGER IN A CAR FOR AN HOUR WITHOUT A BREAK: WOULD NEVER DOZE

## 2024-10-24 ASSESSMENT — PAIN SCALES - GENERAL: PAINLEVEL_OUTOF10: MODERATE PAIN (5)

## 2024-10-24 NOTE — NURSING NOTE
"Chief Complaint   Patient presents with    Consult For     PAROXYSMAL ATRIAL FIB       Initial /76 (BP Location: Right arm, Patient Position: Sitting, Cuff Size: Adult Large)   Pulse 100   Temp 97.6  F (36.4  C) (Temporal)   Resp 16   Ht 1.67 m (5' 5.75\")   Wt 137.4 kg (303 lb)   SpO2 97%   BMI 49.28 kg/m   Estimated body mass index is 49.28 kg/m  as calculated from the following:    Height as of this encounter: 1.67 m (5' 5.75\").    Weight as of this encounter: 137.4 kg (303 lb).  Meds Reconciled: complete  Pt is not on Aspirin  Pt is not on a Statin  PHQ and/or AGNIESZKA reviewed. Pt referred to PCP/MH Provider as appropriate.    Chika Archer LPN      "

## 2024-10-25 NOTE — PROGRESS NOTES
10/24/24 7306   Reason For Your Visit   Please briefly describe the main reason(s) for your sleep visit (Patient-Rptd)  Concern about sleep apnea   Approximately when did this problem start (Patient-Rptd)  [place] 2 months ago   What are your goals for this visit (Patient-Rptd)  Find out about proper sleep   Time in Bed - Work Or School Days   Do you work or go to school (Patient-Rptd)  Yes   What time do you usually get into bed (Patient-Rptd)  10   About how long does it take you to fall asleep (Patient-Rptd)  15 minutes   How often do you have trouble falling asleep (Patient-Rptd)  1   How often do you wake up during the night (Patient-Rptd)  2   Do you work days/evenings/nights/rotating shifts (Patient-Rptd)  Days   What wakes you up at night (Patient-Rptd)  Snorting self awake;Use the bathroom   How often do you have trouble falling back to sleep (Patient-Rptd)  1   About how long does it take to fall back to sleep (Patient-Rptd)  10 monutes   What do you usually do if you have trouble getting back to sleep (Patient-Rptd)  Relax   What time do you usually get out of bed to start your day (Patient-Rptd)  7   Do you use an alarm (Patient-Rptd)  No   Time in Bed - Weekends/Non-work Days/All Other Days   What time do you usually get into bed (Patient-Rptd)  10   About how long does it take you to fall asleep (Patient-Rptd)  10   What time do you usually get out of bed to start your day (Patient-Rptd)  7   Do you use an alarm (Patient-Rptd)  No   Sleep Need   On average, about how much sleep do you think you get (Patient-Rptd)  7 hours   About how much sleep do you think you need (Patient-Rptd)  Same   Sleep Position   Which sleep positions do you prefer (Patient-Rptd)  Side   Do you do any of the following activities in bed (Patient-Rptd)  Use phone, computer, or tablet   How often do you take a nap on purpose (Patient-Rptd)  3   About how long are your naps (Patient-Rptd)  1 hr   Do you feel better after naps  (Patient-Rptd)  Yes   How often do you doze off unintentionally (Patient-Rptd)  1   Have you ever had a driving accident or near-miss due to sleepiness/drowsiness (Patient-Rptd)  No   Sleep Disruptions - Breathing/Snoring   Do you snore (Patient-Rptd)  Yes   Do other people complain about your snoring (Patient-Rptd)  No   Have you been told you stop breathing in your sleep (Patient-Rptd)  No   Sleep Disruptions - Movement   Do you get pain, discomfort, with an urge to move (Patient-Rptd)  No   Does it happen when you are resting (Patient-Rptd)  No   Does it get better if you move around (Patient-Rptd)  No   Does it happen more at night (Patient-Rptd)  No   Have you been told you kick your legs at night (Patient-Rptd)  No   Sleep Disruptions - Behaviours in Sleep   Do you ever experience sudden muscle weakness during the day (Patient-Rptd)  No   Caffeine, Alcohol and Other Substances   How many caffeinated beverages (coffee, tea, soda, energy drinks) per day (Patient-Rptd)  1   What time of day is your last caffeine use (Patient-Rptd)  9am   Family History   Has any family member been diagnosed with a sleep disorder (Patient-Rptd)  No   In the last TWO WEEKS have you experienced any of the following symptoms?   Night Sweats (Patient-Rptd)  No   Weight Gain (Patient-Rptd)  Yes   Pain at Night (Patient-Rptd)  No   Double Vision (Patient-Rptd)  No   Changes in Vision (Patient-Rptd)  No   Difficulty Breathing through Nose (Patient-Rptd)  No   Sore Throat in Morning (Patient-Rptd)  Yes   Dry Mouth in the Morning (Patient-Rptd)  Yes   Shortness of Breath Lying Flat (Patient-Rptd)  No   Shortness of Breath With Activity (Patient-Rptd)  No   Awakening with Shortness of Breath (Patient-Rptd)  No   Increased Cough (Patient-Rptd)  Yes   Heart Racing at Night (Patient-Rptd)  No   Swelling in Feet or Legs (Patient-Rptd)  Yes   Diarrhea at Night (Patient-Rptd)  No   Heartburn at Night (Patient-Rptd)  No   Urinating More than Once  at Night (Patient-Rptd)  No   Losing Control of Urine at Night (Patient-Rptd)  No   Joint Pains at Night (Patient-Rptd)  Yes   Headaches in Morning (Patient-Rptd)  No   Weakness in Arms or Legs (Patient-Rptd)  No   Depressed Mood (Patient-Rptd)  No   Anxiety (Patient-Rptd)  Yes         10/24/2024     1:50 PM    [place] Sleepiness Scale ( SONIA Miranda  [id number]<br>ESS - [place]/English - Final version - 21 Nov 07 - [facility] [place].)   Sitting and reading Slight chance of dozing   Watching TV Moderate chance of dozing   Sitting, inactive in a public place (e.g. a theatre or a meeting) Slight chance of dozing   As a passenger in a car for an hour without a break Would never doze   Lying down to rest in the afternoon when circumstances permit Moderate chance of dozing   Sitting and talking to someone Slight chance of dozing   Sitting quietly after a lunch without alcohol Would never doze   In a car, while stopped for a few minutes in traffic Slight chance of dozing   [place] Score (MC) 8   [place] Score (Sleep) 8        Patient-reported         10/24/2024     1:48 PM   Insomnia Severity Index (CYNTHIA)   Difficulty falling asleep 1    Difficulty staying asleep 1    Problems waking up too early 1    How SATISFIED/DISSATISFIED are you with your CURRENT sleep pattern? 1    How NOTICEABLE to others do you think your sleep problem is in terms of impairing the quality of your life? 1    How WORRIED/DISTRESSED are you about your current sleep problem? 1    To what extent do you consider your sleep problem to INTERFERE with your daily functioning (e.g. daytime fatigue, mood, ability to function at work/daily chores, concentration, memory, mood, etc.) CURRENTLY? 1    CYNTHIA Total Score 7        Patient-reported         10/24/2024     1:49 PM   STOP BANG Questionnaire (  2008, the American Society [employer]est[employer], [employer]   1. Snoring - Do you snore loudly (louder than talking or loud enough to  be heard through closed doors)? No   2. Tired - Do you often feel tired, fatigued, or sleepy during daytime? No   3. Observed - Has anyone observed you stop breathing during your sleep? No   4. Blood pressure - Do you have or are you being treated for high blood pressure? No   5. BMI - BMI more than 35 kg/m2? Yes   6. Age - Age over 50 yr old? No   7. Neck circumference - Neck circumference greater than 40 cm? No   8. Gender - Gender male? No   STOP BANG Score (MC): 2 (Low risk of TAWNYA)

## 2024-10-25 NOTE — TELEPHONE ENCOUNTER
Chart review prior to sleep testing.    Patient Summary:  49 year old female who is referred for sleep-disordered breathing.    Patient Active Problem List    Diagnosis Date Noted    New onset a-fib (H) 10/24/2024     Priority: Medium    Atrial fibrillation with rapid ventricular response (H) 09/03/2024     Priority: Medium    Malaise and fatigue 09/03/2024     Priority: Medium    Snores 09/03/2024     Priority: Medium    Paroxysmal atrial fibrillation (H) - New Dx 8/22/2024 09/02/2024     Priority: Medium    Primary insomnia 04/04/2024     Priority: Medium    Nocturnal leg cramps 01/23/2024     Priority: Medium    CKD (chronic kidney disease) stage 2, GFR 60-89 ml/min 03/14/2023     Priority: Medium    H/O adenomatous polyp of colon 07/25/2022     Priority: Medium    Nail dystrophy 02/27/2022     Priority: Medium    Intermittent low back pain 09/08/2021     Priority: Medium    Abnormal Pap smear of cervix 04/11/2018     Priority: Medium     Overview:   had scraping of cervix    Formatting of this note might be different from the original.  had scraping of cervix      Allergic rhinitis due to pollen 02/08/2018     Priority: Medium    Esophageal reflux 02/08/2018     Priority: Medium    History of substance abuse (H) 02/08/2018     Priority: Medium    Chronic pain of right knee 04/20/2017     Priority: Medium    Menorrhagia with irregular cycle 02/16/2017     Priority: Medium    Bilateral foot pain 12/18/2016     Priority: Medium    Elevated random blood glucose level 12/18/2016     Priority: Medium    Peripheral polyneuropathy 12/18/2016     Priority: Medium    Vitamin B12 deficiency 12/18/2016     Priority: Medium    Vitamin D deficiency 12/18/2016     Priority: Medium    Lymphedema of both lower extremities 11/22/2016     Priority: Medium    Chronic venous stasis dermatitis of right lower extremity 08/17/2016     Priority: Medium     Overview:   Updated per 10/1/17 IMO import    Formatting of this note might be  different from the original.  Updated per 10/1/17 IMO import      Alcohol use disorder, moderate, in sustained remission, dependence (H) 11/28/2015     Priority: Medium    Generalized anxiety disorder 11/28/2015     Priority: Medium    Bipolar disorder, in full remission, most recent episode depressed (H) 11/28/2015     Priority: Medium    Class 3 severe obesity due to excess calories with serious comorbidity and body mass index (BMI) of 45.0 to 49.9 in adult (H) 03/23/2015     Priority: Medium    Edema of extremity of unknown cause 01/15/2014     Priority: Medium     Formatting of this note might be different from the original.  1/15/2014: both legs, well controlled on prn lasix      Moderate persistent asthma 08/19/2013     Priority: Medium     AAP completed on 08/19/13  Triggers: pollen, fumes, exercise, URI, warm weather. Peak flow today is 250. Symptomatic: moderate    Formatting of this note might be different from the original.  Overview:   AAP completed on 08/19/13  Triggers: pollen, fumes, exercise, URI, warm weather. Peak flow today is 250. Symptomatic: moderate  Overview:   AAP completed on 08/19/13  Triggers: pollen, fumes, exercise, URI, warm weather. Peak flow today is 250. Symptomatic: moderate  Formatting of this note might be different from the original.  AAP completed on 08/19/13  Triggers: pollen, fumes, exercise, URI, warm weather. Peak flow today is 250. Symptomatic: moderate      Urinary incontinence 03/15/2013     Priority: Medium    Allergic rhinitis due to other allergen 10/02/2003     Priority: Medium     Overview:   [facility] - Seasonal Allergies  Overview:   Overview:   [facility] - Seasonal Allergies    Formatting of this note might be different from the original.  G[facility] - Seasonal Allergies      Borderline personality disorder (H) - [facility] for therapy + [facility] for medication management. 07/07/2003     Priority: Medium     [facility] for therapy + [facility] for medication  "management.      Disorder of female genital organ 07/05/2001     Priority: Medium     Overview:   DUB, dysmenorrhea  Overview:   Overview:   DUB, dysmenorrhea    Formatting of this note might be different from the original.  Overview:   DUB, dysmenorrhea         Current Outpatient Medications   Medication Sig Dispense Refill    acetaminophen (TYLENOL) 500 MG tablet Take 1-2 tablets (500-1,000 mg) by mouth every 6 hours as needed for mild pain 90 tablet 4    albuterol (VENTOLIN HFA) 108 (90 Base) MCG/ACT inhaler Inhale 1-2 puffs into the lungs every 4 hours as needed for shortness of breath or wheezing 18 g 11    amLODIPine (NORVASC) 2.5 MG tablet Take 1 tablet (2.5 mg) by mouth every morning. May also take 1 tablet (2.5 mg) every evening as needed (-- for Raynaud's / Hand color changes --). 180 tablet 4    ARIPiprazole (ABILIFY) 15 MG tablet Take 1 tablet (15 mg) by mouth At Bedtime 30 tablet 1    atomoxetine (STRATTERA) 25 MG capsule TAKE 1 CAPSULE BY MOUTH DAILY AT 8AM      BD SYRINGE SLIP TIP 25G X 5/8\" 1 ML MISC USE TO INJECT B-12 INTRAMUSCULARLY EVERY 2 WEEKS.      busPIRone (BUSPAR) 10 MG tablet Take 1 tablet by mouth 3 times daily Am and 2 pm      cholecalciferol (VITAMIN D3) 125 mcg (5000 units) capsule Take 1 capsule (125 mcg) by mouth daily 100 capsule 4    cyanocobalamin (CYANOCOBALAMIN) 1000 MCG/ML injection INJECT 1ML INTRAMUSCULARLY EVERY 2 WEEKS 6 mL 11    diltiazem ER (CARDIAZEM LA) 180 MG 24 hr tablet Take 1 tablet (180 mg) by mouth 2 times daily. -- Dose Increase 9/3/2024    + Stop 120 mg Diltiazem tablet 180 tablet 3    docusate sodium (COLACE) 100 MG tablet Take 2 tablets (200 mg) by mouth 2 times daily -Adjust dose as needed to maintain soft stools 120 tablet 9    Elemental iron 65 mg Vitamin C 125 mg (VITRON C)  MG TABS tablet Take 1 tablet by mouth daily on empty stomach -for iron deficiency 90 tablet 4    famotidine (PEPCID) 20 MG tablet Take 1 tablet (20 mg) by mouth 2 times daily - " "For Heartburn 180 tablet 4    gabapentin (NEURONTIN) 100 MG capsule Take 100 mg by mouth 3 times daily      HYDROcodone-acetaminophen (NORCO) 5-325 MG tablet Take 1 tablet by mouth nightly as needed for severe pain. 4 tablet 0    hydrOXYzine (VISTARIL) 50 MG capsule TAKE 1 CAPSULE BY MOUTH FOUR TIMES A DAY AS NEEDED FOR ANXIETY      hydrOXYzine HCl (ATARAX) 50 MG tablet Take 1 tablet (50 mg) by mouth at bedtime 30 tablet 0    ibuprofen (ADVIL/MOTRIN) 200 MG tablet Take 1-2 tablets (200-400 mg) by mouth every 6 hours as needed for moderate pain 1 to 2 tabs Q6 PRN 90 tablet 0    lamoTRIgine (LAMICTAL) 100 MG tablet Take 1 tablet (100 mg) by mouth 3 times daily -- Managed by Psych      Menthol, Topical Analgesic, (BIOFREEZE EX) Apply topically 4 times daily as needed May self administer      Menthol, Topical Analgesic, (BIOFREEZE) 4 % GEL Externally apply topically 2 times daily as needed (For pain) 150 mL 3    montelukast (SINGULAIR) 10 MG tablet Take 1 tablet (10 mg) by mouth at bedtime 90 tablet 4    prazosin (MINIPRESS) 1 MG capsule Take by mouth as needed      PREVIDENT 5000 PLUS 1.1 % CREA May self administer      progesterone (PROMETRIUM) 100 MG capsule Take 1 capsule (100 mg) by mouth daily 90 capsule 3    ramelteon (ROZEREM) 8 MG tablet Take 1 tablet (8 mg) by mouth at bedtime 90 tablet 4    syringe/needle, sisp, (B-D SYRINGE/NEEDLE) 25G X 5/8\" 1 ML MISC USE TO INJECT B-12 INTRAMUSCULARLY EVERY 2 WEEKS 94 each 4    vilazodone (VIIBRYD) 40 MG TABS tablet Take 40 mg by mouth every morning       No current facility-administered medications for this visit.       Pertinent PMHx of paroxysmal atrial fibrillation dx'd 8/22/2024, peripheral neuropathy, low vitamin B12 and D, obesity, nocturnal leg cramps, AGNIESZKA, bipolar disorder in remission, borderline personality disorder, history of EtOH abuse.    STOP-BANG score of 2, with unknown neck circumference.  Alpine score of 8.  CYNTHIA: 7    BMI of Estimated body mass index " "is 49.28 kg/m  as calculated from the following:    Height as of an earlier encounter on 10/24/24: 1.67 m (5' 5.75\").    Weight as of an earlier encounter on 10/24/24: 137.4 kg (303 lb).     Chief concern per questionnaire: \"Concern about sleep apnea \"    Duration of symptoms:  \"Abouy 2 months ago \"    Goals for visit per questionnaire: \"Find out about proper sleep \"    Sleep pattern:  Workdays.  10pm - 7am.  Weekends.  same.  Time to fall asleep: ~15 minutes.  Awakenings: 2 times per night, 10 minutes to return to sleep.  Average total sleep time:  7 hours  Napping.  3 days per week, 1 hours per nap.    No for RLS screen.  No for sleep walking.  No for dream enactment behavior.  No for bruxism.    No for morning headaches.  Yes for snoring.  No for observed apnea.  No for FHx of TAWNYA.    Caffeine use:  No for 3+ per day.  No for within 6 hours of bed.    A/P:    1.)  Lower pretest likelihood of TAWNYA with STOP-BANG score of 2.  2.)  Recent diagnosis of atrial fibrillation  3.)  Increased risk for hypoventilation with BMI > 45, with normal CO2 of 28 on recent metabolic panel    Given primary concern for evaluating TAWNYA in setting of newly diagnosed atrial fib and lower clinical concern for hypoventilation, she would appear to be candidate for either home sleep testing or in-lab PSG.    ---  This note was written with the assistance of the Dragon voice-dictation technology software. The final document, although reviewed, may contain errors. For corrections, please contact the office.    Juvenal Kearns MD    Sleep Medicine  [facility]  - [place], [place]  Main Office: [phone]  [facility] [address], [address]  Schedule visits: [phone]  Main Office: [phone]  Fax: [phone]    "

## 2024-11-06 DIAGNOSIS — K59.00 CONSTIPATION: Primary | ICD-10-CM

## 2024-11-07 RX ORDER — DOCUSATE SODIUM 100 MG/1
CAPSULE, LIQUID FILLED ORAL
Qty: 720 CAPSULE | Refills: 2 | Status: SHIPPED | OUTPATIENT
Start: 2024-11-07

## 2024-11-07 NOTE — TELEPHONE ENCOUNTER
[facility] sent Rx request for the following:      Requested Prescriptions   Pending Prescriptions Disp Refills    EQ STOOL SOFTENER 100 MG capsule [Pharmacy Med Name: EQ Stool Softener 100 MG Oral Capsule] 720 capsule 0     Sig: TAKE 2 CAPSULES BY MOUTH TWICE DAILY .  ADJUST  DOSE  AS  NEEDED  TO  MAINTAIN          Last Prescription Date:   1/17/24  Last Fill Qty/Refills:         120, R-9    Last Office Visit:              9/3/24   Future Office visit:           11/7/24  Next 5 appointments (look out 90 days)      Jan 23, 2025 9:15 AM  (Arrive by 9:00 AM)  Return Visit with Elmer Ceballos,   [facility] ([facility] ) [address]  [place] [address]  [phone]           Prescription approved per [facility] Refill Protocol.    Carolyn Dougherty RN on 11/7/2024 at 2:38 PM

## 2025-01-13 ENCOUNTER — TELEPHONE (OUTPATIENT)
Dept: INTERNAL MEDICINE | Facility: OTHER | Age: 50
End: 2025-01-13
Payer: MEDICARE

## 2025-01-13 DIAGNOSIS — R23.2 HOT FLASHES: ICD-10-CM

## 2025-01-13 NOTE — TELEPHONE ENCOUNTER
Patient would like a call back to discuss orders medication dosage increase. Please call    Vee Caruso on 1/13/2025 at 9:19 AM

## 2025-01-13 NOTE — TELEPHONE ENCOUNTER
[facility] sent Rx request for the following:      Requested Prescriptions   Pending Prescriptions Disp Refills    progesterone (PROMETRIUM) 100 MG capsule [Pharmacy Med Name: Progesterone 100 MG Oral Capsule] 270 capsule 0     Sig: Take 1 capsule by mouth once daily       Hormone Replacement Therapy Failed - 1/13/2025  3:38 PM        Failed - Medication indicated for associated diagnosis     The medication is prescribed for one or more of the following conditions:    Menopause   Vulva/Vaginal atrophy   Low Estrogen   Gender Dysphoria   Male to Female transgender          Passed - Most recent blood pressure under 140/90 in past 12 months     BP Readings from Last 3 Encounters:   10/24/24 120/76   10/14/24 132/81   09/03/24 110/68       No data recorded            Passed - Medication is active on med list        Passed - Recent (12 mo) or future (90 days) visit within the authorizing provider's specialty     The patient must have completed an in-person or virtual visit within the past 12 months or has a future visit scheduled within the next 90 days with the authorizing provider s specialty.  Urgent care and e-visits do not qualify as an office visit for this protocol.          Passed - Patient is 18 years of age or older        Passed - No active pregnancy on record        Passed - No positive pregnancy test on record in past 12 months             Last Prescription Date:   2/9/2024  Last Fill Qty/Refills:         90, R-3    Last Office Visit:              3/14/2024   Future Office visit:             Next 5 appointments (look out 90 days)      Jan 23, 2025 9:15 AM  (Arrive by 9:00 AM)  Return Visit with Elmer Ceballos DO  [facility] ([facility]) [address]  [place] [address]  [phone]           Cecilia Seaman RN on 1/13/2025 at 3:40 PM

## 2025-01-14 ENCOUNTER — VIRTUAL VISIT (OUTPATIENT)
Dept: SLEEP MEDICINE | Facility: HOSPITAL | Age: 50
End: 2025-01-14
Attending: FAMILY MEDICINE
Payer: MEDICARE

## 2025-01-14 DIAGNOSIS — G47.33 OSA (OBSTRUCTIVE SLEEP APNEA): Primary | ICD-10-CM

## 2025-01-14 RX ORDER — PROGESTERONE 100 MG/1
100 CAPSULE ORAL DAILY
Qty: 90 CAPSULE | Refills: 0 | Status: SHIPPED | OUTPATIENT
Start: 2025-01-14

## 2025-01-15 NOTE — TELEPHONE ENCOUNTER
Returned call to patient. Patient reports that she never started the increased dose of diltiazem that Dr. Ceballos prescreibed. Patient is needing orders faxed to [facility] where she lives before the new dose can be started.  Order faxed to [place] [phone].    Maricarmen Gutierrez LPN on 1/15/2025 at 12:38 PM

## 2025-01-15 NOTE — PROGRESS NOTES
Patient was provided both verbal and written education and instructions on use of Watch PAT device. Watch PAT device has been registered and shipped via [employer] on 1/15/2025. Patient was notified that package was mailed out. Watch PAT serial number: [id number]    Tracking # [id number].

## 2025-02-13 NOTE — NURSING NOTE
Chief Complaint   Patient presents with    Pre-Op Exam         Medication Reconciliation: complete    Noemy Rodgers, LPN    
Clinical documentation and/or evidence in the medical record indicates that this patient has a compression fracture.  In order to ensure accurate coding and accuracy of the clinical record, the documentation in this patient’s medical record requires additional clarification.      Please include more specific documentation as to the type of compression fracture in your progress note and/or discharge summary.      Please clarify if the patient was found to have one of the following:      • L1 compression fracture is associated with mechanical fall  • L1 compression fracture is associated with osteopenia  • Other (specify)    Supporting documentation and/or clinical evidence:     2/12 Progress Note Adult-Internal Medicine Attending: Recent Mechanical fall   L1 compression fracture  Rt knee & Back pain, inability to ambulate, Deconditioning  Hx of Osteoporosis RA, OA s/p B/L ROHIT & TKA, Rt hip ORIF(2024)  - No weakness, paresthesia, urinary/bowel incontinence  - Seen by ortho- F/u CT Rt knee, F/u ortho and spine Sx recs  - c/w Abdominal binder  - Pain control- Tylenol 1000mg q8, Celecoxib 200mg BID, Methocarbamol 500mg q8, Lidocaine patch and Tramadol 50mg q6 prn  - c/w Calcium, vit D    2/9 CT Lumbar Spine: FINDINGS:  Unchanged acute-appearing L1 vertebral body minimal compression fracture   deformity without significant osseous retropulsion.  Unchanged mild anterolisthesis of L3 on L4 L4, on L5.  Osteopenia. Multilevel degenerative change. Lumbar levoscoliosis.   Degenerative change of sacroiliac joints. Paraspinal musculature   unremarkable.  Trace left pleural effusion. Vascular calcifications.    IMPRESSION:  Unchanged acute-appearing L1 vertebral body minimal compression fracture   deformity without significant osseous retropulsion.    Thank you,  Leslie Zhou RN, BSN, CCDS  [phone]/ TEAMS (preferred)

## 2025-02-21 ENCOUNTER — TELEPHONE (OUTPATIENT)
Dept: OBGYN | Facility: OTHER | Age: 50
End: 2025-02-21

## 2025-02-21 ENCOUNTER — TELEPHONE (OUTPATIENT)
Dept: PULMONOLOGY | Facility: OTHER | Age: 50
End: 2025-02-21

## 2025-02-21 NOTE — TELEPHONE ENCOUNTER
Patient needs to be rescheduled for their virtual visit due to Reason for Reschedule: Patient Request    Scheduling team, please refer to service line late cancellation/no-show policies and reach out to patient at a later date for rescheduling.    Appointment mode: Video  Provider: Juvenal Kearns MD Jacqueline McCaskill, JAMES

## 2025-02-21 NOTE — TELEPHONE ENCOUNTER
Reason for call: Medication or medication refill    Have you contacted your pharmacy regarding this refill? Yes    Name of medication requested: progesterone    How many days of medication do you have left? ZERO    What pharmacy do you use? [facility] [place]    Preferred method for responding to this message: Telephone Call    Phone number patient can be reached at: Cell number on file:    Telephone Information:   Mobile [phone]       If we cannot reach you directly, may we leave a detailed response at the number you provided? Yes        Isabel Nguyen on 2/21/2025 at 9:32 AM

## 2025-02-21 NOTE — TELEPHONE ENCOUNTER
After verifying pt last name and date of birth, pt states she is all out of this medication. Based on refill on 1/14/2025 nurse sent over quantity of 90. Nurse reaching out to pharmacy. Pharmacy states that her insurance will only allow her to be given 30 and that it is now requesting a PA. EZEQUIEL team contacted.     Cecilia Seaman RN on 2/21/2025 at 10:18 AM

## 2025-02-25 ENCOUNTER — HOSPITAL ENCOUNTER (EMERGENCY)
Facility: OTHER | Age: 50
Discharge: OTHER INSTITUTION | End: 2025-02-25
Payer: MEDICARE

## 2025-02-25 ENCOUNTER — HOSPITAL ENCOUNTER (INPATIENT)
Facility: HOSPITAL | Age: 50
End: 2025-02-25
Attending: PSYCHIATRY & NEUROLOGY | Admitting: PSYCHIATRY & NEUROLOGY
Payer: MEDICARE

## 2025-02-25 VITALS
OXYGEN SATURATION: 99 % | TEMPERATURE: 98.4 F | RESPIRATION RATE: 24 BRPM | HEART RATE: 108 BPM | SYSTOLIC BLOOD PRESSURE: 144 MMHG | DIASTOLIC BLOOD PRESSURE: 92 MMHG

## 2025-02-25 DIAGNOSIS — F41.1 GENERALIZED ANXIETY DISORDER: Primary | Chronic | ICD-10-CM

## 2025-02-25 DIAGNOSIS — R45.851 SUICIDE IDEATION: ICD-10-CM

## 2025-02-25 PROBLEM — N18.2 CKD (CHRONIC KIDNEY DISEASE) STAGE 2, GFR 60-89 ML/MIN: Status: ACTIVE | Noted: 2023-03-14

## 2025-02-25 PROBLEM — K21.9 ESOPHAGEAL REFLUX: Chronic | Status: ACTIVE | Noted: 2018-02-08

## 2025-02-25 PROBLEM — F31.4 SEVERE DEPRESSED BIPOLAR I DISORDER WITHOUT PSYCHOTIC FEATURES (H): Status: ACTIVE | Noted: 2025-02-25

## 2025-02-25 PROBLEM — I48.0 PAROXYSMAL ATRIAL FIBRILLATION (H): Status: ACTIVE | Noted: 2024-09-02

## 2025-02-25 LAB
ALBUMIN UR-MCNC: NEGATIVE MG/DL
AMPHETAMINES UR QL SCN: NORMAL
ANION GAP SERPL CALCULATED.3IONS-SCNC: 10 MMOL/L (ref 7–15)
APAP SERPL-MCNC: <5 UG/ML (ref 10–30)
APPEARANCE UR: CLEAR
BARBITURATES UR QL SCN: NORMAL
BASOPHILS # BLD AUTO: 0 10E3/UL (ref 0–0.2)
BASOPHILS NFR BLD AUTO: 1 %
BENZODIAZ UR QL SCN: NORMAL
BILIRUB UR QL STRIP: NEGATIVE
BUN SERPL-MCNC: 8.2 MG/DL (ref 6–20)
BZE UR QL SCN: NORMAL
CALCIUM SERPL-MCNC: 9.5 MG/DL (ref 8.8–10.4)
CANNABINOIDS UR QL SCN: NORMAL
CHLORIDE SERPL-SCNC: 101 MMOL/L (ref 98–107)
COLOR UR AUTO: YELLOW
CREAT SERPL-MCNC: 0.83 MG/DL (ref 0.51–0.95)
EGFRCR SERPLBLD CKD-EPI 2021: 86 ML/MIN/1.73M2
EOSINOPHIL # BLD AUTO: 0.1 10E3/UL (ref 0–0.7)
EOSINOPHIL NFR BLD AUTO: 2 %
ERYTHROCYTE [DISTWIDTH] IN BLOOD BY AUTOMATED COUNT: 13.1 % (ref 10–15)
ETHANOL SERPL-MCNC: <0.01 G/DL
FENTANYL UR QL: NORMAL
GLUCOSE SERPL-MCNC: 97 MG/DL (ref 70–99)
GLUCOSE UR STRIP-MCNC: NEGATIVE MG/DL
HCO3 SERPL-SCNC: 27 MMOL/L (ref 22–29)
HCT VFR BLD AUTO: 42.9 % (ref 35–47)
HGB BLD-MCNC: 14.3 G/DL (ref 11.7–15.7)
HGB UR QL STRIP: NEGATIVE
IMM GRANULOCYTES # BLD: 0 10E3/UL
IMM GRANULOCYTES NFR BLD: 0 %
KETONES UR STRIP-MCNC: NEGATIVE MG/DL
LEUKOCYTE ESTERASE UR QL STRIP: NEGATIVE
LYMPHOCYTES # BLD AUTO: 1.3 10E3/UL (ref 0.8–5.3)
LYMPHOCYTES NFR BLD AUTO: 18 %
MCH RBC QN AUTO: 31.1 PG (ref 26.5–33)
MCHC RBC AUTO-ENTMCNC: 33.3 G/DL (ref 31.5–36.5)
MCV RBC AUTO: 93 FL (ref 78–100)
MONOCYTES # BLD AUTO: 0.6 10E3/UL (ref 0–1.3)
MONOCYTES NFR BLD AUTO: 9 %
NEUTROPHILS # BLD AUTO: 5.1 10E3/UL (ref 1.6–8.3)
NEUTROPHILS NFR BLD AUTO: 71 %
NITRATE UR QL: NEGATIVE
NRBC # BLD AUTO: 0 10E3/UL
NRBC BLD AUTO-RTO: 0 /100
OPIATES UR QL SCN: NORMAL
PCP QUAL URINE (ROCHE): NORMAL
PH UR STRIP: 6.5 [PH] (ref 5–9)
PLATELET # BLD AUTO: 284 10E3/UL (ref 150–450)
POTASSIUM SERPL-SCNC: 4.1 MMOL/L (ref 3.4–5.3)
RBC # BLD AUTO: 4.6 10E6/UL (ref 3.8–5.2)
RBC URINE: 0 /HPF
SALICYLATES SERPL-MCNC: <0.3 MG/DL
SODIUM SERPL-SCNC: 138 MMOL/L (ref 135–145)
SP GR UR STRIP: 1 (ref 1–1.03)
UROBILINOGEN UR STRIP-MCNC: NORMAL MG/DL
WBC # BLD AUTO: 7.2 10E3/UL (ref 4–11)
WBC URINE: <1 /HPF

## 2025-02-25 PROCEDURE — 99222 1ST HOSP IP/OBS MODERATE 55: CPT | Performed by: NURSE PRACTITIONER

## 2025-02-25 PROCEDURE — 80179 DRUG ASSAY SALICYLATE: CPT

## 2025-02-25 PROCEDURE — 36415 COLL VENOUS BLD VENIPUNCTURE: CPT

## 2025-02-25 PROCEDURE — 250N000013 HC RX MED GY IP 250 OP 250 PS 637: Performed by: PSYCHIATRY & NEUROLOGY

## 2025-02-25 PROCEDURE — 85004 AUTOMATED DIFF WBC COUNT: CPT

## 2025-02-25 PROCEDURE — 99285 EMERGENCY DEPT VISIT HI MDM: CPT

## 2025-02-25 PROCEDURE — 82435 ASSAY OF BLOOD CHLORIDE: CPT

## 2025-02-25 PROCEDURE — 82077 ASSAY SPEC XCP UR&BREATH IA: CPT

## 2025-02-25 PROCEDURE — 80048 BASIC METABOLIC PNL TOTAL CA: CPT

## 2025-02-25 PROCEDURE — 80307 DRUG TEST PRSMV CHEM ANLYZR: CPT

## 2025-02-25 PROCEDURE — 80143 DRUG ASSAY ACETAMINOPHEN: CPT

## 2025-02-25 PROCEDURE — 124N000001 HC R&B MH

## 2025-02-25 PROCEDURE — 81001 URINALYSIS AUTO W/SCOPE: CPT

## 2025-02-25 RX ORDER — POLYETHYLENE GLYCOL 3350 17 G/17G
17 POWDER, FOR SOLUTION ORAL DAILY PRN
Status: ACTIVE | OUTPATIENT
Start: 2025-02-25

## 2025-02-25 RX ORDER — MAGNESIUM HYDROXIDE/ALUMINUM HYDROXICE/SIMETHICONE 120; 1200; 1200 MG/30ML; MG/30ML; MG/30ML
30 SUSPENSION ORAL EVERY 4 HOURS PRN
Status: ACTIVE | OUTPATIENT
Start: 2025-02-25

## 2025-02-25 RX ORDER — AMLODIPINE BESYLATE 2.5 MG/1
2.5 TABLET ORAL EVERY MORNING
Status: DISPENSED | OUTPATIENT
Start: 2025-02-26

## 2025-02-25 RX ORDER — OLANZAPINE 10 MG/2ML
10 INJECTION, POWDER, FOR SOLUTION INTRAMUSCULAR 3 TIMES DAILY PRN
Status: ACTIVE | OUTPATIENT
Start: 2025-02-25

## 2025-02-25 RX ORDER — RAMELTEON 8 MG/1
8 TABLET ORAL AT BEDTIME
Status: DISPENSED | OUTPATIENT
Start: 2025-02-25

## 2025-02-25 RX ORDER — OLANZAPINE 10 MG/1
10 TABLET ORAL 3 TIMES DAILY PRN
Status: ACTIVE | OUTPATIENT
Start: 2025-02-25

## 2025-02-25 RX ORDER — HYDROXYZINE HYDROCHLORIDE 25 MG/1
25 TABLET, FILM COATED ORAL EVERY 4 HOURS PRN
Status: ACTIVE | OUTPATIENT
Start: 2025-02-25

## 2025-02-25 RX ORDER — DILTIAZEM HYDROCHLORIDE EXTENDED-RELEASE TABLETS 180 MG/1
180 TABLET, EXTENDED RELEASE ORAL 2 TIMES DAILY
Status: CANCELLED | OUTPATIENT
Start: 2025-02-25

## 2025-02-25 RX ORDER — AMLODIPINE BESYLATE 2.5 MG/1
2.5 TABLET ORAL
Status: ACTIVE | OUTPATIENT
Start: 2025-02-25

## 2025-02-25 RX ORDER — GABAPENTIN 100 MG/1
100 CAPSULE ORAL 3 TIMES DAILY
Status: DISCONTINUED | OUTPATIENT
Start: 2025-02-25 | End: 2025-02-27

## 2025-02-25 RX ORDER — ALBUTEROL SULFATE 90 UG/1
1-2 INHALANT RESPIRATORY (INHALATION) EVERY 4 HOURS PRN
Status: ACTIVE | OUTPATIENT
Start: 2025-02-25

## 2025-02-25 RX ADMIN — GABAPENTIN 100 MG: 100 CAPSULE ORAL at 21:19

## 2025-02-25 RX ADMIN — ARIPIPRAZOLE 15 MG: 5 TABLET ORAL at 21:19

## 2025-02-25 RX ADMIN — RAMELTEON 8 MG: 8 TABLET ORAL at 21:19

## 2025-02-25 ASSESSMENT — COLUMBIA-SUICIDE SEVERITY RATING SCALE - C-SSRS
1. IN THE PAST MONTH, HAVE YOU WISHED YOU WERE DEAD OR WISHED YOU COULD GO TO SLEEP AND NOT WAKE UP?: YES
2. HAVE YOU ACTUALLY HAD ANY THOUGHTS OF KILLING YOURSELF IN THE PAST MONTH?: YES
4. HAVE YOU HAD THESE THOUGHTS AND HAD SOME INTENTION OF ACTING ON THEM?: NO
6. HAVE YOU EVER DONE ANYTHING, STARTED TO DO ANYTHING, OR PREPARED TO DO ANYTHING TO END YOUR LIFE?: YES
3. HAVE YOU BEEN THINKING ABOUT HOW YOU MIGHT KILL YOURSELF?: YES
5. HAVE YOU STARTED TO WORK OUT OR WORKED OUT THE DETAILS OF HOW TO KILL YOURSELF? DO YOU INTEND TO CARRY OUT THIS PLAN?: YES

## 2025-02-25 ASSESSMENT — ACTIVITIES OF DAILY LIVING (ADL)
ADLS_ACUITY_SCORE: 48
ADLS_ACUITY_SCORE: 22
ADLS_ACUITY_SCORE: 48
ADLS_ACUITY_SCORE: 22
ADLS_ACUITY_SCORE: 48
ADLS_ACUITY_SCORE: 22
ADLS_ACUITY_SCORE: 48
ADLS_ACUITY_SCORE: 48

## 2025-02-25 NOTE — ED NOTES
If patient has a DEC assessment, michelle Lal [facility] would like a call at [phone] from the DEC .

## 2025-02-25 NOTE — PLAN OF CARE
Rosi Lamb  February 25, 2025  Plan of Care Hand-off Note     Patient Recommended Care Path: inpatient mental health    Clinical Substantiation:  Pt presenting in the ER today with CRT staff due to worsening of depression, anxiety and suicidal ideations.  Pt shared her current boyfriend was in his CD treatment due to alcohol abuse and he did not want to do anything with her anymore as trigger to her current mental health crisis. Pt also reported living alone, and she felt like a failure in her life as trigger. Pt endorsed increased depression, cry, worry, racing thoughts and anxiety. Pt shared she mostly worried about her parents. Pt reported having poor sleep and disrupted appetite. Pt denied having acute psychosis and leonel. Pt endorsed active suicidal ideations with plan to cut herself with a knife everywhere. Pt reported previous suicide attempt more than 20 years ago by overdosing on pills. Pt denied having homicidal ideations and access to firearms. Pt reported history of SIB by cutting herself 14 years ago.  Pt was not able to engage in her DEC Safety plan as she did not feel safe to go home.  Pt shared if she went back home today, she will likely to act on her suicidal thoughts to kill herself.  Pt was not able to demonstrate her ability to use her coping skills and support system to mitigate her current mental health crisis.  Pt was danger to herself out in the community.  Writer recommended inpatient psychiatric service for further stabilization, safety assessment and medication management.    Goals for crisis stabilization:  Pt will need inpatient psychiatric service for further stabilization, safety assessment and medication management.    Next steps for Care Team:  EC will follow up with Pt to provide further theraepeutic support while on boarding. Psych consult order was made for medication evaluation.    Treatment Objectives Addressed:  rapport building, safety planning, assessing safety,  identifying an appropriate aftercare plan, identifying and practicing coping strategies, processing feelings, identifying additional supports    Therapeutic Interventions:  Engaged in safety planning, Engaged in guided discovery, explored patient's perspectives and helped expand them through socratic dialogue., Coached on coping techniques/relaxation skills to help improve distress tolerance and managing intense emotions.    Has a specific means been identified for suicidal.homicide actions: Yes  If yes, describe: Pt shared her suicide plan was to using a knife to cut herself everywhere.  Explain action steps toward mitigation: Writer engaged Pt in her suicide risk assessment, reviewed safety plan, coping skills and support system.  Document completion of mitigation action: Writer recommending inpatient psychiatric service for Pt for further stabilization, safety assessment and medication management.  Writer called CRT for their ongoing involvement and support in Pt's aftercare plan.  The follow up action still needed prior to discharge: EC will follow up with Pt to provide further theraepeutic support while on boarding.  Psych consult order was made for medication evaluation.    Patient coping skills attempted to reduce the crisis:   crochetting, playing cards, listening to music, journaling, reading, playing basketball, DBT, mindfulnes coping skills, grounding techniques, deep breathing exercise, meditation, affirmations.        Imminent risk of harm: Suicidal Behavior  Severe psychiatric, behavioral or other comorbid conditions are appropriate for management at inpatient mental health as indicated by at least one of the following: Psychiatric Symptoms, Impaired impulse control, judgement, or insight, Symptoms of impact to function  Severe dysfunction in daily living is present as indicated by at least one of the following: Complete inability to maintain any appropriate aspect of personal responsibility in any  adult roles, Other evidence of severe dysfunction  Situation and expectations are appropriate for inpatient care: Voluntary treatment at lower level of care is not feasible, Patient management/treatment at lower level of care is not feasible or is inappropriate, Biopsychosocial stresses potentially contributing to clinical presentation (co morbidities) have been assessed and are absent or manageable at proposed level of care  Inpatient mental health services are necessary to meet patient needs and at least one of the following: Specific condition related to admission diagnosis is present and judged likely to further improve at proposed level of care, Specific condition related to admission diagnosis is present and judged likely to deteriorate in absence of treatment at proposed level of care      Collateral contact information:  Lukasz from [facility], direct #[phone].    Legal Status: Voluntary/Patient has signed consent for treatment                                                                                                                                       Psychiatry Consult: Patient has Psychiatry Consult Order    AMBROCIO Abel

## 2025-02-25 NOTE — ED NOTES
Dr. Mathews accepted patient to [facility]. Called [facility] at [phone] and gave report to PORFIRIO Sibley. This writer will call for transportation and call facility back when patient leaves [facility].

## 2025-02-25 NOTE — ED NOTES
IP MH Referral Acuity Rating Score (RARS)    LMHP complete at referral to IP MH, with DEC; and, daily while awaiting IP MH placement. Call score to PPS.  CRITERIA SCORING   New 74 HH and Involuntary for IP MH (not adolescent) 0/3   Boarding over 24 hours 0/1   Vulnerable adult at least 55+ with multiple co morbidities; or, Patient age 11 or under 0/1   Suicide ideation without relief of precipitating factors 1/1   Current plan for suicide 1/1   Current plan for homicide 0/1   Imminent risk or actual attempt to seriously harm another without relief of factors precipitating the attempt 0/1   Severe dysfunction in daily living (ex: complete neglect for self care, extreme disruption in vegetative function, extreme deterioration in social interactions) 1/1   Recent (last 2 weeks) or current physical aggression in the ED 0/1   Restraints or seclusion episode in ED 0/1   Verbal aggression, agitation, yelling, etc., while in the ED 0/1   Active psychosis with psychomotor agitation or catatonia 0/1   Need for constant or near constant redirection (from leaving, from others, etc).  0/1   Intrusive or disruptive behaviors 0/1   TOTAL 3

## 2025-02-25 NOTE — ED NOTES
Discussed with patient the Crisis Response Team (CRT) and potential benefits for care of CRT: Yes  Patient was interested in a referral to CRT: Yes  Referral call was made to [phone]: Yes  Was transferred to CRT worker: Yes  Coordinator contacted to fax [phone]) CRT: Yes
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
## 2025-02-25 NOTE — ED PROVIDER NOTES
History     Chief Complaint   Patient presents with    Suicidal     HPI  Rosi Lamb is a 50 year old female with a PMH AGNIESZKA, borderline personality disorder, bipolar disorder, CKD, insomnia, paroxysmal afib who presents with the crisis response team with suicidal thoughts and a plan. Her plan was to cut herself with a knife. Her current stressors are her ex-boyfriend went to treatment for alcohol and after treatment he is not talking to her anymore. She went to go talk with him and violated a lease. So getting in trouble is upsetting her. Her family lives far away and her father is currently sick. She is worried about her son who is currently in foster care and potentially be released with no place to go. She was recently discharged from [facility] on 2/11/25. She reports during that stay they increased her Abilify dose. She felt better when she was at [facility]. Last inpatient mental health hospitalization was 2022. Denies alcohol or drug use. Per the patient compliant with taking her daily medications.     Allergies:  Allergies   Allergen Reactions    Clonazepam Other (See Comments)     Causes Violence and aggresssion    Hydrocodone-Acetaminophen      Other reaction(s): Seizures  Can take Percocet without difficulty.    Lorazepam Other (See Comments)     Causes Violence and aggresssion    Sertraline Other (See Comments)     Caused suicidally     Bupropion Other (See Comments)     Caused Major Depression    Dust Mite Extract      Other reaction(s): Asthma symptoms    Lithium Other (See Comments)     Mood alteration    Pollen Extract      Other reaction(s): Asthma symptoms    Risperidone Other (See Comments)     Agitation      Sulfa Antibiotics Itching    Trichophyton      Other reaction(s): Asthma symptoms    Valproic Acid Other (See Comments)     Weight gain       Problem List:    Patient Active Problem List    Diagnosis Date Noted    Severe depressed bipolar I disorder without psychotic features (H)  02/25/2025     Priority: Medium    Malaise and fatigue 09/03/2024     Priority: Medium    Snores 09/03/2024     Priority: Medium    Paroxysmal atrial fibrillation (H) - New Dx 8/22/2024 09/02/2024     Priority: Medium    Primary insomnia 04/04/2024     Priority: Medium    Nocturnal leg cramps 01/23/2024     Priority: Medium    CKD (chronic kidney disease) stage 2, GFR 60-89 ml/min 03/14/2023     Priority: Medium    H/O adenomatous polyp of colon 07/25/2022     Priority: Medium    Nail dystrophy 02/27/2022     Priority: Medium    Intermittent low back pain 09/08/2021     Priority: Medium    Abnormal Pap smear of cervix 04/11/2018     Priority: Medium     Overview:   had scraping of cervix    Formatting of this note might be different from the original.  had scraping of cervix      Allergic rhinitis due to pollen 02/08/2018     Priority: Medium    Esophageal reflux 02/08/2018     Priority: Medium    History of substance abuse (H) 02/08/2018     Priority: Medium    Chronic pain of right knee 04/20/2017     Priority: Medium    Menorrhagia with irregular cycle 02/16/2017     Priority: Medium    Bilateral foot pain 12/18/2016     Priority: Medium    Elevated random blood glucose level 12/18/2016     Priority: Medium    Peripheral polyneuropathy 12/18/2016     Priority: Medium    Vitamin B12 deficiency 12/18/2016     Priority: Medium    Vitamin D deficiency 12/18/2016     Priority: Medium    Lymphedema of both lower extremities 11/22/2016     Priority: Medium    Chronic venous stasis dermatitis of right lower extremity 08/17/2016     Priority: Medium     Overview:   Updated per 10/1/17 IMO import    Formatting of this note might be different from the original.  Updated per 10/1/17 IMO import      Alcohol use disorder, moderate, in sustained remission, dependence (H) 11/28/2015     Priority: Medium    Generalized anxiety disorder 11/28/2015     Priority: Medium    Bipolar disorder, in full remission, most recent episode  depressed 11/28/2015     Priority: Medium    Class 3 severe obesity due to excess calories with serious comorbidity and body mass index (BMI) of 45.0 to 49.9 in adult (H) 03/23/2015     Priority: Medium    Edema of extremity of unknown cause 01/15/2014     Priority: Medium     Formatting of this note might be different from the original.  1/15/2014: both legs, well controlled on prn lasix      Moderate persistent asthma 08/19/2013     Priority: Medium     AAP completed on 08/19/13  Triggers: pollen, fumes, exercise, URI, warm weather. Peak flow today is 250. Symptomatic: moderate    Formatting of this note might be different from the original.  Overview:   AAP completed on 08/19/13  Triggers: pollen, fumes, exercise, URI, warm weather. Peak flow today is 250. Symptomatic: moderate  Overview:   AAP completed on 08/19/13  Triggers: pollen, fumes, exercise, URI, warm weather. Peak flow today is 250. Symptomatic: moderate  Formatting of this note might be different from the original.  AAP completed on 08/19/13  Triggers: pollen, fumes, exercise, URI, warm weather. Peak flow today is 250. Symptomatic: moderate      Urinary incontinence 03/15/2013     Priority: Medium    Allergic rhinitis due to other allergen 10/02/2003     Priority: Medium     Overview:   G[facility] - Seasonal Allergies  Overview:   Overview:   GICH - Seasonal Allergies    Formatting of this note might be different from the original.  GICH - Seasonal Allergies      Borderline personality disorder (H) - [facility] for therapy + [facility] for medication management. 07/07/2003     Priority: Medium     [facility] for therapy + [facility] for medication management.      Disorder of female genital organ 07/05/2001     Priority: Medium     Overview:   DUB, dysmenorrhea  Overview:   Overview:   DUB, dysmenorrhea    Formatting of this note might be different from the original.  Overview:   DUB, dysmenorrhea          Past Medical History:    Past  Medical History:   Diagnosis Date    Abnormal Pap smear of cervix 2018    Cannabis use disorder, moderate, in sustained remission, dependence (H) 2015    Closed dislocation of tarsal joint 2011    Closed dislocation of tarsal joint - left 2011    Edema     Encounter for removal and reinsertion of intrauterine contraceptive device     Excessive and frequent menstruation with irregular cycle     Major depressive disorder, single episode     Personal history of other medical treatment (CODE)     Personal history of other medical treatment (CODE)     Uncomplicated asthma        Past Surgical History:    Past Surgical History:   Procedure Laterality Date    ANKLE SURGERY      fracture, repair with screws    ARTHRODESIS FOOT Left 2022    Procedure: left foot hardware removaL, fusion of 1st-2nd Tarsal metatarsal;  Surgeon: Anthony Castillo DPM;  Location: [facility] OR    COLONOSCOPY  2022    F/U  tubular adenoma    CONIZATION LEEP      ,Underwent a loop    IMPLANT STIMULATOR AND LEADS SACRAL NERVE (STAGE ONE AND TWO)      scheduled 23 with Dr. Sheth at [place]    LAPAROSCOPIC TUBAL LIGATION      ,tubal ligation       Family History:    Family History   Problem Relation Age of Onset    Osteoporosis Mother     Asthma Father         Asthma,+ Leg edema, knee, hip replacement. + Lymphedema    Heart Failure Father     Other - See Comments Paternal Grandmother         Lymphedema    Osteoporosis Brother         Osteoporosis    Other - See Comments Brother         No Known Problems       Social History:  Marital Status:   [4]  Social History     Tobacco Use    Smoking status: Former     Current packs/day: 0.00     Average packs/day: 2.0 packs/day for 3.0 years (6.0 ttl pk-yrs)     Types: Cigarettes     Start date:      Quit date: 2003     Years since quittin.1     Passive exposure: Past    Smokeless tobacco: Never   Vaping Use    Vaping status: Never Used  "  Substance Use Topics    Alcohol use: Not Currently     Alcohol/week: 0.0 standard drinks of alcohol    Drug use: Never        Medications:    acetaminophen (TYLENOL) 500 MG tablet  albuterol (VENTOLIN HFA) 108 (90 Base) MCG/ACT inhaler  amLODIPine (NORVASC) 2.5 MG tablet  ARIPiprazole (ABILIFY) 15 MG tablet  atomoxetine (STRATTERA) 25 MG capsule  busPIRone (BUSPAR) 10 MG tablet  cholecalciferol (VITAMIN D3) 125 mcg (5000 units) capsule  diltiazem ER (CARDIAZEM LA) 180 MG 24 hr tablet  docusate sodium (COLACE) 100 MG tablet  Elemental iron 65 mg Vitamin C 125 mg (VITRON C)  MG TABS tablet  famotidine (PEPCID) 20 MG tablet  gabapentin (NEURONTIN) 100 MG capsule  hydrOXYzine (VISTARIL) 50 MG capsule  hydrOXYzine HCl (ATARAX) 50 MG tablet  ibuprofen (ADVIL/MOTRIN) 200 MG tablet  lamoTRIgine (LAMICTAL) 100 MG tablet  montelukast (SINGULAIR) 10 MG tablet  prazosin (MINIPRESS) 1 MG capsule  progesterone (PROMETRIUM) 100 MG capsule  ramelteon (ROZEREM) 8 MG tablet  vilazodone (VIIBRYD) 40 MG TABS tablet  BD SYRINGE SLIP TIP 25G X 5/8\" 1 ML MISC  docusate sodium (EQ STOOL SOFTENER) 100 MG capsule  Menthol, Topical Analgesic, (BIOFREEZE EX)  Menthol, Topical Analgesic, (BIOFREEZE) 4 % GEL  PREVIDENT 5000 PLUS 1.1 % CREA  syringe/needle, sisp, (B-D SYRINGE/NEEDLE) 25G X 5/8\" 1 ML MISC      Review of Systems   Psychiatric/Behavioral:  Positive for suicidal ideas.    All other systems reviewed and are negative.    Physical Exam   BP: (!) 144/92  Pulse: 108  Temp: 98.4  F (36.9  C)  Resp: 24  SpO2: 99 %    Physical Exam  Vitals and nursing note reviewed.   Constitutional:       General: She is not in acute distress.     Appearance: Normal appearance. She is not ill-appearing.   HENT:      Head: Normocephalic.      Mouth/Throat:      Mouth: Mucous membranes are moist.   Eyes:      Conjunctiva/sclera: Conjunctivae normal.   Cardiovascular:      Rate and Rhythm: Regular rhythm. Tachycardia present.      Pulses: Normal " pulses.      Heart sounds: Normal heart sounds.   Pulmonary:      Effort: Pulmonary effort is normal.      Breath sounds: Normal breath sounds.   Skin:     General: Skin is warm and dry.   Neurological:      General: No focal deficit present.      Mental Status: She is alert and oriented to person, place, and time. Mental status is at baseline.   Psychiatric:         Mood and Affect: Mood is anxious.         Speech: Speech normal.         Behavior: Behavior is cooperative.         Thought Content: Thought content includes suicidal ideation. Thought content includes suicidal plan.            Results for orders placed or performed during the hospital encounter of 02/25/25 (from the past 24 hours)   CBC with platelets differential    Narrative    The following orders were created for panel order CBC with platelets differential.  Procedure                               Abnormality         Status                     ---------                               -----------         ------                     CBC with platelets and d...[[id number]]                      Final result                 Please view results for these tests on the individual orders.   Basic metabolic panel   Result Value Ref Range    Sodium 138 135 - 145 mmol/L    Potassium 4.1 3.4 - 5.3 mmol/L    Chloride 101 98 - 107 mmol/L    Carbon Dioxide (CO2) 27 22 - 29 mmol/L    Anion Gap 10 7 - 15 mmol/L    Urea Nitrogen 8.2 6.0 - 20.0 mg/dL    Creatinine 0.83 0.51 - 0.95 mg/dL    GFR Estimate 86 >60 mL/min/1.73m2    Calcium 9.5 8.8 - 10.4 mg/dL    Glucose 97 70 - 99 mg/dL   Ethanol GH   Result Value Ref Range    Alcohol ethyl <0.01 <=0.01 g/dL   Salicylate level   Result Value Ref Range    Salicylate <0.3   mg/dL   Acetaminophen GH   Result Value Ref Range    Acetaminophen <5.0 (L) 10.0 - 30.0 ug/mL   CBC with platelets and differential   Result Value Ref Range    WBC Count 7.2 4.0 - 11.0 10e3/uL    RBC Count 4.60 3.80 - 5.20 10e6/uL    Hemoglobin 14.3 11.7 -  15.7 g/dL    Hematocrit 42.9 35.0 - 47.0 %    MCV 93 78 - 100 fL    MCH 31.1 26.5 - 33.0 pg    MCHC 33.3 31.5 - 36.5 g/dL    RDW 13.1 10.0 - 15.0 %    Platelet Count 284 150 - 450 10e3/uL    % Neutrophils 71 %    % Lymphocytes 18 %    % Monocytes 9 %    % Eosinophils 2 %    % Basophils 1 %    % Immature Granulocytes 0 %    NRBCs per 100 WBC 0 <1 /100    Absolute Neutrophils 5.1 1.6 - 8.3 10e3/uL    Absolute Lymphocytes 1.3 0.8 - 5.3 10e3/uL    Absolute Monocytes 0.6 0.0 - 1.3 10e3/uL    Absolute Eosinophils 0.1 0.0 - 0.7 10e3/uL    Absolute Basophils 0.0 0.0 - 0.2 10e3/uL    Absolute Immature Granulocytes 0.0 <=0.4 10e3/uL    Absolute NRBCs 0.0 10e3/uL   UA with Microscopic reflex to Culture    Specimen: Urine, Clean Catch   Result Value Ref Range    Color Urine Yellow Colorless, Straw, Light Yellow, Yellow    Appearance Urine Clear Clear    Glucose Urine Negative Negative mg/dL    Bilirubin Urine Negative Negative    Ketones Urine Negative Negative mg/dL    Specific Gravity Urine 1.004 1.000 - 1.030    Blood Urine Negative Negative    pH Urine 6.5 5.0 - 9.0    Protein Albumin Urine Negative Negative mg/dL    Urobilinogen Urine Normal Normal, 2.0 mg/dL    Nitrite Urine Negative Negative    Leukocyte Esterase Urine Negative Negative    RBC Urine 0 <=2 /HPF    WBC Urine <1 <=5 /HPF    Narrative    Urine Culture not indicated   Urine Drug Screen    Narrative    The following orders were created for panel order Urine Drug Screen.  Procedure                               Abnormality         Status                     ---------                               -----------         ------                     Urine Drug Screen Panel[[id number]]      Normal              Final result                 Please view results for these tests on the individual orders.   Urine Drug Screen Panel   Result Value Ref Range    Amphetamines Urine Screen Negative Screen Negative    Barbituates Urine Screen Negative Screen Negative     Benzodiazepine Urine Screen Negative Screen Negative    Cannabinoids Urine Screen Negative Screen Negative    Cocaine Urine Screen Negative Screen Negative    Fentanyl Qual Urine Screen Negative Screen Negative    Opiates Urine Screen Negative Screen Negative    PCP Urine Screen Negative Screen Negative       Medications - No data to display    Assessments & Plan (with Medical Decision Making)  Rosi Labm is a 50 year old female with a PMH AGNIESZKA, borderline personality disorder, bipolar disorder, CKD, insomnia, paroxysmal afib who presents with the crisis response team with suicidal thoughts and a plan. Her plan was to cut herself with a knife. Her current stressors are her ex-boyfriend went to treatment for alcohol and after treatment he is not talking to her anymore. She went to go talk with him and violated a lease. So getting in trouble is upsetting her. Her family lives far away and her father is currently sick. She is worried about her son who is currently in foster care and potentially be released with no place to go. She was recently discharged from [facility] on 2/11/25. She reports during that stay they increased her Abilify dose. She felt better when she was at [facility]. Last inpatient mental health hospitalization was 2022. Denies alcohol or drug use. Per the patient compliant with taking her daily medications.   VS in the ED. BP (!) 144/92   Pulse 108   Temp 98.4  F (36.9  C) (Tympanic)   Resp 24   SpO2 99%   Diagnostics:    Lab: Labs are unremarkable.     ED Course as of 02/25/25 1707   Tue Feb 25, 2025   1533 I spoke with DEC , Layo who recommends inpatient hospitalization. She is high risk for self harm. She does not feel safe going home. She is voluntary. No contraindication to going inpatient.      Rosi is a 49 y/o female evaluated today with suicidal ideation with a plan. She is having suicidal thoughts with a plan. She does not feel safe going home. She has access to knives.  She is cooperative. DEC assessment completed. DEC , Layo recommends inpatient hospitalization. I agree with DEC's recommendations. No contraindications to inpatient hospitalization. Pt is voluntary. She is accepted at [facility] in [place]. Pt agreeable with the plan.      I have reviewed the nursing notes.    I have reviewed the findings, diagnosis, plan and need for follow up with the patient.  Medical Decision Making  The patient's presentation was of moderate complexity (a chronic illness mild to moderate exacerbation, progression, or side effect of treatment).    The patient's evaluation involved:  an assessment requiring an independent historian (see separate area of note for details)  ordering and/or review of 3+ test(s) in this encounter (see separate area of note for details)  discussion of management or test interpretation with another health professional (see separate area of note for details)    The patient's management necessitated only low risk treatment.    Final diagnoses:   Suicide ideation     2/25/2025   [facility]       Lois Sandoval, APRN CNP  02/25/25 3950

## 2025-02-25 NOTE — ED TRIAGE NOTES
Pt arrives to ER with Lukasz from [facility] Pt states that she called [facility] with SI and plan to use a knife and cut herself. Pt denies and injuries at this time.

## 2025-02-26 ENCOUNTER — TELEPHONE (OUTPATIENT)
Dept: BEHAVIORAL HEALTH | Facility: CLINIC | Age: 50
End: 2025-02-26
Payer: MEDICARE

## 2025-02-26 LAB
EST. AVERAGE GLUCOSE BLD GHB EST-MCNC: 91 MG/DL
HBA1C MFR BLD: 4.8 %
TSH SERPL DL<=0.005 MIU/L-ACNC: 1.41 UIU/ML (ref 0.3–4.2)
VIT B12 SERPL-MCNC: 784 PG/ML (ref 232–1245)
VIT D+METAB SERPL-MCNC: 50 NG/ML (ref 20–50)

## 2025-02-26 PROCEDURE — 84443 ASSAY THYROID STIM HORMONE: CPT | Performed by: NURSE PRACTITIONER

## 2025-02-26 PROCEDURE — 36415 COLL VENOUS BLD VENIPUNCTURE: CPT

## 2025-02-26 PROCEDURE — 124N000001 HC R&B MH

## 2025-02-26 PROCEDURE — 82607 VITAMIN B-12: CPT | Performed by: NURSE PRACTITIONER

## 2025-02-26 PROCEDURE — 250N000013 HC RX MED GY IP 250 OP 250 PS 637

## 2025-02-26 PROCEDURE — 82306 VITAMIN D 25 HYDROXY: CPT | Performed by: NURSE PRACTITIONER

## 2025-02-26 PROCEDURE — 83036 HEMOGLOBIN GLYCOSYLATED A1C: CPT | Performed by: NURSE PRACTITIONER

## 2025-02-26 PROCEDURE — 99223 1ST HOSP IP/OBS HIGH 75: CPT | Mod: AI

## 2025-02-26 PROCEDURE — 250N000013 HC RX MED GY IP 250 OP 250 PS 637: Performed by: NURSE PRACTITIONER

## 2025-02-26 PROCEDURE — 250N000013 HC RX MED GY IP 250 OP 250 PS 637: Performed by: PSYCHIATRY & NEUROLOGY

## 2025-02-26 RX ORDER — PANTOPRAZOLE SODIUM 40 MG/1
40 TABLET, DELAYED RELEASE ORAL
Status: DISCONTINUED | OUTPATIENT
Start: 2025-02-27 | End: 2025-02-26

## 2025-02-26 RX ORDER — BUSPIRONE HYDROCHLORIDE 10 MG/1
10 TABLET ORAL 2 TIMES DAILY
Status: DISPENSED | OUTPATIENT
Start: 2025-02-26

## 2025-02-26 RX ORDER — DILTIAZEM HYDROCHLORIDE 180 MG/1
180 CAPSULE, COATED, EXTENDED RELEASE ORAL 2 TIMES DAILY
Status: DISPENSED | OUTPATIENT
Start: 2025-02-26

## 2025-02-26 RX ORDER — MUSCLE RUB CREAM 100; 150 MG/G; MG/G
CREAM TOPICAL EVERY 6 HOURS PRN
Status: DISPENSED | OUTPATIENT
Start: 2025-02-26

## 2025-02-26 RX ORDER — ACETAMINOPHEN 325 MG/1
650 TABLET ORAL EVERY 4 HOURS PRN
Status: DISPENSED | OUTPATIENT
Start: 2025-02-26

## 2025-02-26 RX ORDER — LAMOTRIGINE 100 MG/1
100 TABLET ORAL 3 TIMES DAILY
Status: DISPENSED | OUTPATIENT
Start: 2025-02-26

## 2025-02-26 RX ORDER — VILAZODONE HYDROCHLORIDE 10 MG/1
40 TABLET ORAL EVERY MORNING
Status: DISPENSED | OUTPATIENT
Start: 2025-02-27

## 2025-02-26 RX ORDER — ARIPIPRAZOLE 20 MG/1
20 TABLET ORAL AT BEDTIME
Status: ON HOLD | COMMUNITY
Start: 2025-02-07

## 2025-02-26 RX ORDER — PANTOPRAZOLE SODIUM 40 MG/1
40 TABLET, DELAYED RELEASE ORAL
Status: DISPENSED | OUTPATIENT
Start: 2025-02-26

## 2025-02-26 RX ORDER — LIDOCAINE 4 G/G
2 PATCH TOPICAL DAILY PRN
Status: DISPENSED | OUTPATIENT
Start: 2025-02-26

## 2025-02-26 RX ORDER — ARIPIPRAZOLE 10 MG/1
20 TABLET ORAL AT BEDTIME
Status: DISPENSED | OUTPATIENT
Start: 2025-02-26

## 2025-02-26 RX ORDER — MONTELUKAST SODIUM 10 MG/1
10 TABLET ORAL AT BEDTIME
Status: DISPENSED | OUTPATIENT
Start: 2025-02-26

## 2025-02-26 RX ADMIN — BUSPIRONE HYDROCHLORIDE 10 MG: 10 TABLET ORAL at 20:40

## 2025-02-26 RX ADMIN — GABAPENTIN 100 MG: 100 CAPSULE ORAL at 20:39

## 2025-02-26 RX ADMIN — RAMELTEON 8 MG: 8 TABLET ORAL at 20:39

## 2025-02-26 RX ADMIN — GABAPENTIN 100 MG: 100 CAPSULE ORAL at 08:26

## 2025-02-26 RX ADMIN — MONTELUKAST 10 MG: 10 TABLET, FILM COATED ORAL at 20:39

## 2025-02-26 RX ADMIN — PANTOPRAZOLE SODIUM 40 MG: 40 TABLET, DELAYED RELEASE ORAL at 11:59

## 2025-02-26 RX ADMIN — AMLODIPINE BESYLATE 2.5 MG: 2.5 TABLET ORAL at 08:26

## 2025-02-26 RX ADMIN — DILTIAZEM HYDROCHLORIDE 180 MG: 180 CAPSULE, COATED, EXTENDED RELEASE ORAL at 11:59

## 2025-02-26 RX ADMIN — LAMOTRIGINE 100 MG: 100 TABLET ORAL at 15:28

## 2025-02-26 RX ADMIN — DILTIAZEM HYDROCHLORIDE 180 MG: 180 CAPSULE, COATED, EXTENDED RELEASE ORAL at 20:39

## 2025-02-26 RX ADMIN — GABAPENTIN 100 MG: 100 CAPSULE ORAL at 14:18

## 2025-02-26 RX ADMIN — LAMOTRIGINE 100 MG: 100 TABLET ORAL at 20:39

## 2025-02-26 RX ADMIN — ARIPIPRAZOLE 20 MG: 10 TABLET ORAL at 20:39

## 2025-02-26 ASSESSMENT — ACTIVITIES OF DAILY LIVING (ADL)
ADLS_ACUITY_SCORE: 22

## 2025-02-26 NOTE — PLAN OF CARE
"  Problem: Adult Behavioral Health Plan of Care  Goal: Patient-Specific Goal (Individualization)  Description: You can add care plan individualizations to a care plan. Examples of Individualization might be:  \"Parent requests to be called daily at 9am for status\", \"I have a hard time hearing out of my right ear\", or \"Do not touch me to wake me up as it startles  me\".  2/25/2025 2145 by Kimberlee Marquez RN  Outcome: Progressing  Note: ADMISSION NOTE    Reason for admission depression with suicidal ideation.  Safety concerns self harm.  Risk for or history of violence overdose age 22.   Full skin assessment: done    [facility]  746 PM.   Status on arrival: ambulatory  BP (!) 142/75   Pulse 94   Temp 98  F (36.7  C) (Temporal)   Resp 16   Ht 1.702 m (5' 7\")   Wt 134.8 kg (297 lb 1.6 oz)   SpO2 94%   BMI 46.53 kg/m    Patient given tour of unit and Welcome to  unit papers given to patient, wanding completed, belongings inventoried, and admission assessment completed.   Patient's legal status on arrival is voluntary. Appropriate legal rights discussed with and copy given to patient. Patient Bill of Rights discussed with and copy given to patient.   Patient denies SI, HI, and thoughts of self harm and contracts for safety while on unit.      Patient calm cooperative with changing into scrubs and skin checks. Patient arrived from [facility] after [facility] when she had increasing depression and suicidal ideation. Patient states she has had increasing depression since she had an increase in her Abilify medication from 15 to 20 mg. Patient states she has been taking her medications as prescribed. Patient states she has a large support group of friends, family and her  Venessa. Patient state she lives alone in an apartment and she manages to take care of herself with some trouble at times. Patient " denies falls. Patient states she does have asthma and takes medications for this. No alcohol or drug use and patient went to outpatient treatment at [facility] several years ago. Patient state she had a suicide attempt by overdose at age 22. Tour of unit given and patient attending some groups.     Kimberlee Marquez RN  2/25/2025  9:46 PM    Face to face end of shift report communicated to 11-7 shift RN.     Kimberlee Marquez RN  2/25/2025  9:55 PM             Problem: Suicide Risk  Goal: Absence of Self-Harm  Outcome: Progressing   Goal Outcome Evaluation:

## 2025-02-26 NOTE — PROGRESS NOTES
02/25/25 2055   Patient Belongings   Did you bring any home meds/supplements to the hospital?  No   Patient Belongings locker;sent to security per site process   Patient Belongings Put in Hospital Secure Location (Security or Locker, etc.) clothing;shoes;other (see comments)   Belongings Search Yes   Clothing Search Yes   Second Staff Sonal   Comment yadav stuffed animal, gray bear paw boots, pink socks, blue scarf, cream cardigan, black t-shirt, gray pants, blue fleece, coral t-shirt, gray sweatpants, charging block and cord, blue printed purse, st. heriberto notepad and pen, soft cover bible, body cream x2, metal spoon, glasses with purple frames, skipbo cards, chapstick, sol hook, pencil, misc. papers.     List items sent to safe: floral clutch, carly' card x2, receipts x4, medtronic card, business card x6, medicare card, [place] [employer] pass x2, $9.75 in cash and change, [facility] counseling card x2, [facility]are card x3, [facility] card, [employer] card, starbucks card, target gift card, library card, phone numbers, [place] ID card, 2 gold keys and 1 silver key on pink lanyard with 5 key chain accessories attached, black Clever[employer] phone in black case (screen protector has multiple cracks)  All other belongings put in assigned cubby in belongings room.     I have reviewed my belongings list on admission and verify that it is correct.     Patient signature_______________________________    Second staff witness (if patient unable to sign) ______________________________       I have received all my belongings at discharge.    Patient signature________________________________    Nelly  2/25/2025  9:01 PM
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
## 2025-02-26 NOTE — DISCHARGE INSTRUCTIONS
Behavioral Discharge Planning and Instructions    Summary: This is a 50 year old female with a PMH of bipolar disorder, BPD who presents with worsening anxiety, depression and SI from what appears to be increased psychosocial stressors.     Main Diagnosis:     #. Bipolar I disorder, current episode depressed  #. AGNIESZKA  #. Borderline personality traits    Health Care Follow-up:     [facility] Coordinator  Solomon Knox  [phone]      Attend all scheduled appointments with your outpatient providers. Call at least 24 hours in advance if you need to reschedule an appointment to ensure continued access to your outpatient providers.     Major Treatments, Procedures and Findings:  You were provided with: a psychiatric assessment, assessed for medical stability, medication evaluation and/or management, group therapy, family therapy, individual therapy, CD evaluation/assessment, milieu management, and medical interventions    Symptoms to Report: feeling more aggressive, increased confusion, losing more sleep, mood getting worse, or thoughts of suicide    Early warning signs can include: increased depression or anxiety sleep disturbances increased thoughts or behaviors of suicide or self-harm  increased unusual thinking, such as paranoia or hearing voices    Safety and Wellness:  Take all medicines as directed.  Make no changes unless your doctor suggests them.      Follow treatment recommendations.  Refrain from alcohol and non-prescribed drugs.  Ask your support system to help you reduce your access to items that could harm yourself or others. Items could include:  Firearms  Medicines (both prescribed and over-the-counter)  Knives and other sharp objects  Ropes and like materials  Car keys  If there is a concern for safety, call 911. If there is a concern for safety, call 911.    Resources:   Crisis Intervention: 657.157.2537 or 514-377-2854 (TTY: 400.309.3027).  Call anytime for help.  National [place] on Mental Illness  "(www.[place].lazarus.org): 524.396.5769 or 952-523-2864.  [place] Association for Children's Mental Health (www.mac.org): 980.433.8707.  Alcoholics Anonymous (www.alcoholics-anonymous.org): Check your phone book for your local chapter.  Suicide Awareness Voices of Education (SAVE) (www.save.org): 182-085-GWZE (7259)  National Suicide Prevention Line (www.mentalhealthmn.org): 582-318-AVMR (4244)  Mental Health Consumer/Survivor Network of [place] (www.mhcsn.net): 241.413.2190 or 425-995-3893  Mental Health Association of [place] (www.mentalhealth.org): 750.860.9664 or 474-260-2558  Self- Management and Recovery Training., SMART-- Toll free: 232.152.3905  www.[employer]formatic  Text 4 Life: txt \"LIFE\" to 66078 for immediate support and crisis intervention  Crisis text line: Text \"[place]\" to 021964. Free, confidential, 24/7.  Crisis Intervention: 825.506.4751 or 935-252-5871. Call anytime for help.     General Medication Instructions:   See your medication sheet(s) for instructions.   Take all medicines as directed.  Make no changes unless your doctor suggests them.   Go to all your doctor visits.  Be sure to have all your required lab tests. This way, your medicines can be refilled on time.  Do not use any drugs not prescribed by your doctor.  Avoid alcohol.    Advance Directives:   Scanned document on file with [facility]? Yes, scanned ACP docmt  Is document scanned? Current scanned  Honoring Choices Your Rights Handout: Informed and given  Was more information offered? Materials given    The Treatment team has appreciated the opportunity to work with you. If you have any questions or concerns about your recent admission, you can contact the unit which can receive your call 24 hours a day, 7 days a week. They will be able to get in touch with a Provider if needed. The unit number is [phone] .  " be able to get in touch with a Provider if needed. The unit number is [phone].
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
## 2025-02-26 NOTE — PLAN OF CARE
"  Problem: Adult Behavioral Health Plan of Care  Goal: Patient-Specific Goal (Individualization)  Description: You can add care plan individualizations to a care plan. Examples of Individualization might be:  \"Parent requests to be called daily at 9am for status\", \"I have a hard time hearing out of my right ear\", or \"Do not touch me to wake me up as it startles  me\".  Outcome: Progressing  Note: Report received from Kimberlee. Rounding complete. Pt observed sleeping in right side lying position with regular and unlabored respirations.    Pt has been in bed with eyes closed and regular respirations. 15 minute and PRN checks all night. No complaints offered.     Pt slept approx  7  hours this NOC shift.    Face to face end of shift report communicated to oncoming RN.    Kelly RODRIGUEZ RN  February 26, 2025  4:57 AM          Problem: Suicide Risk  Goal: Absence of Self-Harm  Outcome: Progressing  Note: Unable to assess due to pt sleeping. Pt has remained free of self-harm.     Goal Outcome Evaluation:                        "

## 2025-02-26 NOTE — PLAN OF CARE
"  Problem: Adult Behavioral Health Plan of Care  Goal: Patient-Specific Goal (Individualization)  Description: *  Encourage patient to use restroom every 2 hours*    Patient will attend 50% or more of groups  Patient will sleep 6-8 hours per night  Outcome: Not Progressing  Note: Face to face start of shift report received from RN.  Rounding complete, patient laying in bed at this time.     Maria Del Rosario Winkler RN  2/26/2025  7:46 AM     Patient remains laying in bed, reports \"I'm wet, I need new scrubs\"  Brought patient scrubs and bedding, cleaned bed, patient showers at this time.  Eats breakfast in lounge with peers, eats 100%.  Patient takes AM medications as scheduled, requests to have her other scheduled medications at this time.  Informed patient her medications need to be verified and approved so this could be later today.      Patient attends groups. Patient lets needs be known.    Encouraged patient to wear brief.      Patient attends all groups.    Lays in bed at times.      Goal Outcome Evaluation:       Face to face end of shift report communicated to oncoming shift.     Maria Del Rosario Winkler RN  2/26/2025  3:20 PM                      "

## 2025-02-27 ENCOUNTER — APPOINTMENT (OUTPATIENT)
Dept: OCCUPATIONAL THERAPY | Facility: HOSPITAL | Age: 50
DRG: 883 | End: 2025-02-27
Attending: PSYCHIATRY & NEUROLOGY
Payer: MEDICARE

## 2025-02-27 VITALS
OXYGEN SATURATION: 98 % | TEMPERATURE: 98.8 F | RESPIRATION RATE: 16 BRPM | DIASTOLIC BLOOD PRESSURE: 70 MMHG | HEART RATE: 102 BPM | HEIGHT: 67 IN | BODY MASS INDEX: 45.99 KG/M2 | SYSTOLIC BLOOD PRESSURE: 124 MMHG | WEIGHT: 293 LBS

## 2025-02-27 LAB — SARS-COV-2 RNA RESP QL NAA+PROBE: NEGATIVE

## 2025-02-27 PROCEDURE — 250N000013 HC RX MED GY IP 250 OP 250 PS 637: Performed by: PSYCHIATRY & NEUROLOGY

## 2025-02-27 PROCEDURE — 999N000104 HC STATISTIC NO CHARGE

## 2025-02-27 PROCEDURE — 250N000013 HC RX MED GY IP 250 OP 250 PS 637

## 2025-02-27 PROCEDURE — 97165 OT EVAL LOW COMPLEX 30 MIN: CPT | Mod: GO

## 2025-02-27 PROCEDURE — 250N000013 HC RX MED GY IP 250 OP 250 PS 637: Performed by: NURSE PRACTITIONER

## 2025-02-27 PROCEDURE — 87635 SARS-COV-2 COVID-19 AMP PRB: CPT | Performed by: NURSE PRACTITIONER

## 2025-02-27 PROCEDURE — 99232 SBSQ HOSP IP/OBS MODERATE 35: CPT

## 2025-02-27 PROCEDURE — 124N000001 HC R&B MH

## 2025-02-27 RX ORDER — ASCORBIC ACID 250 MG
125 TABLET ORAL DAILY
Status: DISPENSED | OUTPATIENT
Start: 2025-02-27

## 2025-02-27 RX ORDER — GABAPENTIN 300 MG/1
300 CAPSULE ORAL 3 TIMES DAILY
Status: DISPENSED | OUTPATIENT
Start: 2025-02-27

## 2025-02-27 RX ORDER — FERROUS SULFATE 325(65) MG
325 TABLET ORAL DAILY
Status: DISPENSED | OUTPATIENT
Start: 2025-02-27

## 2025-02-27 RX ORDER — HYDROXYZINE HYDROCHLORIDE 25 MG/1
50 TABLET, FILM COATED ORAL AT BEDTIME
Status: DISPENSED | OUTPATIENT
Start: 2025-02-27

## 2025-02-27 RX ADMIN — BUSPIRONE HYDROCHLORIDE 10 MG: 10 TABLET ORAL at 20:26

## 2025-02-27 RX ADMIN — DILTIAZEM HYDROCHLORIDE 180 MG: 180 CAPSULE, COATED, EXTENDED RELEASE ORAL at 08:14

## 2025-02-27 RX ADMIN — ACETAMINOPHEN 650 MG: 325 TABLET, FILM COATED ORAL at 18:32

## 2025-02-27 RX ADMIN — VILAZODONE HYDROCHLORIDE 40 MG: 10 TABLET ORAL at 08:14

## 2025-02-27 RX ADMIN — GABAPENTIN 100 MG: 100 CAPSULE ORAL at 08:14

## 2025-02-27 RX ADMIN — DILTIAZEM HYDROCHLORIDE 180 MG: 180 CAPSULE, COATED, EXTENDED RELEASE ORAL at 20:26

## 2025-02-27 RX ADMIN — ARIPIPRAZOLE 20 MG: 10 TABLET ORAL at 20:26

## 2025-02-27 RX ADMIN — RAMELTEON 8 MG: 8 TABLET ORAL at 20:26

## 2025-02-27 RX ADMIN — AMLODIPINE BESYLATE 2.5 MG: 2.5 TABLET ORAL at 08:14

## 2025-02-27 RX ADMIN — LAMOTRIGINE 100 MG: 100 TABLET ORAL at 08:14

## 2025-02-27 RX ADMIN — MONTELUKAST 10 MG: 10 TABLET, FILM COATED ORAL at 20:26

## 2025-02-27 RX ADMIN — PANTOPRAZOLE SODIUM 40 MG: 40 TABLET, DELAYED RELEASE ORAL at 06:30

## 2025-02-27 RX ADMIN — GABAPENTIN 300 MG: 300 CAPSULE ORAL at 13:50

## 2025-02-27 RX ADMIN — FERROUS SULFATE TAB 325 MG (65 MG ELEMENTAL FE) 325 MG: 325 (65 FE) TAB at 09:12

## 2025-02-27 RX ADMIN — Medication 125 MG: at 09:12

## 2025-02-27 RX ADMIN — BUSPIRONE HYDROCHLORIDE 10 MG: 10 TABLET ORAL at 08:14

## 2025-02-27 RX ADMIN — HYDROXYZINE HYDROCHLORIDE 50 MG: 25 TABLET, FILM COATED ORAL at 20:26

## 2025-02-27 RX ADMIN — LAMOTRIGINE 100 MG: 100 TABLET ORAL at 13:50

## 2025-02-27 RX ADMIN — GABAPENTIN 300 MG: 300 CAPSULE ORAL at 20:26

## 2025-02-27 RX ADMIN — LAMOTRIGINE 100 MG: 100 TABLET ORAL at 20:26

## 2025-02-27 RX ADMIN — LIDOCAINE 2 PATCH: 4 PATCH TOPICAL at 18:33

## 2025-02-27 ASSESSMENT — ACTIVITIES OF DAILY LIVING (ADL)
ADLS_ACUITY_SCORE: 22

## 2025-02-27 NOTE — PLAN OF CARE
Problem: Adult Behavioral Health Plan of Care  Goal: Patient-Specific Goal (Individualization)  Description: *  Encourage patient to use restroom every 2 hours*    Patient will attend 50% or more of groups  Patient will sleep 6-8 hours per night  Outcome: Progressing  Note: Report received from Noemy. Rounding complete. Pt observed sleeping in right side lying position with regular and unlabored respirations. Pt is being woken q2h for toileting as an incontinence protocol.    Pt has been in bed with eyes closed and regular respirations. 15 minute and PRN checks all night. No complaints offered.     Pt slept approx  4.5  hours this NOC shift.    Face to face end of shift report communicated to oncoming RN.    Kelly RODRIGUEZ RN  February 26, 2025  11:52 PM          Problem: Suicide Risk  Goal: Absence of Self-Harm  Outcome: Progressing  Note: Unable to assess due to pt sleeping. Pt has remained free of self-harm.     Goal Outcome Evaluation:

## 2025-02-27 NOTE — PROGRESS NOTES
OT attempted to see patient for eval this am. Patient notes that she is tired from waking every 2 hours for incontinence protocol. Willing to meet later today.

## 2025-02-27 NOTE — PLAN OF CARE
Face to face shift report received from previous shift RN.       Problem: Adult Behavioral Health Plan of Care  Goal: Patient-Specific Goal (Individualization)  Description: *  Encourage patient to use restroom every 2 hours*    Patient will attend 50% or more of groups  Patient will sleep 6-8 hours per night  Outcome: Progressing   Calm and cooperative. Medication compliant. Denies thoughts of harming self or others. Reports depression and anxiety. Spends time in lounge area. Attends group. Pleasant and appropriate during conversation. No reports of pain.     Problem: Suicide Risk  Goal: Absence of Self-Harm  Outcome: Progressing   Free from self harm or injury this shift.     Face to face end of shift report to be communicated to oncoming RN.

## 2025-02-27 NOTE — PLAN OF CARE
Face to face shift report received from previous shift RN.       Problem: Adult Behavioral Health Plan of Care  Goal: Patient-Specific Goal (Individualization)  Description: *  Encourage patient to use restroom every 2 hours*    Patient will attend 50% or more of groups  Patient will sleep 6-8 hours per night  Patient ok to use weighted blanket in room, 18.5 lbs. Patient able to sandi/doff indep.   Outcome: Progressing   Calm and cooperative. Medication compliant. Reports passive suicidal thoughts. Verbally contracts for safety, agrees to approach staff if feeling unsafe. Reports depression, but feels this has improved from yesterday. Reports poor sleep last noc, napping in room after dinner. Attended groups. Reports right sided rib pain. Pt reports this is from a previous injury, and is chronic pain for her. Requested and received Tylenol 650 mg at 1832. Lidocaine patches placed in this area at this time also per patient request.       Problem: Suicide Risk  Goal: Absence of Self-Harm  Outcome: Progressing   Free from self harm or injury this shift.     Face to face end of shift report to be communicated to oncoming RN.

## 2025-02-27 NOTE — MEDICATION SCRIBE - ADMISSION MEDICATION HISTORY
Medication Scribe Admission Medication History    Admission medication history is complete. The information provided in this note is only as accurate as the sources available at the time of the update.    Information Source(s): Patient and Facility (TCU/NH/) medication list/MAR via phone    Pertinent Information:   Patient was residing at [facility] ([phone]) up until Monday and staff were managing her medications. Staff report she signed herself and her medications out on Monday when she went to see a friend. Meds updated with verbal review from [facility] staff and gone through with patient to confirm last home doses.     Changes made to PTA medication list:  Added: abilify 20 mg  Deleted: abilify 15 mg, b12 injection supplies, duplicates  Changed:   Buspar- per fill hx, prescribed BID, per patient and staff at [facility], pt taking TID  Input missing information  Prometrium- not currently taking, waiting on PA     Allergies reviewed with patient and updates made in EHR: no    Medication History Completed By: Fely Andrade 2/26/2025 8:17 PM    PTA Med List   Medication Sig Note Last Dose/Taking    acetaminophen (TYLENOL) 500 MG tablet Take 1-2 tablets (500-1,000 mg) by mouth every 6 hours as needed for mild pain  More than a month    albuterol (VENTOLIN HFA) 108 (90 Base) MCG/ACT inhaler Inhale 1-2 puffs into the lungs every 4 hours as needed for shortness of breath or wheezing  More than a month    amLODIPine (NORVASC) 2.5 MG tablet Take 1 tablet (2.5 mg) by mouth every morning. May also take 1 tablet (2.5 mg) every evening as needed (-- for Raynaud's / Hand color changes --).  2/25/2025 Morning    ARIPiprazole (ABILIFY) 20 MG tablet Take 20 mg by mouth at bedtime.  2/24/2025 Bedtime    atomoxetine (STRATTERA) 25 MG capsule Take 25 mg by mouth every morning.  2/25/2025 Morning    busPIRone (BUSPAR) 10 MG tablet Take 1 tablet by mouth 2 times daily. (Patient taking differently: Take 1 tablet by mouth 3 times  daily.)  2/25/2025 Noon    cholecalciferol (VITAMIN D3) 125 mcg (5000 units) capsule Take 1 capsule (125 mcg) by mouth daily  2/25/2025 Morning    diltiazem ER (CARDIAZEM LA) 180 MG 24 hr tablet Take 1 tablet (180 mg) by mouth 2 times daily. -- Dose Increase 9/3/2024    + Stop 120 mg Diltiazem tablet 2/26/2025: Was taking at noon and bedtime.  2/25/2025 Noon    docusate sodium (EQ STOOL SOFTENER) 100 MG capsule TAKE 2 CAPSULES BY MOUTH TWICE DAILY .  ADJUST  DOSE  AS  NEEDED  TO  MAINTAIN  2/25/2025 Morning    Elemental iron 65 mg Vitamin C 125 mg (VITRON C)  MG TABS tablet Take 1 tablet by mouth daily on empty stomach -for iron deficiency  2/25/2025 Morning    famotidine (PEPCID) 20 MG tablet Take 1 tablet (20 mg) by mouth 2 times daily - For Heartburn  2/25/2025 Morning    gabapentin (NEURONTIN) 100 MG capsule Take 100 mg by mouth 3 times daily  2/25/2025 Noon    hydrOXYzine (VISTARIL) 50 MG capsule Take 50 mg by mouth 4 times daily as needed for anxiety.  Past Week    hydrOXYzine HCl (ATARAX) 50 MG tablet Take 1 tablet (50 mg) by mouth at bedtime  2/24/2025 Bedtime    ibuprofen (ADVIL/MOTRIN) 200 MG tablet Take 1-2 tablets (200-400 mg) by mouth every 6 hours as needed for moderate pain 1 to 2 tabs Q6 PRN  Past Week    lamoTRIgine (LAMICTAL) 100 MG tablet Take 100 mg by mouth 3 times daily.  2/25/2025 at 12:00 PM    Menthol, Topical Analgesic, (BIOFREEZE) 4 % GEL Externally apply topically 2 times daily as needed (For pain)  More than a month    montelukast (SINGULAIR) 10 MG tablet Take 1 tablet (10 mg) by mouth at bedtime  2/24/2025 Bedtime    prazosin (MINIPRESS) 1 MG capsule Take 1 mg by mouth nightly as needed (nightmares).  More than a month    PREVIDENT 5000 PLUS 1.1 % CREA Use in place of toothpaste once daily before bed. Brush, then swish toothpaste for 30 seconds and spit. Do not rinse, eat or drink for 30 minutes.  Past Week    ramelteon (ROZEREM) 8 MG tablet Take 1 tablet (8 mg) by mouth at  bedtime  2/24/2025 Bedtime    vilazodone (VIIBRYD) 40 MG TABS tablet Take 40 mg by mouth every morning  2/25/2025 Morning

## 2025-02-27 NOTE — PLAN OF CARE
"  Problem: Adult Behavioral Health Plan of Care  Goal: Patient-Specific Goal (Individualization)  Description: *  Encourage patient to use restroom every 2 hours*    Patient will attend 50% or more of groups  Patient will sleep 6-8 hours per night  Outcome: Progressing  Note: Face to face start of shift report received from RN.  Rounding complete, patient in bed at this time.     Maria Del Rosario Winkler RN  2/27/2025  7:55 AM     Patient lays in bed awake, waiting for breakfast.  \"You will let me know right?\"  \"You know they woke me up every two hours last night to go to the bathroom\"  informed patient that this is to prevent an accident.  Patient agreeable.    Patient stares at this writer, flat/blunt affect.   Patient endorses increased anxiety today, \"I'm attending all the groups and working on distraction\"  Patient eats meals in lounge with peers, eats 100%.    Patient lets needs be known.    Patient takes all medications as prescribed.    Patient encouraged to toilet every two hours, two episodes of incontinence this shift.        Goal Outcome Evaluation:       Face to face end of shift report communicated to oncoming shift.     Maria Del Rosario Winkler RN  2/27/2025  3:19 PM                      "

## 2025-02-27 NOTE — PROGRESS NOTES
Pt care coordinator called and wanted an updated. Updated them and placed contact information in AVS.     SW called pt CM and left a voicemail to call back. Waiting for response.

## 2025-02-28 ENCOUNTER — APPOINTMENT (OUTPATIENT)
Dept: OCCUPATIONAL THERAPY | Facility: HOSPITAL | Age: 50
DRG: 883 | End: 2025-02-28
Attending: PSYCHIATRY & NEUROLOGY
Payer: MEDICARE

## 2025-02-28 PROCEDURE — 99232 SBSQ HOSP IP/OBS MODERATE 35: CPT

## 2025-02-28 PROCEDURE — 250N000013 HC RX MED GY IP 250 OP 250 PS 637: Performed by: NURSE PRACTITIONER

## 2025-02-28 PROCEDURE — 97530 THERAPEUTIC ACTIVITIES: CPT | Mod: GO

## 2025-02-28 PROCEDURE — 124N000001 HC R&B MH

## 2025-02-28 PROCEDURE — 250N000013 HC RX MED GY IP 250 OP 250 PS 637: Performed by: PSYCHIATRY & NEUROLOGY

## 2025-02-28 PROCEDURE — 250N000013 HC RX MED GY IP 250 OP 250 PS 637

## 2025-02-28 RX ORDER — DOCUSATE SODIUM 100 MG/1
100 CAPSULE, LIQUID FILLED ORAL 2 TIMES DAILY
Status: DISCONTINUED | OUTPATIENT
Start: 2025-02-28 | End: 2025-03-04 | Stop reason: HOSPADM

## 2025-02-28 RX ORDER — POLYETHYLENE GLYCOL 3350 17 G/17G
17 POWDER, FOR SOLUTION ORAL DAILY
Status: DISCONTINUED | OUTPATIENT
Start: 2025-02-28 | End: 2025-03-04 | Stop reason: HOSPADM

## 2025-02-28 RX ADMIN — ARIPIPRAZOLE 20 MG: 10 TABLET ORAL at 20:49

## 2025-02-28 RX ADMIN — VILAZODONE HYDROCHLORIDE 40 MG: 10 TABLET ORAL at 08:10

## 2025-02-28 RX ADMIN — RAMELTEON 8 MG: 8 TABLET ORAL at 20:49

## 2025-02-28 RX ADMIN — DILTIAZEM HYDROCHLORIDE 180 MG: 180 CAPSULE, COATED, EXTENDED RELEASE ORAL at 08:11

## 2025-02-28 RX ADMIN — LAMOTRIGINE 100 MG: 100 TABLET ORAL at 20:49

## 2025-02-28 RX ADMIN — DOCUSATE SODIUM 100 MG: 100 CAPSULE, LIQUID FILLED ORAL at 20:49

## 2025-02-28 RX ADMIN — LAMOTRIGINE 100 MG: 100 TABLET ORAL at 14:08

## 2025-02-28 RX ADMIN — DILTIAZEM HYDROCHLORIDE 180 MG: 180 CAPSULE, COATED, EXTENDED RELEASE ORAL at 20:49

## 2025-02-28 RX ADMIN — BUSPIRONE HYDROCHLORIDE 10 MG: 10 TABLET ORAL at 20:49

## 2025-02-28 RX ADMIN — ACETAMINOPHEN 650 MG: 325 TABLET, FILM COATED ORAL at 08:16

## 2025-02-28 RX ADMIN — PANTOPRAZOLE SODIUM 40 MG: 40 TABLET, DELAYED RELEASE ORAL at 06:43

## 2025-02-28 RX ADMIN — POLYETHYLENE GLYCOL 3350 17 G: 17 POWDER, FOR SOLUTION ORAL at 18:04

## 2025-02-28 RX ADMIN — AMLODIPINE BESYLATE 2.5 MG: 2.5 TABLET ORAL at 08:12

## 2025-02-28 RX ADMIN — FERROUS SULFATE TAB 325 MG (65 MG ELEMENTAL FE) 325 MG: 325 (65 FE) TAB at 08:12

## 2025-02-28 RX ADMIN — BUSPIRONE HYDROCHLORIDE 10 MG: 10 TABLET ORAL at 08:12

## 2025-02-28 RX ADMIN — HYDROXYZINE HYDROCHLORIDE 50 MG: 25 TABLET, FILM COATED ORAL at 20:49

## 2025-02-28 RX ADMIN — LAMOTRIGINE 100 MG: 100 TABLET ORAL at 08:12

## 2025-02-28 RX ADMIN — Medication 125 MG: at 08:12

## 2025-02-28 RX ADMIN — GABAPENTIN 300 MG: 300 CAPSULE ORAL at 14:08

## 2025-02-28 RX ADMIN — GABAPENTIN 300 MG: 300 CAPSULE ORAL at 20:49

## 2025-02-28 RX ADMIN — MONTELUKAST 10 MG: 10 TABLET, FILM COATED ORAL at 20:49

## 2025-02-28 RX ADMIN — GABAPENTIN 300 MG: 300 CAPSULE ORAL at 08:11

## 2025-02-28 ASSESSMENT — ACTIVITIES OF DAILY LIVING (ADL)
ADLS_ACUITY_SCORE: 22

## 2025-02-28 NOTE — PLAN OF CARE
Problem: Adult Behavioral Health Plan of Care  Goal: Patient-Specific Goal (Individualization)  Description: *  Encourage patient to use restroom every 2 hours*    Patient will attend 50% or more of groups  Patient will sleep 6-8 hours per night  Patient ok to use weighted blanket in room, 18.5 lbs. Patient able to asndi/doff indep.   Outcome: Progressing  Note: Report received from Noemy. Rounding complete. Pt observed sleeping in supine position with regular and unlabored respirations.    Pt has been in bed with eyes closed and regular respirations. 15 minute and PRN checks all night. No complaints offered.     Pt slept approx  6.25  hours this NOC shift.    Face to face end of shift report communicated to oncoming RN.    Kelly RODRIGUEZ RN  February 28, 2025  3:02 AM          Problem: Suicide Risk  Goal: Absence of Self-Harm  Outcome: Progressing  Note: Unable to assess due to pt sleeping. Pt has remained free of self-harm.     Goal Outcome Evaluation:

## 2025-02-28 NOTE — PROGRESS NOTES
02/28/25 1500   Appointment Info   Signing Clinician's Name / Credentials (OT) Malaika Saldana OTR/L   Therapeutic Activities   Therapeutic Activity Minutes (83541) 31   Symptoms noted during/after treatment none   Treatment Detail/Skilled Intervention handout and education provided on deep breathing.  Pt was guided through four different deep breathing techniques for coping and calming.  Educated in biofeedback and completed 2 biofeedback techniques such as exercise and Heartmath to help recognize changes in heart rate for emotion regulation.   OT Discharge Planning   OT Plan OT to continue per plan of care.   OT Discharge Recommendation (DC Rec) other (see comments)  (return to [facility])   OT Rationale for DC Rec Clinical judgement   OT Brief overview of current status Engaged, asks appropriate questions.  Educated in biofeeback and deep breathing.   Total Session Time   Timed Code Treatment Minutes 31   Total Session Time (sum of timed and untimed services) 31

## 2025-02-28 NOTE — PLAN OF CARE
"  Problem: Adult Behavioral Health Plan of Care  Goal: Patient-Specific Goal (Individualization)  Description: *  Encourage patient to use restroom every 2 hours*    Patient will attend 50% or more of groups  Patient will sleep 6-8 hours per night  Patient ok to use weighted blanket in room, 18.5 lbs. Patient able to sandi/doff indep.   Outcome: Progressing  Note: Face to face start of shift report received from RN.  Rounding complete, patient in bed at this time.      Maria Del Rosario Winkler RN  2/28/2025  7:42 AM      Patient remains in bed through breakfast.  \"I just have a headache and want to sleep until 0900\"  PRN:  Tylenol 650 mg given @ 0816.    Patient up on unit for breakfast at 0900, eats 100%.      Patient takes all medications as prescribed.   Patient reports Tylenol was effective for headache.      Patient attends groups, spends time in lounge with peers, social and appropriate, reports to having \"a better day, I'm pretty good today\"         Goal Outcome Evaluation:    Face to face end of shift report communicated to oncoming shift.     Maria Del Rosario Winkler RN  2/28/2025  3:07 PM                           "

## 2025-02-28 NOTE — PROGRESS NOTES
Medication management and therapy have been scheduled for pt. Pt CM would not talk with SW without an BEE being signed. Explained that she did sign one here and she said she is out of the office until Monday and will reach out then to either get that or send one of her own.     [facility]   [address],   [place] [address]  Therapy: Ayala- March 5th @ 8:00am    [facility]  [address],   [place] [address]  Medication management: Sabrina- April 21st @ 9:30am

## 2025-02-28 NOTE — PROGRESS NOTES
[facility] PSYCHIATRY  PROGRESS NOTE     SUBJECTIVE     Prior to interviewing the patient, I met with nursing and reviewed patient's clinical condition. We discussed clinical care both before and after the interview. I have reviewed the patient's clinical course by review of records including previous notes, labs, and vital signs.     Per nursing, the patient had the following behavioral events over the last 24-hours: none.    On psychiatric interview, pt tells me that her anxiety and depression are gradually improving. She denies SI, HI or SIB. Denies AVH. Notes she slept ok, but again was woke up during the night for toileting. Discussed that CM appears to be out of the office as SW has not been able to connect.     Denies acute concerns. Denies any noted adverse effects from medication. She feels as though she is getting closer each day to discharge. Would like to hear back from CM but knows that this situation is out of her control and is no one's fault. Pt does plan on following up with CM about potential for adult foster care, which pt believes may be helpful to mitigate stress. We talked about the recent stressors leading to admission as likely martel for exacerbation of sx. Pt agrees.        MEDICATIONS   Scheduled Meds:  Current Facility-Administered Medications   Medication Dose Route Frequency Provider Last Rate Last Admin    amLODIPine (NORVASC) tablet 2.5 mg  2.5 mg Oral Elmer Multani MD   2.5 mg at 02/28/25 0812    ARIPiprazole (ABILIFY) tablet 20 mg  20 mg Oral At Bedtime Isma Ward APRN CNP   20 mg at 02/27/25 2026    ascorbic acid half-tab 125 mg  125 mg Oral Daily Isma Ward APRN CNP   125 mg at 02/28/25 0812    And    ferrous sulfate (FEROSUL) tablet 325 mg  325 mg Oral Daily Isma Ward APRN CNP   325 mg at 02/28/25 0812    busPIRone (BUSPAR) tablet 10 mg  10 mg Oral BID Isma Ward APRN CNP   10 mg at 02/28/25 0812    diltiazem ER COATED BEADS (CARDIZEM CD/CARTIA XT) 24  hr capsule 180 mg  180 mg Oral BID Dominique Anaya CNP   180 mg at 02/28/25 0811    gabapentin (NEURONTIN) capsule 300 mg  300 mg Oral TID Isma Ward APRN CNP   300 mg at 02/28/25 0811    hydrOXYzine HCl (ATARAX) tablet 50 mg  50 mg Oral At Bedtime Isma Ward APRN CNP   50 mg at 02/27/25 2026    lamoTRIgine (LaMICtal) tablet 100 mg  100 mg Oral TID Isma Ward APRN CNP   100 mg at 02/28/25 0812    montelukast (SINGULAIR) tablet 10 mg  10 mg Oral At Bedtime Dominique Anaya CNP   10 mg at 02/27/25 2026    pantoprazole (PROTONIX) EC tablet 40 mg  40 mg Oral QAM AC Dominique Anaya CNP   40 mg at 02/28/25 0643    ramelteon (ROZEREM) tablet 8 mg  8 mg Oral At Bedtime Elmer Mathews MD   8 mg at 02/27/25 2026    vilazodone (VIIBRYD) tablet 40 mg  40 mg Oral QAM Isma Ward APRN CNP   40 mg at 02/28/25 0810     PRN Meds:.  Current Facility-Administered Medications   Medication Dose Route Frequency Provider Last Rate Last Admin    acetaminophen (TYLENOL) tablet 650 mg  650 mg Oral Q4H PRN Dominique Anaya CNP   650 mg at 02/28/25 0816    albuterol (PROVENTIL HFA/VENTOLIN HFA) inhaler  1-2 puff Inhalation Q4H PRN Elmer Mathews MD        alum & mag hydroxide-simethicone (MAALOX) suspension 30 mL  30 mL Oral Q4H PRN Elmer Mathews MD        amLODIPine (NORVASC) tablet 2.5 mg  2.5 mg Oral QPM PRN Elmer Mathews MD        hydrOXYzine HCl (ATARAX) tablet 25 mg  25 mg Oral Q4H PRN Elmer Mathews MD        Lidocaine (LIDOCARE) 4 % Patch 2 patch  2 patch Transdermal Daily PRN Dominique Anaya CNP   2 patch at 02/27/25 1833    melatonin tablet 3 mg  3 mg Oral At Bedtime PRN Elmer Mathews MD        muscle rub (ARTHRITIS HOT) pain relief cream   Topical Q6H PRN Dominique Anaya CNP        OLANZapine (zyPREXA) tablet 10 mg  10 mg Oral TID PRN Elmer Mathews MD        Or    OLANZapine (zyPREXA) injection 10 mg  10 mg Intramuscular TID PRN Elmer Mathews MD        polyethylene glycol (MIRALAX) Packet  "17 g  17 g Oral Daily PRN Elmer Mathews MD            ALLERGIES   Allergies   Allergen Reactions    Clonazepam Other (See Comments)     Causes Violence and aggresssion    Hydrocodone-Acetaminophen      Other reaction(s): Seizures  Can take Percocet without difficulty.    Lorazepam Other (See Comments)     Causes Violence and aggresssion    Sertraline Other (See Comments)     Caused suicidally     Bupropion Other (See Comments)     Caused Major Depression    Dust Mite Extract      Other reaction(s): Asthma symptoms    Lithium Other (See Comments)     Mood alteration    Milk Protein Unknown    Pollen Extract      Other reaction(s): Asthma symptoms    Risperidone Other (See Comments)     Agitation      Sulfa Antibiotics Itching    Trichophyton      Other reaction(s): Asthma symptoms    Valproic Acid Other (See Comments)     Weight gain        MENTAL STATUS EXAM   Vitals: /70   Pulse 88   Temp 97.9  F (36.6  C) (Temporal)   Resp 14   Ht 1.702 m (5' 7\")   Wt 134.8 kg (297 lb 1.6 oz)   SpO2 95%   BMI 46.53 kg/m      Appearance:  awake, alert, adequately groomed, dressed in hospital scrubs, and appeared as age stated  Attitude:  cooperative  Eye Contact:  good  Mood:   better  Affect:  appropriate and in normal range  Speech:  clear, coherent  Psychomotor Behavior:  no evidence of tardive dyskinesia, dystonia, or tics  Thought Process:  linear  Associations:  no loose associations  Thought Content:  no evidence of psychotic thought, denies SI  Insight:  fair  Judgment:  fair  Oriented to:  time, person, and place  Attention Span and Concentration:  intact  Recent and Remote Memory:  intact  Language: English with appropriate syntax and vocabulary    Fund of Knowledge: appropriate  Muscle Strength and Tone: normal  Gait and Station: Normal       LABS   No results found for this or any previous visit (from the past 24 hours).      IMPRESSION     This is a 50 year old female with a PMH of bipolar disorder, " BPD who presents with worsening anxiety, depression and SI from what appears to be increased psychosocial stressors. Such stressors include break up with boyfriend, reported apartment lease violations d/t boyfriend's behavior in relation to EtOH, lack of support system, reported health issue with parent. Pt stating that structured living situation may be beneficial for her as it has in the past when she resided in a group home. This may positively impact mood and SI. Recent crisis stabilization at [facility], discharging 2/11. Remote hx of EtOH, however reports hx of sobriety for 4 years, negative EtOH and UDS in ED and do no suspect contributing to presentation. Due to the above presentation, pt was admitted for [facility] for further safety and stabilization.       Plan on resuming PTA medications once medication reconciliation complete. From there, will look to make changes as appropriate and if pt interested. SW to be in contact with CM per pt request for continuity of care.    Today: Pt gradually improving in terms of anxiety and depression. Denies SI. Appears to be tolerating increase in gabapentin. Exacerbation of sx appears related to psychosocial stressors. Pt would like to seek AFC placement with this likely to be managed outpatient with her CM. Would anticipate pt would be able to discharge in a few days if she continues to progress.      DIAGNOSES     #. Bipolar I disorder, current episode depressed  #. AGNIESZKA  #. Borderline personality traits       PLAN     Location: Unit 5  Legal Status: Orders Placed This Encounter      Voluntary    Safety Assessment:    Behavioral Orders   Procedures    Code 1 - Restrict to Unit    Routine Programming     As clinically indicated    Status 15     Every 15 minutes.      PTA psychotropic medications stopped:     - none    PTA psychotropic medications continued/changed:     - Abilify 15 mg   - gabapentin 100 mg TID->300 mg TID 2/27  -  Rozerem 8 mg qhs  - Viibryd    New medications tried and stopped:     -None    New medications initiated:     - standard unit PRNs    Today's Changes:    - none    Programming: Patient will be treated in a therapeutic milieu with appropriate individual and group therapies. Education will be provided on diagnoses, medications, and treatments.     Medical diagnoses:  Per medicine    Consult: None  Tests: None    Anticipated LOS: 4-7 days  Disposition: home with OP services         ATTESTATION      DASHA Coleman CNP

## 2025-02-28 NOTE — PROGRESS NOTES
[facility] PSYCHIATRY  PROGRESS NOTE     SUBJECTIVE     Prior to interviewing the patient, I met with nursing and reviewed patient's clinical condition. We discussed clinical care both before and after the interview. I have reviewed the patient's clinical course by review of records including previous notes, labs, and vital signs.     Per nursing, the patient had the following behavioral events over the last 24-hours: none.    On psychiatric interview, notes continued anxiety that worsens depression. SI is softening some, but reports passive SI without plan or intent. States she is tired from being woke in the night for toileting. Denies AVH.     Questions if SW was able to contact her CM. Informed SW has reached out but CM appears to be not available.     Denies any noted adverse effects to medications. Offered to increase gabapentin to 300 mg TID to target anxiety. Reviewed B/R/SE including sedation, ataxia, fatigue, peripheral edema, GI upset. Pt consents.        MEDICATIONS   Scheduled Meds:  Current Facility-Administered Medications   Medication Dose Route Frequency Provider Last Rate Last Admin    amLODIPine (NORVASC) tablet 2.5 mg  2.5 mg Oral Emler Hayes MD   2.5 mg at 02/28/25 0812    ARIPiprazole (ABILIFY) tablet 20 mg  20 mg Oral At Bedtime Isma Ward APRN CNP   20 mg at 02/27/25 2026    ascorbic acid half-tab 125 mg  125 mg Oral Daily Isma Ward APRN CNP   125 mg at 02/28/25 0812    And    ferrous sulfate (FEROSUL) tablet 325 mg  325 mg Oral Daily Isma Ward APRN CNP   325 mg at 02/28/25 0812    busPIRone (BUSPAR) tablet 10 mg  10 mg Oral BID Isma Ward APRN CNP   10 mg at 02/28/25 0812    diltiazem ER COATED BEADS (CARDIZEM CD/CARTIA XT) 24 hr capsule 180 mg  180 mg Oral BID Dominique Anaya CNP   180 mg at 02/28/25 0811    gabapentin (NEURONTIN) capsule 300 mg  300 mg Oral TID Isma Ward APRN CNP   300 mg at 02/28/25 0811    hydrOXYzine HCl (ATARAX) tablet 50 mg  50 mg  Oral At Bedtime Isma Ward APRN CNP   50 mg at 02/27/25 2026    lamoTRIgine (LaMICtal) tablet 100 mg  100 mg Oral TID Isma Ward APRN CNP   100 mg at 02/28/25 0812    montelukast (SINGULAIR) tablet 10 mg  10 mg Oral At Bedtime Dominique Anaya CNP   10 mg at 02/27/25 2026    pantoprazole (PROTONIX) EC tablet 40 mg  40 mg Oral QAM AC Dominique Anaya CNP   40 mg at 02/28/25 0643    ramelteon (ROZEREM) tablet 8 mg  8 mg Oral At Bedtime Elmer Mathews MD   8 mg at 02/27/25 2026    vilazodone (VIIBRYD) tablet 40 mg  40 mg Oral QAM Isma Ward APRN CNP   40 mg at 02/28/25 0810     PRN Meds:.  Current Facility-Administered Medications   Medication Dose Route Frequency Provider Last Rate Last Admin    acetaminophen (TYLENOL) tablet 650 mg  650 mg Oral Q4H PRN Dominique Anaya CNP   650 mg at 02/28/25 0816    albuterol (PROVENTIL HFA/VENTOLIN HFA) inhaler  1-2 puff Inhalation Q4H PRN Elmer Mathews MD        alum & mag hydroxide-simethicone (MAALOX) suspension 30 mL  30 mL Oral Q4H PRN Elmer Mathews MD        amLODIPine (NORVASC) tablet 2.5 mg  2.5 mg Oral QPM PRN Elmer Mathews MD        hydrOXYzine HCl (ATARAX) tablet 25 mg  25 mg Oral Q4H PRN Elmer Mathews MD        Lidocaine (LIDOCARE) 4 % Patch 2 patch  2 patch Transdermal Daily PRN Dominique Anaya CNP   2 patch at 02/27/25 1833    melatonin tablet 3 mg  3 mg Oral At Bedtime PRN Elmer Mathews MD        muscle rub (ARTHRITIS HOT) pain relief cream   Topical Q6H PRN Dominique Anaya CNP        OLANZapine (zyPREXA) tablet 10 mg  10 mg Oral TID PRN Elmer Mathews MD        Or    OLANZapine (zyPREXA) injection 10 mg  10 mg Intramuscular TID PRN Elmer Mathews MD        polyethylene glycol (MIRALAX) Packet 17 g  17 g Oral Daily PRN Elmer Mathews MD            ALLERGIES   Allergies   Allergen Reactions    Clonazepam Other (See Comments)     Causes Violence and aggresssion    Hydrocodone-Acetaminophen      Other reaction(s):  "Seizures  Can take Percocet without difficulty.    Lorazepam Other (See Comments)     Causes Violence and aggresssion    Sertraline Other (See Comments)     Caused suicidally     Bupropion Other (See Comments)     Caused Major Depression    Dust Mite Extract      Other reaction(s): Asthma symptoms    Lithium Other (See Comments)     Mood alteration    Milk Protein Unknown    Pollen Extract      Other reaction(s): Asthma symptoms    Risperidone Other (See Comments)     Agitation      Sulfa Antibiotics Itching    Trichophyton      Other reaction(s): Asthma symptoms    Valproic Acid Other (See Comments)     Weight gain        MENTAL STATUS EXAM   Vitals: /70   Pulse 88   Temp 97.9  F (36.6  C) (Temporal)   Resp 14   Ht 1.702 m (5' 7\")   Wt 134.8 kg (297 lb 1.6 oz)   SpO2 95%   BMI 46.53 kg/m      Appearance:  awake, alert, adequately groomed, dressed in hospital scrubs, and appeared as age stated  Attitude:  cooperative  Eye Contact:  good  Mood:   anxious  Affect:  appropriate and in normal range  Speech:  clear, coherent  Psychomotor Behavior:  no evidence of tardive dyskinesia, dystonia, or tics  Thought Process:  linear  Associations:  no loose associations  Thought Content:  no evidence of psychotic thought, endorses passive SI (no plan or intent)  Insight:  fair  Judgment:  fair  Oriented to:  time, person, and place  Attention Span and Concentration:  intact  Recent and Remote Memory:  intact  Language: English with appropriate syntax and vocabulary    Fund of Knowledge: appropriate  Muscle Strength and Tone: normal  Gait and Station: Normal       LABS   No results found for this or any previous visit (from the past 24 hours).      IMPRESSION     This is a 50 year old female with a PMH of bipolar disorder, BPD who presents with worsening anxiety, depression and SI from what appears to be increased psychosocial stressors. Such stressors include break up with boyfriend, reported apartment lease " violations d/t boyfriend's behavior in relation to EtOH, lack of support system, reported health issue with parent. Pt stating that structured living situation may be beneficial for her as it has in the past when she resided in a group home. This may positively impact mood and SI. Recent crisis stabilization at [facility], discharging 2/11. Remote hx of EtOH, however reports hx of sobriety for 4 years, negative EtOH and UDS in ED and do no suspect contributing to presentation. Due to the above presentation, pt was admitted for [facility] for further safety and stabilization.       Plan on resuming PTA medications once medication reconciliation complete. From there, will look to make changes as appropriate and if pt interested. SW to be in contact with CM per pt request for continuity of care.       DIAGNOSES     #. Bipolar I disorder, current episode depressed  #. AGNIESZKA  #. Borderline personality traits       PLAN     Location: Unit 5  Legal Status: Orders Placed This Encounter      Voluntary    Safety Assessment:    Behavioral Orders   Procedures    Code 1 - Restrict to Unit    Routine Programming     As clinically indicated    Status 15     Every 15 minutes.      PTA psychotropic medications stopped:     - none    PTA psychotropic medications continued/changed:     - Abilify 15 mg   - gabapentin 100 mg TID->300 mg TID 2/27  - Rozerem 8 mg qhs  - Viibryd    New medications tried and stopped:     -None    New medications initiated:     - standard unit PRNs    Today's Changes:    - increase heath to 300 mg TID    Programming: Patient will be treated in a therapeutic milieu with appropriate individual and group therapies. Education will be provided on diagnoses, medications, and treatments.     Medical diagnoses:  Per medicine    Consult: None  Tests: None    Anticipated LOS: 4-7 days  Disposition: home with OP services       TREATMENT TEAM CARE PLAN     Progress: Continued  symptoms.    Continued Stay Criteria/Rationale: Continued symptoms without sufficient improvement/resolution.    Medical/Physical: See above.    Precautions: See above.     Plan: Continue inpatient care with unit support and medication management.    Rationale for change in precautions or plan: NA due to no change.    Participants: DASHA Coleman CNP, Nursing, SW, OT.    The patient's care was discussed with the treatment team and chart notes were reviewed.       ATTESTATION      DASHA Coleman CNP

## 2025-03-01 LAB — SARS-COV-2 RNA RESP QL NAA+PROBE: NEGATIVE

## 2025-03-01 PROCEDURE — 87635 SARS-COV-2 COVID-19 AMP PRB: CPT | Performed by: NURSE PRACTITIONER

## 2025-03-01 PROCEDURE — 124N000001 HC R&B MH

## 2025-03-01 PROCEDURE — 250N000013 HC RX MED GY IP 250 OP 250 PS 637: Performed by: NURSE PRACTITIONER

## 2025-03-01 PROCEDURE — 99232 SBSQ HOSP IP/OBS MODERATE 35: CPT

## 2025-03-01 PROCEDURE — 250N000013 HC RX MED GY IP 250 OP 250 PS 637

## 2025-03-01 PROCEDURE — 250N000013 HC RX MED GY IP 250 OP 250 PS 637: Performed by: PSYCHIATRY & NEUROLOGY

## 2025-03-01 RX ADMIN — AMLODIPINE BESYLATE 2.5 MG: 2.5 TABLET ORAL at 08:04

## 2025-03-01 RX ADMIN — GABAPENTIN 300 MG: 300 CAPSULE ORAL at 20:59

## 2025-03-01 RX ADMIN — Medication 125 MG: at 08:05

## 2025-03-01 RX ADMIN — BUSPIRONE HYDROCHLORIDE 10 MG: 10 TABLET ORAL at 20:59

## 2025-03-01 RX ADMIN — DILTIAZEM HYDROCHLORIDE 180 MG: 180 CAPSULE, COATED, EXTENDED RELEASE ORAL at 08:04

## 2025-03-01 RX ADMIN — VILAZODONE HYDROCHLORIDE 40 MG: 10 TABLET ORAL at 08:03

## 2025-03-01 RX ADMIN — MONTELUKAST 10 MG: 10 TABLET, FILM COATED ORAL at 20:59

## 2025-03-01 RX ADMIN — POLYETHYLENE GLYCOL 3350 17 G: 17 POWDER, FOR SOLUTION ORAL at 08:06

## 2025-03-01 RX ADMIN — LAMOTRIGINE 100 MG: 100 TABLET ORAL at 08:05

## 2025-03-01 RX ADMIN — PANTOPRAZOLE SODIUM 40 MG: 40 TABLET, DELAYED RELEASE ORAL at 06:21

## 2025-03-01 RX ADMIN — DILTIAZEM HYDROCHLORIDE 180 MG: 180 CAPSULE, COATED, EXTENDED RELEASE ORAL at 20:58

## 2025-03-01 RX ADMIN — LAMOTRIGINE 100 MG: 100 TABLET ORAL at 14:08

## 2025-03-01 RX ADMIN — HYDROXYZINE HYDROCHLORIDE 50 MG: 25 TABLET, FILM COATED ORAL at 20:59

## 2025-03-01 RX ADMIN — LAMOTRIGINE 100 MG: 100 TABLET ORAL at 20:59

## 2025-03-01 RX ADMIN — DOCUSATE SODIUM 100 MG: 100 CAPSULE, LIQUID FILLED ORAL at 20:59

## 2025-03-01 RX ADMIN — ARIPIPRAZOLE 20 MG: 10 TABLET ORAL at 20:58

## 2025-03-01 RX ADMIN — FERROUS SULFATE TAB 325 MG (65 MG ELEMENTAL FE) 325 MG: 325 (65 FE) TAB at 08:05

## 2025-03-01 RX ADMIN — RAMELTEON 8 MG: 8 TABLET ORAL at 20:59

## 2025-03-01 RX ADMIN — DOCUSATE SODIUM 100 MG: 100 CAPSULE, LIQUID FILLED ORAL at 08:04

## 2025-03-01 RX ADMIN — GABAPENTIN 300 MG: 300 CAPSULE ORAL at 14:08

## 2025-03-01 RX ADMIN — BUSPIRONE HYDROCHLORIDE 10 MG: 10 TABLET ORAL at 08:04

## 2025-03-01 RX ADMIN — GABAPENTIN 300 MG: 300 CAPSULE ORAL at 08:04

## 2025-03-01 ASSESSMENT — ACTIVITIES OF DAILY LIVING (ADL)
ADLS_ACUITY_SCORE: 22

## 2025-03-01 NOTE — PLAN OF CARE
Problem: Adult Behavioral Health Plan of Care  Goal: Patient-Specific Goal (Individualization)  Description: *  Encourage patient to use restroom every 2 hours*    Patient will attend 50% or more of groups  Patient will sleep 6-8 hours per night  Patient ok to use weighted blanket in room, 18.5 lbs. Patient able to sandi/doff indep.   Outcome: Progressing  Note: Report received from Hammad. Rounding complete. Pt observed sleeping in right side lying position with regular and unlabored respirations.    Pt has been in bed with eyes closed and regular respirations. 15 minute and PRN checks all night. No complaints offered.     Pt slept approx 6.5   hours this NOC shift.    Face to face end of shift report communicated to oncoming RN.    Kelly RODRIGUEZ RN  March 1, 2025  4:49 AM          Problem: Suicide Risk  Goal: Absence of Self-Harm  Outcome: Progressing  Note: Unable to assess due to pt sleeping. Pt has remained free of self-harm.     Goal Outcome Evaluation:

## 2025-03-01 NOTE — PLAN OF CARE
Face to face end of shift report received from day RN. Rounding completed. Patient observed in her room resting in bed.     Hammad Bautista, RN  2/28/2025  7250    Patient spent majority of shift socializing with peers in the milieu. Denies SI/HI/AVH/depression/anxiety/pain. C/o constipation. PRN Miralax in prune juice given and writer spoke with Jaclyn NP and scheduled colace ordered as well. Medication compliant. Appetite/hygiene good. Attends group. Makes needs known. Calm, cooperative, and appropriate.     Report given to night RN at shift change.      Problem: Adult Behavioral Health Plan of Care  Goal: Patient-Specific Goal (Individualization)  Description: *  Encourage patient to use restroom every 2 hours*    Patient will attend 50% or more of groups  Patient will sleep 6-8 hours per night  Patient ok to use weighted blanket in room, 18.5 lbs. Patient able to sandi/doff indep.   Outcome: Progressing     Problem: Suicide Risk  Goal: Absence of Self-Harm  Outcome: Progressing

## 2025-03-01 NOTE — PROGRESS NOTES
"[facility] PSYCHIATRY  PROGRESS NOTE     SUBJECTIVE     Prior to interviewing the patient, I met with nursing and reviewed patient's clinical condition. We discussed clinical care both before and after the interview. I have reviewed the patient's clinical course by review of records including previous notes, labs, and vital signs.     Per nursing, the patient had the following behavioral events over the last 24-hours: none. Slept 6.5 hours.    On psychiatric interview, Pt is met on the general unit. She tells me she is a little better today in terms of anxiety and depression. States SI is \"not really there\". Denies any plan or intent. Discussed hx BPD playing a major role in sx given psychosocial stressors. Pt notes she has done DBT in the past.     She tells me she had a small BM yesterday and normal, large BM today. Denies abd discomfort, N/V/D. Did ask about B12 injections which were not identified on med rec. With B 12 level unremarkable and pt reporting not taking for months, recommend pt follow up PCP.     Denies any noted adverse effects to medications.          MEDICATIONS   Scheduled Meds:  Current Facility-Administered Medications   Medication Dose Route Frequency Provider Last Rate Last Admin    amLODIPine (NORVASC) tablet 2.5 mg  2.5 mg Oral Elmer Multani MD   2.5 mg at 03/01/25 0804    ARIPiprazole (ABILIFY) tablet 20 mg  20 mg Oral At Bedtime Isma Ward APRN CNP   20 mg at 02/28/25 2049    ascorbic acid half-tab 125 mg  125 mg Oral Daily Isma Ward APRN CNP   125 mg at 03/01/25 0805    And    ferrous sulfate (FEROSUL) tablet 325 mg  325 mg Oral Daily Isma Ward APRN CNP   325 mg at 03/01/25 0805    busPIRone (BUSPAR) tablet 10 mg  10 mg Oral BID Isma Ward APRN CNP   10 mg at 03/01/25 0804    diltiazem ER COATED BEADS (CARDIZEM CD/CARTIA XT) 24 hr capsule 180 mg  180 mg Oral BID Dominique Anaya CNP   180 mg at 03/01/25 0804    docusate sodium (COLACE) capsule 100 mg  100 mg " Oral BID Dominique Anaya CNP   100 mg at 03/01/25 0804    gabapentin (NEURONTIN) capsule 300 mg  300 mg Oral TID Isma Ward APRN CNP   300 mg at 03/01/25 1408    hydrOXYzine HCl (ATARAX) tablet 50 mg  50 mg Oral At Bedtime Isma Ward APRN CNP   50 mg at 02/28/25 2049    lamoTRIgine (LaMICtal) tablet 100 mg  100 mg Oral TID Isma Ward APRN CNP   100 mg at 03/01/25 1408    montelukast (SINGULAIR) tablet 10 mg  10 mg Oral At Bedtime Dominique Anaya CNP   10 mg at 02/28/25 2049    pantoprazole (PROTONIX) EC tablet 40 mg  40 mg Oral QAM AC Dominique Anaya CNP   40 mg at 03/01/25 0621    polyethylene glycol (MIRALAX) Packet 17 g  17 g Oral Daily Reder, Dominique, CNP   17 g at 03/01/25 0806    ramelteon (ROZEREM) tablet 8 mg  8 mg Oral At Bedtime Elmer Mathews MD   8 mg at 02/28/25 2049    vilazodone (VIIBRYD) tablet 40 mg  40 mg Oral QAM Isma Ward APRN CNP   40 mg at 03/01/25 0803     PRN Meds:.  Current Facility-Administered Medications   Medication Dose Route Frequency Provider Last Rate Last Admin    acetaminophen (TYLENOL) tablet 650 mg  650 mg Oral Q4H PRN Dominique Anaya CNP   650 mg at 02/28/25 0816    albuterol (PROVENTIL HFA/VENTOLIN HFA) inhaler  1-2 puff Inhalation Q4H PRN Elmer Mathews MD        alum & mag hydroxide-simethicone (MAALOX) suspension 30 mL  30 mL Oral Q4H PRN Elmer Mathews MD        amLODIPine (NORVASC) tablet 2.5 mg  2.5 mg Oral QPM PRN Elmer Mathews MD        hydrOXYzine HCl (ATARAX) tablet 25 mg  25 mg Oral Q4H PRN Elmer Mathews MD        Lidocaine (LIDOCARE) 4 % Patch 2 patch  2 patch Transdermal Daily PRN Dominique Anaya CNP   2 patch at 02/27/25 1833    magnesium hydroxide (MILK OF MAGNESIA) suspension 30 mL  30 mL Oral Daily PRN Dominique Anaya CNP        melatonin tablet 3 mg  3 mg Oral At Bedtime PRN Elmer Mathews MD        muscle rub (ARTHRITIS HOT) pain relief cream   Topical Q6H PRN Dominique Anaya CNP        OLANZapine (zyPREXA) tablet 10 mg  10  "mg Oral TID PRN Elmer Mathews MD        Or    OLANZapine (zyPREXA) injection 10 mg  10 mg Intramuscular TID PRN Elmer Mathews MD        polyethylene glycol (MIRALAX) Packet 17 g  17 g Oral Daily PRN Elmer Mathews MD   17 g at 02/28/25 9439        ALLERGIES   Allergies   Allergen Reactions    Clonazepam Other (See Comments)     Causes Violence and aggresssion    Hydrocodone-Acetaminophen      Other reaction(s): Seizures  Can take Percocet without difficulty.    Lorazepam Other (See Comments)     Causes Violence and aggresssion    Sertraline Other (See Comments)     Caused suicidally     Bupropion Other (See Comments)     Caused Major Depression    Dust Mite Extract      Other reaction(s): Asthma symptoms    Lithium Other (See Comments)     Mood alteration    Milk Protein Unknown    Pollen Extract      Other reaction(s): Asthma symptoms    Risperidone Other (See Comments)     Agitation      Sulfa Antibiotics Itching    Trichophyton      Other reaction(s): Asthma symptoms    Valproic Acid Other (See Comments)     Weight gain        MENTAL STATUS EXAM   Vitals: BP (!) 146/79   Pulse 87   Temp 97.6  F (36.4  C) (Temporal)   Resp 14   Ht 1.702 m (5' 7\")   Wt 134.8 kg (297 lb 1.6 oz)   SpO2 98%   BMI 46.53 kg/m      Appearance:  awake, alert, adequately groomed, dressed in hospital scrubs, and appeared as age stated  Attitude:  cooperative  Eye Contact:  good  Mood:   better  Affect:  appropriate and in normal range  Speech:  clear, coherent  Psychomotor Behavior:  no evidence of tardive dyskinesia, dystonia, or tics  Thought Process:  linear  Associations:  no loose associations  Thought Content:  no evidence of psychotic thought, denies SI  Insight:  fair  Judgment:  fair  Oriented to:  time, person, and place  Attention Span and Concentration:  intact  Recent and Remote Memory:  intact  Language: English with appropriate syntax and vocabulary    Fund of Knowledge: appropriate  Muscle Strength and " Tone: normal  Gait and Station: Normal       LABS   Recent Results (from the past 24 hours)   COVID-19 Virus (Coronavirus) by PCR Nose    Collection Time: 03/01/25  6:16 AM    Specimen: Nose; Swab   Result Value Ref Range    SARS CoV2 PCR Negative Negative         IMPRESSION     This is a 50 year old female with a PMH of bipolar disorder, BPD who presents with worsening anxiety, depression and SI from what appears to be increased psychosocial stressors. Such stressors include break up with boyfriend, reported apartment lease violations d/t boyfriend's behavior in relation to EtOH, lack of support system, reported health issue with parent. Pt stating that structured living situation may be beneficial for her as it has in the past when she resided in a group home. This may positively impact mood and SI. Recent crisis stabilization at [facility], discharging 2/11. Remote hx of EtOH, however reports hx of sobriety for 4 years, negative EtOH and UDS in ED and do no suspect contributing to presentation. Due to the above presentation, pt was admitted for [facility] for further safety and stabilization.       Plan on resuming PTA medications once medication reconciliation complete. From there, will look to make changes as appropriate and if pt interested. SW to be in contact with CM per pt request for continuity of care.    Today: Pt gradually improving in terms of anxiety and depression. Denies SI. Appears to be tolerating increase in gabapentin. Exacerbation of sx appears related to psychosocial stressors. Pt would like to seek AFC placement with this likely to be managed outpatient with her CM. Had BM today, denies abd sx. Would anticipate pt would be able to discharge in a few days if she continues to progress.      DIAGNOSES     #. Bipolar I disorder, current episode depressed  #. AGNIESZKA  #. Borderline personality traits       PLAN     Location: Unit 5  Legal Status: Orders Placed This  Encounter      Voluntary    Safety Assessment:    Behavioral Orders   Procedures    Code 1 - Restrict to Unit    Routine Programming     As clinically indicated    Status 15     Every 15 minutes.      PTA psychotropic medications stopped:     - none    PTA psychotropic medications continued/changed:     - Abilify 15 mg   - gabapentin 100 mg TID->300 mg TID 2/27  - Rozerem 8 mg qhs  - Viibryd    New medications tried and stopped:     -None    New medications initiated:     - standard unit PRNs    Today's Changes:    - none    Programming: Patient will be treated in a therapeutic milieu with appropriate individual and group therapies. Education will be provided on diagnoses, medications, and treatments.     Medical diagnoses:  Per medicine    Consult: None  Tests: None    Anticipated LOS: 4-7 days  Disposition: home with OP services         ATTESTATION      DASHA Coleman CNP

## 2025-03-01 NOTE — PLAN OF CARE
Problem: Adult Behavioral Health Plan of Care  Goal: Patient-Specific Goal (Individualization)  Description: *  Encourage patient to use restroom every 2 hours*    Patient will attend 50% or more of groups  Patient will sleep 6-8 hours per night  Patient ok to use weighted blanket in room, 18.5 lbs. Patient able to sandi/doff indep.   Outcome: Progressing  Note: Face to face start of shift report received from RN.  Rounding complete, patient in lounge at this time.      Maria Del Rosario Winkler RN  3/1/2025  7:46 AM    [facility]    Patient eats 100% of breakfast in lounge, attends groups.      Patient denies SI/HI, AH//VH and pain.     Patient takes all medications as prescribed.  Polite and cooperative with nursing cares and assessment.        Goal Outcome Evaluation:       Face to face end of shift report communicated to oncoming shift.     Maria Del Rosario Winkler RN  3/1/2025  3:14 PM

## 2025-03-02 PROCEDURE — 250N000013 HC RX MED GY IP 250 OP 250 PS 637: Performed by: NURSE PRACTITIONER

## 2025-03-02 PROCEDURE — 124N000001 HC R&B MH

## 2025-03-02 PROCEDURE — 250N000013 HC RX MED GY IP 250 OP 250 PS 637: Performed by: PSYCHIATRY & NEUROLOGY

## 2025-03-02 PROCEDURE — 250N000013 HC RX MED GY IP 250 OP 250 PS 637

## 2025-03-02 PROCEDURE — 99232 SBSQ HOSP IP/OBS MODERATE 35: CPT

## 2025-03-02 RX ADMIN — ARIPIPRAZOLE 20 MG: 10 TABLET ORAL at 20:32

## 2025-03-02 RX ADMIN — GABAPENTIN 300 MG: 300 CAPSULE ORAL at 08:08

## 2025-03-02 RX ADMIN — DOCUSATE SODIUM 100 MG: 100 CAPSULE, LIQUID FILLED ORAL at 08:07

## 2025-03-02 RX ADMIN — LAMOTRIGINE 100 MG: 100 TABLET ORAL at 20:32

## 2025-03-02 RX ADMIN — RAMELTEON 8 MG: 8 TABLET ORAL at 20:32

## 2025-03-02 RX ADMIN — DILTIAZEM HYDROCHLORIDE 180 MG: 180 CAPSULE, COATED, EXTENDED RELEASE ORAL at 08:07

## 2025-03-02 RX ADMIN — BUSPIRONE HYDROCHLORIDE 10 MG: 10 TABLET ORAL at 20:32

## 2025-03-02 RX ADMIN — VILAZODONE HYDROCHLORIDE 40 MG: 10 TABLET ORAL at 08:06

## 2025-03-02 RX ADMIN — DOCUSATE SODIUM 100 MG: 100 CAPSULE, LIQUID FILLED ORAL at 20:32

## 2025-03-02 RX ADMIN — FERROUS SULFATE TAB 325 MG (65 MG ELEMENTAL FE) 325 MG: 325 (65 FE) TAB at 08:08

## 2025-03-02 RX ADMIN — Medication 125 MG: at 08:13

## 2025-03-02 RX ADMIN — PANTOPRAZOLE SODIUM 40 MG: 40 TABLET, DELAYED RELEASE ORAL at 06:01

## 2025-03-02 RX ADMIN — BUSPIRONE HYDROCHLORIDE 10 MG: 10 TABLET ORAL at 08:08

## 2025-03-02 RX ADMIN — HYDROXYZINE HYDROCHLORIDE 25 MG: 25 TABLET, FILM COATED ORAL at 11:03

## 2025-03-02 RX ADMIN — GABAPENTIN 300 MG: 300 CAPSULE ORAL at 20:32

## 2025-03-02 RX ADMIN — HYDROXYZINE HYDROCHLORIDE 50 MG: 25 TABLET, FILM COATED ORAL at 20:32

## 2025-03-02 RX ADMIN — LAMOTRIGINE 100 MG: 100 TABLET ORAL at 13:51

## 2025-03-02 RX ADMIN — MONTELUKAST 10 MG: 10 TABLET, FILM COATED ORAL at 20:32

## 2025-03-02 RX ADMIN — DILTIAZEM HYDROCHLORIDE 180 MG: 180 CAPSULE, COATED, EXTENDED RELEASE ORAL at 20:32

## 2025-03-02 RX ADMIN — GABAPENTIN 300 MG: 300 CAPSULE ORAL at 13:51

## 2025-03-02 RX ADMIN — AMLODIPINE BESYLATE 2.5 MG: 2.5 TABLET ORAL at 08:07

## 2025-03-02 RX ADMIN — ACETAMINOPHEN 650 MG: 325 TABLET, FILM COATED ORAL at 11:09

## 2025-03-02 RX ADMIN — LAMOTRIGINE 100 MG: 100 TABLET ORAL at 08:08

## 2025-03-02 ASSESSMENT — ACTIVITIES OF DAILY LIVING (ADL)
ADLS_ACUITY_SCORE: 22

## 2025-03-02 NOTE — PROGRESS NOTES
[facility] PSYCHIATRY  PROGRESS NOTE     SUBJECTIVE     Prior to interviewing the patient, I met with nursing and reviewed patient's clinical condition. We discussed clinical care both before and after the interview. I have reviewed the patient's clinical course by review of records including previous notes, labs, and vital signs.     Per nursing, the patient had the following behavioral events over the last 24-hours: none. Slept 6.5 hours.    On psychiatric interview, Pt is met on the general unit. She tells me she was sad this morning and was crying, but is feeling much better as the day progressed. Notes it has to do with helping others. States she is improving overall and will likely be ready to discharge early this week. Questions if she should go to a crisis bed prior to returning home. States she lives at an independent adult residence that does have staff and she cannot be away too long. She is going to think about it.     Denies any noted adverse effects to medications.          MEDICATIONS   Scheduled Meds:  Current Facility-Administered Medications   Medication Dose Route Frequency Provider Last Rate Last Admin    amLODIPine (NORVASC) tablet 2.5 mg  2.5 mg Oral Elmer Multani MD   2.5 mg at 03/02/25 0807    ARIPiprazole (ABILIFY) tablet 20 mg  20 mg Oral At Bedtime Isma Ward APRN CNP   20 mg at 03/01/25 2058    ascorbic acid half-tab 125 mg  125 mg Oral Daily Isma Ward APRN CNP   125 mg at 03/02/25 0813    And    ferrous sulfate (FEROSUL) tablet 325 mg  325 mg Oral Daily Isma Ward APRN CNP   325 mg at 03/02/25 0808    busPIRone (BUSPAR) tablet 10 mg  10 mg Oral BID Isma Ward APRN CNP   10 mg at 03/02/25 0808    diltiazem ER COATED BEADS (CARDIZEM CD/CARTIA XT) 24 hr capsule 180 mg  180 mg Oral BID RedHazel olivieresa, CNP   180 mg at 03/02/25 0807    docusate sodium (COLACE) capsule 100 mg  100 mg Oral BID Dominique Anaya, CNP   100 mg at 03/02/25 0807    gabapentin (NEURONTIN)  capsule 300 mg  300 mg Oral TID Isma Ward APRN CNP   300 mg at 03/02/25 1351    hydrOXYzine HCl (ATARAX) tablet 50 mg  50 mg Oral At Bedtime Isma Ward APRN CNP   50 mg at 03/01/25 2059    lamoTRIgine (LaMICtal) tablet 100 mg  100 mg Oral TID Isma Ward APRN CNP   100 mg at 03/02/25 1351    montelukast (SINGULAIR) tablet 10 mg  10 mg Oral At Bedtime Dominique Anaya CNP   10 mg at 03/01/25 2059    pantoprazole (PROTONIX) EC tablet 40 mg  40 mg Oral QAM AC Dominique Anaya CNP   40 mg at 03/02/25 0601    polyethylene glycol (MIRALAX) Packet 17 g  17 g Oral Daily Reder, Dominique, CNP   17 g at 03/01/25 0806    ramelteon (ROZEREM) tablet 8 mg  8 mg Oral At Bedtime Elmer Mathews MD   8 mg at 03/01/25 2059    vilazodone (VIIBRYD) tablet 40 mg  40 mg Oral QAM Isma Ward APRN CNP   40 mg at 03/02/25 0806     PRN Meds:.  Current Facility-Administered Medications   Medication Dose Route Frequency Provider Last Rate Last Admin    acetaminophen (TYLENOL) tablet 650 mg  650 mg Oral Q4H PRN Dominique Anaya CNP   650 mg at 03/02/25 1109    albuterol (PROVENTIL HFA/VENTOLIN HFA) inhaler  1-2 puff Inhalation Q4H PRN Elmer Mathews MD        alum & mag hydroxide-simethicone (MAALOX) suspension 30 mL  30 mL Oral Q4H PRN Elmer Mathews MD        amLODIPine (NORVASC) tablet 2.5 mg  2.5 mg Oral QPM PRN Elmer Mathews MD        hydrOXYzine HCl (ATARAX) tablet 25 mg  25 mg Oral Q4H PRN Elmer Mathews MD   25 mg at 03/02/25 1103    Lidocaine (LIDOCARE) 4 % Patch 2 patch  2 patch Transdermal Daily PRN Dominique Anaya CNP   2 patch at 02/27/25 1833    magnesium hydroxide (MILK OF MAGNESIA) suspension 30 mL  30 mL Oral Daily PRN Dominique Anaya CNP        melatonin tablet 3 mg  3 mg Oral At Bedtime PRN Elmer Mathews MD        muscle rub (ARTHRITIS HOT) pain relief cream   Topical Q6H PRN Dominique Anaya CNP        OLANZapine (zyPREXA) tablet 10 mg  10 mg Oral TID PRN Elmer Mathews MD        Or     "OLANZapine (zyPREXA) injection 10 mg  10 mg Intramuscular TID PRN Elmer Mathews MD        polyethylene glycol (MIRALAX) Packet 17 g  17 g Oral Daily PRN Elmer Mathews MD   17 g at 02/28/25 0109        ALLERGIES   Allergies   Allergen Reactions    Clonazepam Other (See Comments)     Causes Violence and aggresssion    Hydrocodone-Acetaminophen      Other reaction(s): Seizures  Can take Percocet without difficulty.    Lorazepam Other (See Comments)     Causes Violence and aggresssion    Sertraline Other (See Comments)     Caused suicidally     Bupropion Other (See Comments)     Caused Major Depression    Dust Mite Extract      Other reaction(s): Asthma symptoms    Lithium Other (See Comments)     Mood alteration    Milk Protein Unknown    Pollen Extract      Other reaction(s): Asthma symptoms    Risperidone Other (See Comments)     Agitation      Sulfa Antibiotics Itching    Trichophyton      Other reaction(s): Asthma symptoms    Valproic Acid Other (See Comments)     Weight gain        MENTAL STATUS EXAM   Vitals: /66   Pulse 87   Temp 98.3  F (36.8  C) (Temporal)   Resp 16   Ht 1.702 m (5' 7\")   Wt 136.1 kg (300 lb)   SpO2 98%   BMI 46.99 kg/m      Appearance:  awake, alert, adequately groomed, dressed in hospital scrubs, and appeared as age stated  Attitude:  cooperative  Eye Contact:  good  Mood:   \"better through the day\"  Affect:  appropriate and in normal range  Speech:  clear, coherent  Psychomotor Behavior:  no evidence of tardive dyskinesia, dystonia, or tics  Thought Process:  linear  Associations:  no loose associations  Thought Content:  no evidence of psychotic thought, denies SI  Insight:  fair  Judgment:  fair  Oriented to:  time, person, and place  Attention Span and Concentration:  intact  Recent and Remote Memory:  intact  Language: English with appropriate syntax and vocabulary    Fund of Knowledge: appropriate  Muscle Strength and Tone: normal  Gait and Station: Normal   "     LABS   No results found for this or any previous visit (from the past 24 hours).        IMPRESSION     This is a 50 year old female with a PMH of bipolar disorder, BPD who presents with worsening anxiety, depression and SI from what appears to be increased psychosocial stressors. Such stressors include break up with boyfriend, reported apartment lease violations d/t boyfriend's behavior in relation to EtOH, lack of support system, reported health issue with parent. Pt stating that structured living situation may be beneficial for her as it has in the past when she resided in a group home. This may positively impact mood and SI. Recent crisis stabilization at [facility], discharging 2/11. Remote hx of EtOH, however reports hx of sobriety for 4 years, negative EtOH and UDS in ED and do no suspect contributing to presentation. Due to the above presentation, pt was admitted for [facility] for further safety and stabilization.       Plan on resuming PTA medications once medication reconciliation complete. From there, will look to make changes as appropriate and if pt interested. SW to be in contact with CM per pt request for continuity of care.    Today: Pt gradually improving in terms of anxiety and depression. Denies SI. Appears to be tolerating increase in gabapentin. Exacerbation of sx appears related to psychosocial stressors. Pt would like to seek AFC placement with this likely to be managed outpatient with her CM. Had BM today, denies abd sx. Would anticipate pt would be able to discharge in a few days if she continues to progress.      DIAGNOSES     #. Bipolar I disorder, current episode depressed  #. AGNIESZKA  #. Borderline personality traits       PLAN     Location: Unit 5  Legal Status: Orders Placed This Encounter      Voluntary    Safety Assessment:    Behavioral Orders   Procedures    Code 1 - Restrict to Unit    Routine Programming     As clinically indicated    Status 15      Every 15 minutes.      PTA psychotropic medications stopped:     - none    PTA psychotropic medications continued/changed:     - Abilify 15 mg   - gabapentin 100 mg TID->300 mg TID 2/27  - Rozerem 8 mg qhs  - Viibryd    New medications tried and stopped:     -None    New medications initiated:     - standard unit PRNs    Today's Changes:    - none    Programming: Patient will be treated in a therapeutic milieu with appropriate individual and group therapies. Education will be provided on diagnoses, medications, and treatments.     Medical diagnoses:  Per medicine    Consult: None  Tests: None    Anticipated LOS: 4-7 days  Disposition: home with OP services         ATTESTATION      DASHA Coleman CNP

## 2025-03-02 NOTE — PLAN OF CARE
"  Problem: Adult Behavioral Health Plan of Care  Goal: Patient-Specific Goal (Individualization)  Description: *  Encourage patient to use restroom every 2 hours*    Patient will attend 50% or more of groups  Patient will sleep 6-8 hours per night  Patient ok to use weighted blanket in room, 18.5 lbs. Patient able to sandi/doff indep.   Outcome: Progressing  Note: Face to face start of shift report received from RN.  Rounding complete, patient in bed at this time.     Maria Del Rosario Winkler RN  3/2/2025  7:38 AM     Patient remains in bed until 0900.  \"They wake me up too much at night and I am not getting enough sleep\"      Patient takes medications as prescribed.  Eats breakfast in lounge at 0900, 100%.  Patient reports to feeling \"more anxious/emotional today\"  \"maybe we just need to talk about it, I heard a song in group and it just hit me\"  PRN:  Tylenol 650 mg @ 1109 for headache \"from anxiety\" and hydroxyzine 25 mg @ 1103 for anxiety.      Patient polite and cooperative with nursing cares and assessment.  Patient reports stressors at home and \"maybe I'm ready to go back, maybe I'm not, it's just I don't know and I should probably call my mom\"  Encouraged patient to attend group.  Patient briefly attends groups.      Reports medication effective for headache/anxiety.     Goal Outcome Evaluation:       Face to face end of shift report communicated to oncoming shift.     Maria Del Rosario Winkler RN  3/2/2025  3:07 PM                      "

## 2025-03-02 NOTE — PLAN OF CARE
Face to face end of shift report received from day RN. Rounding completed. Patient observed sitting in the lounge watching TV with peers.     Hammad Bautista, RN  3/1/2025  1530    Patient spent majority of shift socializing with peers in the milieu. Denies SI/HI/AVH/depression/anxiety/pain. Constipation has resolved. Medication compliant. Appetite/hygiene good. Attends group. Makes needs known. Calm, cooperative, and appropriate.      Report given to night RN at shift change.     Problem: Adult Behavioral Health Plan of Care  Goal: Patient-Specific Goal (Individualization)  Description: *  Encourage patient to use restroom every 2 hours*    Patient will attend 50% or more of groups  Patient will sleep 6-8 hours per night  Patient ok to use weighted blanket in room, 18.5 lbs. Patient able to sandi/doff indep.   Outcome: Progressing     Problem: Suicide Risk  Goal: Absence of Self-Harm  Outcome: Progressing

## 2025-03-02 NOTE — PLAN OF CARE
Face to face end of shift report received from day RN. Rounding completed. Patient observed sitting in the lounge watching TV with peers.     Hammad Bautista, RN  3/2/2025  1530    Patient spent majority of shift socializing with peers in the milieu. Denies SI/HI/AVH/depression/anxiety/pain. Medication compliant. Appetite/hygiene good. Attends group. Makes needs known. Calm, cooperative, and appropriate.      Report given to night RN at shift change.     Problem: Adult Behavioral Health Plan of Care  Goal: Patient-Specific Goal (Individualization)  Description: *  Encourage patient to use restroom 2 times per shift*    Patient will attend 50% or more of groups  Patient will sleep 6-8 hours per night  Patient ok to use weighted blanket in room, 18.5 lbs. Patient able to sandi/doff indep.   Outcome: Progressing     Problem: Suicide Risk  Goal: Absence of Self-Harm  Outcome: Progressing

## 2025-03-02 NOTE — PLAN OF CARE
Problem: Adult Behavioral Health Plan of Care  Goal: Patient-Specific Goal (Individualization)  Description: *  Encourage patient to use restroom every 2 hours*    Patient will attend 50% or more of groups  Patient will sleep 6-8 hours per night  Patient ok to use weighted blanket in room, 18.5 lbs. Patient able to sandi/doff indep.   Outcome: Progressing  Note: Report received from Hammad. Rounding complete. Pt observed sleeping in left side lying position with regular and unlabored respirations.     Pt reported to  that she is tired up being woken q2h for toileting due to recent issues of  urinary incontinence involving large volume of urine being voided  to the point of saturating most of her mattress. Pt has been educated on risk for infection and skin breakdown. She has not had any incontinence issues on this shift since the toileting protocol was began.    Pt has been in bed with eyes closed and regular respirations. 15 minute and PRN checks all night. No complaints offered.     Pt slept approx 6.5   hours this NOC shift.    Face to face end of shift report communicated to oncoming RN.    Kelly RODRIGUEZ RN  March 2, 2025  1:13 AM          Problem: Suicide Risk  Goal: Absence of Self-Harm  Outcome: Progressing  Note: Unable to assess due to pt sleeping. Pt has remained free of self-harm.     Goal Outcome Evaluation:

## 2025-03-03 LAB — SARS-COV-2 RNA RESP QL NAA+PROBE: NEGATIVE

## 2025-03-03 PROCEDURE — 97530 THERAPEUTIC ACTIVITIES: CPT | Mod: GO

## 2025-03-03 PROCEDURE — 250N000013 HC RX MED GY IP 250 OP 250 PS 637

## 2025-03-03 PROCEDURE — 250N000013 HC RX MED GY IP 250 OP 250 PS 637: Performed by: NURSE PRACTITIONER

## 2025-03-03 PROCEDURE — 124N000001 HC R&B MH

## 2025-03-03 PROCEDURE — 250N000013 HC RX MED GY IP 250 OP 250 PS 637: Performed by: PSYCHIATRY & NEUROLOGY

## 2025-03-03 PROCEDURE — 87635 SARS-COV-2 COVID-19 AMP PRB: CPT

## 2025-03-03 PROCEDURE — 99232 SBSQ HOSP IP/OBS MODERATE 35: CPT

## 2025-03-03 PROCEDURE — 999N000104 HC STATISTIC NO CHARGE

## 2025-03-03 RX ORDER — GABAPENTIN 300 MG/1
300 CAPSULE ORAL 3 TIMES DAILY
Qty: 90 CAPSULE | Refills: 3 | Status: SHIPPED | OUTPATIENT
Start: 2025-03-03

## 2025-03-03 RX ADMIN — BUSPIRONE HYDROCHLORIDE 10 MG: 10 TABLET ORAL at 08:33

## 2025-03-03 RX ADMIN — LAMOTRIGINE 100 MG: 100 TABLET ORAL at 20:12

## 2025-03-03 RX ADMIN — AMLODIPINE BESYLATE 2.5 MG: 2.5 TABLET ORAL at 08:33

## 2025-03-03 RX ADMIN — BUSPIRONE HYDROCHLORIDE 10 MG: 10 TABLET ORAL at 20:13

## 2025-03-03 RX ADMIN — DILTIAZEM HYDROCHLORIDE 180 MG: 180 CAPSULE, COATED, EXTENDED RELEASE ORAL at 20:13

## 2025-03-03 RX ADMIN — ARIPIPRAZOLE 20 MG: 10 TABLET ORAL at 20:12

## 2025-03-03 RX ADMIN — LAMOTRIGINE 100 MG: 100 TABLET ORAL at 08:33

## 2025-03-03 RX ADMIN — FERROUS SULFATE TAB 325 MG (65 MG ELEMENTAL FE) 325 MG: 325 (65 FE) TAB at 08:33

## 2025-03-03 RX ADMIN — HYDROXYZINE HYDROCHLORIDE 50 MG: 25 TABLET, FILM COATED ORAL at 20:13

## 2025-03-03 RX ADMIN — VILAZODONE HYDROCHLORIDE 40 MG: 10 TABLET ORAL at 08:33

## 2025-03-03 RX ADMIN — PANTOPRAZOLE SODIUM 40 MG: 40 TABLET, DELAYED RELEASE ORAL at 06:06

## 2025-03-03 RX ADMIN — DOCUSATE SODIUM 100 MG: 100 CAPSULE, LIQUID FILLED ORAL at 08:33

## 2025-03-03 RX ADMIN — LAMOTRIGINE 100 MG: 100 TABLET ORAL at 14:28

## 2025-03-03 RX ADMIN — DILTIAZEM HYDROCHLORIDE 180 MG: 180 CAPSULE, COATED, EXTENDED RELEASE ORAL at 08:33

## 2025-03-03 RX ADMIN — Medication 125 MG: at 08:33

## 2025-03-03 RX ADMIN — GABAPENTIN 300 MG: 300 CAPSULE ORAL at 20:13

## 2025-03-03 RX ADMIN — GABAPENTIN 300 MG: 300 CAPSULE ORAL at 08:33

## 2025-03-03 RX ADMIN — MONTELUKAST 10 MG: 10 TABLET, FILM COATED ORAL at 20:13

## 2025-03-03 RX ADMIN — DOCUSATE SODIUM 100 MG: 100 CAPSULE, LIQUID FILLED ORAL at 20:13

## 2025-03-03 RX ADMIN — GABAPENTIN 300 MG: 300 CAPSULE ORAL at 14:28

## 2025-03-03 RX ADMIN — RAMELTEON 8 MG: 8 TABLET ORAL at 20:13

## 2025-03-03 ASSESSMENT — ACTIVITIES OF DAILY LIVING (ADL)
ADLS_ACUITY_SCORE: 22
HYGIENE/GROOMING: INDEPENDENT
ORAL_HYGIENE: INDEPENDENT
ADLS_ACUITY_SCORE: 22
LAUNDRY: UNABLE TO COMPLETE
ADLS_ACUITY_SCORE: 22
ADLS_ACUITY_SCORE: 22
DRESS: SCRUBS (BEHAVIORAL HEALTH);INDEPENDENT
ADLS_ACUITY_SCORE: 22

## 2025-03-03 NOTE — PROGRESS NOTES
03/03/25 1400   Appointment Info   Signing Clinician's Name / Credentials (OT) jailene GOETZ OTR/L   Therapeutic Activities   Therapeutic Activity Minutes (66497) 28   Symptoms noted during/after treatment none   Treatment Detail/Skilled Intervention Went over sensory interventions for self regulation. Issued a sensory skills checklist and identification of what can be done at home or outside of the home. Patient notes that aromatherapy is helpful.   Total Session Time   Timed Code Treatment Minutes 28   Total Session Time (sum of timed and untimed services) 28   Psychosocial Support   Trust Relationship/Rapport care explained;questions answered;questions encouraged

## 2025-03-03 NOTE — PROGRESS NOTES
Pb scheduled discharge ride for patient. [facility] ([phone]) will  patient @8:30am tomorrow and transport her home.     PB spoke with CARYL Clifford at [facility]. Updated her that patient plans to discharge tomorrow and to return to her apartment. CARYL will talk with her after discharge about looking for a group home for patient. This writer will plan to fax discharge summary to [facility] (fax: [phone]).

## 2025-03-03 NOTE — PROGRESS NOTES
"[facility] PSYCHIATRY  PROGRESS NOTE     SUBJECTIVE     Prior to interviewing the patient, I met with nursing and reviewed patient's clinical condition. We discussed clinical care both before and after the interview. I have reviewed the patient's clinical course by review of records including previous notes, labs, and vital signs.     Per nursing, the patient had the following behavioral events over the last 24-hours: none. Slept 6.75 hours.    On psychiatric interview, Pt is met on the general unit. She states she is a little tired, though did sleep better last night. Notes her mood sx continue to improve. SI \"not really there\", denies plan or intent. She believes she would be ready to discharge tomorrow and plans on returning to her residence. Notes that she does not think she needs to go to the crisis bed.      Denies any noted adverse effects to medications.          MEDICATIONS   Scheduled Meds:  Current Facility-Administered Medications   Medication Dose Route Frequency Provider Last Rate Last Admin    amLODIPine (NORVASC) tablet 2.5 mg  2.5 mg Oral Elmer Multani MD   2.5 mg at 03/02/25 0807    ARIPiprazole (ABILIFY) tablet 20 mg  20 mg Oral At Bedtime Isma Ward APRN CNP   20 mg at 03/02/25 2032    ascorbic acid half-tab 125 mg  125 mg Oral Daily Isma Ward APRN CNP   125 mg at 03/02/25 0813    And    ferrous sulfate (FEROSUL) tablet 325 mg  325 mg Oral Daily Isma Ward APRN CNP   325 mg at 03/02/25 0808    busPIRone (BUSPAR) tablet 10 mg  10 mg Oral BID Isma Ward APRN CNP   10 mg at 03/02/25 2032    diltiazem ER COATED BEADS (CARDIZEM CD/CARTIA XT) 24 hr capsule 180 mg  180 mg Oral BID Reder, Dominique, CNP   180 mg at 03/02/25 2032    docusate sodium (COLACE) capsule 100 mg  100 mg Oral BID Reder, Dominique, CNP   100 mg at 03/02/25 2032    gabapentin (NEURONTIN) capsule 300 mg  300 mg Oral TID Isma Ward APRN CNP   300 mg at 03/02/25 2032    hydrOXYzine HCl (ATARAX) tablet 50 mg "  50 mg Oral At Bedtime Isma Ward APRN CNP   50 mg at 03/02/25 2032    lamoTRIgine (LaMICtal) tablet 100 mg  100 mg Oral TID Isma Ward APRN CNP   100 mg at 03/02/25 2032    montelukast (SINGULAIR) tablet 10 mg  10 mg Oral At Bedtime Dominique Anaya CNP   10 mg at 03/02/25 2032    pantoprazole (PROTONIX) EC tablet 40 mg  40 mg Oral QAM AC Dominique Anaya CNP   40 mg at 03/03/25 0606    polyethylene glycol (MIRALAX) Packet 17 g  17 g Oral Daily Reder, Dominique, CNP   17 g at 03/01/25 0806    ramelteon (ROZEREM) tablet 8 mg  8 mg Oral At Bedtime Elmer Mathews MD   8 mg at 03/02/25 2032    vilazodone (VIIBRYD) tablet 40 mg  40 mg Oral QAM Isma Ward APRN CNP   40 mg at 03/02/25 0806     PRN Meds:.  Current Facility-Administered Medications   Medication Dose Route Frequency Provider Last Rate Last Admin    acetaminophen (TYLENOL) tablet 650 mg  650 mg Oral Q4H PRN Dominique Anaya CNP   650 mg at 03/02/25 1109    albuterol (PROVENTIL HFA/VENTOLIN HFA) inhaler  1-2 puff Inhalation Q4H PRN Elmer Mathews MD        alum & mag hydroxide-simethicone (MAALOX) suspension 30 mL  30 mL Oral Q4H PRN Elmer Mathews MD        amLODIPine (NORVASC) tablet 2.5 mg  2.5 mg Oral QPM PRN Elmer Mathews MD        hydrOXYzine HCl (ATARAX) tablet 25 mg  25 mg Oral Q4H PRN Elmer Mathews MD   25 mg at 03/02/25 1103    Lidocaine (LIDOCARE) 4 % Patch 2 patch  2 patch Transdermal Daily PRN Dominique Anaya CNP   2 patch at 02/27/25 1833    magnesium hydroxide (MILK OF MAGNESIA) suspension 30 mL  30 mL Oral Daily PRN Dominique Anaya CNP        melatonin tablet 3 mg  3 mg Oral At Bedtime PRN Elmer Mathews MD        muscle rub (ARTHRITIS HOT) pain relief cream   Topical Q6H PRN Dominique Anaya CNP        OLANZapine (zyPREXA) tablet 10 mg  10 mg Oral TID PRN Elmer Mathews MD        Or    OLANZapine (zyPREXA) injection 10 mg  10 mg Intramuscular TID PRN Elmer Mathews MD        polyethylene glycol (MIRALAX) Packet  "17 g  17 g Oral Daily PRN Elmer Mathews MD   17 g at 02/28/25 0990        ALLERGIES   Allergies   Allergen Reactions    Clonazepam Other (See Comments)     Causes Violence and aggresssion    Hydrocodone-Acetaminophen      Other reaction(s): Seizures  Can take Percocet without difficulty.    Lorazepam Other (See Comments)     Causes Violence and aggresssion    Sertraline Other (See Comments)     Caused suicidally     Bupropion Other (See Comments)     Caused Major Depression    Dust Mite Extract      Other reaction(s): Asthma symptoms    Lithium Other (See Comments)     Mood alteration    Milk Protein Unknown    Pollen Extract      Other reaction(s): Asthma symptoms    Risperidone Other (See Comments)     Agitation      Sulfa Antibiotics Itching    Trichophyton      Other reaction(s): Asthma symptoms    Valproic Acid Other (See Comments)     Weight gain        MENTAL STATUS EXAM   Vitals: /61   Pulse 79   Temp 98  F (36.7  C) (Temporal)   Resp 16   Ht 1.702 m (5' 7\")   Wt 136.1 kg (300 lb)   SpO2 95%   BMI 46.99 kg/m      Appearance:  awake, alert, adequately groomed, dressed in hospital scrubs, and appeared as age stated  Attitude:  cooperative  Eye Contact:  good  Mood:   \"getting better\"  Affect:  appropriate and in normal range  Speech:  clear, coherent  Psychomotor Behavior:  no evidence of tardive dyskinesia, dystonia, or tics  Thought Process:  linear  Associations:  no loose associations  Thought Content:  no evidence of psychotic thought, denies SI  Insight:  fair  Judgment:  fair  Oriented to:  time, person, and place  Attention Span and Concentration:  intact  Recent and Remote Memory:  intact  Language: English with appropriate syntax and vocabulary    Fund of Knowledge: appropriate  Muscle Strength and Tone: normal  Gait and Station: Normal       LABS   No results found for this or any previous visit (from the past 24 hours).        IMPRESSION     This is a 50 year old female with a PMH " of bipolar disorder, BPD who presents with worsening anxiety, depression and SI from what appears to be increased psychosocial stressors. Such stressors include break up with boyfriend, reported apartment lease violations d/t boyfriend's behavior in relation to EtOH, lack of support system, reported health issue with parent. Pt stating that structured living situation may be beneficial for her as it has in the past when she resided in a group home. This may positively impact mood and SI. Recent crisis stabilization at [facility], discharging 2/11. Remote hx of EtOH, however reports hx of sobriety for 4 years, negative EtOH and UDS in ED and do no suspect contributing to presentation. Due to the above presentation, pt was admitted for [facility] for further safety and stabilization.       Plan on resuming PTA medications once medication reconciliation complete. From there, will look to make changes as appropriate and if pt interested. SW to be in contact with CM per pt request for continuity of care.    Today: Pt gradually improving in terms of anxiety and depression. Denies SI. Appears to be tolerating increase in gabapentin. Exacerbation of sx appears related to psychosocial stressors. Pt would like to seek AFC placement with this likely to be managed outpatient with her CM. Had BM today, denies abd sx. Would anticipate pt would discharge tomorrow.      DIAGNOSES     #. Bipolar I disorder, current episode depressed  #. AGNIESZKA  #. Borderline personality traits       PLAN     Location: Unit 5  Legal Status: Orders Placed This Encounter      Voluntary    Safety Assessment:    Behavioral Orders   Procedures    Code 1 - Restrict to Unit    Routine Programming     As clinically indicated    Status 15     Every 15 minutes.      PTA psychotropic medications stopped:     - none    PTA psychotropic medications continued/changed:     - Abilify 15 mg   - gabapentin 100 mg TID->300 mg TID 2/27  -  Rozerem 8 mg qhs  - Viibryd    New medications tried and stopped:     -None    New medications initiated:     - standard unit PRNs    Today's Changes:    - none    Programming: Patient will be treated in a therapeutic milieu with appropriate individual and group therapies. Education will be provided on diagnoses, medications, and treatments.     Medical diagnoses:  Per medicine    Consult: None  Tests: None    Anticipated LOS: 4-7 days  Disposition: home with OP services       TREATMENT TEAM CARE PLAN     Progress: Symptoms improved.    Continued Stay Criteria/Rationale: Ongoing treatment and safe discharge planning.    Medical/Physical: See above.    Precautions: See above.     Plan: Continue inpatient care with unit support and medication management.    Rationale for change in precautions or plan: NA due to no change.    Participants: DASHA Coleman CNP, Nursing, SW, OT.    The patient's care was discussed with the treatment team and chart notes were reviewed.          ATTESTATION      DASHA Coleman CNP

## 2025-03-03 NOTE — PLAN OF CARE
Problem: Adult Behavioral Health Plan of Care  Goal: Patient-Specific Goal (Individualization)  Description: *  Encourage patient to use restroom 2 times per shift*    Patient will attend 50% or more of groups  Patient will sleep 6-8 hours per night  Patient ok to use weighted blanket in room, 18.5 lbs. Patient able to sandi/doff indep.   Outcome: Progressing  Note: Report received from Hammad. Rounding complete. Pt observed sleeping in left side lying position with regular and unlabored respirations.    Pt has been in bed with eyes closed and regular respirations. 15 minute and PRN checks all night. No complaints offered.     Pt slept approx 6.75  hours this NOC shift.    Face to face end of shift report communicated to oncoming RN.    Kelly RODRIGUEZ RN  March 3, 2025  2:02 AM          Problem: Suicide Risk  Goal: Absence of Self-Harm  Outcome: Progressing  Note: Unable to assess due to pt sleeping. Pt has remained free of self-harm.     Goal Outcome Evaluation:

## 2025-03-03 NOTE — PROGRESS NOTES
OT attempted to meet with patient, she notes that she is not feeling well. Patient was swabbed for covid per nursing. Patient notes that she is willing to try this afternoon.

## 2025-03-03 NOTE — PLAN OF CARE
Problem: Adult Behavioral Health Plan of Care  Goal: Patient-Specific Goal (Individualization)  Description: *  Encourage patient to use restroom 2 times per shift*    Patient will attend 50% or more of groups  Patient will sleep 6-8 hours per night  Patient ok to use weighted blanket in room, 18.5 lbs. Patient able to sandi/doff indep.   Outcome: Progressing     Problem: Suicide Risk  Goal: Absence of Self-Harm  Outcome: Progressing       Patient calm, cooperative, and medication compliant this shift.  She denies thoughts of suicide and feels safe to discharge home.  Plan for patient to return home tomorrow at 0830.  She spent much of the day in the lounge and attended groups.  Social with peers and friendly with staff.  Did have one incontinence episode but attended to her personal cares independently.  No complaints of pain.  VS WNL.  Face to face end of shift report communicated to evening shift RN.     Sabrina Zelaya RN  3/3/2025  1:33 PM

## 2025-03-04 VITALS
SYSTOLIC BLOOD PRESSURE: 127 MMHG | OXYGEN SATURATION: 97 % | HEART RATE: 97 BPM | BODY MASS INDEX: 45.99 KG/M2 | TEMPERATURE: 97.8 F | RESPIRATION RATE: 16 BRPM | HEIGHT: 67 IN | DIASTOLIC BLOOD PRESSURE: 52 MMHG | WEIGHT: 293 LBS

## 2025-03-04 PROCEDURE — 250N000013 HC RX MED GY IP 250 OP 250 PS 637: Performed by: PSYCHIATRY & NEUROLOGY

## 2025-03-04 PROCEDURE — 99239 HOSP IP/OBS DSCHRG MGMT >30: CPT

## 2025-03-04 PROCEDURE — 250N000013 HC RX MED GY IP 250 OP 250 PS 637: Performed by: NURSE PRACTITIONER

## 2025-03-04 PROCEDURE — 250N000013 HC RX MED GY IP 250 OP 250 PS 637

## 2025-03-04 RX ADMIN — VILAZODONE HYDROCHLORIDE 40 MG: 10 TABLET ORAL at 08:11

## 2025-03-04 RX ADMIN — PANTOPRAZOLE SODIUM 40 MG: 40 TABLET, DELAYED RELEASE ORAL at 06:13

## 2025-03-04 RX ADMIN — BUSPIRONE HYDROCHLORIDE 10 MG: 10 TABLET ORAL at 08:11

## 2025-03-04 RX ADMIN — AMLODIPINE BESYLATE 2.5 MG: 2.5 TABLET ORAL at 08:11

## 2025-03-04 RX ADMIN — LAMOTRIGINE 100 MG: 100 TABLET ORAL at 08:11

## 2025-03-04 RX ADMIN — GABAPENTIN 300 MG: 300 CAPSULE ORAL at 08:11

## 2025-03-04 RX ADMIN — Medication 125 MG: at 08:11

## 2025-03-04 RX ADMIN — DOCUSATE SODIUM 100 MG: 100 CAPSULE, LIQUID FILLED ORAL at 08:11

## 2025-03-04 RX ADMIN — DILTIAZEM HYDROCHLORIDE 180 MG: 180 CAPSULE, COATED, EXTENDED RELEASE ORAL at 08:11

## 2025-03-04 RX ADMIN — FERROUS SULFATE TAB 325 MG (65 MG ELEMENTAL FE) 325 MG: 325 (65 FE) TAB at 08:21

## 2025-03-04 ASSESSMENT — ACTIVITIES OF DAILY LIVING (ADL)
ADLS_ACUITY_SCORE: 22

## 2025-03-04 NOTE — PLAN OF CARE
Face to face end of shift report received from Fortino MONROY. Rounding completed. Patient observed in bed appears asleep.     Problem: Adult Behavioral Health Plan of Care  Goal: Patient-Specific Goal (Individualization)  Description: *  Encourage patient to use restroom 2 times per shift*    Patient will attend 50% or more of groups  Patient will sleep 6-8 hours per night  Patient ok to use weighted blanket in room, 18.5 lbs. Patient able to sandi/doff indep.   Outcome: Progressing     Problem: Suicide Risk  Goal: Absence of Self-Harm  Outcome: Progressing   Goal Outcome Evaluation:         Patient woken every 2 hours to void. No episodes of urinary incontinence noted this shift.    No observed episodes of SIB this shift. Patient slept (6) hours. Face to face end of shift report communicated to oncoming shift.     René Caban RN  3/4/2025  0600 AM

## 2025-03-04 NOTE — PROGRESS NOTES
Occupational Therapy Discharge Summary    Reason for therapy discharge:    Discharged to home.  All goals and outcomes met, no further needs identified.    Progress towards therapy goal(s). See goals on [facility] Plan [facility] electronic health record for goal details.  Goals met    Therapy recommendation(s):    No further therapy is recommended.

## 2025-03-04 NOTE — PLAN OF CARE
"1140: Pt called stating [facility] had filled Gabapentin 100 mg, not the Gabapentin 300 mg which was discharge medication. Writer contacted [facility], per pharmacy employee \"computer put the 300 on hold.\" Pharmacy employee to un-hold the 300 mg and get it filled.   "

## 2025-03-04 NOTE — DISCHARGE SUMMARY
[facility] PSYCHIATRY  DISCHARGE SUMMARY     DISCHARGE DATA     Rosi Lamb MRN# [id number]   Age: 50 year old YOB: 1975     Date of Admission: 2/25/2025  Date of Discharge: March 4, 2025  Discharge Provider: DASHA Coleman CNP       REASON FOR ADMISSION     This is a 50 year old female with a PMH of bipolar disorder, BPD who presents with worsening anxiety, depression and SI from what appears to be increased psychosocial stressors. Such stressors include break up with boyfriend, reported apartment lease violations d/t boyfriend's behavior in relation to EtOH, lack of support system, reported health issue with parent. Pt stating that structured living situation may be beneficial for her as it has in the past when she resided in a group home. This may positively impact mood and SI. Recent crisis stabilization at [facility], discharging 2/11. Remote hx of EtOH, however reports hx of sobriety for 4 years, negative EtOH and UDS in ED and do no suspect contributing to presentation. Due to the above presentation, pt was admitted for [facility] for further safety and stabilization.       Plan on resuming PTA medications once medication reconciliation complete. From there, will look to make changes as appropriate and if pt interested. SW to be in contact with CM per pt request for continuity of care.        DISCHARGE DIAGNOSES     #. Bipolar I disorder, current episode depressed  #. AGNIESZKA  #. Borderline personality traits       CONSULTS     none       HOSPITAL COURSE     Legal status: Orders Placed This Encounter      Voluntary    Patient was admitted to unit 5 due to the aforementioned presentation. The patient was placed under 15 minute checks to ensure patient safety. The patient participated in unit programming and groups as able.    Ms. Lamb did not require seclusion/restraint during hospitalization.     We reviewed with Ms. Lamb current and past  medication trials including duration, dose, response and side effects. During this hospitalization, the following changes to the patient's psychotropic medications were made:    PTA psychotropic medications stopped:     - None    PTA psychotropic medications continued/changed:     - Abilify 15 mg->PTA dose verified to be 20 mg and increased 2/26  - gabapentin 100 mg TID->300 mg TID 2/27  - Rozerem 8 mg qhs  - Viibryd    New psychotropic medications tried and stopped:     - none    New psychotropic medications initiated:     - none    Rosi Lamb was admitted to [facility] for the above presentation. PTA medications were resumed as above. Pt noted anxiety being prevalent component contributing to depressed thoughts and SI. Discussed several psychosocial factors mentioned above as likely disruptive forces on her mood. Pt contemplating returning to adult foster care as she felt she was able to maintain mood, employment, social balances. Our  did attempt to contact her , however it appeared they were out of the office. Pt plans to communicate with her  when she returns home. Increased gabapentin to 300 mg TID to target anxiety. Pt reported anxiety gradually improved, which reduced depression. She denies SI prior to discharge as a result of her improved mood. Pt reported increased confidence in skill and ability to manager her symptoms independently and felt prepared for discharge. The treatment team agreed pt appeared improved and would be appropriate for dismissal.    With these changes and supports the patient noticed improvement in their symptoms and felt sufficiently ready for discharge. As a result, Rosi Lamb was discharged. At the time of discharge, Rosi Lamb was determined to not be a danger to self or others. The patient was also medically stable for discharge. At the current time of discharge, the patient does not meet  criteria for involuntary hospitalization. On the day of discharge, the patient reports that they do not have suicidal or homicidal ideation. Steps taken to minimize risk include: assessing patient s behavior and thought process daily during hospital stay, discharging patient with adequate plan for follow up for mental and physical health and discussing safety plan of returning to the hospital should the patient ever have thoughts of harming themselves or others. Therefore, based on all available evidence including the factors cited above, the patient does not appear to be at imminent risk for self-harm, and is appropriate for outpatient level of care. However, if patient uses substances or is medication non-adherent, their risk of decompensation and SI will be elevated. This was discussed with the patient.       DISCHARGE MEDICATIONS     Current Discharge Medication List        CONTINUE these medications which have CHANGED    Details   gabapentin (NEURONTIN) 300 MG capsule Take 1 capsule (300 mg) by mouth 3 times daily.  Qty: 90 capsule, Refills: 3    Associated Diagnoses: Generalized anxiety disorder           CONTINUE these medications which have NOT CHANGED    Details   acetaminophen (TYLENOL) 500 MG tablet Take 1-2 tablets (500-1,000 mg) by mouth every 6 hours as needed for mild pain  Qty: 90 tablet, Refills: 4    Associated Diagnoses: Chronic pain of right knee      albuterol (VENTOLIN HFA) 108 (90 Base) MCG/ACT inhaler Inhale 1-2 puffs into the lungs every 4 hours as needed for shortness of breath or wheezing  Qty: 18 g, Refills: 11    Comments: Pharmacy may dispense brand covered by insurance (Proair, or proventil or ventolin or generic albuterol inhaler)  Associated Diagnoses: Moderate persistent asthma without complication      amLODIPine (NORVASC) 2.5 MG tablet Take 1 tablet (2.5 mg) by mouth every morning. May also take 1 tablet (2.5 mg) every evening as needed (-- for Raynaud's / Hand color changes  --).  Qty: 180 tablet, Refills: 4    Associated Diagnoses: Raynaud's phenomenon without gangrene      ARIPiprazole (ABILIFY) 20 MG tablet Take 20 mg by mouth at bedtime.      atomoxetine (STRATTERA) 25 MG capsule Take 25 mg by mouth every morning.      busPIRone (BUSPAR) 10 MG tablet Take 1 tablet by mouth 2 times daily.      cholecalciferol (VITAMIN D3) 125 mcg (5000 units) capsule Take 1 capsule (125 mcg) by mouth daily  Qty: 100 capsule, Refills: 4    Associated Diagnoses: Vitamin D deficiency      diltiazem ER (CARDIAZEM LA) 180 MG 24 hr tablet Take 1 tablet (180 mg) by mouth 2 times daily. -- Dose Increase 9/3/2024    + Stop 120 mg Diltiazem tablet  Qty: 180 tablet, Refills: 3    Associated Diagnoses: Paroxysmal atrial fibrillation (H); Atrial fibrillation with rapid ventricular response (H); New onset a-fib (H); Paroxysmal atrial fibrillation (H)      docusate sodium (EQ STOOL SOFTENER) 100 MG capsule TAKE 2 CAPSULES BY MOUTH TWICE DAILY .  ADJUST  DOSE  AS  NEEDED  TO  MAINTAIN  Qty: 720 capsule, Refills: 2    Associated Diagnoses: Constipation      Elemental iron 65 mg Vitamin C 125 mg (VITRON C)  MG TABS tablet Take 1 tablet by mouth daily on empty stomach -for iron deficiency  Qty: 90 tablet, Refills: 4    Associated Diagnoses: Iron deficiency anemia, unspecified iron deficiency anemia type      famotidine (PEPCID) 20 MG tablet Take 1 tablet (20 mg) by mouth 2 times daily - For Heartburn  Qty: 180 tablet, Refills: 4    Associated Diagnoses: Gastroesophageal reflux disease without esophagitis      hydrOXYzine (VISTARIL) 50 MG capsule Take 50 mg by mouth 4 times daily as needed for anxiety.      hydrOXYzine HCl (ATARAX) 50 MG tablet Take 1 tablet (50 mg) by mouth at bedtime  Qty: 30 tablet, Refills: 0    Associated Diagnoses: Bipolar affective disorder, currently depressed, moderate (H)      ibuprofen (ADVIL/MOTRIN) 200 MG tablet Take 1-2 tablets (200-400 mg) by mouth every 6 hours as needed for  "moderate pain 1 to 2 tabs Q6 PRN  Qty: 90 tablet, Refills: 0    Associated Diagnoses: Chronic pain of right knee      lamoTRIgine (LAMICTAL) 100 MG tablet Take 100 mg by mouth 3 times daily.    Associated Diagnoses: Bipolar affective disorder, currently depressed, moderate (H)      Menthol, Topical Analgesic, (BIOFREEZE) 4 % GEL Externally apply topically 2 times daily as needed (For pain)  Qty: 150 mL, Refills: 3    Associated Diagnoses: Chronic pain of right knee      montelukast (SINGULAIR) 10 MG tablet Take 1 tablet (10 mg) by mouth at bedtime  Qty: 90 tablet, Refills: 4    Associated Diagnoses: Moderate persistent asthma without complication      prazosin (MINIPRESS) 1 MG capsule Take 1 mg by mouth nightly as needed (nightmares).      PREVIDENT 5000 PLUS 1.1 % CREA Use in place of toothpaste once daily before bed. Brush, then swish toothpaste for 30 seconds and spit. Do not rinse, eat or drink for 30 minutes.      ramelteon (ROZEREM) 8 MG tablet Take 1 tablet (8 mg) by mouth at bedtime  Qty: 90 tablet, Refills: 4    Associated Diagnoses: Primary insomnia      vilazodone (VIIBRYD) 40 MG TABS tablet Take 40 mg by mouth every morning      progesterone (PROMETRIUM) 100 MG capsule Take 1 capsule by mouth once daily  Qty: 90 capsule, Refills: 0    Associated Diagnoses: Hot flashes                  MENTAL STATUS EXAM   Vitals: /69   Pulse 85   Temp 98.3  F (36.8  C) (Temporal)   Resp 16   Ht 1.702 m (5' 7\")   Wt 136.1 kg (300 lb)   SpO2 98%   BMI 46.99 kg/m      Appearance: Alert, oriented, dressed in hospital scrubs, appears stated age   Attitude: Cooperative   Eye Contact: Good  Mood: \"Better\"  Affect: euthymic  Speech: Normal rate and rhythm   Psychomotor Behavior: No tremor, rigidity, or psychomotor abnormality   Thought Process: Logical, goal directed   Associations: No loose associations   Thought Content: Denies SI or plan. No SIB. Denies A/V hallucinations. No evidence of delusional " thought.  Insight: fair  Judgment: adequate  Oriented to: Person, place, and time  Attention Span and Concentration: Intact  Recent and Remote Memory: Intact  Language: English with appropriate syntax and vocabulary  Fund of Knowledge: low normal  Muscle Strength and Tone: Grossly normal  Gait and Station: Grossly normal       DISCHARGE PLAN     1.  Education given regarding diagnostic and treatment options with risks, benefits and alternatives with adequate verbalization of understanding.  2.  Discharge to [place] Upon detailed review of risk factors, patient amenable for release.   3.  Continue aforementioned medications and associated medication changes with follow-up by outpatient provider.  4.  Crisis management planning in place.    5.  Nursing and  to review further discharge recommendations.   6.  Patient is being discharged with the following appointments as detailed below.    [facility]   [address] [place] [address]  Therapy: Ayala- March 5th @ 8:00am     [facility]  [address] [place] [address]  Medication management: Jenny- April 21st @ 9:30am       DISCHARGE SERVICES PROVIDED     40 minutes spent on discharge services, including:  Final examination of patient.  Review and discussion of hospital stay.  Instructions for continued outpatient care/goals.  Preparation of discharge records.  Preparation of medications refills and new prescriptions.  Preparation of applicable referral forms.        ATTESTATION     DASHA Coleman CNP       LABS THIS ADMISSION     Results for orders placed or performed during the hospital encounter of 02/25/25   Hemoglobin A1c     Status: Normal   Result Value Ref Range    Estimated Average Glucose 91 <117 mg/dL    Hemoglobin A1C 4.8 <5.7 %   Vitamin B12     Status: Normal   Result Value Ref Range    Vitamin B12 784 232 - 1,245 pg/mL   Vitamin D     Status: Normal   Result Value Ref Range    Vitamin  D, Total (25-Hydroxy) 50 20 - 50 ng/mL    Narrative    Season, race, dietary intake, and treatment affect the concentration of 25-hydroxy-Vitamin D. Values may decrease during winter months and increase during summer months.    Vitamin D determination is routinely performed by an immunoassay specific for 25 hydroxyvitamin D3.  If an individual is on vitamin D2(ergocalciferol) supplementation, please specify 25 OH vitamin D2 and D3 level determination by LCMSMS test [id number].     TSH with free T4 reflex     Status: Normal   Result Value Ref Range    TSH 1.41 0.30 - 4.20 uIU/mL   COVID-19 Virus (Coronavirus) by PCR Nose     Status: Normal    Specimen: Nose; Swab   Result Value Ref Range    SARS CoV2 PCR Negative Negative    Narrative    Testing was performed using the Nosopharm[employer] Xpress SARS-CoV-2 Assay on the Cepheid Gene-Xpert Instrument Systems. Additional information about this assay can be found via the Test Directory. This [place] FDA cleared test should be ordered for the detection of SARS-CoV-2 in individuals with signs and symptoms of respiratory tract infection. This test is for in vitro diagnostic use under the US FDA for laboratories certified under CLIA to perform high complexity testing. A negative result does not rule out the presence of PCR inhibitors in the specimen or target RNA concentration below the limit of detection for the assay. The possibility of a false negative should be considered if the patient's recent exposure or clinical presentation suggests COVID-19. This test was validated by [email or web address]. These Laboratories are certified under the  Clinical Laboratory Improvement Amendments (CLIA) as qualified to perform high complexity testing.   COVID-19 Virus (Coronavirus) by PCR Nose     Status: Normal    Specimen: Nose; Swab   Result Value Ref Range    SARS CoV2 PCR Negative Negative    Narrative    Testing was performed using the Xpert Xpress SARS-CoV-2 Assay on the Cepheid Gene-Xpert  Instrument Systems. Additional information about this assay can be found via the Test Directory. This [place] FDA cleared test should be ordered for the detection of SARS-CoV-2 in individuals with signs and symptoms of respiratory tract infection. This test is for in vitro diagnostic use under the US FDA for laboratories certified under CLIA to perform high complexity testing. A negative result does not rule out the presence of PCR inhibitors in the specimen or target RNA concentration below the limit of detection for the assay. The possibility of a false negative should be considered if the patient's recent exposure or clinical presentation suggests COVID-19. This test was validated by [facility]. These Laboratories are certified under the  Clinical Laboratory Improvement Amendments (CLIA) as qualified to perform high complexity testing.   COVID-19 Virus (Coronavirus) by PCR Nasopharyngeal     Status: Normal    Specimen: Nasopharyngeal; Swab   Result Value Ref Range    SARS CoV2 PCR Negative Negative    Narrative    Testing was performed using the Xpert Xpress SARS-CoV-2 Assay on the Cepheid Gene-Xpert Instrument Systems. Additional information about this assay can be found via the Test Directory. This [place] FDA cleared test should be ordered for the detection of SARS-CoV-2 in individuals with signs and symptoms of respiratory tract infection. This test is for in vitro diagnostic use under the US FDA for laboratories certified under CLIA to perform high complexity testing. A negative result does not rule out the presence of PCR inhibitors in the specimen or target RNA concentration below the limit of detection for the assay. The possibility of a false negative should be considered if the patient's recent exposure or clinical presentation suggests COVID-19. This test was validated by [facility]. These Laboratories are certified under the  Clinical Laboratory Improvement Amendments  (CLIA) as qualified to perform high complexity testing.

## 2025-03-04 NOTE — PROGRESS NOTES
Pt is discharging at the recommendation of the treatment team. Pt is discharging to home transported by [facility]. Pt denies having any thoughts of hurting themself or anyone else. Pt denies anxiety or depression. Pt has follow up with therapy and psychiatry.   Discharge instructions, including demographic sheet, psychiatric evaluation, discharge summary, and AVS were faxed to any next level of care providers.

## 2025-03-04 NOTE — PLAN OF CARE
Discharge Note    Patient Discharged to home on 3/4/2025 8:27 AM via [facility] accompanied by unit staff to [facility] to meet transportation.     Patient informed of discharge instructions in AVS. patient verbalizes understanding and denies having any questions pertaining to AVS. Patient stable at time of discharge. Patient denies SI, HI, and thoughts of self harm at time of discharge. All personal belongings returned to patient. Discharge prescriptions sent to [facility] in [place] via electronic communication. Psych evaluation, history and physical, AVS, and discharge summary faxed to next level of care- by JOSE ALEJANDRO.     Noemy KILPATRICK RN  3/4/2025  8:47 AM

## 2025-03-04 NOTE — PLAN OF CARE
"Face to face end of shift report received from Sabrina TRUJILLO RN. Rounding completed and patient observed in the lounge. No requests at this time.      Goal Outcome Evaluation: Patient has appeared calm and cooperative. She said she is looking forward to discharge and does \"feel ready.\" She stated she likes her place to live and might look into another place to live but said she knows she's manny to live there. She endorsed mild depression and anxiety. She denied HI/SI and hallucinations. She denied pain at this time. She attends groups and participates in unit milieu. She continues to be incontinent of urine multiple times throughout the shift but then asked for clean scrubs and shower supplies.     Face to face end of shift report communicated to lane RN.           Problem: Adult Behavioral Health Plan of Care  Goal: Patient-Specific Goal (Individualization)  Description: *  Encourage patient to use restroom 2 times per shift*    Patient will attend 50% or more of groups  Patient will sleep 6-8 hours per night  Patient ok to use weighted blanket in room, 18.5 lbs. Patient able to sandi/doff indep.   Outcome: Progressing     Problem: Suicide Risk  Goal: Absence of Self-Harm  Outcome: Progressing                      "

## 2025-03-05 ENCOUNTER — TELEPHONE (OUTPATIENT)
Dept: INTERNAL MEDICINE | Facility: OTHER | Age: 50
End: 2025-03-05
Payer: MEDICARE

## 2025-03-05 DIAGNOSIS — M54.6 ACUTE BILATERAL THORACIC BACK PAIN: ICD-10-CM

## 2025-03-05 RX ORDER — LIDOCAINE 4 G/G
1 PATCH TOPICAL EVERY 24 HOURS
Qty: 30 PATCH | Refills: 5 | Status: SHIPPED | OUTPATIENT
Start: 2025-03-05 | End: 2025-03-05

## 2025-03-05 NOTE — TELEPHONE ENCOUNTER
Okay to restart lidocaine patches.     - Lidocaine (LIDOCARE) 4 % Patch; Place 1 patch onto the skin every 24 hours. To prevent lidocaine toxicity, patient should be patch free for 12 hrs daily.      Electronically signed by:  Lucio Curiel MD  on March 5, 2025 12:11 PM

## 2025-03-05 NOTE — TELEPHONE ENCOUNTER
DJS-patient is looking for an order for her assisted living that states that a patch was discontinued    Please call and advise      Thank You    Halle Galvan on 3/5/2025 at 10:43 AM

## 2025-03-05 NOTE — TELEPHONE ENCOUNTER
Patient states she needs letter that lidocaine patch is discontinued. Please fax to [facility].    Fax # [phone]    Imelda Brewer on 3/5/2025 at 10:56 AM

## 2025-03-05 NOTE — TELEPHONE ENCOUNTER
Okay to discontinue Lidocaine patches.     Electronically signed by:  Lucio Curiel MD  on March 5, 2025 2:22 PM

## 2025-03-10 ENCOUNTER — OFFICE VISIT (OUTPATIENT)
Dept: FAMILY MEDICINE | Facility: OTHER | Age: 50
End: 2025-03-10
Attending: NURSE PRACTITIONER
Payer: MEDICARE

## 2025-03-10 VITALS
TEMPERATURE: 98 F | HEART RATE: 96 BPM | HEIGHT: 67 IN | SYSTOLIC BLOOD PRESSURE: 128 MMHG | WEIGHT: 293 LBS | DIASTOLIC BLOOD PRESSURE: 76 MMHG | OXYGEN SATURATION: 100 % | BODY MASS INDEX: 45.99 KG/M2 | RESPIRATION RATE: 19 BRPM

## 2025-03-10 DIAGNOSIS — L03.115 CELLULITIS OF RIGHT LEG WITHOUT FOOT: ICD-10-CM

## 2025-03-10 DIAGNOSIS — R22.41 LOCALIZED SWELLING OF RIGHT LOWER LEG: Primary | ICD-10-CM

## 2025-03-10 LAB
ALBUMIN SERPL BCG-MCNC: 4.1 G/DL (ref 3.5–5.2)
ALP SERPL-CCNC: 85 U/L (ref 40–150)
ALT SERPL W P-5'-P-CCNC: 28 U/L (ref 0–50)
ANION GAP SERPL CALCULATED.3IONS-SCNC: 9 MMOL/L (ref 7–15)
AST SERPL W P-5'-P-CCNC: 28 U/L (ref 0–45)
BASOPHILS # BLD AUTO: 0 10E3/UL (ref 0–0.2)
BASOPHILS NFR BLD AUTO: 0 %
BILIRUB SERPL-MCNC: 0.3 MG/DL
BUN SERPL-MCNC: 9.3 MG/DL (ref 6–20)
CALCIUM SERPL-MCNC: 9.7 MG/DL (ref 8.8–10.4)
CHLORIDE SERPL-SCNC: 102 MMOL/L (ref 98–107)
CREAT SERPL-MCNC: 0.87 MG/DL (ref 0.51–0.95)
CRP SERPL-MCNC: <3 MG/L
EGFRCR SERPLBLD CKD-EPI 2021: 82 ML/MIN/1.73M2
EOSINOPHIL # BLD AUTO: 0.2 10E3/UL (ref 0–0.7)
EOSINOPHIL NFR BLD AUTO: 2 %
ERYTHROCYTE [DISTWIDTH] IN BLOOD BY AUTOMATED COUNT: 13.3 % (ref 10–15)
ERYTHROCYTE [SEDIMENTATION RATE] IN BLOOD BY WESTERGREN METHOD: 18 MM/HR (ref 0–20)
GLUCOSE SERPL-MCNC: 84 MG/DL (ref 70–99)
HCO3 SERPL-SCNC: 29 MMOL/L (ref 22–29)
HCT VFR BLD AUTO: 40.1 % (ref 35–47)
HGB BLD-MCNC: 12.9 G/DL (ref 11.7–15.7)
IMM GRANULOCYTES # BLD: 0 10E3/UL
IMM GRANULOCYTES NFR BLD: 0 %
LYMPHOCYTES # BLD AUTO: 1.5 10E3/UL (ref 0.8–5.3)
LYMPHOCYTES NFR BLD AUTO: 18 %
MCH RBC QN AUTO: 30.5 PG (ref 26.5–33)
MCHC RBC AUTO-ENTMCNC: 32.2 G/DL (ref 31.5–36.5)
MCV RBC AUTO: 95 FL (ref 78–100)
MONOCYTES # BLD AUTO: 0.7 10E3/UL (ref 0–1.3)
MONOCYTES NFR BLD AUTO: 9 %
NEUTROPHILS # BLD AUTO: 5.9 10E3/UL (ref 1.6–8.3)
NEUTROPHILS NFR BLD AUTO: 71 %
NRBC # BLD AUTO: 0 10E3/UL
NRBC BLD AUTO-RTO: 0 /100
PLATELET # BLD AUTO: 255 10E3/UL (ref 150–450)
POTASSIUM SERPL-SCNC: 4.2 MMOL/L (ref 3.4–5.3)
PROT SERPL-MCNC: 7.4 G/DL (ref 6.4–8.3)
RBC # BLD AUTO: 4.23 10E6/UL (ref 3.8–5.2)
SODIUM SERPL-SCNC: 140 MMOL/L (ref 135–145)
WBC # BLD AUTO: 8.4 10E3/UL (ref 4–11)

## 2025-03-10 PROCEDURE — 82040 ASSAY OF SERUM ALBUMIN: CPT | Mod: ZL | Performed by: NURSE PRACTITIONER

## 2025-03-10 PROCEDURE — 85018 HEMOGLOBIN: CPT | Mod: ZL | Performed by: NURSE PRACTITIONER

## 2025-03-10 PROCEDURE — 86140 C-REACTIVE PROTEIN: CPT | Mod: ZL | Performed by: NURSE PRACTITIONER

## 2025-03-10 PROCEDURE — 82310 ASSAY OF CALCIUM: CPT | Mod: ZL | Performed by: NURSE PRACTITIONER

## 2025-03-10 PROCEDURE — G0463 HOSPITAL OUTPT CLINIC VISIT: HCPCS

## 2025-03-10 PROCEDURE — 36415 COLL VENOUS BLD VENIPUNCTURE: CPT | Mod: ZL | Performed by: NURSE PRACTITIONER

## 2025-03-10 PROCEDURE — 85652 RBC SED RATE AUTOMATED: CPT | Mod: ZL | Performed by: NURSE PRACTITIONER

## 2025-03-10 PROCEDURE — 82947 ASSAY GLUCOSE BLOOD QUANT: CPT | Mod: ZL | Performed by: NURSE PRACTITIONER

## 2025-03-10 PROCEDURE — 85004 AUTOMATED DIFF WBC COUNT: CPT | Mod: ZL | Performed by: NURSE PRACTITIONER

## 2025-03-10 PROCEDURE — 82247 BILIRUBIN TOTAL: CPT | Mod: ZL | Performed by: NURSE PRACTITIONER

## 2025-03-10 RX ORDER — CEPHALEXIN 500 MG/1
500 CAPSULE ORAL 4 TIMES DAILY
Qty: 28 CAPSULE | Refills: 0 | Status: SHIPPED | OUTPATIENT
Start: 2025-03-10 | End: 2025-03-10

## 2025-03-10 RX ORDER — CEPHALEXIN 500 MG/1
500 CAPSULE ORAL 4 TIMES DAILY
Qty: 28 CAPSULE | Refills: 0 | Status: SHIPPED | OUTPATIENT
Start: 2025-03-10

## 2025-03-10 ASSESSMENT — ASTHMA QUESTIONNAIRES
QUESTION_4 LAST FOUR WEEKS HOW OFTEN HAVE YOU USED YOUR RESCUE INHALER OR NEBULIZER MEDICATION (SUCH AS ALBUTEROL): NOT AT ALL
QUESTION_2 LAST FOUR WEEKS HOW OFTEN HAVE YOU HAD SHORTNESS OF BREATH: NOT AT ALL
ACT_TOTALSCORE: 25
ACT_TOTALSCORE: 25
QUESTION_3 LAST FOUR WEEKS HOW OFTEN DID YOUR ASTHMA SYMPTOMS (WHEEZING, COUGHING, SHORTNESS OF BREATH, CHEST TIGHTNESS OR PAIN) WAKE YOU UP AT NIGHT OR EARLIER THAN USUAL IN THE MORNING: NOT AT ALL
QUESTION_5 LAST FOUR WEEKS HOW WOULD YOU RATE YOUR ASTHMA CONTROL: COMPLETELY CONTROLLED
QUESTION_1 LAST FOUR WEEKS HOW MUCH OF THE TIME DID YOUR ASTHMA KEEP YOU FROM GETTING AS MUCH DONE AT WORK, SCHOOL OR AT HOME: NONE OF THE TIME

## 2025-03-10 ASSESSMENT — ENCOUNTER SYMPTOMS
PSYCHIATRIC NEGATIVE: 1
GASTROINTESTINAL NEGATIVE: 1
CARDIOVASCULAR NEGATIVE: 1
RESPIRATORY NEGATIVE: 1
NEUROLOGICAL NEGATIVE: 1
HEMATOLOGIC/LYMPHATIC NEGATIVE: 1
CONSTITUTIONAL NEGATIVE: 1

## 2025-03-10 ASSESSMENT — PAIN SCALES - GENERAL: PAINLEVEL_OUTOF10: MODERATE PAIN (5)

## 2025-03-10 NOTE — NURSING NOTE
"Chief Complaint   Patient presents with    Swelling     Bilateral legs       Patient here for swelling in feet and legs x2 days. She notes redness and warmth in right leg.     Initial /76   Pulse 96   Temp 98  F (36.7  C) (Tympanic)   Resp 19   Ht 1.689 m (5' 6.5\")   Wt 136.1 kg (300 lb)   SpO2 100%   BMI 47.70 kg/m   Estimated body mass index is 47.7 kg/m  as calculated from the following:    Height as of this encounter: 1.689 m (5' 6.5\").    Weight as of this encounter: 136.1 kg (300 lb).  Medication Review: complete    The next two questions are to help us understand your food security.  If you are feeling you need any assistance in this area, we have resources available to support you today.          2/25/2025   SDOH- Food Insecurity   Within the past 12 months, did you worry that your food would run out before you got money to buy more?    Within the past 12 months, did the food you bought just not last and you didn t have money to get more?        Information is confidential and restricted. Go to Review Flowsheets to unlock data.         Health Care Directive:  Patient has a Health Care Directive on file      Anne Valencia LPN      "

## 2025-03-10 NOTE — PROGRESS NOTES
Rosi Lamb  1975    ASSESSMENT/PLAN      Presents to [facility] clinic with worsening edema in her feet, chronic lymphedema and swelling with increasing redness and warmth on her right leg.  Patient has history of right leg cellulitis and the area of previous infection is warm and has increased erythema since yesterday.  Patient has not had fever, chills.  Labs obtained in [facility] clinic and reassuring.  CBC, CMP, CRP, ESR within normal limit.   Area of previous cellulitis is warm and slightly more erythema.  Patient has also been scratching her legs and there are several areas of scabs and scratches.  Given patient's history of hospitalization with cellulitis would recommend treatment with Keflex and this was called into the pharmacy.  Patient's vitals are stable and she appears nontoxic.        1. Localized swelling of right lower leg (Primary)    - CBC and Differential  - Comprehensive Metabolic Panel  - CRP inflammation  - Sedimentation Rate (ESR)  - May use over-the-counter Tylenol or ibuprofen PRN  - Follow up as needed for new or worsening symptoms        *A shared decision making model was used.   *Patient and/or associated parties understood and were agreeable to treatment plan.   *Plan and all results were discussed.   *Explanation of diagnosis, treatment options and risk and benefits of medications reviewed with patient.    *Time was taken to answer all questions.   *Red flags symptoms were discussed and patient was advised when they should return for reevaluation or for prompt emergency evaluation.   *Patient was given verbal and written instructions on plan of care. Instructions were printed or are available on Mychart on electronic AVS.   *We discussed potential side effects of any prescribed or recommended therapies, as well as expectations for response to treatments.  *Patient discharged in stable condition    Callie Conde CNP  [facility]    SUBJECTIVE  CHIEF COMPLAINT/  "REASON FOR VISIT  Patient presents with:  Swelling: Bilateral legs       HISTORY OF PRESENT ILLNESS  Rosi Lamb is a pleasant 50 year old female [facility] with worsening edema in her feet, chronic lymphedema and swelling with increasing redness and warmth on her right leg.  Patient has history of right leg cellulitis and the area of previous infection is warm and has increased erythema since yesterday.  Patient has not had fever, chills.             I have reviewed the nursing notes.  I have reviewed allergies, medication list, problem list, and past medical history.    REVIEW OF SYSTEMS  Review of Systems   Constitutional: Negative.    HENT: Negative.     Respiratory: Negative.     Cardiovascular: Negative.    Gastrointestinal: Negative.    Genitourinary: Negative.    Musculoskeletal:         Lateral lower extremity edema, lymphedema and increasing warmth and redness of previous cellulitis infection area on right leg.   Skin: Negative.    Neurological: Negative.    Hematological: Negative.    Psychiatric/Behavioral: Negative.     All other systems reviewed and are negative.       VITAL SIGNS  Vitals:    03/10/25 1700   BP: 128/76   Pulse: 96   Resp: 19   Temp: 98  F (36.7  C)   TempSrc: Tympanic   SpO2: 100%   Weight: 136.1 kg (300 lb)   Height: 1.689 m (5' 6.5\")      Body mass index is 47.7 kg/m .      OBJECTIVE  PHYSICAL EXAM  Physical Exam  Vitals and nursing note reviewed.   Constitutional:       Appearance: Normal appearance.   HENT:      Head: Normocephalic.      Nose: Nose normal.      Mouth/Throat:      Mouth: Mucous membranes are moist.   Eyes:      Pupils: Pupils are equal, round, and reactive to light.   Cardiovascular:      Rate and Rhythm: Normal rate and regular rhythm.      Pulses: Normal pulses.      Heart sounds: Normal heart sounds.   Pulmonary:      Effort: Pulmonary effort is normal.      Breath sounds: Normal breath sounds.   Abdominal:      Palpations: Abdomen is " soft.   Musculoskeletal:         General: Normal range of motion.   Skin:     General: Skin is warm and dry.      Capillary Refill: Capillary refill takes less than 2 seconds.      Findings: Erythema present.      Comments: Right lower extremity with increased swelling in the foot, noted chronic lymphedema bilaterally overall stable.  Area of previous right lower extremity cellulitis is now showing signs of infection with increased erythema and warmth.   Neurological:      General: No focal deficit present.      Mental Status: She is alert.            DIAGNOSTICS  Results for orders placed or performed in visit on 03/10/25   Comprehensive Metabolic Panel     Status: Normal   Result Value Ref Range    Sodium 140 135 - 145 mmol/L    Potassium 4.2 3.4 - 5.3 mmol/L    Carbon Dioxide (CO2) 29 22 - 29 mmol/L    Anion Gap 9 7 - 15 mmol/L    Urea Nitrogen 9.3 6.0 - 20.0 mg/dL    Creatinine 0.87 0.51 - 0.95 mg/dL    GFR Estimate 82 >60 mL/min/1.73m2    Calcium 9.7 8.8 - 10.4 mg/dL    Chloride 102 98 - 107 mmol/L    Glucose 84 70 - 99 mg/dL    Alkaline Phosphatase 85 40 - 150 U/L    AST 28 0 - 45 U/L    ALT 28 0 - 50 U/L    Protein Total 7.4 6.4 - 8.3 g/dL    Albumin 4.1 3.5 - 5.2 g/dL    Bilirubin Total 0.3 <=1.2 mg/dL   CRP inflammation     Status: Normal   Result Value Ref Range    CRP Inflammation <3.00 <5.00 mg/L   Sedimentation Rate (ESR)     Status: Normal   Result Value Ref Range    Erythrocyte Sedimentation Rate 18 0 - 20 mm/hr   CBC with platelets and differential     Status: None   Result Value Ref Range    WBC Count 8.4 4.0 - 11.0 10e3/uL    RBC Count 4.23 3.80 - 5.20 10e6/uL    Hemoglobin 12.9 11.7 - 15.7 g/dL    Hematocrit 40.1 35.0 - 47.0 %    MCV 95 78 - 100 fL    MCH 30.5 26.5 - 33.0 pg    MCHC 32.2 31.5 - 36.5 g/dL    RDW 13.3 10.0 - 15.0 %    Platelet Count 255 150 - 450 10e3/uL    % Neutrophils 71 %    % Lymphocytes 18 %    % Monocytes 9 %    % Eosinophils 2 %    % Basophils 0 %    % Immature Granulocytes  0 %    NRBCs per 100 WBC 0 <1 /100    Absolute Neutrophils 5.9 1.6 - 8.3 10e3/uL    Absolute Lymphocytes 1.5 0.8 - 5.3 10e3/uL    Absolute Monocytes 0.7 0.0 - 1.3 10e3/uL    Absolute Eosinophils 0.2 0.0 - 0.7 10e3/uL    Absolute Basophils 0.0 0.0 - 0.2 10e3/uL    Absolute Immature Granulocytes 0.0 <=0.4 10e3/uL    Absolute NRBCs 0.0 10e3/uL   CBC and Differential     Status: None    Narrative    The following orders were created for panel order CBC and Differential.  Procedure                               Abnormality         Status                     ---------                               -----------         ------                     CBC with platelets and ...[[id number]]                      Final result                 Please view results for these tests on the individual orders.

## 2025-03-11 DIAGNOSIS — K59.00 CONSTIPATION: ICD-10-CM

## 2025-03-12 RX ORDER — DOCUSATE SODIUM 100 MG/1
CAPSULE, LIQUID FILLED ORAL
Qty: 120 CAPSULE | Refills: 0 | OUTPATIENT
Start: 2025-03-12

## 2025-03-12 NOTE — TELEPHONE ENCOUNTER
Left message for patient to return call.  Chelsea Cam LPN on 4/5/2021 at 3:36 PM    
Please let patient know I sent over diflucan x 1 for her to take. She can use OTC products for symptom management in addition, if needed.  
Pt has been on antibiotics at the  and now she has a yeast infection.  Please call        Raudel Oconnor on 4/5/2021 at 7:14 AM    
S-(situation): patient states that she developed symptoms of yeast infection yesterday.    B-(background): patient has history of yeast infections while on antibiotics in past. Patient started on cefdinir (OMNICEF) 300 MG capsule for ear infection on 3/27/2021.    A-(assessment): patient states that she is having vaginal burning and some itching that started yesterday.  Patient [facility].    R-(recommendations): informed patient Dr. Curiel out of office but will route to covering provider for review and consideration.    Shirley Wren RN on 4/5/2021 at 8:03 AM      
[employer]    Physical Therapy: [facility]-order created 3/12/25  CT of Rt Shoulder for [facility] Protocol-order created 3/12/25    Clearances sent to:  PCP: Dre    Insurance: BC/BS PPO  Prior auth started on 3/12/2025 via [employer] | mentor | [employer] portal, clinicals uploaded.  Approved  #[id number]  5/5/25-7/3/25  
24

## 2025-03-12 NOTE — TELEPHONE ENCOUNTER
Rx was previously sent 11/24/24 #720 +2 additional refills. Per pharmacy, this request was transferred 11/24/24. Pharmacy will check with them. Irma Perera RN on 3/12/2025 at 1:03 PM      Last Office Visit:              9/3/24 Ve[facility]   Future Office visit:           3/27/25 [facility]    Requested Prescriptions   Pending Prescriptions Disp Refills    EQ STOOL SOFTENER 100 MG capsule [Pharmacy Med Name: EQ Stool Softener 100 MG Oral Capsule] 120 capsule 0     Sig: TAKE 2 CAPSULES BY MOUTH TWICE DAILY .  ADJUST  DOSE  AS  NEEDED  TO  MAINTAIN       [employer] Protocol Passed - 3/12/2025 12:55 PM        Passed - Patient is age 6 or older        Passed - Medication is active on med list and the sig matches. RN to manually verify dose and sig if red X/fail.     If the protocol passes (green check), you do not need to verify med dose and sig.    A prescription matches if they are the same clinical intention.    For Example: once daily and every morning are the same.    The protocol can not identify upper and lower case letters as matching and will fail.     For Example: Take 1 tablet (50 mg) by mouth daily     TAKE 1 TABLET (50 MG) BY MOUTH DAILY    For all fails (red x), verify dose and sig.    If the refill does match what is on file, the RN can still proceed to approve the refill request.       If they do not match, route to the appropriate provider.             Passed - Medication indicated for associated diagnosis     The medication is prescribed for one or more of the following conditions:     Constipation   Preparation of bowel for procedure   Fecal impaction   Dysfunctional voiding   Narcotic induced constipation   Cystic Fibrosis  Bronchiectasis            Passed - Recent (12 mo) or future (90 days) visit within the authorizing provider's specialty     The patient must have completed an in-person or virtual visit within the past 12 months or has a future visit scheduled within the next 90 days with the  authorizing provider s specialty.  Urgent care and e-visits do not qualify as an office visit for this protocol.       Last Prescription Date:   11/7/24  Last Fill Qty/Refills:         720  /  2

## 2025-03-24 NOTE — TELEPHONE ENCOUNTER
Message sent to [employer] to check on status of prior auth.  Merly Pascual RN on 3/24/2025 at 10:01 AM

## 2025-03-24 NOTE — TELEPHONE ENCOUNTER
Patient called checking on this refill and PA.  She still hasn't heard anything and is wondering what the status of it is.  Adri Moore on 3/24/2025 at 9:37 AM

## 2025-03-25 NOTE — TELEPHONE ENCOUNTER
Called and spoke to patient to update. Also needs an annual exam or PCP can continue her hormone management with yearly physicals.  Merly Pascual RN on 3/25/2025 at 9:53 AM

## 2025-03-31 ENCOUNTER — TELEPHONE (OUTPATIENT)
Dept: PULMONOLOGY | Facility: OTHER | Age: 50
End: 2025-03-31

## 2025-03-31 NOTE — TELEPHONE ENCOUNTER
PT has not completed WatchPAT testing. A courtesy reminder phone call was given and answered by the patient.

## 2025-04-08 ENCOUNTER — OFFICE VISIT (OUTPATIENT)
Dept: FAMILY MEDICINE | Facility: OTHER | Age: 50
End: 2025-04-08
Attending: FAMILY MEDICINE
Payer: MEDICARE

## 2025-04-08 VITALS
TEMPERATURE: 98.4 F | SYSTOLIC BLOOD PRESSURE: 132 MMHG | DIASTOLIC BLOOD PRESSURE: 84 MMHG | BODY MASS INDEX: 47.09 KG/M2 | WEIGHT: 293 LBS | HEART RATE: 70 BPM | HEIGHT: 66 IN | RESPIRATION RATE: 18 BRPM

## 2025-04-08 DIAGNOSIS — J45.40 MODERATE PERSISTENT ASTHMA WITHOUT COMPLICATION: Chronic | ICD-10-CM

## 2025-04-08 DIAGNOSIS — J30.1 SEASONAL ALLERGIC RHINITIS DUE TO POLLEN: ICD-10-CM

## 2025-04-08 DIAGNOSIS — I89.0 LYMPHEDEMA OF BOTH LOWER EXTREMITIES: Chronic | ICD-10-CM

## 2025-04-08 DIAGNOSIS — Z00.00 MEDICARE ANNUAL WELLNESS VISIT, SUBSEQUENT: ICD-10-CM

## 2025-04-08 DIAGNOSIS — N18.2 CKD (CHRONIC KIDNEY DISEASE) STAGE 2, GFR 60-89 ML/MIN: ICD-10-CM

## 2025-04-08 DIAGNOSIS — R23.2 HOT FLASHES: Primary | ICD-10-CM

## 2025-04-08 DIAGNOSIS — F10.21 ALCOHOL USE DISORDER, MODERATE, IN SUSTAINED REMISSION, DEPENDENCE (H): ICD-10-CM

## 2025-04-08 DIAGNOSIS — I73.00 RAYNAUD'S PHENOMENON WITHOUT GANGRENE: ICD-10-CM

## 2025-04-08 DIAGNOSIS — R32 URINARY INCONTINENCE, UNSPECIFIED TYPE: ICD-10-CM

## 2025-04-08 DIAGNOSIS — F41.1 GENERALIZED ANXIETY DISORDER: Chronic | ICD-10-CM

## 2025-04-08 DIAGNOSIS — F51.01 PRIMARY INSOMNIA: ICD-10-CM

## 2025-04-08 DIAGNOSIS — K21.9 GASTROESOPHAGEAL REFLUX DISEASE WITHOUT ESOPHAGITIS: Chronic | ICD-10-CM

## 2025-04-08 DIAGNOSIS — F31.76 BIPOLAR DISORDER, IN FULL REMISSION, MOST RECENT EPISODE DEPRESSED: ICD-10-CM

## 2025-04-08 DIAGNOSIS — E66.01 CLASS 3 SEVERE OBESITY DUE TO EXCESS CALORIES WITH SERIOUS COMORBIDITY AND BODY MASS INDEX (BMI) OF 45.0 TO 49.9 IN ADULT (H): ICD-10-CM

## 2025-04-08 DIAGNOSIS — E66.813 CLASS 3 SEVERE OBESITY DUE TO EXCESS CALORIES WITH SERIOUS COMORBIDITY AND BODY MASS INDEX (BMI) OF 45.0 TO 49.9 IN ADULT (H): ICD-10-CM

## 2025-04-08 DIAGNOSIS — I48.0 PAROXYSMAL ATRIAL FIBRILLATION (H): ICD-10-CM

## 2025-04-08 PROCEDURE — G0463 HOSPITAL OUTPT CLINIC VISIT: HCPCS

## 2025-04-08 PROCEDURE — 90480 ADMN SARSCOV2 VAC 1/ONLY CMP: CPT

## 2025-04-08 RX ORDER — ESTRADIOL 0.05 MG/D
1 PATCH, EXTENDED RELEASE TRANSDERMAL
Qty: 24 PATCH | Refills: 3 | Status: SHIPPED | OUTPATIENT
Start: 2025-04-10

## 2025-04-08 RX ORDER — PROGESTERONE 100 MG/1
100 CAPSULE ORAL DAILY
Qty: 90 CAPSULE | Refills: 0 | Status: SHIPPED | OUTPATIENT
Start: 2025-04-08

## 2025-04-08 RX ORDER — AMLODIPINE BESYLATE 2.5 MG/1
TABLET ORAL
Qty: 180 TABLET | Refills: 4 | Status: SHIPPED | OUTPATIENT
Start: 2025-04-08

## 2025-04-08 RX ORDER — MONTELUKAST SODIUM 10 MG/1
10 TABLET ORAL AT BEDTIME
Qty: 90 TABLET | Refills: 4 | Status: SHIPPED | OUTPATIENT
Start: 2025-04-08

## 2025-04-08 RX ORDER — RAMELTEON 8 MG/1
8 TABLET ORAL AT BEDTIME
Qty: 90 TABLET | Refills: 4 | Status: SHIPPED | OUTPATIENT
Start: 2025-04-08

## 2025-04-08 SDOH — HEALTH STABILITY: PHYSICAL HEALTH: ON AVERAGE, HOW MANY DAYS PER WEEK DO YOU ENGAGE IN MODERATE TO STRENUOUS EXERCISE (LIKE A BRISK WALK)?: 2 DAYS

## 2025-04-08 ASSESSMENT — SOCIAL DETERMINANTS OF HEALTH (SDOH): HOW OFTEN DO YOU GET TOGETHER WITH FRIENDS OR RELATIVES?: TWICE A WEEK

## 2025-04-08 ASSESSMENT — PAIN SCALES - GENERAL: PAINLEVEL_OUTOF10: MILD PAIN (2)

## 2025-04-08 NOTE — PROGRESS NOTES
Preventive Care Visit  [facility] AND \[facility]\""  Nelson Bojorquez MD, Family Medicine  Apr 8, 2025      Assessment & Plan     Medicare annual wellness visit, subsequent  Preventative care discussed.  Immunizations up-to-date other than COVID booster which she wishes to get today so that is given.  Tetanus will be due January 2026.  Pap smear will be due March 2026.  Colonoscopy due in 2027.  Mammogram due in August.  No labs needed as she has had extensive lab work over the last 2 months and previous excellent lipid panel back in 2023 so not indicated at this time.  Follow-up in 1 year for annual wellness visit, sooner if any problems.    Hot flashes  Refilled the progesterone and estradiol patch.  She has had some insurance coverage issues with that in the past so prior authorization could be filled out if needed.  - progesterone (PROMETRIUM) 100 MG capsule; Take 1 capsule (100 mg) by mouth daily.  - estradiol (VIVELLE-DOT) 0.05 MG/24HR bi-weekly patch; Place 1 patch onto the skin twice a week.    Seasonal allergic rhinitis due to pollen  No acute issues.  Continue Singulair.    Moderate persistent asthma without complication  No acute issues on Singulair.  - montelukast (SINGULAIR) 10 MG tablet; Take 1 tablet (10 mg) by mouth at bedtime.    Class 3 severe obesity due to excess calories with serious comorbidity and body mass index (BMI) of 45.0 to 49.9 in adult (H)  No acute issues.    Gastroesophageal reflux disease without esophagitis  Stable.    Paroxysmal atrial fibrillation (H) - New Dx 8/22/2024  Doing well on medications.  No recurrence of palpitations.  Refills given.    Lymphedema of both lower extremities  No acute issues.  Stable and chronic.    CKD (chronic kidney disease) stage 2, GFR 60-89 ml/min  No acute issues.    Alcohol use disorder, moderate, in sustained remission, dependence (H)  In remission now for 4 to 5 years.    Bipolar disorder, in full remission, most recent episode  "depressed  Following with psychiatrist for medication management.    Generalized anxiety disorder  Following with psychiatrist.    Urinary incontinence, unspecified type  DME prescription for supplies for depends 2 a day given.  - Incontinence Supplies Order for DME - ONLY FOR DME    Raynaud's phenomenon without gangrene  No acute issues.  - amLODIPine (NORVASC) 2.5 MG tablet; Take 1 tablet (2.5 mg) by mouth every morning. May also take 1 tablet (2.5 mg) every evening as needed (-- for Raynaud's / Hand color changes --).    Primary insomnia  No acute issues.  Previous difficulty with the [employer] for insurance coverage as well.  - ramelteon (ROZEREM) 8 MG tablet; Take 1 tablet (8 mg) by mouth at bedtime.            BMI  Estimated body mass index is 49.39 kg/m  as calculated from the following:    Height as of this encounter: 1.676 m (5' 6\").    Weight as of this encounter: 138.8 kg (306 lb).       Counseling  Appropriate preventive services were addressed with this patient via screening, questionnaire, or discussion as appropriate for fall prevention, nutrition, physical activity, Tobacco-use cessation, social engagement, weight loss and cognition.  Checklist reviewing preventive services available has been given to the patient.  Reviewed patient's diet, addressing concerns and/or questions.   She is at risk for lack of exercise and has been provided with information to increase physical activity for the benefit of her well-being.   Information on urinary incontinence and treatment options given to patient.           No follow-ups on file.    Tawanda Aranda is a 50 year old, presenting for the following:  Physical        4/8/2025     3:22 PM   Additional Questions   Roomed by Martine INFANTE           HPI  50-year-old female here for annual Medicare wellness visit as well as follow-up of multiple medical problems.  She feels like overall she is doing quite well.  Medications have all been stable.  She follows with " her psychiatrist at [place] for her mental health.  She did have mental health hospitalization last month, but feels like things are going well now.  She follows with psychiatrist regularly.     She does have new diagnosis of atrial fibrillation back in August 2024.  She is on diltiazem and amlodipine with good control of that.  No recurrence.  No need for anticoagulation.  Blood pressures have been well-controlled.  No side effects of the medications.      She has been stable with all her other medical problems with allergic rhinitis, asthma, GERD, and lymphedema.  She had previously done some leg wraps, but has not used those in quite a while as she just did not feel like that did too much for her in the past.  She will need refills of her medications today.  She does have history of alcohol use disorder in remission.  She has been clean and sober for 4 to 5 years now and feels like that is going quite well.    She does request a DME prescription for Depends undergarments for urinary incontinence.  She has been on that long-term typically 2 a day.  No changes with that.  No other urinary symptoms.     No other complaints today.  She is overall otherwise doing well.  She works part-time at a  through [employer].  She is on [employer] disability for mental health.            Advance Care Planning  Patient has a Health Care Directive on file  Advance care planning document is on file and is current.      4/8/2025   General Health   How would you rate your overall physical health? (!) FAIR   Feel stress (tense, anxious, or unable to sleep) Not at all         4/8/2025   Nutrition   Diet: Regular (no restrictions)         4/8/2025   Exercise   Days per week of moderate/strenous exercise 2 days   (!) EXERCISE CONCERN      4/8/2025   Social Factors   Frequency of gathering with friends or relatives Twice a week   Worry food won't last until get money to buy more No   Food not last or not have enough money for food?  No   Do you have housing? (Housing is defined as stable permanent housing and does not include staying ouside in a car, in a tent, in an abandoned building, in an overnight shelter, or couch-surfing.) No   Are you worried about losing your housing? No   Lack of transportation? No   Unable to get utilities (heat,electricity)? No   Want help with housing or utility concern? No   (!) HOUSING CONCERN PRESENT      2025   Fall Risk   Fallen 2 or more times in the past year? No   Trouble with walking or balance? No          2025   Activities of Daily Living- Home Safety   Needs help with the following daily activites None of the above   Safety concerns in the home None of the above         2025   Dental   Dentist two times every year? Yes         2025   Hearing Screening   Hearing concerns? None of the above         2025   Driving Risk Screening   Patient/family members have concerns about driving No         2025   General Alertness/Fatigue Screening   Have you been more tired than usual lately? No         2025   Urinary Incontinence Screening   Bothered by leaking urine in past 6 months Yes           2024   TB Screening   Were you born outside of the [place]? No             Today's PHQ-2 Score:       3/10/2025     4:58 PM   PHQ-2 ( 1999 Pfizer)   Q1: Little interest or pleasure in doing things 0   Q2: Feeling down, depressed or hopeless 0   PHQ-2 Score 0         2025   Substance Use   Alcohol more than 3/day or more than 7/wk Not Applicable   Do you have a current opioid prescription? No   How severe/bad is pain from 1 to 10? 2/10   Do you use any other substances recreationally? No     Social History     Tobacco Use    Smoking status: Former     Current packs/day: 0.00     Average packs/day: 2.0 packs/day for 3.0 years (6.0 ttl pk-yrs)     Types: Cigarettes     Start date:      Quit date: 2003     Years since quittin.2     Passive exposure: Past    Smokeless tobacco: Never    Vaping Use    Vaping status: Never Used   Substance Use Topics    Alcohol use: Not Currently     Alcohol/week: 0.0 standard drinks of alcohol    Drug use: Never           7/31/2023   LAST FHS-7 RESULTS   1st degree relative breast or ovarian cancer No   Any relative bilateral breast cancer No   Any male have breast cancer No   Any ONE woman have BOTH breast AND ovarian cancer No   Any woman with breast cancer before 50yrs No   2 or more relatives with breast AND/OR ovarian cancer No   2 or more relatives with breast AND/OR bowel cancer Yes        Mammogram Screening - Mammogram every 1-2 years updated in Health Maintenance based on mutual decision making      History of abnormal Pap smear: No - age 30-64 HPV with reflex Pap every 5 years recommended        Latest Ref Rng & Units 3/20/2023     3:52 PM 3/11/2020    11:10 AM   PAP / HPV   PAP  Negative for Intraepithelial Lesion or Malignancy (NILM)     PAP (Historical)   NIL    HPV 16 DNA Negative Negative  Negative    HPV 18 DNA Negative Negative  Negative    Other HR HPV Negative Negative  Negative      ASCVD Risk   The 10-year ASCVD risk score (Yoko LICONA, et al., 2019) is: 0.7%    Values used to calculate the score:      Age: 50 years      Sex: Female      Is Non- : No      Diabetic: No      Tobacco smoker: No      Systolic Blood Pressure: 132 mmHg      Is BP treated: No      HDL Cholesterol: 62 mg/dL      Total Cholesterol: 161 mg/dL        Reviewed and updated as needed this visit by Provider   Tobacco  Allergies  Meds  Problems  Med Hx  Surg Hx  Fam Hx            Past Medical History:   Diagnosis Date    Abnormal Pap smear of cervix 04/11/2018    Overview:  had scraping of cervix    Cannabis use disorder, moderate, in sustained remission, dependence (H) 11/28/2015    Closed dislocation of tarsal joint 02/04/2011    Closed dislocation of tarsal joint - left 02/04/2011    Edema     No Comments Provided    Encounter for  removal and reinsertion of intrauterine contraceptive device     05,IUD placement, Removed    Excessive and frequent menstruation with irregular cycle     2017    Major depressive disorder, single episode     No Comments Provided    Personal history of other medical treatment (CODE)     G3, P2-0-1-2 with history of spontaneous     Personal history of other medical treatment (CODE)     No Comments Provided    Uncomplicated asthma     No Comments Provided    Urinary incontinence 03/15/2013     Current providers sharing in care for this patient include:  Patient Care Team:  Lucio Curiel MD as PCP - General (Internal Medicine)  Lucio Curiel MD as Assigned PCP  Philip Sanchez MD as Assigned Musculoskeletal Provider  Leticia Wren MD as Assigned OBGYN Provider  Elmer Ceballos DO as Assigned Heart and Vascular Provider  Juvenal Kearns MD as Assigned Sleep Provider    The following health maintenance items are reviewed in Epic and correct as of today:  Health Maintenance   Topic Date Due    LIPID  2024    MICROALBUMIN  2024    ASTHMA ACTION PLAN  2024    ASTHMA CONTROL TEST  09/10/2025    DTAP/TDAP/TD IMMUNIZATION (4 - Td or Tdap) 2026    BMP  03/10/2026    HPV TEST  2026    PAP  2026    MEDICARE ANNUAL WELLNESS VISIT  2026    MAMMO SCREENING  08/15/2026    Pneumococcal Vaccine: Pediatrics (0 to 5 Years) and At-Risk Patients (6 to 49 Years) (3 of 3 - PCV20 or PCV21) 2027    ZOSTER IMMUNIZATION (1 of 2) 2027    COLORECTAL CANCER SCREENING  2027    DIABETES SCREENING  03/10/2028    ADVANCE CARE PLANNING  2029    HEPATITIS C SCREENING  Completed    HIV SCREENING  Completed    PHQ-2 (once per calendar year)  Completed    INFLUENZA VACCINE  Completed    URINALYSIS  Completed    HEPATITIS B IMMUNIZATION  Completed    COVID-19 Vaccine  Completed    HPV IMMUNIZATION  Aged Out    MENINGITIS IMMUNIZATION  Aged Out      Current Outpatient Medications   Medication Sig Dispense Refill    acetaminophen (TYLENOL) 500 MG tablet Take 1-2 tablets (500-1,000 mg) by mouth every 6 hours as needed for mild pain 90 tablet 4    albuterol (VENTOLIN HFA) 108 (90 Base) MCG/ACT inhaler Inhale 1-2 puffs into the lungs every 4 hours as needed for shortness of breath or wheezing 18 g 11    amLODIPine (NORVASC) 2.5 MG tablet Take 1 tablet (2.5 mg) by mouth every morning. May also take 1 tablet (2.5 mg) every evening as needed (-- for Raynaud's / Hand color changes --). 180 tablet 4    ARIPiprazole (ABILIFY) 20 MG tablet Take 20 mg by mouth at bedtime.      atomoxetine (STRATTERA) 25 MG capsule Take 25 mg by mouth every morning.      busPIRone (BUSPAR) 10 MG tablet Take 1 tablet by mouth 2 times daily.      cholecalciferol (VITAMIN D3) 125 mcg (5000 units) capsule Take 1 capsule (125 mcg) by mouth daily 100 capsule 4    diltiazem ER (CARDIAZEM LA) 180 MG 24 hr tablet Take 1 tablet (180 mg) by mouth 2 times daily. -- Dose Increase 9/3/2024    + Stop 120 mg Diltiazem tablet 180 tablet 3    docusate sodium (EQ STOOL SOFTENER) 100 MG capsule TAKE 2 CAPSULES BY MOUTH TWICE DAILY .  ADJUST  DOSE  AS  NEEDED  TO  MAINTAIN 720 capsule 2    Elemental iron 65 mg Vitamin C 125 mg (VITRON C)  MG TABS tablet Take 1 tablet by mouth daily on empty stomach -for iron deficiency 90 tablet 4    [START ON 4/10/2025] estradiol (VIVELLE-DOT) 0.05 MG/24HR bi-weekly patch Place 1 patch onto the skin twice a week. 24 patch 3    famotidine (PEPCID) 20 MG tablet Take 1 tablet (20 mg) by mouth 2 times daily - For Heartburn 180 tablet 4    gabapentin (NEURONTIN) 300 MG capsule Take 1 capsule (300 mg) by mouth 3 times daily. 90 capsule 3    hydrOXYzine (VISTARIL) 50 MG capsule Take 50 mg by mouth 4 times daily as needed for anxiety.      hydrOXYzine HCl (ATARAX) 50 MG tablet Take 1 tablet (50 mg) by mouth at bedtime 30 tablet 0    ibuprofen (ADVIL/MOTRIN) 200 MG tablet  "Take 1-2 tablets (200-400 mg) by mouth every 6 hours as needed for moderate pain 1 to 2 tabs Q6 PRN 90 tablet 0    lamoTRIgine (LAMICTAL) 100 MG tablet Take 100 mg by mouth 3 times daily.      Menthol, Topical Analgesic, (BIOFREEZE) 4 % GEL Externally apply topically 2 times daily as needed (For pain) 150 mL 3    montelukast (SINGULAIR) 10 MG tablet Take 1 tablet (10 mg) by mouth at bedtime. 90 tablet 4    prazosin (MINIPRESS) 1 MG capsule Take 1 mg by mouth nightly as needed (nightmares).      PREVIDENT 5000 PLUS 1.1 % CREA Use in place of toothpaste once daily before bed. Brush, then swish toothpaste for 30 seconds and spit. Do not rinse, eat or drink for 30 minutes.      progesterone (PROMETRIUM) 100 MG capsule Take 1 capsule (100 mg) by mouth daily. 90 capsule 0    ramelteon (ROZEREM) 8 MG tablet Take 1 tablet (8 mg) by mouth at bedtime. 90 tablet 4    vilazodone (VIIBRYD) 40 MG TABS tablet Take 40 mg by mouth every morning                Objective    Exam  /84 (BP Location: Right arm, Patient Position: Sitting, Cuff Size: Adult Large)   Pulse 70   Temp 98.4  F (36.9  C) (Tympanic)   Resp 18   Ht 1.676 m (5' 6\")   Wt (!) 138.8 kg (306 lb)   BMI 49.39 kg/m     Estimated body mass index is 49.39 kg/m  as calculated from the following:    Height as of this encounter: 1.676 m (5' 6\").    Weight as of this encounter: 138.8 kg (306 lb).    Physical Exam  GENERAL: alert and no distress  EYES: Eyes grossly normal to inspection, PERRL and conjunctivae and sclerae normal  HENT: ear canals and TM's normal, nose and mouth without ulcers or lesions  NECK: no adenopathy, no asymmetry, masses, or scars  RESP: lungs clear to auscultation - no rales, rhonchi or wheezes  CV: regular rate and rhythm, normal S1 S2, no S3 or S4, no murmur, click or rub  ABDOMEN: soft, nontender, no hepatosplenomegaly, no masses and bowel sounds normal  MS: no gross musculoskeletal defects noted, no edema  SKIN: no suspicious lesions or " rashes  NEURO: Normal strength and tone, mentation intact and speech normal  PSYCH: mentation appears normal, affect normal/bright  Extremities: Severe bilateral lower extremity lymphedema at baseline per patient.  No skin breakdown or sores.        4/8/2025   Mini Cog   Clock Draw Score 2 Normal   3 Item Recall 3 objects recalled   Mini Cog Total Score 5              Signed Electronically by: Nelson Bojorquez MD

## 2025-04-08 NOTE — NURSING NOTE
"Chief Complaint   Patient presents with    Physical     Patient presents to the clinic for her annual physical. She states that she has a lump in her right ear. She also states she has been having some bladder leakage and has been using depends. She is wondering if those can be prescribed to her.     Initial /84 (BP Location: Right arm, Patient Position: Sitting, Cuff Size: Adult Large)   Pulse 70   Temp 98.4  F (36.9  C) (Tympanic)   Resp 18   Ht 1.676 m (5' 6\")   Wt (!) 138.8 kg (306 lb)   BMI 49.39 kg/m   Estimated body mass index is 49.39 kg/m  as calculated from the following:    Height as of this encounter: 1.676 m (5' 6\").    Weight as of this encounter: 138.8 kg (306 lb).  Medication Review: complete    The next two questions are to help us understand your food security.  If you are feeling you need any assistance in this area, we have resources available to support you today.          4/8/2025   SDOH- Food Insecurity   Within the past 12 months, did you worry that your food would run out before you got money to buy more? N   Within the past 12 months, did the food you bought just not last and you didn t have money to get more? N         Health Care Directive:  Patient has a Health Care Directive on file      Martine Pires LPN      "

## 2025-04-09 ENCOUNTER — DOCUMENTATION ONLY (OUTPATIENT)
Dept: FAMILY MEDICINE | Facility: OTHER | Age: 50
End: 2025-04-09
Payer: MEDICARE

## 2025-04-09 ENCOUNTER — TELEPHONE (OUTPATIENT)
Dept: INTERNAL MEDICINE | Facility: OTHER | Age: 50
End: 2025-04-09
Payer: MEDICARE

## 2025-04-09 ENCOUNTER — TELEPHONE (OUTPATIENT)
Dept: FAMILY MEDICINE | Facility: OTHER | Age: 50
End: 2025-04-09
Payer: MEDICARE

## 2025-04-09 DIAGNOSIS — E66.01 MORBID OBESITY (H): ICD-10-CM

## 2025-04-09 DIAGNOSIS — R39.81 FUNCTIONAL URINARY INCONTINENCE: Primary | ICD-10-CM

## 2025-04-09 NOTE — TELEPHONE ENCOUNTER
Writer contacted patient to verify where her DME supplies needed to be faxed to. After verifying name and date of birth she informed me that she would like it sent to [facility].   Martine iPres LPN on 4/9/2025 at 11:34 AM

## 2025-04-09 NOTE — TELEPHONE ENCOUNTER
Medication was discontinued by Dr. Curiel on 3/5/25. Faxed to number below.    Imelda Brewer on 4/9/2025 at 1:10 PM

## 2025-04-09 NOTE — TELEPHONE ENCOUNTER
Patient calling in requesting her lidocaine patch be discontinued. Please send this order to fax # [phone].  Beronica Sylvester on 4/9/2025 at 11:54 AM

## 2025-04-11 NOTE — NURSING NOTE
Rosi is here today per Maria Del Rosario Cox, NP to get her left orthotic check because she is having a pressure point on the top of her left foot.  Medication Reconciliation: complete  Advanced Care Directive Reviewed.    Malaika Wren LPN  3/7/2022 8:58 AM    
Patient is a healthy 29-year-old male with no past medical history presenting with 1 day worth of flulike symptoms.  Subtoxic with a fever of 103 supportive care complaining of headache malaise muscle aches headache, diarrhea nausea sinus congestion also consider chest x-ray ultrasound urine infectious workup met sepsis criteria but likely viral in origin

## 2025-04-14 ENCOUNTER — TELEPHONE (OUTPATIENT)
Dept: FAMILY MEDICINE | Facility: OTHER | Age: 50
End: 2025-04-14
Payer: MEDICARE

## 2025-04-14 NOTE — TELEPHONE ENCOUNTER
I received a request to process prior authorization for Progesterone 100MG capsules for patient. This could not be processed because it had already been denied.  Pura Corona on 4/14/2025 at 3:21 PM

## 2025-04-21 ENCOUNTER — TELEPHONE (OUTPATIENT)
Dept: CARDIOLOGY | Facility: OTHER | Age: 50
End: 2025-04-21
Payer: MEDICARE

## 2025-04-21 DIAGNOSIS — I48.0 PAROXYSMAL ATRIAL FIBRILLATION (H): Primary | ICD-10-CM

## 2025-04-21 NOTE — TELEPHONE ENCOUNTER
"Received a fax from [employer] [facility] in regards to Rx they have for   iltiazem ER (CARDIAZEM LA) 180 MG 24 hr tablet 180 tablet 3 10/24/2024 -- No   Sig - Route: Take 1 tablet (180 mg) by mouth 2 times daily. -- Dose Increase 9/3/2024    + Stop 120 mg Diltiazem tablet -        Note from pharmacy:  \"Insurance prefers Diltiazem HCL, prior auth request sent also\"    Shirley Wren RN on 4/21/2025 at 4:06 PM    "

## 2025-04-22 ENCOUNTER — TELEPHONE (OUTPATIENT)
Dept: BEHAVIORAL HEALTH | Facility: CLINIC | Age: 50
End: 2025-04-22
Payer: MEDICARE

## 2025-04-22 ENCOUNTER — HOSPITAL ENCOUNTER (EMERGENCY)
Facility: OTHER | Age: 50
Discharge: OTHER INSTITUTION | End: 2025-04-22
Payer: MEDICARE

## 2025-04-22 ENCOUNTER — TELEPHONE (OUTPATIENT)
Dept: BEHAVIORAL HEALTH | Facility: CLINIC | Age: 50
End: 2025-04-22

## 2025-04-22 ENCOUNTER — HOSPITAL ENCOUNTER (INPATIENT)
Facility: HOSPITAL | Age: 50
DRG: 885 | End: 2025-04-22
Attending: PSYCHIATRY & NEUROLOGY | Admitting: PSYCHIATRY & NEUROLOGY
Payer: MEDICARE

## 2025-04-22 VITALS
RESPIRATION RATE: 18 BRPM | HEIGHT: 66 IN | TEMPERATURE: 98.4 F | HEART RATE: 84 BPM | OXYGEN SATURATION: 100 % | SYSTOLIC BLOOD PRESSURE: 147 MMHG | DIASTOLIC BLOOD PRESSURE: 68 MMHG | WEIGHT: 293 LBS | BODY MASS INDEX: 47.09 KG/M2

## 2025-04-22 DIAGNOSIS — F51.01 PRIMARY INSOMNIA: ICD-10-CM

## 2025-04-22 DIAGNOSIS — R45.851 SUICIDAL IDEATION: ICD-10-CM

## 2025-04-22 DIAGNOSIS — G89.29 CHRONIC PAIN OF RIGHT KNEE: ICD-10-CM

## 2025-04-22 DIAGNOSIS — K59.00 CONSTIPATION: ICD-10-CM

## 2025-04-22 DIAGNOSIS — I48.91 NEW ONSET A-FIB (H): ICD-10-CM

## 2025-04-22 DIAGNOSIS — F41.1 GENERALIZED ANXIETY DISORDER: Chronic | ICD-10-CM

## 2025-04-22 DIAGNOSIS — J45.40 MODERATE PERSISTENT ASTHMA WITHOUT COMPLICATION: Chronic | ICD-10-CM

## 2025-04-22 DIAGNOSIS — I48.91 ATRIAL FIBRILLATION WITH RAPID VENTRICULAR RESPONSE (H): ICD-10-CM

## 2025-04-22 DIAGNOSIS — R23.2 HOT FLASHES: ICD-10-CM

## 2025-04-22 DIAGNOSIS — F31.32 BIPOLAR AFFECTIVE DISORDER, CURRENTLY DEPRESSED, MODERATE (H): ICD-10-CM

## 2025-04-22 DIAGNOSIS — I48.0 PAROXYSMAL ATRIAL FIBRILLATION (H): ICD-10-CM

## 2025-04-22 DIAGNOSIS — E55.9 VITAMIN D DEFICIENCY: Chronic | ICD-10-CM

## 2025-04-22 DIAGNOSIS — M25.561 CHRONIC PAIN OF RIGHT KNEE: ICD-10-CM

## 2025-04-22 DIAGNOSIS — I73.00 RAYNAUD'S PHENOMENON WITHOUT GANGRENE: ICD-10-CM

## 2025-04-22 DIAGNOSIS — K21.9 GASTROESOPHAGEAL REFLUX DISEASE WITHOUT ESOPHAGITIS: ICD-10-CM

## 2025-04-22 LAB
AMPHETAMINES UR QL SCN: NORMAL
ANION GAP SERPL CALCULATED.3IONS-SCNC: 11 MMOL/L (ref 7–15)
APAP SERPL-MCNC: <5 UG/ML (ref 10–30)
BARBITURATES UR QL SCN: NORMAL
BASOPHILS # BLD AUTO: 0 10E3/UL (ref 0–0.2)
BASOPHILS NFR BLD AUTO: 1 %
BENZODIAZ UR QL SCN: NORMAL
BUN SERPL-MCNC: 9 MG/DL (ref 6–20)
BZE UR QL SCN: NORMAL
CALCIUM SERPL-MCNC: 9.9 MG/DL (ref 8.8–10.4)
CANNABINOIDS UR QL SCN: NORMAL
CHLORIDE SERPL-SCNC: 101 MMOL/L (ref 98–107)
CREAT SERPL-MCNC: 0.79 MG/DL (ref 0.51–0.95)
EGFRCR SERPLBLD CKD-EPI 2021: >90 ML/MIN/1.73M2
EOSINOPHIL # BLD AUTO: 0.1 10E3/UL (ref 0–0.7)
EOSINOPHIL NFR BLD AUTO: 1 %
ERYTHROCYTE [DISTWIDTH] IN BLOOD BY AUTOMATED COUNT: 12.9 % (ref 10–15)
ETHANOL SERPL-MCNC: <0.01 G/DL
FENTANYL UR QL: NORMAL
GLUCOSE SERPL-MCNC: 91 MG/DL (ref 70–99)
HCO3 SERPL-SCNC: 29 MMOL/L (ref 22–29)
HCT VFR BLD AUTO: 43.1 % (ref 35–47)
HGB BLD-MCNC: 14.2 G/DL (ref 11.7–15.7)
HOLD SPECIMEN: NORMAL
HOLD SPECIMEN: NORMAL
IMM GRANULOCYTES # BLD: 0 10E3/UL
IMM GRANULOCYTES NFR BLD: 0 %
LYMPHOCYTES # BLD AUTO: 1.6 10E3/UL (ref 0.8–5.3)
LYMPHOCYTES NFR BLD AUTO: 19 %
MCH RBC QN AUTO: 30.9 PG (ref 26.5–33)
MCHC RBC AUTO-ENTMCNC: 32.9 G/DL (ref 31.5–36.5)
MCV RBC AUTO: 94 FL (ref 78–100)
MONOCYTES # BLD AUTO: 0.6 10E3/UL (ref 0–1.3)
MONOCYTES NFR BLD AUTO: 7 %
NEUTROPHILS # BLD AUTO: 6.3 10E3/UL (ref 1.6–8.3)
NEUTROPHILS NFR BLD AUTO: 74 %
NRBC # BLD AUTO: 0 10E3/UL
NRBC BLD AUTO-RTO: 0 /100
OPIATES UR QL SCN: NORMAL
PCP QUAL URINE (ROCHE): NORMAL
PLATELET # BLD AUTO: 284 10E3/UL (ref 150–450)
POTASSIUM SERPL-SCNC: 3.7 MMOL/L (ref 3.4–5.3)
RBC # BLD AUTO: 4.59 10E6/UL (ref 3.8–5.2)
SALICYLATES SERPL-MCNC: <0.3 MG/DL
SARS-COV-2 RNA RESP QL NAA+PROBE: NEGATIVE
SODIUM SERPL-SCNC: 141 MMOL/L (ref 135–145)
WBC # BLD AUTO: 8.5 10E3/UL (ref 4–11)

## 2025-04-22 PROCEDURE — 80179 DRUG ASSAY SALICYLATE: CPT

## 2025-04-22 PROCEDURE — 80143 DRUG ASSAY ACETAMINOPHEN: CPT

## 2025-04-22 PROCEDURE — 124N000001 HC R&B MH

## 2025-04-22 PROCEDURE — 85004 AUTOMATED DIFF WBC COUNT: CPT

## 2025-04-22 PROCEDURE — 80048 BASIC METABOLIC PNL TOTAL CA: CPT

## 2025-04-22 PROCEDURE — 80307 DRUG TEST PRSMV CHEM ANLYZR: CPT

## 2025-04-22 PROCEDURE — 99285 EMERGENCY DEPT VISIT HI MDM: CPT

## 2025-04-22 PROCEDURE — 250N000013 HC RX MED GY IP 250 OP 250 PS 637: Performed by: PSYCHIATRY & NEUROLOGY

## 2025-04-22 PROCEDURE — 87635 SARS-COV-2 COVID-19 AMP PRB: CPT

## 2025-04-22 PROCEDURE — 82077 ASSAY SPEC XCP UR&BREATH IA: CPT

## 2025-04-22 PROCEDURE — 36415 COLL VENOUS BLD VENIPUNCTURE: CPT

## 2025-04-22 PROCEDURE — 99291 CRITICAL CARE FIRST HOUR: CPT

## 2025-04-22 PROCEDURE — 250N000013 HC RX MED GY IP 250 OP 250 PS 637

## 2025-04-22 RX ORDER — IBUPROFEN 200 MG
200-400 TABLET ORAL EVERY 6 HOURS PRN
Status: DISCONTINUED | OUTPATIENT
Start: 2025-04-22 | End: 2025-04-29 | Stop reason: HOSPADM

## 2025-04-22 RX ORDER — ESTRADIOL 0.05 MG/D
1 PATCH TRANSDERMAL
Status: DISCONTINUED | OUTPATIENT
Start: 2025-04-24 | End: 2025-04-29 | Stop reason: HOSPADM

## 2025-04-22 RX ORDER — PROGESTERONE 100 MG/1
100 CAPSULE ORAL DAILY
Status: DISCONTINUED | OUTPATIENT
Start: 2025-04-22 | End: 2025-04-29 | Stop reason: HOSPADM

## 2025-04-22 RX ORDER — ALBUTEROL SULFATE 90 UG/1
1-2 INHALANT RESPIRATORY (INHALATION) EVERY 4 HOURS PRN
Status: DISCONTINUED | OUTPATIENT
Start: 2025-04-22 | End: 2025-04-29 | Stop reason: HOSPADM

## 2025-04-22 RX ORDER — HYDROXYZINE HYDROCHLORIDE 25 MG/1
25 TABLET, FILM COATED ORAL EVERY 4 HOURS PRN
Status: DISCONTINUED | OUTPATIENT
Start: 2025-04-22 | End: 2025-04-29 | Stop reason: HOSPADM

## 2025-04-22 RX ORDER — MONTELUKAST SODIUM 10 MG/1
10 TABLET ORAL AT BEDTIME
Status: DISCONTINUED | OUTPATIENT
Start: 2025-04-22 | End: 2025-04-29 | Stop reason: HOSPADM

## 2025-04-22 RX ORDER — AMLODIPINE BESYLATE 2.5 MG/1
2.5 TABLET ORAL EVERY MORNING
Status: DISCONTINUED | OUTPATIENT
Start: 2025-04-23 | End: 2025-04-29 | Stop reason: HOSPADM

## 2025-04-22 RX ORDER — DILTIAZEM HYDROCHLORIDE 180 MG/1
180 CAPSULE, COATED, EXTENDED RELEASE ORAL 2 TIMES DAILY
Status: DISCONTINUED | OUTPATIENT
Start: 2025-04-22 | End: 2025-04-29 | Stop reason: HOSPADM

## 2025-04-22 RX ORDER — HYDROXYZINE PAMOATE 25 MG/1
50 CAPSULE ORAL ONCE
Status: COMPLETED | OUTPATIENT
Start: 2025-04-22 | End: 2025-04-22

## 2025-04-22 RX ORDER — BUSPIRONE HYDROCHLORIDE 10 MG/1
10 TABLET ORAL 2 TIMES DAILY
Status: DISCONTINUED | OUTPATIENT
Start: 2025-04-22 | End: 2025-04-23

## 2025-04-22 RX ORDER — MAGNESIUM HYDROXIDE/ALUMINUM HYDROXICE/SIMETHICONE 120; 1200; 1200 MG/30ML; MG/30ML; MG/30ML
30 SUSPENSION ORAL EVERY 4 HOURS PRN
Status: DISCONTINUED | OUTPATIENT
Start: 2025-04-22 | End: 2025-04-29 | Stop reason: HOSPADM

## 2025-04-22 RX ORDER — HYDROXYZINE HYDROCHLORIDE 25 MG/1
50 TABLET, FILM COATED ORAL AT BEDTIME
Status: DISCONTINUED | OUTPATIENT
Start: 2025-04-22 | End: 2025-04-29 | Stop reason: HOSPADM

## 2025-04-22 RX ORDER — ATOMOXETINE 25 MG/1
25 CAPSULE ORAL EVERY MORNING
Status: DISCONTINUED | OUTPATIENT
Start: 2025-04-23 | End: 2025-04-29 | Stop reason: HOSPADM

## 2025-04-22 RX ORDER — POLYETHYLENE GLYCOL 3350 17 G/17G
17 POWDER, FOR SOLUTION ORAL DAILY PRN
Status: DISCONTINUED | OUTPATIENT
Start: 2025-04-22 | End: 2025-04-29 | Stop reason: HOSPADM

## 2025-04-22 RX ORDER — DOCUSATE SODIUM 100 MG/1
100 CAPSULE, LIQUID FILLED ORAL 2 TIMES DAILY PRN
Status: DISCONTINUED | OUTPATIENT
Start: 2025-04-22 | End: 2025-04-29 | Stop reason: HOSPADM

## 2025-04-22 RX ORDER — OLANZAPINE 10 MG/2ML
10 INJECTION, POWDER, FOR SOLUTION INTRAMUSCULAR 3 TIMES DAILY PRN
Status: DISCONTINUED | OUTPATIENT
Start: 2025-04-22 | End: 2025-04-29 | Stop reason: HOSPADM

## 2025-04-22 RX ORDER — OLANZAPINE 10 MG/1
10 TABLET, FILM COATED ORAL 3 TIMES DAILY PRN
Status: DISCONTINUED | OUTPATIENT
Start: 2025-04-22 | End: 2025-04-29 | Stop reason: HOSPADM

## 2025-04-22 RX ORDER — ARIPIPRAZOLE 10 MG/1
20 TABLET ORAL AT BEDTIME
Status: DISCONTINUED | OUTPATIENT
Start: 2025-04-22 | End: 2025-04-29 | Stop reason: HOSPADM

## 2025-04-22 RX ORDER — FAMOTIDINE 20 MG/1
20 TABLET, FILM COATED ORAL 2 TIMES DAILY
Status: DISCONTINUED | OUTPATIENT
Start: 2025-04-22 | End: 2025-04-29 | Stop reason: HOSPADM

## 2025-04-22 RX ORDER — GABAPENTIN 300 MG/1
300 CAPSULE ORAL 3 TIMES DAILY
Status: DISCONTINUED | OUTPATIENT
Start: 2025-04-22 | End: 2025-04-29 | Stop reason: HOSPADM

## 2025-04-22 RX ORDER — RAMELTEON 8 MG/1
8 TABLET ORAL AT BEDTIME
Status: DISCONTINUED | OUTPATIENT
Start: 2025-04-22 | End: 2025-04-29 | Stop reason: HOSPADM

## 2025-04-22 RX ORDER — LAMOTRIGINE 100 MG/1
100 TABLET ORAL 3 TIMES DAILY
Status: DISCONTINUED | OUTPATIENT
Start: 2025-04-22 | End: 2025-04-29 | Stop reason: HOSPADM

## 2025-04-22 RX ORDER — PRAZOSIN HYDROCHLORIDE 1 MG/1
1 CAPSULE ORAL
Status: DISCONTINUED | OUTPATIENT
Start: 2025-04-22 | End: 2025-04-28

## 2025-04-22 RX ORDER — VILAZODONE HYDROCHLORIDE 10 MG/1
40 TABLET ORAL EVERY MORNING
Status: DISCONTINUED | OUTPATIENT
Start: 2025-04-23 | End: 2025-04-29 | Stop reason: HOSPADM

## 2025-04-22 RX ORDER — FERROUS SULFATE 325(65) MG
325 TABLET ORAL DAILY
Status: DISCONTINUED | OUTPATIENT
Start: 2025-04-22 | End: 2025-04-29 | Stop reason: HOSPADM

## 2025-04-22 RX ORDER — AMLODIPINE BESYLATE 2.5 MG/1
2.5 TABLET ORAL
Status: DISCONTINUED | OUTPATIENT
Start: 2025-04-22 | End: 2025-04-29 | Stop reason: HOSPADM

## 2025-04-22 RX ADMIN — GABAPENTIN 300 MG: 300 CAPSULE ORAL at 20:43

## 2025-04-22 RX ADMIN — FERROUS SULFATE TAB 325 MG (65 MG ELEMENTAL FE) 325 MG: 325 (65 FE) TAB at 20:43

## 2025-04-22 RX ADMIN — FAMOTIDINE 20 MG: 20 TABLET ORAL at 20:42

## 2025-04-22 RX ADMIN — BUSPIRONE HYDROCHLORIDE 10 MG: 10 TABLET ORAL at 20:42

## 2025-04-22 RX ADMIN — LAMOTRIGINE 100 MG: 100 TABLET ORAL at 20:43

## 2025-04-22 RX ADMIN — MONTELUKAST 10 MG: 10 TABLET, FILM COATED ORAL at 20:42

## 2025-04-22 RX ADMIN — ARIPIPRAZOLE 20 MG: 10 TABLET ORAL at 20:43

## 2025-04-22 RX ADMIN — RAMELTEON 8 MG: 8 TABLET, FILM COATED ORAL at 20:43

## 2025-04-22 RX ADMIN — HYDROXYZINE HYDROCHLORIDE 50 MG: 25 TABLET, FILM COATED ORAL at 20:43

## 2025-04-22 RX ADMIN — HYDROXYZINE PAMOATE 50 MG: 25 CAPSULE ORAL at 15:41

## 2025-04-22 RX ADMIN — DILTIAZEM HYDROCHLORIDE 180 MG: 180 CAPSULE, COATED, EXTENDED RELEASE ORAL at 20:42

## 2025-04-22 RX ADMIN — PROGESTERONE 100 MG: 100 CAPSULE ORAL at 20:42

## 2025-04-22 ASSESSMENT — ACTIVITIES OF DAILY LIVING (ADL)
ADLS_ACUITY_SCORE: 48
ADLS_ACUITY_SCORE: 25
ADLS_ACUITY_SCORE: 48
ADLS_ACUITY_SCORE: 25
ADLS_ACUITY_SCORE: 48
DRESS: SCRUBS (BEHAVIORAL HEALTH)
ADLS_ACUITY_SCORE: 25
ORAL_HYGIENE: INDEPENDENT
HYGIENE/GROOMING: INDEPENDENT
ADLS_ACUITY_SCORE: 48
LAUNDRY: UNABLE TO COMPLETE
ADLS_ACUITY_SCORE: 48

## 2025-04-22 ASSESSMENT — COLUMBIA-SUICIDE SEVERITY RATING SCALE - C-SSRS
1. IN THE PAST MONTH, HAVE YOU WISHED YOU WERE DEAD OR WISHED YOU COULD GO TO SLEEP AND NOT WAKE UP?: YES
3. HAVE YOU BEEN THINKING ABOUT HOW YOU MIGHT KILL YOURSELF?: YES
2. HAVE YOU ACTUALLY HAD ANY THOUGHTS OF KILLING YOURSELF IN THE PAST MONTH?: YES
6. HAVE YOU EVER DONE ANYTHING, STARTED TO DO ANYTHING, OR PREPARED TO DO ANYTHING TO END YOUR LIFE?: YES
4. HAVE YOU HAD THESE THOUGHTS AND HAD SOME INTENTION OF ACTING ON THEM?: NO
5. HAVE YOU STARTED TO WORK OUT OR WORKED OUT THE DETAILS OF HOW TO KILL YOURSELF? DO YOU INTEND TO CARRY OUT THIS PLAN?: YES

## 2025-04-22 NOTE — ED NOTES
IP MH Referral Acuity Rating Score (RARS)    LMHP complete at referral to IP MH, with DEC; and, daily while awaiting IP MH placement. Call score to PPS.  CRITERIA SCORING   New 72 HH and Involuntary for IP MH (not adolescent) 0/3   Boarding over 24 hours 0/1   Vulnerable adult at least 55+ with multiple co morbidities; or, Patient age 11 or under 0/1   Suicide ideation without relief of precipitating factors 1/1   Current plan for suicide 1/1   Current plan for homicide 0/1   Imminent risk or actual attempt to seriously harm another without relief of factors precipitating the attempt 1/1   Severe dysfunction in daily living (ex: complete neglect for self care, extreme disruption in vegetative function, extreme deterioration in social interactions) 1/1   Recent (last 2 weeks) or current physical aggression in the ED 0/1   Restraints or seclusion episode in ED 0/1   Verbal aggression, agitation, yelling, etc., while in the ED 0/1   Active psychosis with psychomotor agitation or catatonia 0/1   Need for constant or near constant redirection (from leaving, from others, etc).  0/1   Intrusive or disruptive behaviors 0/1   TOTAL 4

## 2025-04-22 NOTE — ED TRIAGE NOTES
"ED Nursing Triage Note (General)   ________________________________    Rosi Lamb is a 50 year old Female that presents to triage via private vehicle with family friend for complaints of SI.  Patient states, \"I'm having a lot of self harm thoughts and worsening depression.  The last couple days its been getting worse\". Patient states she is currently being followed by [facility] and is on medication for her depression. No recent dose changes per patient. Patient states trigger from her significant other who has been, \"downgrading me and stuff\". Patient states thoughts and a plan.  Prior attempt when she was in her 20's.   Significant symptoms had onset 3 day(s) ago.  Vital signs:  Temp: 98.2  F (36.8  C) Temp src: Tympanic BP: (!) 156/96 Pulse: 96   Resp: 20 SpO2: 100 %     Height: 167.6 cm (5' 6\") Weight: (!) 138.8 kg (306 lb)  Estimated body mass index is 49.39 kg/m  as calculated from the following:    Height as of this encounter: 1.676 m (5' 6\").    Weight as of this encounter: 138.8 kg (306 lb).  PRE HOSPITAL PRIOR LIVING SITUATION Alone      Triage Assessment (Adult)       Row Name 04/22/25 1131          Triage Assessment    Airway WDL WDL        Respiratory WDL    Respiratory WDL WDL        Skin Circulation/Temperature WDL    Skin Circulation/Temperature WDL WDL        Cardiac WDL    Cardiac WDL WDL     Cardiac Rhythm NSR        Peripheral/Neurovascular WDL    Peripheral Neurovascular WDL WDL     Capillary Refill, General less than/equal to 3 secs        Cognitive/Neuro/Behavioral WDL    Cognitive/Neuro/Behavioral WDL WDL        Davion Coma Scale    Best Eye Response 4-->(E4) spontaneous     Best Motor Response 6-->(M6) obeys commands     Best Verbal Response 5-->(V5) oriented     Creede Coma Scale Score 15                     "

## 2025-04-22 NOTE — ED PROVIDER NOTES
History     Chief Complaint   Patient presents with    Mental Health Problem     The history is provided by the patient and medical records.     Rosi Lamb is a 50 year old female who presents to the ER today with her ARMS worker Tia. Patient reports she has suicidal ideation and a plan. Her plan would be to use a knife to cut her wrists. Tells me her boyfriend berates her, which causes her to feel suicidal, but endorse that she has these thoughts off and on. She hasn't been sleeping well. Denies drugs, alcohol use. Otherwise compliant with daily medications. Sees therapist at [facility], last visit yesterday, reported to her that her depression was worse, but did not tell her about being suicidal. Called her ARMS worker today and came in voluntarily for help. Reports previous hospitalizations for her mental health, last in Feb of this year. Suicidal attempt when she was 22, slit her wrists, took meds.     PMH AGNIESZKA, borderline personality disorder, bipolar disorder, CKD, insomnia, paroxysmal afib     Allergies:  Allergies   Allergen Reactions    Clonazepam Other (See Comments)     Causes Violence and aggresssion    Hydrocodone-Acetaminophen      Other reaction(s): Seizures  Can take Percocet without difficulty.    Lorazepam Other (See Comments)     Causes Violence and aggresssion    Sertraline Other (See Comments)     Caused suicidally     Bupropion Other (See Comments)     Caused Major Depression    Dust Mite Extract      Other reaction(s): Asthma symptoms    Lithium Other (See Comments)     Mood alteration    Milk Protein Unknown    Pollen Extract      Other reaction(s): Asthma symptoms    Risperidone Other (See Comments)     Agitation      Sulfa Antibiotics Itching    Trichophyton      Other reaction(s): Asthma symptoms    Valproic Acid Other (See Comments)     Weight gain       Problem List:    Patient Active Problem List    Diagnosis Date Noted    Severe depressed bipolar I disorder without  psychotic features (H) 02/25/2025     Priority: Medium    Suicidal ideation 02/25/2025     Priority: Medium    Malaise and fatigue 09/03/2024     Priority: Medium    Snores 09/03/2024     Priority: Medium    Paroxysmal atrial fibrillation (H) - New Dx 8/22/2024 09/02/2024     Priority: Medium    Primary insomnia 04/04/2024     Priority: Medium    Nocturnal leg cramps 01/23/2024     Priority: Medium    CKD (chronic kidney disease) stage 2, GFR 60-89 ml/min 03/14/2023     Priority: Medium    H/O adenomatous polyp of colon 07/25/2022     Priority: Medium    Nail dystrophy 02/27/2022     Priority: Medium    Intermittent low back pain 09/08/2021     Priority: Medium    Abnormal Pap smear of cervix 04/11/2018     Priority: Medium     Overview:   had scraping of cervix    Formatting of this note might be different from the original.  had scraping of cervix      Allergic rhinitis due to pollen 02/08/2018     Priority: Medium    Esophageal reflux 02/08/2018     Priority: Medium    History of substance abuse (H) 02/08/2018     Priority: Medium    Chronic pain of right knee 04/20/2017     Priority: Medium    Menorrhagia with irregular cycle 02/16/2017     Priority: Medium    Bilateral foot pain 12/18/2016     Priority: Medium    Elevated random blood glucose level 12/18/2016     Priority: Medium    Peripheral polyneuropathy 12/18/2016     Priority: Medium    Vitamin B12 deficiency 12/18/2016     Priority: Medium    Vitamin D deficiency 12/18/2016     Priority: Medium    Lymphedema of both lower extremities 11/22/2016     Priority: Medium    Chronic venous stasis dermatitis of right lower extremity 08/17/2016     Priority: Medium     Overview:   Updated per 10/1/17 IMO import    Formatting of this note might be different from the original.  Updated per 10/1/17 IMO import      Alcohol use disorder, moderate, in sustained remission, dependence (H) 11/28/2015     Priority: Medium    Generalized anxiety disorder 11/28/2015      Priority: Medium    Bipolar disorder, in full remission, most recent episode depressed 11/28/2015     Priority: Medium    Class 3 severe obesity due to excess calories with serious comorbidity and body mass index (BMI) of 45.0 to 49.9 in adult 03/23/2015     Priority: Medium    Edema of extremity of unknown cause 01/15/2014     Priority: Medium     Formatting of this note might be different from the original.  1/15/2014: both legs, well controlled on prn lasix      Moderate persistent asthma 08/19/2013     Priority: Medium     AAP completed on 08/19/13  Triggers: pollen, fumes, exercise, URI, warm weather. Peak flow today is 250. Symptomatic: moderate    Formatting of this note might be different from the original.  Overview:   AAP completed on 08/19/13  Triggers: pollen, fumes, exercise, URI, warm weather. Peak flow today is 250. Symptomatic: moderate  Overview:   AAP completed on 08/19/13  Triggers: pollen, fumes, exercise, URI, warm weather. Peak flow today is 250. Symptomatic: moderate  Formatting of this note might be different from the original.  AAP completed on 08/19/13  Triggers: pollen, fumes, exercise, URI, warm weather. Peak flow today is 250. Symptomatic: moderate      Urinary incontinence 03/15/2013     Priority: Medium    Allergic rhinitis due to other allergen 10/02/2003     Priority: Medium     Overview:   [facility] - Seasonal Allergies  Overview:   Overview:   [facility] - Seasonal Allergies    Formatting of this note might be different from the original.  [facility] - Seasonal Allergies      Borderline personality disorder (H) - [facility] for therapy + [place] for medication management. 07/07/2003     Priority: Medium     [facility] for therapy + [place] for medication management.      Disorder of female genital organ 07/05/2001     Priority: Medium     Overview:   DUB, dysmenorrhea  Overview:   Overview:   DUB, dysmenorrhea    Formatting of this note might be different from the  original.  Overview:   DUB, dysmenorrhea          Past Medical History:    Past Medical History:   Diagnosis Date    Abnormal Pap smear of cervix 04/11/2018    Cannabis use disorder, moderate, in sustained remission, dependence (H) 11/28/2015    Closed dislocation of tarsal joint 02/04/2011    Closed dislocation of tarsal joint - left 02/04/2011    Edema     Encounter for removal and reinsertion of intrauterine contraceptive device     Excessive and frequent menstruation with irregular cycle     Major depressive disorder, single episode     Personal history of other medical treatment (CODE)     Personal history of other medical treatment (CODE)     Uncomplicated asthma     Urinary incontinence 03/15/2013       Past Surgical History:    Past Surgical History:   Procedure Laterality Date    ANKLE SURGERY      fracture, repair with screws    ARTHRODESIS FOOT Left 04/21/2022    Procedure: left foot hardware removaL, fusion of 1st-2nd Tarsal metatarsal;  Surgeon: Anthony Castillo DPM;  Location: [facility] OR    COLONOSCOPY  07/25/2022    F/U 2027 tubular adenoma    CONIZATION LEEP      07/04,Underwent a loop    IMPLANT STIMULATOR AND LEADS SACRAL NERVE (STAGE ONE AND TWO)      scheduled 11/8/23 with Dr. Sheth at [facility]    LAPAROSCOPIC TUBAL LIGATION      2009,tubal ligation       Family History:    Family History   Problem Relation Age of Onset    Osteoporosis Mother     Asthma Father         Asthma,+ Leg edema, knee, hip replacement. + Lymphedema    Heart Failure Father     Other - See Comments Paternal Grandmother         Lymphedema    Osteoporosis Brother         Osteoporosis    Other - See Comments Brother         No Known Problems       Social History:  Marital Status:   [4]  Social History     Tobacco Use    Smoking status: Former     Current packs/day: 0.00     Average packs/day: 2.0 packs/day for 3.0 years (6.0 ttl pk-yrs)     Types: Cigarettes     Start date: 2000     Quit date: 1/1/2003     Years since  "quittin.3     Passive exposure: Past    Smokeless tobacco: Never   Vaping Use    Vaping status: Never Used   Substance Use Topics    Alcohol use: Not Currently     Alcohol/week: 0.0 standard drinks of alcohol    Drug use: Never        Medications:    acetaminophen (TYLENOL) 500 MG tablet  albuterol (VENTOLIN HFA) 108 (90 Base) MCG/ACT inhaler  amLODIPine (NORVASC) 2.5 MG tablet  ARIPiprazole (ABILIFY) 20 MG tablet  atomoxetine (STRATTERA) 25 MG capsule  busPIRone (BUSPAR) 10 MG tablet  cholecalciferol (VITAMIN D3) 125 mcg (5000 units) capsule  diltiazem ER (CARDIAZEM LA) 180 MG 24 hr tablet  docusate sodium (EQ STOOL SOFTENER) 100 MG capsule  Elemental iron 65 mg Vitamin C 125 mg (VITRON C)  MG TABS tablet  estradiol (VIVELLE-DOT) 0.05 MG/24HR bi-weekly patch  famotidine (PEPCID) 20 MG tablet  gabapentin (NEURONTIN) 300 MG capsule  hydrOXYzine (VISTARIL) 50 MG capsule  hydrOXYzine HCl (ATARAX) 50 MG tablet  ibuprofen (ADVIL/MOTRIN) 200 MG tablet  lamoTRIgine (LAMICTAL) 100 MG tablet  Menthol, Topical Analgesic, (BIOFREEZE) 4 % GEL  montelukast (SINGULAIR) 10 MG tablet  prazosin (MINIPRESS) 1 MG capsule  PREVIDENT 5000 PLUS 1.1 % CREA  progesterone (PROMETRIUM) 100 MG capsule  ramelteon (ROZEREM) 8 MG tablet  vilazodone (VIIBRYD) 40 MG TABS tablet          Review of Systems   Psychiatric/Behavioral:  Positive for suicidal ideas.    All other systems reviewed and are negative.  See HPI    Physical Exam   BP: (!) 156/96  Pulse: 96  Temp: 98.2  F (36.8  C)  Resp: 20  Height: 167.6 cm (5' 6\")  Weight: (!) 138.8 kg (306 lb)  SpO2: 100 %      Physical Exam  Vitals and nursing note reviewed.   Constitutional:       General: She is not in acute distress.     Appearance: She is not ill-appearing or toxic-appearing.   HENT:      Head: Normocephalic.   Cardiovascular:      Rate and Rhythm: Normal rate.   Pulmonary:      Effort: Pulmonary effort is normal.   Musculoskeletal:         General: Normal range of motion.    "   Cervical back: Neck supple.   Skin:     General: Skin is warm.      Capillary Refill: Capillary refill takes less than 2 seconds.   Neurological:      General: No focal deficit present.      Mental Status: She is alert and oriented to person, place, and time.   Psychiatric:         Attention and Perception: Attention normal.         Mood and Affect: Mood normal.         Behavior: Behavior normal.         Thought Content: Thought content includes suicidal ideation. Thought content includes suicidal plan.         ED Course          Results for orders placed or performed during the hospital encounter of 04/22/25 (from the past 24 hours)   Urine Drug Screen    Narrative    The following orders were created for panel order Urine Drug Screen.  Procedure                               Abnormality         Status                     ---------                               -----------         ------                     Urine Drug Screen Panel[[id number]]     Normal              Final result                 Please view results for these tests on the individual orders.   Urine Drug Screen Panel   Result Value Ref Range    Amphetamines Urine Screen Negative Screen Negative    Barbituates Urine Screen Negative Screen Negative    Benzodiazepine Urine Screen Negative Screen Negative    Cannabinoids Urine Screen Negative Screen Negative    Cocaine Urine Screen Negative Screen Negative    Fentanyl Qual Urine Screen Negative Screen Negative    Opiates Urine Screen Negative Screen Negative    PCP Urine Screen Negative Screen Negative   CBC with platelets differential    Narrative    The following orders were created for panel order CBC with platelets differential.  Procedure                               Abnormality         Status                     ---------                               -----------         ------                     CBC with platelets and ...[[id number]]                      Final result                 Please view  results for these tests on the individual orders.   Basic metabolic panel   Result Value Ref Range    Sodium 141 135 - 145 mmol/L    Potassium 3.7 3.4 - 5.3 mmol/L    Chloride 101 98 - 107 mmol/L    Carbon Dioxide (CO2) 29 22 - 29 mmol/L    Anion Gap 11 7 - 15 mmol/L    Urea Nitrogen 9.0 6.0 - 20.0 mg/dL    Creatinine 0.79 0.51 - 0.95 mg/dL    GFR Estimate >90 >60 mL/min/1.73m2    Calcium 9.9 8.8 - 10.4 mg/dL    Glucose 91 70 - 99 mg/dL   Acetaminophen level   Result Value Ref Range    Acetaminophen <5.0 (L) 10.0 - 30.0 ug/mL   Salicylate level   Result Value Ref Range    Salicylate <0.3   mg/dL   Ethanol [facility]   Result Value Ref Range    Alcohol ethyl <0.01 <=0.01 g/dL   [place] Draw    Narrative    The following orders were created for panel order [place] Draw.  Procedure                               Abnormality         Status                     ---------                               -----------         ------                     Extra Blue Top Tube[[id number]]                             Final result               Extra Green Top (Lithiu...[[id number]]                      Final result                 Please view results for these tests on the individual orders.   CBC with platelets and differential   Result Value Ref Range    WBC Count 8.5 4.0 - 11.0 10e3/uL    RBC Count 4.59 3.80 - 5.20 10e6/uL    Hemoglobin 14.2 11.7 - 15.7 g/dL    Hematocrit 43.1 35.0 - 47.0 %    MCV 94 78 - 100 fL    MCH 30.9 26.5 - 33.0 pg    MCHC 32.9 31.5 - 36.5 g/dL    RDW 12.9 10.0 - 15.0 %    Platelet Count 284 150 - 450 10e3/uL    % Neutrophils 74 %    % Lymphocytes 19 %    % Monocytes 7 %    % Eosinophils 1 %    % Basophils 1 %    % Immature Granulocytes 0 %    NRBCs per 100 WBC 0 <1 /100    Absolute Neutrophils 6.3 1.6 - 8.3 10e3/uL    Absolute Lymphocytes 1.6 0.8 - 5.3 10e3/uL    Absolute Monocytes 0.6 0.0 - 1.3 10e3/uL    Absolute Eosinophils 0.1 0.0 - 0.7 10e3/uL    Absolute Basophils 0.0 0.0 - 0.2 10e3/uL    Absolute Immature  Granulocytes 0.0 <=0.4 10e3/uL    Absolute NRBCs 0.0 10e3/uL   Extra Blue Top Tube   Result Value Ref Range    Hold Specimen x    Extra Green Top (Lithium Heparin) Tube   Result Value Ref Range    Hold Specimen x    COVID-19 Virus (Coronavirus) by PCR Nose    Specimen: Nose; Swab   Result Value Ref Range    SARS CoV2 PCR Negative Negative    Narrative    Testing was performed using the Xpert Xpress SARS-CoV-2 Assay on the Cepheid Gene-Xpert Instrument Systems. Additional information about this assay can be found via the Test Directory. This [place] FDA cleared test should be ordered for the detection of SARS-CoV-2 in individuals with signs and symptoms of respiratory tract infection. This test is for in vitro diagnostic use under the [place] FDA for laboratories certified under CLIA to perform high complexity testing. A negative result does not rule out the presence of PCR inhibitors in the specimen or target RNA concentration below the limit of detection for the assay. The possibility of a false negative should be considered if the patient's recent exposure or clinical presentation suggests COVID-19. This test [facility]. These Laboratories are certified under the  Clinical Laboratory Improvement Amendments (CLIA) as qualified to perform high complexity testing.       Medications - No data to display    Assessments & Plan (with Medical Decision Making)  Rosi Lamb is a 50 year old female who presents to the ER today with her ARMS worker Tia. Patient reports she has suicidal ideation and a plan. Her plan would be to use a knife to cut her wrists. Tells me her boyfriend berates her, which causes her to feel suicidal, but endorse that she has these thoughts off and on. She hasn't been sleeping well. Denies drugs, alcohol use. Otherwise compliant with daily medications. Sees therapist at [facility], last visit yesterday, reported to her that her depression was worse, but  did not tell her about being suicidal. Called her ARMS worker today and came in voluntarily for help. Reports previous hospitalizations for her mental health, last in Feb of this year. Suicidal attempt when she was 22, slit her wrists, took meds.   PMH AGNIESZKA, borderline personality disorder, bipolar disorder, CKD, insomnia, paroxysmal afib   Vitals stable. 156/96 HR 96 temp 98.2 RR 20 100% on room air  She is alert, orientated, pleasant, non-distressed. Voluntary  Labs: unremarkable.   1:23 PM Spoke with Pura from [facility]. Advised inpatient hospitalization for SI with plan, stabilization, inability to follow outpatient safety plan. Patient voluntary. Please see her note. I agree with this plan.  3:34 PM nursing staff notified me that patient accepted to [facility] voluntary. Medically cleared for transfer. Patient requested anxiety medication, has PRN vistaril at home, given dose here.      I have reviewed the nursing notes.    I have reviewed the findings, diagnosis, plan and need for follow up with the patient.    Medical Decision Making  The patient's presentation was of high complexity (suicidal with plan ).    The patient's evaluation involved:  review of external note(s) from 1 sources (see separate area of note for details)  ordering and/or review of 3+ test(s) in this encounter (see separate area of note for details)  discussion of management or test interpretation with another health professional (see separate area of note for details)    The patient's management necessitated high risk (a decision regarding hospitalization).      New Prescriptions    No medications on file       Final diagnoses:   Suicidal ideation       4/22/2025   [facility]       Sheeba Trevino APRN CNP  04/22/25 [id number]       Sheeba Trevino APRN CNP  04/22/25 [id number]
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
## 2025-04-22 NOTE — PROGRESS NOTES
:    Patient presented to the ED with mental health concerns.    DEC assessed and recommended inpatient mental health.  Patient is voluntary and was placed on the inpatient mental health waiting list.    JOSE ALEJANDRO Griffiths on 4/22/2025 at 1:20 PM       :    Patient has been accepted to [facility] for inpatient mental health.    JOSE ALEJANDRO Griffiths on 4/22/2025 at 4:00 PM

## 2025-04-22 NOTE — TELEPHONE ENCOUNTER
S: TWAN Vasquez  Pura Martinez  calling at 1:32 PM about 50 year old/female presenting with SI w/ plan to cut wrists if they discharge home, not feeling stable, and increased depression.      B: Pt arrived via  [employer] WORK[employer] . Presenting problem, stressors: boyfriend makes fun of them.     Pt affect in ED: Tearful and sad  Pt Dx: Generalized Anxiety Disorder, Bipolar Disorder, Borderline Personality Disorder, and Depression  Previous [facility] hx? Yes: [place] February 2025  Pt endorses SI with a plan to cut wrists    Hx of suicide attempt? Yes: 20 years ago o.d. with pills and cut wrists.   Pt denies SIB  Pt denies HI   Pt denies hallucinations .   Pt RARS Score: 4    Hx of aggression/violence, sexual offenses, legal concerns, Epic care plan? describe: No  Current concerns for aggression this visit? No  Does pt have a history of Civil Commitment? No  Is Pt their own guardian? Yes    Pt is prescribed medication. Is patient medication compliant? Yes  Pt endorses OP services: Psychiatrist, Therapist, [facility] Worker, and [facility] WORKER, PEER SPECIALIST  CD concerns:  has not used alcohol or marijuana since January 2025  Acute or chronic medical concerns: No  Does Pt present with specific needs, assistive devices, or exclusionary criteria? None      Pt is ambulatory  Pt is able to perform ADLs independently      A: Pt to be reviewed for [facility] admission. Pt is Voluntary  Preferred placement: [facility]    COVID Symptoms: No  If yes, COVID test required   Utox: Not ordered, intake to request lab    CMP: Ordered, not yet collected   CBC: Ordered, not yet collected   HCG: Not ordered, intake to request lab - per ED Provider Sheeba pt has their tubes tied.     R: Patient cleared and ready for behavioral bed placement: Yes  Pt placed on [facility] worklist? Yes    Does Patient need a Transfer Center request created? Yes, writer completed Transfer Center request at: 1:33 PM    1:38p Called [facility] ED Provider Uriel to inform needed  UDS and HCG labs. ED Provider informed that pt has had their tubes tied but will order lab if necessary.

## 2025-04-22 NOTE — TELEPHONE ENCOUNTER
R: ACCEPTED TO [facility]    2:38p Called [facility] for reviewing of pt for potential admission, awaiting response.    2:53p Received call from [facility] informing pt is accepted to [address] [place] [address], report after 4:00PM.  Intake to update work lists.     3:05p Called [facility] ED Nurse Israel to inform pt is accepted to [address] [place] [address], report after 4:00PM. Phone: [phone]    3:13p Indicia Completed.     3:13p Sent secure [facility] e-mail to [facility] notifying of pt's admission to facility.     3:14p  Intake notes all work lists updated with pt's acceptance.

## 2025-04-23 PROCEDURE — 250N000013 HC RX MED GY IP 250 OP 250 PS 637: Performed by: PSYCHIATRY & NEUROLOGY

## 2025-04-23 PROCEDURE — 124N000001 HC R&B MH

## 2025-04-23 PROCEDURE — 250N000013 HC RX MED GY IP 250 OP 250 PS 637

## 2025-04-23 PROCEDURE — 99223 1ST HOSP IP/OBS HIGH 75: CPT | Mod: AI

## 2025-04-23 PROCEDURE — 99222 1ST HOSP IP/OBS MODERATE 55: CPT | Performed by: NURSE PRACTITIONER

## 2025-04-23 RX ORDER — DILTIAZEM HYDROCHLORIDE 180 MG/1
180 CAPSULE, COATED, EXTENDED RELEASE ORAL 2 TIMES DAILY
Qty: 180 CAPSULE | Refills: 3 | Status: SHIPPED | OUTPATIENT
Start: 2025-04-23

## 2025-04-23 RX ORDER — LIDOCAINE 4 G/G
2 PATCH TOPICAL DAILY PRN
Status: DISCONTINUED | OUTPATIENT
Start: 2025-04-24 | End: 2025-04-29 | Stop reason: HOSPADM

## 2025-04-23 RX ORDER — MUSCLE RUB CREAM 100; 150 MG/G; MG/G
CREAM TOPICAL EVERY 6 HOURS PRN
Status: DISCONTINUED | OUTPATIENT
Start: 2025-04-23 | End: 2025-04-29 | Stop reason: HOSPADM

## 2025-04-23 RX ORDER — BUSPIRONE HYDROCHLORIDE 10 MG/1
10 TABLET ORAL 3 TIMES DAILY
Status: DISCONTINUED | OUTPATIENT
Start: 2025-04-23 | End: 2025-04-29 | Stop reason: HOSPADM

## 2025-04-23 RX ADMIN — VILAZODONE HYDROCHLORIDE 40 MG: 10 TABLET ORAL at 08:44

## 2025-04-23 RX ADMIN — FAMOTIDINE 20 MG: 20 TABLET ORAL at 08:44

## 2025-04-23 RX ADMIN — ARIPIPRAZOLE 20 MG: 10 TABLET ORAL at 21:20

## 2025-04-23 RX ADMIN — BUSPIRONE HYDROCHLORIDE 10 MG: 10 TABLET ORAL at 08:43

## 2025-04-23 RX ADMIN — ATOMOXETINE 25 MG: 25 CAPSULE ORAL at 08:44

## 2025-04-23 RX ADMIN — LAMOTRIGINE 100 MG: 100 TABLET ORAL at 13:57

## 2025-04-23 RX ADMIN — PROGESTERONE 100 MG: 100 CAPSULE ORAL at 08:44

## 2025-04-23 RX ADMIN — DILTIAZEM HYDROCHLORIDE 180 MG: 180 CAPSULE, COATED, EXTENDED RELEASE ORAL at 08:44

## 2025-04-23 RX ADMIN — FERROUS SULFATE TAB 325 MG (65 MG ELEMENTAL FE) 325 MG: 325 (65 FE) TAB at 08:44

## 2025-04-23 RX ADMIN — Medication 125 MCG: at 08:44

## 2025-04-23 RX ADMIN — GABAPENTIN 300 MG: 300 CAPSULE ORAL at 13:57

## 2025-04-23 RX ADMIN — BUSPIRONE HYDROCHLORIDE 10 MG: 10 TABLET ORAL at 21:20

## 2025-04-23 RX ADMIN — GABAPENTIN 300 MG: 300 CAPSULE ORAL at 21:21

## 2025-04-23 RX ADMIN — LAMOTRIGINE 100 MG: 100 TABLET ORAL at 21:21

## 2025-04-23 RX ADMIN — AMLODIPINE BESYLATE 2.5 MG: 2.5 TABLET ORAL at 08:43

## 2025-04-23 RX ADMIN — GABAPENTIN 300 MG: 300 CAPSULE ORAL at 08:44

## 2025-04-23 RX ADMIN — HYDROXYZINE HYDROCHLORIDE 50 MG: 25 TABLET, FILM COATED ORAL at 21:21

## 2025-04-23 RX ADMIN — FAMOTIDINE 20 MG: 20 TABLET ORAL at 21:22

## 2025-04-23 RX ADMIN — RAMELTEON 8 MG: 8 TABLET, FILM COATED ORAL at 21:21

## 2025-04-23 RX ADMIN — DILTIAZEM HYDROCHLORIDE 180 MG: 180 CAPSULE, COATED, EXTENDED RELEASE ORAL at 21:22

## 2025-04-23 RX ADMIN — LAMOTRIGINE 100 MG: 100 TABLET ORAL at 08:44

## 2025-04-23 RX ADMIN — MONTELUKAST 10 MG: 10 TABLET, FILM COATED ORAL at 21:21

## 2025-04-23 RX ADMIN — BUSPIRONE HYDROCHLORIDE 10 MG: 10 TABLET ORAL at 13:57

## 2025-04-23 RX ADMIN — DOCUSATE SODIUM 100 MG: 100 CAPSULE, LIQUID FILLED ORAL at 08:50

## 2025-04-23 ASSESSMENT — ACTIVITIES OF DAILY LIVING (ADL)
ORAL_HYGIENE: INDEPENDENT
ADLS_ACUITY_SCORE: 25
HYGIENE/GROOMING: INDEPENDENT
ADLS_ACUITY_SCORE: 25
DRESS: SCRUBS (BEHAVIORAL HEALTH)
ADLS_ACUITY_SCORE: 25
LAUNDRY: UNABLE TO COMPLETE
ADLS_ACUITY_SCORE: 25
ORAL_HYGIENE: INDEPENDENT
ADLS_ACUITY_SCORE: 25
DRESS: SCRUBS (BEHAVIORAL HEALTH);INDEPENDENT
ADLS_ACUITY_SCORE: 25
HYGIENE/GROOMING: INDEPENDENT
ADLS_ACUITY_SCORE: 25
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
## 2025-04-23 NOTE — PLAN OF CARE
Problem: Adult Behavioral Health Plan of Care  Goal: Patient-Specific Goal (Individualization)  Description: Patient will sleep 6 to 8 hours per night  Patient will eat at least 50% of meals  Patient will attend at least 50% of groups  Patient will comply with recommendations of treatment team  Patient will remain medication compliant  Outcome: Progressing   Goal Outcome Evaluation:        Face to face shift report received from RN. Rounding completed, pt observed.Client rested in room for 7 hours with eyes closed and respirations noted. Client had no falls or instances of instability this shift.Face to face report will be communicated to oncoming RN.    Arnaud Middleton RN  4/23/2025  6:14 AM
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
## 2025-04-23 NOTE — PLAN OF CARE
Problem: Adult Behavioral Health Plan of Care  Goal: Patient-Specific Goal (Individualization)  Description: Patient will sleep 6 to 8 hours per night  Patient will eat at least 50% of meals  Patient will attend at least 50% of groups  Patient will comply with recommendations of treatment team  Patient will remain medication compliant    *Patient to be reminded to toilet every 2 hours on all shifts*  Outcome: Progressing     Problem: Suicide Risk  Goal: Absence of Self-Harm  Description: Patient will report a decrease in suicidal ideation.  Outcome: Progressing      Patient is pleasant and cooperative. Affect is full range, mood is calm. She admits to anxiety and depression. States she's fine otherwise, she denies SI today. She was soaked in urine this morning, she received new scrubs and linens and she showered after breakfast. She attends groups this shift. She is able to make her needs known.   Face to face end of shift report communicated to oncoming RN.     Kacie Quintana RN  4/23/2025  3:14 PM

## 2025-04-23 NOTE — PROGRESS NOTES
04/22/25 2025   Patient Belongings   Did you bring any home meds/supplements to the hospital?  No   Patient Belongings sent to security per site process   Patient Belongings Put in Hospital Secure Location (Security or Locker, etc.) cell phone/electronics;keys   Belongings Search Yes   Clothing Search Yes   Second Staff Veronica   Comment Blue joggers, blue sweatshirt, viking jacket, 3 books, misc receipts, misc cards, misc, misc purse items, misc mail, blue purse/ bag, phone no damage, purple case, 3 medicare cards, 1 debit card, 2 food cards, $7 cash, $5.74 in change, keys with pink lanyard.     List items sent to safe: phone no damage, purple case, 3 medicare cards, 1 debit card, 2 food cards, $7 cash, $5.74 in change, keys with pink lanyard.   All other belongings put in assigned cubby in belongings room.       I have reviewed my belongings list on admission and verify that it is correct.     Patient signature_______________________________    Second staff witness (if patient unable to sign) ______________________________       I have received all my belongings at discharge.    Patient signature________________________________    Kendrick Elizabeth  4/22/2025  9:25 PM

## 2025-04-24 ENCOUNTER — APPOINTMENT (OUTPATIENT)
Dept: OCCUPATIONAL THERAPY | Facility: HOSPITAL | Age: 50
DRG: 885 | End: 2025-04-24
Attending: PSYCHIATRY & NEUROLOGY
Payer: MEDICARE

## 2025-04-24 VITALS
TEMPERATURE: 99.4 F | BODY MASS INDEX: 48.44 KG/M2 | RESPIRATION RATE: 16 BRPM | HEART RATE: 84 BPM | WEIGHT: 293 LBS | SYSTOLIC BLOOD PRESSURE: 112 MMHG | OXYGEN SATURATION: 94 % | DIASTOLIC BLOOD PRESSURE: 62 MMHG

## 2025-04-24 PROCEDURE — 97165 OT EVAL LOW COMPLEX 30 MIN: CPT | Mod: GO

## 2025-04-24 PROCEDURE — 250N000013 HC RX MED GY IP 250 OP 250 PS 637

## 2025-04-24 PROCEDURE — 97530 THERAPEUTIC ACTIVITIES: CPT | Mod: GO

## 2025-04-24 PROCEDURE — 124N000001 HC R&B MH

## 2025-04-24 PROCEDURE — 250N000013 HC RX MED GY IP 250 OP 250 PS 637: Performed by: PSYCHIATRY & NEUROLOGY

## 2025-04-24 PROCEDURE — 99232 SBSQ HOSP IP/OBS MODERATE 35: CPT

## 2025-04-24 RX ADMIN — BUSPIRONE HYDROCHLORIDE 10 MG: 10 TABLET ORAL at 08:40

## 2025-04-24 RX ADMIN — PROGESTERONE 100 MG: 100 CAPSULE ORAL at 08:39

## 2025-04-24 RX ADMIN — IBUPROFEN 400 MG: 200 TABLET, FILM COATED ORAL at 17:05

## 2025-04-24 RX ADMIN — GABAPENTIN 300 MG: 300 CAPSULE ORAL at 08:39

## 2025-04-24 RX ADMIN — ATOMOXETINE 25 MG: 25 CAPSULE ORAL at 08:40

## 2025-04-24 RX ADMIN — LAMOTRIGINE 100 MG: 100 TABLET ORAL at 14:39

## 2025-04-24 RX ADMIN — RAMELTEON 8 MG: 8 TABLET, FILM COATED ORAL at 20:34

## 2025-04-24 RX ADMIN — HYDROXYZINE HYDROCHLORIDE 50 MG: 25 TABLET, FILM COATED ORAL at 20:34

## 2025-04-24 RX ADMIN — DILTIAZEM HYDROCHLORIDE 180 MG: 180 CAPSULE, COATED, EXTENDED RELEASE ORAL at 08:40

## 2025-04-24 RX ADMIN — VILAZODONE HYDROCHLORIDE 40 MG: 10 TABLET ORAL at 08:39

## 2025-04-24 RX ADMIN — LAMOTRIGINE 100 MG: 100 TABLET ORAL at 20:34

## 2025-04-24 RX ADMIN — LAMOTRIGINE 100 MG: 100 TABLET ORAL at 08:39

## 2025-04-24 RX ADMIN — Medication 125 MCG: at 08:40

## 2025-04-24 RX ADMIN — GABAPENTIN 300 MG: 300 CAPSULE ORAL at 20:34

## 2025-04-24 RX ADMIN — FAMOTIDINE 20 MG: 20 TABLET ORAL at 08:40

## 2025-04-24 RX ADMIN — ESTRADIOL 1 PATCH: 0.05 PATCH TRANSDERMAL at 08:40

## 2025-04-24 RX ADMIN — GABAPENTIN 300 MG: 300 CAPSULE ORAL at 14:39

## 2025-04-24 RX ADMIN — BUSPIRONE HYDROCHLORIDE 10 MG: 10 TABLET ORAL at 14:39

## 2025-04-24 RX ADMIN — DILTIAZEM HYDROCHLORIDE 180 MG: 180 CAPSULE, COATED, EXTENDED RELEASE ORAL at 20:34

## 2025-04-24 RX ADMIN — MONTELUKAST 10 MG: 10 TABLET, FILM COATED ORAL at 20:34

## 2025-04-24 RX ADMIN — FAMOTIDINE 20 MG: 20 TABLET ORAL at 20:34

## 2025-04-24 RX ADMIN — BUSPIRONE HYDROCHLORIDE 10 MG: 10 TABLET ORAL at 20:34

## 2025-04-24 RX ADMIN — FERROUS SULFATE TAB 325 MG (65 MG ELEMENTAL FE) 325 MG: 325 (65 FE) TAB at 08:40

## 2025-04-24 RX ADMIN — AMLODIPINE BESYLATE 2.5 MG: 2.5 TABLET ORAL at 08:39

## 2025-04-24 RX ADMIN — ARIPIPRAZOLE 20 MG: 10 TABLET ORAL at 20:34

## 2025-04-24 ASSESSMENT — ACTIVITIES OF DAILY LIVING (ADL)
ADLS_ACUITY_SCORE: 25
ADLS_ACUITY_SCORE: 25
LAUNDRY: UNABLE TO COMPLETE
ADLS_ACUITY_SCORE: 25
ADLS_ACUITY_SCORE: 25
HYGIENE/GROOMING: INDEPENDENT
ADLS_ACUITY_SCORE: 25
DRESS: SCRUBS (BEHAVIORAL HEALTH)
ORAL_HYGIENE: INDEPENDENT
ADLS_ACUITY_SCORE: 25
ORAL_HYGIENE: INDEPENDENT
ADLS_ACUITY_SCORE: 25
DRESS: SCRUBS (BEHAVIORAL HEALTH);INDEPENDENT
HYGIENE/GROOMING: INDEPENDENT
ADLS_ACUITY_SCORE: 25

## 2025-04-24 NOTE — MEDICATION SCRIBE - ADMISSION MEDICATION HISTORY
Medication Scribe Admission Medication History    Admission medication history is complete. The information provided in this note is only as accurate as the sources available at the time of the update.    Information Source(s): Facility (U/NH/) medication list/MAR and CareEverywhere/SureScripts via phone    Pertinent Information:   Patient resides at [facility] [phone], staff manage medications. Unable to fax MAR so staff reviewed medications over the phone- patient received AM/NOON medications at [place] prior to going to the ER.   Per staff, patient has been out of her ramelteon and progesterone.   Dispense hx reviewed: Melatonin 5 mg and gabapentin 100 mg capsules both filled 4/21/25, staff reports they do not have these medications and patient has not started them yet PTA.     Changes made to PTA medication list:  Added: None  Deleted: None  Changed:   Estradiol- per staff, last applied on 4/19/25 and prior to that 4/15/25 (Sat/Tues administration).   Ibu- per staff, pt usually takes 800 mg ibu when needed    Allergies reviewed with patient and updates made in EHR: no    Medication History Completed By: Fely Andrade 4/23/2025 7:16 PM    PTA Med List   Medication Sig Note Last Dose/Taking    acetaminophen (TYLENOL) 500 MG tablet Take 1-2 tablets (500-1,000 mg) by mouth every 6 hours as needed for mild pain  Unknown    albuterol (VENTOLIN HFA) 108 (90 Base) MCG/ACT inhaler Inhale 1-2 puffs into the lungs every 4 hours as needed for shortness of breath or wheezing  Unknown    amLODIPine (NORVASC) 2.5 MG tablet Take 1 tablet (2.5 mg) by mouth every morning. May also take 1 tablet (2.5 mg) every evening as needed (-- for Raynaud's / Hand color changes --).  4/22/2025 Morning    ARIPiprazole (ABILIFY) 20 MG tablet Take 20 mg by mouth at bedtime.  4/21/2025 Bedtime    atomoxetine (STRATTERA) 25 MG capsule Take 25 mg by mouth every morning.  4/22/2025 Morning    busPIRone (BUSPAR) 10 MG tablet Take 1  tablet by mouth 2 times daily.  4/22/2025 Morning    cholecalciferol (VITAMIN D3) 125 mcg (5000 units) capsule Take 1 capsule (125 mcg) by mouth daily  4/22/2025 Morning    diltiazem ER COATED BEADS (CARDIZEM CD/CARTIA XT) 180 MG 24 hr capsule Take 1 capsule (180 mg) by mouth 2 times daily. 4/23/2025: Noon/bedtime administration 4/22/2025 Noon    docusate sodium (EQ STOOL SOFTENER) 100 MG capsule TAKE 2 CAPSULES BY MOUTH TWICE DAILY .  ADJUST  DOSE  AS  NEEDED  TO  MAINTAIN  4/22/2025 Morning    Elemental iron 65 mg Vitamin C 125 mg (VITRON C)  MG TABS tablet Take 1 tablet by mouth daily on empty stomach -for iron deficiency  4/22/2025 Morning    estradiol (VIVELLE-DOT) 0.05 MG/24HR bi-weekly patch Place 1 patch onto the skin twice a week. 4/23/2025: Applies on Tuesday and Saturday 4/19/2025    famotidine (PEPCID) 20 MG tablet Take 1 tablet (20 mg) by mouth 2 times daily - For Heartburn  4/22/2025 Morning    gabapentin (NEURONTIN) 300 MG capsule Take 1 capsule (300 mg) by mouth 3 times daily.  4/22/2025 Noon    hydrOXYzine (VISTARIL) 50 MG capsule Take 50 mg by mouth 4 times daily as needed for anxiety.  4/18/2025    hydrOXYzine HCl (ATARAX) 50 MG tablet Take 1 tablet (50 mg) by mouth at bedtime  4/21/2025 Bedtime    ibuprofen (ADVIL/MOTRIN) 200 MG tablet Take 1-2 tablets (200-400 mg) by mouth every 6 hours as needed for moderate pain 1 to 2 tabs Q6 PRN (Patient taking differently: Take 800 mg by mouth every 8 hours as needed (back pain/ headache).)  4/13/2025    lamoTRIgine (LAMICTAL) 100 MG tablet Take 100 mg by mouth 3 times daily.  4/22/2025 Noon    montelukast (SINGULAIR) 10 MG tablet Take 1 tablet (10 mg) by mouth at bedtime.  4/21/2025 Bedtime    prazosin (MINIPRESS) 1 MG capsule Take 1 mg by mouth nightly as needed (nightmares).  Unknown    ramelteon (ROZEREM) 8 MG tablet Take 1 tablet (8 mg) by mouth at bedtime. 4/23/2025: Has been out since 4/4/25 4/4/2025    vilazodone (VIIBRYD) 40 MG TABS tablet  Take 40 mg by mouth every morning  4/22/2025 Morning    [DISCONTINUED] diltiazem ER (CARDIAZEM LA) 180 MG 24 hr tablet Take 1 tablet (180 mg) by mouth 2 times daily. -- Dose Increase 9/3/2024    + Stop 120 mg Diltiazem tablet  4/22/2025 Noon

## 2025-04-24 NOTE — PROGRESS NOTES
Behavioral Health Occupational Therapy Eval      Name: Rosi Lamb MRN# [id number]   Age: 50 year old YOB: 1975     Date of Consultation: April 24, 2025  Primary care provider: Lucio Curiel    Referring Physician: Isam Ward NP  Orders: Eval and Treat  Medical Diagnosis:   #. Bipolar I disorder, current episode depressed  #. AGNIESZKA  #. Borderline personality traits  Onset of Illness/Injury: Patient admitted to our unit on 4/23/24 due to SI with a plan.     Prior Level of Function: Patient resides at [facility]. They provide meals but patient is responsible for cleaning her apartment, dishes, and laundry. Patient notes the laundry room is down the ureña and carries her laundry in a laundry bag. 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 Reports that she doesn't really have a routine anymore but notes when she did have a routine it was helpful.  Has an [facility] worker, therapist, and medication management.     Current Level of Function: Patient notes that she is tired today as she is being woke up at night for toileting. Patient is able to complete BADLS. Patient indep with bed mobility and using a wedge at this time.     Patient is familiar to this unit and OT staff. Reviewed skills that were taught at last hospitalization and patient notes that she is doing deep breathing daily. She also notes that she engages in leisure activities as a coping skill and is working on a scarf currently.     Discussed what she would like to work on with OT while she is here and she notes that self esteem, assertiveness, and creating a routine would all be helpful. Gave patient homework of identifying 5 things that are a strength or quality of hers to review tomorrow and practiced assertiveness communication.     Patient/Family Goal: self esteem, assertiveness, and creating a routine         Past Medical History:   Past Medical History:   Diagnosis Date    Abnormal Pap smear of cervix  2018    Overview:  had scraping of cervix    Cannabis use disorder, moderate, in sustained remission, dependence (H) 2015    Closed dislocation of tarsal joint 2011    Closed dislocation of tarsal joint - left 2011    Edema     No Comments Provided    Encounter for removal and reinsertion of intrauterine contraceptive device     05,IUD placement, Removed    Excessive and frequent menstruation with irregular cycle     2017    Major depressive disorder, single episode     No Comments Provided    Personal history of other medical treatment (CODE)     G3, P2-0-1-2 with history of spontaneous     Personal history of other medical treatment (CODE)     No Comments Provided    Uncomplicated asthma     No Comments Provided    Urinary incontinence 03/15/2013       Past Surgical History:  Past Surgical History:   Procedure Laterality Date    ANKLE SURGERY      fracture, repair with screws    ARTHRODESIS FOOT Left 2022    Procedure: left foot hardware removaL, fusion of 1st-2nd Tarsal metatarsal;  Surgeon: Anthony Castillo DPM;  Location: [facility] OR    COLONOSCOPY  2022    F/U  tubular adenoma    CONIZATION LEEP      ,Underwent a loop    IMPLANT STIMULATOR AND LEADS SACRAL NERVE (STAGE ONE AND TWO)      scheduled 23 with Dr. Sheth at [facility]    LAPAROSCOPIC TUBAL LIGATION      ,tubal ligation       Medications:   Current Facility-Administered Medications   Medication Dose Route Frequency Provider Last Rate Last Admin    albuterol (PROVENTIL HFA/VENTOLIN HFA) inhaler  1-2 puff Inhalation Q4H PRN Elmer Mathews MD        alum & mag hydroxide-simethicone (MAALOX) suspension 30 mL  30 mL Oral Q4H PRN Elmer Mathews MD        amLODIPine (NORVASC) tablet 2.5 mg  2.5 mg Oral QAM Elmer Mathews MD   2.5 mg at 25 0839    And    amLODIPine (NORVASC) tablet 2.5 mg  2.5 mg Oral QPM PRN Elmer Mathews MD        ARIPiprazole (ABILIFY) tablet 20 mg   20 mg Oral At Bedtime Elmer Mathews MD   20 mg at 04/23/25 2120    atomoxetine (STRATTERA) capsule 25 mg  25 mg Oral QAM Elmer Mathews MD   25 mg at 04/24/25 0840    busPIRone (BUSPAR) tablet 10 mg  10 mg Oral TID Isma Ward APRN CNP   10 mg at 04/24/25 0840    cholecalciferol (VITAMIN D3) capsule 125 mcg  125 mcg Oral Daily Elmer Mathews MD   125 mcg at 04/24/25 0840    diltiazem ER COATED BEADS (CARDIZEM CD/CARTIA XT) 24 hr capsule 180 mg  180 mg Oral BID Elmer Mathews MD   180 mg at 04/24/25 0840    docusate sodium (COLACE) capsule 100 mg  100 mg Oral BID PRN Elmer Mathews MD   100 mg at 04/23/25 0850    estradiol (CLIMARA) 0.05 MG/24HR WK patch 1 patch  1 patch Transdermal Once per day on Monday Thursday Elmer Mathews MD   1 patch at 04/24/25 0840    estradiol weekly (CLIMARA) Patch in Place   Transdermal Q8H IZA Elmer Mathews MD        famotidine (PEPCID) tablet 20 mg  20 mg Oral BID Elmer Mathews MD   20 mg at 04/24/25 0840    ferrous sulfate (FEROSUL) tablet 325 mg  325 mg Oral Daily Elmer Mathews MD   325 mg at 04/24/25 0840    gabapentin (NEURONTIN) capsule 300 mg  300 mg Oral TID Elmer Mathews MD   300 mg at 04/24/25 0839    hydrOXYzine HCl (ATARAX) tablet 25 mg  25 mg Oral Q4H PRN Elmer Mathews MD        hydrOXYzine HCl (ATARAX) tablet 50 mg  50 mg Oral At Bedtime Elmer Mathews MD   50 mg at 04/23/25 2121    ibuprofen (ADVIL/MOTRIN) tablet 200-400 mg  200-400 mg Oral Q6H PRN Elmer Mathews MD        lamoTRIgine (LaMICtal) tablet 100 mg  100 mg Oral TID Elmer Mathews MD   100 mg at 04/24/25 0839    Lidocaine (LIDOCARE) 4 % Patch 2 patch  2 patch Transdermal Daily PRN Dominique Anaya CNP        melatonin tablet 3 mg  3 mg Oral At Bedtime Elmer Collins MD        montelukast (SINGULAIR) tablet 10 mg  10 mg Oral At Bedtime Elmer Mathews MD   10 mg at 04/23/25 2121    muscle rub (ARTHRITIS HOT) pain relief cream   Topical Q6H  PRN Dominique Anaya, CNP        OLANZapine (zyPREXA) tablet 10 mg  10 mg Oral TID PRN Elmer Mathews MD        Or    OLANZapine (zyPREXA) injection 10 mg  10 mg Intramuscular TID PRN Elmer Mathews MD        polyethylene glycol (MIRALAX) Packet 17 g  17 g Oral Daily PRN Elmer Mathews MD        prazosin (MINIPRESS) capsule 1 mg  1 mg Oral At Bedtime PRN Elmer Mathews MD        progesterone (PROMETRIUM) capsule 100 mg  100 mg Oral Daily Elmer Mathews MD   100 mg at 04/24/25 0839    ramelteon (ROZEREM) tablet 8 mg  8 mg Oral At Bedtime Elmer Mathews MD   8 mg at 04/23/25 2121    vilazodone (VIIBRYD) tablet 40 mg  40 mg Oral QAM Elmer Mathews MD   40 mg at 04/24/25 0839       Reason for OT Referral:  Mental Health History: Multiple previous hospitalizations with most recent at this facility 2/25-3/4/25 for similar.   Signs/Symptoms of compliant: SI with plan.   Aggravating factors/Current Life Stressors: relationship stressors, increased depression,   Current Services: [facility], medication management and therapy    Personal Information:  Family Structure: , single  Living Arrangement: [place]   Finances: SSDI, part time work.  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     Goals:   Work on skills for self esteem, assertiveness, and creating a healthy routine.     Planned Interventions: interpersonal effectiveness, distress tolerance and life skills.     Clinical Impressions:  Criteria for Skilled Therapeutic Intervention Met: yes  OT Diagnosis: limited coping skills, SI with plan  Influenced by the following impairments: psychosocial stressors, increase in depression symptoms  Functional limitations due to impairment:  SI with plan, unable to maintain personal safety in community  Clinical presentation: Evolving/Changing  Clinical presentation rationale: Patient admitted yesterday due to SI with plan.  Clinical Decision making (complexity): Low Complexity  Predicted Duration of Therapy Intervention (days/wks): 3 x wk 2 wks  Risks and Benefits of therapy have been explained: Yes  Patient, Family & other staff in agreement with plan of care: Yes  Comments: OT to continue to work with patient on self directed goals.    Total Evaluation Time: 20 plus 10 tx.

## 2025-04-24 NOTE — PLAN OF CARE
"Problem: Adult Behavioral Health Plan of Care  Goal: Patient-Specific Goal (Individualization)  Description: Patient will sleep 6 to 8 hours per night  Patient will eat at least 50% of meals  Patient will attend at least 50% of groups  Patient will comply with recommendations of treatment team  Patient will remain medication compliant    *Patient to be reminded to toilet every 2 hours on all shifts*  Note:   Pt endorsed ongoing depression and anxiety. She reported that her boyfriend of 8 months was \"being rude and saying awful things\" to her, such as \"you're clingy as fuck\" and \"creeper.\" She stated \"he's talking to another girl online trying to help in her recovery.\" Pt stated \"the last few weeks I've had thoughts to cut.\" She denied thoughts of self harm and suicidal thoughts tonight. Pt discussed how being \"alienated\" from her family and \"disfellowshipped from the [employer]\" are also contributing to how she feels. She ate 100% of supper. She was compliant with her scheduled medications. Pt was not noted to have an incontinent episodes tonight.      Problem: Suicide Risk  Goal: Absence of Self-Harm  Description: Patient will report a decrease in suicidal ideation.  Outcome: Progressing  Note: Pt denied suicidal ideation and thoughts of self harm tonight.       "

## 2025-04-24 NOTE — PROGRESS NOTES
04/24/25 1000   Appointment Info   Signing Clinician's Name / Credentials (OT) Maritza GOETZ OTR/l   Clinical Impression   Criteria for Skilled Therapeutic Interventions Met (OT) Yes, treatment indicated   OT Diagnosis limited coping, worsening MH   Influenced by the following impairments depression, psychosocial stressors   OT Problem List-Impairments impacting ADL other (see comments)  (keeping self safe in commmunity)   Assessment of Occupational Performance 1-3 Performance Deficits   Identified Performance Deficits Self esteem, interpersonal effectiveness, and healthy routines.   Planned Therapy Interventions (OT) IADL retraining;other (see comments)  (ther act)   Clinical Decision Making Complexity (OT) problem focused assessment/low complexity   Risk & Benefits of therapy have been explained evaluation/treatment results reviewed   Clinical Impression Comments Patient notes that she is willing to work with OT on skill development in the above area.   OT Total Evaluation Time   OT Eval, Low Complexity Minutes (54133) 20   OT Goals   Therapy Frequency (OT) 3 times/week   OT Predicted Duration/Target Date for Goal Attainment 05/09/25   Therapeutic Activities   Therapeutic Activity Minutes (02460) 10   Symptoms noted during/after treatment none   Treatment Detail/Skilled Intervention Reviewed skills that patient uses and practiced some assertive communication as patient notes that this is an area that she wants to work on.   OT Discharge Planning   OT Plan OT to continue per POC   OT Discharge Recommendation (DC Rec) home   OT Rationale for DC Rec Patient is established with supportive services.   OT Brief overview of current status Patient is engaging in therapy session. Is able to have insight to areas that she would like to improve and work on during OT sessions.   Total Session Time   Timed Code Treatment Minutes 10   Total Session Time (sum of timed and untimed services) 30

## 2025-04-24 NOTE — PLAN OF CARE
"Problem: Adult Behavioral Health Plan of Care  Goal: Patient-Specific Goal (Individualization)  Description: Patient will sleep 6 to 8 hours per night  Patient will eat at least 50% of meals  Patient will attend at least 50% of groups  Patient will comply with recommendations of treatment team  Patient will remain medication compliant    *Patient to be reminded to toilet every 2 hours on all shifts*  Outcome: Progressing  Note: 1705 - Pt requested and received 400 mg of PRN Ibuprofen for \"6\"/10 head, neck, and abdomen pain. Medication effective.    Pt reported that she is still feeling anxious, depressed, and melancholic. She stated \"I'm still thinking about my boyfriend.\" Discussed signs of healthy and unhealthy relationships. Encouraged pt to focus on self care. Pt ate 100% of supper tonight. She was compliant with her scheduled medications. No episodes of incontinence noted tonight.     Face to face end of shift report will be communicated to oncoming RN.      Problem: Suicide Risk  Goal: Absence of Self-Harm  Description: Patient will report a decrease in suicidal ideation.  Outcome: Progressing  Note: Pt denied suicidal ideation and thoughts of self harm. Pt reported that she is still struggling with depression and sadness.      Goal Outcome Evaluation:    Plan of Care Reviewed With: patient      "

## 2025-04-24 NOTE — DISCHARGE INSTRUCTIONS
Behavioral Discharge Planning and Instructions    Summary: You were admitted on 4/22/2025  due to Suicidal Ideations.      Main Diagnosis: Bipolar I disorder, current episode depressed      Health Care Follow-up:     Psychiatry Appointment:      Therapy Appointment: Wednesday. April 30 @8am  Ayala Wilder  [facility] Office  [address] [address]  Phone: [phone]   Fax: [phone]  Med Records Fax: [phone]      Primary Care Appointment:       Attend all scheduled appointments with your outpatient providers. Call at least 24 hours in advance if you need to reschedule an appointment to ensure continued access to your outpatient providers.     Major Treatments, Procedures and Findings:  You were provided with: a psychiatric assessment, assessed for medical stability, medication evaluation and/or management, group therapy, family therapy, individual therapy, CD evaluation/assessment, milieu management, and medical interventions    Symptoms to Report: feeling more aggressive, increased confusion, losing more sleep, mood getting worse, or thoughts of suicide    Early warning signs can include: increased depression or anxiety sleep disturbances increased thoughts or behaviors of suicide or self-harm  increased unusual thinking, such as paranoia or hearing voices     Safety and Wellness: Take all medicines as directed.  Make no changes unless your doctor suggests them.      Follow treatment recommendations.  Refrain from alcohol and non-prescribed drugs.  Ask your support system to help you reduce your access to items that could harm yourself or others. Items could include: Firearms, Medicines (both prescribed and over-the-counter), Knives and other sharp objects, Ropes and like materials, Car keys, and If there is a concern for safety, call 911. If there is a concern for safety, call 911.          Resources:     Crisis Intervention:  "248.291.6459 or 153-162-8678 (TTY: 666.575.3511).  Call anytime for help.  National [place] on Mental Illness (www.[place].lazarus.org): 778.852.9341 or 513-799-5196.  [place] Association for Children's Mental Health (www.macmh.org): 261.413.1739.  Alcoholics Anonymous (www.aa.org): Search for local meetings online  Alcoholics Anonymous [place] (aam[place].org)  Suicide Awareness Voices of Education (SAVE) (www.save.org): 054-067-DSNJ (9771)  National Suicide Prevention Line (www.mentalhealthmn.org): 319-864-HIXI (9331)  Mental Health Consumer/Survivor Network of [place] (www.mhcsn.net): 181.762.9504 or 965-722-7691  Mental Health Association of [place] (www.mentalhealth.org): 954.797.1969 or 823-825-0638  Self- Management and Recovery Training., SMART-- Toll free: 453.118.3282  www.ExecMobile  Text 4 Life: txt \"LIFE\" to 51807 for immediate support and crisis intervention  Crisis text line: Text \"[place]\" to 972912. Free, confidential, 24/7.  Crisis Intervention: 870.138.2956 or 300-058-1963. Call anytime for help.     [facility] Crisis Resources: [facility] Crisis Response Team (CRT): 2-1-1 or 382-604-3962    General Medication Instructions:   See your medication sheet(s) for instructions.   Take all medicines as directed.  Make no changes unless your doctor suggests them.   Go to all your doctor visits.  Be sure to have all your required lab tests. This way, your medicines can be refilled on time.  Do not use any drugs not prescribed by your doctor.  Avoid alcohol.    Advance Directives:   Scanned document on file with [place]? Yes, scanned ACP docmt  Is document scanned? Current scanned  Honoring Choices Your Rights Handout: Informed and given  Was more information offered? Pt declined    The Treatment team has appreciated the opportunity to work with you. If you have any questions or concerns about your recent admission, you can contact the unit which can receive your call 24 hours a day, 7 days a week. They will be able to get " in touch with a Provider if needed. The unit number is [phone].

## 2025-04-24 NOTE — PLAN OF CARE
"  Problem: Adult Behavioral Health Plan of Care  Goal: Patient-Specific Goal (Individualization)  Description: Patient will sleep 6 to 8 hours per night  Patient will eat at least 50% of meals  Patient will attend at least 50% of groups  Patient will comply with recommendations of treatment team  Patient will remain medication compliant    *Patient to be reminded to toilet every 2 hours on all shifts*  Outcome: Progressing     Problem: Suicide Risk  Goal: Absence of Self-Harm  Description: Patient will report a decrease in suicidal ideation.  Outcome: Progressing   Goal Outcome Evaluation:    Plan of Care Reviewed With: patient          Patient is pleasant and cooperative. Affect is full range, mood is calm. She admits to anxiety and depression, states \"a little\". States \"I didn't sleep the greatest last night, they kept waking me every 2 hours to ask if I needed to use the bathroom\", states she was dry this morning. She takes her medications as prescribed, attends groups this shift, and is able to make her needs known.   Patient was incontinent once this shift. She was moved to a private room.   1445-patient in bed resting.  Face to face end of shift report communicated to oncoming PORFIRIO.     Kacie Quintana RN  4/24/2025  2:46 PM               "

## 2025-04-24 NOTE — PROGRESS NOTES
"[facility] PSYCHIATRY  PROGRESS NOTE     SUBJECTIVE     Prior to interviewing the patient, I met with nursing and reviewed patient's clinical condition. We discussed clinical care both before and after the interview. I have reviewed the patient's clinical course by review of records including previous notes, labs, and vital signs.     Per nursing, the patient had the following behavioral events over the last 24-hours: none. Slept 6 hours.    On psychiatric interview, pt is met in her room where she is resting in bed. Notes she is \"a little depressed\" and tired. States she did not sleep as well d/t checks at night. Notes she would like to rest before the next group session at 1000. Denies SI, HI or SIB. Endorses \"a little anxiety\". Denies AVH.     Denies acute concerns. Denies any noted adverse effects from medication.     She would be interested in speaking to OT for coping mechanisms.      MEDICATIONS   Scheduled Meds:  Current Facility-Administered Medications   Medication Dose Route Frequency Provider Last Rate Last Admin    amLODIPine (NORVASC) tablet 2.5 mg  2.5 mg Oral Elmer Multani MD   2.5 mg at 04/24/25 0839    ARIPiprazole (ABILIFY) tablet 20 mg  20 mg Oral At Bedtime Elmer Mathews MD   20 mg at 04/23/25 2120    atomoxetine (STRATTERA) capsule 25 mg  25 mg Oral Elmer Multani MD   25 mg at 04/24/25 0840    busPIRone (BUSPAR) tablet 10 mg  10 mg Oral TID Isma Ward APRN CNP   10 mg at 04/24/25 0840    cholecalciferol (VITAMIN D3) capsule 125 mcg  125 mcg Oral Daily Elmer Mathews MD   125 mcg at 04/24/25 0840    diltiazem ER COATED BEADS (CARDIZEM CD/CARTIA XT) 24 hr capsule 180 mg  180 mg Oral BID Elmer Mathews MD   180 mg at 04/24/25 0840    estradiol (CLIMARA) 0.05 MG/24HR WK patch 1 patch  1 patch Transdermal Once per day on Monday Thursday Elmer Mathews MD   1 patch at 04/24/25 0840    estradiol weekly (CLIMARA) Patch in Place   Transdermal Q8H IZA Reid, " MD Elmer        famotidine (PEPCID) tablet 20 mg  20 mg Oral BID Elmer Mathews MD   20 mg at 04/24/25 0840    ferrous sulfate (FEROSUL) tablet 325 mg  325 mg Oral Daily Elmer Mathews MD   325 mg at 04/24/25 0840    gabapentin (NEURONTIN) capsule 300 mg  300 mg Oral TID Elmer Mathews MD   300 mg at 04/24/25 0839    hydrOXYzine HCl (ATARAX) tablet 50 mg  50 mg Oral At Bedtime Elmer Mathews MD   50 mg at 04/23/25 2121    lamoTRIgine (LaMICtal) tablet 100 mg  100 mg Oral TID Elmer Mathews MD   100 mg at 04/24/25 0839    montelukast (SINGULAIR) tablet 10 mg  10 mg Oral At Bedtime Elmer Mathews MD   10 mg at 04/23/25 2121    progesterone (PROMETRIUM) capsule 100 mg  100 mg Oral Daily Elmer Mathews MD   100 mg at 04/24/25 0839    ramelteon (ROZEREM) tablet 8 mg  8 mg Oral At Bedtime Elmer Mathews MD   8 mg at 04/23/25 2121    vilazodone (VIIBRYD) tablet 40 mg  40 mg Oral QAM Elmer Mathews MD   40 mg at 04/24/25 0839     PRN Meds:.  Current Facility-Administered Medications   Medication Dose Route Frequency Provider Last Rate Last Admin    albuterol (PROVENTIL HFA/VENTOLIN HFA) inhaler  1-2 puff Inhalation Q4H PRN Elmer Mathews MD        alum & mag hydroxide-simethicone (MAALOX) suspension 30 mL  30 mL Oral Q4H PRN Elmer Mathews MD        amLODIPine (NORVASC) tablet 2.5 mg  2.5 mg Oral QPM PRN Elmer Mathews MD        docusate sodium (COLACE) capsule 100 mg  100 mg Oral BID PRN Elmer Mathews MD   100 mg at 04/23/25 0850    hydrOXYzine HCl (ATARAX) tablet 25 mg  25 mg Oral Q4H PRN Elmer Mathews MD        ibuprofen (ADVIL/MOTRIN) tablet 200-400 mg  200-400 mg Oral Q6H PRN Elmer Mathews MD        Lidocaine (LIDOCARE) 4 % Patch 2 patch  2 patch Transdermal Daily PRN Dominique Anaya CNP        melatonin tablet 3 mg  3 mg Oral At Bedtime PRN Elmer Mathews MD        muscle rub (ARTHRITIS HOT) pain relief cream   Topical Q6H PRN Dominique Anaya, CNP      "   OLANZapine (zyPREXA) tablet 10 mg  10 mg Oral TID PRN Elmer Mathews MD        Or    OLANZapine (zyPREXA) injection 10 mg  10 mg Intramuscular TID PRN Elmer Mathews MD        polyethylene glycol (MIRALAX) Packet 17 g  17 g Oral Daily PRN Elmer Mathews MD        prazosin (MINIPRESS) capsule 1 mg  1 mg Oral At Bedtime PRN Elmer Mathews MD            ALLERGIES   Allergies   Allergen Reactions    Clonazepam Other (See Comments)     Causes Violence and aggresssion    Hydrocodone-Acetaminophen      Other reaction(s): Seizures  Can take Percocet without difficulty.    Lorazepam Other (See Comments)     Causes Violence and aggresssion    Sertraline Other (See Comments)     Caused suicidally     Bupropion Other (See Comments)     Caused Major Depression    Dust Mite Extract      Other reaction(s): Asthma symptoms    Lithium Other (See Comments)     Mood alteration    Milk Protein Unknown    Pollen Extract      Other reaction(s): Asthma symptoms    Risperidone Other (See Comments)     Agitation      Sulfa Antibiotics Itching    Trichophyton      Other reaction(s): Asthma symptoms    Valproic Acid Other (See Comments)     Weight gain        MENTAL STATUS EXAM   Vitals: /52 (BP Location: Left arm)   Pulse 79   Temp 98.2  F (36.8  C) (Tympanic)   Resp 16   Wt (!) 136.1 kg (300 lb 1.6 oz)   SpO2 96%   BMI 48.44 kg/m      Appearance:  awake, alert, adequately groomed, dressed in hospital scrubs, and appeared as age stated  Attitude:  cooperative  Eye Contact:  good  Mood:   \"a little depressed\"  Affect:   more mobile, smiles  Speech:  clear, coherent  Psychomotor Behavior:  no evidence of tardive dyskinesia, dystonia, or tics  Thought Process:  linear  Associations:  no loose associations  Thought Content:  no evidence of suicidal ideation or homicidal ideation and no evidence of psychotic thought  Insight:  fair  Judgment:  intact  Oriented to:  time, person, and place  Attention Span and " Concentration:  intact  Recent and Remote Memory:  intact  Language: English with appropriate syntax and vocabulary    Fund of Knowledge: low-normal  Muscle Strength and Tone: normal  Gait and Station: Normal       LABS   No results found for this or any previous visit (from the past 24 hours).      IMPRESSION     This is a 50 year old female with a PMH of of bipolar disorder, BPD, SI/SA who presents with increased depression and SI with plan to cut wrists in the context of relational stress with significant other. Pt has belief that SO is berating her d/t him being in another relationship, which could be true, however unconfirmed. Worsening mood and thoughts of harming self increased as a result. Crisis stabilization at [facility], discharging 2/11 and recently hospitalization here 2/25-3/4/25 with a similar presentation. Hx of EtOH with 4 years of sobriety, denies use and EtOH in ED negative. UDS negative with substances not appearing to be contributor. Due to the above presentation, pt was admitted for [facility] for further safety and stabilization.       In terms of treatment, will increase BuSpar for mixed anxiety and depression. Consider OT for coping mechanisms (had done during previous admit), will discuss with OT.        DIAGNOSES     #. Bipolar I disorder, current episode depressed  #. AGNIESZKA  #. Borderline personality traits       PLAN     Location: Unit 5  Legal Status: None    Safety Assessment:    Behavioral Orders   Procedures    Code 1 - Restrict to Unit    Routine Programming     As clinically indicated    Status 15     Every 15 minutes.      PTA psychotropic medications stopped:     - none    PTA psychotropic medications continued/changed:     - BuSpar 10 mg BID->10 mg TID 4/23  - Stattera 25 mg daily  - Abilify 20 mg daily  - gabapentin 300 mg TID  - hydroxyzine 50 mg at bedtime  - Lamictal 100 mg TID  - Viibryd 40 mg daily  - Prazosin 1 mg at bedtime  - Rozerem 8  mg at bedtime  - Hydroxyzine 25 mg QID PRN (PTA 50 mg QID PRN)       New medications tried and stopped:     -None    New medications initiated:     - Standard unit PRNS     Today's Changes:    - OT eval for coping mechanisms    Programming: Patient will be treated in a therapeutic milieu with appropriate individual and group therapies. Education will be provided on diagnoses, medications, and treatments.     Medical diagnoses:  Per medicine    Consult: OT  Tests: None    Anticipated LOS: 3-5 days  Disposition: home with established services       TREATMENT TEAM CARE PLAN     Progress: Continued symptoms.    Continued Stay Criteria/Rationale: Continued symptoms without sufficient improvement/resolution.    Medical/Physical: See above.    Precautions: See above.     Plan: Continue inpatient care with unit support and medication management.    Rationale for change in precautions or plan: NA due to no change.    Participants: DASHA Coleman CNP, Nursing, SW, OT.    The patient's care was discussed with the treatment team and chart notes were reviewed.       ATTESTATION      DASHA Coleman CNP

## 2025-04-24 NOTE — PROGRESS NOTES
Assessment/Intervention/Current Symptoms and Care Coordination:  Called CARYL Clifford through [facility] ([phone]) and provided update.   Confirmed upcoming appointment for therapy at [facility]:  Therapy Appointment on Wednesday. April 30 @8am  Ayala Wilder  [facility] Office  [address] [address]  Phone: [phone]   Fax: [phone]  Med Records Fax: [phone]    Discharge Plan or Goal:  Symptom stabilization  Development of coping skills  Development of safe discharge plan     Discharge Checklist:  Transportation: TBD  Medications ordered/sent to: TBD  Appointments scheduled:   Psychiatry - TBD  Therapy - 4/30 @8am  PCP - TBD  AVS complete: No     Legal Status:  Voluntary     Contacts:  Venessa Alvarado -  - [phone]

## 2025-04-24 NOTE — PLAN OF CARE
Problem: Adult Behavioral Health Plan of Care  Goal: Patient-Specific Goal (Individualization)  Description: Patient will sleep 6 to 8 hours per night  Patient will eat at least 50% of meals  Patient will attend at least 50% of groups  Patient will comply with recommendations of treatment team  Patient will remain medication compliant    *Patient to be reminded to toilet every 2 hours on all shifts*  Outcome: Progressing     Problem: Suicide Risk  Goal: Absence of Self-Harm  Description: Patient will report a decrease in suicidal ideation.  Outcome: Progressing     Face to face shift report received from Kimberlee MONROY. Rounding completed, pt observed.     Pt appeared to sleep a total of 6 hours on and off this shift. Pt did not have any noted episodes of self harm this shift.    Face to face report will be communicated to oncoming RN.    Deirdre Sotelo RN  4/24/2025  6:05 AM

## 2025-04-25 ENCOUNTER — APPOINTMENT (OUTPATIENT)
Dept: OCCUPATIONAL THERAPY | Facility: HOSPITAL | Age: 50
DRG: 885 | End: 2025-04-25
Attending: PSYCHIATRY & NEUROLOGY
Payer: MEDICARE

## 2025-04-25 PROCEDURE — 124N000001 HC R&B MH

## 2025-04-25 PROCEDURE — 250N000013 HC RX MED GY IP 250 OP 250 PS 637: Performed by: PSYCHIATRY & NEUROLOGY

## 2025-04-25 PROCEDURE — 97530 THERAPEUTIC ACTIVITIES: CPT | Mod: GO

## 2025-04-25 PROCEDURE — 250N000013 HC RX MED GY IP 250 OP 250 PS 637

## 2025-04-25 PROCEDURE — 99232 SBSQ HOSP IP/OBS MODERATE 35: CPT

## 2025-04-25 RX ADMIN — GABAPENTIN 300 MG: 300 CAPSULE ORAL at 20:20

## 2025-04-25 RX ADMIN — LAMOTRIGINE 100 MG: 100 TABLET ORAL at 20:20

## 2025-04-25 RX ADMIN — GABAPENTIN 300 MG: 300 CAPSULE ORAL at 13:38

## 2025-04-25 RX ADMIN — ARIPIPRAZOLE 20 MG: 10 TABLET ORAL at 20:20

## 2025-04-25 RX ADMIN — LAMOTRIGINE 100 MG: 100 TABLET ORAL at 13:38

## 2025-04-25 RX ADMIN — PROGESTERONE 100 MG: 100 CAPSULE ORAL at 08:38

## 2025-04-25 RX ADMIN — FAMOTIDINE 20 MG: 20 TABLET ORAL at 20:20

## 2025-04-25 RX ADMIN — DILTIAZEM HYDROCHLORIDE 180 MG: 180 CAPSULE, COATED, EXTENDED RELEASE ORAL at 20:20

## 2025-04-25 RX ADMIN — Medication 125 MCG: at 08:38

## 2025-04-25 RX ADMIN — DILTIAZEM HYDROCHLORIDE 180 MG: 180 CAPSULE, COATED, EXTENDED RELEASE ORAL at 08:38

## 2025-04-25 RX ADMIN — BUSPIRONE HYDROCHLORIDE 10 MG: 10 TABLET ORAL at 08:39

## 2025-04-25 RX ADMIN — HYDROXYZINE HYDROCHLORIDE 50 MG: 25 TABLET, FILM COATED ORAL at 20:20

## 2025-04-25 RX ADMIN — ATOMOXETINE 25 MG: 25 CAPSULE ORAL at 08:38

## 2025-04-25 RX ADMIN — FAMOTIDINE 20 MG: 20 TABLET ORAL at 08:38

## 2025-04-25 RX ADMIN — GABAPENTIN 300 MG: 300 CAPSULE ORAL at 08:38

## 2025-04-25 RX ADMIN — VILAZODONE HYDROCHLORIDE 40 MG: 10 TABLET ORAL at 08:39

## 2025-04-25 RX ADMIN — MONTELUKAST 10 MG: 10 TABLET, FILM COATED ORAL at 20:20

## 2025-04-25 RX ADMIN — AMLODIPINE BESYLATE 2.5 MG: 2.5 TABLET ORAL at 08:38

## 2025-04-25 RX ADMIN — BUSPIRONE HYDROCHLORIDE 10 MG: 10 TABLET ORAL at 20:20

## 2025-04-25 RX ADMIN — BUSPIRONE HYDROCHLORIDE 10 MG: 10 TABLET ORAL at 13:38

## 2025-04-25 RX ADMIN — FERROUS SULFATE TAB 325 MG (65 MG ELEMENTAL FE) 325 MG: 325 (65 FE) TAB at 08:38

## 2025-04-25 RX ADMIN — RAMELTEON 8 MG: 8 TABLET, FILM COATED ORAL at 20:20

## 2025-04-25 RX ADMIN — LAMOTRIGINE 100 MG: 100 TABLET ORAL at 08:38

## 2025-04-25 ASSESSMENT — ACTIVITIES OF DAILY LIVING (ADL)
DRESS: SCRUBS (BEHAVIORAL HEALTH)
ADLS_ACUITY_SCORE: 25
ORAL_HYGIENE: INDEPENDENT
HYGIENE/GROOMING: INDEPENDENT
ADLS_ACUITY_SCORE: 25
ORAL_HYGIENE: INDEPENDENT
ADLS_ACUITY_SCORE: 25
LAUNDRY: UNABLE TO COMPLETE
ADLS_ACUITY_SCORE: 25
ADLS_ACUITY_SCORE: 25
HYGIENE/GROOMING: INDEPENDENT
DRESS: SCRUBS (BEHAVIORAL HEALTH);INDEPENDENT
ADLS_ACUITY_SCORE: 25
LAUNDRY: UNABLE TO COMPLETE
ADLS_ACUITY_SCORE: 25

## 2025-04-25 NOTE — PLAN OF CARE
Problem: Suicide Risk  Goal: Absence of Self-Harm  Description: Patient will report a decrease in suicidal ideation.  Outcome: Progressing     Problem: Adult Behavioral Health Plan of Care  Goal: Patient-Specific Goal (Individualization)  Description: Patient will sleep 6 to 8 hours per night  Patient will eat at least 50% of meals  Patient will attend at least 50% of groups  Patient will comply with recommendations of treatment team  Patient will remain medication compliant    *Patient to be reminded to toilet every 2 hours on all shifts*  Outcome: Progressing    Face to face shift report received from previously assigned nurse. Rounding completed, pt observed sitting in the lounge, watching television.    Patient is met with in the lounge. Denies SI, HI, A/V hallucinations, and depression. Endorses pain, 4/10, in her neck, heat applied at this time. Endorses anxiety, but states it is better than it has been previously. Patient is pleasant in conversation, appropriate with boundaries. No further needs at this time.    Patient has not had any bladder incidents this evening. Compliant with reminders. Compliant with HS medications, patient is social with peers, playing games in the lounge. No further needs at this time.      Face to face report communicated to oncoming RN.    Michelle Ferguson RN  4/25/2025  4:25 PM

## 2025-04-25 NOTE — PROGRESS NOTES
Assessment/Intervention/Current Symptoms and Care Coordination:  Confirmed psychiatry appointment: June 2 @9:30am  Sabrina Peri  [facility]  [address],  [place] [address]  [phone]    Scheduled hospital follow up appointment:  Patient's PCP is booked out, scheduled with other provider    Friday, May 2 @10:15am  iVctoria Smyth  [facility]  [address]  [place] [id number] [phone]    SW met with patient. Informed her of scheduled appointments. Patient reports she is tired and resting today. Pt denies having other needs or questions for writer at this time.      Discharge Plan or Goal:  Symptom stabilization  Development of coping skills  Development of safe discharge plan     Discharge Checklist:  Transportation: TBD  Medications ordered/sent to: TBD  Appointments scheduled:   Psychiatry - 6/2 @9:30am  Therapy - 4/30 @8am  PCP - 5/2  AVS complete: Yes     Legal Status:  Voluntary     Contacts:  Venessa Alvarado -  - [phone]

## 2025-04-25 NOTE — PLAN OF CARE
Problem: Adult Behavioral Health Plan of Care  Goal: Patient-Specific Goal (Individualization)  Description: Patient will sleep 6 to 8 hours per night  Patient will eat at least 50% of meals  Patient will attend at least 50% of groups  Patient will comply with recommendations of treatment team  Patient will remain medication compliant    *Patient to be reminded to toilet every 2 hours on all shifts*  Outcome: Progressing     Problem: Suicide Risk  Goal: Absence of Self-Harm  Description: Patient will report a decrease in suicidal ideation.  Outcome: Progressing     Face to face shift report received from Kimberlee MONROY. Rounding completed, pt observed.

## 2025-04-25 NOTE — PLAN OF CARE
Pt. Observed.    Problem: Adult Behavioral Health Plan of Care  Goal: Patient-Specific Goal (Individualization)  Description: Patient will sleep 6 to 8 hours per night  Patient will eat at least 50% of meals  Patient will attend at least 50% of groups  Patient will comply with recommendations of treatment team  Patient will remain medication compliant    *Patient to be reminded to toilet every 2 hours on all shifts*  Outcome: Progressing   Pt has been in bed with eyes closed and regular respirations x 6.5 hours this noc shift. 15 minute and PRN checks all night. No complaints offered. Will continue to monitor.      Face to face end of shift report communicated to oncoming RN.     Dona Kearns, PORFIRIO  4/25/2025

## 2025-04-25 NOTE — PROGRESS NOTES
[facility] PSYCHIATRY  PROGRESS NOTE     SUBJECTIVE     Prior to interviewing the patient, I met with nursing and reviewed patient's clinical condition. We discussed clinical care both before and after the interview. I have reviewed the patient's clinical course by review of records including previous notes, labs, and vital signs.     Per nursing, the patient had the following behavioral events over the last 24-hours: none. No urinary incontinence. Slept 6 hours.    On psychiatric interview, pt is met in her room where she is resting in bed. She notes she is going to rest until lunch and then go to afternoon groups. She reports sleeping ok, being woke up by noise on the unit. Notes still feeling a little down, thinking about her boyfriend. Denies SI today. Reports anxiety improved since starting to feel as the medications are working. She does not feel fully prepared to discharge as yet.     Denies acute concerns. Denies any noted adverse effects from medication. Reviewed plan to follow up with psychiatric medication manager after discharge.      MEDICATIONS   Scheduled Meds:  Current Facility-Administered Medications   Medication Dose Route Frequency Provider Last Rate Last Admin    amLODIPine (NORVASC) tablet 2.5 mg  2.5 mg Oral Elmer Multani MD   2.5 mg at 04/25/25 0838    ARIPiprazole (ABILIFY) tablet 20 mg  20 mg Oral At Bedtime Elmer Mathews MD   20 mg at 04/24/25 2034    atomoxetine (STRATTERA) capsule 25 mg  25 mg Oral MARYM Elmer Mathews MD   25 mg at 04/25/25 0838    busPIRone (BUSPAR) tablet 10 mg  10 mg Oral TID Isma Ward APRN CNP   10 mg at 04/25/25 0839    cholecalciferol (VITAMIN D3) capsule 125 mcg  125 mcg Oral Daily Elmer Mathews MD   125 mcg at 04/25/25 0838    diltiazem ER COATED BEADS (CARDIZEM CD/CARTIA XT) 24 hr capsule 180 mg  180 mg Oral BID Elmer Mathews MD   180 mg at 04/25/25 0838    estradiol (CLIMARA) 0.05 MG/24HR WK patch 1 patch  1 patch Transdermal  Once per day on Monday Thursday Elmer Mathews MD   1 patch at 04/24/25 0840    estradiol weekly (CLIMARA) Patch in Place   Transdermal Q8H IZA Elmer Mathews MD        famotidine (PEPCID) tablet 20 mg  20 mg Oral BID Elmer Mathews MD   20 mg at 04/25/25 0838    ferrous sulfate (FEROSUL) tablet 325 mg  325 mg Oral Daily Elmer Mathews MD   325 mg at 04/25/25 0838    gabapentin (NEURONTIN) capsule 300 mg  300 mg Oral TID Elmer Mathews MD   300 mg at 04/25/25 0838    hydrOXYzine HCl (ATARAX) tablet 50 mg  50 mg Oral At Bedtime Elmer Mathews MD   50 mg at 04/24/25 2034    lamoTRIgine (LaMICtal) tablet 100 mg  100 mg Oral TID Elmer Mathews MD   100 mg at 04/25/25 0838    montelukast (SINGULAIR) tablet 10 mg  10 mg Oral At Bedtime Elmer Mathews MD   10 mg at 04/24/25 2034    progesterone (PROMETRIUM) capsule 100 mg  100 mg Oral Daily Elmer Mathews MD   100 mg at 04/25/25 0838    ramelteon (ROZEREM) tablet 8 mg  8 mg Oral At Bedtime Elmer Mathews MD   8 mg at 04/24/25 2034    vilazodone (VIIBRYD) tablet 40 mg  40 mg Oral QAM Elmer Mathews MD   40 mg at 04/25/25 0839     PRN Meds:.  Current Facility-Administered Medications   Medication Dose Route Frequency Provider Last Rate Last Admin    albuterol (PROVENTIL HFA/VENTOLIN HFA) inhaler  1-2 puff Inhalation Q4H PRN Elmer Mathews MD        alum & mag hydroxide-simethicone (MAALOX) suspension 30 mL  30 mL Oral Q4H PRN Elmer Mathews MD        amLODIPine (NORVASC) tablet 2.5 mg  2.5 mg Oral QPM PRN Elmer Mathews MD        docusate sodium (COLACE) capsule 100 mg  100 mg Oral BID PRN Elmer Mathews MD   100 mg at 04/23/25 0850    hydrOXYzine HCl (ATARAX) tablet 25 mg  25 mg Oral Q4H PRN Elmer Mathews MD        ibuprofen (ADVIL/MOTRIN) tablet 200-400 mg  200-400 mg Oral Q6H PRN Elmer Mathews MD   400 mg at 04/24/25 1705    Lidocaine (LIDOCARE) 4 % Patch 2 patch  2 patch Transdermal Daily PRN Jaclyn,  "VIANNEY Fox        melatonin tablet 3 mg  3 mg Oral At Bedtime PRN Elmer Mathews MD        muscle rub (ARTHRITIS HOT) pain relief cream   Topical Q6H PRN Dominique Anaya CNP        OLANZapine (zyPREXA) tablet 10 mg  10 mg Oral TID PRN Elmer Mathews MD        Or    OLANZapine (zyPREXA) injection 10 mg  10 mg Intramuscular TID PRN Elmer Mathews MD        polyethylene glycol (MIRALAX) Packet 17 g  17 g Oral Daily PRN Elmer Mathews MD        prazosin (MINIPRESS) capsule 1 mg  1 mg Oral At Bedtime PRN Elmer Mathews MD            ALLERGIES   Allergies   Allergen Reactions    Clonazepam Other (See Comments)     Causes Violence and aggresssion    Hydrocodone-Acetaminophen      Other reaction(s): Seizures  Can take Percocet without difficulty.    Lorazepam Other (See Comments)     Causes Violence and aggresssion    Sertraline Other (See Comments)     Caused suicidally     Bupropion Other (See Comments)     Caused Major Depression    Dust Mite Extract      Other reaction(s): Asthma symptoms    Lithium Other (See Comments)     Mood alteration    Milk Protein Unknown    Pollen Extract      Other reaction(s): Asthma symptoms    Risperidone Other (See Comments)     Agitation      Sulfa Antibiotics Itching    Trichophyton      Other reaction(s): Asthma symptoms    Valproic Acid Other (See Comments)     Weight gain        MENTAL STATUS EXAM   Vitals: /71   Pulse 83   Temp 97.8  F (36.6  C) (Tympanic)   Resp 16   Wt (!) 136.1 kg (300 lb 1.6 oz)   SpO2 94%   BMI 48.44 kg/m      Appearance:  awake, alert, adequately groomed, dressed in hospital scrubs, and appeared as age stated  Attitude:  cooperative  Eye Contact:  good  Mood:   \"still depressed\"  Affect:   more mobile, smiles  Speech:  clear, coherent  Psychomotor Behavior:  no evidence of tardive dyskinesia, dystonia, or tics  Thought Process:  linear  Associations:  no loose associations  Thought Content:  no evidence of suicidal ideation or " homicidal ideation and no evidence of psychotic thought  Insight:  fair  Judgment:  intact  Oriented to:  time, person, and place  Attention Span and Concentration:  intact  Recent and Remote Memory:  intact  Language: English with appropriate syntax and vocabulary    Fund of Knowledge: low-normal  Muscle Strength and Tone: normal  Gait and Station: Normal       LABS   No results found for this or any previous visit (from the past 24 hours).      IMPRESSION     This is a 50 year old female with a PMH of of bipolar disorder, BPD, SI/SA who presents with increased depression and SI with plan to cut wrists in the context of relational stress with significant other. Pt has belief that SO is berating her d/t him being in another relationship, which could be true, however unconfirmed. Worsening mood and thoughts of harming self increased as a result. Crisis stabilization at [facility], discharging 2/11 and recently hospitalization here 2/25-3/4/25 with a similar presentation. Hx of EtOH with 4 years of sobriety, denies use and EtOH in ED negative. UDS negative with substances not appearing to be contributor. Due to the above presentation, pt was admitted for [facility] for further safety and stabilization.       In terms of treatment, will increase BuSpar for mixed anxiety and depression. Consider OT for coping mechanisms (had done during previous admit), will discuss with OT.      Today: Anxiety improving with pt reporting depressive sx related to reported relational strife with boyfriend. Denies SI, notes improving. Attending groups. Tolerating increase in BuSpar. Recommend meeting with OP psychiatric manager to assess if BuSpar could be tapered back once current crisis has reduced. Would like need to be on this dose a few months. Could also review medications and simplify if able. Anticipate pt could use a few more days to stabilize and return home.      DIAGNOSES     #. Bipolar I  disorder, current episode depressed  #. AGNIESZKA  #. Borderline personality traits       PLAN     Location: Unit 5  Legal Status: None    Safety Assessment:    Behavioral Orders   Procedures    Code 1 - Restrict to Unit    Routine Programming     As clinically indicated    Status 15     Every 15 minutes.      PTA psychotropic medications stopped:     - none    PTA psychotropic medications continued/changed:     - BuSpar 10 mg BID->10 mg TID 4/23  - Stattera 25 mg daily  - Abilify 20 mg daily  - gabapentin 300 mg TID  - hydroxyzine 50 mg at bedtime  - Lamictal 100 mg TID  - Viibryd 40 mg daily  - Prazosin 1 mg at bedtime  - Rozerem 8 mg at bedtime  - Hydroxyzine 25 mg QID PRN (PTA 50 mg QID PRN)       New medications tried and stopped:     -None    New medications initiated:     - Standard unit PRNS     Today's Changes:    - none    Programming: Patient will be treated in a therapeutic milieu with appropriate individual and group therapies. Education will be provided on diagnoses, medications, and treatments.     Medical diagnoses:  Per medicine    Consult: OT  Tests: None    Anticipated LOS: 3-5 days  Disposition: home with established services         ATTESTATION      DASHA Coleman CNP    Type of Service: video visit for mental health treatment  Reason for Video Visit: COVID-19 [place]  Originating Site (patient location): [facility]  Distant Site (provider location): Remote Location  Mode of Communication: Video Conference via [employer]  Time of Service: Date: 04/25/2025 , Start: 1100,  Stop: 1130

## 2025-04-25 NOTE — PLAN OF CARE
Face to face shift report received from PORFIRIO Carcamo. Rounding completed, pt observed sitting in lounge at start of shift.    Problem: Suicide Risk  Goal: Absence of Self-Harm  Description: Patient will report a decrease in suicidal ideation.  Outcome: Progressing     Problem: Adult Behavioral Health Plan of Care  Goal: Patient-Specific Goal (Individualization)  Description: Patient will sleep 6 to 8 hours per night  Patient will eat at least 50% of meals  Patient will attend at least 50% of groups  Patient will comply with recommendations of treatment team  Patient will remain medication compliant    *Patient to be reminded to toilet every 2 hours on all shifts*  Outcome: Progressing   Goal Outcome Evaluation:          Pt. C/O being tired this shift. They spent majority of shift before lunch resting in bed. They did come out to the lounge to eat 100% at breakfast. Pt. Stated that they had planned to shower later and then attend afternoon groups. Pt. Later attended a group. 75% was eaten at lunch. Pt. Stated that their anxiety was starting to get better and that their depression was trying to get better. Scheduled medications were taken with no issues.     Face to face report will be communicated to oncoming RN.    Oralia Patel RN  4/25/2025  1:45 PM

## 2025-04-25 NOTE — PROGRESS NOTES
04/25/25 1100   Appointment Info   Signing Clinician's Name / Credentials (OT) Malaika Saldana OTR/L   Therapeutic Activities   Therapeutic Activity Minutes (55337) 15   Symptoms noted during/after treatment none   Treatment Detail/Skilled Intervention Pt in bed upon approach, requests to stay in room for therapy session.  Handout and education provided on fair fighting and reviewed slef affirmations.  Pt has created a list of 5 qualities/traits she likes about herself and is able to identify an affirmation that she likes.  Encouraged pt to pick one affirmation each day and journal about it for self esteem.   OT Discharge Planning   OT Plan Continue for self esteem and communication   OT Discharge Recommendation (DC Rec) home   OT Rationale for DC Rec Clinical judgemnt   OT Brief overview of current status [place] today, participates minimally citing she is tired. Does agrees to get up out of bed later today.  Encouraged positive journaling and educated in fair fighting and affirmations.   Total Session Time   Timed Code Treatment Minutes 15   Total Session Time (sum of timed and untimed services) 15

## 2025-04-26 PROCEDURE — 99232 SBSQ HOSP IP/OBS MODERATE 35: CPT

## 2025-04-26 PROCEDURE — 124N000001 HC R&B MH

## 2025-04-26 PROCEDURE — 250N000013 HC RX MED GY IP 250 OP 250 PS 637: Performed by: PSYCHIATRY & NEUROLOGY

## 2025-04-26 PROCEDURE — 250N000013 HC RX MED GY IP 250 OP 250 PS 637

## 2025-04-26 RX ADMIN — LAMOTRIGINE 100 MG: 100 TABLET ORAL at 20:25

## 2025-04-26 RX ADMIN — BUSPIRONE HYDROCHLORIDE 10 MG: 10 TABLET ORAL at 08:27

## 2025-04-26 RX ADMIN — DILTIAZEM HYDROCHLORIDE 180 MG: 180 CAPSULE, COATED, EXTENDED RELEASE ORAL at 08:27

## 2025-04-26 RX ADMIN — RAMELTEON 8 MG: 8 TABLET, FILM COATED ORAL at 20:28

## 2025-04-26 RX ADMIN — LAMOTRIGINE 100 MG: 100 TABLET ORAL at 08:27

## 2025-04-26 RX ADMIN — AMLODIPINE BESYLATE 2.5 MG: 2.5 TABLET ORAL at 08:26

## 2025-04-26 RX ADMIN — ARIPIPRAZOLE 20 MG: 10 TABLET ORAL at 20:24

## 2025-04-26 RX ADMIN — GABAPENTIN 300 MG: 300 CAPSULE ORAL at 20:25

## 2025-04-26 RX ADMIN — BUSPIRONE HYDROCHLORIDE 10 MG: 10 TABLET ORAL at 20:28

## 2025-04-26 RX ADMIN — HYDROXYZINE HYDROCHLORIDE 50 MG: 25 TABLET, FILM COATED ORAL at 20:28

## 2025-04-26 RX ADMIN — VILAZODONE HYDROCHLORIDE 40 MG: 10 TABLET ORAL at 08:26

## 2025-04-26 RX ADMIN — ATOMOXETINE 25 MG: 25 CAPSULE ORAL at 08:27

## 2025-04-26 RX ADMIN — GABAPENTIN 300 MG: 300 CAPSULE ORAL at 13:04

## 2025-04-26 RX ADMIN — FAMOTIDINE 20 MG: 20 TABLET ORAL at 08:27

## 2025-04-26 RX ADMIN — GABAPENTIN 300 MG: 300 CAPSULE ORAL at 08:27

## 2025-04-26 RX ADMIN — FERROUS SULFATE TAB 325 MG (65 MG ELEMENTAL FE) 325 MG: 325 (65 FE) TAB at 08:27

## 2025-04-26 RX ADMIN — LAMOTRIGINE 100 MG: 100 TABLET ORAL at 13:04

## 2025-04-26 RX ADMIN — PROGESTERONE 100 MG: 100 CAPSULE ORAL at 08:26

## 2025-04-26 RX ADMIN — DILTIAZEM HYDROCHLORIDE 180 MG: 180 CAPSULE, COATED, EXTENDED RELEASE ORAL at 20:27

## 2025-04-26 RX ADMIN — BUSPIRONE HYDROCHLORIDE 10 MG: 10 TABLET ORAL at 13:04

## 2025-04-26 RX ADMIN — FAMOTIDINE 20 MG: 20 TABLET ORAL at 20:27

## 2025-04-26 RX ADMIN — Medication 125 MCG: at 08:27

## 2025-04-26 RX ADMIN — MONTELUKAST 10 MG: 10 TABLET, FILM COATED ORAL at 20:24

## 2025-04-26 ASSESSMENT — ACTIVITIES OF DAILY LIVING (ADL)
HYGIENE/GROOMING: INDEPENDENT
HYGIENE/GROOMING: INDEPENDENT
LAUNDRY: UNABLE TO COMPLETE
ADLS_ACUITY_SCORE: 25
DRESS: SCRUBS (BEHAVIORAL HEALTH)
ADLS_ACUITY_SCORE: 25
ORAL_HYGIENE: INDEPENDENT
ADLS_ACUITY_SCORE: 25
DRESS: SCRUBS (BEHAVIORAL HEALTH)
ADLS_ACUITY_SCORE: 25
LAUNDRY: UNABLE TO COMPLETE
ADLS_ACUITY_SCORE: 25
ADLS_ACUITY_SCORE: 25
ORAL_HYGIENE: INDEPENDENT
ADLS_ACUITY_SCORE: 25

## 2025-04-26 NOTE — PLAN OF CARE
Problem: Adult Behavioral Health Plan of Care  Goal: Patient-Specific Goal (Individualization)  Description: Patient will sleep 6 to 8 hours per night  Patient will eat at least 50% of meals  Patient will attend at least 50% of groups  Patient will comply with recommendations of treatment team  Patient will remain medication compliant    *Patient to be reminded to toilet every 2 hours on all shifts*  Outcome: Progressing   Goal Outcome Evaluation:        Face to face shift report received from RN. Rounding completed, pt observed.Client rested in room for 7 hours with eyes closed and respirations noted. Client had no falls or instances of instability this shift.Face to face report will be communicated to oncoming RN.    Arnaud Middleton RN  4/26/2025  6:12 AM

## 2025-04-26 NOTE — PLAN OF CARE
Problem: Suicide Risk  Goal: Absence of Self-Harm  Description: Patient will report a decrease in suicidal ideation.  Outcome: Progressing     Problem: Adult Behavioral Health Plan of Care  Goal: Patient-Specific Goal (Individualization)  Description: Patient will sleep 6 to 8 hours per night  Patient will eat at least 50% of meals  Patient will attend at least 50% of groups  Patient will comply with recommendations of treatment team  Patient will remain medication compliant    *Patient to be reminded to toilet every 2 hours on all shifts*  Outcome: Progressing     Pt awake in lounge at start of shift, then went to room to rest before dinner. Upon assessment pt reports feeling calm and tired.  Pt verbalizes a reduction in symptoms r/t depression and anxiety.   Pt denies current thoughts of SI/SIB, HI and A/VH and none observed.  Pt  also denies pain and other physical concerns.  Later in the evening pt reports increased anxiety due to not receiving a call from SO like she had hoped.  SO currently inpatient at [facility].  Pt cooperative with medications and interacted appropriately with staff and peers.  Reminders given periodically to void as requested.  No additional concerns.  Report given to oncoming RN.

## 2025-04-26 NOTE — PROGRESS NOTES
[facility] PSYCHIATRY  PROGRESS NOTE     SUBJECTIVE     Prior to interviewing the patient, I met with nursing and reviewed patient's clinical condition. We discussed clinical care both before and after the interview. I have reviewed the patient's clinical course by review of records including previous notes, labs, and vital signs.     Per nursing, the patient had the following behavioral events over the last 24-hours: none. No urinary incontinence. Slept 7 hours.    On psychiatric interview, pt is met in her room where she is resting in bed. Tells me she is going to group shortly. States she slep ok last night. Endorses depression, thought improved since admission and rates 5/10. Notes anxiety as minimal, rates 2-3/10. Denies SI, HI or SIB.     Denies acute concerns. Denies any noted adverse effects from medication.      MEDICATIONS   Scheduled Meds:  Current Facility-Administered Medications   Medication Dose Route Frequency Provider Last Rate Last Admin    amLODIPine (NORVASC) tablet 2.5 mg  2.5 mg Oral Elmer Multani MD   2.5 mg at 04/26/25 0826    ARIPiprazole (ABILIFY) tablet 20 mg  20 mg Oral At Bedtime Elmer Mathews MD   20 mg at 04/25/25 2020    atomoxetine (STRATTERA) capsule 25 mg  25 mg Oral MARY Elmer Mathews MD   25 mg at 04/26/25 0827    busPIRone (BUSPAR) tablet 10 mg  10 mg Oral TID Isma Ward APRN CNP   10 mg at 04/26/25 0827    cholecalciferol (VITAMIN D3) capsule 125 mcg  125 mcg Oral Daily Elmer Mathews MD   125 mcg at 04/26/25 0827    diltiazem ER COATED BEADS (CARDIZEM CD/CARTIA XT) 24 hr capsule 180 mg  180 mg Oral BID Elmer Mathews MD   180 mg at 04/26/25 0827    estradiol (CLIMARA) 0.05 MG/24HR WK patch 1 patch  1 patch Transdermal Once per day on Monday Thursday Elmer Mathews MD   1 patch at 04/24/25 0840    estradiol weekly (CLIMARA) Patch in Place   Transdermal Q8H IZA Elmer Mathews MD        famotidine (PEPCID) tablet 20 mg  20 mg Oral BID  Elmer Mathews MD   20 mg at 04/26/25 0827    ferrous sulfate (FEROSUL) tablet 325 mg  325 mg Oral Daily Elmer Mathews MD   325 mg at 04/26/25 0827    gabapentin (NEURONTIN) capsule 300 mg  300 mg Oral TID Elmer Mathews MD   300 mg at 04/26/25 0827    hydrOXYzine HCl (ATARAX) tablet 50 mg  50 mg Oral At Bedtime Elmer Mathews MD   50 mg at 04/25/25 2020    lamoTRIgine (LaMICtal) tablet 100 mg  100 mg Oral TID Elmer Mathews MD   100 mg at 04/26/25 0827    montelukast (SINGULAIR) tablet 10 mg  10 mg Oral At Bedtime Elmer Mathews MD   10 mg at 04/25/25 2020    progesterone (PROMETRIUM) capsule 100 mg  100 mg Oral Daily Elmer Mathews MD   100 mg at 04/26/25 0826    ramelteon (ROZEREM) tablet 8 mg  8 mg Oral At Bedtime Elmer Mathews MD   8 mg at 04/25/25 2020    vilazodone (VIIBRYD) tablet 40 mg  40 mg Oral QAM Elmer Mathews MD   40 mg at 04/26/25 0826     PRN Meds:.  Current Facility-Administered Medications   Medication Dose Route Frequency Provider Last Rate Last Admin    albuterol (PROVENTIL HFA/VENTOLIN HFA) inhaler  1-2 puff Inhalation Q4H PRN Elmer Mathews MD        alum & mag hydroxide-simethicone (MAALOX) suspension 30 mL  30 mL Oral Q4H PRN Elmer Mathews MD        amLODIPine (NORVASC) tablet 2.5 mg  2.5 mg Oral QPM PRN Elmer Mathews MD        docusate sodium (COLACE) capsule 100 mg  100 mg Oral BID PRN Elmer Mathews MD   100 mg at 04/23/25 0850    hydrOXYzine HCl (ATARAX) tablet 25 mg  25 mg Oral Q4H PRN Elmer Mathews MD        ibuprofen (ADVIL/MOTRIN) tablet 200-400 mg  200-400 mg Oral Q6H PRN Elmer Mathews MD   400 mg at 04/24/25 1705    Lidocaine (LIDOCARE) 4 % Patch 2 patch  2 patch Transdermal Daily PRN Dominique Anaya CNP        melatonin tablet 3 mg  3 mg Oral At Bedtime PRN Elmer Mathews MD        muscle rub (ARTHRITIS HOT) pain relief cream   Topical Q6H PRN Dominique Anaya CNP        OLANZapine (zyPREXA) tablet 10 mg  10 mg  "Oral TID PRN Elmer Mathews MD        Or    OLANZapine (zyPREXA) injection 10 mg  10 mg Intramuscular TID PRN Elmer Mathews MD        polyethylene glycol (MIRALAX) Packet 17 g  17 g Oral Daily PRN Elmer Mathews MD        prazosin (MINIPRESS) capsule 1 mg  1 mg Oral At Bedtime PRN Elmer Mathews MD            ALLERGIES   Allergies   Allergen Reactions    Clonazepam Other (See Comments)     Causes Violence and aggresssion    Hydrocodone-Acetaminophen      Other reaction(s): Seizures  Can take Percocet without difficulty.    Lorazepam Other (See Comments)     Causes Violence and aggresssion    Sertraline Other (See Comments)     Caused suicidally     Bupropion Other (See Comments)     Caused Major Depression    Dust Mite Extract      Other reaction(s): Asthma symptoms    Lithium Other (See Comments)     Mood alteration    Milk Protein Unknown    Pollen Extract      Other reaction(s): Asthma symptoms    Risperidone Other (See Comments)     Agitation      Sulfa Antibiotics Itching    Trichophyton      Other reaction(s): Asthma symptoms    Valproic Acid Other (See Comments)     Weight gain        MENTAL STATUS EXAM   Vitals: /63   Pulse 95   Temp 97  F (36.1  C) (Tympanic)   Resp 16   Wt (!) 136.1 kg (300 lb 1.6 oz)   SpO2 95%   BMI 48.44 kg/m      Appearance:  awake, alert, adequately groomed, dressed in hospital scrubs, and appeared as age stated  Attitude:  cooperative  Eye Contact:  good  Mood:   \"a little better\"  Affect:   more mobile, smiles  Speech:  clear, coherent  Psychomotor Behavior:  no evidence of tardive dyskinesia, dystonia, or tics  Thought Process:  linear  Associations:  no loose associations  Thought Content:  no evidence of suicidal ideation or homicidal ideation and no evidence of psychotic thought  Insight:  fair  Judgment:  intact  Oriented to:  time, person, and place  Attention Span and Concentration:  intact  Recent and Remote Memory:  intact  Language: English " with appropriate syntax and vocabulary    Fund of Knowledge: low-normal  Muscle Strength and Tone: normal  Gait and Station: Normal       LABS   No results found for this or any previous visit (from the past 24 hours).      IMPRESSION     This is a 50 year old female with a PMH of of bipolar disorder, BPD, SI/SA who presents with increased depression and SI with plan to cut wrists in the context of relational stress with significant other. Pt has belief that SO is berating her d/t him being in another relationship, which could be true, however unconfirmed. Worsening mood and thoughts of harming self increased as a result. Crisis stabilization at [facility], discharging 2/11 and recently hospitalization here 2/25-3/4/25 with a similar presentation. Hx of EtOH with 4 years of sobriety, denies use and EtOH in ED negative. UDS negative with substances not appearing to be contributor. Due to the above presentation, pt was admitted for [facility] for further safety and stabilization.       In terms of treatment, will increase BuSpar for mixed anxiety and depression. Consider OT for coping mechanisms (had done during previous admit), will discuss with OT.      Today: Anxiety and depression both improving. Denies SI. Attending groups. Tolerating increase in BuSpar. Recommend meeting with OP psychiatric manager to assess if BuSpar could be tapered back once current crisis has reduced. Would like need to be on this dose a few months. Could also review medications and simplify if able. Anticipate pt could use a few more days to stabilize and return home.      DIAGNOSES     #. Bipolar I disorder, current episode depressed  #. AGNIESZKA  #. Borderline personality traits       PLAN     Location: Unit 5  Legal Status: None    Safety Assessment:    Behavioral Orders   Procedures    Code 1 - Restrict to Unit    Routine Programming     As clinically indicated    Status 15     Every 15 minutes.      PTA  psychotropic medications stopped:     - none    PTA psychotropic medications continued/changed:     - BuSpar 10 mg BID->10 mg TID 4/23  - Stattera 25 mg daily  - Abilify 20 mg daily  - gabapentin 300 mg TID  - hydroxyzine 50 mg at bedtime  - Lamictal 100 mg TID  - Viibryd 40 mg daily  - Prazosin 1 mg at bedtime  - Rozerem 8 mg at bedtime  - Hydroxyzine 25 mg QID PRN (PTA 50 mg QID PRN)       New medications tried and stopped:     -None    New medications initiated:     - Standard unit PRNS     Today's Changes:    - none    Programming: Patient will be treated in a therapeutic milieu with appropriate individual and group therapies. Education will be provided on diagnoses, medications, and treatments.     Medical diagnoses:  Per medicine    Consult: OT  Tests: None    Anticipated LOS: 3-5 days  Disposition: home with established services         ATTESTATION      DASHA Coleman CNP    Type of Service: video visit for mental health treatment  Reason for Video Visit: COVID-19 [place]  Originating Site (patient location): [facility]  Distant Site (provider location): Remote Location  Mode of Communication: Video Conference via [employer]  Time of Service: Date: 04/26/2025 , Start: 0945,  Stop: 1015

## 2025-04-26 NOTE — PLAN OF CARE
Problem: Suicide Risk  Goal: Absence of Self-Harm  Description: Patient will report a decrease in suicidal ideation.  Outcome: Progressing     Problem: Adult Behavioral Health Plan of Care  Goal: Patient-Specific Goal (Individualization)  Description: Patient will sleep 6 to 8 hours per night  Patient will eat at least 50% of meals  Patient will attend at least 50% of groups  Patient will comply with recommendations of treatment team  Patient will remain medication compliant    *Patient to be reminded to toilet every 2 hours on all shifts*  Outcome: Progressing    Face to face shift report received from previously assigned nurse. Rounding completed, pt observed resting in bed with eyes closed and easy respirations.    Patient is met with in her room. Denies pain, SI, HI, A/V hallucinations, and depression. Endorses anxiety, but states it is better than previous and she is happy with the progress she has made. Compliant with AM medication administration. Requesting to rest until groups start. No further needs at this time.     Patient pleasant and cooperative throughout the day, attending groups when available, no further needs at this time.     Face to face report communicated to oncoming RN.    Michelle Ferguson RN  4/26/2025  8:56 AM

## 2025-04-27 PROCEDURE — 124N000001 HC R&B MH

## 2025-04-27 PROCEDURE — 250N000013 HC RX MED GY IP 250 OP 250 PS 637: Performed by: PSYCHIATRY & NEUROLOGY

## 2025-04-27 PROCEDURE — 99232 SBSQ HOSP IP/OBS MODERATE 35: CPT

## 2025-04-27 PROCEDURE — 250N000013 HC RX MED GY IP 250 OP 250 PS 637

## 2025-04-27 RX ORDER — ACETAMINOPHEN 325 MG/1
975 TABLET ORAL EVERY 6 HOURS PRN
Status: DISCONTINUED | OUTPATIENT
Start: 2025-04-27 | End: 2025-04-29 | Stop reason: HOSPADM

## 2025-04-27 RX ADMIN — ARIPIPRAZOLE 20 MG: 10 TABLET ORAL at 20:28

## 2025-04-27 RX ADMIN — BUSPIRONE HYDROCHLORIDE 10 MG: 10 TABLET ORAL at 08:46

## 2025-04-27 RX ADMIN — VILAZODONE HYDROCHLORIDE 40 MG: 10 TABLET ORAL at 08:45

## 2025-04-27 RX ADMIN — MONTELUKAST 10 MG: 10 TABLET, FILM COATED ORAL at 20:27

## 2025-04-27 RX ADMIN — BUSPIRONE HYDROCHLORIDE 10 MG: 10 TABLET ORAL at 14:32

## 2025-04-27 RX ADMIN — GABAPENTIN 300 MG: 300 CAPSULE ORAL at 08:46

## 2025-04-27 RX ADMIN — GABAPENTIN 300 MG: 300 CAPSULE ORAL at 14:33

## 2025-04-27 RX ADMIN — FERROUS SULFATE TAB 325 MG (65 MG ELEMENTAL FE) 325 MG: 325 (65 FE) TAB at 08:45

## 2025-04-27 RX ADMIN — GABAPENTIN 300 MG: 300 CAPSULE ORAL at 20:29

## 2025-04-27 RX ADMIN — LAMOTRIGINE 100 MG: 100 TABLET ORAL at 14:33

## 2025-04-27 RX ADMIN — FAMOTIDINE 20 MG: 20 TABLET ORAL at 08:45

## 2025-04-27 RX ADMIN — DILTIAZEM HYDROCHLORIDE 180 MG: 180 CAPSULE, COATED, EXTENDED RELEASE ORAL at 08:45

## 2025-04-27 RX ADMIN — LAMOTRIGINE 100 MG: 100 TABLET ORAL at 08:46

## 2025-04-27 RX ADMIN — BUSPIRONE HYDROCHLORIDE 10 MG: 10 TABLET ORAL at 20:27

## 2025-04-27 RX ADMIN — PROGESTERONE 100 MG: 100 CAPSULE ORAL at 08:45

## 2025-04-27 RX ADMIN — FAMOTIDINE 20 MG: 20 TABLET ORAL at 20:27

## 2025-04-27 RX ADMIN — DILTIAZEM HYDROCHLORIDE 180 MG: 180 CAPSULE, COATED, EXTENDED RELEASE ORAL at 20:27

## 2025-04-27 RX ADMIN — LAMOTRIGINE 100 MG: 100 TABLET ORAL at 20:29

## 2025-04-27 RX ADMIN — AMLODIPINE BESYLATE 2.5 MG: 2.5 TABLET ORAL at 08:45

## 2025-04-27 RX ADMIN — HYDROXYZINE HYDROCHLORIDE 50 MG: 25 TABLET, FILM COATED ORAL at 20:29

## 2025-04-27 RX ADMIN — Medication 125 MCG: at 08:45

## 2025-04-27 RX ADMIN — RAMELTEON 8 MG: 8 TABLET, FILM COATED ORAL at 20:28

## 2025-04-27 RX ADMIN — IBUPROFEN 400 MG: 200 TABLET, FILM COATED ORAL at 20:39

## 2025-04-27 RX ADMIN — ATOMOXETINE 25 MG: 25 CAPSULE ORAL at 08:45

## 2025-04-27 ASSESSMENT — ACTIVITIES OF DAILY LIVING (ADL)
LAUNDRY: UNABLE TO COMPLETE
ADLS_ACUITY_SCORE: 25
ADLS_ACUITY_SCORE: 25
HYGIENE/GROOMING: INDEPENDENT
ORAL_HYGIENE: INDEPENDENT
ADLS_ACUITY_SCORE: 25
ADLS_ACUITY_SCORE: 25
DRESS: SCRUBS (BEHAVIORAL HEALTH)
ADLS_ACUITY_SCORE: 25
ORAL_HYGIENE: INDEPENDENT
ADLS_ACUITY_SCORE: 25
HYGIENE/GROOMING: INDEPENDENT
ADLS_ACUITY_SCORE: 25
LAUNDRY: UNABLE TO COMPLETE
ADLS_ACUITY_SCORE: 25
ADLS_ACUITY_SCORE: 25
DRESS: SCRUBS (BEHAVIORAL HEALTH)

## 2025-04-27 NOTE — PLAN OF CARE
"  Problem: Adult Behavioral Health Plan of Care  Goal: Patient-Specific Goal (Individualization)  Description: Patient will sleep 6 to 8 hours per night  Patient will eat at least 50% of meals  Patient will attend at least 50% of groups  Patient will comply with recommendations of treatment team  Patient will remain medication compliant    *Patient to be reminded to toilet every 2 hours on all shifts*  Outcome: Progressing   Goal Outcome Evaluation:    Plan of Care Reviewed With: patient      Patient is Alert, able to make needs known. VSS, afebrile. Assessment and vitals as charted. Denies pain.     Patient states she feels \"okay.\" Does endorse depression, denies SI or SIB. She states she feels a lot better since admission. She has spent much time out in lounge, socializing with peers.   Appetite good. Steady gait, no falls. Took scheduled medications. Denies side effects.     "

## 2025-04-27 NOTE — PLAN OF CARE
Problem: Adult Behavioral Health Plan of Care  Goal: Patient-Specific Goal (Individualization)  Description: Patient will sleep 6 to 8 hours per night  Patient will eat at least 50% of meals  Patient will attend at least 50% of groups  Patient will comply with recommendations of treatment team  Patient will remain medication compliant    *Patient to be reminded to toilet every 2 hours on all shifts*  Outcome: Progressing     Problem: Suicide Risk  Goal: Absence of Self-Harm  Description: Patient will report a decrease in suicidal ideation.  Outcome: Progressing   Goal Outcome Evaluation:    Plan of Care Reviewed With: patient      At start of shift pt was observed to be in lounge playing cards with peers. Affect calm, appropriate interactions with peers, pleasant and forthcoming with staff. Took shower before dinner and attended evening groups.  Mild hypertension noted during vitals assessment, otherwise VSS.  Pt denies SI, SIB, HI and A/VH. C/o of neck pain after working on puzzle and sonny art. Pt requested tylenol but none had been ordered, so pt was offered ibuprofen.  Pt accepted and provider contacted to add tylenol to pt MAR as additional option for pain intervention. Pt reassessed within 1 hour and reports improvement in pain.  Declines additional interventions and is currently resting in bed.  Pt did not receive call back from SO for second day in a row after leaving messages for him to call. Pt is coping.    Face to face end of shift report communicated to lane MONROY.     Kayleen Loja RN  4/27/2025  10:10 PM

## 2025-04-27 NOTE — PLAN OF CARE
Problem: Adult Behavioral Health Plan of Care  Goal: Patient-Specific Goal (Individualization)  Description: Patient will sleep 6 to 8 hours per night  Patient will eat at least 50% of meals  Patient will attend at least 50% of groups  Patient will comply with recommendations of treatment team  Patient will remain medication compliant    *Patient to be reminded to toilet every 2 hours on all shifts*  Outcome: Progressing     Face to face shift report received from Kayleen MONROY. Rounding completed, pt observed.     Pt appeared to sleep most of this shift.    Face to face report will be communicated to oncoming RN.    Deirdre Sotelo RN  4/27/2025  6:37 AM

## 2025-04-27 NOTE — PROGRESS NOTES
"[facility] PSYCHIATRY  PROGRESS NOTE     SUBJECTIVE     Prior to interviewing the patient, I met with nursing and reviewed patient's clinical condition. We discussed clinical care both before and after the interview. I have reviewed the patient's clinical course by review of records including previous notes, labs, and vital signs.     Per nursing, the patient had the following behavioral events over the last 24-hours: none. No urinary incontinence. Appeared to sleep most of the night shift.    On psychiatric interview, pt is met in her room where she is resting in bed. She notes she is \"a little down, but ok\" today. Tells me that her boyfriend did not call her yesterday, which is why she is feeling down. We reviewed that she has been working on her mental health and he is in treatment, which adds multiple variables. Recommended she discuss her thoughts and emotions regarding this relationship with her established therapist. Pt agrees, appt is 4/30.     States she slept alright. Denies anxiety. Thinks she would be ready to discharge Monday or Tuesday. She does want to be able to go to her therapy appointment.     Denies acute concerns. Denies any noted adverse effects from medication.      MEDICATIONS   Scheduled Meds:  Current Facility-Administered Medications   Medication Dose Route Frequency Provider Last Rate Last Admin    amLODIPine (NORVASC) tablet 2.5 mg  2.5 mg Oral Elmer Multani MD   2.5 mg at 04/27/25 0845    ARIPiprazole (ABILIFY) tablet 20 mg  20 mg Oral At Bedtime Elmer Mathews MD   20 mg at 04/26/25 2024    atomoxetine (STRATTERA) capsule 25 mg  25 mg Oral Elmer Multani MD   25 mg at 04/27/25 0845    busPIRone (BUSPAR) tablet 10 mg  10 mg Oral TID Isma Ward APRN CNP   10 mg at 04/27/25 0846    cholecalciferol (VITAMIN D3) capsule 125 mcg  125 mcg Oral Daily Elmer Mathews MD   125 mcg at 04/27/25 0845    diltiazem ER COATED BEADS (CARDIZEM CD/CARTIA XT) 24 hr capsule " 180 mg  180 mg Oral BID Elmer Mathews MD   180 mg at 04/27/25 0845    estradiol (CLIMARA) 0.05 MG/24HR WK patch 1 patch  1 patch Transdermal Once per day on Monday Thursday Elmer Mathews MD   1 patch at 04/24/25 0840    estradiol weekly (CLIMARA) Patch in Place   Transdermal Q8H IZA Elmer Mathews MD        famotidine (PEPCID) tablet 20 mg  20 mg Oral BID Elmer Mathews MD   20 mg at 04/27/25 0845    ferrous sulfate (FEROSUL) tablet 325 mg  325 mg Oral Daily Elmer Mathews MD   325 mg at 04/27/25 0845    gabapentin (NEURONTIN) capsule 300 mg  300 mg Oral TID Elmer Mathews MD   300 mg at 04/27/25 0846    hydrOXYzine HCl (ATARAX) tablet 50 mg  50 mg Oral At Bedtime Elmer Mathews MD   50 mg at 04/26/25 2028    lamoTRIgine (LaMICtal) tablet 100 mg  100 mg Oral TID Elmer Mathews MD   100 mg at 04/27/25 0846    montelukast (SINGULAIR) tablet 10 mg  10 mg Oral At Bedtime Elmer Mathews MD   10 mg at 04/26/25 2024    progesterone (PROMETRIUM) capsule 100 mg  100 mg Oral Daily Elmer Mathews MD   100 mg at 04/27/25 0845    ramelteon (ROZEREM) tablet 8 mg  8 mg Oral At Bedtime Elmer Mathews MD   8 mg at 04/26/25 2028    vilazodone (VIIBRYD) tablet 40 mg  40 mg Oral QAM Elmer Mathews MD   40 mg at 04/27/25 0845     PRN Meds:.  Current Facility-Administered Medications   Medication Dose Route Frequency Provider Last Rate Last Admin    albuterol (PROVENTIL HFA/VENTOLIN HFA) inhaler  1-2 puff Inhalation Q4H PRN Elmer Mathews MD        alum & mag hydroxide-simethicone (MAALOX) suspension 30 mL  30 mL Oral Q4H PRN Elmer Mathews MD        amLODIPine (NORVASC) tablet 2.5 mg  2.5 mg Oral QPM PRN Elmer Mathews MD        docusate sodium (COLACE) capsule 100 mg  100 mg Oral BID PRN Elmer Mathews MD   100 mg at 04/23/25 0850    hydrOXYzine HCl (ATARAX) tablet 25 mg  25 mg Oral Q4H PRN Elmer Mathews MD        ibuprofen (ADVIL/MOTRIN) tablet 200-400 mg  200-400  "mg Oral Q6H PRN Elmer Mathews MD   400 mg at 04/24/25 1705    Lidocaine (LIDOCARE) 4 % Patch 2 patch  2 patch Transdermal Daily PRN Dominique Anaya CNP        melatonin tablet 3 mg  3 mg Oral At Bedtime PRN Elmer Mathews MD        muscle rub (ARTHRITIS HOT) pain relief cream   Topical Q6H PRN Dominique Anaya, CNP        OLANZapine (zyPREXA) tablet 10 mg  10 mg Oral TID PRN Elmer Mathews MD        Or    OLANZapine (zyPREXA) injection 10 mg  10 mg Intramuscular TID PRN Elmer Mathews MD        polyethylene glycol (MIRALAX) Packet 17 g  17 g Oral Daily PRN Elmer Mathews MD        prazosin (MINIPRESS) capsule 1 mg  1 mg Oral At Bedtime PRN Elmer Mathews MD            ALLERGIES   Allergies   Allergen Reactions    Clonazepam Other (See Comments)     Causes Violence and aggresssion    Hydrocodone-Acetaminophen      Other reaction(s): Seizures  Can take Percocet without difficulty.    Lorazepam Other (See Comments)     Causes Violence and aggresssion    Sertraline Other (See Comments)     Caused suicidally     Bupropion Other (See Comments)     Caused Major Depression    Dust Mite Extract      Other reaction(s): Asthma symptoms    Lithium Other (See Comments)     Mood alteration    Milk Protein Unknown    Pollen Extract      Other reaction(s): Asthma symptoms    Risperidone Other (See Comments)     Agitation      Sulfa Antibiotics Itching    Trichophyton      Other reaction(s): Asthma symptoms    Valproic Acid Other (See Comments)     Weight gain        MENTAL STATUS EXAM   Vitals: /50   Pulse 81   Temp 98.3  F (36.8  C) (Tympanic)   Resp 14   Wt (!) 136.1 kg (300 lb 1.6 oz)   SpO2 96%   BMI 48.44 kg/m      Appearance:  awake, alert, adequately groomed, dressed in hospital scrubs, and appeared as age stated  Attitude:  cooperative  Eye Contact:  good  Mood:   \"a little down but ok\"  Affect:   more mobile, smiles  Speech:  clear, coherent  Psychomotor Behavior:  no evidence of tardive " dyskinesia, dystonia, or tics  Thought Process:  linear  Associations:  no loose associations  Thought Content:  no evidence of suicidal ideation or homicidal ideation and no evidence of psychotic thought  Insight:  fair  Judgment:  intact  Oriented to:  time, person, and place  Attention Span and Concentration:  intact  Recent and Remote Memory:  intact  Language: English with appropriate syntax and vocabulary    Fund of Knowledge: low-normal  Muscle Strength and Tone: normal  Gait and Station: Normal       LABS   No results found for this or any previous visit (from the past 24 hours).      IMPRESSION     This is a 50 year old female with a PMH of of bipolar disorder, BPD, SI/SA who presents with increased depression and SI with plan to cut wrists in the context of relational stress with significant other. Pt has belief that SO is berating her d/t him being in another relationship, which could be true, however unconfirmed. Worsening mood and thoughts of harming self increased as a result. Crisis stabilization at [facility], discharging 2/11 and recently hospitalization here 2/25-3/4/25 with a similar presentation. Hx of EtOH with 4 years of sobriety, denies use and EtOH in ED negative. UDS negative with substances not appearing to be contributor. Due to the above presentation, pt was admitted for [facility] for further safety and stabilization.       In terms of treatment, will increase BuSpar for mixed anxiety and depression. Consider OT for coping mechanisms (had done during previous admit), will discuss with OT.      Today: Anxiety and depression both improving, appears mostly related to relational stress with significant other. Denies SI. Attending groups. Tolerating increase in BuSpar. Recommend meeting with OP psychiatric manager to assess if BuSpar could be tapered back once current crisis has reduced. Would like need to be on this dose a few months. Could also review  medications and simplify if able.  Believes she will be ready for discharge Monday or Tuesday.      DIAGNOSES     #. Bipolar I disorder, current episode depressed  #. AGNIESZKA  #. Borderline personality traits       PLAN     Location: Unit 5  Legal Status: None    Safety Assessment:    Behavioral Orders   Procedures    Code 1 - Restrict to Unit    Routine Programming     As clinically indicated    Status 15     Every 15 minutes.      PTA psychotropic medications stopped:     - none    PTA psychotropic medications continued/changed:     - BuSpar 10 mg BID->10 mg TID 4/23  - Stattera 25 mg daily  - Abilify 20 mg daily  - gabapentin 300 mg TID  - hydroxyzine 50 mg at bedtime  - Lamictal 100 mg TID  - Viibryd 40 mg daily  - Prazosin 1 mg at bedtime  - Rozerem 8 mg at bedtime  - Hydroxyzine 25 mg QID PRN (PTA 50 mg QID PRN)       New medications tried and stopped:     -None    New medications initiated:     - Standard unit PRNS     Today's Changes:    - none    Programming: Patient will be treated in a therapeutic milieu with appropriate individual and group therapies. Education will be provided on diagnoses, medications, and treatments.     Medical diagnoses:  Per medicine    Consult: OT  Tests: None    Anticipated LOS: 3-5 days  Disposition: home with established services         ATTESTATION      DASHA Coleman CNP    Type of Service: video visit for mental health treatment  Reason for Video Visit: COVID-19 [place]  Originating Site (patient location): [facility]  Distant Site (provider location): Remote Location  Mode of Communication: Video Conference via [employer]  Time of Service: Date: 04/27/2025 , Start: 0915,  Stop: 0945

## 2025-04-28 PROCEDURE — 97530 THERAPEUTIC ACTIVITIES: CPT | Mod: GO

## 2025-04-28 PROCEDURE — 99232 SBSQ HOSP IP/OBS MODERATE 35: CPT | Performed by: STUDENT IN AN ORGANIZED HEALTH CARE EDUCATION/TRAINING PROGRAM

## 2025-04-28 PROCEDURE — 250N000013 HC RX MED GY IP 250 OP 250 PS 637

## 2025-04-28 PROCEDURE — 250N000013 HC RX MED GY IP 250 OP 250 PS 637: Performed by: PSYCHIATRY & NEUROLOGY

## 2025-04-28 PROCEDURE — 124N000001 HC R&B MH

## 2025-04-28 RX ORDER — HYDROXYZINE HYDROCHLORIDE 50 MG/1
50 TABLET, FILM COATED ORAL AT BEDTIME
Qty: 30 TABLET | Refills: 1 | Status: SHIPPED | OUTPATIENT
Start: 2025-04-28

## 2025-04-28 RX ORDER — DILTIAZEM HYDROCHLORIDE EXTENDED-RELEASE TABLETS 180 MG/1
180 TABLET, EXTENDED RELEASE ORAL 2 TIMES DAILY
Qty: 60 TABLET | Refills: 1 | Status: SHIPPED | OUTPATIENT
Start: 2025-04-28 | End: 2025-04-28

## 2025-04-28 RX ORDER — BUSPIRONE HYDROCHLORIDE 10 MG/1
10 TABLET ORAL 3 TIMES DAILY
Qty: 90 TABLET | Refills: 1 | Status: SHIPPED | OUTPATIENT
Start: 2025-04-28

## 2025-04-28 RX ORDER — VILAZODONE HYDROCHLORIDE 40 MG/1
40 TABLET ORAL EVERY MORNING
Qty: 30 TABLET | Refills: 1 | Status: SHIPPED | OUTPATIENT
Start: 2025-04-28

## 2025-04-28 RX ORDER — FAMOTIDINE 20 MG/1
20 TABLET, FILM COATED ORAL 2 TIMES DAILY
Qty: 60 TABLET | Refills: 1 | Status: SHIPPED | OUTPATIENT
Start: 2025-04-28

## 2025-04-28 RX ORDER — GABAPENTIN 300 MG/1
300 CAPSULE ORAL 3 TIMES DAILY
Qty: 90 CAPSULE | Refills: 1 | Status: SHIPPED | OUTPATIENT
Start: 2025-04-28

## 2025-04-28 RX ORDER — IBUPROFEN 200 MG
800 TABLET ORAL EVERY 8 HOURS PRN
DISCHARGE
Start: 2025-04-28

## 2025-04-28 RX ORDER — ATOMOXETINE 25 MG/1
25 CAPSULE ORAL EVERY MORNING
Qty: 30 CAPSULE | Refills: 1 | Status: SHIPPED | OUTPATIENT
Start: 2025-04-28

## 2025-04-28 RX ORDER — DOCUSATE SODIUM 100 MG/1
100 CAPSULE, LIQUID FILLED ORAL 2 TIMES DAILY PRN
Qty: 60 CAPSULE | Refills: 1 | Status: SHIPPED | OUTPATIENT
Start: 2025-04-28

## 2025-04-28 RX ORDER — ARIPIPRAZOLE 20 MG/1
20 TABLET ORAL AT BEDTIME
Qty: 30 TABLET | Refills: 1 | Status: SHIPPED | OUTPATIENT
Start: 2025-04-28

## 2025-04-28 RX ORDER — LAMOTRIGINE 100 MG/1
100 TABLET ORAL 3 TIMES DAILY
Qty: 90 TABLET | Refills: 1 | Status: SHIPPED | OUTPATIENT
Start: 2025-04-28

## 2025-04-28 RX ORDER — PROGESTERONE 100 MG/1
100 CAPSULE ORAL DAILY
Qty: 30 CAPSULE | Refills: 1 | Status: SHIPPED | OUTPATIENT
Start: 2025-04-28

## 2025-04-28 RX ORDER — DILTIAZEM HYDROCHLORIDE 180 MG/1
180 CAPSULE, COATED, EXTENDED RELEASE ORAL 2 TIMES DAILY
Qty: 60 CAPSULE | Refills: 1 | Status: SHIPPED | OUTPATIENT
Start: 2025-04-28

## 2025-04-28 RX ORDER — RAMELTEON 8 MG/1
8 TABLET ORAL
Qty: 30 TABLET | Refills: 1 | Status: SHIPPED | OUTPATIENT
Start: 2025-04-28

## 2025-04-28 RX ORDER — ESTRADIOL 0.05 MG/D
1 PATCH, EXTENDED RELEASE TRANSDERMAL
Qty: 9 PATCH | Refills: 1 | Status: SHIPPED | OUTPATIENT
Start: 2025-04-28

## 2025-04-28 RX ORDER — MONTELUKAST SODIUM 10 MG/1
10 TABLET ORAL AT BEDTIME
Qty: 30 TABLET | Refills: 1 | Status: SHIPPED | OUTPATIENT
Start: 2025-04-28

## 2025-04-28 RX ORDER — AMLODIPINE BESYLATE 2.5 MG/1
TABLET ORAL
Qty: 60 TABLET | Refills: 1 | Status: SHIPPED | OUTPATIENT
Start: 2025-04-28

## 2025-04-28 RX ADMIN — LAMOTRIGINE 100 MG: 100 TABLET ORAL at 08:28

## 2025-04-28 RX ADMIN — ARIPIPRAZOLE 20 MG: 10 TABLET ORAL at 20:31

## 2025-04-28 RX ADMIN — DILTIAZEM HYDROCHLORIDE 180 MG: 180 CAPSULE, COATED, EXTENDED RELEASE ORAL at 08:29

## 2025-04-28 RX ADMIN — VILAZODONE HYDROCHLORIDE 40 MG: 10 TABLET ORAL at 08:28

## 2025-04-28 RX ADMIN — HYDROXYZINE HYDROCHLORIDE 50 MG: 25 TABLET, FILM COATED ORAL at 20:30

## 2025-04-28 RX ADMIN — DILTIAZEM HYDROCHLORIDE 180 MG: 180 CAPSULE, COATED, EXTENDED RELEASE ORAL at 20:31

## 2025-04-28 RX ADMIN — GABAPENTIN 300 MG: 300 CAPSULE ORAL at 20:31

## 2025-04-28 RX ADMIN — FAMOTIDINE 20 MG: 20 TABLET ORAL at 08:28

## 2025-04-28 RX ADMIN — Medication 125 MCG: at 08:29

## 2025-04-28 RX ADMIN — GABAPENTIN 300 MG: 300 CAPSULE ORAL at 08:28

## 2025-04-28 RX ADMIN — MONTELUKAST 10 MG: 10 TABLET, FILM COATED ORAL at 20:31

## 2025-04-28 RX ADMIN — GABAPENTIN 300 MG: 300 CAPSULE ORAL at 13:32

## 2025-04-28 RX ADMIN — LAMOTRIGINE 100 MG: 100 TABLET ORAL at 20:31

## 2025-04-28 RX ADMIN — AMLODIPINE BESYLATE 2.5 MG: 2.5 TABLET ORAL at 08:28

## 2025-04-28 RX ADMIN — BUSPIRONE HYDROCHLORIDE 10 MG: 10 TABLET ORAL at 20:31

## 2025-04-28 RX ADMIN — BUSPIRONE HYDROCHLORIDE 10 MG: 10 TABLET ORAL at 13:32

## 2025-04-28 RX ADMIN — FERROUS SULFATE TAB 325 MG (65 MG ELEMENTAL FE) 325 MG: 325 (65 FE) TAB at 08:28

## 2025-04-28 RX ADMIN — PROGESTERONE 100 MG: 100 CAPSULE ORAL at 08:28

## 2025-04-28 RX ADMIN — ATOMOXETINE 25 MG: 25 CAPSULE ORAL at 08:29

## 2025-04-28 RX ADMIN — BUSPIRONE HYDROCHLORIDE 10 MG: 10 TABLET ORAL at 08:29

## 2025-04-28 RX ADMIN — FAMOTIDINE 20 MG: 20 TABLET ORAL at 20:31

## 2025-04-28 RX ADMIN — LAMOTRIGINE 100 MG: 100 TABLET ORAL at 13:33

## 2025-04-28 RX ADMIN — ESTRADIOL 1 PATCH: 0.05 PATCH TRANSDERMAL at 08:27

## 2025-04-28 RX ADMIN — RAMELTEON 8 MG: 8 TABLET, FILM COATED ORAL at 20:31

## 2025-04-28 ASSESSMENT — ACTIVITIES OF DAILY LIVING (ADL)
ADLS_ACUITY_SCORE: 25
HYGIENE/GROOMING: INDEPENDENT
ADLS_ACUITY_SCORE: 25
ORAL_HYGIENE: INDEPENDENT
ADLS_ACUITY_SCORE: 25
ORAL_HYGIENE: INDEPENDENT
DRESS: SCRUBS (BEHAVIORAL HEALTH);INDEPENDENT
ADLS_ACUITY_SCORE: 25
HYGIENE/GROOMING: INDEPENDENT
ADLS_ACUITY_SCORE: 25
DRESS: INDEPENDENT

## 2025-04-28 NOTE — PLAN OF CARE
Problem: Suicide Risk  Goal: Absence of Self-Harm  Description: Patient will report a decrease in suicidal ideation.  Outcome: Progressing     Problem: Adult Behavioral Health Plan of Care  Goal: Patient-Specific Goal (Individualization)  Description: Patient will sleep 6 to 8 hours per night  Patient will eat at least 50% of meals  Patient will attend at least 50% of groups  Patient will comply with recommendations of treatment team  Patient will remain medication compliant    *Patient to be reminded to toilet every 2 hours on all shifts*  Outcome: Progressing  Note:     1100 Patient is alert and up on the unit and is attending groups. Patient is pleasant and cooperative. Attending groups. Ate well for breakfast meal. Patient states she had an accident last night in her bed. Poor control of patients bladder noted. Patient denies physical problems and pain. Patient steady on her feet. Has numerous resources of support that she sees many times a day.     2:00 Patient is alert and up attending groups and has appropriate behavior. Patient is pleasant and animated. Denies any further issues at this time. Ate well at lunch meal.    Face to face end of shift report communicated to [facility] shift RN.     Kimberlee Marquez RN  4/28/2025  1:59 PM            Goal Outcome Evaluation:    Plan of Care Reviewed With: patient

## 2025-04-28 NOTE — PROGRESS NOTES
NIGHT SHIFT SUMMARY:    Observed resting with eyes closed, respirations even, unlabored and WNL. Able to reposition self and make needs known. 15-minute checks in place. No falls observed or reported.     Report given to oncoming day-shift nurse.

## 2025-04-28 NOTE — PROGRESS NOTES
Occupational Therapy Discharge Summary    Reason for therapy discharge:    Discharged to home.    Progress towards therapy goal(s). See goals on Care Plan in [facility] electronic health record for goal details.  Goals partially met.  Barriers to achieving goals:   discharge from facility.    Therapy recommendation(s):    Continue to utilize boundaries, assertiveness skills, and affirmations.

## 2025-04-28 NOTE — PLAN OF CARE
Face to face shift report received from Kimberlee MENDEZ RN. Rounding completed, pt observed.    Problem: Adult Behavioral Health Plan of Care  Goal: Patient-Specific Goal (Individualization)  Description: Patient will sleep 6 to 8 hours per night  Patient will eat at least 50% of meals  Patient will attend at least 50% of groups  Patient will comply with recommendations of treatment team  Patient will remain medication compliant    *Patient to be reminded to toilet every 2 hours on all shifts*  Outcome: Progressing  [place] Summary:  Patient was awake resting in bed at the start of this shift.  Patient is calm and cooperative with nurse.  Denies any suicidal ideation or intent.  States she feels safe to discharge tomorrow. No incidents of incontinence on this shift.  Gait Is steady.  Played card game [place] in [facility]    Problem: Suicide Risk  Goal: Absence of Self-Harm  Description: Patient will report a decrease in suicidal ideation.  Outcome: Progressing       Face to face report will be communicated to oncoming RN.    Latricia Espinal RN  4/28/2025

## 2025-04-28 NOTE — PROGRESS NOTES
Assessment/Intervention/Current Symptoms and Care Coordination:  SW met with patient. {Patient reports feeling ready to discharge tomorrow. Patient does not have anyone able to pick her up tomorrow - writer will schedule ride.     Scheduled a ride through patient's insurance. Dakota Adams ([phone]) will  patient at 9am tomorrow and transport her home.      Discharge Plan or Goal:  Symptom stabilization  Development of coping skills  Development of safe discharge plan     Discharge Checklist:  Transportation: [facility] @9am tomorrow  Medications ordered/sent to: TBD  Appointments scheduled:   Psychiatry - 6/2 @9:30am  Therapy - 4/30 @8am  PCP - 5/2  AVS complete: Yes     Legal Status:  Voluntary     Contacts:  Venessa Alvarado -  - [phone]

## 2025-04-28 NOTE — PROGRESS NOTES
04/28/25 1000   Appointment Info   Signing Clinician's Name / Credentials (OT) Maritza GOETZ OTR/L   Therapeutic Activities   Therapeutic Activity Minutes (12604) 23   Symptoms noted during/after treatment none   Treatment Detail/Skilled Intervention Reviewed with patient about her journaling on affirmations. Validation on her experience with this activity and positive reinforcement to continue with the practice. Discussed boundaries as related to assertiveness. Went over the 6 types of boundaries and she notes emotional and material boundaries are the most challenging. Discussed what a healthy emotional boundary looks like for her and behavioral supports that she can utilize to assist with reinforcing this healthy boundary. Encouraged patient to think of other things that can support this change for herself. Questions encouraged. Patient reported no questions at this time.   OT Discharge Planning   OT Plan Patient to discharge tomorrow.   OT Discharge Recommendation (DC Rec) home   OT Rationale for DC Rec Clinical judgemnt   OT Brief overview of current status Patient smiling today. Reports that she feels good about discharge tomorrow.   Total Session Time   Timed Code Treatment Minutes 23   Total Session Time (sum of timed and untimed services) 23   Psychosocial Support   Trust Relationship/Rapport care explained;questions encouraged

## 2025-04-28 NOTE — PROGRESS NOTES
[facility] PSYCHIATRY  PROGRESS NOTE     SUBJECTIVE     Prior to interviewing the patient, I met with nursing and reviewed patient's clinical condition. We discussed clinical care both before and after the interview. I have reviewed the patient's clinical course by review of records including previous notes, labs, and vital signs.     Per nursing, the patient had the following behavioral events over the last 24-hours: none.     On psychiatric interview, patient was met in her room. She notes that she is feeling better. She would like to leave tomorrow. She is tolerating medications well. She notes that she does not use Prazosin prn and is fine with stopping it. She notes that she is doing well with the increase in BuSpar.     She denies any acute concerns today. She plans to go to groups.       MEDICATIONS   Scheduled Meds:  Current Facility-Administered Medications   Medication Dose Route Frequency Provider Last Rate Last Admin    amLODIPine (NORVASC) tablet 2.5 mg  2.5 mg Oral Elmer Multani MD   2.5 mg at 04/28/25 0828    ARIPiprazole (ABILIFY) tablet 20 mg  20 mg Oral At Bedtime Elmer Mathews MD   20 mg at 04/27/25 2028    atomoxetine (STRATTERA) capsule 25 mg  25 mg Oral MARYM Elmer Mathews MD   25 mg at 04/28/25 0829    busPIRone (BUSPAR) tablet 10 mg  10 mg Oral TID Isma Ward APRN CNP   10 mg at 04/28/25 0829    cholecalciferol (VITAMIN D3) capsule 125 mcg  125 mcg Oral Daily Elmer Mathews MD   125 mcg at 04/28/25 0829    diltiazem ER COATED BEADS (CARDIZEM CD/CARTIA XT) 24 hr capsule 180 mg  180 mg Oral BID Elmer Mathews MD   180 mg at 04/28/25 0829    estradiol (CLIMARA) 0.05 MG/24HR WK patch 1 patch  1 patch Transdermal Once per day on Monday Thursday Elmer Mathews MD   1 patch at 04/28/25 0827    estradiol weekly (CLIMARA) Patch in Place   Transdermal Q8H IZA Elmer Mathews MD        famotidine (PEPCID) tablet 20 mg  20 mg Oral BID Elmer Mathews MD   20 mg  at 04/28/25 0828    ferrous sulfate (FEROSUL) tablet 325 mg  325 mg Oral Daily Elmer Mathews MD   325 mg at 04/28/25 0828    gabapentin (NEURONTIN) capsule 300 mg  300 mg Oral TID Elmer Mathews MD   300 mg at 04/28/25 0828    hydrOXYzine HCl (ATARAX) tablet 50 mg  50 mg Oral At Bedtime Elmer Mathews MD   50 mg at 04/27/25 2029    lamoTRIgine (LaMICtal) tablet 100 mg  100 mg Oral TID Elmer Mathews MD   100 mg at 04/28/25 0828    montelukast (SINGULAIR) tablet 10 mg  10 mg Oral At Bedtime Elmer Mathews MD   10 mg at 04/27/25 2027    progesterone (PROMETRIUM) capsule 100 mg  100 mg Oral Daily Elmer Mathews MD   100 mg at 04/28/25 0828    ramelteon (ROZEREM) tablet 8 mg  8 mg Oral At Bedtime Elmer Mathews MD   8 mg at 04/27/25 2028    vilazodone (VIIBRYD) tablet 40 mg  40 mg Oral QAM Elmer Mathews MD   40 mg at 04/28/25 0828     PRN Meds:.  Current Facility-Administered Medications   Medication Dose Route Frequency Provider Last Rate Last Admin    acetaminophen (TYLENOL) tablet 975 mg  975 mg Oral Q6H PRN Jewell Garcia APRN CNP        albuterol (PROVENTIL HFA/VENTOLIN HFA) inhaler  1-2 puff Inhalation Q4H PRN Elmer Mathews MD        alum & mag hydroxide-simethicone (MAALOX) suspension 30 mL  30 mL Oral Q4H PRN Elmer Mathews MD        amLODIPine (NORVASC) tablet 2.5 mg  2.5 mg Oral QPM PRN Elmer Mathews MD        docusate sodium (COLACE) capsule 100 mg  100 mg Oral BID PRN Elmer Mathews MD   100 mg at 04/23/25 0850    hydrOXYzine HCl (ATARAX) tablet 25 mg  25 mg Oral Q4H PRN Elmer Mathews MD        ibuprofen (ADVIL/MOTRIN) tablet 200-400 mg  200-400 mg Oral Q6H PRN Elmer Mathews MD   400 mg at 04/27/25 2039    Lidocaine (LIDOCARE) 4 % Patch 2 patch  2 patch Transdermal Daily PRN Dominique Anaya, CNP        melatonin tablet 3 mg  3 mg Oral At Bedtime PRN Elmer Mathews MD        muscle rub (ARTHRITIS HOT) pain relief cream   Topical Q6H PRN  Dominique Anaay, CNP        OLANZapine (zyPREXA) tablet 10 mg  10 mg Oral TID PRN Elmer Mathews MD        Or    OLANZapine (zyPREXA) injection 10 mg  10 mg Intramuscular TID PRN Elmer Mathews MD        polyethylene glycol (MIRALAX) Packet 17 g  17 g Oral Daily PRN Elmer Mathews MD            ALLERGIES   Allergies   Allergen Reactions    Clonazepam Other (See Comments)     Causes Violence and aggresssion    Hydrocodone-Acetaminophen      NOT allergic to Tylenol  Other reaction(s): Seizures  Can take Percocet without difficulty.    Lorazepam Other (See Comments)     Causes Violence and aggresssion    Sertraline Other (See Comments)     Caused suicidally     Bupropion Other (See Comments)     Caused Major Depression    Dust Mite Extract      Other reaction(s): Asthma symptoms    Lithium Other (See Comments)     Mood alteration    Milk Protein Unknown    Pollen Extract      Other reaction(s): Asthma symptoms    Risperidone Other (See Comments)     Agitation      Sulfa Antibiotics Itching    Trichophyton      Other reaction(s): Asthma symptoms    Valproic Acid Other (See Comments)     Weight gain        MENTAL STATUS EXAM   Vitals: /59   Pulse 79   Temp 98  F (36.7  C) (Tympanic)   Resp 16   Wt (!) 136.1 kg (300 lb 1.6 oz)   SpO2 95%   BMI 48.44 kg/m      Appearance:  awake, alert, adequately groomed, dressed in hospital scrubs, and appeared as age stated  Attitude:  cooperative  Eye Contact:  good  Mood:  Better  Affect:  Normal range   Speech:  clear, coherent  Psychomotor Behavior:  no evidence of tardive dyskinesia, dystonia, or tics  Thought Process:  linear  Associations:  no loose associations  Thought Content:  no evidence of suicidal ideation or homicidal ideation and no evidence of psychotic thought  Insight:  fair  Judgment:  intact  Oriented to:  time, person, and place  Attention Span and Concentration:  intact  Recent and Remote Memory:  intact  Language: English with appropriate  syntax and vocabulary    Fund of Knowledge: low-normal  Muscle Strength and Tone: normal  Gait and Station: Normal       LABS   No results found for this or any previous visit (from the past 24 hours).      IMPRESSION     This is a 50 year old female with a PMH of of bipolar disorder, BPD, SI/SA who presents with increased depression and SI with plan to cut wrists in the context of relational stress with significant other. Pt has belief that SO is berating her d/t him being in another relationship, which could be true, however unconfirmed. Worsening mood and thoughts of harming self increased as a result. Crisis stabilization at [facility], discharging 2/11 and recently hospitalization here 2/25-3/4/25 with a similar presentation. Hx of EtOH with 4 years of sobriety, denies use and EtOH in ED negative. UDS negative with substances not appearing to be contributor. Due to the above presentation, pt was admitted for [facility] for further safety and stabilization.       In terms of treatment, will increase BuSpar for mixed anxiety and depression. Consider OT for coping mechanisms (had done during previous admit), will discuss with OT.      Today: Patient's depression and SI has improved. Preparing for discharge tomorrow.       DIAGNOSES     #. Bipolar I disorder, current episode depressed  #. AGNIESZKA  #. Borderline personality traits       PLAN     Location: Unit 5  Legal Status: Orders Placed This Encounter      Legal status Voluntary    Safety Assessment:    Behavioral Orders   Procedures    Code 1 - Restrict to Unit    Routine Programming     As clinically indicated    Status 15     Every 15 minutes.      PTA psychotropic medications stopped:     - Prazosin 1 mg at bedtime prn nightmares given not using     PTA psychotropic medications continued/changed:     - BuSpar 10 mg BID increased to 10 mg TID 4/23  - Stattera 25 mg daily  - Abilify 20 mg daily  - gabapentin 300 mg TID  -  hydroxyzine 50 mg at bedtime  - Lamictal 100 mg TID  - Viibryd 40 mg daily  - Rozerem 8 mg at bedtime (changed to prn upon discharge)  - Hydroxyzine 25 mg QID PRN (PTA 50 mg QID PRN)     New medications initiated:     - Standard unit PRNS     Today's Changes:    - stop Prazosin prn  - Prepare for discharge tomorrow     Programming: Patient will be treated in a therapeutic milieu with appropriate individual and group therapies. Education will be provided on diagnoses, medications, and treatments.     Medical diagnoses:  Per medicine    Consult: OT  Tests: None    Anticipated LOS: 3-5 days  Disposition: home with established services       TREATMENT TEAM CARE PLAN     Progress: Symptoms improved.    Continued Stay Criteria/Rationale: Ongoing treatment and safe discharge planning.    Medical/Physical: See above.    Precautions: See above.     Plan: Continue inpatient care with unit support and medication management.    Rationale for change in precautions or plan: NA due to no change.    Participants: Rodney Sanches DO, Nursing, SW, OT.    The patient's care was discussed with the treatment team and chart notes were reviewed.       ATTESTATION      Rdoney Sanches DO, MA  Psychiatrist     Video Visit: Patient has given verbal consent for video visit?: Yes  Type of Service: video visit for mental health treatment  Reason [place] Visit: [place]  Originating Site (patient location): [facility]  Distant Site (provider location): Remote Location  Mode of Communication: Video Conference via [employer]  Time of Service: Date: 04/28/2025 Start: 815 end: 830

## 2025-04-29 VITALS
BODY MASS INDEX: 48.44 KG/M2 | WEIGHT: 293 LBS | OXYGEN SATURATION: 95 % | TEMPERATURE: 98.3 F | HEART RATE: 87 BPM | RESPIRATION RATE: 16 BRPM | DIASTOLIC BLOOD PRESSURE: 57 MMHG | SYSTOLIC BLOOD PRESSURE: 113 MMHG

## 2025-04-29 PROCEDURE — 99239 HOSP IP/OBS DSCHRG MGMT >30: CPT | Performed by: STUDENT IN AN ORGANIZED HEALTH CARE EDUCATION/TRAINING PROGRAM

## 2025-04-29 PROCEDURE — 250N000013 HC RX MED GY IP 250 OP 250 PS 637: Performed by: PSYCHIATRY & NEUROLOGY

## 2025-04-29 PROCEDURE — 250N000013 HC RX MED GY IP 250 OP 250 PS 637

## 2025-04-29 RX ADMIN — FAMOTIDINE 20 MG: 20 TABLET ORAL at 08:02

## 2025-04-29 RX ADMIN — FERROUS SULFATE TAB 325 MG (65 MG ELEMENTAL FE) 325 MG: 325 (65 FE) TAB at 08:02

## 2025-04-29 RX ADMIN — ATOMOXETINE 25 MG: 25 CAPSULE ORAL at 08:01

## 2025-04-29 RX ADMIN — LAMOTRIGINE 100 MG: 100 TABLET ORAL at 08:01

## 2025-04-29 RX ADMIN — BUSPIRONE HYDROCHLORIDE 10 MG: 10 TABLET ORAL at 08:01

## 2025-04-29 RX ADMIN — VILAZODONE HYDROCHLORIDE 40 MG: 10 TABLET ORAL at 08:01

## 2025-04-29 RX ADMIN — DILTIAZEM HYDROCHLORIDE 180 MG: 180 CAPSULE, COATED, EXTENDED RELEASE ORAL at 08:02

## 2025-04-29 RX ADMIN — Medication 125 MCG: at 08:01

## 2025-04-29 RX ADMIN — GABAPENTIN 300 MG: 300 CAPSULE ORAL at 08:01

## 2025-04-29 RX ADMIN — AMLODIPINE BESYLATE 2.5 MG: 2.5 TABLET ORAL at 08:02

## 2025-04-29 RX ADMIN — PROGESTERONE 100 MG: 100 CAPSULE ORAL at 08:01

## 2025-04-29 ASSESSMENT — ACTIVITIES OF DAILY LIVING (ADL)
ADLS_ACUITY_SCORE: 25

## 2025-04-29 NOTE — PROGRESS NOTES
Pt is discharging at the recommendation of the treatment team. Pt is discharging to home transported by [employer] through insurance. Pt denies having any thoughts of hurting themself or anyone else. Pt denies anxiety or depression. Pt has follow up with therapy, psychiatry, and primary care.   Discharge instructions, including demographic sheet, psychiatric evaluation, discharge summary, and AVS were faxed to any next level of care providers.

## 2025-04-29 NOTE — PLAN OF CARE
Problem: Suicide Risk  Goal: Absence of Self-Harm  Description: Patient will report a decrease in suicidal ideation.  Outcome: Progressing     Problem: Adult Behavioral Health Plan of Care  Goal: Patient-Specific Goal (Individualization)  Description: Patient will sleep 6 to 8 hours per night  Patient will eat at least 50% of meals  Patient will attend at least 50% of groups  Patient will comply with recommendations of treatment team  Patient will remain medication compliant    *Patient to be reminded to toilet every 2 hours on all shifts*  Outcome: Progressing     Face to face shift report received from Latricia.     Patient appeared to be sleeping for approximately 7.75 hours since 2145 last shift per check sheets.    Patient had no reported or observed suicidal behavior or self harm this shift.      No concerns noted this shift.    Medicare discharge rights reviewed and signed by pt.    Face to face report will be communicated to oncoming RN.    Maryjo Wheatley RN  4/29/2025  6:40 AM

## 2025-04-29 NOTE — PLAN OF CARE
Goal Outcome Evaluation:       Discharge Note    Patient Discharged to home on 4/29/2025 9:23 AM [facility] [employer].    Patient informed of discharge instructions in AVS. patient verbalizes understanding and denies having any questions pertaining to AVS. Patient stable at time of discharge. Patient denies SI, HI, and thoughts of self harm at time of discharge. All personal belongings returned to patient. Discharge prescriptions sent to [facility] via electronic communication. Psych evaluation, history and physical, AVS, and discharge summary faxed to next level of care- see  note.     Kelly Suárez RN  4/29/2025  9:23 AM

## 2025-04-29 NOTE — DISCHARGE SUMMARY
[facility] PSYCHIATRY  DISCHARGE SUMMARY     DISCHARGE DATA     Rosi Lamb MRN# [id number]   Age: 50 year old YOB: 1975     Date of Admission: 4/22/2025  Date of Discharge: 4/29/2025  Discharge Provider: Rodney Sanches DO       REASON FOR ADMISSION     This is a 50 year old female with a PMH of of bipolar disorder, BPD, SI/SA who presents with increased depression and SI with plan to cut wrists in the context of relational stress with significant other. Pt has belief that SO is berating her d/t him being in another relationship, which could be true, however unconfirmed. Worsening mood and thoughts of harming self increased as a result. Crisis stabilization at [facility], discharging 2/11 and recently hospitalization here 2/25-3/4/25 with a similar presentation. Hx of EtOH with 4 years of sobriety, denies use and EtOH in ED negative. UDS negative with substances not appearing to be contributor. Due to the above presentation, pt was admitted for [facility] for further safety and stabilization.       In terms of treatment, will increase BuSpar for mixed anxiety and depression. Consider OT for coping mechanisms (had done during previous admit), will discuss with OT.       DISCHARGE DIAGNOSES     #. Bipolar I disorder, current episode depressed  #. AGNIESZKA  #. Borderline personality disorder        CONSULTS     #. OT for skill building        HOSPITAL COURSE   Psychiatric Course:    Legal status: Orders Placed This Encounter      Legal status Voluntary    Patient was admitted to unit 5 due to the aforementioned presentation. The patient was placed under 15 minute checks to ensure patient safety. The patient participated in unit programming and groups as able.    Ms. Lamb did not require seclusion/restraint during hospitalization.     We reviewed with Ms. Lamb current and past medication trials including duration, dose, response and side effects. During  this hospitalization, the following changes to the patient's psychotropic medications were made:    PTA psychotropic medications stopped:      - Prazosin 1 mg at bedtime prn nightmares given not using      PTA psychotropic medications continued/changed:      - BuSpar 10 mg BID increased to 10 mg TID 4/23  - Stattera 25 mg daily  - Abilify 20 mg daily  - gabapentin 300 mg TID  - hydroxyzine 50 mg at bedtime  - Lamictal 100 mg TID  - Viibryd 40 mg daily  - Rozerem 8 mg at bedtime (changed to prn upon discharge)  - Hydroxyzine 25 mg QID PRN (PTA 50 mg QID PRN) -> changed to 50 mg at bedtime scheduled. Stopped prn.     New medications initiated:      - None    The patient presented in a depressive episode with SI. The patient's PTA medications were continued apart from Prazosin prn being stopped given not using. In addition, scheduled hydroxyzine and stopped the prn to simplify her regimen. Increased her BuSpar also to address mood and anxiety. She tolerated these changes well. Encouraged her to continue simplifying her regimen as able with her outpatient provider. OT was consulted to assist with skill building, which the patient benefited from.     With these changes and supports the patient noticed improvement in their symptoms and felt sufficiently ready for discharge. As a result, Rosi Lamb was discharged. At the time of discharge, Rosi Lamb was determined to not be a danger to self or others. At the current time of discharge, the patient does not meet criteria for involuntary hospitalization. On the day of discharge, the patient reports that they do not have suicidal or homicidal ideation. Steps taken to minimize risk include: assessing patient s behavior and thought process daily during hospital stay, discharging patient with adequate plan for follow up for mental and physical health and discussing safety plan of returning to the hospital should the patient ever have thoughts of harming  themselves or others. Therefore, based on all available evidence including the factors cited above, the patient does not appear to be at imminent risk for self-harm, and is appropriate for outpatient level of care. However, if patient uses substances or is medication non-adherent, their risk of decompensation and SI will be elevated. This was discussed with the patient.    Medical Course:    The patient was medically cleared for admission to inpatient psychiatry. The following medical issues arose below. The patient was medically stable at the time of discharge.     Esophageal reflux- stable. Reports taking Pepcid daily. Home dose ordered.      Moderate persistent asthma- Denies chest pain, sob, difficulty breathing.   Albuterol inhaler as needed.      CKD (chronic kidney disease) stage 2, GFR 60-89 ml/min- stable. Creatinine wnl 0.79, GFR >90.      Raynauds- denies concerns. home dose of amlodipine ordered.      Class 3 severe obesity- weight 136.1 kg, BMI 48.44.  Encourage diet and exercise. A1c wnl at 4.8.      Vitamin D deficiency- stable on home dose of vitamin D 125 mcg daily.  Vitamin D level wnl at 50.     B12 deficiency- B12 level on 2/26/25 wnl at 784.      Paroxysmal atrial fibrillation-  denies chest pain, sod, palpations. diagnosed 8/22/2024. Follows with cardiology. Last seen on 10/24/2024. Echo showed EF of 60-65 %. Prescribed Diltiazem 180 mg, this was increased to bid per cardiology on 10/24/2024. According to telephone encounter on 1/15/2025 pt never increased dose. Pt was to follow up in in 3 months, which would have been around 1/24/2025.Pr reports she did not follow up. Pt encouraged to schedule follow up appointment. Pt verbalized understanding. Pt reports she has been taking her Cardizem twice daily since January and reports last dose yesterday.      Concern for TAWNYA-  pt was referred for a sleep study for concerns of sleep apnea. Watch PAT device was mailed to pt. Per telephone communication  pt needs to be rescheduled for virtual visit for reschedule. Pt states she will call to reschedule.      Chronic urinary incontinence/overactive bladder- wears briefs. Has been seen by urology and had Inter Sim placement on 12/20/2023. Pt reports it was effective for the first few months and then stopped working. Pt denies following up with urology since then. Pt encouraged to make appointment with urology. Verbalized understanding.      Chronic bilateral low back pain- denies injuries or new or worsening pain. Has chronic urinary incontinence. Denies saddle anesthesia. Tylenol, muscle rub and Lidocaine patch as needed. Bed wedge.        DISCHARGE MEDICATIONS     Current Discharge Medication List        START taking these medications    Details   diltiazem ER COATED BEADS (CARDIZEM CD/CARTIA XT) 180 MG 24 hr capsule Take 1 capsule (180 mg) by mouth 2 times daily.  Qty: 60 capsule, Refills: 1    Associated Diagnoses: Atrial fibrillation with rapid ventricular response (H)           CONTINUE these medications which have CHANGED    Details   amLODIPine (NORVASC) 2.5 MG tablet Take 1 tablet (2.5 mg) by mouth every morning. May also take 1 tablet (2.5 mg) every evening as needed (-- for Raynaud's / Hand color changes --).  Qty: 60 tablet, Refills: 1    Associated Diagnoses: Raynaud's phenomenon without gangrene      ARIPiprazole (ABILIFY) 20 MG tablet Take 1 tablet (20 mg) by mouth at bedtime.  Qty: 30 tablet, Refills: 1    Associated Diagnoses: Bipolar affective disorder, currently depressed, moderate (H)      atomoxetine (STRATTERA) 25 MG capsule Take 1 capsule (25 mg) by mouth every morning.  Qty: 30 capsule, Refills: 1    Associated Diagnoses: Bipolar affective disorder, currently depressed, moderate (H)      busPIRone (BUSPAR) 10 MG tablet Take 1 tablet (10 mg) by mouth 3 times daily.  Qty: 90 tablet, Refills: 1    Associated Diagnoses: Bipolar affective disorder, currently depressed, moderate (H)       cholecalciferol (VITAMIN D3) 125 mcg (5000 units) capsule Take 1 capsule (125 mcg) by mouth daily.  Qty: 30 capsule, Refills: 1    Associated Diagnoses: Vitamin D deficiency      docusate sodium (EQ STOOL SOFTENER) 100 MG capsule Take 1 capsule (100 mg) by mouth 2 times daily as needed for constipation.  Qty: 60 capsule, Refills: 1    Associated Diagnoses: Bipolar affective disorder, currently depressed, moderate (H)      estradiol (VIVELLE-DOT) 0.05 MG/24HR bi-weekly patch Place 1 patch onto the skin twice a week.  Qty: 9 patch, Refills: 1    Associated Diagnoses: Hot flashes      famotidine (PEPCID) 20 MG tablet Take 1 tablet (20 mg) by mouth 2 times daily. - For Heartburn  Qty: 60 tablet, Refills: 1    Associated Diagnoses: Gastroesophageal reflux disease without esophagitis      gabapentin (NEURONTIN) 300 MG capsule Take 1 capsule (300 mg) by mouth 3 times daily.  Qty: 90 capsule, Refills: 1    Associated Diagnoses: Generalized anxiety disorder      hydrOXYzine HCl (ATARAX) 50 MG tablet Take 1 tablet (50 mg) by mouth at bedtime.  Qty: 30 tablet, Refills: 1    Associated Diagnoses: Bipolar affective disorder, currently depressed, moderate (H)      ibuprofen (ADVIL/MOTRIN) 200 MG tablet Take 4 tablets (800 mg) by mouth every 8 hours as needed (back pain/ headache).    Associated Diagnoses: Chronic pain of right knee      lamoTRIgine (LAMICTAL) 100 MG tablet Take 1 tablet (100 mg) by mouth 3 times daily.  Qty: 90 tablet, Refills: 1    Associated Diagnoses: Bipolar affective disorder, currently depressed, moderate (H)      montelukast (SINGULAIR) 10 MG tablet Take 1 tablet (10 mg) by mouth at bedtime.  Qty: 30 tablet, Refills: 1    Associated Diagnoses: Moderate persistent asthma without complication      progesterone (PROMETRIUM) 100 MG capsule Take 1 capsule (100 mg) by mouth daily.  Qty: 30 capsule, Refills: 1    Associated Diagnoses: Hot flashes      ramelteon (ROZEREM) 8 MG tablet Take 1 tablet (8 mg) by  "mouth nightly as needed for sleep.  Qty: 30 tablet, Refills: 1    Associated Diagnoses: Primary insomnia      vilazodone (VIIBRYD) 40 MG TABS tablet Take 1 tablet (40 mg) by mouth every morning.  Qty: 30 tablet, Refills: 1    Associated Diagnoses: Bipolar affective disorder, currently depressed, moderate (H)           CONTINUE these medications which have NOT CHANGED    Details   acetaminophen (TYLENOL) 500 MG tablet Take 1-2 tablets (500-1,000 mg) by mouth every 6 hours as needed for mild pain  Qty: 90 tablet, Refills: 4    Associated Diagnoses: Chronic pain of right knee      albuterol (VENTOLIN HFA) 108 (90 Base) MCG/ACT inhaler Inhale 1-2 puffs into the lungs every 4 hours as needed for shortness of breath or wheezing  Qty: 18 g, Refills: 11    Comments: Pharmacy may dispense brand covered by insurance (Proair, or proventil or ventolin or generic albuterol inhaler)  Associated Diagnoses: Moderate persistent asthma without complication      Elemental iron 65 mg Vitamin C 125 mg (VITRON C)  MG TABS tablet Take 1 tablet by mouth daily on empty stomach -for iron deficiency  Qty: 90 tablet, Refills: 4    Associated Diagnoses: Iron deficiency anemia, unspecified iron deficiency anemia type           STOP taking these medications       diltiazem ER (CARDIAZEM LA) 180 MG 24 hr tablet Comments:   Reason for Stopping:         hydrOXYzine (VISTARIL) 50 MG capsule Comments:   Reason for Stopping:         prazosin (MINIPRESS) 1 MG capsule Comments:   Reason for Stopping:                MENTAL STATUS EXAM   Vitals: /57 (BP Location: Right arm)   Pulse 87   Temp 98.3  F (36.8  C) (Temporal)   Resp 16   Wt (!) 136.1 kg (300 lb 1.6 oz)   SpO2 95%   BMI 48.44 kg/m      Appearance: Alert, oriented, dressed in hospital scrubs, appears stated age   Attitude: Cooperative   Eye Contact: Good  Mood: \"Ok\"  Affect: Full range of affect  Speech: Normal rate and rhythm   Psychomotor Behavior: No tremor, rigidity, or " psychomotor abnormality   Thought Process: Logical, goal directed   Associations: No loose associations   Thought Content: Denies SI or plan. No SIB. Denies A/V hallucinations. No evidence of delusional thought.  Insight: Fair   Judgment: Fair  Oriented to: Person, place, and time  Attention Span and Concentration: Intact  Recent and Remote Memory: Intact  Language: English with appropriate syntax and vocabulary  Fund of Knowledge: Low  Muscle Strength and Tone: Grossly normal  Gait and Station: Grossly normal       DISCHARGE PLAN     1.  Education given regarding diagnostic and treatment options with risks, benefits and alternatives with adequate verbalization of understanding.  2.  Discharge to home with care supports in place. Upon detailed review of risk factors, patient amenable for release.   3.  Continue aforementioned medications and associated medication changes with follow-up by outpatient provider.  4.  Crisis management planning in place.    5.  Nursing and  to review further discharge recommendations.   6.  Patient is being discharged with the following appointments:    Health Care Follow-up:      Psychiatry Appointment: June 2 @9:30am  Sabrina Whitt  [facility]  [address] [place] [address]  [phone]     Therapy Appointment: Wednesday, April 30 @8am  Ayala Wilder  [facility]  [address] [place] [id number]  Phone: [phone]   Fax: [phone]  Med Records Fax: [phone]     Primary Care Appointment: Friday, May 2 @10:15am  Victoria Smyth  [facility]  [address]  [place] [id number] [phone]       7. General discharge instructions:       Follow-up and recommended labs and tests     Follow up with PCP within 2 weeks of discharge  for hospital follow- up. No follow up labs or test are needed.     Reason for your hospital stay    SI      Activity    Your activity upon discharge: activity as tolerated     Diet    Follow this diet upon discharge: Orders Placed This Encounter      Regular Diet Adult           DISCHARGE SERVICES PROVIDED     50 minutes spent on discharge services, including:  Final examination of patient.  Review and discussion of hospital stay.  Instructions for continued outpatient care/goals.  Preparation of discharge records.  Preparation of medications refills and new prescriptions.  Preparation of applicable referral forms.        ATTESTATION     Dr. Rodney Sanches  Psychiatrist     VIDEO VISIT    Patient has given verbal consent for video visit?: Yes     Video- Visit Details  Type of service:  video visit for mental health treatment.  Time of service:  Date:  04/29/2025  Video Start Time: 730 AM  Video End Time: 800 AM    Reason for video visit[place]  Originating Site (patient location):  [facility]  Distant Site (provider location):  Remote location  Mode of Communication:  Video Conference via [employer] THIS ADMISSION     No results found for any visits on 04/22/25.

## 2025-05-05 DIAGNOSIS — N39.46 MIXED INCONTINENCE: Primary | ICD-10-CM

## 2025-05-06 ENCOUNTER — OFFICE VISIT (OUTPATIENT)
Dept: FAMILY MEDICINE | Facility: OTHER | Age: 50
End: 2025-05-06
Attending: NURSE PRACTITIONER
Payer: MEDICARE

## 2025-05-06 VITALS
SYSTOLIC BLOOD PRESSURE: 124 MMHG | BODY MASS INDEX: 47.09 KG/M2 | WEIGHT: 293 LBS | DIASTOLIC BLOOD PRESSURE: 70 MMHG | RESPIRATION RATE: 12 BRPM | OXYGEN SATURATION: 98 % | HEIGHT: 66 IN | HEART RATE: 85 BPM | TEMPERATURE: 98 F

## 2025-05-06 DIAGNOSIS — R10.2 SUPRAPUBIC PAIN: ICD-10-CM

## 2025-05-06 DIAGNOSIS — N76.0 BACTERIAL VAGINOSIS: Primary | ICD-10-CM

## 2025-05-06 DIAGNOSIS — B96.89 BACTERIAL VAGINOSIS: Primary | ICD-10-CM

## 2025-05-06 DIAGNOSIS — R35.0 URINARY FREQUENCY: ICD-10-CM

## 2025-05-06 DIAGNOSIS — D50.9 IRON DEFICIENCY ANEMIA, UNSPECIFIED IRON DEFICIENCY ANEMIA TYPE: ICD-10-CM

## 2025-05-06 LAB
ALBUMIN UR-MCNC: NEGATIVE MG/DL
APPEARANCE UR: CLEAR
BACTERIAL VAGINOSIS VAG-IMP: POSITIVE
BILIRUB UR QL STRIP: NEGATIVE
CANDIDA DNA VAG QL NAA+PROBE: NOT DETECTED
CANDIDA GLABRATA / CANDIDA KRUSEI DNA: NOT DETECTED
COLOR UR AUTO: ABNORMAL
GLUCOSE UR STRIP-MCNC: NEGATIVE MG/DL
HGB UR QL STRIP: NEGATIVE
KETONES UR STRIP-MCNC: NEGATIVE MG/DL
LEUKOCYTE ESTERASE UR QL STRIP: NEGATIVE
MUCOUS THREADS #/AREA URNS LPF: PRESENT /LPF
NITRATE UR QL: NEGATIVE
PH UR STRIP: 8 [PH] (ref 5–9)
RBC URINE: <1 /HPF
SP GR UR STRIP: 1.01 (ref 1–1.03)
SQUAMOUS EPITHELIAL: 4 /HPF
T VAGINALIS DNA VAG QL NAA+PROBE: NOT DETECTED
UROBILINOGEN UR STRIP-MCNC: NORMAL MG/DL
WBC URINE: 8 /HPF

## 2025-05-06 PROCEDURE — 81003 URINALYSIS AUTO W/O SCOPE: CPT | Mod: ZL

## 2025-05-06 PROCEDURE — 81515 NFCT DS BV&VAGINITIS DNA ALG: CPT | Mod: ZL

## 2025-05-06 PROCEDURE — G0463 HOSPITAL OUTPT CLINIC VISIT: HCPCS

## 2025-05-06 RX ORDER — BACILLUS COAGULANS 1B CELL
CAPSULE ORAL
Qty: 60 TABLET | Refills: 0 | Status: SHIPPED | OUTPATIENT
Start: 2025-05-06

## 2025-05-06 RX ORDER — METRONIDAZOLE 500 MG/1
500 TABLET ORAL 2 TIMES DAILY
Qty: 14 TABLET | Refills: 0 | Status: SHIPPED | OUTPATIENT
Start: 2025-05-06 | End: 2025-05-13

## 2025-05-06 ASSESSMENT — PAIN SCALES - GENERAL: PAINLEVEL_OUTOF10: SEVERE PAIN (7)

## 2025-05-06 NOTE — PROGRESS NOTES
ASSESSMENT/PLAN:    I have reviewed the nursing notes.  I have reviewed the findings, diagnosis, plan and need for follow up with the patient.    1. Bacterial vaginosis (Primary)  2. Urinary frequency  3. Suprapubic pain  - UA with Microscopic reflex to Culture  - Multiplex Vaginal Panel by PCR  - metroNIDAZOLE (FLAGYL) 500 MG tablet; Take 1 tablet (500 mg) by mouth 2 times daily for 7 days.  Dispense: 14 tablet; Refill: 0    Patient presents with urinary and vaginal symptoms.  Vitals are stable and she appears nontoxic.  Urinalysis was negative for any signs of infection but her multiplex vaginal panel came back positive for bacterial vaginosis.  Will treat with metronidazole.  Advised patient to avoid sexual activity until she has completed treatment.  Discussed that if she continues to have symptoms after completing treatment that she should follow-up with her gynecologist.    Discussed warning signs/symptoms indicative of need to f/u    Follow up if symptoms persist or worsen or concerns    I explained my diagnostic considerations and recommendations to the patient, who voiced understanding and agreement with the treatment plan. All questions were answered. We discussed potential side effects of any prescribed or recommended therapies, as well as expectations for response to treatments.    Oscar Dykes, DASHA CNP  5/6/2025  10:38 AM    HPI:    Rosi Lamb is a 50 year old female  who presents to [facility] today for concerns of urinary symptoms    Patient presents with concerns of possible UTI, x 2 days    Symptoms: urgency, frequency, suprapubic pain and pressure, and back pain  Flank Pain or Back Pain: Yes  Blood in Urine: No  Last Urination: in clinic  Able to Completely Urinate/Void: Yes  Prior UTIs: Yes  Exposures to STIs/STDs: No  Fevers, chills, N/V/D: No  Change in Bowel Habits: No  Vaginal Symptoms or Discharge: No  Recent swimming/use of hot tubs/swimming pools/lakes: No    LMP:  n/a    Treatments tried: tylenol and ibuprofen    Allergies: reviewed    PCP: [facility]    Past Medical History:   Diagnosis Date    Abnormal Pap smear of cervix 2018    Overview:  had scraping of cervix    Cannabis use disorder, moderate, in sustained remission, dependence (H) 2015    Closed dislocation of tarsal joint 2011    Closed dislocation of tarsal joint - left 2011    Edema     No Comments Provided    Encounter for removal and reinsertion of intrauterine contraceptive device     05,IUD placement, Removed    Excessive and frequent menstruation with irregular cycle     2017    Major depressive disorder, single episode     No Comments Provided    Personal history of other medical treatment (CODE)     G3, P2-0-1-2 with history of spontaneous     Personal history of other medical treatment (CODE)     No Comments Provided    Uncomplicated asthma     No Comments Provided    Urinary incontinence 03/15/2013     Past Surgical History:   Procedure Laterality Date    ANKLE SURGERY      fracture, repair with screws    ARTHRODESIS FOOT Left 2022    Procedure: left foot hardware removaL, fusion of 1st-2nd Tarsal metatarsal;  Surgeon: Anthony Castillo DPM;  Location: [facility] OR    COLONOSCOPY  2022    F/U  tubular adenoma    CONIZATION LEEP      ,Underwent a loop    IMPLANT STIMULATOR AND LEADS SACRAL NERVE (STAGE ONE AND TWO)      scheduled 23 with Dr. Sheth at [facility]    LAPAROSCOPIC TUBAL LIGATION      ,tubal ligation     Social History     Tobacco Use    Smoking status: Former     Current packs/day: 0.00     Average packs/day: 2.0 packs/day for 3.0 years (6.0 ttl pk-yrs)     Types: Cigarettes     Start date:      Quit date: 2003     Years since quittin.3     Passive exposure: Past    Smokeless tobacco: Never   Substance Use Topics    Alcohol use: Not Currently     Alcohol/week: 0.0 standard drinks of alcohol     Current Outpatient  Medications   Medication Sig Dispense Refill    acetaminophen (TYLENOL) 500 MG tablet Take 1-2 tablets (500-1,000 mg) by mouth every 6 hours as needed for mild pain 90 tablet 4    albuterol (VENTOLIN HFA) 108 (90 Base) MCG/ACT inhaler Inhale 1-2 puffs into the lungs every 4 hours as needed for shortness of breath or wheezing 18 g 11    amLODIPine (NORVASC) 2.5 MG tablet Take 1 tablet (2.5 mg) by mouth every morning. May also take 1 tablet (2.5 mg) every evening as needed (-- for Raynaud's / Hand color changes --). 60 tablet 1    ARIPiprazole (ABILIFY) 20 MG tablet Take 1 tablet (20 mg) by mouth at bedtime. 30 tablet 1    atomoxetine (STRATTERA) 25 MG capsule Take 1 capsule (25 mg) by mouth every morning. 30 capsule 1    busPIRone (BUSPAR) 10 MG tablet Take 1 tablet (10 mg) by mouth 3 times daily. 90 tablet 1    cholecalciferol (VITAMIN D3) 125 mcg (5000 units) capsule Take 1 capsule (125 mcg) by mouth daily. 30 capsule 1    diltiazem ER COATED BEADS (CARDIZEM CD/CARTIA XT) 180 MG 24 hr capsule Take 1 capsule (180 mg) by mouth 2 times daily. 60 capsule 1    docusate sodium (EQ STOOL SOFTENER) 100 MG capsule Take 1 capsule (100 mg) by mouth 2 times daily as needed for constipation. 60 capsule 1    Elemental iron 65 mg Vitamin C 125 mg (VITRON C)  MG TABS tablet Take 1 tablet by mouth daily on empty stomach -for iron deficiency 90 tablet 4    estradiol (VIVELLE-DOT) 0.05 MG/24HR bi-weekly patch Place 1 patch onto the skin twice a week. 9 patch 1    famotidine (PEPCID) 20 MG tablet Take 1 tablet (20 mg) by mouth 2 times daily. - For Heartburn 60 tablet 1    gabapentin (NEURONTIN) 300 MG capsule Take 1 capsule (300 mg) by mouth 3 times daily. 90 capsule 1    hydrOXYzine HCl (ATARAX) 50 MG tablet Take 1 tablet (50 mg) by mouth at bedtime. 30 tablet 1    ibuprofen (ADVIL/MOTRIN) 200 MG tablet Take 4 tablets (800 mg) by mouth every 8 hours as needed (back pain/ headache).      lamoTRIgine (LAMICTAL) 100 MG tablet Take  "1 tablet (100 mg) by mouth 3 times daily. 90 tablet 1    montelukast (SINGULAIR) 10 MG tablet Take 1 tablet (10 mg) by mouth at bedtime. 30 tablet 1    progesterone (PROMETRIUM) 100 MG capsule Take 1 capsule (100 mg) by mouth daily. 30 capsule 1    ramelteon (ROZEREM) 8 MG tablet Take 1 tablet (8 mg) by mouth nightly as needed for sleep. 30 tablet 1    vilazodone (VIIBRYD) 40 MG TABS tablet Take 1 tablet (40 mg) by mouth every morning. 30 tablet 1     Allergies   Allergen Reactions    Clonazepam Other (See Comments)     Causes Violence and aggresssion    Hydrocodone-Acetaminophen      NOT allergic to Tylenol  Other reaction(s): Seizures  Can take Percocet without difficulty.    Lorazepam Other (See Comments)     Causes Violence and aggresssion    Sertraline Other (See Comments)     Caused suicidally     Bupropion Other (See Comments)     Caused Major Depression    Dust Mite Extract      Other reaction(s): Asthma symptoms    Lithium Other (See Comments)     Mood alteration    Milk Protein Unknown    Pollen Extract      Other reaction(s): Asthma symptoms    Risperidone Other (See Comments)     Agitation      Sulfa Antibiotics Itching    Trichophyton      Other reaction(s): Asthma symptoms    Valproic Acid Other (See Comments)     Weight gain     Past medical history, past surgical history, current medications and allergies reviewed and accurate to the best of my knowledge.      ROS:  Refer to HPI    /70 (BP Location: Right arm, Patient Position: Sitting, Cuff Size: Adult Large)   Pulse 85   Temp 98  F (36.7  C) (Tympanic)   Resp 12   Ht 1.676 m (5' 6\")   Wt (!) 138.3 kg (305 lb)   SpO2 98%   BMI 49.23 kg/m      EXAM:  General Appearance: Well appearing 50 year old female, appropriate appearance for age. No acute distress   Respiratory: normal chest wall and respirations.  Normal effort.  Clear to auscultation bilaterally, no wheezing, crackles or rhonchi.  No increased work of breathing.  No cough " appreciated.  Cardiac: RRR with no murmurs  :  Mild suprapubic tenderness to palpation.  Absent CVA tenderness to palpation.  Vulva appears normal, no discharge noted.  Chaperone present during exam.  Neuro: Alert and oriented to person, place, and time.    Psychological: normal affect, alert, oriented, and pleasant.     Labs:  Results for orders placed or performed in visit on 05/06/25   UA with Microscopic reflex to Culture     Status: Abnormal    Specimen: Urine, Clean Catch   Result Value Ref Range    Color Urine Light Yellow Colorless, Straw, Light Yellow, Yellow    Appearance Urine Clear Clear    Glucose Urine Negative Negative mg/dL    Bilirubin Urine Negative Negative    Ketones Urine Negative Negative mg/dL    Specific Gravity Urine 1.015 1.000 - 1.030    Blood Urine Negative Negative    pH Urine 8.0 5.0 - 9.0    Protein Albumin Urine Negative Negative mg/dL    Urobilinogen Urine Normal Normal mg/dL    Nitrite Urine Negative Negative    Leukocyte Esterase Urine Negative Negative    Mucus Urine Present (A) None Seen /LPF    RBC Urine <1 <=2 /HPF    WBC Urine 8 (H) <=5 /HPF    Squamous Epithelials Urine 4 (H) <=1 /HPF    Narrative    Urine Culture not indicated   Multiplex Vaginal Panel by PCR     Status: Abnormal    Specimen: Vagina; Swab   Result Value Ref Range    Bacterial Vaginosis Organism DNA Positive (A) Negative    Candida Group DNA Not Detected Not Detected    Candida glabrata / Rajani krusei DNA Not Detected Not Detected    Trichomonas vaginalis DNA Not Detected Not Detected    Narrative    The Xpert  Xpress MVP test, performed on the [employer], is an automated, qualitative in vitro diagnostic test for the detection of DNA targets from anaerobic bacteria associated with bacterial vaginosis, Candida species associated with vulvovaginal candidiasis, and Trichomonas vaginalis. The assay uses clinician-collected and self-collected vaginal swabs from patients who are symptomatic  for vaginitis/ vaginosis. The Xpert  Xpress MVP test utilizes real-time polymerase chain reaction (PCR) for the amplification of specific DNA targets and utilizes fluorogenic target-specific hybridization probes to detect and differentiate DNA. It is intended to aid in the diagnosis of vaginal infections in women with a clinical presentation consistent with bacterial vaginosis, vulvovaginal candidiasis, or trichomoniasis.   The assay targets three anaerobic microorgansims that are associated with bacterial vaginosis (BV). Other organisms that are not detected by the Xpert  Xpress MVP test have also been reported to be associated with BV. The BV organism and Candida species targets of the Xpert  Xpress MVP test can be commensal in women; positive results must be considered in conjunction with other clinical and patient information to determine the disease status.

## 2025-05-06 NOTE — NURSING NOTE
"Chief Complaint   Patient presents with    Back Pain     Last night      Abdominal Pain     Cramping x 2 days    Vaginal Problem     Pain in vaginal and pubic area     Patient tx with ibuprofen and tylenol with some relief.  Concerned with kidney infection as she has had increased urination.    Please fax medication orders to [facility]: FAX: [phone]    Initial /70 (BP Location: Right arm, Patient Position: Sitting, Cuff Size: Adult Large)   Pulse 85   Temp 98  F (36.7  C) (Tympanic)   Resp 12   Ht 1.676 m (5' 6\")   Wt (!) 138.3 kg (305 lb)   SpO2 98%   BMI 49.23 kg/m   Estimated body mass index is 49.23 kg/m  as calculated from the following:    Height as of this encounter: 1.676 m (5' 6\").    Weight as of this encounter: 138.3 kg (305 lb).     Advance Care Directive on file? y    FOOD SECURITY SCREENING QUESTIONS:    The next two questions are to help us understand your food security.  If you are feeling you need any assistance in this area, we have resources available to support you today.    Hunger Vital Signs:  Within the past 12 months we worried whether our food would run out before we got money to buy more. Never  Within the past 12 months the food we bought just didn't last and we didn't have money to get more. Never  Aspen Brewer LPN,LPN on 5/6/2025 at 10:32 AM      Aspen Brewer LPN     " ----- Message from Jo Resendiz DO sent at 12/20/2019 10:38 AM CST -----  Pls call the pt to inform of results and place orders as below.  If unable to reach x 2, send letter to the effect of:  \"1. You do not have anemia.   2. Your liver and kidneys are working normally.   3. Your thyroid is underactive. We will increase your Levothyroxine and recheck your levels in 2 months.\"     Orders   Levothyroxine 50 mcg daily, #30 refills 2  TSH with reflex in 2m

## 2025-05-07 NOTE — PROGRESS NOTES
[facility] HEART CARE   CARDIOLOGY PROGRESS NOTE     Chief Complaint   Patient presents with    Follow Up     Tests and meds          Diagnosis:  1.  A-fib, new on 8/22/2024.   -Diltiazem.   -EKG A-fib with RVR to 145 bpm.  2.  Obesity.   -BMI of 47 on 10/14/2024.  3.  TAWNYA.   -Concern for.  4.  History of tobacco abuse.   -Quitting in 1/1/2003.  5.  CKD-2.  6.  Vitamin D deficiency.  7.  B12 deficiency.  8.  History of drug abuse.  9.  History of alcohol abuse.        Assessment/Plan:    1.  A-fib: Here for 6-month follow-up.  Newly diagnosed with A-fib on 8/22/2024.  Has approximately x2 episodes of palpitation a week.  They last only a few seconds.  Symptoms are mild.  When last seen, we increased diltiazem to 180 mg twice a day.  Symptoms improved.  Previously, describes an episode of palpitations where she felt lightheaded and describes near syncope.  She presented to the ER.  EKG at the ER showed A-fib with a heart rate of 145 bpm.  Thankfully, after an hour, she spontaneously converted.  She was on the bus during the second episode.  [id number] score is 0.  No plans for anticoagulation at this point.  Discussed potential need for anticoagulation in the future.  Also, discussed cardioversion and potentially an ablation.  She is not interested in an ablation.  No changes today.  Follow-up 1 year or sooner if issues.  2.  TAWNYA: High likelihood.  Does snore at night.  Referred to sleep medicine.  Currently has a WatchPAT testing at home but has not worn it.  Encouraged her to wear it.  3.  CKD-2.  Aware, no changes.  4.  History of tobacco abuse: Not currently smoking.  5.  History of drug/alcohol abuse.  Not currently using.  6.  Follow-up in 1 year or sooner with issues.      Interval history:  See above.          Relevant testing:  Echocardiogram on 9/10/2024:  Global and regional left ventricular function is normal with an EF of 60-65%.  Left ventricular diastolic function is normal.   Right ventricular  function, chamber size, wall motion, and thickness are  normal.  Trace aortic insufficiency is present.  Mean RA pressure is normal at 3 mmHg.  No pericardial effusion present.  This study was compared with the study from 2016. No significant changes noted.        ICD-10-CM    1. Paroxysmal atrial fibrillation (H)  I48.0 EKG 12-lead, tracing only      2. Chronic venous stasis dermatitis of right lower extremity  I87.2       3. CKD (chronic kidney disease) stage 2, GFR 60-89 ml/min  N18.2       4. Obesity, unspecified class  E66.9       5. History of tobacco abuse  Z87.891       6. TAWNYA (obstructive sleep apnea)  G47.33             Past Medical History:   Diagnosis Date    Abnormal Pap smear of cervix 2018    Overview:  had scraping of cervix    Cannabis use disorder, moderate, in sustained remission, dependence (H) 2015    Closed dislocation of tarsal joint 2011    Closed dislocation of tarsal joint - left 2011    Edema     No Comments Provided    Encounter for removal and reinsertion of intrauterine contraceptive device     05,IUD placement, Removed    Excessive and frequent menstruation with irregular cycle     2017    Major depressive disorder, single episode     No Comments Provided    Personal history of other medical treatment (CODE)     G3, P2-0-1-2 with history of spontaneous     Personal history of other medical treatment (CODE)     No Comments Provided    Uncomplicated asthma     No Comments Provided    Urinary incontinence 03/15/2013       Past Surgical History:   Procedure Laterality Date    ANKLE SURGERY      fracture, repair with screws    ARTHRODESIS FOOT Left 2022    Procedure: left foot hardware removaL, fusion of 1st-2nd Tarsal metatarsal;  Surgeon: Anthony Castillo DPM;  Location: [facility] OR    COLONOSCOPY  2022    F/U  tubular adenoma    CONIZATION LEEP      ,Underwent a loop    IMPLANT STIMULATOR AND LEADS SACRAL NERVE (STAGE ONE AND  TWO)      scheduled 11/8/23 with Dr. Sheth at [place]    LAPAROSCOPIC TUBAL LIGATION      2009,tubal ligation       Allergies   Allergen Reactions    Clonazepam Other (See Comments)     Causes Violence and aggresssion    Hydrocodone-Acetaminophen      NOT allergic to Tylenol  Other reaction(s): Seizures  Can take Percocet without difficulty.    Lorazepam Other (See Comments)     Causes Violence and aggresssion    Sertraline Other (See Comments)     Caused suicidally     Bupropion Other (See Comments)     Caused Major Depression    Dust Mite Extract      Other reaction(s): Asthma symptoms    Lithium Other (See Comments)     Mood alteration    Milk Protein Unknown    Pollen Extract      Other reaction(s): Asthma symptoms    Risperidone Other (See Comments)     Agitation      Sulfa Antibiotics Itching    Trichophyton      Other reaction(s): Asthma symptoms    Valproic Acid Other (See Comments)     Weight gain       Current Outpatient Medications   Medication Sig Dispense Refill    acetaminophen (TYLENOL) 500 MG tablet Take 1-2 tablets (500-1,000 mg) by mouth every 6 hours as needed for mild pain 90 tablet 4    albuterol (VENTOLIN HFA) 108 (90 Base) MCG/ACT inhaler Inhale 1-2 puffs into the lungs every 4 hours as needed for shortness of breath or wheezing 18 g 11    amLODIPine (NORVASC) 2.5 MG tablet Take 1 tablet (2.5 mg) by mouth every morning. May also take 1 tablet (2.5 mg) every evening as needed (-- for Raynaud's / Hand color changes --). 60 tablet 1    ARIPiprazole (ABILIFY) 20 MG tablet Take 1 tablet (20 mg) by mouth at bedtime. 30 tablet 1    atomoxetine (STRATTERA) 25 MG capsule Take 1 capsule (25 mg) by mouth every morning. 30 capsule 1    busPIRone (BUSPAR) 10 MG tablet Take 1 tablet (10 mg) by mouth 3 times daily. 90 tablet 1    cholecalciferol (VITAMIN D3) 125 mcg (5000 units) capsule Take 1 capsule (125 mcg) by mouth daily. 30 capsule 1    diltiazem ER COATED BEADS (CARDIZEM CD/CARTIA XT) 180 MG 24  hr capsule Take 1 capsule (180 mg) by mouth 2 times daily. 60 capsule 1    docusate sodium (EQ STOOL SOFTENER) 100 MG capsule Take 1 capsule (100 mg) by mouth 2 times daily as needed for constipation. 60 capsule 1    estradiol (VIVELLE-DOT) 0.05 MG/24HR bi-weekly patch Place 1 patch onto the skin twice a week. 9 patch 1    famotidine (PEPCID) 20 MG tablet Take 1 tablet (20 mg) by mouth 2 times daily. - For Heartburn 60 tablet 1    gabapentin (NEURONTIN) 300 MG capsule Take 1 capsule (300 mg) by mouth 3 times daily. 90 capsule 1    hydrOXYzine HCl (ATARAX) 50 MG tablet Take 1 tablet (50 mg) by mouth at bedtime. 30 tablet 1    ibuprofen (ADVIL/MOTRIN) 200 MG tablet Take 4 tablets (800 mg) by mouth every 8 hours as needed (back pain/ headache).      lamoTRIgine (LAMICTAL) 100 MG tablet Take 1 tablet (100 mg) by mouth 3 times daily. 90 tablet 1    metroNIDAZOLE (FLAGYL) 500 MG tablet Take 1 tablet (500 mg) by mouth 2 times daily for 7 days. 14 tablet 0    montelukast (SINGULAIR) 10 MG tablet Take 1 tablet (10 mg) by mouth at bedtime. 30 tablet 1    progesterone (PROMETRIUM) 100 MG capsule Take 1 capsule (100 mg) by mouth daily. 30 capsule 1    ramelteon (ROZEREM) 8 MG tablet Take 1 tablet (8 mg) by mouth nightly as needed for sleep. 30 tablet 1    vilazodone (VIIBRYD) 40 MG TABS tablet Take 1 tablet (40 mg) by mouth every morning. 30 tablet 1    VITRON-C  MG TABS tablet TAKE 1 TABLET BY MOUTH ONCE DAILY ON AN EMPTY STOMACH FOR  IRON  DEFICIENCY 60 tablet 0       Social History     Socioeconomic History    Marital status:      Spouse name: Not on file    Number of children: Not on file    Years of education: Not on file    Highest education level: Not on file   Occupational History    Not on file   Tobacco Use    Smoking status: Former     Current packs/day: 0.00     Average packs/day: 2.0 packs/day for 3.0 years (6.0 ttl pk-yrs)     Types: Cigarettes     Start date: 2000     Quit date: 1/1/2003     Years  since quittin.3     Passive exposure: Past    Smokeless tobacco: Never   Vaping Use    Vaping status: Never Used   Substance and Sexual Activity    Alcohol use: Not Currently     Alcohol/week: 0.0 standard drinks of alcohol    Drug use: Never    Sexual activity: Not Currently     Partners: Male     Birth control/protection: Female Surgical     Comment: ablation   Other Topics Concern    Parent/sibling w/ CABG, MI or angioplasty before 65F 55M? Not Asked   Social History Narrative    No longer in adult foster care lived with Charley Godwin.    .   2 sons - age 12 and 7 - as of 2016.   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     Social Drivers of Health     Financial Resource Strain: Low Risk  (2025)    Financial Resource Strain     Within the past 12 months, have you or your family members you live with been unable to get utilities (heat, electricity) when it was really needed?: No   Food Insecurity: Low Risk  (2025)    Food Insecurity     Within the past 12 months, did you worry that your food would run out before you got money to buy more?: No     Within the past 12 months, did the food you bought just not last and you didn t have money to get more?: No   Transportation Needs: Low Risk  (2025)    Transportation Needs     Within the past 12 months, has lack of transportation kept you from medical appointments, getting your medicines, non-medical meetings or appointments, work, or from getting things that you need?: No   Physical Activity: Unknown (2025)    Exercise Vital Sign     Days of Exercise per Week: 2 days     Minutes of Exercise per Session: Not on file   Stress: No Stress Concern Present (2025)    [employer] [place] [employer] - Occupational Stress Questionnaire     Feeling of Stress : Not at all   Social Connections: Unknown (2025)    Social Connection and Isolation Panel [NHANES]     Frequency of Communication with Friends and  Family: Not on file     Frequency of Social Gatherings with Friends and Family: Twice a week     Attends Buddhist Services: Not on file     Active Member of Clubs or Organizations: Not on file     Attends Club or Organization Meetings: Not on file     Marital Status: Not on file   Interpersonal Safety: Low Risk  (5/8/2025)    Interpersonal Safety     Do you feel physically and emotionally safe where you currently live?: Yes     Within the past 12 months, have you been hit, slapped, kicked or otherwise physically hurt by someone?: No     Within the past 12 months, have you been humiliated or emotionally abused in other ways by your partner or ex-partner?: No   Housing Stability: Low Risk  (4/22/2025)    Housing Stability     Do you have housing? : Yes     Are you worried about losing your housing?: No   Recent Concern: Housing Stability - High Risk (4/8/2025)    Housing Stability     Do you have housing? : No     Are you worried about losing your housing?: No       LAB RESULTS:   No visits with results within 2 Month(s) from this visit.   Latest known visit with results is:   Office Visit on 08/20/2024   Component Date Value Ref Range Status    Sodium 08/20/2024 139  135 - 145 mmol/L Final    Potassium 08/20/2024 4.5  3.4 - 5.3 mmol/L Final    Carbon Dioxide (CO2) 08/20/2024 30 (H)  22 - 29 mmol/L Final    Anion Gap 08/20/2024 6 (L)  7 - 15 mmol/L Final    Urea Nitrogen 08/20/2024 15.0  6.0 - 20.0 mg/dL Final    Creatinine 08/20/2024 0.90  0.51 - 0.95 mg/dL Final    GFR Estimate 08/20/2024 79  >60 mL/min/1.73m2 Final    Calcium 08/20/2024 9.4  8.8 - 10.4 mg/dL Final    Chloride 08/20/2024 103  98 - 107 mmol/L Final    Glucose 08/20/2024 96  70 - 99 mg/dL Final    Alkaline Phosphatase 08/20/2024 86  40 - 150 U/L Final    AST 08/20/2024 27  0 - 45 U/L Final    ALT 08/20/2024 23  0 - 50 U/L Final    Protein Total 08/20/2024 8.0  6.4 - 8.3 g/dL Final    Albumin 08/20/2024 4.4  3.5 - 5.2 g/dL Final    Bilirubin Total  "08/20/2024 0.6  <=1.2 mg/dL Final    WBC Count 08/20/2024 9.1  4.0 - 11.0 10e3/uL Final    RBC Count 08/20/2024 4.69  3.80 - 5.20 10e6/uL Final    Hemoglobin 08/20/2024 14.2  11.7 - 15.7 g/dL Final    Hematocrit 08/20/2024 44.4  35.0 - 47.0 % Final    MCV 08/20/2024 95  78 - 100 fL Final    MCH 08/20/2024 30.3  26.5 - 33.0 pg Final    MCHC 08/20/2024 32.0  31.5 - 36.5 g/dL Final    RDW 08/20/2024 13.2  10.0 - 15.0 % Final    Platelet Count 08/20/2024 237  150 - 450 10e3/uL Final    % Neutrophils 08/20/2024 72  % Final    % Lymphocytes 08/20/2024 17  % Final    % Monocytes 08/20/2024 10  % Final    % Eosinophils 08/20/2024 1  % Final    % Basophils 08/20/2024 0  % Final    % Immature Granulocytes 08/20/2024 0  % Final    NRBCs per 100 WBC 08/20/2024 0  <1 /100 Final    Absolute Neutrophils 08/20/2024 6.5  1.6 - 8.3 10e3/uL Final    Absolute Lymphocytes 08/20/2024 1.5  0.8 - 5.3 10e3/uL Final    Absolute Monocytes 08/20/2024 0.9  0.0 - 1.3 10e3/uL Final    Absolute Eosinophils 08/20/2024 0.1  0.0 - 0.7 10e3/uL Final    Absolute Basophils 08/20/2024 0.0  0.0 - 0.2 10e3/uL Final    Absolute Immature Granulocytes 08/20/2024 0.0  <=0.4 10e3/uL Final    Absolute NRBCs 08/20/2024 0.0  10e3/uL Final        Review of systems: Negative except that which was noted in the HPI.    Physical examination:  /76 (BP Location: Right arm, Patient Position: Sitting, Cuff Size: Adult Large)   Pulse 100   Temp 98.2  F (36.8  C) (Temporal)   Resp 16   Ht 1.67 m (5' 5.75\")   Wt (!) 137.9 kg (304 lb)   SpO2 100%   BMI 49.44 kg/m      GENERAL APPEARANCE: healthy, alert and no distress  CHEST: lungs clear to auscultation.  CARDIOVASCULAR: regular rhythm, normal S1 with physiologic split S2, no S3 or S4 and no murmur, click or rub  EXTREMITIES: no clubbing, cyanosis or edema.              Thank you for allowing me to participate in the care of your patient. Please do not hesitate to contact me if you have any questions.     Elmer" SOFY Ceballos, DO

## 2025-05-08 ENCOUNTER — OFFICE VISIT (OUTPATIENT)
Dept: UROLOGY | Facility: OTHER | Age: 50
End: 2025-05-08
Payer: MEDICARE

## 2025-05-08 ENCOUNTER — OFFICE VISIT (OUTPATIENT)
Dept: CARDIOLOGY | Facility: OTHER | Age: 50
End: 2025-05-08
Attending: INTERNAL MEDICINE
Payer: MEDICARE

## 2025-05-08 ENCOUNTER — LAB (OUTPATIENT)
Dept: LAB | Facility: OTHER | Age: 50
End: 2025-05-08
Payer: MEDICARE

## 2025-05-08 ENCOUNTER — RESULTS FOLLOW-UP (OUTPATIENT)
Dept: UROLOGY | Facility: OTHER | Age: 50
End: 2025-05-08

## 2025-05-08 VITALS
SYSTOLIC BLOOD PRESSURE: 118 MMHG | BODY MASS INDEX: 47.09 KG/M2 | RESPIRATION RATE: 16 BRPM | TEMPERATURE: 98.2 F | HEART RATE: 100 BPM | HEIGHT: 66 IN | WEIGHT: 293 LBS | OXYGEN SATURATION: 100 % | DIASTOLIC BLOOD PRESSURE: 76 MMHG

## 2025-05-08 VITALS
BODY MASS INDEX: 48.42 KG/M2 | HEART RATE: 110 BPM | SYSTOLIC BLOOD PRESSURE: 122 MMHG | WEIGHT: 293 LBS | OXYGEN SATURATION: 98 % | TEMPERATURE: 97.4 F | DIASTOLIC BLOOD PRESSURE: 80 MMHG | RESPIRATION RATE: 18 BRPM

## 2025-05-08 DIAGNOSIS — R32 URINARY INCONTINENCE, UNSPECIFIED TYPE: ICD-10-CM

## 2025-05-08 DIAGNOSIS — N39.46 MIXED INCONTINENCE: ICD-10-CM

## 2025-05-08 DIAGNOSIS — G47.33 OSA (OBSTRUCTIVE SLEEP APNEA): ICD-10-CM

## 2025-05-08 DIAGNOSIS — N18.2 CKD (CHRONIC KIDNEY DISEASE) STAGE 2, GFR 60-89 ML/MIN: ICD-10-CM

## 2025-05-08 DIAGNOSIS — E66.9 OBESITY, UNSPECIFIED CLASS, UNSPECIFIED OBESITY TYPE, UNSPECIFIED WHETHER SERIOUS COMORBIDITY PRESENT: ICD-10-CM

## 2025-05-08 DIAGNOSIS — N32.81 OAB (OVERACTIVE BLADDER): ICD-10-CM

## 2025-05-08 DIAGNOSIS — I48.0 PAROXYSMAL ATRIAL FIBRILLATION (H): Primary | ICD-10-CM

## 2025-05-08 DIAGNOSIS — I87.2 CHRONIC VENOUS STASIS DERMATITIS OF RIGHT LOWER EXTREMITY: Chronic | ICD-10-CM

## 2025-05-08 DIAGNOSIS — Z87.891 HISTORY OF TOBACCO ABUSE: ICD-10-CM

## 2025-05-08 DIAGNOSIS — N39.41 URGE INCONTINENCE: Primary | ICD-10-CM

## 2025-05-08 LAB
ALBUMIN UR-MCNC: 10 MG/DL
APPEARANCE UR: CLEAR
ATRIAL RATE - MUSE: 89 BPM
BILIRUB UR QL STRIP: NEGATIVE
COLOR UR AUTO: YELLOW
DIASTOLIC BLOOD PRESSURE - MUSE: NORMAL MMHG
GLUCOSE UR STRIP-MCNC: NEGATIVE MG/DL
HGB UR QL STRIP: NEGATIVE
INTERPRETATION ECG - MUSE: NORMAL
KETONES UR STRIP-MCNC: ABNORMAL MG/DL
LEUKOCYTE ESTERASE UR QL STRIP: ABNORMAL
NITRATE UR QL: NEGATIVE
P AXIS - MUSE: 69 DEGREES
PH UR STRIP: 6 [PH] (ref 5–9)
PR INTERVAL - MUSE: 158 MS
QRS DURATION - MUSE: 96 MS
QT - MUSE: 378 MS
QTC - MUSE: 459 MS
R AXIS - MUSE: 61 DEGREES
SP GR UR STRIP: 1.02 (ref 1–1.03)
SYSTOLIC BLOOD PRESSURE - MUSE: NORMAL MMHG
T AXIS - MUSE: 48 DEGREES
UROBILINOGEN UR STRIP-MCNC: 3 MG/DL
VENTRICULAR RATE- MUSE: 89 BPM

## 2025-05-08 PROCEDURE — 81003 URINALYSIS AUTO W/O SCOPE: CPT | Mod: ZL

## 2025-05-08 PROCEDURE — 99214 OFFICE O/P EST MOD 30 MIN: CPT

## 2025-05-08 PROCEDURE — G0463 HOSPITAL OUTPT CLINIC VISIT: HCPCS | Mod: 25

## 2025-05-08 PROCEDURE — 1126F AMNT PAIN NOTED NONE PRSNT: CPT

## 2025-05-08 PROCEDURE — 3079F DIAST BP 80-89 MM HG: CPT

## 2025-05-08 PROCEDURE — G0463 HOSPITAL OUTPT CLINIC VISIT: HCPCS

## 2025-05-08 PROCEDURE — 3074F SYST BP LT 130 MM HG: CPT

## 2025-05-08 ASSESSMENT — PAIN SCALES - GENERAL
PAINLEVEL_OUTOF10: NO PAIN (0)
PAINLEVEL_OUTOF10: NO PAIN (0)

## 2025-05-08 NOTE — PATIENT INSTRUCTIONS
Drink plenty of fluids, > 2 liters/day  Avoid constipation.  Consider Miralax Over the counter, metamucil or Citrucel with increased fiber in your diet  Limit bladder irritants including alcohol, caffeine, and heavily seasoned foods.  Consider timed voiding every 1-2 hours while awake. Use mindfulness and pelvic floor contraction to prolong urination for up to six seconds prior to voiding.  double-voiding with the second void leaning forward and pressing on the bladder.   Contact our office  [phone] when you find your Insterstim device and we will help you adjust settings.

## 2025-05-08 NOTE — PROGRESS NOTES
Chief Complaint: Consult (Incontinence)  .    HPI: Ms. Rosi Lamb is a 50 year old year old female with a history of urinary incontinence, CKD stage II, disorder of female genital organ, polyneuropathy, paroxysmal A-fib, urinary incontinence and bipolar disorder who presents today May 8, 2025 for evaluation of incontinence.    Patient voids every 2-3 hours during the day and is getting up every two hours during the night. Patient endorses urge incontinence, which is more severe during. Patient is going through 3-4 depends per day. Denies stress leakage with coughing/sneezing/squatting. Endorses urinary urgency, and urinary frequency, denies dribbling.  These symptoms started 3-4 years ago.  Does not know what makes symptoms better and caffeine makes symptoms worse.    OAB/urge incontinence. She has failed behavioral/dietary modifications, oxybutynin, Myrbetriq. She is noted to be on hydroxyzine.     Patient drinks 60-80 ounces of water per day. Patient consumes one servings of caffeine in the form of  coffee per day. With the last beverage at 0930.  Drinks no alcohol-containing beverages. Does not typically consume spicy foods. Regular daily bowel movements that are easy to pass.      Past Medical History:   Diagnosis Date    Abnormal Pap smear of cervix 2018    Overview:  had scraping of cervix    Cannabis use disorder, moderate, in sustained remission, dependence (H) 2015    Closed dislocation of tarsal joint 2011    Closed dislocation of tarsal joint - left 2011    Edema     No Comments Provided    Encounter for removal and reinsertion of intrauterine contraceptive device     05,IUD placement, Removed    Excessive and frequent menstruation with irregular cycle     2017    Major depressive disorder, single episode     No Comments Provided    Personal history of other medical treatment (CODE)     G3, P2-0-1-2 with history of spontaneous     Personal history of  other medical treatment (CODE)     No Comments Provided    Uncomplicated asthma     No Comments Provided    Urinary incontinence 03/15/2013       Past Surgical History:   Procedure Laterality Date    ANKLE SURGERY      fracture, repair with screws    ARTHRODESIS FOOT Left 04/21/2022    Procedure: left foot hardware removaL, fusion of 1st-2nd Tarsal metatarsal;  Surgeon: Anthony Castillo DPM;  Location: [facility] OR    COLONOSCOPY  07/25/2022    F/U 2027 tubular adenoma    CONIZATION LEEP      07/04,Underwent a loop    IMPLANT STIMULATOR AND LEADS SACRAL NERVE (STAGE ONE AND TWO)      scheduled 11/8/23 with Dr. Sheth at [place]    LAPAROSCOPIC TUBAL LIGATION      2009,tubal ligation       FAMILY HISTORY:  *** a family history of *** cancer.    SOCIAL HISTORY:    reports that she quit smoking about 22 years ago. Her smoking use included cigarettes. She started smoking about 25 years ago. She has a 6 pack-year smoking history. She has been exposed to tobacco smoke. She has never used smokeless tobacco.    Current Outpatient Medications   Medication Sig Dispense Refill    acetaminophen (TYLENOL) 500 MG tablet Take 1-2 tablets (500-1,000 mg) by mouth every 6 hours as needed for mild pain 90 tablet 4    albuterol (VENTOLIN HFA) 108 (90 Base) MCG/ACT inhaler Inhale 1-2 puffs into the lungs every 4 hours as needed for shortness of breath or wheezing 18 g 11    amLODIPine (NORVASC) 2.5 MG tablet Take 1 tablet (2.5 mg) by mouth every morning. May also take 1 tablet (2.5 mg) every evening as needed (-- for Raynaud's / Hand color changes --). 60 tablet 1    ARIPiprazole (ABILIFY) 20 MG tablet Take 1 tablet (20 mg) by mouth at bedtime. 30 tablet 1    atomoxetine (STRATTERA) 25 MG capsule Take 1 capsule (25 mg) by mouth every morning. 30 capsule 1    busPIRone (BUSPAR) 10 MG tablet Take 1 tablet (10 mg) by mouth 3 times daily. 90 tablet 1    cholecalciferol (VITAMIN D3) 125 mcg (5000 units) capsule Take 1 capsule (125 mcg) by mouth  daily. 30 capsule 1    diltiazem ER COATED BEADS (CARDIZEM CD/CARTIA XT) 180 MG 24 hr capsule Take 1 capsule (180 mg) by mouth 2 times daily. 60 capsule 1    docusate sodium (EQ STOOL SOFTENER) 100 MG capsule Take 1 capsule (100 mg) by mouth 2 times daily as needed for constipation. 60 capsule 1    estradiol (VIVELLE-DOT) 0.05 MG/24HR bi-weekly patch Place 1 patch onto the skin twice a week. 9 patch 1    famotidine (PEPCID) 20 MG tablet Take 1 tablet (20 mg) by mouth 2 times daily. - For Heartburn 60 tablet 1    gabapentin (NEURONTIN) 300 MG capsule Take 1 capsule (300 mg) by mouth 3 times daily. 90 capsule 1    hydrOXYzine HCl (ATARAX) 50 MG tablet Take 1 tablet (50 mg) by mouth at bedtime. 30 tablet 1    ibuprofen (ADVIL/MOTRIN) 200 MG tablet Take 4 tablets (800 mg) by mouth every 8 hours as needed (back pain/ headache).      lamoTRIgine (LAMICTAL) 100 MG tablet Take 1 tablet (100 mg) by mouth 3 times daily. 90 tablet 1    metroNIDAZOLE (FLAGYL) 500 MG tablet Take 1 tablet (500 mg) by mouth 2 times daily for 7 days. 14 tablet 0    montelukast (SINGULAIR) 10 MG tablet Take 1 tablet (10 mg) by mouth at bedtime. 30 tablet 1    progesterone (PROMETRIUM) 100 MG capsule Take 1 capsule (100 mg) by mouth daily. 30 capsule 1    ramelteon (ROZEREM) 8 MG tablet Take 1 tablet (8 mg) by mouth nightly as needed for sleep. 30 tablet 1    vilazodone (VIIBRYD) 40 MG TABS tablet Take 1 tablet (40 mg) by mouth every morning. 30 tablet 1    VITRON-C  MG TABS tablet TAKE 1 TABLET BY MOUTH ONCE DAILY ON AN EMPTY STOMACH FOR  IRON  DEFICIENCY 60 tablet 0       ALLERGIES: Clonazepam, Hydrocodone-acetaminophen, Lorazepam, Sertraline, Bupropion, Dust mite extract, Lithium, Milk protein, Pollen extract, Risperidone, Sulfa antibiotics, Trichophyton, and Valproic acid     GENERAL PHYSICAL EXAM:   Vitals: /80   Pulse 110   Temp 97.4  F (36.3  C) (Temporal)   Resp 18   Wt 136.1 kg (300 lb)   SpO2 98%   BMI 48.42 kg/m    Body  mass index is 48.42 kg/m .    GENERAL: Well groomed, well developed, well nourished female in NAD.  HEENT: Normal  CV:  Warm extremities   RESPIRATORY: Normal respiratory effort.    GI:  Soft, NT, ND,   MS: Moving all four  NEURO: Alert and oriented x 3.  PSYCH: Normal mood and affect, pleasant and agreeable during interview and exam.    :  ***      PVR: Residual urine by ultrasound was *** ml.           RADIOLOGY: The following tests were reviewed: ***    LABS: The last test results for Ms. Rosi Lamb were reviewed.   Results for orders placed or performed in visit on 05/08/25 (from the past 24 hours)   Urinalysis Macroscopic   Result Value Ref Range    Color Urine Yellow Colorless, Straw, Light Yellow, Yellow    Appearance Urine Clear Clear    Glucose Urine Negative Negative mg/dL    Bilirubin Urine Negative Negative    Ketones Urine Trace (A) Negative mg/dL    Specific Gravity Urine 1.025 1.000 - 1.030    Blood Urine Negative Negative    pH Urine 6.0 5.0 - 9.0    Protein Albumin Urine 10 (A) Negative mg/dL    Urobilinogen Urine 3.0 (A) Normal mg/dL    Nitrite Urine Negative Negative    Leukocyte Esterase Urine Moderate (A) Negative       BMP -   Recent Labs   Lab Test 04/22/25  1354 03/10/25  1724 02/25/25  1439 08/22/24  1633 04/25/24  2209 01/23/24  1145 03/18/22  1131 11/05/21  1518    140 138 140   < > 137   < > 139   POTASSIUM 3.7 4.2 4.1 4.2   < > 4.1   < > 4.1   CHLORIDE 101 102 101 104   < > 101   < > 101   CO2 29 29 27 28   < > 29   < > 32*   BUN 9.0 9.3 8.2 19.6   < > 10.8   < > 11   CR 0.79 0.87 0.83 0.95   < > 0.92   < > 1.08   GLC 91 84 97 80   < > 94   < > 69*   NOE 9.9 9.7 9.5 9.5   < > 9.5   < > 10.0   MAG  --   --   --  1.9  --  2.0  --  1.7*    < > = values in this interval not displayed.       CBC -   Recent Labs   Lab Test 04/22/25  1354 03/10/25  1724 02/25/25  1439   WBC 8.5 8.4 7.2   HGB 14.2 12.9 14.3    255 284       ASSESSMENT:   ***    PLAN:   ***    *** minutes  spent on the date of this encounter doing chart review, history and exam, documentation and further activities as noted above.        DASHA Nelson [facility]      injections, Interstim and/or PTNS therapy.    Recommendations made for behavioral therapy including limitation of caffeine use, alcohol use, avoidance of constipation, avoidance of spicy or acidic foods, better control of diabetes and blood sugars and avoidance of certain medications used to treat fluid overload(diuretics) and Diabetes (Invokana,etc).     Risks and benefits of each discussed including common ones such as infection, bleeding, dry mouth, constipation, dry eyes, retention of urine and failure.    Patient has tried many modalities including Myrbetriq, Botox, and InterStim.  We discussed the role of pelvic floor physical therapy and how this may be helpful with her vaginal prolapse, however she is not interested in pursuing this yet.  She would like to contact our office when she finds her InterStim device and come in for assistance with adjusting her settings.  Given her many medications nonpharmacologic intervention would be ideal.  I do question whether some of her medication could be impacting her sensation to void and causing some incontinence.  May consider urodynamic study if above listed interventions are not helpful.      2. OAB (overactive bladder)  See #1.        34 minutes spent on the date of this encounter doing chart review, history and exam, documentation and further activities as noted above.        DASHA [facility]

## 2025-05-08 NOTE — NURSING NOTE
"Chief Complaint   Patient presents with    Follow Up     Tests and meds       Initial /76 (BP Location: Right arm, Patient Position: Sitting, Cuff Size: Adult Large)   Pulse 100   Temp 98.2  F (36.8  C) (Temporal)   Resp 16   Ht 1.67 m (5' 5.75\")   Wt (!) 137.9 kg (304 lb)   SpO2 100%   BMI 49.44 kg/m   Estimated body mass index is 49.44 kg/m  as calculated from the following:    Height as of this encounter: 1.67 m (5' 5.75\").    Weight as of this encounter: 137.9 kg (304 lb).  Meds Reconciled: complete  Pt is not on Aspirin  Pt is not on a Statin  PHQ and/or AGNIESZKA reviewed. Pt referred to PCP/MH Provider as appropriate.    Chika Archer LPN    "

## 2025-05-08 NOTE — NURSING NOTE
"Chief Complaint   Patient presents with    Consult     Incontinence       Initial /80   Pulse 110   Temp 97.4  F (36.3  C) (Temporal)   Resp 18   Wt 136.1 kg (300 lb)   SpO2 98%   BMI 48.42 kg/m   Estimated body mass index is 48.42 kg/m  as calculated from the following:    Height as of 5/6/25: 1.676 m (5' 6\").    Weight as of this encounter: 136.1 kg (300 lb).    PVR-0    Maria M Bray LPN      "

## 2025-05-15 DIAGNOSIS — N39.41 URGE INCONTINENCE: Primary | ICD-10-CM

## 2025-05-30 ENCOUNTER — HOSPITAL ENCOUNTER (EMERGENCY)
Facility: OTHER | Age: 50
Discharge: HOME | End: 2025-05-31
Attending: STUDENT IN AN ORGANIZED HEALTH CARE EDUCATION/TRAINING PROGRAM
Payer: MEDICARE

## 2025-05-30 DIAGNOSIS — F32.A DEPRESSION, UNSPECIFIED DEPRESSION TYPE: ICD-10-CM

## 2025-05-30 DIAGNOSIS — F31.76 BIPOLAR DISORDER, IN FULL REMISSION, MOST RECENT EPISODE DEPRESSED: ICD-10-CM

## 2025-05-30 DIAGNOSIS — F41.1 GENERALIZED ANXIETY DISORDER: Chronic | ICD-10-CM

## 2025-05-30 DIAGNOSIS — F60.3 BORDERLINE PERSONALITY DISORDER (H): Primary | Chronic | ICD-10-CM

## 2025-05-30 PROCEDURE — 250N000013 HC RX MED GY IP 250 OP 250 PS 637: Performed by: STUDENT IN AN ORGANIZED HEALTH CARE EDUCATION/TRAINING PROGRAM

## 2025-05-30 PROCEDURE — 99284 EMERGENCY DEPT VISIT MOD MDM: CPT | Performed by: STUDENT IN AN ORGANIZED HEALTH CARE EDUCATION/TRAINING PROGRAM

## 2025-05-30 PROCEDURE — 99291 CRITICAL CARE FIRST HOUR: CPT | Performed by: STUDENT IN AN ORGANIZED HEALTH CARE EDUCATION/TRAINING PROGRAM

## 2025-05-30 RX ORDER — HYDROXYZINE PAMOATE 25 MG/1
50 CAPSULE ORAL ONCE
Status: COMPLETED | OUTPATIENT
Start: 2025-05-30 | End: 2025-05-30

## 2025-05-30 RX ADMIN — HYDROXYZINE PAMOATE 50 MG: 25 CAPSULE ORAL at 22:13

## 2025-05-30 ASSESSMENT — ACTIVITIES OF DAILY LIVING (ADL)
ADLS_ACUITY_SCORE: 48
ADLS_ACUITY_SCORE: 48

## 2025-05-31 ENCOUNTER — HOSPITAL ENCOUNTER (EMERGENCY)
Facility: OTHER | Age: 50
Discharge: HOME | End: 2025-06-01
Attending: PHYSICIAN ASSISTANT
Payer: MEDICARE

## 2025-05-31 ENCOUNTER — TELEPHONE (OUTPATIENT)
Dept: BEHAVIORAL HEALTH | Facility: CLINIC | Age: 50
End: 2025-05-31

## 2025-05-31 VITALS
RESPIRATION RATE: 18 BRPM | SYSTOLIC BLOOD PRESSURE: 130 MMHG | DIASTOLIC BLOOD PRESSURE: 80 MMHG | WEIGHT: 293 LBS | OXYGEN SATURATION: 100 % | BODY MASS INDEX: 49.44 KG/M2 | HEART RATE: 119 BPM | TEMPERATURE: 98 F

## 2025-05-31 VITALS
RESPIRATION RATE: 18 BRPM | OXYGEN SATURATION: 98 % | HEIGHT: 66 IN | HEART RATE: 98 BPM | TEMPERATURE: 97.8 F | WEIGHT: 293 LBS | BODY MASS INDEX: 47.09 KG/M2 | DIASTOLIC BLOOD PRESSURE: 79 MMHG | SYSTOLIC BLOOD PRESSURE: 136 MMHG

## 2025-05-31 DIAGNOSIS — R45.851 SUICIDAL IDEATION: ICD-10-CM

## 2025-05-31 PROBLEM — F32.A DEPRESSIVE DISORDER: Status: ACTIVE | Noted: 2025-05-31

## 2025-05-31 LAB
ALBUMIN SERPL BCG-MCNC: 4.5 G/DL (ref 3.5–5.2)
ALBUMIN UR-MCNC: NEGATIVE MG/DL
ALP SERPL-CCNC: 99 U/L (ref 40–150)
ALT SERPL W P-5'-P-CCNC: 20 U/L (ref 0–50)
AMPHETAMINES UR QL SCN: NORMAL
ANION GAP SERPL CALCULATED.3IONS-SCNC: 13 MMOL/L (ref 7–15)
APAP SERPL-MCNC: <5 UG/ML (ref 10–30)
APPEARANCE UR: CLEAR
AST SERPL W P-5'-P-CCNC: 24 U/L (ref 0–45)
BARBITURATES UR QL SCN: NORMAL
BASOPHILS # BLD AUTO: 0 10E3/UL (ref 0–0.2)
BASOPHILS NFR BLD AUTO: 0 %
BENZODIAZ UR QL SCN: NORMAL
BILIRUB SERPL-MCNC: 0.4 MG/DL
BILIRUB UR QL STRIP: NEGATIVE
BUN SERPL-MCNC: 14.1 MG/DL (ref 6–20)
BZE UR QL SCN: NORMAL
CALCIUM SERPL-MCNC: 9.7 MG/DL (ref 8.8–10.4)
CANNABINOIDS UR QL SCN: NORMAL
CHLORIDE SERPL-SCNC: 99 MMOL/L (ref 98–107)
COLOR UR AUTO: YELLOW
CREAT SERPL-MCNC: 1.01 MG/DL (ref 0.51–0.95)
EGFRCR SERPLBLD CKD-EPI 2021: 68 ML/MIN/1.73M2
EOSINOPHIL # BLD AUTO: 0.1 10E3/UL (ref 0–0.7)
EOSINOPHIL NFR BLD AUTO: 1 %
ERYTHROCYTE [DISTWIDTH] IN BLOOD BY AUTOMATED COUNT: 13.1 % (ref 10–15)
ETHANOL SERPL-MCNC: <0.01 G/DL
FENTANYL UR QL: NORMAL
GLUCOSE SERPL-MCNC: 85 MG/DL (ref 70–99)
GLUCOSE UR STRIP-MCNC: NEGATIVE MG/DL
HCG UR QL: NEGATIVE
HCO3 SERPL-SCNC: 26 MMOL/L (ref 22–29)
HCT VFR BLD AUTO: 42.4 % (ref 35–47)
HGB BLD-MCNC: 13.8 G/DL (ref 11.7–15.7)
HGB UR QL STRIP: NEGATIVE
HOLD SPECIMEN: NORMAL
HOLD SPECIMEN: NORMAL
IMM GRANULOCYTES # BLD: 0 10E3/UL
IMM GRANULOCYTES NFR BLD: 0 %
KETONES UR STRIP-MCNC: NEGATIVE MG/DL
LEUKOCYTE ESTERASE UR QL STRIP: NEGATIVE
LYMPHOCYTES # BLD AUTO: 2.1 10E3/UL (ref 0.8–5.3)
LYMPHOCYTES NFR BLD AUTO: 21 %
MCH RBC QN AUTO: 30 PG (ref 26.5–33)
MCHC RBC AUTO-ENTMCNC: 32.5 G/DL (ref 31.5–36.5)
MCV RBC AUTO: 92 FL (ref 78–100)
MONOCYTES # BLD AUTO: 0.8 10E3/UL (ref 0–1.3)
MONOCYTES NFR BLD AUTO: 8 %
MUCOUS THREADS #/AREA URNS LPF: PRESENT /LPF
NEUTROPHILS # BLD AUTO: 6.9 10E3/UL (ref 1.6–8.3)
NEUTROPHILS NFR BLD AUTO: 69 %
NITRATE UR QL: NEGATIVE
NRBC # BLD AUTO: 0 10E3/UL
NRBC BLD AUTO-RTO: 0 /100
OPIATES UR QL SCN: NORMAL
PCP QUAL URINE (ROCHE): NORMAL
PH UR STRIP: 5.5 [PH] (ref 5–9)
PLATELET # BLD AUTO: 319 10E3/UL (ref 150–450)
POTASSIUM SERPL-SCNC: 3.7 MMOL/L (ref 3.4–5.3)
PROT SERPL-MCNC: 8 G/DL (ref 6.4–8.3)
RBC # BLD AUTO: 4.6 10E6/UL (ref 3.8–5.2)
RBC URINE: <1 /HPF
SALICYLATES SERPL-MCNC: <0.3 MG/DL (ref ?–30)
SODIUM SERPL-SCNC: 138 MMOL/L (ref 135–145)
SP GR UR STRIP: 1.01 (ref 1–1.03)
TSH SERPL DL<=0.005 MIU/L-ACNC: 2.41 UIU/ML (ref 0.3–4.2)
UROBILINOGEN UR STRIP-MCNC: NORMAL MG/DL
WBC # BLD AUTO: 9.9 10E3/UL (ref 4–11)
WBC URINE: <1 /HPF

## 2025-05-31 PROCEDURE — 81025 URINE PREGNANCY TEST: CPT | Performed by: PHYSICIAN ASSISTANT

## 2025-05-31 PROCEDURE — 80179 DRUG ASSAY SALICYLATE: CPT | Performed by: PHYSICIAN ASSISTANT

## 2025-05-31 PROCEDURE — 99285 EMERGENCY DEPT VISIT HI MDM: CPT | Performed by: PHYSICIAN ASSISTANT

## 2025-05-31 PROCEDURE — 81001 URINALYSIS AUTO W/SCOPE: CPT | Performed by: PHYSICIAN ASSISTANT

## 2025-05-31 PROCEDURE — 84155 ASSAY OF PROTEIN SERUM: CPT | Performed by: PHYSICIAN ASSISTANT

## 2025-05-31 PROCEDURE — 36415 COLL VENOUS BLD VENIPUNCTURE: CPT | Performed by: PHYSICIAN ASSISTANT

## 2025-05-31 PROCEDURE — 84443 ASSAY THYROID STIM HORMONE: CPT | Performed by: PHYSICIAN ASSISTANT

## 2025-05-31 PROCEDURE — 250N000013 HC RX MED GY IP 250 OP 250 PS 637: Performed by: PHYSICIAN ASSISTANT

## 2025-05-31 PROCEDURE — 85025 COMPLETE CBC W/AUTO DIFF WBC: CPT | Performed by: PHYSICIAN ASSISTANT

## 2025-05-31 PROCEDURE — 82077 ASSAY SPEC XCP UR&BREATH IA: CPT | Performed by: PHYSICIAN ASSISTANT

## 2025-05-31 PROCEDURE — 80143 DRUG ASSAY ACETAMINOPHEN: CPT | Performed by: PHYSICIAN ASSISTANT

## 2025-05-31 PROCEDURE — 80307 DRUG TEST PRSMV CHEM ANLYZR: CPT | Performed by: PHYSICIAN ASSISTANT

## 2025-05-31 PROCEDURE — 82374 ASSAY BLOOD CARBON DIOXIDE: CPT | Performed by: PHYSICIAN ASSISTANT

## 2025-05-31 RX ORDER — LAMOTRIGINE 100 MG/1
100 TABLET ORAL 3 TIMES DAILY
Status: DISCONTINUED | OUTPATIENT
Start: 2025-05-31 | End: 2025-06-01 | Stop reason: HOSPADM

## 2025-05-31 RX ORDER — ARIPIPRAZOLE 5 MG/1
20 TABLET ORAL AT BEDTIME
Status: DISCONTINUED | OUTPATIENT
Start: 2025-05-31 | End: 2025-06-01 | Stop reason: HOSPADM

## 2025-05-31 RX ORDER — BUSPIRONE HYDROCHLORIDE 10 MG/1
10 TABLET ORAL 3 TIMES DAILY
Status: DISCONTINUED | OUTPATIENT
Start: 2025-05-31 | End: 2025-06-01 | Stop reason: HOSPADM

## 2025-05-31 RX ORDER — IBUPROFEN 400 MG/1
800 TABLET, FILM COATED ORAL 3 TIMES DAILY PRN
Status: DISCONTINUED | OUTPATIENT
Start: 2025-05-31 | End: 2025-06-01 | Stop reason: HOSPADM

## 2025-05-31 RX ORDER — FAMOTIDINE 20 MG/1
20 TABLET, FILM COATED ORAL 2 TIMES DAILY
Status: DISCONTINUED | OUTPATIENT
Start: 2025-05-31 | End: 2025-06-01 | Stop reason: HOSPADM

## 2025-05-31 RX ORDER — HYDROXYZINE PAMOATE 50 MG/1
50 CAPSULE ORAL AT BEDTIME
Status: ON HOLD | COMMUNITY

## 2025-05-31 RX ORDER — HYDROXYZINE PAMOATE 25 MG/1
25 CAPSULE ORAL EVERY 6 HOURS PRN
Status: DISCONTINUED | OUTPATIENT
Start: 2025-05-31 | End: 2025-06-01 | Stop reason: HOSPADM

## 2025-05-31 RX ORDER — ALBUTEROL SULFATE 0.83 MG/ML
2.5 SOLUTION RESPIRATORY (INHALATION) EVERY 4 HOURS PRN
Status: DISCONTINUED | OUTPATIENT
Start: 2025-05-31 | End: 2025-06-01 | Stop reason: HOSPADM

## 2025-05-31 RX ORDER — ALBUTEROL SULFATE 90 UG/1
1-2 INHALANT RESPIRATORY (INHALATION) EVERY 4 HOURS PRN
Status: DISCONTINUED | OUTPATIENT
Start: 2025-05-31 | End: 2025-05-31 | Stop reason: ALTCHOICE

## 2025-05-31 RX ORDER — DOCUSATE SODIUM 100 MG/1
100 CAPSULE, LIQUID FILLED ORAL 2 TIMES DAILY PRN
Status: DISCONTINUED | OUTPATIENT
Start: 2025-05-31 | End: 2025-06-01 | Stop reason: HOSPADM

## 2025-05-31 RX ORDER — AMLODIPINE BESYLATE 2.5 MG/1
2.5 TABLET ORAL DAILY
Status: DISCONTINUED | OUTPATIENT
Start: 2025-06-01 | End: 2025-06-01 | Stop reason: HOSPADM

## 2025-05-31 RX ORDER — AMLODIPINE BESYLATE 2.5 MG/1
2.5 TABLET ORAL DAILY PRN
Status: DISCONTINUED | OUTPATIENT
Start: 2025-05-31 | End: 2025-06-01 | Stop reason: HOSPADM

## 2025-05-31 RX ORDER — HYDROXYZINE PAMOATE 25 MG/1
25-50 CAPSULE ORAL EVERY 6 HOURS PRN
Status: DISCONTINUED | OUTPATIENT
Start: 2025-05-31 | End: 2025-05-31 | Stop reason: DRUGHIGH

## 2025-05-31 RX ORDER — GABAPENTIN 300 MG/1
300 CAPSULE ORAL 3 TIMES DAILY
Status: DISCONTINUED | OUTPATIENT
Start: 2025-05-31 | End: 2025-06-01 | Stop reason: HOSPADM

## 2025-05-31 RX ORDER — HYDROXYZINE PAMOATE 25 MG/1
50 CAPSULE ORAL AT BEDTIME
Status: DISCONTINUED | OUTPATIENT
Start: 2025-05-31 | End: 2025-06-01 | Stop reason: HOSPADM

## 2025-05-31 RX ORDER — ACETAMINOPHEN 500 MG
1000 TABLET ORAL EVERY 6 HOURS PRN
Status: DISCONTINUED | OUTPATIENT
Start: 2025-05-31 | End: 2025-06-01 | Stop reason: HOSPADM

## 2025-05-31 RX ORDER — MONTELUKAST SODIUM 5 MG/1
10 TABLET, CHEWABLE ORAL AT BEDTIME
Status: DISCONTINUED | OUTPATIENT
Start: 2025-05-31 | End: 2025-06-01 | Stop reason: HOSPADM

## 2025-05-31 RX ADMIN — BUSPIRONE HYDROCHLORIDE 10 MG: 10 TABLET ORAL at 22:30

## 2025-05-31 RX ADMIN — HYDROXYZINE PAMOATE 50 MG: 25 CAPSULE ORAL at 22:29

## 2025-05-31 RX ADMIN — HYDROXYZINE PAMOATE 25 MG: 25 CAPSULE ORAL at 18:20

## 2025-05-31 RX ADMIN — GABAPENTIN 300 MG: 300 CAPSULE ORAL at 22:29

## 2025-05-31 RX ADMIN — LAMOTRIGINE 100 MG: 100 TABLET ORAL at 22:30

## 2025-05-31 RX ADMIN — FAMOTIDINE 20 MG: 20 TABLET, FILM COATED ORAL at 22:29

## 2025-05-31 RX ADMIN — MONTELUKAST SODIUM 10 MG: 5 TABLET, CHEWABLE ORAL at 22:30

## 2025-05-31 RX ADMIN — ARIPIPRAZOLE 20 MG: 5 TABLET ORAL at 22:30

## 2025-05-31 RX ADMIN — IBUPROFEN 600 MG: 200 TABLET, FILM COATED ORAL at 16:14

## 2025-05-31 ASSESSMENT — COLUMBIA-SUICIDE SEVERITY RATING SCALE - C-SSRS
6. HAVE YOU EVER DONE ANYTHING, STARTED TO DO ANYTHING, OR PREPARED TO DO ANYTHING TO END YOUR LIFE?: YES
3. HAVE YOU BEEN THINKING ABOUT HOW YOU MIGHT KILL YOURSELF?: YES
4. HAVE YOU HAD THESE THOUGHTS AND HAD SOME INTENTION OF ACTING ON THEM?: YES
5. HAVE YOU STARTED TO WORK OUT OR WORKED OUT THE DETAILS OF HOW TO KILL YOURSELF? DO YOU INTEND TO CARRY OUT THIS PLAN?: YES
1. IN THE PAST MONTH, HAVE YOU WISHED YOU WERE DEAD OR WISHED YOU COULD GO TO SLEEP AND NOT WAKE UP?: YES
2. HAVE YOU ACTUALLY HAD ANY THOUGHTS OF KILLING YOURSELF IN THE PAST MONTH?: YES

## 2025-05-31 ASSESSMENT — ACTIVITIES OF DAILY LIVING (ADL)
ADLS_ACUITY_SCORE: 48

## 2025-05-31 NOTE — CONSULTS
"Diagnostic Evaluation Consultation  Crisis Assessment    Patient Name: Rosi Lamb  Age:  50 year old  Legal Sex: female  Gender Identity: female  Pronouns:      Race: White  Ethnicity: Not  or   Language: English      Patient was assessed: Virtual: Renetta   Crisis Assessment Start Date: 05/31/25  Crisis Assessment Start Time: 0011  Crisis Assessment Stop Time: 0050  Patient location: [facility]                                 Referral Data and Chief Complaint  Rosi Lamb presents to the ED via EMS. Patient is presenting to the ED for the following concerns: Worsening psychosocial stress, Depression, Suicidal ideation, Anxiety. Factors that make the mental health crisis life threatening or complex are: Pt was brought to the ER by ambulance, presenting with worsening psychosocial stress, depression, anxiety, and SI. Pt is also reported seeking housing services as reason for evaluation.      Informed Consent and Assessment Methods  Explained the crisis assessment process, including applicable information disclosures and limits to confidentiality, assessed understanding of the process, and obtained consent to proceed with the assessment.  Assessment methods included conducting a formal interview with patient, review of medical records, collaboration with medical staff, and obtaining relevant collateral information from family and community providers when available.  : done     History of the Crisis   Pt reported increasing sx of depression and anxiety following speaking with an ex today, who is said to say negative things toward pt. Pt stated deep down she doesn't agree with what he's saying but sometimes starts to agree. Pt stated she recognizes it's an unhealthy relationship but she feels torn on if she wants to break contact. Pt endorsed thoughts of self-harm by cutting to \"relieve emotional pain;\" pt clarified these thoughts were not suicidal. Pt reported it's been " "15 years since last NSSIB, stating she has been successful by reminding herself about the good things she has in life; pt reported her \"support system\" is the good she has in life right now. Pt reported a suicide attempt c. 27-28 years ago by ESTELLE and genny. Pt reported living home alone contributes to feeling unsafe. Pt said tried to obtain a crisis bed but none were available. Pt contacted [facility], who she stated recommended pt seek evaluation in the ER for housing resources. Other factors which may be contributing to worsening mood and thoughts: feeling sad that family visiting from out of state was brief after 8 years of not seeing them in person and not being sure if she'll see her mom that way again; and, a change in medications. Pt stated she used to have a asya relationship with her family in the past although it has been better recently, stating there has been a difference in beliefs/values. Pt endorsed past trauma in adulthood (sexual/emotional/physical), PMHx of bipolar, BPD, and AGNIESZKA, and denied HI, hallucinations, recent substance use, and legal problems. Pt reported past alcohol and cannabis use, stating she had drank more than intended once with ex about a month ago but nothing since. Pt endorsed individual therapy, aftercare DBT groups, frequenting day clinics for MH, having peer support services, [facility], OP psychiatry, a , and a supported living environment with staff onsite providing meals and medications during the day until 10 pm. Pt reported improvement in sx since taking a PRN of hydroxyzine in ER, but stated she still didn't feel safe enough to return home even when discussing safety planning; pt is seeking [facility].    Brief Psychosocial History  Family:  Single, Children yes  Support System:  Friend, Parent(s), Facility resident(s)/Staff  Employment Status:  disabled  Source of Income:  disability  Financial Environmental Concerns:  none  Current Hobbies:  arts/crafts, sports/team sports, " "exercise/fitness, meditation, music, social media/computer activities, television/movies/videos, writing/journaling/blogging, reading, games, family functions, group/social activities, outdoor activities  Barriers in Personal Life:  mental health concerns    Significant Clinical History  Current Anxiety Symptoms:  racing thoughts  Current Depression/Trauma:  low self esteem, impaired decision making, sadness  Current Somatic Symptoms:  racing thoughts  Current Psychosis/Thought Disturbance:     Current Eating Symptoms:     Chemical Use History:  Alcohol: Social  Last Use::  (One time \"a few months ago.\")  Benzodiazepines: None  Opiates: None  Cocaine: None  Marijuana: None  Other Use: None   Past diagnosis:  Anxiety Disorder, Bipolar Disorder, Depression, Personality Disorder, Suicide attempt(s), Substance Use Disorder  Family history:  Bipolar Disorder, Depression, Anxiety Disorder  Past treatment:  Individual therapy, Case management, Primary Care, Psychiatric Medication Management, Inpatient Hospitalization, Supportive Living Environment (group home, [facility] house, etc), [facility], Day Treatment, Civil Commitment  Details of most recent treatment:  Pt is in supportive living with [facility], has [facility], case management, individual therapy, DBT aftercare group, visits day tx center ([facility]), is followed by [facility], has OP psychiatry, and has a .  Other relevant history:  [facility] 4/22-4/29/2025 (7 days)    Have there been any medication changes in the past two weeks:  yes, please comment  Pt stated she tapered off Viibryd and started Trintellix on 5/30.    Is the patient compliant with medications:  yes        Collateral Information  Is there collateral information: Yes     Collateral information name, relationship, phone number:  Edwina Trujillo (friend): [phone]    What happened today: Edwina stated she's in regular contact with pt by phone, almost every other day. Edwina stated she's not " "aware of what happened today but suspects it could have something to do with pt's ex who attends Friday night AA groups with pt.     What is different about patient's functioning: Edwina stated pt doesn't like to talk about somethings with her because Edwina doesn't think pt's relationship with her ex is healthy. Edwina stated last month pt was happy and laughing while playing cards with a friend and the next half hour or so she was in the ER and admitted to [facility]. Edwina said pt can appear \"normal\" one moment and then switch suddenly; Edwina stated she has to be careful how she says things sometimes as pt can appear sensitive. Edwina said things appear to have gotten worse for pt since she moved into new residence, stating pt has more independence, which has led to pt receiving written notices with her ex coming over. Edwina stated staff onsite leave around 10 pm, and then return the next day to provide meals and medications. Edwina said pt has lived in group homes for many years and may benefit from something with more structure. Edwina reported she's aware pt receives Critical access hospital services, is followed by [facility], frequents [facility] during the day several times a week for socialization and therapeutic services, and goes to [facility] for therapy and DBT. Edwina stated pt grew up [employer] and was kicked out of their [employer]. Pt was said to have lost custody of her children whom she saw pictures of during family's recent visit, which Edwina suspects may be contributing to worsening depression/sadness.     What do you think the patient needs: Unsure    Has patient made comments about wanting to kill themselves/others: no    If d/c is recommended, can they take part in safety/aftercare planning:  yes    Additional collateral information: Edwina expressed a willingness to do whatever she can to be there for pt to support her.     Risk Assessment  [place] Suicide Severity Rating Scale Full Clinical Version:  Suicidal Ideation  Q1 Wish to be Dead " (Lifetime): Yes  Q2 Non-Specific Active Suicidal Thoughts (Lifetime): Yes  3. Active Suicidal Ideation with any Methods (Not Plan) Without Intent to Act (Lifetime): Yes  4. Active Suicidal Ideation with Some Intent to Act, Without Specific Plan (Lifetime): Yes  5. Active Suicidal Ideation with Specific Plan and Intent (Lifetime): Yes  Q6 Suicide Behavior (Lifetime): yes     Suicidal Behavior (Lifetime)  Actual Attempt (Lifetime): Yes  Total Number of Actual Attempts (Lifetime): 1  Actual Attempt Description (Lifetime): Attempted OD and cutting around age [age]  Has subject engaged in non-suicidal self-injurious behavior? (Lifetime): Yes  Interrupted Attempts (Lifetime): No  Aborted or Self-Interrupted Attempt (Lifetime): No  Preparatory Acts or Behavior (Lifetime): No    [place] Suicide Severity Rating Scale Recent:   Suicidal Ideation (Recent)  Q1 Wished to be Dead (Past Month): no  Q2 Suicidal Thoughts (Past Month): no  Q3 Suicidal Thought Method: no  If yes to Q6, within past 3 months?: no  Level of Risk per Screen: moderate risk     Suicidal Behavior (Recent)  Actual Attempt (Past 3 Months): No  Has subject engaged in non-suicidal self-injurious behavior? (Past 3 Months): No  Interrupted Attempts (Past 3 Months): No  Aborted or Self-Interrupted Attempt (Past 3 Months): No  Preparatory Acts or Behavior (Past 3 Months): No    Environmental or Psychosocial Events: challenging interpersonal relationships, neither working nor attending school  Protective Factors: Protective Factors: lives in a responsibly safe and stable environment, supportive ongoing medical and mental health care relationships, good treatment engagement, help seeking, able to access care without barriers, constructive use of leisure time, enjoyable activities, resilience, cultural, spiritual , or Orthodoxy beliefs associated with meaning and value in life, reality testing ability    Does the patient have thoughts of harming others? Feels Like  Hurting Others: no  Previous Attempt to Hurt Others: no  Is the patient engaging in sexually inappropriate behavior?: no  Does Patient have a known history of aggressive behavior: No    Is the patient engaging in sexually inappropriate behavior?  no        Mental Status Exam   Affect: Appropriate  Appearance: Appropriate  Attention Span/Concentration: Attentive  Eye Contact: Engaged    Fund of Knowledge: Appropriate   Language /Speech Content: Fluent  Language /Speech Volume: Normal  Language /Speech Rate/Productions: Normal  Recent Memory: Intact  Remote Memory: Intact  Mood: Depressed, Anxious, Sad  Orientation to Person: Yes   Orientation to Place: Yes  Orientation to Time of Day: Yes  Orientation to Date: Yes     Situation (Do they understand why they are here?): Yes  Psychomotor Behavior: Normal  Thought Content: Clear  Thought Form: Intact     Medication  Psychotropic medications:   Medication Orders - Psychiatric (From admission, onward)      None          No current facility-administered medications for this encounter.     Current Outpatient Medications   Medication Sig Dispense Refill    acetaminophen (TYLENOL) 500 MG tablet Take 1-2 tablets (500-1,000 mg) by mouth every 6 hours as needed for mild pain 90 tablet 4    albuterol (VENTOLIN HFA) 108 (90 Base) MCG/ACT inhaler Inhale 1-2 puffs into the lungs every 4 hours as needed for shortness of breath or wheezing 18 g 11    amLODIPine (NORVASC) 2.5 MG tablet Take 1 tablet (2.5 mg) by mouth every morning. May also take 1 tablet (2.5 mg) every evening as needed (-- for Raynaud's / Hand color changes --). 60 tablet 1    ARIPiprazole (ABILIFY) 20 MG tablet Take 1 tablet (20 mg) by mouth at bedtime. 30 tablet 1    atomoxetine (STRATTERA) 25 MG capsule Take 1 capsule (25 mg) by mouth every morning. 30 capsule 1    busPIRone (BUSPAR) 10 MG tablet Take 1 tablet (10 mg) by mouth 3 times daily. 90 tablet 1    cholecalciferol (VITAMIN D3) 125 mcg (5000 units) capsule  Take 1 capsule (125 mcg) by mouth daily. 30 capsule 1    diltiazem ER COATED BEADS (CARDIZEM CD/CARTIA XT) 180 MG 24 hr capsule Take 1 capsule (180 mg) by mouth 2 times daily. 60 capsule 1    docusate sodium (EQ STOOL SOFTENER) 100 MG capsule Take 1 capsule (100 mg) by mouth 2 times daily as needed for constipation. 60 capsule 1    estradiol (VIVELLE-DOT) 0.05 MG/24HR bi-weekly patch Place 1 patch onto the skin twice a week. 9 patch 1    famotidine (PEPCID) 20 MG tablet Take 1 tablet (20 mg) by mouth 2 times daily. - For Heartburn 60 tablet 1    gabapentin (NEURONTIN) 300 MG capsule Take 1 capsule (300 mg) by mouth 3 times daily. 90 capsule 1    hydrOXYzine HCl (ATARAX) 50 MG tablet Take 1 tablet (50 mg) by mouth at bedtime. 30 tablet 1    ibuprofen (ADVIL/MOTRIN) 200 MG tablet Take 4 tablets (800 mg) by mouth every 8 hours as needed (back pain/ headache).      lamoTRIgine (LAMICTAL) 100 MG tablet Take 1 tablet (100 mg) by mouth 3 times daily. 90 tablet 1    montelukast (SINGULAIR) 10 MG tablet Take 1 tablet (10 mg) by mouth at bedtime. 30 tablet 1    progesterone (PROMETRIUM) 100 MG capsule Take 1 capsule (100 mg) by mouth daily. 30 capsule 1    ramelteon (ROZEREM) 8 MG tablet Take 1 tablet (8 mg) by mouth nightly as needed for sleep. 30 tablet 1    vilazodone (VIIBRYD) 40 MG TABS tablet Take 1 tablet (40 mg) by mouth every morning. 30 tablet 1    VITRON-C  MG TABS tablet TAKE 1 TABLET BY MOUTH ONCE DAILY ON AN EMPTY STOMACH FOR  IRON  DEFICIENCY 60 tablet 0      Current Care Team  Patient Care Team:  Lucio Curiel MD as PCP - General (Internal Medicine)  Lucio Curiel MD as Assigned PCP  Philip Sanchez MD as Assigned Musculoskeletal Provider  Leticia Wren MD as Assigned OBGYN Provider  Elmer Ceballos DO as Assigned Heart and Vascular Provider  Juvenal Kearns MD as Assigned Sleep Provider  Mehdi Bridges APRN CNP as Assigned Surgical Provider    Diagnosis  Patient Active Problem  List   Diagnosis Code    Alcohol use disorder, moderate, in sustained remission, dependence (H) F10.21    Allergic rhinitis due to pollen J30.1    Generalized anxiety disorder F41.1    Allergic rhinitis due to other allergen J30.89    Chronic pain of right knee M25.561, G89.29    Bilateral foot pain M79.671, M79.672    Borderline personality disorder (H) - [facility] for therapy + [facility] for medication management. F60.3    Disorder of female genital organ N94.9    Elevated random blood glucose level R73.9    Esophageal reflux K21.9    Lymphedema of both lower extremities I89.0    Menorrhagia with irregular cycle N92.1    Moderate persistent asthma J45.40    Class 3 severe obesity due to excess calories with serious comorbidity and body mass index (BMI) of 45.0 to 49.9 in adult (H) E66.813, Z68.42    Peripheral polyneuropathy G62.9    Urinary incontinence R32    History of substance abuse (H) F19.11    Chronic venous stasis dermatitis of right lower extremity I87.2    Vitamin B12 deficiency E53.8    Vitamin D deficiency E55.9    Abnormal Pap smear of cervix R87.619    Bipolar disorder, in full remission, most recent episode depressed F31.76    Intermittent low back pain M54.50    Nail dystrophy L60.3    Edema of extremity of unknown cause R60.0    H/O adenomatous polyp of colon Z86.0101    CKD (chronic kidney disease) stage 2, GFR 60-89 ml/min N18.2    Nocturnal leg cramps G47.62    Primary insomnia F51.01    Paroxysmal atrial fibrillation (H) - [facility] 8/22/2024 I48.0    Malaise and fatigue R53.81, R53.83    Snores R06.83    Severe depressed bipolar I disorder without psychotic features (H) F31.4    Suicidal ideation R45.851    Obesity, unspecified class E66.9    History of tobacco abuse Z87.891    TAWNYA (obstructive sleep apnea) G47.33    Unspecified depressive disorder F32.A       Primary Problem This Admission  F60.3 Borderline personality disorder  F32.A Unspecified depressive disorder    Clinical  Summary and Substantiation of Recommendations   Clinical Substantiation:  It is the recommendation of this provider that pt discharge. Pt denied SI but endorsed thoughts of cutting herself to relieve emotional pain. Pt also denied HI, hallucinations, NSSIB, legal problems, and recent substance use. As pt was requesting [facility] for feeling unsafe to discharge, original recommendation was for observation or delay discharge to permit pt time to safety plan with friends/onsite staff in housing to secure sharps before returning home, as pt stated staff (who also manage her Rx) left at 10 pm and she was not sure if she could wake her friends. However, bed space in ER appeared limited and pt appeared agreeable to discharge with attending physicians' encouragement and feeling better after taking a PRN. Pt's risk for self-harm appears low, given it's chronic nature, last incident occuring 15 years ago due to healthy coping strategies and an expansive network of supports, and denying thoughts to self-harm were suicidal. Pt's distress appeared triggered by contact with an unhealthy relationship (i.e., ex). Pt expressed support for a discharge plan to place healthy boundaries (e.g., block ex on social media), request frequency of individual therapy to increase, participate in DBT aftercare group, discuss programmatic care options with referral to [facility], take PRNs, plan enjoyable activities over the weekend, continue to work with providers to find more supportive housing (e.g., with case management, [facility]), and work with onsite staff to secure sharp objects.    Goals for crisis stabilization:  Reduce racing thoughts; improve sense of safety    Next steps for Care Team:  Referral for [facility]    Treatment Objectives Addressed:  rapport building, building self-esteem, identifying an appropriate aftercare plan, orienting the patient to therapy, identifying and practicing coping strategies, processing feelings, safety planning,  assessing safety, identifying treatment goals, identifying additional supports, exploring obstacles to safety in the community    Therapeutic Interventions:  Engaged in guided discovery, explored patient's perspectives and helped expand them through socratic dialogue., Engaged in activity scheduling and behavioral activation, looking at and reviewing the prior week's goals, problem solving any barriers and acknowledging successes, as well as setting new goals., Coached on coping techniques/relaxation skills to help improve distress tolerance and managing intense emotions., Taught the link between thoughts, feelings, and behaviors., Reviewed healthy living that supports positive mental health, including looking at sleep hygiene, regular movement, nutrition, and regular socialization., Provided positive reinforcement for progress towards goals, gains in knowledge, and application of skills previously taught.    Has a specific means been identified for suicidal/homicide actions: No    Patient coping skills attempted to reduce the crisis:  Journaled, went to support group, sought a crisis bed, contacted [facility], requested a PRN in ER    Disposition  Recommended referrals: Individual Therapy, Medication Management, Programmatic Care, Crisis Services        Reviewed case and recommendations with attending provider. Attending Name: Dr. Irwin       Attending concurs with disposition: yes       Patient and/or validated legal guardian concurs with disposition:   no       Final disposition:  discharge      Legal status: Voluntary/Patient has signed consent for treatment                         Reviewed court records: yes     Assessment Details   Total duration spent with the patient: 39 min     CPT code(s) utilized: 53518 - Psychotherapy for Crisis - 60 (30-74*) min    Marvin Jamil Psychotherapist Trainee  [facility] - Triage & Transition Services  Callback: [phone]

## 2025-05-31 NOTE — CONSULTS
"Diagnostic Evaluation Consultation  Crisis Assessment    Patient Name: Rosi Lamb  Age:  50 year old  Legal Sex: female  Gender Identity: female  Pronouns:      Race: White  Ethnicity: Not  or   Language: English      Patient was assessed: Virtual: [employer]   Crisis Assessment Start Date: 05/31/25  Crisis Assessment Start Time: 1536  Crisis Assessment Stop Time: 1548  Patient location: [facility]                             ED10    Referral Data and Chief Complaint  Rosi Lamb presents to the ED via EMS. Patient is presenting to the ED for the following concerns: Worsening psychosocial stress, Depression, Suicidal ideation, Anxiety. Factors that make the mental health crisis life threatening or complex are: Pt was brought to ED early this morning by [facility] for reported sx of depression and anxiety following speaking with an ex today, who is said to say negative things toward pt. Pt stated deep down she doesn't agree with what he's saying but sometimes starts to agree. Pt stated she recognizes it's an unhealthy relationship but she feels torn on if she wants to break contact. At that time, pt endorsed thoughts of SIB but no plan and intent, and no SI. Pt was given prn, stabilized, and engaged in safety pbefore being discharged home. However, a few hours later pt presented back to ED, now endorsing active SI with plan.Pt is cooperative and oriented x4 but has poor insight into her mental health condition. Pt reports after discharging today, she continued to argue with her bf after leaving the ED. She reports \"she couldn't help it\" and impulsively messaged him, resulting in getting shaming messages back. This made her feel worthless and now she endorses SI with plan to cut her throat and send him pictures, rating herself as a 4.5 out of 5 in intensity. Pt says that she wants to be admitted so that she can take time to \"figure out her relationship goals\" and learn coping " skills. Pt denies any HI,SIB, AH/VH, or substance use..      Informed Consent and Assessment Methods  Explained the crisis assessment process, including applicable information disclosures and limits to confidentiality, assessed understanding of the process, and obtained consent to proceed with the assessment.  Assessment methods included conducting a formal interview with patient, review of medical records, collaboration with medical staff, and obtaining relevant collateral information from family and community providers when available.  : done     History of the Crisis   PMHx of bipolar, BPD, substance use disorder, and AGNIESZKA. Pt has had several IP  hospitalizations with most recent being in February 2025. Pt reported one prior suicide attempt c. 27-28 years ago by ESTELLE and genny. Pt has lived in group homes for many years. Pt receives [facility] services, is followed by [facility], frequents [facility] during the day several times a week for socialization and therapeutic services, and goes to [facility] for therapy and DBT. Pt also has psychiatrist and .    Brief Psychosocial History  Family:  Single, Children yes  Support System:  Friend, Neighbor  Employment Status:  disabled  Source of Income:  disability  Financial Environmental Concerns:  none  Current Hobbies:  arts/crafts, sports/team sports, exercise/fitness, meditation, music, social media/computer activities, television/movies/videos, writing/journaling/blogging, reading, games, family functions, group/social activities, outdoor activities  Barriers in Personal Life:  mental health concerns    Significant Clinical History  Current Anxiety Symptoms:  racing thoughts  Current Depression/Trauma:  low self esteem, impaired decision making, sadness, thoughts of death/suicide  Current Somatic Symptoms:  racing thoughts  Current Psychosis/Thought Disturbance:  displaces blame, impulsive  Current Eating Symptoms:  loss of appetite  Chemical Use History:  Alcohol:  Social  Benzodiazepines: None  Opiates: None  Cocaine: None  Marijuana: None  Other Use: None   Past diagnosis:  Anxiety Disorder, Bipolar Disorder, Depression, Personality Disorder, Suicide attempt(s), Substance Use Disorder  Family history:  Bipolar Disorder, Depression, Anxiety Disorder  Past treatment:  Individual therapy, Case management, Primary Care, Psychiatric Medication Management, Inpatient Hospitalization, Supportive Living Environment (group home, [facility] house, etc), [facility]SS, Day Treatment, Civil Commitment  Details of most recent treatment:  Pt saw her therapist yesterday. She also presented to the ED yesterday for same concerns, and was discharged.  Other relevant history:  Pt was said to have lost custody of her children whom she saw pictures of during family's recent visit. Pt stated she used to have a asya relationship with her family in the past although it has been better recently, stating there has been a difference in beliefs/values. Pt endorsed past trauma in adulthood (sexual/emotional/physical    Have there been any medication changes in the past two weeks:  yes, please comment  Pt stated she tapered off Viibryd and started Trintellix on 5/30.    Is the patient compliant with medications:  yes        Collateral Information  Is there collateral information:       Collateral information name, relationship, phone number:       What happened today:       What is different about patient's functioning:       What do you think the patient needs:      Has patient made comments about wanting to kill themselves/others:      If d/c is recommended, can they take part in safety/aftercare planning:       Additional collateral information:        Risk Assessment  [place] Suicide Severity Rating Scale Full Clinical Version:  Suicidal Ideation  Q6 Suicide Behavior (Lifetime): yes          [place] Suicide Severity Rating Scale Recent:   Suicidal Ideation (Recent)  Q1 Wished to be Dead (Past Month):  yes  Q2 Suicidal Thoughts (Past Month): yes  Q3 Suicidal Thought Method: yes  Q4 Suicidal Intent without Specific Plan: yes  Q5 Suicide Intent with Specific Plan: yes  If yes to Q6, within past 3 months?: no  Level of Risk per Screen: high risk          Environmental or Psychosocial Events: challenging interpersonal relationships, neither working nor attending school  Protective Factors: Protective Factors: lives in a responsibly safe and stable environment, supportive ongoing medical and mental health care relationships, good treatment engagement, help seeking, able to access care without barriers, constructive use of leisure time, enjoyable activities, resilience, cultural, spiritual , or Bahai beliefs associated with meaning and value in life, reality testing ability    Does the patient have thoughts of harming others? Feels Like Hurting Others: no  Previous Attempt to Hurt Others: no  Is the patient engaging in sexually inappropriate behavior?: no  Does Patient have a known history of aggressive behavior: No  Has aggression occurred as a result of MH concerns/diagnosis: None endorsed  Does patient have history of aggression in hospital: None endorsed    Is the patient engaging in sexually inappropriate behavior?  no        Mental Status Exam   Affect: Appropriate  Appearance: Appropriate  Attention Span/Concentration: Attentive  Eye Contact: Engaged    Fund of Knowledge: Appropriate   Language /Speech Content: Fluent  Language /Speech Volume: Normal  Language /Speech Rate/Productions: Normal  Recent Memory: Intact  Remote Memory: Intact  Mood: Normal, Anxious, Sad  Orientation to Person: Yes   Orientation to Place: Yes  Orientation to Time of Day: Yes  Orientation to Date: Yes     Situation (Do they understand why they are here?): Yes  Psychomotor Behavior: Normal  Thought Content: Suicidal  Thought Form: Intact     Mini-Cog Assessment  Number of Words Recalled:    Clock-Drawing Test:     Three Item Recall:     Mini-Cog Total Score:       Medication  Psychotropic medications:   Medication Orders - Psychiatric (From admission, onward)      Start     Dose/Rate Route Frequency Ordered Stop    05/31/25 2200  ARIPiprazole (ABILIFY) tablet 20 mg         20 mg Oral AT BEDTIME 05/31/25 1620      05/31/25 2200  hydrOXYzine edin (VISTARIL) capsule 50 mg         50 mg Oral AT BEDTIME 05/31/25 1620 05/31/25 1730  busPIRone (BUSPAR) tablet 10 mg         10 mg Oral 3 TIMES DAILY 05/31/25 1620               Current Care Team  Patient Care Team:  Lucio Curiel MD as PCP - General (Internal Medicine)  Lucio Curiel MD as Assigned PCP  Philip Sanchez MD as Assigned Musculoskeletal Provider  Leticia Wren MD as Assigned OBGYN Provider  Elmer Ceballos DO as Assigned Heart and Vascular Provider  Juvenal Kearns MD as Assigned Sleep Provider  Mehdi Bridges APRN CNP as Assigned Surgical Provider    Diagnosis  Patient Active Problem List   Diagnosis Code    Alcohol use disorder, moderate, in sustained remission, dependence (H) F10.21    Allergic rhinitis due to pollen J30.1    Generalized anxiety disorder F41.1    Allergic rhinitis due to other allergen J30.89    Chronic pain of right knee M25.561, G89.29    Bilateral foot pain M79.671, M79.672    Borderline personality disorder (H) - [facility] for therapy + [facility] for medication management. F60.3    Disorder of female genital organ N94.9    Elevated random blood glucose level R73.9    Esophageal reflux K21.9    Lymphedema of both lower extremities I89.0    Menorrhagia with irregular cycle N92.1    Moderate persistent asthma J45.40    Class 3 severe obesity due to excess calories with serious comorbidity and body mass index (BMI) of 45.0 to 49.9 in adult (H) E66.813, Z68.42    Peripheral polyneuropathy G62.9    Urinary incontinence R32    History of substance abuse (H) F19.11    Chronic venous stasis dermatitis of right lower extremity I87.2     Vitamin B12 deficiency E53.8    Vitamin D deficiency E55.9    Abnormal Pap smear of cervix R87.619    Bipolar disorder, in full remission, most recent episode depressed F31.76    Intermittent low back pain M54.50    Nail dystrophy L60.3    Edema of extremity of unknown cause R60.0    H/O adenomatous polyp of colon Z86.0101    CKD (chronic kidney disease) stage 2, GFR 60-89 ml/min N18.2    Nocturnal leg cramps G47.62    Primary insomnia F51.01    Paroxysmal atrial fibrillation (H) - New Dx 8/22/2024 I48.0    Malaise and fatigue R53.81, R53.83    Snores R06.83    Severe depressed bipolar I disorder without psychotic features (H) F31.4    Suicidal ideation R45.851    Obesity, unspecified class E66.9    History of tobacco abuse Z87.891    TAWNYA (obstructive sleep apnea) G47.33    Unspecified depressive disorder F32.A       Primary Problem This Admission  Active Hospital Problems    *Borderline personality disorder (H) - [facility] for therapy + [place] for medication management.        Clinical Summary and Substantiation of Recommendations   Clinical Substantiation:  It is the recommendation of this clinician that pt admit to IP MH for safety and stabilization. Although  engaged in motivational interviewing targeted at empowering pt to place her own healthy boundaries on communication with her ex because she endorses that he is the direct trigger for depressive symptoms and new SI,with recommendation to continue to engage in her outpatient services to learn coping skills and boundary setting, pt continued to endorse SI with a plan to cut her throat with a knife and was unable/unwilling to engage in safety planning. Although IP MH is unlikely to change her current interpersonal communication skills and relationship struggles, safety concerns cannot be mitigated by less restrictive settings. There were no Crisis Beds available and group home staff cannot limit access to means. Pt will be placed on IP MH  waitlist.    Goals for crisis stabilization:  safety planning    Next steps for Care Team:       Treatment Objectives Addressed:  rapport building, identifying an appropriate aftercare plan, orienting the patient to therapy, identifying and practicing coping strategies, processing feelings, assessing safety, identifying treatment goals, identifying additional supports, exploring obstacles to safety in the community    Therapeutic Interventions:  Engaged in guided discovery, explored patient's perspectives and helped expand them through socratic dialogue., Engaged in activity scheduling and behavioral activation, looking at and reviewing the prior week's goals, problem solving any barriers and acknowledging successes, as well as setting new goals., Coached on coping techniques/relaxation skills to help improve distress tolerance and managing intense emotions., Taught the link between thoughts, feelings, and behaviors., Reviewed healthy living that supports positive mental health, including looking at sleep hygiene, regular movement, nutrition, and regular socialization.    Has a specific means been identified for suicidal/homicide actions: Yes    If yes, describe:  pt says she would cut her throat with a knife and then send pictures to her ex    Explain action steps toward mitigation:  confirmed if staff can remove sharp objects. Because they are only there until 10:00pm and pt has her own apartment, they could not confirm that they can limit access    Document completion of mitigation actions:  Writer recommending inpatient psychiatric service for Pt for further stabilization, safety assessment and medication management.    The follow up action still needed prior to discharge:  EC will follow up with Pt to provide further theraepeutic support while on boarding.    Patient coping skills attempted to reduce the crisis:  help seeking    Disposition  Recommended referrals: Individual Therapy, Medication Management,  Programmatic Care, Crisis Services        Reviewed case and recommendations with attending provider. Attending Name: Julio Cruz       Attending concurs with disposition: yes       Patient and/or validated legal guardian concurs with disposition:   yes       Final disposition:  inpatient mental health         Imminent risk of harm: Suicidal Behavior  Severe psychiatric, behavioral or other comorbid conditions are appropriate for management at inpatient mental health as indicated by at least one of the following: Psychiatric Symptoms  Severe dysfunction in daily living is present as indicated by at least one of the following: Other evidence of severe dysfunction  Situation and expectations are appropriate for inpatient care: Voluntary treatment at lower level of care is not feasible, Biopsychosocial stresses potentially contributing to clinical presentation (co morbidities) have been assessed and are absent or manageable at proposed level of care  Inpatient mental health services are necessary to meet patient needs and at least one of the following: Specific condition related to admission diagnosis is present and judged likely to further improve at proposed level of care, Specific condition related to admission diagnosis is present and judged likely to deteriorate in absence of treatment at proposed level of care      Legal status: Voluntary/Patient has signed consent for treatment                                                                                                                                 Reviewed court records: yes   Discussed with patient the Crisis Response Team (CRT) and potential benefits for care of CRT: Yes  Patient was interested in a referral to CRT: No, pt reports that she is already in involved with CRT and wants IPMH admit at this time  Referral call was made to [phone]: No  Was transferred to CRT worker: No  Coordinator contacted to fax ([phone]) CRT: No        Assessment Details   Total duration spent with the patient: 12 min     CPT code(s) utilized: Non-Billable    KERRIE Joya, Psychotherapist  [facility] - Triage & Transition Services  Callback: [phone]    KERRIE Joya on 5/31/2025 at 4:40 PM

## 2025-05-31 NOTE — DISCHARGE INSTRUCTIONS
Patient Navigation Hub - Scheduled Appointment  You have been scheduled a telephone appointment with the Mental Health and Addiction Services Patient Navigation Hub. As a reminder, this is not an in-person appointment. A Navigator will contact you at your personal telephone number on 6/2/2025 at 3:00PM. You can expect a 15-30 minute appointment. You will discuss programming options and be assisted in next steps. If you have any further questions or concerns, please contact the Patient Navigation Hub at [phone]. Note: You will not be charged for this telephone appointment.    Our Navigators work to be your point-of-contact for trustworthy and compassionate care from Emergency Services to [facility] s Programmatic Care. We will provide resources and communication to help guide you into programmatic care, other internal resources (I.e., Transition Clinic), and/or community programs. Ultimately, our goal is to be the one-stop-shop of communication, coordination, and support for you.    Phone: [phone]  Email: [email or web address]  Fax: [phone]    [facility] Programmatic Care  The following is a list of our programming options that may fit your next steps:    Programs  Mental Health  Intensive Outpatient Program (IOP)  Partial Hospitalization Program (PHP)  Day Treatment  Substance Use Disorder  Intensive Outpatient Program  Outpatient Group  Mental Health & Substance Use Disorder  Co-Occurring Intensive Outpatient Program  Residential  Available Locations  [facility], [facility], [place], [place], [place]  Note: Specific program options vary by location.    Transition Clinic  After leaving Emergency Services, it may take up to 30 days to enter a program. Knowing support is important during this period of time, we offer an urgent model of mental health care via the Transition Clinic. We encourage you to discuss this with your Navigator  during your telephone appointment.    FAQ  What can I expect after leaving Emergency Services?  If not already, you will soon be contacted by a Navigator who will work with you to find a program that fits your needs. If you desire to enter one of our [facility] programs, you will enter our programmatic care intake where an assessment will likely be needed over telephone, virtually, or in-person. After this assessment, a Navigator will connect with you again to provide a handoff to the specific program for next steps.   Please note that it may take up to 30 days for you to begin a program. If an extended wait occurs, please consider services at the Transition Clinic.  What is programmatic care?  It is a highly structured and comprehensive approach designed to address mental health and substance use disorders.   Programmatic care is typically used when individuals have not responded well to less intensive treatments or when they require a higher level of intervention due to the severity of their condition. It can be found in various settings, such as an outpatient location, residential treatment facilities, or inpatient hospitals.  Each program varies in duration and weekly commitments. Ask a Navigator for more program details.

## 2025-05-31 NOTE — ED PROVIDER NOTES
EMERGENCY DEPARTMENT ENCOUNTER      NAME: Rosi Lamb  AGE: 50 year old female  YOB: 1975  MRN: [id number]  EVALUATION DATE & TIME: 2025  3:08 PM    PCP: Lucio Curiel    ED PROVIDER: Julio Wheatley PA-C       CHIEF COMPLAINT:  Chief Complaint   Patient presents with    Suicidal         HPI  Rosi Lamb is a pleasant 50 year old female with history of anxiety, depression, bipolar disorder, polysubstance use disorder, borderline personality disorder, tobacco use presents to the ER for concerns of suicidal ideation.  Was seen yesterday for similar symptoms but no specific plan.  Patient states that she does have a plan to either cut her wrist or her throat as shown to her boyfriend.  Patient states that she has been having conflict with her boyfriend.  No physical abuse.  Patient states that her boyfriend says things about her behind her back.  Currently at [facility].  Denies any self-harm or cutting today.  History of suicidal ideation in the past.      REVIEW OF SYSTEMS   Review of Systems  As above, otherwise ROS is unremarkable.      PAST MEDICAL HISTORY:  Past Medical History:   Diagnosis Date    Abnormal Pap smear of cervix 2018    Overview:  had scraping of cervix    Cannabis use disorder, moderate, in sustained remission, dependence (H) 2015    Closed dislocation of tarsal joint 2011    Closed dislocation of tarsal joint - left 2011    Edema     No Comments Provided    Encounter for removal and reinsertion of intrauterine contraceptive device     05,IUD placement, Removed    Excessive and frequent menstruation with irregular cycle     2017    Major depressive disorder, single episode     No Comments Provided    Personal history of other medical treatment (CODE)     G3, P2-0-1-2 with history of spontaneous     Personal history of other medical treatment (CODE)     No Comments Provided    Uncomplicated asthma      No Comments Provided    Urinary incontinence 03/15/2013         PAST SURGICAL HISTORY:  Past Surgical History:   Procedure Laterality Date    ANKLE SURGERY      fracture, repair with screws    ARTHRODESIS FOOT Left 04/21/2022    Procedure: left foot hardware removaL, fusion of 1st-2nd Tarsal metatarsal;  Surgeon: Anthony Castillo DPM;  Location: [facility] OR    COLONOSCOPY  07/25/2022    F/U 2027 tubular adenoma    CONIZATION LEEP      07/04,Underwent a loop    IMPLANT STIMULATOR AND LEADS SACRAL NERVE (STAGE ONE AND TWO)      scheduled 11/8/23 with Dr. Sheth at [facility]    LAPAROSCOPIC TUBAL LIGATION      2009,tubal ligation         CURRENT MEDICATIONS:    Current Outpatient Medications   Medication Instructions    acetaminophen (TYLENOL) 500-1,000 mg, Oral, EVERY 6 HOURS PRN    albuterol (VENTOLIN HFA) 108 (90 Base) MCG/ACT inhaler 1-2 puffs, Inhalation, EVERY 4 HOURS PRN    amLODIPine (NORVASC) 2.5 MG tablet Take 1 tablet (2.5 mg) by mouth every morning. May also take 1 tablet (2.5 mg) every evening as needed (-- for Raynaud's / Hand color changes --).    ARIPiprazole (ABILIFY) 20 mg, Oral, AT BEDTIME    atomoxetine (STRATTERA) 25 mg, Oral, EVERY MORNING    busPIRone (BUSPAR) 10 mg, Oral, 3 TIMES DAILY    cholecalciferol (VITAMIN D3) 125 mcg, Oral, DAILY    diltiazem ER COATED BEADS (CARDIZEM CD/CARTIA XT) 180 mg, Oral, 2 TIMES DAILY    docusate sodium (EQ STOOL SOFTENER) 100 mg, Oral, 2 TIMES DAILY PRN    estradiol (VIVELLE-DOT) 0.05 MG/24HR bi-weekly patch 1 patch, Transdermal, TWICE WEEKLY    famotidine (PEPCID) 20 mg, Oral, 2 TIMES DAILY, - For Heartburn    gabapentin (NEURONTIN) 300 mg, Oral, 3 TIMES DAILY    hydrOXYzine HCl (ATARAX) 50 mg, Oral, AT BEDTIME    ibuprofen (ADVIL/MOTRIN) 800 mg, Oral, EVERY 8 HOURS PRN    lamoTRIgine (LAMICTAL) 100 mg, Oral, 3 TIMES DAILY    montelukast (SINGULAIR) 10 mg, Oral, AT BEDTIME    progesterone (PROMETRIUM) 100 mg, Oral, DAILY    ramelteon (ROZEREM) 8 mg, Oral, AT  BEDTIME PRN    vilazodone (VIIBRYD) 40 mg, Oral, EVERY MORNING    VITRON-C  MG TABS tablet TAKE 1 TABLET BY MOUTH ONCE DAILY ON AN EMPTY STOMACH FOR  IRON  DEFICIENCY         ALLERGIES:  Allergies   Allergen Reactions    Clonazepam Other (See Comments)     Causes Violence and aggresssion    Hydrocodone Other (See Comments)     Seizures; not allergic to tylenol    Lorazepam Other (See Comments)     Causes Violence and aggresssion    Sertraline Other (See Comments)     Caused suicidally     Bupropion Other (See Comments)     Caused Major Depression    Dust Mite Extract      Other reaction(s): Asthma symptoms    Lithium Other (See Comments)     Mood alteration    Milk Protein Unknown    Pollen Extract      Other reaction(s): Asthma symptoms    Risperidone Other (See Comments)     Agitation      Sulfa Antibiotics Itching    Trichophyton      Other reaction(s): Asthma symptoms    Valproic Acid Other (See Comments)     Weight gain         FAMILY HISTORY:  Family History   Problem Relation Age of Onset    Osteoporosis Mother     Asthma Father         Asthma,+ Leg edema, knee, hip replacement. + Lymphedema    Heart Failure Father     Other - See Comments Paternal Grandmother         Lymphedema    Osteoporosis Brother         Osteoporosis    Other - See Comments Brother         No Known Problems         SOCIAL HISTORY:   Social History     Socioeconomic History    Marital status:      Spouse name: None    Number of children: None    Years of education: None    Highest education level: None   Tobacco Use    Smoking status: Former     Current packs/day: 0.00     Average packs/day: 2.0 packs/day for 3.0 years (6.0 ttl pk-yrs)     Types: Cigarettes     Start date:      Quit date: 2003     Years since quittin.4     Passive exposure: Past    Smokeless tobacco: Never   Vaping Use    Vaping status: Never Used   Substance and Sexual Activity    Alcohol use: Not Currently     Alcohol/week: 0.0 standard  drinks of alcohol    Drug use: Never    Sexual activity: Not Currently     Partners: Male     Birth control/protection: Female Surgical     Comment: ablation   Social History Narrative    No longer in adult foster care lived with Charley Godwin.    .   2 sons - age 12 and 7 - as of 11/2016.   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     Social Drivers of Health     Financial Resource Strain: Low Risk  (4/22/2025)    Financial Resource Strain     Within the past 12 months, have you or your family members you live with been unable to get utilities (heat, electricity) when it was really needed?: No   Food Insecurity: Low Risk  (4/22/2025)    Food Insecurity     Within the past 12 months, did you worry that your food would run out before you got money to buy more?: No     Within the past 12 months, did the food you bought just not last and you didn t have money to get more?: No   Transportation Needs: Low Risk  (4/22/2025)    Transportation Needs     Within the past 12 months, has lack of transportation kept you from medical appointments, getting your medicines, non-medical meetings or appointments, work, or from getting things that you need?: No   Physical Activity: Unknown (4/8/2025)    Exercise Vital Sign     Days of Exercise per Week: 2 days   Stress: No Stress Concern Present (4/8/2025)    [place] [employer] - Occupational Stress Questionnaire     Feeling of Stress : Not at all   Social Connections: Unknown (4/8/2025)    Social Connection and Isolation Panel [NHANES]     Frequency of Social Gatherings with Friends and Family: Twice a week   Interpersonal Safety: Low Risk  (5/8/2025)    Interpersonal Safety     Do you feel physically and emotionally safe where you currently live?: Yes     Within the past 12 months, have you been hit, slapped, kicked or otherwise physically hurt by someone?: No     Within the past 12 months, have you been humiliated or emotionally  abused in other ways by your partner or ex-partner?: No   Housing Stability: Low Risk  (4/22/2025)    Housing Stability     Do you have housing? : Yes     Are you worried about losing your housing?: No   Recent Concern: Housing Stability - High Risk (4/8/2025)    Housing Stability     Do you have housing? : No     Are you worried about losing your housing?: No         ==================================================================================================================================    PHYSICAL EXAM    VITAL SIGNS: /80   Pulse 119   Temp 98  F (36.7  C) (Tympanic)   Resp 18   Wt (!) 137.9 kg (304 lb)   SpO2 100%   BMI 49.44 kg/m      Patient Vitals for the past 24 hrs:   BP Temp Temp src Pulse Resp SpO2 Weight   05/31/25 1502 130/80 98  F (36.7  C) Tympanic 119 18 100 % (!) 137.9 kg (304 lb)       Physical Exam  Vitals and nursing note reviewed.   Constitutional:       Appearance: Normal appearance. She is obese. She is not ill-appearing or diaphoretic.   HENT:      Head: Normocephalic.      Nose: Nose normal.      Mouth/Throat:      Mouth: Mucous membranes are moist.      Pharynx: Oropharynx is clear.   Eyes:      Conjunctiva/sclera: Conjunctivae normal.   Cardiovascular:      Rate and Rhythm: Regular rhythm. Tachycardia present.      Pulses: Normal pulses.      Heart sounds: Normal heart sounds.   Pulmonary:      Effort: Pulmonary effort is normal.      Breath sounds: Normal breath sounds.   Abdominal:      General: Abdomen is flat.      Palpations: Abdomen is soft.      Tenderness: There is no abdominal tenderness.   Musculoskeletal:         General: Normal range of motion.      Cervical back: Normal range of motion.   Skin:     General: Skin is warm.   Neurological:      General: No focal deficit present.      Mental Status: She is alert.   Psychiatric:         Attention and Perception: She does not perceive auditory or visual hallucinations.         Mood and Affect: Mood is anxious.          Speech: Speech normal.         Behavior: Behavior normal. Behavior is cooperative.         Thought Content: Thought content includes suicidal ideation. Thought content does not include homicidal ideation. Thought content includes suicidal plan. Thought content does not include homicidal plan.           ==================================================================================================================================     LABS & RADIOLOGY:  All pertinent labs reviewed and interpreted. Reviewed all pertinent imaging. Please see official radiology report.  Results for orders placed or performed during the hospital encounter of 05/31/25   Acetaminophen level   Result Value Ref Range    Acetaminophen <5.0 (L) 10.0 - 30.0 ug/mL   Comprehensive metabolic panel   Result Value Ref Range    Sodium 138 135 - 145 mmol/L    Potassium 3.7 3.4 - 5.3 mmol/L    Carbon Dioxide (CO2) 26 22 - 29 mmol/L    Anion Gap 13 7 - 15 mmol/L    Urea Nitrogen 14.1 6.0 - 20.0 mg/dL    Creatinine 1.01 (H) 0.51 - 0.95 mg/dL    GFR Estimate 68 >60 mL/min/1.73m2    Calcium 9.7 8.8 - 10.4 mg/dL    Chloride 99 98 - 107 mmol/L    Glucose 85 70 - 99 mg/dL    Alkaline Phosphatase 99 40 - 150 U/L    AST 24 0 - 45 U/L    ALT 20 0 - 50 U/L    Protein Total 8.0 6.4 - 8.3 g/dL    Albumin 4.5 3.5 - 5.2 g/dL    Bilirubin Total 0.4 <=1.2 mg/dL   Ethanol GH   Result Value Ref Range    Ethanol Level Blood <0.01 <=0.01 g/dL   HCG qualitative urine   Result Value Ref Range    hCG Urine Qualitative Negative Negative   Salicylate level   Result Value Ref Range    Salicylate <0.3   mg/dL   TSH Reflex GH   Result Value Ref Range    TSH 2.41 0.30 - 4.20 uIU/mL   UA with Microscopic reflex to Culture    Specimen: Urine, Clean Catch   Result Value Ref Range    Color Urine Yellow Colorless, Straw, Light Yellow, Yellow    Appearance Urine Clear Clear    Glucose Urine Negative Negative mg/dL    Bilirubin Urine Negative Negative    Ketones Urine Negative  Negative mg/dL    Specific Gravity Urine 1.010 1.000 - 1.030    Blood Urine Negative Negative    pH Urine 5.5 5.0 - 9.0    Protein Albumin Urine Negative Negative mg/dL    Urobilinogen Urine Normal Normal mg/dL    Nitrite Urine Negative Negative    Leukocyte Esterase Urine Negative Negative    Mucus Urine Present (A) None Seen /LPF    RBC Urine <1 <=2 /HPF    WBC Urine <1 <=5 /HPF   Urine Drug Screen Panel   Result Value Ref Range    Amphetamines Urine Screen Negative Screen Negative    Barbituates Urine Screen Negative Screen Negative    Benzodiazepine Urine Screen Negative Screen Negative    Cannabinoids Urine Screen Negative Screen Negative    Cocaine Urine Screen Negative Screen Negative    Fentanyl Qual Urine Screen Negative Screen Negative    Opiates Urine Screen Negative Screen Negative    PCP Urine Screen Negative Screen Negative   CBC with platelets and differential   Result Value Ref Range    WBC Count 9.9 4.0 - 11.0 10e3/uL    RBC Count 4.60 3.80 - 5.20 10e6/uL    Hemoglobin 13.8 11.7 - 15.7 g/dL    Hematocrit 42.4 35.0 - 47.0 %    MCV 92 78 - 100 fL    MCH 30.0 26.5 - 33.0 pg    MCHC 32.5 31.5 - 36.5 g/dL    RDW 13.1 10.0 - 15.0 %    Platelet Count 319 150 - 450 10e3/uL    % Neutrophils 69 %    % Lymphocytes 21 %    % Monocytes 8 %    % Eosinophils 1 %    % Basophils 0 %    % Immature Granulocytes 0 %    NRBCs per 100 WBC 0 <1 /100    Absolute Neutrophils 6.9 1.6 - 8.3 10e3/uL    Absolute Lymphocytes 2.1 0.8 - 5.3 10e3/uL    Absolute Monocytes 0.8 0.0 - 1.3 10e3/uL    Absolute Eosinophils 0.1 0.0 - 0.7 10e3/uL    Absolute Basophils 0.0 0.0 - 0.2 10e3/uL    Absolute Immature Granulocytes 0.0 <=0.4 10e3/uL    Absolute NRBCs 0.0 10e3/uL   Extra Blue Top Tube   Result Value Ref Range    Hold Specimen JIC    Extra Green Top (Lithium Heparin) Tube   Result Value Ref Range    Hold Specimen JIC      No orders to display          ==================================================================================================================================    ED COURSE, MEDICAL DECISION MAKING, FINAL IMPRESSION AND PLAN:      Assessment / Plan:  1. Suicidal ideation        The patient was interviewed and examined.  HPI and physical exam as below.  Differential diagnosis and MDM Key Documentation Elements as below.  Vitals, triage note, and nursing notes were reviewed./80   Pulse 119   Temp 98  F (36.7  C) (Tympanic)   Resp 18   Wt (!) 137.9 kg (304 lb)   SpO2 100%   BMI 49.44 kg/m      Differential includes but is not limited to suicidal ideation, anxiety, depression, bipolar, adjustment disorder    Patient was afebrile but tachycardic.  Patient was in no acute distress.  Patient states that she does feel suicidal and has a plan and does not want to hurt herself.  No other complaints.  Patient assessed by DEC .  DEC recommends inpatient placement.  Will hold patient here in the ER until appropriate facilities are found.  Laboratory studies otherwise reassuring.  Negative UDS.  Maintenance medications entered.  Discussed transfer of care with ED attending physician, Dr. Irwin.  Patient stable at time of transfer of care.    Pertinent Labs & Imaging studies reviewed. (See chart for details)  Results for orders placed or performed during the hospital encounter of 05/31/25   Acetaminophen level   Result Value Ref Range    Acetaminophen <5.0 (L) 10.0 - 30.0 ug/mL   Comprehensive metabolic panel   Result Value Ref Range    Sodium 138 135 - 145 mmol/L    Potassium 3.7 3.4 - 5.3 mmol/L    Carbon Dioxide (CO2) 26 22 - 29 mmol/L    Anion Gap 13 7 - 15 mmol/L    Urea Nitrogen 14.1 6.0 - 20.0 mg/dL    Creatinine 1.01 (H) 0.51 - 0.95 mg/dL    GFR Estimate 68 >60 mL/min/1.73m2    Calcium 9.7 8.8 - 10.4 mg/dL    Chloride 99 98 - 107 mmol/L    Glucose 85 70 - 99 mg/dL    Alkaline Phosphatase 99 40 - 150 U/L    AST 24 0 - 45 U/L     ALT 20 0 - 50 U/L    Protein Total 8.0 6.4 - 8.3 g/dL    Albumin 4.5 3.5 - 5.2 g/dL    Bilirubin Total 0.4 <=1.2 mg/dL   Ethanol GH   Result Value Ref Range    Ethanol Level Blood <0.01 <=0.01 g/dL   HCG qualitative urine   Result Value Ref Range    hCG Urine Qualitative Negative Negative   Salicylate level   Result Value Ref Range    Salicylate <0.3   mg/dL   TSH Reflex GH   Result Value Ref Range    TSH 2.41 0.30 - 4.20 uIU/mL   UA with Microscopic reflex to Culture    Specimen: Urine, Clean Catch   Result Value Ref Range    Color Urine Yellow Colorless, Straw, Light Yellow, Yellow    Appearance Urine Clear Clear    Glucose Urine Negative Negative mg/dL    Bilirubin Urine Negative Negative    Ketones Urine Negative Negative mg/dL    Specific Gravity Urine 1.010 1.000 - 1.030    Blood Urine Negative Negative    pH Urine 5.5 5.0 - 9.0    Protein Albumin Urine Negative Negative mg/dL    Urobilinogen Urine Normal Normal mg/dL    Nitrite Urine Negative Negative    Leukocyte Esterase Urine Negative Negative    Mucus Urine Present (A) None Seen /LPF    RBC Urine <1 <=2 /HPF    WBC Urine <1 <=5 /HPF   Urine Drug Screen Panel   Result Value Ref Range    Amphetamines Urine Screen Negative Screen Negative    Barbituates Urine Screen Negative Screen Negative    Benzodiazepine Urine Screen Negative Screen Negative    Cannabinoids Urine Screen Negative Screen Negative    Cocaine Urine Screen Negative Screen Negative    Fentanyl Qual Urine Screen Negative Screen Negative    Opiates Urine Screen Negative Screen Negative    PCP Urine Screen Negative Screen Negative   CBC with platelets and differential   Result Value Ref Range    WBC Count 9.9 4.0 - 11.0 10e3/uL    RBC Count 4.60 3.80 - 5.20 10e6/uL    Hemoglobin 13.8 11.7 - 15.7 g/dL    Hematocrit 42.4 35.0 - 47.0 %    MCV 92 78 - 100 fL    MCH 30.0 26.5 - 33.0 pg    MCHC 32.5 31.5 - 36.5 g/dL    RDW 13.1 10.0 - 15.0 %    Platelet Count 319 150 - 450 10e3/uL    % Neutrophils  "69 %    % Lymphocytes 21 %    % Monocytes 8 %    % Eosinophils 1 %    % Basophils 0 %    % Immature Granulocytes 0 %    NRBCs per 100 WBC 0 <1 /100    Absolute Neutrophils 6.9 1.6 - 8.3 10e3/uL    Absolute Lymphocytes 2.1 0.8 - 5.3 10e3/uL    Absolute Monocytes 0.8 0.0 - 1.3 10e3/uL    Absolute Eosinophils 0.1 0.0 - 0.7 10e3/uL    Absolute Basophils 0.0 0.0 - 0.2 10e3/uL    Absolute Immature Granulocytes 0.0 <=0.4 10e3/uL    Absolute NRBCs 0.0 10e3/uL   Extra Blue Top Tube   Result Value Ref Range    Hold Specimen JIC    Extra Green Top (Lithium Heparin) Tube   Result Value Ref Range    Hold Specimen JIC      No results found for: \"ABORH\"      Reassessments, Medications, Interventions, & Response to Treatments:  -as above    Medications given during today's ER visit:  Medications   acetaminophen (TYLENOL) tablet 1,000 mg (has no administration in time range)   amLODIPine (NORVASC) tablet 2.5 mg (has no administration in time range)   amLODIPine (NORVASC) tablet 2.5 mg (has no administration in time range)   ARIPiprazole (ABILIFY) tablet 20 mg (has no administration in time range)   busPIRone (BUSPAR) tablet 10 mg (has no administration in time range)   docusate sodium (COLACE) capsule 100 mg (has no administration in time range)   famotidine (PEPCID) tablet 20 mg (has no administration in time range)   gabapentin (NEURONTIN) capsule 300 mg (has no administration in time range)   hydrOXYzine edin (VISTARIL) capsule 50 mg (has no administration in time range)   ibuprofen (ADVIL/MOTRIN) tablet 800 mg (has no administration in time range)   lamoTRIgine (LaMICtal) tablet 100 mg (has no administration in time range)   montelukast (SINGULAIR) chewable tablet 10 mg (has no administration in time range)   albuterol (PROVENTIL) neb solution 2.5 mg (has no administration in time range)   hydrOXYzine edin (VISTARIL) capsule 25 mg (25 mg Oral $Given 5/31/25 2550)   estradiol biweekly (VIVELLE-DOT) patch REMOVAL (has no " administration in time range)   ibuprofen (ADVIL/MOTRIN) tablet 600 mg (600 mg Oral $Given 5/31/25 2934)       Consultations:  DEC    Decision Rules, Medical Calculators, Sepsis Criteria, and Risk Stratification Tools:  None    MDM Key Documentation Elements for Patient's Evaluation:  Differential diagnosis to include high risk considerations: As above  Escalation to admission/observation considered: Admission/observation considered, patient was admitted/transferred in stable condition  Discussions and management with other clinicians:    3a. Independent interpretation of testing performed by another health professional:  -No  3b. Discussion of management or test interpretation with another health professional: -Yes  Independent interpretation of tests:  Ordering and/or review of 3+ test(s)  Discussion of test interpretations with radiology:  No  History obtained from source other than patient or assessment requiring an independent historian:  No  Review of non-ED/external records:  review of 3+ records  Diagnostic tests considered but not ultimately performed/deferred:  -Chest x-ray  Prescription medications considered but not prescribed:  - Patient was admitted/transferred   Chronic conditions affecting care:  -Anxiety, depression, bipolar, substance abuse, borderline personality disorder  Care affected by social determinants of health:  -None    The patient's management involved:   - Laboratory studies  - Decision regarding hospitalization/transfer        A shared decision making model was used. Time was taken to answer all questions.  Patient and/or associated parties understood and were agreeable to treatment plan.  Plan and all results were discussed.       NEW PRESCRIPTIONS STARTED AT TODAY'S ER VISIT:  New Prescriptions    No medications on file          ==================================================================================================================================      IMalik,  DEVAN, personally performed the services described in this documentation, and it is both accurate and complete.       Julio Wheatley PA-C  05/31/25 2053

## 2025-05-31 NOTE — PLAN OF CARE
Rosi Lamb  May 31, 2025  Plan of Care Hand-off Note     Patient Recommended Care Path: inpatient mental health    Clinical Substantiation:  It is the recommendation of this clinician that pt admit to IP  for safety and stabilization. Although  engaged in motivational interviewing targeted at empowering pt to place her own healthy boundaries on communication with her ex because she endorses that he is the direct trigger for depressive symptoms and new SI,with recommendation to continue to engage in her outpatient services to learn coping skills and boundary setting, pt continued to endorse SI with a plan to cut her throat with a knife and was unable/unwilling to engage in safety planning. Although IP MH is unlikely to change her current interpersonal communication skills and relationship struggles, safety concerns cannot be mitigated by less restrictive settings. There were no Crisis Beds available and group home staff cannot limit access to means. Pt will be placed on IP MH waitlist.    Goals for crisis stabilization:  safety planning    Next steps for Care Team:       Treatment Objectives Addressed:  rapport building, identifying an appropriate aftercare plan, orienting the patient to therapy, identifying and practicing coping strategies, processing feelings, assessing safety, identifying treatment goals, identifying additional supports, exploring obstacles to safety in the community    Therapeutic Interventions:  Engaged in guided discovery, explored patient's perspectives and helped expand them through socratic dialogue., Engaged in activity scheduling and behavioral activation, looking at and reviewing the prior week's goals, problem solving any barriers and acknowledging successes, as well as setting new goals., Coached on coping techniques/relaxation skills to help improve distress tolerance and managing intense emotions., Taught the link between thoughts, feelings, and behaviors., Reviewed  healthy living that supports positive mental health, including looking at sleep hygiene, regular movement, nutrition, and regular socialization.    Has a specific means been identified for suicidal.homicide actions: Yes  If yes, describe: pt says she would cut her throat with a knife and then send pictures to her ex  Explain action steps toward mitigation: confirmed if staff can remove sharp objects. Because they are only there until 10:00pm and pt has her own apartment, they could not confirm that they can limit access  Document completion of mitigation action: Writer recommending inpatient psychiatric service for Pt for further stabilization, safety assessment and medication management.  The follow up action still needed prior to discharge: EC will follow up with Pt to provide further theraepeutic support while on boarding.    Patient coping skills attempted to reduce the crisis:  help seeking       Imminent risk of harm: Suicidal Behavior  Severe psychiatric, behavioral or other comorbid conditions are appropriate for management at inpatient mental health as indicated by at least one of the following: Psychiatric Symptoms  Severe dysfunction in daily living is present as indicated by at least one of the following: Other evidence of severe dysfunction  Situation and expectations are appropriate for inpatient care: Voluntary treatment at lower level of care is not feasible, Biopsychosocial stresses potentially contributing to clinical presentation (co morbidities) have been assessed and are absent or manageable at proposed level of care  Inpatient mental health services are necessary to meet patient needs and at least one of the following: Specific condition related to admission diagnosis is present and judged likely to further improve at proposed level of care, Specific condition related to admission diagnosis is present and judged likely to deteriorate in absence of treatment at proposed level of  care      Collateral contact information:       Legal Status: Voluntary/Patient has signed consent for treatment                                                                                                                                 Reviewed court records: yes     Psychiatry Consult:     KERRIE Joya LICSW on 5/31/2025 at 4:41 PM

## 2025-05-31 NOTE — PHARMACY-ADMISSION MEDICATION HISTORY
Pharmacist Admission Medication History    Admission medication history is complete. The information provided in this note is only as accurate as the sources available at the time of the update.    Information Source(s): Patient via in-person interview  -Surescripts    Pertinent Information: pt good historian and knowledgeable about her medications. She resides at [facility], where staff have her medications locked up but goes to take her meds morning, noon, and evening. Reports she took AM and noon doses prior to admission.    Recently tapered off of Viibryd (reports last dose was 3 days ago), and started vortioxetine (Trintellix) 10 mg once daily (took first dose 5/30).    Prescribed Progesterone but has not taken recently- thinks insurance will not pay for it.    Changes made to PTA medication list:  Added:   Trintellix  Hydroxyzine pamoate cap  Deleted:   Viibryd  Hydroxyzine HCl tab  Changed: None    Allergies reviewed with patient and updates made in EHR: yes- no changes made at this time.      Medication History Completed By: Candelario Chiang RPH 5/31/2025 6:34 PM    Candelario Chiang RPH on 5/31/2025 at 6:44 PM

## 2025-05-31 NOTE — ED TRIAGE NOTES
"Pt arrives via [facility] from her apartment.  Pt states she is having thoughts of SI.  Pt states the last time she attempted suicide was when she was 23.  Pt states she has been having theses thoughts on and off for the past few days and \"came to a head\" today. Pt states today she was having issues with her ex-boyfriend online.  Pt called [facility] for help and then it was determined to come in for help.           "

## 2025-05-31 NOTE — TELEPHONE ENCOUNTER
S: TWAN Vasquez  Isa calling at 4:14 PM about 50 year old/female presenting with SI w/ plan to cut her throat with a knife     B: Pt arrived via EMS. Presenting problem, stressors: Pt was discharged from the ED earlier today. Pt got into argument with her ex today.     Pt affect in ED: Anxious  and sad  Pt Dx: Major Depressive Disorder, Generalized Anxiety Disorder, and Borderline Personality Disorder  Previous MH hx? Yes: Feb 2025  Pt endorses SI with a plan to cut her throat   Hx of suicide attempt? Yes: 27 years ago via overdose   Pt denies SIB  Pt denies HI   Pt denies hallucinations .   Pt RARS Score: 2    Hx of aggression/violence, sexual offenses, legal concerns, Epic care plan? describe: None  Current concerns for aggression this visit? No  Does pt have a history of Civil Commitment? Yes, most recent commitment 1998  Is Pt their own guardian? Yes    Pt is prescribed medication. Is patient medication compliant? Yes  Pt endorses OP services: CRT, supportive living facility, psychiatry, DBT groups, individual therapy and   CD concerns: Pt is in recovery with a hx of alcohol use   Acute or chronic medical concerns: None  Does Pt present with specific needs, assistive devices, or exclusionary criteria? None      Pt is ambulatory  Pt is able to perform ADLs independently      A: Pt to be reviewed for [facility] admission. Pt is Voluntary  Preferred placement: [facility] only    COVID Symptoms: No  If yes, COVID test required   Utox: Negative   CMP: Abnormalities: Creatinine 1.01  CBC: WNL  HCG: Negative    R: Patient cleared and ready for behavioral bed placement: Yes  Pt placed on [facility] worklist? Yes    Does Patient need a Transfer Center request created? Yes, writer completed Transfer Center request at: 4:18 PM    4:42 PM - Called [facility] to request review. MRN provided  9:17 PM - Called [facility]; Pt is still under review  10:25 PM - received notification from A.JUAN J that pt was  accepted to [facility].  Placed pt in queue    R: 5 Jose Roberto / Jose / Verónica    10:37 PM - Notified ED BENEDICT downs

## 2025-05-31 NOTE — ED PROVIDER NOTES
"ED PROVIDER NOTE  Patient Name: Rosi Lamb  MRN: [id number]    CC:    Chief Complaint   Patient presents with    Mental Health Problem         MDM  50 year old female presenting with mental health problem.      In the ED, /87   Pulse 109   Resp 17   Ht 1.67 m (5' 5.75\")   Wt (!) 137.9 kg (304 lb)   SpO2 97%   BMI 49.44 kg/m      Physical exam revealed clear auscultation bilaterally.  Benign abdominal exam.  Grossly neurologically intact.  In no acute distress, no accessory muscle use.  Overall does not look critically ill or toxic .      I have independently reviewed and interpreted the patients test results which I have ordered this visit which are the following:      ED Course as of 05/31/25 0119   Sat May 31, 2025   0053 Dec call - thoughts of self harm and by cutting not with suicidal intent to relieve emotional.  SI chronic.  Last incident of self harm years ago.           Depressive symptoms  Hx of bipolar, borderline personality disorder, anxiety  Patient was recently admitted to the hospital for depression and SI at [facility].  Today patient is having difficulties with her ex-boyfriend who is calling her names and causing significant depression.  She has no thoughts of a plan to end her life, but she obtained have suicidal ideations.  She was called, and determined that she should come to the emergency department for extra help.  Patient is requesting hydroxyzine for worsening anxiety regarding her symptoms.  This was ordered.  DEC was paged, there is no indication for emergent admission to .  I am in agreement with this.  She gives a history of low likelihood for any self-harm.  She does feel unsafe around sharp objects.  We discussed possibly going home, and staying with her neighbor and around other people.  She does know she could come back at any time.  She does seem motivated to like to try to follow-up outpatient.  She knows she can return anytime call 911.  Symptoms " seem consistent with a depressive reaction difficulties with ex-partnerwith thoughts of self-harm without thoughts of suicidal ideation.  Tach.  - DEC  - Hydroxyzine 50 mg p.o.  - The patient was advised to follow up with PCP in 2-3 days and to return to the ED if symptoms worsened, or if there were any other urgent or life-threatening concerns.  - Following counseling on the work-up, results, diagnosis, and treatment plan, the patient was amenable to discharge after all questions were answered. The patient expressed agreement and understanding to the plan.      Clinical impression  Depressive symptoms    Disposition  Home      HPI    Rosi Lamb is a 50 year old female with PMH of alcohol use disorder, anxiety, borderline personality disorder, asthma, substance abuse, urinary incontinence, CKD 2, paroxysmal atrial fibrillation, who presents with suicidal ideations.     History of obtained by: patient     Patient was recently discharged from [facility] for depression and SI.     Patient presents with a few days of worsening depression, states that her triggers are her ex-boyfriend.  She is still in contact with them.  States that online on [employer] ex-boyfriend is incredibly mean, calling her narcissistic and other names.  She denies any alcohol or illicit drug use.  She did have a recent medication change for her depression today.  Denies any chest pain palpitations or physical symptoms.  No recent prodromal illness, no headaches.  She denies any self-harm attempts, but feels like this will progress into an attempt.      PMH and SH reviewed.    10 point ROS reviewed and negative except as stated in HPI.    Past medical history:  Past Medical History:   Diagnosis Date    Abnormal Pap smear of cervix 04/11/2018    Overview:  had scraping of cervix    Cannabis use disorder, moderate, in sustained remission, dependence (H) 11/28/2015    Closed dislocation of tarsal joint 02/04/2011    Closed dislocation of tarsal joint  - left 2011    Edema     No Comments Provided    Encounter for removal and reinsertion of intrauterine contraceptive device     05,IUD placement, Removed    Excessive and frequent menstruation with irregular cycle     2017    Major depressive disorder, single episode     No Comments Provided    Personal history of other medical treatment (CODE)     G3, P2-0-1-2 with history of spontaneous     Personal history of other medical treatment (CODE)     No Comments Provided    Uncomplicated asthma     No Comments Provided    Urinary incontinence 03/15/2013       Social history:  Social Connections: Unknown (2025)    Social Connection and Isolation Panel [NHANES]     Frequency of Communication with Friends and Family: Not on file     Frequency of Social Gatherings with Friends and Family: Twice a week     Attends Yazidi Services: Not on file     Active Member of Clubs or Organizations: Not on file     Attends Club or Organization Meetings: Not on file     Marital Status: Not on file     Social History     Socioeconomic History    Marital status:    Tobacco Use    Smoking status: Former     Current packs/day: 0.00     Average packs/day: 2.0 packs/day for 3.0 years (6.0 ttl pk-yrs)     Types: Cigarettes     Start date:      Quit date: 2003     Years since quittin.4     Passive exposure: Past    Smokeless tobacco: Never   Vaping Use    Vaping status: Never Used   Substance and Sexual Activity    Alcohol use: Not Currently     Alcohol/week: 0.0 standard drinks of alcohol    Drug use: Never    Sexual activity: Not Currently     Partners: Male     Birth control/protection: Female Surgical     Comment: ablation   Social History Narrative    No longer in adult foster care lived with Charley Godwin.    .   2 sons - age 12 and 7 - as of 2016.   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     Social Drivers of Health     Financial Resource Strain: Low  Risk  (4/22/2025)    Financial Resource Strain     Within the past 12 months, have you or your family members you live with been unable to get utilities (heat, electricity) when it was really needed?: No   Food Insecurity: Low Risk  (4/22/2025)    Food Insecurity     Within the past 12 months, did you worry that your food would run out before you got money to buy more?: No     Within the past 12 months, did the food you bought just not last and you didn t have money to get more?: No   Transportation Needs: Low Risk  (4/22/2025)    Transportation Needs     Within the past 12 months, has lack of transportation kept you from medical appointments, getting your medicines, non-medical meetings or appointments, work, or from getting things that you need?: No   Physical Activity: Unknown (4/8/2025)    Exercise Vital Sign     Days of Exercise per Week: 2 days   Stress: No Stress Concern Present (4/8/2025)    [employer] - Occupational Stress Questionnaire     Feeling of Stress : Not at all   Social Connections: Unknown (4/8/2025)    Social Connection and Isolation Panel [NHANES]     Frequency of Social Gatherings with Friends and Family: Twice a week   Interpersonal Safety: Low Risk  (5/8/2025)    Interpersonal Safety     Do you feel physically and emotionally safe where you currently live?: Yes     Within the past 12 months, have you been hit, slapped, kicked or otherwise physically hurt by someone?: No     Within the past 12 months, have you been humiliated or emotionally abused in other ways by your partner or ex-partner?: No   Housing Stability: Low Risk  (4/22/2025)    Housing Stability     Do you have housing? : Yes     Are you worried about losing your housing?: No   Recent Concern: Housing Stability - High Risk (4/8/2025)    Housing Stability     Do you have housing? : No     Are you worried about losing your housing?: No         I have reviewed the patients prescribed medications:  No  current facility-administered medications for this encounter.    Current Outpatient Medications:     acetaminophen (TYLENOL) 500 MG tablet, Take 1-2 tablets (500-1,000 mg) by mouth every 6 hours as needed for mild pain, Disp: 90 tablet, Rfl: 4    albuterol (VENTOLIN HFA) 108 (90 Base) MCG/ACT inhaler, Inhale 1-2 puffs into the lungs every 4 hours as needed for shortness of breath or wheezing, Disp: 18 g, Rfl: 11    amLODIPine (NORVASC) 2.5 MG tablet, Take 1 tablet (2.5 mg) by mouth every morning. May also take 1 tablet (2.5 mg) every evening as needed (-- for Raynaud's / Hand color changes --)., Disp: 60 tablet, Rfl: 1    ARIPiprazole (ABILIFY) 20 MG tablet, Take 1 tablet (20 mg) by mouth at bedtime., Disp: 30 tablet, Rfl: 1    atomoxetine (STRATTERA) 25 MG capsule, Take 1 capsule (25 mg) by mouth every morning., Disp: 30 capsule, Rfl: 1    busPIRone (BUSPAR) 10 MG tablet, Take 1 tablet (10 mg) by mouth 3 times daily., Disp: 90 tablet, Rfl: 1    cholecalciferol (VITAMIN D3) 125 mcg (5000 units) capsule, Take 1 capsule (125 mcg) by mouth daily., Disp: 30 capsule, Rfl: 1    diltiazem ER COATED BEADS (CARDIZEM CD/CARTIA XT) 180 MG 24 hr capsule, Take 1 capsule (180 mg) by mouth 2 times daily., Disp: 60 capsule, Rfl: 1    docusate sodium (EQ STOOL SOFTENER) 100 MG capsule, Take 1 capsule (100 mg) by mouth 2 times daily as needed for constipation., Disp: 60 capsule, Rfl: 1    estradiol (VIVELLE-DOT) 0.05 MG/24HR bi-weekly patch, Place 1 patch onto the skin twice a week., Disp: 9 patch, Rfl: 1    famotidine (PEPCID) 20 MG tablet, Take 1 tablet (20 mg) by mouth 2 times daily. - For Heartburn, Disp: 60 tablet, Rfl: 1    gabapentin (NEURONTIN) 300 MG capsule, Take 1 capsule (300 mg) by mouth 3 times daily., Disp: 90 capsule, Rfl: 1    hydrOXYzine HCl (ATARAX) 50 MG tablet, Take 1 tablet (50 mg) by mouth at bedtime., Disp: 30 tablet, Rfl: 1    ibuprofen (ADVIL/MOTRIN) 200 MG tablet, Take 4 tablets (800 mg) by mouth every 8  "hours as needed (back pain/ headache)., Disp: , Rfl:     lamoTRIgine (LAMICTAL) 100 MG tablet, Take 1 tablet (100 mg) by mouth 3 times daily., Disp: 90 tablet, Rfl: 1    montelukast (SINGULAIR) 10 MG tablet, Take 1 tablet (10 mg) by mouth at bedtime., Disp: 30 tablet, Rfl: 1    progesterone (PROMETRIUM) 100 MG capsule, Take 1 capsule (100 mg) by mouth daily., Disp: 30 capsule, Rfl: 1    ramelteon (ROZEREM) 8 MG tablet, Take 1 tablet (8 mg) by mouth nightly as needed for sleep., Disp: 30 tablet, Rfl: 1    vilazodone (VIIBRYD) 40 MG TABS tablet, Take 1 tablet (40 mg) by mouth every morning., Disp: 30 tablet, Rfl: 1    VITRON-C  MG TABS tablet, TAKE 1 TABLET BY MOUTH ONCE DAILY ON AN EMPTY STOMACH FOR  IRON  DEFICIENCY, Disp: 60 tablet, Rfl: 0    Allergies:  Allergies   Allergen Reactions    Clonazepam Other (See Comments)     Causes Violence and aggresssion    Hydrocodone-Acetaminophen      NOT allergic to Tylenol  Other reaction(s): Seizures  Can take Percocet without difficulty.    Lorazepam Other (See Comments)     Causes Violence and aggresssion    Sertraline Other (See Comments)     Caused suicidally     Bupropion Other (See Comments)     Caused Major Depression    Dust Mite Extract      Other reaction(s): Asthma symptoms    Lithium Other (See Comments)     Mood alteration    Milk Protein Unknown    Pollen Extract      Other reaction(s): Asthma symptoms    Risperidone Other (See Comments)     Agitation      Sulfa Antibiotics Itching    Trichophyton      Other reaction(s): Asthma symptoms    Valproic Acid Other (See Comments)     Weight gain         Vitals:    05/30/25 2113   BP: 137/87   Pulse: 109   Resp: 17   SpO2: 97%   Weight: (!) 137.9 kg (304 lb)   Height: 1.67 m (5' 5.75\")       Physical Exam  Vitals: /87   Pulse 109   Resp 17   Ht 1.67 m (5' 5.75\")   Wt (!) 137.9 kg (304 lb)   SpO2 97%   BMI 49.44 kg/m    Constitutional: awake, NAD  Eyes: No conjunctival injection and normal lids, PERRL, " EOMI  ENT: external nose and ears atraumatic  Neck: Symmetric, trachea midline, Supple  CV: RRR, no murmurs appreciated.  Pulm: Unlabored respiratory effort, good air movement, CTAB, no w/c/r appreciated.  GI: Soft, nontender, nondistended.  No rebound or guarding  MSK: No deformities.  No cyanosis.  Neuro: AOx4, normal speech, following commands, grossly symmetric strength in all extremities.  Cranial nerves III-XII grossly intact.    Skin: warm & dry, no rashes or lesions  Psych: Appropriate mood & affect    Past medical history, past surgical history, problem list, family history, social history, medication list, and allergies were reviewed as documented in epic snapshot.  Relevant review of systems are documented within the HPI above.    The below information is for billing purposes only.  There are examples under each underlined title, that are not necessary reflective of the patient's care or history, but are represent examples of the level of care for billing purposes only.     Complexity of the Problems Addressed  5 (High) - 1 or more chronic illnesses with severe exacerbation, progression, or side effects of treatment or 1 acute chronic illness or injury that poses threat to live or bodily function    Complexity of Data  I have reviewed the following pertinent historical labs, diagnoses, tests, and/or imaging which contribute to complexity of this patient's care.   5 - (Extensive) - (2 of three above)    Complication Risk  Based on my interpretation of the current labs, historical tests and/or external tests. Patient does have a risk level as stated below of morbidity, threat to life, and bodily function, and severe exacerbation if not treated.   5 - High (drug therapy requiring intensive monitoring, elective major surgery with risk factors, emergency major surgery, hospitalization or excalation of hospital level care, decision not to resuscitate or to de-escalate care because of poor prognosis, parenteral  controlled substances)          ED Events, Labs and Imaging:  ED Course as of 05/31/25 0119   Sat May 31, 2025   0053 Dec call - thoughts of self harm and by cutting not with suicidal intent to relieve emotional.  SI chronic.  Last incident of self harm years ago.         Isac Irwin MD  Emergency Medicine    Dictation Disclaimer: Some notes are completed with voice-recognition dictation software. Errors are generally corrected in real time. Please contact me via Epic staff message if you note any errors requiring clarification.     Juancho Irwin MD  05/31/25 0121

## 2025-05-31 NOTE — ED NOTES
IP MH Referral Acuity Rating Score (RARS)    LMHP complete at referral to IP MH, with DEC; and, daily while awaiting IP MH placement. Call score to PPS.  CRITERIA SCORING   New 74 HH and Involuntary for IP MH (not adolescent) 0/3   Boarding over 24 hours 0/1   Vulnerable adult at least 55+ with multiple co morbidities; or, Patient age 11 or under 0/1   Suicide ideation without relief of precipitating factors 1/1   Current plan for suicide 1/1   Current plan for homicide 0/1   Imminent risk or actual attempt to seriously harm another without relief of factors precipitating the attempt 0/1   Severe dysfunction in daily living (ex: complete neglect for self care, extreme disruption in vegetative function, extreme deterioration in social interactions) 0/1   Recent (last 2 weeks) or current physical aggression in the ED 0/1   Restraints or seclusion episode in ED 0/1   Verbal aggression, agitation, yelling, etc., while in the ED 0/1   Active psychosis with psychomotor agitation or catatonia 0/1   Need for constant or near constant redirection (from leaving, from others, etc).  0/1   Intrusive or disruptive behaviors 0/1   TOTAL 2    KERRIE Joya on 5/31/2025 at 4:14 PM
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
## 2025-06-01 ENCOUNTER — HOSPITAL ENCOUNTER (INPATIENT)
Facility: HOSPITAL | Age: 50
End: 2025-06-01
Attending: STUDENT IN AN ORGANIZED HEALTH CARE EDUCATION/TRAINING PROGRAM | Admitting: STUDENT IN AN ORGANIZED HEALTH CARE EDUCATION/TRAINING PROGRAM
Payer: MEDICARE

## 2025-06-01 DIAGNOSIS — F60.3 BORDERLINE PERSONALITY DISORDER (H): Primary | Chronic | ICD-10-CM

## 2025-06-01 PROCEDURE — 250N000013 HC RX MED GY IP 250 OP 250 PS 637: Performed by: NURSE PRACTITIONER

## 2025-06-01 PROCEDURE — 99223 1ST HOSP IP/OBS HIGH 75: CPT | Mod: AI | Performed by: PSYCHIATRY & NEUROLOGY

## 2025-06-01 PROCEDURE — 250N000013 HC RX MED GY IP 250 OP 250 PS 637: Performed by: PSYCHIATRY & NEUROLOGY

## 2025-06-01 PROCEDURE — 124N000001 HC R&B MH

## 2025-06-01 RX ORDER — HYDROXYZINE HYDROCHLORIDE 25 MG/1
50 TABLET, FILM COATED ORAL AT BEDTIME
Status: DISPENSED | OUTPATIENT
Start: 2025-06-01

## 2025-06-01 RX ORDER — VILAZODONE HYDROCHLORIDE 10 MG/1
10 TABLET ORAL DAILY
Status: ON HOLD | COMMUNITY
End: 2025-06-02

## 2025-06-01 RX ORDER — ACETAMINOPHEN 325 MG/1
650 TABLET ORAL EVERY 4 HOURS PRN
Status: DISPENSED | OUTPATIENT
Start: 2025-06-01

## 2025-06-01 RX ORDER — BUSPIRONE HYDROCHLORIDE 10 MG/1
10 TABLET ORAL 3 TIMES DAILY
Status: DISPENSED | OUTPATIENT
Start: 2025-06-01

## 2025-06-01 RX ORDER — DILTIAZEM HYDROCHLORIDE 180 MG/1
180 CAPSULE, COATED, EXTENDED RELEASE ORAL 2 TIMES DAILY
Status: DISPENSED | OUTPATIENT
Start: 2025-06-01

## 2025-06-01 RX ORDER — AMLODIPINE BESYLATE 2.5 MG/1
2.5 TABLET ORAL
Status: ACTIVE | OUTPATIENT
Start: 2025-06-01

## 2025-06-01 RX ORDER — TRAZODONE HYDROCHLORIDE 50 MG/1
50 TABLET ORAL
Status: ACTIVE | OUTPATIENT
Start: 2025-06-01

## 2025-06-01 RX ORDER — ALBUTEROL SULFATE 90 UG/1
2 INHALANT RESPIRATORY (INHALATION) EVERY 4 HOURS PRN
Status: DISPENSED | OUTPATIENT
Start: 2025-06-01

## 2025-06-01 RX ORDER — VILAZODONE HYDROCHLORIDE 20 MG/1
20 TABLET ORAL DAILY
Status: ON HOLD | COMMUNITY
End: 2025-06-02

## 2025-06-01 RX ORDER — ALBUTEROL SULFATE 90 UG/1
1-2 INHALANT RESPIRATORY (INHALATION) EVERY 4 HOURS PRN
Status: DISCONTINUED | OUTPATIENT
Start: 2025-06-01 | End: 2025-06-01

## 2025-06-01 RX ORDER — FERROUS SULFATE 325(65) MG
325 TABLET ORAL DAILY
Status: DISPENSED | OUTPATIENT
Start: 2025-06-01

## 2025-06-01 RX ORDER — ARIPIPRAZOLE 10 MG/1
20 TABLET ORAL AT BEDTIME
Status: DISPENSED | OUTPATIENT
Start: 2025-06-01

## 2025-06-01 RX ORDER — RAMELTEON 8 MG/1
8 TABLET ORAL
Status: DISPENSED | OUTPATIENT
Start: 2025-06-01

## 2025-06-01 RX ORDER — ATOMOXETINE 25 MG/1
25 CAPSULE ORAL EVERY MORNING
Status: DISCONTINUED | OUTPATIENT
Start: 2025-06-02 | End: 2025-06-03

## 2025-06-01 RX ORDER — PROGESTERONE 100 MG/1
100 CAPSULE ORAL DAILY
Status: DISCONTINUED | OUTPATIENT
Start: 2025-06-01 | End: 2025-06-01

## 2025-06-01 RX ORDER — HYDROXYZINE HYDROCHLORIDE 25 MG/1
25 TABLET, FILM COATED ORAL EVERY 4 HOURS PRN
Status: DISPENSED | OUTPATIENT
Start: 2025-06-01

## 2025-06-01 RX ORDER — MONTELUKAST SODIUM 10 MG/1
10 TABLET ORAL AT BEDTIME
Status: DISPENSED | OUTPATIENT
Start: 2025-06-01

## 2025-06-01 RX ORDER — DOCUSATE SODIUM 100 MG/1
100 CAPSULE, LIQUID FILLED ORAL 2 TIMES DAILY PRN
Status: ACTIVE | OUTPATIENT
Start: 2025-06-01

## 2025-06-01 RX ORDER — VILAZODONE HYDROCHLORIDE 40 MG/1
40 TABLET ORAL DAILY
Status: ON HOLD | COMMUNITY
End: 2025-06-02

## 2025-06-01 RX ORDER — AMLODIPINE BESYLATE 2.5 MG/1
2.5 TABLET ORAL EVERY MORNING
Status: DISPENSED | OUTPATIENT
Start: 2025-06-02

## 2025-06-01 RX ORDER — ASCORBIC ACID 250 MG
125 TABLET ORAL DAILY
Status: DISPENSED | OUTPATIENT
Start: 2025-06-01

## 2025-06-01 RX ORDER — POLYETHYLENE GLYCOL 3350 17 G/17G
17 POWDER, FOR SOLUTION ORAL DAILY PRN
Status: ACTIVE | OUTPATIENT
Start: 2025-06-01

## 2025-06-01 RX ORDER — FAMOTIDINE 20 MG/1
20 TABLET, FILM COATED ORAL 2 TIMES DAILY
Status: DISPENSED | OUTPATIENT
Start: 2025-06-01

## 2025-06-01 RX ORDER — GABAPENTIN 300 MG/1
300 CAPSULE ORAL 3 TIMES DAILY
Status: DISPENSED | OUTPATIENT
Start: 2025-06-01

## 2025-06-01 RX ORDER — IBUPROFEN 800 MG/1
800 TABLET, FILM COATED ORAL EVERY 8 HOURS PRN
Status: DISPENSED | OUTPATIENT
Start: 2025-06-01

## 2025-06-01 RX ORDER — IRON,CARBONYL/ASCORBIC ACID 65MG-125MG
1 TABLET ORAL DAILY
Status: DISCONTINUED | OUTPATIENT
Start: 2025-06-01 | End: 2025-06-01

## 2025-06-01 RX ORDER — MAGNESIUM HYDROXIDE/ALUMINUM HYDROXICE/SIMETHICONE 120; 1200; 1200 MG/30ML; MG/30ML; MG/30ML
30 SUSPENSION ORAL EVERY 4 HOURS PRN
Status: ACTIVE | OUTPATIENT
Start: 2025-06-01

## 2025-06-01 RX ORDER — OLANZAPINE 10 MG/2ML
10 INJECTION, POWDER, FOR SOLUTION INTRAMUSCULAR 3 TIMES DAILY PRN
Status: ACTIVE | OUTPATIENT
Start: 2025-06-01

## 2025-06-01 RX ORDER — LAMOTRIGINE 100 MG/1
100 TABLET ORAL 3 TIMES DAILY
Status: DISPENSED | OUTPATIENT
Start: 2025-06-01

## 2025-06-01 RX ORDER — OLANZAPINE 10 MG/1
10 TABLET, FILM COATED ORAL 3 TIMES DAILY PRN
Status: ACTIVE | OUTPATIENT
Start: 2025-06-01

## 2025-06-01 RX ADMIN — ACETAMINOPHEN 650 MG: 325 TABLET, FILM COATED ORAL at 00:59

## 2025-06-01 RX ADMIN — BUSPIRONE HYDROCHLORIDE 10 MG: 10 TABLET ORAL at 20:25

## 2025-06-01 RX ADMIN — ACETAMINOPHEN 650 MG: 325 TABLET, FILM COATED ORAL at 17:58

## 2025-06-01 RX ADMIN — Medication 125 MG: at 16:02

## 2025-06-01 RX ADMIN — GABAPENTIN 300 MG: 300 CAPSULE ORAL at 20:25

## 2025-06-01 RX ADMIN — RAMELTEON 8 MG: 8 TABLET, FILM COATED ORAL at 20:25

## 2025-06-01 RX ADMIN — LAMOTRIGINE 100 MG: 100 TABLET ORAL at 20:25

## 2025-06-01 RX ADMIN — FAMOTIDINE 20 MG: 20 TABLET, FILM COATED ORAL at 20:25

## 2025-06-01 RX ADMIN — VORTIOXETINE 10 MG: 10 TABLET, FILM COATED ORAL at 16:02

## 2025-06-01 RX ADMIN — HYDROXYZINE HYDROCHLORIDE 50 MG: 25 TABLET, FILM COATED ORAL at 20:25

## 2025-06-01 RX ADMIN — FERROUS SULFATE TAB 325 MG (65 MG ELEMENTAL FE) 325 MG: 325 (65 FE) TAB at 16:02

## 2025-06-01 RX ADMIN — LAMOTRIGINE 100 MG: 100 TABLET ORAL at 16:02

## 2025-06-01 RX ADMIN — ARIPIPRAZOLE 20 MG: 10 TABLET ORAL at 20:25

## 2025-06-01 RX ADMIN — GABAPENTIN 300 MG: 300 CAPSULE ORAL at 16:02

## 2025-06-01 RX ADMIN — MONTELUKAST 10 MG: 10 TABLET, FILM COATED ORAL at 20:25

## 2025-06-01 RX ADMIN — BUSPIRONE HYDROCHLORIDE 10 MG: 10 TABLET ORAL at 16:02

## 2025-06-01 RX ADMIN — DILTIAZEM HYDROCHLORIDE 180 MG: 180 CAPSULE, COATED, EXTENDED RELEASE ORAL at 16:02

## 2025-06-01 RX ADMIN — Medication 125 MCG: at 16:02

## 2025-06-01 ASSESSMENT — ACTIVITIES OF DAILY LIVING (ADL)
ADLS_ACUITY_SCORE: 25
DRESS: INDEPENDENT;SCRUBS (BEHAVIORAL HEALTH)
ADLS_ACUITY_SCORE: 25
ADLS_ACUITY_SCORE: 48
ADLS_ACUITY_SCORE: 25
ADLS_ACUITY_SCORE: 25
LAUNDRY: UNABLE TO COMPLETE
ADLS_ACUITY_SCORE: 25
ORAL_HYGIENE: INDEPENDENT
HYGIENE/GROOMING: INDEPENDENT

## 2025-06-01 NOTE — H&P
"  [facility] PSYCHIATRY  -  HISTORY AND PHYSICAL     ADMISSION DATA     Rosi Lamb MRN# [id number]   Age: 50 year old YOB: 1975     Date of Admission: 6/1/2025  Primary Physician: Lucio Curiel   Referral Area: Referral Area: [facility]       CHIEF COMPLAINT   Reason for Admit/Consult: Suicidal ideation or attempt / self harm       HISTORY OF PRESENT ILLNESS   Rosi Lamb is a 50 year old female with a past psychiatric history notable for bipolar I disorder, generalized anxiety disorder, borderline personality disorder. Multiple prior inpatient psychiatric hospitalizations. At least four hospitalizations in 2025.  Presents to emergency department twice in 24 hour period (returned home the first time after psychiatric clearance and then returned) with complaints of suicidal ideation, worsening anxiety and depressive symptoms. Patient was subsequently transferred and accepted for inpatient psychiatric hospitalization at [facility] for further safety and further stabilization     Per DEC: \"Rosi Lamb presents to the ED via EMS. Patient is presenting to the ED for the following concerns: Worsening psychosocial stress, Depression, Suicidal ideation, Anxiety. Factors that make the mental health crisis life threatening or complex are: Pt was brought to ED early this morning by [facility] for reported sx of depression and anxiety following speaking with an ex today, who is said to say negative things toward pt. Pt stated deep down she doesn't agree with what he's saying but sometimes starts to agree. Pt stated she recognizes it's an unhealthy relationship but she feels torn on if she wants to break contact. At that time, pt endorsed thoughts of SIB but no plan and intent, and no SI. Pt was given prn, stabilized, and engaged in safety pbefore being discharged home. However, a few hours later pt presented back to ED, now " "endorsing active SI with plan.Pt is cooperative and oriented x4 but has poor insight into her mental health condition. Pt reports after discharging today, she continued to argue with her bf after leaving the ED. She reports \"she couldn't help it\" and impulsively messaged him, resulting in getting shaming messages back. This made her feel worthless and now she endorses SI with plan to cut her throat and send him pictures, rating herself as a 4.5 out of 5 in intensity. Pt says that she wants to be admitted so that she can take time to \"figure out her relationship goals\" and learn coping skills. Pt denies any HI,SIB, AH/VH, or substance use.. \"    On psychiatric interview, Karina is met in her room. She states she did not sleep well last night. We discussed her hospitalization and contributing stressors.  She states that she is \"suicidal\". She does not explicitly state a plan or intent. She states that she was recently switched to trintellix and states \"maybe I just need to give it more time to work\". She recognizes that she has had multiple behavioral health admissions in 2025 already. Her goals for hospitalization are to \"work on my relationships\" and \"work on my anxiety\". Discussed with her that medication changes are not likely to be a quick fix for her and that we will focus this hospitalization on skill building. She denies manic symptoms. She denies psychotic symptoms. She states she is willing to actively participate with unit activities.        REVIEW OF PSYCHIATRIC SYSTEMS   I do not elicit symptoms consistent with leonel, agoraphobia, panic disorder, OCD, substance use, and an eating disorder     REVIEW OF MEDICAL SYSTEMS   14-point medical review of systems is negative other than noted in the HPI.     PSYCHIATRIC HISTORY   PMHx of bipolar, BPD, substance use disorder, and AGNIESZKA. Pt has had several IP MH hospitalizations with most recent being in February 2025. Pt reported one prior suicide attempt c. 27-28 " years ago by ESTELLE and genny. Pt has lived in group homes for many years. Pt receives [facility] services, is followed by [facility], frequents [facility] during the day several times a week for socialization and therapeutic services, and goes to [facility] for therapy and DBT. Pt also has psychiatrist and .      FAMILY HISTORY   Family History   Problem Relation Age of Onset    Osteoporosis Mother     Asthma Father         Asthma,+ Leg edema, knee, hip replacement. + Lymphedema    Heart Failure Father     Other - See Comments Paternal Grandmother         Lymphedema    Osteoporosis Brother         Osteoporosis    Other - See Comments Brother         No Known Problems         SUBSTANCE USE HISTORY   History   Drug Use Unknown       Social History    Substance and Sexual Activity      Alcohol use: Not Currently        Alcohol/week: 0.0 standard drinks of alcohol      History   Smoking Status    Former    Types: Cigarettes   Smokeless Tobacco    Never     Chemical Use History:  Alcohol: Social  Benzodiazepines: None  Opiates: None  Cocaine: None  Marijuana: None  Other Use: None        SOCIAL HISTORY   Social History     Socioeconomic History    Marital status:      Spouse name: Not on file    Number of children: Not on file    Years of education: Not on file    Highest education level: Not on file   Occupational History    Not on file   Tobacco Use    Smoking status: Former     Current packs/day: 0.00     Average packs/day: 2.0 packs/day for 3.0 years (6.0 ttl pk-yrs)     Types: Cigarettes     Start date:      Quit date: 2003     Years since quittin.4     Passive exposure: Past    Smokeless tobacco: Never   Vaping Use    Vaping status: Never Used   Substance and Sexual Activity    Alcohol use: Not Currently     Alcohol/week: 0.0 standard drinks of alcohol    Drug use: Never    Sexual activity: Not Currently     Partners: Male     Birth control/protection: Female Surgical     Comment: ablation   Other  Topics Concern    Parent/sibling w/ CABG, MI or angioplasty before 65F 55M? Not Asked   Social History Narrative    No longer in adult foster care lived with Charley Godwin.    .   2 sons - age 12 and 7 - as of 11/2016.   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     Social Drivers of Health     Financial Resource Strain: Low Risk  (6/1/2025)    Financial Resource Strain     Within the past 12 months, have you or your family members you live with been unable to get utilities (heat, electricity) when it was really needed?: No   Food Insecurity: Low Risk  (6/1/2025)    Food Insecurity     Within the past 12 months, did you worry that your food would run out before you got money to buy more?: No     Within the past 12 months, did the food you bought just not last and you didn t have money to get more?: No   Transportation Needs: Low Risk  (6/1/2025)    Transportation Needs     Within the past 12 months, has lack of transportation kept you from medical appointments, getting your medicines, non-medical meetings or appointments, work, or from getting things that you need?: No   Physical Activity: Unknown (4/8/2025)    Exercise Vital Sign     Days of Exercise per Week: 2 days     Minutes of Exercise per Session: Not on file   Stress: No Stress Concern Present (4/8/2025)    [place] [employer] - Occupational Stress Questionnaire     Feeling of Stress : Not at all   Social Connections: Unknown (4/8/2025)    Social Connection and Isolation Panel [NHANES]     Frequency of Communication with Friends and Family: Not on file     Frequency of Social Gatherings with Friends and Family: Twice a week     Attends Jew Services: Not on file     Active Member of Clubs or Organizations: Not on file     Attends Club or Organization Meetings: Not on file     Marital Status: Not on file   Interpersonal Safety: Low Risk  (6/1/2025)    Interpersonal Safety     Do you feel physically and  emotionally safe where you currently live?: Yes     Within the past 12 months, have you been hit, slapped, kicked or otherwise physically hurt by someone?: No     Within the past 12 months, have you been humiliated or emotionally abused in other ways by your partner or ex-partner?: No   Housing Stability: Low Risk  (2025)    Housing Stability     Do you have housing? : Yes     Are you worried about losing your housing?: No   Recent Concern: Housing Stability - High Risk (2025)    Housing Stability     Do you have housing? : No     Are you worried about losing your housing?: No     Family:  Single, Children yes  Support System:  Friend, Neighbor  Employment Status:  disabled  Source of Income:  disability  Financial Environmental Concerns:  none  Current Hobbies:  arts/crafts, sports/team sports, exercise/fitness, meditation, music, social media/computer activities, television/movies/videos, writing/journaling/blogging, reading, games, family functions, group/social activities, outdoor activities  Barriers in Personal Life:  mental health concerns       PAST MEDICAL HISTORY   Past Medical History:   Diagnosis Date    Abnormal Pap smear of cervix 2018    Overview:  had scraping of cervix    Cannabis use disorder, moderate, in sustained remission, dependence (H) 2015    Closed dislocation of tarsal joint 2011    Closed dislocation of tarsal joint - left 2011    Edema     No Comments Provided    Encounter for removal and reinsertion of intrauterine contraceptive device     05,IUD placement, Removed    Excessive and frequent menstruation with irregular cycle     2017    Major depressive disorder, single episode     No Comments Provided    Personal history of other medical treatment (CODE)     G3, P2-0-1-2 with history of spontaneous     Personal history of other medical treatment (CODE)     No Comments Provided    Uncomplicated asthma     No Comments Provided    Urinary  "incontinence 03/15/2013       Past Surgical History:   Procedure Laterality Date    ANKLE SURGERY      fracture, repair with screws    ARTHRODESIS FOOT Left 04/21/2022    Procedure: left foot hardware removaL, fusion of 1st-2nd Tarsal metatarsal;  Surgeon: Anthony Castillo DPM;  Location: [facility] OR    COLONOSCOPY  07/25/2022    F/U 2027 tubular adenoma    CONIZATION LEEP      07/04,Underwent a loop    IMPLANT STIMULATOR AND LEADS SACRAL NERVE (STAGE ONE AND TWO)      scheduled 11/8/23 with Dr. Sheth at [facility]    LAPAROSCOPIC TUBAL LIGATION      2009,tubal ligation       Clonazepam, Hydrocodone, Lorazepam, Sertraline, Bupropion, Dust mite extract, Lithium, Milk protein, Pollen extract, Risperidone, Sulfa antibiotics, Trichophyton, and Valproic acid     PHYSICAL EXAM   I have reviewed the physical exam as documented by the medical team and agree with findings and assessment and have no additional findings to add at this time.  General: Awake and alert, NAD  HEENT: EOMI, no scleral icterus, no injection of conjunctivae, moist mucus membranes  Respiratory: Breathing comfortably   Extremities: No cyanosis, clubbing, or edema   Skin: No gross rash, no bruising  Neuro: CN II-XII intact, no focal deficits      VITALS   Vitals: /66   Pulse 93   Temp 99.3  F (37.4  C) (Temporal)   Resp 16   Ht 1.676 m (5' 6\")   Wt (!) 137.8 kg (303 lb 12.8 oz)   SpO2 98%   BMI 49.03 kg/m       MENTAL STATUS EXAM   Appearance:  awake, alert, adequately groomed, dressed in hospital scrubs, and appeared as age stated  Attitude:  cooperative  Eye Contact:  good  Mood:  depressed  Affect:  mood congruent  Speech:  clear, coherent  Psychomotor Behavior:  no evidence of tardive dyskinesia, dystonia, or tics  Thought Process:  logical, linear, and goal oriented  Associations:  no loose associations  Thought Content:  active suicidal ideation present  Insight:  limited  Judgment:  limited  Oriented to:  time, person, and place  Attention " Span and Concentration:  intact  Recent and Remote Memory:  intact  Gait and Station: Normal      LABS   Recent Results (from the past 24 hours)   HCG qualitative urine    Collection Time: 05/31/25  3:08 PM   Result Value Ref Range    hCG Urine Qualitative Negative Negative   UA with Microscopic reflex to Culture    Collection Time: 05/31/25  3:08 PM    Specimen: Urine, Clean Catch   Result Value Ref Range    Color Urine Yellow Colorless, Straw, Light Yellow, Yellow    Appearance Urine Clear Clear    Glucose Urine Negative Negative mg/dL    Bilirubin Urine Negative Negative    Ketones Urine Negative Negative mg/dL    Specific Gravity Urine 1.010 1.000 - 1.030    Blood Urine Negative Negative    pH Urine 5.5 5.0 - 9.0    Protein Albumin Urine Negative Negative mg/dL    Urobilinogen Urine Normal Normal mg/dL    Nitrite Urine Negative Negative    Leukocyte Esterase Urine Negative Negative    Mucus Urine Present (A) None Seen /LPF    RBC Urine <1 <=2 /HPF    WBC Urine <1 <=5 /HPF   Urine Drug Screen Panel    Collection Time: 05/31/25  3:08 PM   Result Value Ref Range    Amphetamines Urine Screen Negative Screen Negative    Barbituates Urine Screen Negative Screen Negative    Benzodiazepine Urine Screen Negative Screen Negative    Cannabinoids Urine Screen Negative Screen Negative    Cocaine Urine Screen Negative Screen Negative    Fentanyl Qual Urine Screen Negative Screen Negative    Opiates Urine Screen Negative Screen Negative    PCP Urine Screen Negative Screen Negative   Acetaminophen level    Collection Time: 05/31/25  3:30 PM   Result Value Ref Range    Acetaminophen <5.0 (L) 10.0 - 30.0 ug/mL   Comprehensive metabolic panel    Collection Time: 05/31/25  3:30 PM   Result Value Ref Range    Sodium 138 135 - 145 mmol/L    Potassium 3.7 3.4 - 5.3 mmol/L    Carbon Dioxide (CO2) 26 22 - 29 mmol/L    Anion Gap 13 7 - 15 mmol/L    Urea Nitrogen 14.1 6.0 - 20.0 mg/dL    Creatinine 1.01 (H) 0.51 - 0.95 mg/dL    GFR  Estimate 68 >60 mL/min/1.73m2    Calcium 9.7 8.8 - 10.4 mg/dL    Chloride 99 98 - 107 mmol/L    Glucose 85 70 - 99 mg/dL    Alkaline Phosphatase 99 40 - 150 U/L    AST 24 0 - 45 U/L    ALT 20 0 - 50 U/L    Protein Total 8.0 6.4 - 8.3 g/dL    Albumin 4.5 3.5 - 5.2 g/dL    Bilirubin Total 0.4 <=1.2 mg/dL   Ethanol [facility]    Collection Time: 05/31/25  3:30 PM   Result Value Ref Range    Ethanol Level Blood <0.01 <=0.01 g/dL   Salicylate level    Collection Time: 05/31/25  3:30 PM   Result Value Ref Range    Salicylate <0.3   mg/dL   TSH Reflex [facility]    Collection Time: 05/31/25  3:30 PM   Result Value Ref Range    TSH 2.41 0.30 - 4.20 uIU/mL   CBC with platelets and differential    Collection Time: 05/31/25  3:30 PM   Result Value Ref Range    WBC Count 9.9 4.0 - 11.0 10e3/uL    RBC Count 4.60 3.80 - 5.20 10e6/uL    Hemoglobin 13.8 11.7 - 15.7 g/dL    Hematocrit 42.4 35.0 - 47.0 %    MCV 92 78 - 100 fL    MCH 30.0 26.5 - 33.0 pg    MCHC 32.5 31.5 - 36.5 g/dL    RDW 13.1 10.0 - 15.0 %    Platelet Count 319 150 - 450 10e3/uL    % Neutrophils 69 %    % Lymphocytes 21 %    % Monocytes 8 %    % Eosinophils 1 %    % Basophils 0 %    % Immature Granulocytes 0 %    NRBCs per 100 WBC 0 <1 /100    Absolute Neutrophils 6.9 1.6 - 8.3 10e3/uL    Absolute Lymphocytes 2.1 0.8 - 5.3 10e3/uL    Absolute Monocytes 0.8 0.0 - 1.3 10e3/uL    Absolute Eosinophils 0.1 0.0 - 0.7 10e3/uL    Absolute Basophils 0.0 0.0 - 0.2 10e3/uL    Absolute Immature Granulocytes 0.0 <=0.4 10e3/uL    Absolute NRBCs 0.0 10e3/uL   Extra Blue Top Tube    Collection Time: 05/31/25  3:30 PM   Result Value Ref Range    Hold Specimen JIC    Extra Green Top (Lithium Heparin) Tube    Collection Time: 05/31/25  3:30 PM   Result Value Ref Range    Hold Specimen JIC          ASSESSMENT   Rosi Lamb is a 50 year old female with a past psychiatric history notable for bipolar I disorder, generalized anxiety disorder, borderline personality disorder. Multiple prior  inpatient psychiatric hospitalizations. At least four hospitalizations in 2025.  Presents to emergency department twice in 24 hour period (returned home the first time after psychiatric clearance and then returned) with complaints of suicidal ideation, worsening anxiety and depressive symptoms. Patient was subsequently transferred and accepted for inpatient psychiatric hospitalization at [facility] for further safety and further stabilization     Explained the side effects, benefits and complications of neuroleptic medications. Retains capacity to consent. Consents to initiation during hospitalization.        DIAGNOSTIC FORMULATION   #Bipolar I Disorder, Current Episode Depressed  #Generalized Anxiety Disorder  #Borderline Personality Disorder       PLAN   Admit patient to [facility], Location: [facility] 5     Legal Status: Orders Placed This Encounter      Voluntary    Safety Assessment:    Behavioral Orders   Procedures    Code 1 - Restrict to Unit    Routine Programming     As clinically indicated    Status 15     Every 15 minutes.      Imminent risk of harm in a setting less restrictive than an inpatient psychiatric facility is determined to be medium to high.       PTA medications held:   -none    PTA medications continued/changed:   -abilify 20 mg at bedtime  -atomoxetine 25 mg q AM  -gabapentin 300 mg TID  -hydroxyzine 50 mg at bedtime  -lamotrigine 100 mg TID  -vortioxetine 10 mg q daily (recently started May 2025, stopped vilazodone)    New medications initiated:   -none    Programming: Patient will be treated in a therapeutic milieu with appropriate individual and group therapies. Education will be provided on diagnoses, medications, and treatments.     Medical diagnoses:  Per medicine    Diagnostic studies and consultations:  No additional studies or tests recommended on admission.     Level of care required: Acute psychiatric  hospitalization    Justification for hospitalization and estimated length of stay: reasons for hospitalization include potential safety risk to self or others within the last week, decreased functioning in outpatient setting and in the setting of no outpatient management, need for highly structured inpatient management for stabilization of psychiatric symptoms, need for psychiatric medication initiation and stabilization.  Estimated length of stay is 4-7 days.      Total time: 80 minutes       ATTESTATION    Elmer Mathews MD  [facility]   Psychiatry  Type of Service: video visit for mental health treatment  Reason for Video Visit: COVID-19 [place]  Originating Site (patient location): [facility]  Distant Site (provider location): Remote Location  Mode of Communication: Video Conference via OnApp  Time of Service: Date: June 1, 2025 , Start: 07:00,  Stop: 08:00

## 2025-06-01 NOTE — ED PROVIDER NOTES
Brief ED transfer of care note    Patient presented to the emergency department requiring further cares then was able to be completed.  Patient was signed out to me by Malik Wheatley MD/DO/PA/NP.    The workup was pending: placement           /80   Pulse 119   Temp 98  F (36.7  C) (Tympanic)   Resp 18   Wt (!) 137.9 kg (304 lb)   SpO2 100%   BMI 49.44 kg/m      The patient is a 42-year-old female with past medical history anxiety, depression, Polar disorder, polysubstance abuse who presents for suicidal ideation.  Actually saw the patient earlier today, seen by DEC and the patient was discharged.  Fortunately she had return with worsening suicidal ideation.  She did receive another DEC evaluation, at this point they recommended admission given her recurrence.  Awaiting placement.  No acute interventions required by me    Addendum: patient was accepted to [facility] .  Dr. Garcia accepted.  Report was not requested    Diagnosis  Suicidal ideation    Isac Irwin MD  Emergency Medicine     Juancho Irwin MD  05/31/25 [id number]       Juancho Irwin MD  05/31/25 [id number]

## 2025-06-01 NOTE — PLAN OF CARE
Face to face shift report received from previous shift RN.       Problem: Adult Behavioral Health Plan of Care  Goal: Patient-Specific Goal (Individualization)  Description: Pt will follow recommendations of treatment team.  Pt will be compliant with medications.  Pt will attend 50 % of groups.  Pt will sleep 6-8 hours each night.  Wake pt up every 2 hours to use the restroom.  Outcome: Progressing   Cooperative. Denies thoughts of harming self or others. Reports depression. Reports anxiety. Pleasant during conversation. No reports of pain. Pt incontinent of urine multiple times this shift, but is able to manage this independently. Pt showered this shift.     PTA medications obtained from [facility] in [place].     Problem: Suicide Risk  Goal: Absence of Self-Harm  Description: Pt will remain free from self harm/injury.  Pt will verbalize 2-3 coping skills.  Outcome: Progressing   Free from self harm or injury this shift.       Face to face end of shift report to be communicated to oncoming RN.

## 2025-06-01 NOTE — PLAN OF CARE
Face to face shift report received from PORFIRIO Salguero. Rounding completed, pt observed in lounge at start of shift.    Problem: Adult Behavioral Health Plan of Care  Goal: Patient-Specific Goal (Individualization)  Description: Pt will follow recommendations of treatment team.  Pt will be compliant with medications.  Pt will attend 50 % of groups.  Pt will sleep 6-8 hours each night.  Wake pt up every 2 hours to use the restroom.  Outcome: Progressing     Problem: Suicide Risk  Goal: Absence of Self-Harm  Description: Pt will remain free from self harm/injury.  Pt will verbalize 2-3 coping skills.  Outcome: Progressing   Goal Outcome Evaluation:        Pt. Had some physical pain in the form of neck pain. PRN tylenol 650 mg was given for this at 1758. They also had some anxiety. Pt. Denied having any SI, HI, or intent to self-harm. Pt. Did have some moments of urinary incontinence this shift. Pt. Was able to manage this independently this shift and provided clean clothing and supplies. Pt. Spent most of the shift out in the lounge and attending groups. Scheduled medications were given with no issues. HS Cardizem was held due to being to close to her last dose. 100% was eaten at dinner.    Face to face report will be communicated to oncoming RN.    Oralia Patel RN  6/1/2025  10:33 PM

## 2025-06-01 NOTE — PLAN OF CARE
"ADMISSION NOTE    Reason for admission Suicidal Ideation.  Safety concerns no.  Risk for or history of violence no.   Full skin assessment: multiple tattoos, Skin WDL    Patient arrived on unit from [facility] accompanied by Transport and Security on 6/1/2025  12:31 AM.   Status on arrival: Calm and cooperative  BP (!) 145/69   Pulse 102   Temp 97.3  F (36.3  C) (Temporal)   Resp 16   Ht 1.676 m (5' 6\")   Wt (!) 138.8 kg (306 lb 1.6 oz)   SpO2 99%   BMI 49.41 kg/m    Patient given tour of unit and Welcome to  unit papers given to patient, wanding completed, belongings inventoried, and admission assessment completed.   Patient's legal status on arrival is voluntary. Appropriate legal rights discussed with and copy given to patient. Patient Bill of Rights discussed with and copy given to patient.   Patient denies HI, contracts for safety while on unit.      Problem: Adult Behavioral Health Plan of Care  Goal: Patient-Specific Goal (Individualization)  Description: Pt will follow recommendations of treatment team.  Pt will be compliant with medications.  Pt will attend 50 % of groups.  Pt will sleep 6-8 hours each night.  Wake pt up every 2 hours to use the restroom.  Outcome: Progressing     Problem: Suicide Risk  Goal: Absence of Self-Harm  Description: Pt will remain free from self harm/injury.  Pt will verbalize 2-3 coping skills.  Outcome: Progressing   Goal Outcome Evaluation:         Patient endorses suicidal ideation and thoughts of self harm, plan to cut wrists, following an argument with her ex boyfriend. Patient denies intent at this time and contracts for safety. Patient endorses pain rated 9/10 bilaterally in both knees that shoots down the legs. Patient given tylenol 650 mg at 0059.     No observed episodes of SIB this shift . Patient slept (3.75) hours. Face to face end of shift report communicated to oncoming shift.     René Caban RN  6/1/2025  0613 AM                        "

## 2025-06-01 NOTE — TELEPHONE ENCOUNTER
R:    10:25 PM Kimberlee Duncan Called to accept pt from GI. Jewell Garcia is Accepting Provider.

## 2025-06-01 NOTE — PROGRESS NOTES
06/01/25 0120   Patient Belongings   Did you bring any home meds/supplements to the hospital?  Yes   Disposition of meds  Sent to security/pharmacy per site process   Patient Belongings remains with patient;sent to security per site process;locker   Patient Belongings Remaining with Patient jewelry  (1 ring and earring)   Patient Belongings Put in Hospital Secure Location (Security or Locker, etc.) cash/credit card;cell phone/electronics;keys;money (see comment);wallet   Belongings Search Yes   Clothing Search Yes   Second Staff Lois TOUSSAINT   Comment shawn shirt, sweatpants, crocs, changer with box, purse, Misc. papers, misc. reciepts, bandaids, pack of gum, 2 small empty baggies, 2 small journals, 1 pen. 1 bible, 2 small charms, small bottle with liquid     List items sent to safe: Cell phone in purple case (no damage) pink lanyard with 5 charms, [employer] key card and 2 keys, large floral wallet, [place] ID card, Visa debit card, medtronic card, [facility] card, [place] library card, medicare card, 2 UCARE cards, medicare Rx card, buzz Rx card, aldis gift card, target gift card, caribou gift card, carly gift card, stamps, 3 misc. Papers, 3 bus passes, 4 misc. Cards, 13 business cards, 6 appointment cards, sobriety coin, $7.70    All other belongings put in assigned cubby in belongings room.       I have reviewed my belongings list on admission and verify that it is correct.     Patient signature_______________________________    Second staff witness (if patient unable to sign) ______________________________       I have received all my belongings at discharge.    Patient signature________________________________    Sonal   6/1/2025  1:28 AM

## 2025-06-02 PROCEDURE — 250N000013 HC RX MED GY IP 250 OP 250 PS 637: Performed by: PSYCHIATRY & NEUROLOGY

## 2025-06-02 PROCEDURE — 99232 SBSQ HOSP IP/OBS MODERATE 35: CPT | Performed by: PSYCHIATRY & NEUROLOGY

## 2025-06-02 PROCEDURE — 124N000001 HC R&B MH

## 2025-06-02 RX ORDER — RAMELTEON 8 MG/1
8 TABLET ORAL AT BEDTIME
Status: ON HOLD | COMMUNITY
End: 2025-06-02

## 2025-06-02 RX ORDER — HYDROXYZINE PAMOATE 50 MG/1
50 CAPSULE ORAL 2 TIMES DAILY PRN
Status: ON HOLD | COMMUNITY

## 2025-06-02 RX ADMIN — FAMOTIDINE 20 MG: 20 TABLET, FILM COATED ORAL at 20:39

## 2025-06-02 RX ADMIN — GABAPENTIN 300 MG: 300 CAPSULE ORAL at 09:12

## 2025-06-02 RX ADMIN — GABAPENTIN 300 MG: 300 CAPSULE ORAL at 14:16

## 2025-06-02 RX ADMIN — LAMOTRIGINE 100 MG: 100 TABLET ORAL at 14:16

## 2025-06-02 RX ADMIN — MONTELUKAST 10 MG: 10 TABLET, FILM COATED ORAL at 20:39

## 2025-06-02 RX ADMIN — IBUPROFEN 800 MG: 800 TABLET, FILM COATED ORAL at 20:45

## 2025-06-02 RX ADMIN — LAMOTRIGINE 100 MG: 100 TABLET ORAL at 09:12

## 2025-06-02 RX ADMIN — DILTIAZEM HYDROCHLORIDE 180 MG: 180 CAPSULE, COATED, EXTENDED RELEASE ORAL at 09:12

## 2025-06-02 RX ADMIN — Medication 125 MCG: at 09:10

## 2025-06-02 RX ADMIN — AMLODIPINE BESYLATE 2.5 MG: 2.5 TABLET ORAL at 09:10

## 2025-06-02 RX ADMIN — VORTIOXETINE 10 MG: 10 TABLET, FILM COATED ORAL at 09:11

## 2025-06-02 RX ADMIN — FERROUS SULFATE TAB 325 MG (65 MG ELEMENTAL FE) 325 MG: 325 (65 FE) TAB at 09:11

## 2025-06-02 RX ADMIN — ARIPIPRAZOLE 20 MG: 10 TABLET ORAL at 20:39

## 2025-06-02 RX ADMIN — BUSPIRONE HYDROCHLORIDE 10 MG: 10 TABLET ORAL at 09:10

## 2025-06-02 RX ADMIN — DILTIAZEM HYDROCHLORIDE 180 MG: 180 CAPSULE, COATED, EXTENDED RELEASE ORAL at 20:39

## 2025-06-02 RX ADMIN — ATOMOXETINE 25 MG: 25 CAPSULE ORAL at 09:10

## 2025-06-02 RX ADMIN — GABAPENTIN 300 MG: 300 CAPSULE ORAL at 20:39

## 2025-06-02 RX ADMIN — HYDROXYZINE HYDROCHLORIDE 50 MG: 25 TABLET, FILM COATED ORAL at 20:39

## 2025-06-02 RX ADMIN — Medication 125 MG: at 09:11

## 2025-06-02 RX ADMIN — BUSPIRONE HYDROCHLORIDE 10 MG: 10 TABLET ORAL at 14:16

## 2025-06-02 RX ADMIN — BUSPIRONE HYDROCHLORIDE 10 MG: 10 TABLET ORAL at 20:39

## 2025-06-02 RX ADMIN — LAMOTRIGINE 100 MG: 100 TABLET ORAL at 20:39

## 2025-06-02 RX ADMIN — FAMOTIDINE 20 MG: 20 TABLET, FILM COATED ORAL at 09:11

## 2025-06-02 ASSESSMENT — ACTIVITIES OF DAILY LIVING (ADL)
HYGIENE/GROOMING: INDEPENDENT
ADLS_ACUITY_SCORE: 25
LAUNDRY: UNABLE TO COMPLETE
ADLS_ACUITY_SCORE: 25
ADLS_ACUITY_SCORE: 25
DRESS: INDEPENDENT;SCRUBS (BEHAVIORAL HEALTH)
ADLS_ACUITY_SCORE: 25
ORAL_HYGIENE: INDEPENDENT
ADLS_ACUITY_SCORE: 25

## 2025-06-02 NOTE — PLAN OF CARE
Face to face shift report received from PORFIRIO Xiao. Rounding completed, pt observed in lounge at start of shift.    Problem: Adult Behavioral Health Plan of Care  Goal: Patient-Specific Goal (Individualization)  Description: Pt will follow recommendations of treatment team.  Pt will be compliant with medications.  Pt will attend 50 % of groups.  Pt will sleep 6-8 hours each night.  Wake pt up every 2 hours to use the restroom.  Outcome: Progressing     Problem: Suicide Risk  Goal: Absence of Self-Harm  Description: Pt will remain free from self harm/injury.  Pt will verbalize 2-3 coping skills.  Outcome: Progressing   Goal Outcome Evaluation:    Plan of Care Reviewed With: patient        Pt. Had some physical pain in the form of a knot in their neck. PRN ibuprofen 800 mg was given for this at 2045. Pt. Spent most of the shift out in the lounge with peers and attending groups. 100% was eaten at dinner. Scheduled medications were taken with no issues.    Face to face report will be communicated to oncoming RN.    Oralia Patel RN  6/2/2025  10:28 PM

## 2025-06-02 NOTE — PLAN OF CARE
Face to face shift report received from René MONROY. Rounding completed, pt observed in room at the start of the shift.    Patient withdrawn to room throughout the day. Alert and making needs known. Did not eat breakfast but had 100% of her lunch. Pleasant during conversation with this writer. Compliant with scheduled medication and nursing assessment. Reports feeling tired after poor sleep last night. Endorses anxiety related to current life stressors. Denies hallucinations, SI/HI, and pain. States she feels safe in the hospital. X1 episode of incontinence requiring a shower and bed change. Utilizing briefs. Frequent reminders from staff to toilet q.2 hours. Attended one group in the morning and exercise group this afternoon.    Problem: Adult Behavioral Health Plan of Care  Goal: Patient-Specific Goal (Individualization)  Description: Pt will follow recommendations of treatment team.  Pt will be compliant with medications.  Pt will attend 50 % of groups.  Pt will sleep 6-8 hours each night.  Wake pt up every 2 hours to use the restroom.  Outcome: Progressing     Problem: Suicide Risk  Goal: Absence of Self-Harm  Description: Pt will remain free from self harm/injury.  Pt will verbalize 2-3 coping skills.  Outcome: Progressing    Face to face report will be communicated to oncoming RN.    Ninoska Aquino RN  6/2/2025

## 2025-06-02 NOTE — MEDICATION SCRIBE - ADMISSION MEDICATION HISTORY
Medication Scribe Admission Medication History    Admission medication history is complete. The information provided in this note is only as accurate as the sources available at the time of the update.    Information Source(s): Facility ([facility] medication list/MAR and CareEverywhere/SureScripts via phone    Pertinent Information:   Patient resides at [facility] [phone], staff manage medications. Unable to fax MAR so staff reviewed medications over the phone- patient received AM/NOON medications at [facility] on 5/31/25 prior to going to the ER and bedtime doses while in the ER.     Changes made to PTA medication list:  Added: hydroxyzine PRN dose  Deleted:   Viibryd-titrated off and started on trintellix on 5/30/25  Changed:   Prometrium- pt has been out of and staff unsure why she has not received refills  Diltiazem- administered at NOON and HS at [facility]  Vivelle-dot- last placed on 5/29/25. Placement prior to 5/25/25.     Allergies reviewed with patient and updates made in EHR: no    Medication History Completed By: Fely Andrade 6/2/2025 12:18 PM    PTA Med List   Medication Sig Note Last Dose/Taking    acetaminophen (TYLENOL) 500 MG tablet Take 1-2 tablets (500-1,000 mg) by mouth every 6 hours as needed for mild pain  More than a month    albuterol (VENTOLIN HFA) 108 (90 Base) MCG/ACT inhaler Inhale 1-2 puffs into the lungs every 4 hours as needed for shortness of breath or wheezing  More than a month    amLODIPine (NORVASC) 2.5 MG tablet Take 1 tablet (2.5 mg) by mouth every morning. May also take 1 tablet (2.5 mg) every evening as needed (-- for Raynaud's / Hand color changes --).  5/31/2025 Morning    ARIPiprazole (ABILIFY) 20 MG tablet Take 1 tablet (20 mg) by mouth at bedtime.  5/31/2025 at 10:30 PM    atomoxetine (STRATTERA) 25 MG capsule Take 1 capsule (25 mg) by mouth every morning.  5/31/2025 Morning    busPIRone (BUSPAR) 10 MG tablet Take 1 tablet (10 mg) by mouth 3 times daily.   5/31/2025 at 10:30 PM    cholecalciferol (VITAMIN D3) 125 mcg (5000 units) capsule Take 1 capsule (125 mcg) by mouth daily.  5/31/2025 Morning    diltiazem ER COATED BEADS (CARDIZEM CD/CARTIA XT) 180 MG 24 hr capsule Take 1 capsule (180 mg) by mouth 2 times daily. 6/2/2025: Noon/ bedtime administration 5/31/2025 Noon    docusate sodium (EQ STOOL SOFTENER) 100 MG capsule Take 1 capsule (100 mg) by mouth 2 times daily as needed for constipation.  More than a month    estradiol (VIVELLE-DOT) 0.05 MG/24HR bi-weekly patch Place 1 patch onto the skin twice a week. 6/2/2025: Last placement on 5/29. Prior to 5/25. 5/29/2025    famotidine (PEPCID) 20 MG tablet Take 1 tablet (20 mg) by mouth 2 times daily. - For Heartburn  5/31/2025 at 10:29 PM    gabapentin (NEURONTIN) 300 MG capsule Take 1 capsule (300 mg) by mouth 3 times daily.  5/31/2025 at 10:29 PM    hydrOXYzine (VISTARIL) 50 MG capsule Take 50 mg by mouth 2 times daily as needed for anxiety. (Must have 4-6 hours between doses).  5/21/2025    hydrOXYzine (VISTARIL) 50 MG capsule Take 50 mg by mouth at bedtime.  5/31/2025 at 10:29 PM    ibuprofen (ADVIL/MOTRIN) 200 MG tablet Take 4 tablets (800 mg) by mouth every 8 hours as needed (back pain/ headache).  5/31/2025 at  4:14 PM    lamoTRIgine (LAMICTAL) 100 MG tablet Take 1 tablet (100 mg) by mouth 3 times daily.  5/31/2025 at 10:30 PM    montelukast (SINGULAIR) 10 MG tablet Take 1 tablet (10 mg) by mouth at bedtime.  5/31/2025 at 10:30 PM    ramelteon (ROZEREM) 8 MG tablet Take 1 tablet (8 mg) by mouth nightly as needed for sleep.  5/28/2025 Bedtime    VITRON-C  MG TABS tablet TAKE 1 TABLET BY MOUTH ONCE DAILY ON AN EMPTY STOMACH FOR  IRON  DEFICIENCY  5/31/2025 Morning    vortioxetine (TRINTELLIX) 10 MG tablet Take 10 mg by mouth every morning. 6/2/2025: Has been on for 2 days total PTA.  5/31/2025 Morning
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
## 2025-06-02 NOTE — PROGRESS NOTES
Attempted to call patient's , Venessa Alvarado through [facility] [phone]. Was provided her cell phone number [phone]. Left vm asking for a return call.

## 2025-06-02 NOTE — PLAN OF CARE
Face to face end of shift report received from Tia RN. Rounding completed. Patient observed in bed appears asleep.     Problem: Adult Behavioral Health Plan of Care  Goal: Patient-Specific Goal (Individualization)  Description: Pt will follow recommendations of treatment team.  Pt will be compliant with medications.  Pt will attend 50 % of groups.  Pt will sleep 6-8 hours each night.  Wake pt up every 2 hours to use the restroom.  Outcome: Progressing     Problem: Suicide Risk  Goal: Absence of Self-Harm  Description: Pt will remain free from self harm/injury.  Pt will verbalize 2-3 coping skills.  Outcome: Progressing   Goal Outcome Evaluation:         Patient encouraged to toilet every 2 hours, 0000, 0200, 0400, 0600.    No observed episodes of SIB this shift. Patient slept (6.25) hours. Face to face end of shift report communicated to oncoming shift.     René Caban RN  6/2/2025  0615 AM

## 2025-06-02 NOTE — PROGRESS NOTES
[facility]    Spiritual Health Progress Note    Date of Service (when I saw the patient): 06/02/2025     Assessment & Plan   Rosi Lamb is a 50 year old female who was admitted on 6/1/2025.  Introduced the patient as an initial visit to Spiritual Health Services. Patient has an [employer] background and seems to have support there. Patient says she is doing better since coming to the unit. Patient asked for a Bible and Devotional book.  followed-up on this request.  Closed our time with prayer.    Rev. Justin Sotelo  Volunteer

## 2025-06-02 NOTE — PROGRESS NOTES
[facility] PSYCHIATRY  -  PROGRESS NOTE     ID   Name: Rosi Lamb  MRN#: [id number]     SUBJECTIVE   Prior to interviewing the patient, I met with nursing and reviewed patient's clinical condition. We discussed clinical care both before and after the interview. I have reviewed the patient's clinical course by review of records including previous notes, labs, and vital signs.     Per nursing, the patient had the following behavioral events over the last 24-hours: none    On psychiatric interview, Rosi is met within her room. She reports improved sleep overnight. She states her anxiety still remains high however noting decrease in the intensity of her suicidal ideation. She states she plans to attend groups. Encouraged her to work on what she can do in the moment when she becomes distress about her ongoing relationship strife in her life.  Agrees no medication changes are warranted at this time.        MEDICATIONS   Scheduled Meds:  Current Facility-Administered Medications   Medication Dose Route Frequency Provider Last Rate Last Admin    amLODIPine (NORVASC) tablet 2.5 mg  2.5 mg Oral Elmer Multani MD        ARIPiprazole (ABILIFY) tablet 20 mg  20 mg Oral At Bedtime Elmer Mathews MD   20 mg at 06/01/25 2025    ferrous sulfate (FEROSUL) tablet 325 mg  325 mg Oral Daily Elmer Mathews MD   325 mg at 06/01/25 1602    And    ascorbic acid half-tab 125 mg  125 mg Oral Daily Elmer Mathews MD   125 mg at 06/01/25 1602    atomoxetine (STRATTERA) capsule 25 mg  25 mg Oral MARYM Elmer Mathews MD        busPIRone (BUSPAR) tablet 10 mg  10 mg Oral TID Elmer Mathews MD   10 mg at 06/01/25 2025    cholecalciferol (VITAMIN D3) capsule 125 mcg  125 mcg Oral Daily Elmer Mathews MD   125 mcg at 06/01/25 1602    diltiazem ER COATED BEADS (CARDIZEM CD/CARTIA XT) 24 hr capsule 180 mg  180 mg Oral BID Elmer Mathews MD   180 mg at 06/01/25 1602    famotidine (PEPCID) tablet 20 mg   20 mg Oral BID Elmer Mathews MD   20 mg at 06/01/25 2025    gabapentin (NEURONTIN) capsule 300 mg  300 mg Oral TID Elmer Mathews MD   300 mg at 06/01/25 2025    hydrOXYzine HCl (ATARAX) tablet 50 mg  50 mg Oral At Bedtime Elmer Mathews MD   50 mg at 06/01/25 2025    lamoTRIgine (LaMICtal) tablet 100 mg  100 mg Oral TID Elmer Mathews MD   100 mg at 06/01/25 2025    montelukast (SINGULAIR) tablet 10 mg  10 mg Oral At Bedtime Elmer Mathews MD   10 mg at 06/01/25 2025    vortioxetine (TRINTELLIX) tablet 10 mg  10 mg Oral Daily Elmer Mathews MD   10 mg at 06/01/25 1602     PRN Meds:.  Current Facility-Administered Medications   Medication Dose Route Frequency Provider Last Rate Last Admin    acetaminophen (TYLENOL) tablet 650 mg  650 mg Oral Q4H PRN Jewell Garcia APRN CNP   650 mg at 06/01/25 1758    albuterol (PROVENTIL HFA/VENTOLIN HFA) inhaler  2 puff Inhalation Q4H PRN Elmer Mathews MD        alum & mag hydroxide-simethicone (MAALOX) suspension 30 mL  30 mL Oral Q4H PRN Jewell Garcia APRN CNP        amLODIPine (NORVASC) tablet 2.5 mg  2.5 mg Oral QPM PRN Elmer Mathews MD        docusate sodium (COLACE) capsule 100 mg  100 mg Oral BID PRN Elmer Mathews MD        hydrOXYzine HCl (ATARAX) tablet 25 mg  25 mg Oral Q4H PRN Jewell Garcia APRN CNP        ibuprofen (ADVIL/MOTRIN) tablet 800 mg  800 mg Oral Q8H PRN Elmer Mathews MD        OLANZapine (zyPREXA) tablet 10 mg  10 mg Oral TID PRN Jewell Garcia APRN CNP        Or    OLANZapine (zyPREXA) injection 10 mg  10 mg Intramuscular TID PRN Jewell Garcia APRN CNP        polyethylene glycol (MIRALAX) Packet 17 g  17 g Oral Daily PRN Jewell Garcia APRN CNP        ramelteon (ROZEREM) tablet 8 mg  8 mg Oral At Bedtime PRN Elmer Mathews MD   8 mg at 06/01/25 2025    traZODone (DESYREL) tablet 50 mg  50 mg Oral At Bedtime PRN Jewell Garcia APRN CNP         "    ALLERGIES   Allergies   Allergen Reactions    Clonazepam Other (See Comments)     Causes Violence and aggresssion    Hydrocodone Other (See Comments)     Seizures; not allergic to tylenol    Lorazepam Other (See Comments)     Causes Violence and aggresssion    Sertraline Other (See Comments)     Caused suicidally     Bupropion Other (See Comments)     Caused Major Depression    Dust Mite Extract      Other reaction(s): Asthma symptoms    Lithium Other (See Comments)     Mood alteration    Milk Protein Unknown    Pollen Extract      Other reaction(s): Asthma symptoms    Risperidone Other (See Comments)     Agitation      Sulfa Antibiotics Itching    Trichophyton      Other reaction(s): Asthma symptoms    Valproic Acid Other (See Comments)     Weight gain        VITALS   Vitals: BP (!) 124/91   Pulse 93   Temp 98  F (36.7  C) (Temporal)   Resp 16   Ht 1.676 m (5' 6\")   Wt (!) 137.8 kg (303 lb 12.8 oz)   SpO2 95%   BMI 49.03 kg/m       MENTAL STATUS EXAM   Appearance:  awake, alert, adequately groomed, dressed in hospital scrubs, and appeared as age stated  Attitude:  cooperative  Eye Contact:  good  Mood:  depressed  Affect:  mood congruent  Speech:  clear, coherent  Psychomotor Behavior:  no evidence of tardive dyskinesia, dystonia, or tics  Thought Process:  logical, linear, and goal oriented  Associations:  no loose associations  Thought Content:  active suicidal ideation present  Insight:  limited  Judgment:  limited  Oriented to:  time, person, and place  Attention Span and Concentration:  intact  Recent and Remote Memory:  intact  Gait and Station: Normal        LABS   No results found for this or any previous visit (from the past 24 hours).      ASSESSMENT   Rosi Lamb is a 50 year old female with a past psychiatric history notable for bipolar I disorder, generalized anxiety disorder, borderline personality disorder. Multiple prior inpatient psychiatric hospitalizations. At least four " hospitalizations in 2025.  Presents to emergency department twice in 24 hour period (returned home the first time after psychiatric clearance and then returned) with complaints of suicidal ideation, worsening anxiety and depressive symptoms. Patient was subsequently transferred and accepted for inpatient psychiatric hospitalization at [facility] for further safety and further stabilization      Explained the side effects, benefits and complications of neuroleptic medications. Retains capacity to consent. Consents to initiation during hospitalization.     Daily Progress: tolerated transition to unit. Home medications resumed. Encourage group attendance, reporting decrease in her suicidal ideation today       DIAGNOSTIC FORMULATION   #Bipolar I Disorder, Current Episode Depressed  #Generalized Anxiety Disorder  #Borderline Personality Disorder     PLAN     Location: Unit [facility]  Legal Status: Orders Placed This Encounter      Voluntary    Safety Assessment:    Behavioral Orders   Procedures    Code 1 - Restrict to Unit    Routine Programming     As clinically indicated    Status 15     Every 15 minutes.           PTA medications held:   -none     PTA medications continued/changed:   -abilify 20 mg at bedtime  -atomoxetine 25 mg q AM  -gabapentin 300 mg TID  -hydroxyzine 50 mg at bedtime  -lamotrigine 100 mg TID  -vortioxetine 10 mg q daily (recently started May 2025, stopped vilazodone)     New medications initiated:   -none    Today's Changes:  - no changes    Programming: Patient will be treated in a therapeutic milieu with appropriate individual and group therapies. Education will be provided on diagnoses, medications, and treatments. Encouraged behavioral activation and participation in group programming.     Medical diagnoses:  Per medicine    Disposition: pending clinical course        TREATMENT TEAM CARE PLAN     Progress: Continued symptoms.    Continued Stay  Criteria/Rationale: Continued symptoms without sufficient improvement/resolution.    Medical/Physical: See above.    Precautions: See above.     Plan: Continue inpatient care with unit support and medication management.    Rationale for change in precautions or plan: NA due to no change.    Participants: Elmer Mathews MD, Nursing, SW, OT.    The patient's care was discussed with the treatment team and chart notes were reviewed.       ATTESTATION    Elmer Mathews MD  [facility]    Type of Service: video visit for mental health treatment  Reason for Video Visit: COVID-19 [place]  Originating Site (patient location): [facility]  Distant Site (provider location): Remote Location  Mode of Communication: Video Conference via [employer]  Time of Service: Date: 06/02/2025 , Start: 07:30,  Stop: 08:00

## 2025-06-03 LAB
FLUAV RNA SPEC QL NAA+PROBE: NEGATIVE
FLUBV RNA RESP QL NAA+PROBE: NEGATIVE
RSV RNA SPEC NAA+PROBE: NEGATIVE
SARS-COV-2 RNA RESP QL NAA+PROBE: NEGATIVE

## 2025-06-03 PROCEDURE — 87637 SARSCOV2&INF A&B&RSV AMP PRB: CPT | Performed by: PSYCHIATRY & NEUROLOGY

## 2025-06-03 PROCEDURE — 250N000013 HC RX MED GY IP 250 OP 250 PS 637: Performed by: NURSE PRACTITIONER

## 2025-06-03 PROCEDURE — 99222 1ST HOSP IP/OBS MODERATE 55: CPT | Performed by: NURSE PRACTITIONER

## 2025-06-03 PROCEDURE — 250N000013 HC RX MED GY IP 250 OP 250 PS 637: Performed by: PSYCHIATRY & NEUROLOGY

## 2025-06-03 PROCEDURE — 124N000001 HC R&B MH

## 2025-06-03 PROCEDURE — 99232 SBSQ HOSP IP/OBS MODERATE 35: CPT | Performed by: PSYCHIATRY & NEUROLOGY

## 2025-06-03 RX ORDER — ATOMOXETINE 40 MG/1
40 CAPSULE ORAL EVERY MORNING
Status: DISPENSED | OUTPATIENT
Start: 2025-06-04

## 2025-06-03 RX ORDER — LIDOCAINE 4 G/G
2 PATCH TOPICAL DAILY PRN
Status: ACTIVE | OUTPATIENT
Start: 2025-06-04

## 2025-06-03 RX ORDER — MUSCLE RUB CREAM 100; 150 MG/G; MG/G
CREAM TOPICAL EVERY 6 HOURS PRN
Status: DISPENSED | OUTPATIENT
Start: 2025-06-03

## 2025-06-03 RX ADMIN — FERROUS SULFATE TAB 325 MG (65 MG ELEMENTAL FE) 325 MG: 325 (65 FE) TAB at 08:52

## 2025-06-03 RX ADMIN — LAMOTRIGINE 100 MG: 100 TABLET ORAL at 13:39

## 2025-06-03 RX ADMIN — BUSPIRONE HYDROCHLORIDE 10 MG: 10 TABLET ORAL at 08:51

## 2025-06-03 RX ADMIN — BENZOCAINE 6 MG-MENTHOL 10 MG LOZENGES 1 LOZENGE: at 10:35

## 2025-06-03 RX ADMIN — ATOMOXETINE 25 MG: 25 CAPSULE ORAL at 08:52

## 2025-06-03 RX ADMIN — HYDROXYZINE HYDROCHLORIDE 25 MG: 25 TABLET, FILM COATED ORAL at 13:38

## 2025-06-03 RX ADMIN — Medication 125 MG: at 08:51

## 2025-06-03 RX ADMIN — MONTELUKAST 10 MG: 10 TABLET, FILM COATED ORAL at 20:42

## 2025-06-03 RX ADMIN — GABAPENTIN 300 MG: 300 CAPSULE ORAL at 20:42

## 2025-06-03 RX ADMIN — GABAPENTIN 300 MG: 300 CAPSULE ORAL at 13:39

## 2025-06-03 RX ADMIN — LAMOTRIGINE 100 MG: 100 TABLET ORAL at 20:42

## 2025-06-03 RX ADMIN — VORTIOXETINE 10 MG: 10 TABLET, FILM COATED ORAL at 08:53

## 2025-06-03 RX ADMIN — ARIPIPRAZOLE 20 MG: 10 TABLET ORAL at 20:42

## 2025-06-03 RX ADMIN — BUSPIRONE HYDROCHLORIDE 10 MG: 10 TABLET ORAL at 13:38

## 2025-06-03 RX ADMIN — Medication 125 MCG: at 08:51

## 2025-06-03 RX ADMIN — BUSPIRONE HYDROCHLORIDE 10 MG: 10 TABLET ORAL at 20:42

## 2025-06-03 RX ADMIN — AMLODIPINE BESYLATE 2.5 MG: 2.5 TABLET ORAL at 08:59

## 2025-06-03 RX ADMIN — FAMOTIDINE 20 MG: 20 TABLET, FILM COATED ORAL at 08:52

## 2025-06-03 RX ADMIN — GABAPENTIN 300 MG: 300 CAPSULE ORAL at 08:53

## 2025-06-03 RX ADMIN — HYDROXYZINE HYDROCHLORIDE 50 MG: 25 TABLET, FILM COATED ORAL at 20:42

## 2025-06-03 RX ADMIN — DILTIAZEM HYDROCHLORIDE 180 MG: 180 CAPSULE, COATED, EXTENDED RELEASE ORAL at 08:59

## 2025-06-03 RX ADMIN — DILTIAZEM HYDROCHLORIDE 180 MG: 180 CAPSULE, COATED, EXTENDED RELEASE ORAL at 20:42

## 2025-06-03 RX ADMIN — LAMOTRIGINE 100 MG: 100 TABLET ORAL at 08:52

## 2025-06-03 RX ADMIN — IBUPROFEN 800 MG: 800 TABLET, FILM COATED ORAL at 18:46

## 2025-06-03 RX ADMIN — FAMOTIDINE 20 MG: 20 TABLET, FILM COATED ORAL at 20:42

## 2025-06-03 ASSESSMENT — ACTIVITIES OF DAILY LIVING (ADL)
ADLS_ACUITY_SCORE: 25

## 2025-06-03 NOTE — PROGRESS NOTES
"Assessment/Intervention/Current Symptoms and Care Coordination:  SW met with patient. Pt states she is doing \"good\" today. Pt is asking for an update. Advised SWers have not received a call back from pt's . Pt advised she attempted to call CM, too. Will keep pt updated.     Discharge Plan or Goal:  Pt called [facility] [phone]. Pt reports they don't have an available bed at this time, but they would like  to send a referral and MH assessment. Pt reports they will call when they have an available bed. Pt is agreeable to Thursday or Friday discharge.    Discharge Checklist:  Transportation: TBD  Medications ordered/sent to: TBD  Appointments scheduled:   Psychiatry - TBD  Therapy - TBD  PCP - TBD  AVS complete: No     Referral Status: Referral sent to [facility], per patient's request     Legal Status: voluntary    "

## 2025-06-03 NOTE — PLAN OF CARE
Problem: Adult Behavioral Health Plan of Care  Goal: Patient-Specific Goal (Individualization)  Description: Pt will follow recommendations of treatment team.  Pt will be compliant with medications.  Pt will attend 50 % of groups.  Pt will sleep 6-8 hours each night.  Wake pt up every 2 hours to use the restroom.  Outcome: Progressing     Problem: Suicide Risk  Goal: Absence of Self-Harm  Description: Pt will remain free from self harm/injury.  Pt will verbalize 2-3 coping skills.  Outcome: Progressing     Face to face shift report received from [facility].     Patient appeared to be sleeping for approximately 6.25 hours since 2200 last shift per check sheets.    Patient had no reported or observed suicidal behavior or self harm this shift.      No concerns noted this shift.    Face to face report will be communicated to oncoming RN.    Maryjo Wheatley RN  6/3/2025  6:10 AM

## 2025-06-03 NOTE — H&P
"[facility]    History and Physical  Medical Services       Date of Admission:  6/1/2025  Date of Service (when I saw the patient): 06/03/25    Assessment & Plan       Principal Problem:    Suicidal ideation     Active Medical Problems:    Esophageal reflux- stable. Reports taking Pepcid daily. Home dose ordered.        Moderate persistent asthma- Denies chest pain, sob, difficulty breathing.   Albuterol inhaler as needed.        CKD (chronic kidney disease) stage 2, GFR 60-89 ml/min- Creatinine 1.01. this is up some from 0.79 a month ago. Pt reports not drinking much water. Encouraged increase in water intake.      Raynauds- denies concerns. home dose of amlodipine ordered.      Class 3 severe obesity- weight 137.8 kg, BMI 49.03  Encourage diet and exercise. A1c wnl at 4.8.      Vitamin D deficiency- stable on home dose of vitamin D 125 mcg daily.  Vitamin D level wnl at 50.     B12 deficiency- B12 level on 2/26/25 wnl at 784.      Paroxysmal atrial fibrillation-  denies chest pain, sod, palpations. diagnosed 8/22/2024. Follows with cardiology. Last seen on 5/8. Per cardiology. \"Has approximately x2 episodes of palpitation a week.  They last only a few seconds.  Symptoms are mild.  When last seen, we increased diltiazem to 180 mg twice a day.  Symptoms improved.  Previously, describes an episode of palpitations where she felt lightheaded and describes near syncope.  She presented to the ER.  EKG at the ER showed A-fib with a heart rate of 145 bpm.  Thankfully, after an hour, she spontaneously converted.  She was on the bus during the second episode.  [id number] score is 0.  No plans for anticoagulation at this point.  Discussed potential need for anticoagulation in the future.  Also, discussed cardioversion and potentially an ablation.  She is not interested in an ablation.  No changes today.  Follow-up 1 year or sooner if issues. \"  Denies chest pain, sob, palpations.   Home dose of cardizem ordered. " "     TAWNYA-  pt was referred for a sleep study for concerns of sleep apnea. Watch PAT device was mailed to pt. Per Cardiology \": High likelihood.  Does snore at night.  Referred to sleep medicine.  Currently has a WatchPAT testing at home but has not worn it.  Encouraged her to wear it.\"     Chronic urinary incontinence/overactive bladder- wears briefs. Has been seen by urology and had Inter Sim placement on 12/20/2023. Pt reports it was effective for the first few months and then stopped working. Pt denies following up with urology since then. Pt encouraged to make appointment with urology. Verbalized understanding.      Chronic bilateral low back pain- denies injuries or new or worsening pain. Has chronic urinary incontinence. Denies saddle anesthesia. Tylenol, muscle rub and Lidocaine patch as needed. Bed wedge.     Pt medically stable, no acute medical concerns. Chronic medical problems stable. Will sign off. Please consult for any new medical issues or concerns.         Code Status: Full Code    Dominique Anaya, CNP    Primary Care Physician   Lucio Curiel    Chief Complaint   Psych evaluation     History is obtained from the patient and electronic health record    History of Present Illness   (Per ED) Rosi Lamb is a pleasant 50 year old female with history of anxiety, depression, bipolar disorder, polysubstance use disorder, borderline personality disorder, tobacco use presents to the ER for concerns of suicidal ideation.  Was seen yesterday for similar symptoms but no specific plan.  Patient states that she does have a plan to either cut her wrist or her throat as shown to her boyfriend.  Patient states that she has been having conflict with her boyfriend.  No physical abuse.  Patient states that her boyfriend says things about her behind her back.  Currently at [facility].  Denies an     Past Medical History    I have reviewed this patient's medical history and updated it with pertinent " information if needed.   Past Medical History:   Diagnosis Date    Abnormal Pap smear of cervix 2018    Overview:  had scraping of cervix    Cannabis use disorder, moderate, in sustained remission, dependence (H) 2015    Closed dislocation of tarsal joint 2011    Closed dislocation of tarsal joint - left 2011    Edema     No Comments Provided    Encounter for removal and reinsertion of intrauterine contraceptive device     05,IUD placement, Removed    Excessive and frequent menstruation with irregular cycle     2017    Major depressive disorder, single episode     No Comments Provided    Personal history of other medical treatment (CODE)     G3, P2-0-1-2 with history of spontaneous     Personal history of other medical treatment (CODE)     No Comments Provided    Uncomplicated asthma     No Comments Provided    Urinary incontinence 03/15/2013       Past Surgical History   I have reviewed this patient's surgical history and updated it with pertinent information if needed.  Past Surgical History:   Procedure Laterality Date    ANKLE SURGERY      fracture, repair with screws    ARTHRODESIS FOOT Left 2022    Procedure: left foot hardware removaL, fusion of 1st-2nd Tarsal metatarsal;  Surgeon: Anthony Castillo DPM;  Location: [facility] OR    COLONOSCOPY  2022    F/U  tubular adenoma    CONIZATION LEEP      ,Underwent a loop    IMPLANT STIMULATOR AND LEADS SACRAL NERVE (STAGE ONE AND TWO)      scheduled 23 with Dr. Sheth at [place]    LAPAROSCOPIC TUBAL LIGATION      ,tubal ligation       Prior to Admission Medications   Prior to Admission Medications   Prescriptions Last Dose Informant Patient Reported? Taking?   ARIPiprazole (ABILIFY) 20 MG tablet 2025 at 10:30 PM Self, Pharmacy No Yes   Sig: Take 1 tablet (20 mg) by mouth at bedtime.   VITRON-C  MG TABS tablet 2025 Morning Self, Pharmacy No Yes   Sig: TAKE 1 TABLET BY MOUTH ONCE DAILY  ON AN EMPTY STOMACH FOR  IRON  DEFICIENCY   acetaminophen (TYLENOL) 500 MG tablet More than a month Self No Yes   Sig: Take 1-2 tablets (500-1,000 mg) by mouth every 6 hours as needed for mild pain   albuterol (VENTOLIN HFA) 108 (90 Base) MCG/ACT inhaler More than a month Self No Yes   Sig: Inhale 1-2 puffs into the lungs every 4 hours as needed for shortness of breath or wheezing   amLODIPine (NORVASC) 2.5 MG tablet 5/31/2025 Morning Self, Pharmacy No Yes   Sig: Take 1 tablet (2.5 mg) by mouth every morning. May also take 1 tablet (2.5 mg) every evening as needed (-- for Raynaud's / Hand color changes --).   atomoxetine (STRATTERA) 25 MG capsule 5/31/2025 Morning Self, Pharmacy No Yes   Sig: Take 1 capsule (25 mg) by mouth every morning.   busPIRone (BUSPAR) 10 MG tablet 5/31/2025 at 10:30 PM Self, Pharmacy No Yes   Sig: Take 1 tablet (10 mg) by mouth 3 times daily.   cholecalciferol (VITAMIN D3) 125 mcg (5000 units) capsule 5/31/2025 Morning Self, Pharmacy No Yes   Sig: Take 1 capsule (125 mcg) by mouth daily.   diltiazem ER COATED BEADS (CARDIZEM CD/CARTIA XT) 180 MG 24 hr capsule 5/31/2025 Noon Self, Pharmacy No Yes   Sig: Take 1 capsule (180 mg) by mouth 2 times daily.   docusate sodium (EQ STOOL SOFTENER) 100 MG capsule More than a month Self, Pharmacy No Yes   Sig: Take 1 capsule (100 mg) by mouth 2 times daily as needed for constipation.   estradiol (VIVELLE-DOT) 0.05 MG/24HR bi-weekly patch 5/29/2025 Self, Pharmacy No Yes   Sig: Place 1 patch onto the skin twice a week.   famotidine (PEPCID) 20 MG tablet 5/31/2025 at 10:29 PM Self, Pharmacy No Yes   Sig: Take 1 tablet (20 mg) by mouth 2 times daily. - For Heartburn   gabapentin (NEURONTIN) 300 MG capsule 5/31/2025 at 10:29 PM Self, Pharmacy No Yes   Sig: Take 1 capsule (300 mg) by mouth 3 times daily.   hydrOXYzine (VISTARIL) 50 MG capsule 5/31/2025 at 10:29 PM Self, Pharmacy Yes Yes   Sig: Take 50 mg by mouth at bedtime.   hydrOXYzine (VISTARIL) 50 MG  capsule 5/21/2025  Yes Yes   Sig: Take 50 mg by mouth 2 times daily as needed for anxiety. (Must have 4-6 hours between doses).   ibuprofen (ADVIL/MOTRIN) 200 MG tablet 5/31/2025 at  4:14 PM Self No Yes   Sig: Take 4 tablets (800 mg) by mouth every 8 hours as needed (back pain/ headache).   lamoTRIgine (LAMICTAL) 100 MG tablet 5/31/2025 at 10:30 PM Self, Pharmacy No Yes   Sig: Take 1 tablet (100 mg) by mouth 3 times daily.   montelukast (SINGULAIR) 10 MG tablet 5/31/2025 at 10:30 PM Self, Pharmacy No Yes   Sig: Take 1 tablet (10 mg) by mouth at bedtime.   progesterone (PROMETRIUM) 100 MG capsule Not Taking  No No   Sig: Take 1 capsule (100 mg) by mouth daily.   Patient not taking: Reported on 6/2/2025   ramelteon (ROZEREM) 8 MG tablet 5/28/2025 Bedtime Self, Pharmacy No Yes   Sig: Take 1 tablet (8 mg) by mouth nightly as needed for sleep.   vortioxetine (TRINTELLIX) 10 MG tablet 5/31/2025 Morning Self, Pharmacy Yes Yes   Sig: Take 10 mg by mouth every morning.      Facility-Administered Medications: None     Allergies   Allergies   Allergen Reactions    Clonazepam Other (See Comments)     Causes Violence and aggresssion    Hydrocodone Other (See Comments)     Seizures; not allergic to tylenol    Lorazepam Other (See Comments)     Causes Violence and aggresssion    Sertraline Other (See Comments)     Caused suicidally     Bupropion Other (See Comments)     Caused Major Depression    Dust Mite Extract      Other reaction(s): Asthma symptoms    Lithium Other (See Comments)     Mood alteration    Milk Protein Unknown    Pollen Extract      Other reaction(s): Asthma symptoms    Risperidone Other (See Comments)     Agitation      Sulfa Antibiotics Itching    Trichophyton      Other reaction(s): Asthma symptoms    Valproic Acid Other (See Comments)     Weight gain       Social History   I have reviewed this patient's social history and updated it with pertinent information if needed. Rosi ODALYS Lamb  reports that she  quit smoking about 22 years ago. Her smoking use included cigarettes. She started smoking about 25 years ago. She has a 6 pack-year smoking history. She has been exposed to tobacco smoke. She has never used smokeless tobacco. She reports that she does not currently use alcohol. She reports that she does not use drugs.    Family History   I have reviewed this patient's family history and updated it with pertinent information if needed.   Family History   Problem Relation Age of Onset    Osteoporosis Mother     Asthma Father         Asthma,+ Leg edema, knee, hip replacement. + Lymphedema    Heart Failure Father     Other - See Comments Paternal Grandmother         Lymphedema    Osteoporosis Brother         Osteoporosis    Other - See Comments Brother         No Known Problems       Review of Systems   CONSTITUTIONAL:  negative  EYES:  negative  HEENT:  negative  RESPIRATORY:  negative  CARDIOVASCULAR:  negative  GASTROINTESTINAL:  negative  GENITOURINARY:  negative  INTEGUMENT/BREAST:  negative  HEMATOLOGIC/LYMPHATIC:  negative  ALLERGIC/IMMUNOLOGIC:  negative  ENDOCRINE:  negative  MUSCULOSKELETAL:  negative  NEUROLOGICAL:  negative    Physical Exam   Temp: 97.8  F (36.6  C) Temp src: Temporal BP: 123/57 Pulse: 119   Resp: 18 SpO2: 99 % O2 Device: None (Room air)    Vital Signs with Ranges  Temp:  [97.8  F (36.6  C)-98.8  F (37.1  C)] 97.8  F (36.6  C)  Pulse:  [] 119  Resp:  [15-18] 18  BP: (114-151)/(49-74) 123/57  SpO2:  [97 %-100 %] 99 %  303 lbs 12.8 oz    Constitutional: awake, alert, cooperative, no apparent distress, and appears stated age, vitals stable   Eyes: Lids and lashes normal, pupils equal, round and reactive to light, extra ocular muscles intact, sclera clear, conjunctiva normal  ENT: Normocephalic, without obvious abnormality, atraumatic, external ears without lesions, oral pharynx with moist mucous membranes, no erythema or exudates, gums normal   Hematologic / Lymphatic: no cervical  lymphadenopathy  Respiratory: No increased work of breathing, good air exchange, clear to auscultation bilaterally, no crackles or wheezing  Cardiovascular: Normal apical impulse, regular rate and rhythm, normal S1 and S2, no S3 or S4, and no murmur noted  GI: obese, normal bowel sounds, soft, non-distended, non-tender, no masses palpated, no hepatosplenomegally  Genitounirinary: deferred  Skin: normal skin color, texture, turgor and no redness, warmth, or swelling  Musculoskeletal: There is no redness, warmth, or swelling of the joints.  Full range of motion noted.    Neurologic: Awake, alert, oriented to name, place and time.  Cranial nerves II-XII are grossly intact.    Neuropsychiatric: General: restricted, calm, and normal eye contact    Data   Data reviewed today:   Recent Labs   Lab 05/31/25  1530   WBC 9.9   HGB 13.8   MCV 92         POTASSIUM 3.7   CHLORIDE 99   CO2 26   BUN 14.1   CR 1.01*   ANIONGAP 13   NOE 9.7   GLC 85   ALBUMIN 4.5   PROTTOTAL 8.0   BILITOTAL 0.4   ALKPHOS 99   ALT 20   AST 24       No results found for this or any previous visit (from the past 24 hours).

## 2025-06-03 NOTE — PLAN OF CARE
"Face to face shift report received from Maryjo MONROY. Rounding   completed, pt observed in room at the start of the shift.    Patient in and out of room throughout the day. Alert and making needs known. Eating well for meals. Attending groups. Pleasant during conversation with this writer. Compliant with scheduled medication and nursing assessment. Endorses anxiety stating \"I miss the terrell I was seeing.\" Requested and received hydroxyzine 25 mg at 1338. Denies hallucinations and SI/HI. Stating she is overall feeling better. Reports a sore throat and body aches this morning. Negative for covid, influenza A and B. Encouraged patient to report any new or worsening symptoms. Received a chloraseptic lozenge at 1035. Took a shower this shift.      Problem: Adult Behavioral Health Plan of Care  Goal: Patient-Specific Goal (Individualization)  Description: Pt will follow recommendations of treatment team.  Pt will be compliant with medications.  Pt will attend 50 % of groups.  Pt will sleep 6-8 hours each night.  Wake pt up every 2 hours to use the restroom.  Outcome: Progressing     Problem: Suicide Risk  Goal: Absence of Self-Harm  Description: Pt will remain free from self harm/injury.  Pt will verbalize 2-3 coping skills.  Outcome: Progressing    Face to face report will be communicated to oncdarrius MONROY.    Ninoska Aquino RN  6/3/2025                              "

## 2025-06-03 NOTE — PROGRESS NOTES
"  [facility] PSYCHIATRY  -  PROGRESS NOTE     ID   Name: Rosi Lamb  MRN#: [id number]     SUBJECTIVE   Prior to interviewing the patient, I met with nursing and reviewed patient's clinical condition. We discussed clinical care both before and after the interview. I have reviewed the patient's clinical course by review of records including previous notes, labs, and vital signs.     Per nursing, the patient had the following behavioral events over the last 24-hours: none    On psychiatric interview, Rosi is met within her room. She states she is starting to feel \"better\". She reports she is remorseful about her most recent relationship. She states that she slept well overnight. She denies active SI.  Denies any physical pain today. Reports a willingness to continue to work on her coping skills, with specific emphasis on DBT skills.        MEDICATIONS   Scheduled Meds:  Current Facility-Administered Medications   Medication Dose Route Frequency Provider Last Rate Last Admin    amLODIPine (NORVASC) tablet 2.5 mg  2.5 mg Oral Elmer Multani MD   2.5 mg at 06/03/25 0859    ARIPiprazole (ABILIFY) tablet 20 mg  20 mg Oral At Bedtime Elmer Mathews MD   20 mg at 06/02/25 2039    ferrous sulfate (FEROSUL) tablet 325 mg  325 mg Oral Daily Elmer Mathews MD   325 mg at 06/03/25 0852    And    ascorbic acid half-tab 125 mg  125 mg Oral Daily Elmer Mathews MD   125 mg at 06/03/25 0851    [START ON 6/4/2025] atomoxetine (STRATTERA) capsule 40 mg  40 mg Oral MARYM Elmer Mathews MD        busPIRone (BUSPAR) tablet 10 mg  10 mg Oral TID Elmer Mathews MD   10 mg at 06/03/25 1338    cholecalciferol (VITAMIN D3) capsule 125 mcg  125 mcg Oral Daily Elmer Mathews MD   125 mcg at 06/03/25 0851    diltiazem ER COATED BEADS (CARDIZEM CD/CARTIA XT) 24 hr capsule 180 mg  180 mg Oral BID Elmer Mathews MD   180 mg at 06/03/25 0859    famotidine (PEPCID) tablet 20 mg  20 mg Oral BID Reid" MD Elmer   20 mg at 06/03/25 0852    gabapentin (NEURONTIN) capsule 300 mg  300 mg Oral TID Elmer Mathews MD   300 mg at 06/03/25 1339    hydrOXYzine HCl (ATARAX) tablet 50 mg  50 mg Oral At Bedtime Elmer Mathews MD   50 mg at 06/02/25 2039    lamoTRIgine (LaMICtal) tablet 100 mg  100 mg Oral TID Elmer aMthews MD   100 mg at 06/03/25 1339    montelukast (SINGULAIR) tablet 10 mg  10 mg Oral At Bedtime Elmer Mathews MD   10 mg at 06/02/25 2039    vortioxetine (TRINTELLIX) tablet 10 mg  10 mg Oral Daily Elmer Mathews MD   10 mg at 06/03/25 0853     PRN Meds:.  Current Facility-Administered Medications   Medication Dose Route Frequency Provider Last Rate Last Admin    acetaminophen (TYLENOL) tablet 650 mg  650 mg Oral Q4H PRN Jewell Garcia APRN CNP   650 mg at 06/01/25 1758    albuterol (PROVENTIL HFA/VENTOLIN HFA) inhaler  2 puff Inhalation Q4H PRN Elmer Mathews MD        alum & mag hydroxide-simethicone (MAALOX) suspension 30 mL  30 mL Oral Q4H PRN Jewell Garcia APRN CNP        amLODIPine (NORVASC) tablet 2.5 mg  2.5 mg Oral QPM PRN Elmer Mathews MD        benzocaine-menthol (CHLORASEPTIC) 6-10 MG lozenge 1 lozenge  1 lozenge Buccal Q1H PRN Elmer Mathews MD   1 lozenge at 06/03/25 1035    docusate sodium (COLACE) capsule 100 mg  100 mg Oral BID PRN Elmer Mathews MD        hydrOXYzine HCl (ATARAX) tablet 25 mg  25 mg Oral Q4H PRN Jewell Garcia APRN CNP   25 mg at 06/03/25 1338    ibuprofen (ADVIL/MOTRIN) tablet 800 mg  800 mg Oral Q8H PRN Elmer Mathews MD   800 mg at 06/02/25 2045    [START ON 6/4/2025] Lidocaine (LIDOCARE) 4 % Patch 2 patch  2 patch Transdermal Daily PRN Dominique Anaya CNP        muscle rub (ARTHRITIS HOT) pain relief cream   Topical Q6H PRN Dominique Anaya CNP        OLANZapine (zyPREXA) tablet 10 mg  10 mg Oral TID PRN Jewell Garcia APRN CNP        Or    OLANZapine (zyPREXA) injection 10 mg  10 mg Intramuscular  "TID PRN Jewell Garcia APRN CNP        polyethylene glycol (MIRALAX) Packet 17 g  17 g Oral Daily PRN Jewell Garcia APRN CNP        ramelteon (ROZEREM) tablet 8 mg  8 mg Oral At Bedtime PRN Elmer Mathews MD   8 mg at 06/01/25 2025    traZODone (DESYREL) tablet 50 mg  50 mg Oral At Bedtime PRN Jewell Garcia APRN CNP            ALLERGIES   Allergies   Allergen Reactions    Clonazepam Other (See Comments)     Causes Violence and aggresssion    Hydrocodone Other (See Comments)     Seizures; not allergic to tylenol    Lorazepam Other (See Comments)     Causes Violence and aggresssion    Sertraline Other (See Comments)     Caused suicidally     Bupropion Other (See Comments)     Caused Major Depression    Dust Mite Extract      Other reaction(s): Asthma symptoms    Lithium Other (See Comments)     Mood alteration    Milk Protein Unknown    Pollen Extract      Other reaction(s): Asthma symptoms    Risperidone Other (See Comments)     Agitation      Sulfa Antibiotics Itching    Trichophyton      Other reaction(s): Asthma symptoms    Valproic Acid Other (See Comments)     Weight gain        VITALS   Vitals: /57   Pulse 119   Temp 97.8  F (36.6  C) (Temporal)   Resp 18   Ht 1.676 m (5' 6\")   Wt (!) 137.8 kg (303 lb 12.8 oz)   SpO2 99%   BMI 49.03 kg/m       MENTAL STATUS EXAM   Appearance:  awake, alert, adequately groomed, dressed in hospital scrubs, and appeared as age stated  Attitude:  cooperative  Eye Contact:  good  Mood:  better  Affect:  anxious  Speech:  clear, coherent  Psychomotor Behavior:  no evidence of tardive dyskinesia, dystonia, or tics  Thought Process:  logical, linear, and goal oriented  Associations:  no loose associations  Thought Content:  active suicidal ideation present  Insight:  limited  Judgment:  limited  Oriented to:  time, person, and place  Attention Span and Concentration:  intact  Recent and Remote Memory:  intact  Gait and Station: Normal    "     LABS   Recent Results (from the past 24 hours)   Influenza A/B, RSV and SARS-CoV2 PCR (COVID-19) Nasopharyngeal    Collection Time: 06/03/25  9:49 AM    Specimen: Nasopharyngeal; Swab   Result Value Ref Range    Influenza A PCR Negative Negative    Influenza B PCR Negative Negative    RSV PCR Negative Negative    SARS CoV2 PCR Negative Negative         ASSESSMENT   Rosi Lamb is a 50 year old female with a past psychiatric history notable for bipolar I disorder, generalized anxiety disorder, borderline personality disorder. Multiple prior inpatient psychiatric hospitalizations. At least four hospitalizations in 2025.  Presents to emergency department twice in 24 hour period (returned home the first time after psychiatric clearance and then returned) with complaints of suicidal ideation, worsening anxiety and depressive symptoms. Patient was subsequently transferred and accepted for inpatient psychiatric hospitalization at [facility] for further safety and further stabilization      Explained the side effects, benefits and complications of neuroleptic medications. Retains capacity to consent. Consents to initiation during hospitalization.     Daily Progress: increase strattera to 40 mg. Will swab for influenza and COVID-19       DIAGNOSTIC FORMULATION   #Bipolar I Disorder, Current Episode Depressed  #Generalized Anxiety Disorder  #Borderline Personality Disorder     PLAN     Location: Unit 5  Legal Status: Orders Placed This Encounter      Voluntary    Safety Assessment:    Behavioral Orders   Procedures    Code 1 - Restrict to Unit    Routine Programming     As clinically indicated    Status 15     Every 15 minutes.           PTA medications held:   -none     PTA medications continued/changed:   -abilify 20 mg at bedtime  -atomoxetine 25 mg q AM  -gabapentin 300 mg TID  -hydroxyzine 50 mg at bedtime  -lamotrigine 100 mg TID  -vortioxetine 10 mg q daily (recently  started May 2025, stopped vilazodone)     New medications initiated:   -none    Today's Changes:  -increase strattera to 40 mg starting 6/4    Programming: Patient will be treated in a therapeutic milieu with appropriate individual and group therapies. Education will be provided on diagnoses, medications, and treatments. Encouraged behavioral activation and participation in group programming.     Medical diagnoses:  Per medicine    Disposition: pending clinical course        TREATMENT TEAM CARE PLAN     Progress: Continued symptoms.    Continued Stay Criteria/Rationale: Continued symptoms without sufficient improvement/resolution.    Medical/Physical: See above.    Precautions: See above.     Plan: Continue inpatient care with unit support and medication management.    Rationale for change in precautions or plan: NA due to no change.    Participants: Elmer Mathews MD, Nursing, SW, OT.    The patient's care was discussed with the treatment team and chart notes were reviewed.       ATTESTATION    Elmer Mathews MD  [facility]    Type of Service: video visit for mental health treatment  Reason for Video Visit: COVID-[place]  Originating Site (patient location): [facility]  Distant Site (provider location): Remote Location  Mode of Communication: Video Conference via [employer]  Time of Service: Date: 06/03/2025 , Start: 07:30,  Stop: 08:00

## 2025-06-04 PROCEDURE — 124N000001 HC R&B MH

## 2025-06-04 PROCEDURE — 250N000013 HC RX MED GY IP 250 OP 250 PS 637: Performed by: PSYCHIATRY & NEUROLOGY

## 2025-06-04 PROCEDURE — 99232 SBSQ HOSP IP/OBS MODERATE 35: CPT | Performed by: PSYCHIATRY & NEUROLOGY

## 2025-06-04 RX ADMIN — FAMOTIDINE 20 MG: 20 TABLET, FILM COATED ORAL at 08:15

## 2025-06-04 RX ADMIN — FERROUS SULFATE TAB 325 MG (65 MG ELEMENTAL FE) 325 MG: 325 (65 FE) TAB at 08:15

## 2025-06-04 RX ADMIN — ARIPIPRAZOLE 20 MG: 10 TABLET ORAL at 20:34

## 2025-06-04 RX ADMIN — GABAPENTIN 300 MG: 300 CAPSULE ORAL at 13:00

## 2025-06-04 RX ADMIN — LAMOTRIGINE 100 MG: 100 TABLET ORAL at 08:15

## 2025-06-04 RX ADMIN — VORTIOXETINE 10 MG: 10 TABLET, FILM COATED ORAL at 08:14

## 2025-06-04 RX ADMIN — GABAPENTIN 300 MG: 300 CAPSULE ORAL at 20:33

## 2025-06-04 RX ADMIN — BUSPIRONE HYDROCHLORIDE 10 MG: 10 TABLET ORAL at 08:15

## 2025-06-04 RX ADMIN — MONTELUKAST 10 MG: 10 TABLET, FILM COATED ORAL at 20:34

## 2025-06-04 RX ADMIN — BUSPIRONE HYDROCHLORIDE 10 MG: 10 TABLET ORAL at 13:00

## 2025-06-04 RX ADMIN — Medication 125 MG: at 08:15

## 2025-06-04 RX ADMIN — DILTIAZEM HYDROCHLORIDE 180 MG: 180 CAPSULE, COATED, EXTENDED RELEASE ORAL at 08:15

## 2025-06-04 RX ADMIN — AMLODIPINE BESYLATE 2.5 MG: 2.5 TABLET ORAL at 08:15

## 2025-06-04 RX ADMIN — HYDROXYZINE HYDROCHLORIDE 50 MG: 25 TABLET, FILM COATED ORAL at 20:34

## 2025-06-04 RX ADMIN — FAMOTIDINE 20 MG: 20 TABLET, FILM COATED ORAL at 20:33

## 2025-06-04 RX ADMIN — GABAPENTIN 300 MG: 300 CAPSULE ORAL at 08:15

## 2025-06-04 RX ADMIN — ATOMOXETINE HYDROCHLORIDE 40 MG: 40 CAPSULE ORAL at 08:15

## 2025-06-04 RX ADMIN — BUSPIRONE HYDROCHLORIDE 10 MG: 10 TABLET ORAL at 20:34

## 2025-06-04 RX ADMIN — LAMOTRIGINE 100 MG: 100 TABLET ORAL at 13:00

## 2025-06-04 RX ADMIN — Medication 125 MCG: at 08:14

## 2025-06-04 RX ADMIN — LAMOTRIGINE 100 MG: 100 TABLET ORAL at 20:34

## 2025-06-04 RX ADMIN — DILTIAZEM HYDROCHLORIDE 180 MG: 180 CAPSULE, COATED, EXTENDED RELEASE ORAL at 20:34

## 2025-06-04 ASSESSMENT — ACTIVITIES OF DAILY LIVING (ADL)
ADLS_ACUITY_SCORE: 25
ADLS_ACUITY_SCORE: 25
DRESS: SCRUBS (BEHAVIORAL HEALTH);INDEPENDENT
ADLS_ACUITY_SCORE: 25
HYGIENE/GROOMING: INDEPENDENT
ADLS_ACUITY_SCORE: 25
ORAL_HYGIENE: INDEPENDENT
ADLS_ACUITY_SCORE: 25
ADLS_ACUITY_SCORE: 25
LAUNDRY: UNABLE TO COMPLETE
ADLS_ACUITY_SCORE: 25

## 2025-06-04 NOTE — PLAN OF CARE
Problem: Adult Behavioral Health Plan of Care  Goal: Patient-Specific Goal (Individualization)  Description: Pt will follow recommendations of treatment team.  Pt will be compliant with medications.  Pt will attend 50 % of groups.  Pt will sleep 6-8 hours each night.  Wake pt up every 2 hours to use the restroom.  Outcome: Progressing  Note: Report received from Noemy. Rounding complete. Pt observed sleeping in right side lying position with regular and unlabored respirations. Pt up and toileted per orders    Pt has been in bed with eyes closed and regular respirations. 15 minute and PRN checks all night. No complaints offered.     Pt slept approx  6.25  hours this NOC shift.    Face to face end of shift report communicated to oncoming RN.    Kelly RODRIGUEZ RN  June 4, 2025  4:51 AM          Problem: Suicide Risk  Goal: Absence of Self-Harm  Description: Pt will remain free from self harm/injury.  Pt will verbalize 2-3 coping skills.  Outcome: Progressing  Note: Unable to assess due to pt sleeping. Pt has remained free of self-harm.     Goal Outcome Evaluation:

## 2025-06-04 NOTE — PLAN OF CARE
Face to face shift report received from previous shift RN.       Problem: Adult Behavioral Health Plan of Care  Goal: Patient-Specific Goal (Individualization)  Description: Pt will follow recommendations of treatment team.  Pt will be compliant with medications.  Pt will attend 50 % of groups.  Pt will sleep 6-8 hours each night.  Wake pt up every 2 hours to use the restroom.  Outcome: Progressing   Calm and cooperative. Medication compliant. Denies thoughts of harming self or others. Reports improvement to mood and symptoms. Spends majority of shift in lounge area. Social with others. Attends groups. Reported left shoulder pain 8/10, requested and received Ibuprofen 800 mg at 1846.  Pt reports this is a chronic pain she experiences.     Problem: Suicide Risk  Goal: Absence of Self-Harm  Description: Pt will remain free from self harm/injury.  Pt will verbalize 2-3 coping skills.  Outcome: Progressing   Free from self harm or injury this shift.       Face to face end of shift report to be communicated to oncoming RN.

## 2025-06-04 NOTE — PROGRESS NOTES
Pt accepted to [facility] pending availability. They should know sometime today when this will be.      Pt and team updated.

## 2025-06-04 NOTE — PLAN OF CARE
Problem: Adult Behavioral Health Plan of Care  Goal: Patient-Specific Goal (Individualization)  Description: Pt will follow recommendations of treatment team.  Pt will be compliant with medications.  Pt will attend 50 % of groups.  Pt will sleep 6-8 hours each night.  Wake pt up every 2 hours to use the restroom.  Outcome: Progressing     Patient cooperative with assessment. Reports she is feeling good today. Less depression and anxious. Denies SI HI pan and hallucinations. Denies any aide effects from medications. States she slept ok. Going to go to groups today and showered. Compliant with medications without issues. Free from falls. Makes needs known.     Problem: Suicide Risk  Goal: Absence of Self-Harm  Description: Pt will remain free from self harm/injury.  Pt will verbalize 2-3 coping skills.  Outcome: Progressing     Face to face report given with opportunity to observe patient.    Report given to evening shift RN.     Mercedez Stevenson, RN   6/4/2025

## 2025-06-04 NOTE — PROGRESS NOTES
"  [facility] PSYCHIATRY  -  PROGRESS NOTE     ID   Name: Rosi Lamb  MRN#: [id number]     SUBJECTIVE   Prior to interviewing the patient, I met with nursing and reviewed patient's clinical condition. We discussed clinical care both before and after the interview. I have reviewed the patient's clinical course by review of records including previous notes, labs, and vital signs.     Per nursing, the patient had the following behavioral events over the last 24-hours: none    On psychiatric interview, Rosi is met on open unit eating breakfast. She states she is \"Good\" denies active SI. Is aware strattera will increase this AM. Reports she plans to continue to attend groups. Working on DBT skills. Reports her sleep continues to improve.        MEDICATIONS   Scheduled Meds:  Current Facility-Administered Medications   Medication Dose Route Frequency Provider Last Rate Last Admin    amLODIPine (NORVASC) tablet 2.5 mg  2.5 mg Oral QAM Elmer Mathews MD   2.5 mg at 06/04/25 0815    ARIPiprazole (ABILIFY) tablet 20 mg  20 mg Oral At Bedtime Elmer Mathews MD   20 mg at 06/03/25 2042    ferrous sulfate (FEROSUL) tablet 325 mg  325 mg Oral Daily Elmer Mathews MD   325 mg at 06/04/25 0815    And    ascorbic acid half-tab 125 mg  125 mg Oral Daily Elmer Mathews MD   125 mg at 06/04/25 0815    atomoxetine (STRATTERA) capsule 40 mg  40 mg Oral MARYM Elmer Mathews MD   40 mg at 06/04/25 0815    busPIRone (BUSPAR) tablet 10 mg  10 mg Oral TID Elmer Mathews MD   10 mg at 06/04/25 0815    cholecalciferol (VITAMIN D3) capsule 125 mcg  125 mcg Oral Daily Elmer Mathews MD   125 mcg at 06/04/25 0814    diltiazem ER COATED BEADS (CARDIZEM CD/CARTIA XT) 24 hr capsule 180 mg  180 mg Oral BID Elmer Mathews MD   180 mg at 06/04/25 0815    famotidine (PEPCID) tablet 20 mg  20 mg Oral BID Elmer Mathews MD   20 mg at 06/04/25 0815    gabapentin (NEURONTIN) capsule 300 mg  300 mg Oral TID " Elmer Mathews MD   300 mg at 06/04/25 0815    hydrOXYzine HCl (ATARAX) tablet 50 mg  50 mg Oral At Bedtime Elmer Mathews MD   50 mg at 06/03/25 2042    lamoTRIgine (LaMICtal) tablet 100 mg  100 mg Oral TID Elmer Mathews MD   100 mg at 06/04/25 0815    montelukast (SINGULAIR) tablet 10 mg  10 mg Oral At Bedtime Elmer Mathews MD   10 mg at 06/03/25 2042    vortioxetine (TRINTELLIX) tablet 10 mg  10 mg Oral Daily Elmer Mathews MD   10 mg at 06/04/25 0814     PRN Meds:.  Current Facility-Administered Medications   Medication Dose Route Frequency Provider Last Rate Last Admin    acetaminophen (TYLENOL) tablet 650 mg  650 mg Oral Q4H PRN Jewell Garcia APRN CNP   650 mg at 06/01/25 1758    albuterol (PROVENTIL HFA/VENTOLIN HFA) inhaler  2 puff Inhalation Q4H PRN Elmer Mathews MD        alum & mag hydroxide-simethicone (MAALOX) suspension 30 mL  30 mL Oral Q4H PRN Jewell Garcia APRN CNP        amLODIPine (NORVASC) tablet 2.5 mg  2.5 mg Oral QPM PRN Elmer Mathews MD        benzocaine-menthol (CHLORASEPTIC) 6-10 MG lozenge 1 lozenge  1 lozenge Buccal Q1H PRN Elmer Mathews MD   1 lozenge at 06/03/25 1035    docusate sodium (COLACE) capsule 100 mg  100 mg Oral BID PRN Elmer Mathews MD        hydrOXYzine HCl (ATARAX) tablet 25 mg  25 mg Oral Q4H PRN Jewell Garcia APRN CNP   25 mg at 06/03/25 1338    ibuprofen (ADVIL/MOTRIN) tablet 800 mg  800 mg Oral Q8H PRN Elmer Mathews MD   800 mg at 06/03/25 1846    Lidocaine (LIDOCARE) 4 % Patch 2 patch  2 patch Transdermal Daily PRN Dominique Anaya CNP        muscle rub (ARTHRITIS HOT) pain relief cream   Topical Q6H PRN Dominique Anaya CNP        OLANZapine (zyPREXA) tablet 10 mg  10 mg Oral TID PRN Jewell Garcia APRN CNP        Or    OLANZapine (zyPREXA) injection 10 mg  10 mg Intramuscular TID PRN Jewell Garcia APRN CNP        polyethylene glycol (MIRALAX) Packet 17 g  17 g Oral Daily PRN  "Jewell Garcia APRN CNP        ramelteon (ROZEREM) tablet 8 mg  8 mg Oral At Bedtime PRN Elmer Mathews MD   8 mg at 06/01/25 2025    traZODone (DESYREL) tablet 50 mg  50 mg Oral At Bedtime PRN Jewell Garcia APRN CNP            ALLERGIES   Allergies   Allergen Reactions    Clonazepam Other (See Comments)     Causes Violence and aggresssion    Hydrocodone Other (See Comments)     Seizures; not allergic to tylenol    Lorazepam Other (See Comments)     Causes Violence and aggresssion    Sertraline Other (See Comments)     Caused suicidally     Bupropion Other (See Comments)     Caused Major Depression    Dust Mite Extract      Other reaction(s): Asthma symptoms    Lithium Other (See Comments)     Mood alteration    Milk Protein Unknown    Pollen Extract      Other reaction(s): Asthma symptoms    Risperidone Other (See Comments)     Agitation      Sulfa Antibiotics Itching    Trichophyton      Other reaction(s): Asthma symptoms    Valproic Acid Other (See Comments)     Weight gain        VITALS   Vitals: /54 (BP Location: Left arm)   Pulse 92   Temp 98.3  F (36.8  C) (Temporal)   Resp 16   Ht 1.676 m (5' 6\")   Wt (!) 137.8 kg (303 lb 12.8 oz)   SpO2 94%   BMI 49.03 kg/m       MENTAL STATUS EXAM   Appearance:  awake, alert, adequately groomed, dressed in hospital scrubs, and appeared as age stated  Attitude:  cooperative  Eye Contact:  good  Mood:  \"good\"  Affect:  less anxious  Speech:  clear, coherent  Psychomotor Behavior:  no evidence of tardive dyskinesia, dystonia, or tics  Thought Process:  logical, linear, and goal oriented  Associations:  no loose associations  Thought Content:  active suicidal ideation present  Insight:  limited  Judgment:  limited  Oriented to:  time, person, and place  Attention Span and Concentration:  intact  Recent and Remote Memory:  intact  Gait and Station: Normal        LABS   Recent Results (from the past 24 hours)   Influenza A/B, RSV and SARS-CoV2 " PCR (COVID-19) Nasopharyngeal    Collection Time: 06/03/25  9:49 AM    Specimen: Nasopharyngeal; Swab   Result Value Ref Range    Influenza A PCR Negative Negative    Influenza B PCR Negative Negative    RSV PCR Negative Negative    SARS CoV2 PCR Negative Negative         ASSESSMENT   Rosi Lamb is a 50 year old female with a past psychiatric history notable for bipolar I disorder, generalized anxiety disorder, borderline personality disorder. Multiple prior inpatient psychiatric hospitalizations. At least four hospitalizations in 2025.  Presents to emergency department twice in 24 hour period (returned home the first time after psychiatric clearance and then returned) with complaints of suicidal ideation, worsening anxiety and depressive symptoms. Patient was subsequently transferred and accepted for inpatient psychiatric hospitalization at [facility] for further safety and further stabilization      Explained the side effects, benefits and complications of neuroleptic medications. Retains capacity to consent. Consents to initiation during hospitalization.     Daily Progress:increasing strattera today. Reporting less anxiety        DIAGNOSTIC FORMULATION   #Bipolar I Disorder, Current Episode Depressed  #Generalized Anxiety Disorder  #Borderline Personality Disorder     PLAN     Location: Unit 5  Legal Status: Orders Placed This Encounter      Voluntary    Safety Assessment:    Behavioral Orders   Procedures    Code 1 - Restrict to Unit    Routine Programming     As clinically indicated    Status 15     Every 15 minutes.           PTA medications held:   -none     PTA medications continued/changed:   -abilify 20 mg at bedtime  -atomoxetine 25 mg q AM  -gabapentin 300 mg TID  -hydroxyzine 50 mg at bedtime  -lamotrigine 100 mg TID  -vortioxetine 10 mg q daily (recently started May 2025, stopped vilazodone)     New medications initiated:   -none    Today's  Changes:  -increase strattera to 40 mg starting 6/4    Programming: Patient will be treated in a therapeutic milieu with appropriate individual and group therapies. Education will be provided on diagnoses, medications, and treatments. Encouraged behavioral activation and participation in group programming.     Medical diagnoses:  Per medicine    Disposition: pending clinical course        TREATMENT TEAM CARE PLAN     Progress: Continued symptoms.    Continued Stay Criteria/Rationale: Continued symptoms without sufficient improvement/resolution.    Medical/Physical: See above.    Precautions: See above.     Plan: Continue inpatient care with unit support and medication management.    Rationale for change in precautions or plan: NA due to no change.    Participants: Elmer Mathews MD, Nursing, SW, OT.    The patient's care was discussed with the treatment team and chart notes were reviewed.       ATTESTATION    Elmer Mathews MD  [facility]    Type of Service: video visit for mental health treatment  Reason for Video Visit: COVID-19 [place]  Originating Site (patient location): [facility]  Distant Site (provider location): Remote Location  Mode of Communication: Video Conference via [employer]  Time of Service: Date: 06/04/2025 , Start: 07:30,  Stop: 08:00

## 2025-06-05 VITALS
HEIGHT: 66 IN | WEIGHT: 293 LBS | DIASTOLIC BLOOD PRESSURE: 51 MMHG | OXYGEN SATURATION: 99 % | SYSTOLIC BLOOD PRESSURE: 149 MMHG | BODY MASS INDEX: 47.09 KG/M2 | RESPIRATION RATE: 16 BRPM | HEART RATE: 89 BPM | TEMPERATURE: 97.6 F

## 2025-06-05 PROCEDURE — 250N000013 HC RX MED GY IP 250 OP 250 PS 637: Performed by: NURSE PRACTITIONER

## 2025-06-05 PROCEDURE — 124N000001 HC R&B MH

## 2025-06-05 PROCEDURE — 250N000013 HC RX MED GY IP 250 OP 250 PS 637: Performed by: PSYCHIATRY & NEUROLOGY

## 2025-06-05 PROCEDURE — 99239 HOSP IP/OBS DSCHRG MGMT >30: CPT | Performed by: PSYCHIATRY & NEUROLOGY

## 2025-06-05 RX ORDER — ATOMOXETINE 40 MG/1
40 CAPSULE ORAL EVERY MORNING
Qty: 30 CAPSULE | Refills: 0 | Status: SHIPPED | OUTPATIENT
Start: 2025-06-06 | End: 2025-07-06

## 2025-06-05 RX ORDER — ESTRADIOL 0.05 MG/D
1 PATCH TRANSDERMAL WEEKLY
Status: DISPENSED | OUTPATIENT
Start: 2025-06-05

## 2025-06-05 RX ORDER — ESTRADIOL 0.05 MG/D
1 PATCH TRANSDERMAL WEEKLY
Status: DISCONTINUED | OUTPATIENT
Start: 2025-06-05 | End: 2025-06-05

## 2025-06-05 RX ORDER — ESTRADIOL 0.05 MG/D
1 PATCH, EXTENDED RELEASE TRANSDERMAL
Status: DISCONTINUED | OUTPATIENT
Start: 2025-06-05 | End: 2025-06-05

## 2025-06-05 RX ADMIN — LAMOTRIGINE 100 MG: 100 TABLET ORAL at 13:18

## 2025-06-05 RX ADMIN — FERROUS SULFATE TAB 325 MG (65 MG ELEMENTAL FE) 325 MG: 325 (65 FE) TAB at 08:37

## 2025-06-05 RX ADMIN — DILTIAZEM HYDROCHLORIDE 180 MG: 180 CAPSULE, COATED, EXTENDED RELEASE ORAL at 20:34

## 2025-06-05 RX ADMIN — GABAPENTIN 300 MG: 300 CAPSULE ORAL at 13:18

## 2025-06-05 RX ADMIN — GABAPENTIN 300 MG: 300 CAPSULE ORAL at 20:34

## 2025-06-05 RX ADMIN — Medication 125 MCG: at 08:37

## 2025-06-05 RX ADMIN — LAMOTRIGINE 100 MG: 100 TABLET ORAL at 20:34

## 2025-06-05 RX ADMIN — FAMOTIDINE 20 MG: 20 TABLET, FILM COATED ORAL at 08:37

## 2025-06-05 RX ADMIN — MONTELUKAST 10 MG: 10 TABLET, FILM COATED ORAL at 20:34

## 2025-06-05 RX ADMIN — ARIPIPRAZOLE 20 MG: 10 TABLET ORAL at 20:33

## 2025-06-05 RX ADMIN — VORTIOXETINE 10 MG: 10 TABLET, FILM COATED ORAL at 08:37

## 2025-06-05 RX ADMIN — LAMOTRIGINE 100 MG: 100 TABLET ORAL at 08:37

## 2025-06-05 RX ADMIN — BUSPIRONE HYDROCHLORIDE 10 MG: 10 TABLET ORAL at 20:34

## 2025-06-05 RX ADMIN — ESTRADIOL 1 PATCH: 0.05 PATCH TRANSDERMAL at 16:19

## 2025-06-05 RX ADMIN — GABAPENTIN 300 MG: 300 CAPSULE ORAL at 08:37

## 2025-06-05 RX ADMIN — ACETAMINOPHEN 650 MG: 325 TABLET, FILM COATED ORAL at 17:51

## 2025-06-05 RX ADMIN — Medication 125 MG: at 08:37

## 2025-06-05 RX ADMIN — BUSPIRONE HYDROCHLORIDE 10 MG: 10 TABLET ORAL at 13:18

## 2025-06-05 RX ADMIN — FAMOTIDINE 20 MG: 20 TABLET, FILM COATED ORAL at 20:34

## 2025-06-05 RX ADMIN — RAMELTEON 8 MG: 8 TABLET, FILM COATED ORAL at 20:34

## 2025-06-05 RX ADMIN — AMLODIPINE BESYLATE 2.5 MG: 2.5 TABLET ORAL at 08:37

## 2025-06-05 RX ADMIN — ATOMOXETINE HYDROCHLORIDE 40 MG: 40 CAPSULE ORAL at 08:37

## 2025-06-05 RX ADMIN — BUSPIRONE HYDROCHLORIDE 10 MG: 10 TABLET ORAL at 08:37

## 2025-06-05 RX ADMIN — DILTIAZEM HYDROCHLORIDE 180 MG: 180 CAPSULE, COATED, EXTENDED RELEASE ORAL at 08:37

## 2025-06-05 RX ADMIN — HYDROXYZINE HYDROCHLORIDE 50 MG: 25 TABLET, FILM COATED ORAL at 20:34

## 2025-06-05 ASSESSMENT — ACTIVITIES OF DAILY LIVING (ADL)
ADLS_ACUITY_SCORE: 25
ADLS_ACUITY_SCORE: 25
DRESS: SCRUBS (BEHAVIORAL HEALTH);INDEPENDENT
ADLS_ACUITY_SCORE: 25
HYGIENE/GROOMING: INDEPENDENT
ADLS_ACUITY_SCORE: 25
ORAL_HYGIENE: INDEPENDENT
ADLS_ACUITY_SCORE: 25

## 2025-06-05 NOTE — PLAN OF CARE
Problem: Adult Behavioral Health Plan of Care  Goal: Patient-Specific Goal (Individualization)  Description: Pt will follow recommendations of treatment team.  Pt will be compliant with medications.  Pt will attend 50 % of groups.  Pt will sleep 6-8 hours each night.  Wake pt up every 2 hours to use the restroom.    6/5/2025 Patient may use figit spinner et al  Outcome: Progressing  Note:     0930 Patient is alert and up on the unit this morning. Patient denies having pain and physical problems this morning except for the urinary incontinence. Patient is steady on her feet. Pleasant. Admits to some depression and anxiety but denies suicidal thoughts and auditory hallucinations. Patient requested to be able to use a figit spinner and  Has approved. Ate well for breakfast meal. Patient attending groups.    1300 Patient up on the unit. States she learned that she can go to [facility] on Monday.Patient states that she isn't sure if she wants to go home before she goes to [facility]. Patient encouraged to take her time to think about it. Patient also states she needs to take her vivelle dot patch today. MD advised. New order received. Ate well for lunch meal.    Face to face end of shift report communicated to 3-1130 shift RN.     Kimberlee Marquez, RN  6/5/2025  1:03 PM            Problem: Suicide Risk  Goal: Absence of Self-Harm  Description: Pt will remain free from self harm/injury.  Pt will verbalize 2-3 coping skills.  Outcome: Progressing   Goal Outcome Evaluation:    Plan of Care Reviewed With: patient

## 2025-06-05 NOTE — PLAN OF CARE
Problem: Adult Behavioral Health Plan of Care  Goal: Patient-Specific Goal (Individualization)  Description: Pt will follow recommendations of treatment team.  Pt will be compliant with medications.  Pt will attend 50 % of groups.  Pt will sleep 6-8 hours each night.  Wake pt up every 2 hours to use the restroom.  Outcome: Progressing  Note: Report received from Noemy. Rounding complete. Pt observed sleeping in supine position with regular and unlabored respirations. Pt refused UA attempts to get up and void  earlier in the shift so writer went and told pt she needed to get up and go at this time to prevent any incontinence issues and she did without issue when this writer asked. She was found to have a wet mike and pants which were immediately cleaned and removed from her room. Writer spoke with pt about reasoning behind toileting at night and she is agreeable to get up and use the restroom when UA's ask in the future. Pt is pleasant and cooperative with this writer but has been somewhat dismissive at times with the UA's.     Pt has been in bed with eyes closed and regular respirations. 15 minute and PRN checks all night. No complaints offered.     Pt slept approx  7  hours this NOC shift.    Face to face end of shift report communicated to oncoming RN.    Kelly RODRIGUEZ RN  June 5, 2025  5:21 AM          Problem: Suicide Risk  Goal: Absence of Self-Harm  Description: Pt will remain free from self harm/injury.  Pt will verbalize 2-3 coping skills.  Outcome: Progressing  Note: Pt has remained free of injury and self harm.    Goal Outcome Evaluation:

## 2025-06-05 NOTE — PROGRESS NOTES
06/05/25 1500   Appointment Info   Signing Clinician's Name / Credentials (OT) Malaika Saldana OTR/L   Appointment Canceled Reason (OT) Patient declined   Appointment Cancel Comments (OT) Pt declined OT eval at this time stating that she is bed and taking a nap.  Requests this writer come back later.  Will attempt OT eval tommorrow as time allows.

## 2025-06-05 NOTE — PLAN OF CARE
Face to face shift report received from previous shift RN.       Problem: Adult Behavioral Health Plan of Care  Goal: Patient-Specific Goal (Individualization)  Description: Pt will follow recommendations of treatment team.  Pt will be compliant with medications.  Pt will attend 50 % of groups.  Pt will sleep 6-8 hours each night.  Wake pt up every 2 hours to use the restroom.  Outcome: Progressing   Calm and cooperative. Medication compliant. Reports anxiety due to not knowing when she will discharge. However, pt reports feeling hopeful to be discharging soon to [facility]. Pleasant and appropriate during conversation. Spends majority of shift in lounge area. Attends groups. Social with others.       Problem: Suicide Risk  Goal: Absence of Self-Harm  Description: Pt will remain free from self harm/injury.  Pt will verbalize 2-3 coping skills.  Outcome: Progressing       Face to face end of shift report to be communicated to oncoming RN.

## 2025-06-06 ENCOUNTER — APPOINTMENT (OUTPATIENT)
Dept: OCCUPATIONAL THERAPY | Facility: HOSPITAL | Age: 50
DRG: 885 | End: 2025-06-06
Attending: STUDENT IN AN ORGANIZED HEALTH CARE EDUCATION/TRAINING PROGRAM
Payer: MEDICARE

## 2025-06-06 VITALS
HEART RATE: 90 BPM | OXYGEN SATURATION: 100 % | BODY MASS INDEX: 47.09 KG/M2 | WEIGHT: 293 LBS | RESPIRATION RATE: 16 BRPM | SYSTOLIC BLOOD PRESSURE: 126 MMHG | HEIGHT: 66 IN | DIASTOLIC BLOOD PRESSURE: 66 MMHG | TEMPERATURE: 98.4 F

## 2025-06-06 PROCEDURE — 97165 OT EVAL LOW COMPLEX 30 MIN: CPT | Mod: GO

## 2025-06-06 PROCEDURE — 250N000013 HC RX MED GY IP 250 OP 250 PS 637: Performed by: PSYCHIATRY & NEUROLOGY

## 2025-06-06 RX ADMIN — LAMOTRIGINE 100 MG: 100 TABLET ORAL at 08:27

## 2025-06-06 RX ADMIN — FERROUS SULFATE TAB 325 MG (65 MG ELEMENTAL FE) 325 MG: 325 (65 FE) TAB at 08:27

## 2025-06-06 RX ADMIN — Medication 125 MG: at 08:27

## 2025-06-06 RX ADMIN — ATOMOXETINE HYDROCHLORIDE 40 MG: 40 CAPSULE ORAL at 08:27

## 2025-06-06 RX ADMIN — BUSPIRONE HYDROCHLORIDE 10 MG: 10 TABLET ORAL at 08:27

## 2025-06-06 RX ADMIN — FAMOTIDINE 20 MG: 20 TABLET, FILM COATED ORAL at 08:27

## 2025-06-06 RX ADMIN — AMLODIPINE BESYLATE 2.5 MG: 2.5 TABLET ORAL at 08:27

## 2025-06-06 RX ADMIN — VORTIOXETINE 10 MG: 10 TABLET, FILM COATED ORAL at 08:27

## 2025-06-06 RX ADMIN — Medication 125 MCG: at 08:28

## 2025-06-06 RX ADMIN — DILTIAZEM HYDROCHLORIDE 180 MG: 180 CAPSULE, COATED, EXTENDED RELEASE ORAL at 08:27

## 2025-06-06 RX ADMIN — GABAPENTIN 300 MG: 300 CAPSULE ORAL at 08:27

## 2025-06-06 ASSESSMENT — ACTIVITIES OF DAILY LIVING (ADL)
ADLS_ACUITY_SCORE: 25
DRESS: SCRUBS (BEHAVIORAL HEALTH);INDEPENDENT
ADLS_ACUITY_SCORE: 25
ORAL_HYGIENE: INDEPENDENT
LAUNDRY: UNABLE TO COMPLETE
ADLS_ACUITY_SCORE: 25
HYGIENE/GROOMING: INDEPENDENT
ADLS_ACUITY_SCORE: 25
ADLS_ACUITY_SCORE: 25

## 2025-06-06 NOTE — PLAN OF CARE
Problem: Adult Behavioral Health Plan of Care  Goal: Patient-Specific Goal (Individualization)  Description: Pt will follow recommendations of treatment team.  Pt will be compliant with medications.  Pt will attend 50 % of groups.  Pt will sleep 6-8 hours each night.  Wake pt up every 2 hours to use the restroom.    6/5/2025 Patient may use art garcía et al  Outcome: Progressing  Note: Report received from Sunny. Pt is pleasant and cooperative with a full range and bright affect. She is appropriately social with peers and is able to make her needs known. Pt did request an additional pillow which was provided due to pt report of 4/10 neck pain. Pt was offered and accepted Tylenol 650 mg for this. Pt reported anxiety 2/10, depression 3/10, and denied any issues with AVH, sleeping, eating, bowel, or bladder. Pt stated that she is feeling much better now and feels that she is ready to go home tomorrow until the bed at [facility] is available on Monday.     Continued care from evening shift. Rounding complete. Pt observed sleeping in right side lying position with regular and unlabored respirations.    Pt has been in bed with eyes closed and regular respirations. 15 minute and PRN checks all night. No complaints offered.     Pt slept approx  5.5  hours this NOC shift.    Face to face end of shift report communicated to oncoming RN.    Kelly RODRIGUEZ RN  June 6, 2025  1:09 AM          Problem: Suicide Risk  Goal: Absence of Self-Harm  Description: Pt will remain free from self harm/injury.  Pt will verbalize 2-3 coping skills.  Outcome: Progressing  Note: Pt denied SI/ HI and has remained free of injury and self harm.

## 2025-06-06 NOTE — DISCHARGE SUMMARY
[facility] PSYCHIATRY  DISCHARGE SUMMARY     DISCHARGE DATA     Rosi Lmab MRN# [id number]   Age: 50 year old YOB: 1975     Date of Admission: 6/1/2025  Date of Discharge: June 6, 2025  Discharge Provider: Elmer Mathews MD       REASON FOR ADMISSION   Rosi Lamb is a 50 year old female with a past psychiatric history notable for bipolar I disorder, generalized anxiety disorder, borderline personality disorder. Multiple prior inpatient psychiatric hospitalizations. At least four hospitalizations in 2025.  Presents to emergency department twice in 24 hour period (returned home the first time after psychiatric clearance and then returned) with complaints of suicidal ideation, worsening anxiety and depressive symptoms. Patient was subsequently transferred and accepted for inpatient psychiatric hospitalization at [facility] for further safety and further stabilization           DISCHARGE DIAGNOSES   #Bipolar I Disorder, Current Episode Depressed  #Generalized Anxiety Disorder  #Borderline Personality Disorder       HOSPITAL COURSE   Patient was admitted to unit 5 due to the aforementioned presentation. The patient was placed under 15 minute checks to ensure patient safety. The patient participated in unit programming and groups as able.    Legal status during hospitalization was voluntary .Ms. Lamb did not require seclusion/restraint during hospitalization.     We reviewed with Ms. Lamb current and past medication trials including duration, dose, response and side effects. During this hospitalization, the following changes to the patient's psychotropic medications were made:    PTA medications held:   -none     PTA medications continued/changed:   -abilify 20 mg at bedtime  -atomoxetine 25 mg q AM --> increased to 40 mg q daily   -gabapentin 300 mg TID  -hydroxyzine 50 mg at bedtime  -lamotrigine 100 mg TID  -vortioxetine 10 mg q daily  (recently started May 2025, stopped vilazodone)     New medications initiated:   -none      Ms. Lamb progressed gradually related to response to medication changes, confidence in skills to manage symptoms, and establishment of a supportive community network . By the time of discharge, her symptoms had improved significantly.  Depression improved with increased confidence in ability to manage symptoms., Anxiety improved with increased confidence in ability to manage symptoms., Suicidal ideation resolved with adequate outpatient plan in place. , and Psychotropic medications utilized were found to be helpful without significant adverse side effects. The treatment goals (long-term goal/discharge criteria) were reached.     With the aforementioned changes and supports the patient noticed improvement in their symptoms and felt sufficiently ready for discharge. As a result, Rosi Lamb was discharged. At the time of discharge, Rosi Lamb was determined to not be a danger to self or others. The patient was also medically stable for discharge. At the current time of discharge, the patient does not meet criteria for involuntary hospitalization. On the day of discharge, the patient reports that they do not have suicidal or homicidal ideation. Steps taken to minimize risk include: assessing patient s behavior and thought process daily during hospital stay, discharging patient with adequate plan for follow up for mental and physical health and discussing safety plan of returning to the hospital should the patient ever have thoughts of harming themselves or others. Therefore, based on all available evidence including the factors cited above, the patient does not appear to be at imminent risk for self-harm, and is appropriate for outpatient level of care. The acute crisis is resolved and Rosi is discharging in improved condition. Though Rosi's acute crisis has resolved, there remains a higher risk of  suicide over the long term compared to the general population. Factors that may be associated with relapse include worsening of depressive symptoms, alcohol use, illicit substance use, not taking medications, lack of mental health follow-up appointments and work/family/relationship stressors. Rosi Lamb has a plan in place to mitigate these stressors, is hopeful about the future ,and is not assessed to be a imminent risk to self or anyone else and thus is not assessed to be holdable.  Rosi has received maximal benefit from the current hospital stay. Rosi Lamb set to discharge.        DISCHARGE MEDICATIONS     Current Discharge Medication List        CONTINUE these medications which have CHANGED    Details   atomoxetine (STRATTERA) 40 MG capsule Take 1 capsule (40 mg) by mouth every morning.  Qty: 30 capsule, Refills: 0    Associated Diagnoses: Borderline personality disorder (H)           CONTINUE these medications which have NOT CHANGED    Details   acetaminophen (TYLENOL) 500 MG tablet Take 1-2 tablets (500-1,000 mg) by mouth every 6 hours as needed for mild pain  Qty: 90 tablet, Refills: 4    Associated Diagnoses: Chronic pain of right knee      albuterol (VENTOLIN HFA) 108 (90 Base) MCG/ACT inhaler Inhale 1-2 puffs into the lungs every 4 hours as needed for shortness of breath or wheezing  Qty: 18 g, Refills: 11    Comments: Pharmacy may dispense brand covered by insurance (Proair, or proventil or ventolin or generic albuterol inhaler)  Associated Diagnoses: Moderate persistent asthma without complication      amLODIPine (NORVASC) 2.5 MG tablet Take 1 tablet (2.5 mg) by mouth every morning. May also take 1 tablet (2.5 mg) every evening as needed (-- for Raynaud's / Hand color changes --).  Qty: 60 tablet, Refills: 1    Associated Diagnoses: Raynaud's phenomenon without gangrene      ARIPiprazole (ABILIFY) 20 MG tablet Take 1 tablet (20 mg) by mouth at bedtime.  Qty: 30 tablet, Refills: 1     Associated Diagnoses: Bipolar affective disorder, currently depressed, moderate (H)      busPIRone (BUSPAR) 10 MG tablet Take 1 tablet (10 mg) by mouth 3 times daily.  Qty: 90 tablet, Refills: 1    Associated Diagnoses: Bipolar affective disorder, currently depressed, moderate (H)      cholecalciferol (VITAMIN D3) 125 mcg (5000 units) capsule Take 1 capsule (125 mcg) by mouth daily.  Qty: 30 capsule, Refills: 1    Associated Diagnoses: Vitamin D deficiency      diltiazem ER COATED BEADS (CARDIZEM CD/CARTIA XT) 180 MG 24 hr capsule Take 1 capsule (180 mg) by mouth 2 times daily.  Qty: 60 capsule, Refills: 1    Associated Diagnoses: Atrial fibrillation with rapid ventricular response (H)      docusate sodium (EQ STOOL SOFTENER) 100 MG capsule Take 1 capsule (100 mg) by mouth 2 times daily as needed for constipation.  Qty: 60 capsule, Refills: 1    Associated Diagnoses: Bipolar affective disorder, currently depressed, moderate (H)      estradiol (VIVELLE-DOT) 0.05 MG/24HR bi-weekly patch Place 1 patch onto the skin twice a week.  Qty: 9 patch, Refills: 1    Associated Diagnoses: Hot flashes      famotidine (PEPCID) 20 MG tablet Take 1 tablet (20 mg) by mouth 2 times daily. - For Heartburn  Qty: 60 tablet, Refills: 1    Associated Diagnoses: Gastroesophageal reflux disease without esophagitis      gabapentin (NEURONTIN) 300 MG capsule Take 1 capsule (300 mg) by mouth 3 times daily.  Qty: 90 capsule, Refills: 1    Associated Diagnoses: Generalized anxiety disorder      !! hydrOXYzine (VISTARIL) 50 MG capsule Take 50 mg by mouth 2 times daily as needed for anxiety. (Must have 4-6 hours between doses).      !! hydrOXYzine (VISTARIL) 50 MG capsule Take 50 mg by mouth at bedtime.      ibuprofen (ADVIL/MOTRIN) 200 MG tablet Take 4 tablets (800 mg) by mouth every 8 hours as needed (back pain/ headache).    Associated Diagnoses: Chronic pain of right knee      lamoTRIgine (LAMICTAL) 100 MG tablet Take 1 tablet (100 mg) by mouth  "3 times daily.  Qty: 90 tablet, Refills: 1    Associated Diagnoses: Bipolar affective disorder, currently depressed, moderate (H)      montelukast (SINGULAIR) 10 MG tablet Take 1 tablet (10 mg) by mouth at bedtime.  Qty: 30 tablet, Refills: 1    Associated Diagnoses: Moderate persistent asthma without complication      ramelteon (ROZEREM) 8 MG tablet Take 1 tablet (8 mg) by mouth nightly as needed for sleep.  Qty: 30 tablet, Refills: 1    Associated Diagnoses: Primary insomnia      VITRON-C  MG TABS tablet TAKE 1 TABLET BY MOUTH ONCE DAILY ON AN EMPTY STOMACH FOR  IRON  DEFICIENCY  Qty: 60 tablet, Refills: 0    Associated Diagnoses: Iron deficiency anemia, unspecified iron deficiency anemia type      vortioxetine (TRINTELLIX) 10 MG tablet Take 10 mg by mouth every morning.       !! - Potential duplicate medications found. Please discuss with provider.        STOP taking these medications       progesterone (PROMETRIUM) 100 MG capsule Comments:   Reason for Stopping:                VITALS   Vitals: BP (!) 149/51   Pulse 89   Temp 97.6  F (36.4  C) (Temporal)   Resp 16   Ht 1.676 m (5' 6\")   Wt (!) 137.8 kg (303 lb 12.8 oz)   SpO2 99%   BMI 49.03 kg/m       MENTAL STATUS EXAM   Appearance: Alert, oriented, dressed in hospital scrubs, appears stated age   Attitude: Cooperative   Eye Contact: Good  Mood: \"Better\"  Affect: Full range of affect  Speech: Normal rate and rhythm   Psychomotor Behavior: No tremor, rigidity, or psychomotor abnormality   Thought Process: Logical, goal directed   Associations: No loose associations   Thought Content: Denies SI or plan. No SIB. Denies A/V hallucinations. No evidence of delusional thought.  Insight: Good  Judgment: Good  Oriented to: Person, place, and time  Attention Span and Concentration: Intact  Recent and Remote Memory: Intact  Language: English with appropriate syntax and vocabulary  Fund of Knowledge: Average  Muscle Strength and Tone: Grossly normal  Gait and " Station: Grossly normal       DISCHARGE PLAN     1.  Education given regarding diagnostic and treatment options with risks, benefits and alternatives with adequate verbalization of understanding.  2.  Discharge to home. Upon detailed review of risk factors, patient amenable for release.   3.  Continue aforementioned medications and associated medication changes with follow-up by outpatient provider.  4.  Crisis management planning in place.    5.  Nursing and  to review further discharge recommendations.   6.  Active issues: No active medical issues requiring immediate follow-up care.  7.  Patient is being discharged with the following appointments as detailed below:    See AVS       DISCHARGE SERVICES PROVIDED     80 minutes spent on discharge services, including:  Final examination of patient.  Review and discussion of hospital stay.  Instructions for continued outpatient care/goals.  Preparation of discharge records.  Preparation of medications refills and new prescriptions.  Preparation of applicable referral forms.        ATTESTATION   Elmer Mathews MD  [facility]  Type of Service: video visit for mental health treatment  Reason for Video Visit: COVID-[place]  Originating Site (patient location): [facility]  Distant Site (provider location): Remote Location  Mode of Communication: Video Conference via [employer]  Time of Service: Date: June 5, 2025 , Start: 07:00,  Stop: 08:00     LABS THIS ADMISSION     Results for orders placed or performed during the hospital encounter of 06/01/25   Influenza A/B, RSV and SARS-CoV2 PCR (COVID-19) Nasopharyngeal     Status: Normal    Specimen: Nasopharyngeal; Swab   Result Value Ref Range    Influenza A PCR Negative Negative    Influenza B PCR Negative Negative    RSV PCR Negative Negative    SARS CoV2 PCR Negative Negative    Narrative    Testing was performed using the Xpert Xpress CoV2/Flu/RSV Assay on  the [employer] GeneXpert Instrument. This test should be ordered for the detection of SARS-CoV2, influenza, and RSV viruses in individuals with signs and symptoms of respiratory tract infection. This test is for in vitro diagnostic use under the [place] FDA for laboratories certified under CLIA to perform high or moderate complexity testing. This test has been [place] FDA cleared. A negative result does not rule out the presence of PCR inhibitors in the specimen or target RNA in concentration below the limit of detection for the assay. If only one viral target is positive but coinfection with multiple targets is suspected, the sample should be re-tested with another FDA cleared, approved, or authorized test, if coninfection would change clinical management. This test was validated by the [facility]. These laboratories are certified under the Clinical Laboratory Improvement Amendments of 1988 (CLIA-88) as qualified to perfom high complexity laboratory testing.

## 2025-06-06 NOTE — PROGRESS NOTES
06/06/25 0900   Appointment Info   Signing Clinician's Name / Credentials (OT) Malaika Saldana, OTR/L   OT Total Evaluation Time   OT Eval, Low Complexity Minutes (95565) 12   OT Goals   Therapy Frequency (OT) Other (see comments)  (Eval only)   OT Predicted Duration/Target Date for Goal Attainment 06/06/25   OT Goals OT Goal 1   OT: Goal 1 Pt will create a list of daily tasks to complete to aide in home management and cleanliness.   OT Discharge Planning   OT Plan Will discharge at this time, planned DC from hospital today. No other needs at this time.   OT Discharge Recommendation (DC Rec) home;other (see comments)  (and then [facility] crisis bed on Monday when bed is available)   OT Rationale for DC Rec Clinical judgment   OT Brief overview of current status created a list of tasks to get her apt clean.  Pt is encouraged to pick one task each day to complete   Total Session Time   Total Session Time (sum of timed and untimed services) 12

## 2025-06-06 NOTE — DISCHARGE INSTRUCTIONS
Behavioral Discharge Planning and Instructions    Summary: Presents to emergency department twice in 24 hour period (returned home the first time after psychiatric clearance and then returned) with complaints of suicidal ideation, worsening anxiety and depressive symptoms.     Main Diagnosis: Bipolar I Disorder, Current Episode Depressed  #Generalized Anxiety Disorder  #Borderline Personality Disorder       Health Care Follow-up:     [facility]   Monday  [address]   [place] [address]  Mobile: [phone]  Phone: [phone]   Fax: [phone]     Attend all scheduled appointments with your outpatient providers. Call at least 24 hours in advance if you need to reschedule an appointment to ensure continued access to your outpatient providers.     Major Treatments, Procedures and Findings:  You were provided with: a psychiatric assessment, assessed for medical stability, medication evaluation and/or management, group therapy, family therapy, individual therapy, CD evaluation/assessment, milieu management, and medical interventions    Symptoms to Report: feeling more aggressive, increased confusion, losing more sleep, mood getting worse, or thoughts of suicide    Early warning signs can include: increased depression or anxiety sleep disturbances increased thoughts or behaviors of suicide or self-harm  increased unusual thinking, such as paranoia or hearing voices    Safety and Wellness:  Take all medicines as directed.  Make no changes unless your doctor suggests them.      Follow treatment recommendations.  Refrain from alcohol and non-prescribed drugs.  Ask your support system to help you reduce your access to items that could harm yourself or others. Items could include:  Firearms  Medicines (both prescribed and over-the-counter)  Knives and other sharp objects  Ropes and like materials  Car keys  If there is a concern for safety, call 321. If there is a concern for safety, call  "911.    Resources:   Crisis Intervention: 692.740.7461 or 612-211-8609 (TTY: 747.356.7216).  Call anytime for help.  National [place] on Mental Illness (www.[place].lazarus.org): 827.631.8593 or 486-223-5999.  [place] Association for Children's Mental Health (www.mac.org): 679.526.2461.  Alcoholics Anonymous (www.alcoholics-anonymous.org): Check your phone book for your local chapter.  Suicide Awareness Voices of Education (SAVE) (www.save.org): 717-941-XTNS (9621)  National Suicide Prevention Line (www.mentalhealthmn.org): 757-340-ASXD (0397)  Mental Health Consumer/Survivor Network of [place] (www.mhcsn.net): 927.210.2612 or 124-304-1158  Mental Health Association of [place] (www.mentalhealth.org): 227.644.7592 or 024-033-1420  Self- Management and Recovery Training., SMART-- Toll free: 158.377.2822  www.[employer]  Text 4 Life: txt \"LIFE\" to 69345 for immediate support and crisis intervention  Crisis text line: Text \"[place]\" to 824843. Free, confidential, 24/7.  Crisis Intervention: 675.139.5718 or 411-092-8382. Call anytime for help.     General Medication Instructions:   See your medication sheet(s) for instructions.   Take all medicines as directed.  Make no changes unless your doctor suggests them.   Go to all your doctor visits.  Be sure to have all your required lab tests. This way, your medicines can be refilled on time.  Do not use any drugs not prescribed by your doctor.  Avoid alcohol.    Advance Directives:   Scanned document on file with [employer]? Yes, scanned ACP docmt  Is document scanned? Current scanned  Honoring Choices Your Rights Handout: Informed and given  Was more information offered? Pt declined    The Treatment team has appreciated the opportunity to work with you. If you have any questions or concerns about your recent admission, you can contact the unit which can receive your call 24 hours a day, 7 days a week. They will be able to get in touch with a Provider if needed. The unit number is [phone] " .

## 2025-06-06 NOTE — PLAN OF CARE
Discharge Note    Patient Discharged to home on 6/6/2025 11:15 AM via [employer] accompanied to door by 5 [facility] staff.     Patient informed of discharge instructions in AVS. patient verbalizes understanding and denies having any questions pertaining to AVS. Patient stable at time of discharge. Patient denies SI, HI, and thoughts of self harm at time of discharge. All personal belongings returned to patient. Discharge prescriptions sent to [facility] via electronic communication. Psych evaluation, history and physical, AVS, and discharge summary faxed to next level of care- per .     Patient calm and cooperative.  All morning medications given.  Patient feels safe to discharge home with plan to go to [facility] on Monday.  No complaints of pain.  VS WNL.      Sabrina Zelaya RN  6/6/2025  12:14 PM  Problem: Adult Behavioral Health Plan of Care  Goal: Patient-Specific Goal (Individualization)  Description: Pt will follow recommendations of treatment team.  Pt will be compliant with medications.  Pt will attend 50 % of groups.  Pt will sleep 6-8 hours each night.  Wake pt up every 2 hours to use the restroom.    6/5/2025 Patient may use art carey  Outcome: Adequate for Care Transition     Problem: Suicide Risk  Goal: Absence of Self-Harm  Description: Pt will remain free from self harm/injury.  Pt will verbalize 2-3 coping skills.  Outcome: Adequate for Care Transition

## 2025-06-06 NOTE — PROGRESS NOTES
Sw called [facility] for ride. They will be picking up around 11am.     Pt is discharging at the recommendation of the treatment team. Pt is discharging to home transported by [facility]. Pt denies having any thoughts of hurting themself or anyone else. Pt denies anxiety or depression. Pt has follow up with [facility]. Discharge instructions, including; demographic sheet, psychiatric evaluation, discharge summary, and AVS were faxed to these next level of care providers.
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
## 2025-06-09 ENCOUNTER — LAB (OUTPATIENT)
Dept: LAB | Facility: OTHER | Age: 50
End: 2025-06-09
Payer: MEDICARE

## 2025-06-09 DIAGNOSIS — N92.1 MENORRHAGIA WITH IRREGULAR CYCLE: ICD-10-CM

## 2025-06-09 DIAGNOSIS — Z79.899 ENCOUNTER FOR LONG-TERM (CURRENT) USE OF MEDICATIONS: ICD-10-CM

## 2025-06-09 LAB
ALBUMIN SERPL BCG-MCNC: 4.3 G/DL (ref 3.5–5.2)
ALP SERPL-CCNC: 86 U/L (ref 40–150)
ALT SERPL W P-5'-P-CCNC: 21 U/L (ref 0–50)
ANION GAP SERPL CALCULATED.3IONS-SCNC: 12 MMOL/L (ref 7–15)
AST SERPL W P-5'-P-CCNC: 26 U/L (ref 0–45)
BASOPHILS # BLD AUTO: 0 10E3/UL (ref 0–0.2)
BASOPHILS NFR BLD AUTO: 0 %
BILIRUB SERPL-MCNC: 0.4 MG/DL
BUN SERPL-MCNC: 11.3 MG/DL (ref 6–20)
CALCIUM SERPL-MCNC: 9.5 MG/DL (ref 8.8–10.4)
CHLORIDE SERPL-SCNC: 101 MMOL/L (ref 98–107)
CHOLEST SERPL-MCNC: 173 MG/DL
CREAT SERPL-MCNC: 0.83 MG/DL (ref 0.51–0.95)
EGFRCR SERPLBLD CKD-EPI 2021: 86 ML/MIN/1.73M2
EOSINOPHIL # BLD AUTO: 0.1 10E3/UL (ref 0–0.7)
EOSINOPHIL NFR BLD AUTO: 1 %
ERYTHROCYTE [DISTWIDTH] IN BLOOD BY AUTOMATED COUNT: 12.7 % (ref 10–15)
EST. AVERAGE GLUCOSE BLD GHB EST-MCNC: 100 MG/DL
FASTING STATUS PATIENT QL REPORTED: NO
GLUCOSE SERPL-MCNC: 99 MG/DL (ref 70–99)
HBA1C MFR BLD: 5.1 %
HCO3 SERPL-SCNC: 27 MMOL/L (ref 22–29)
HCT VFR BLD AUTO: 40.4 % (ref 35–47)
HDLC SERPL-MCNC: 60 MG/DL
HGB BLD-MCNC: 13.1 G/DL (ref 11.7–15.7)
IMM GRANULOCYTES # BLD: 0 10E3/UL
IMM GRANULOCYTES NFR BLD: 0 %
IRON SERPL-MCNC: 72 UG/DL (ref 37–145)
LDLC SERPL CALC-MCNC: 100 MG/DL
LYMPHOCYTES # BLD AUTO: 1.7 10E3/UL (ref 0.8–5.3)
LYMPHOCYTES NFR BLD AUTO: 21 %
MAGNESIUM SERPL-MCNC: 2 MG/DL (ref 1.7–2.3)
MCH RBC QN AUTO: 30.3 PG (ref 26.5–33)
MCHC RBC AUTO-ENTMCNC: 32.4 G/DL (ref 31.5–36.5)
MCV RBC AUTO: 93 FL (ref 78–100)
MONOCYTES # BLD AUTO: 0.6 10E3/UL (ref 0–1.3)
MONOCYTES NFR BLD AUTO: 7 %
NEUTROPHILS # BLD AUTO: 6 10E3/UL (ref 1.6–8.3)
NEUTROPHILS NFR BLD AUTO: 71 %
NONHDLC SERPL-MCNC: 113 MG/DL
NRBC # BLD AUTO: 0 10E3/UL
NRBC BLD AUTO-RTO: 0 /100
PLATELET # BLD AUTO: 264 10E3/UL (ref 150–450)
POTASSIUM SERPL-SCNC: 4 MMOL/L (ref 3.4–5.3)
PROLACTIN SERPL 3RD IS-MCNC: 11 NG/ML (ref 5–23)
PROT SERPL-MCNC: 7.8 G/DL (ref 6.4–8.3)
RBC # BLD AUTO: 4.33 10E6/UL (ref 3.8–5.2)
SODIUM SERPL-SCNC: 140 MMOL/L (ref 135–145)
T4 FREE SERPL-MCNC: 1.15 NG/DL (ref 0.9–1.7)
TRIGL SERPL-MCNC: 64 MG/DL
TSH SERPL DL<=0.005 MIU/L-ACNC: 2.13 UIU/ML (ref 0.3–4.2)
VIT B12 SERPL-MCNC: 574 PG/ML (ref 232–1245)
WBC # BLD AUTO: 8.4 10E3/UL (ref 4–11)

## 2025-06-09 PROCEDURE — 85004 AUTOMATED DIFF WBC COUNT: CPT | Mod: ZL

## 2025-06-09 PROCEDURE — 84481 FREE ASSAY (FT-3): CPT | Mod: ZL

## 2025-06-09 PROCEDURE — 84439 ASSAY OF FREE THYROXINE: CPT | Mod: ZL

## 2025-06-09 PROCEDURE — 83036 HEMOGLOBIN GLYCOSYLATED A1C: CPT | Mod: ZL

## 2025-06-09 PROCEDURE — 82306 VITAMIN D 25 HYDROXY: CPT | Mod: ZL

## 2025-06-09 PROCEDURE — 84155 ASSAY OF PROTEIN SERUM: CPT | Mod: ZL

## 2025-06-09 PROCEDURE — 83735 ASSAY OF MAGNESIUM: CPT | Mod: ZL

## 2025-06-09 PROCEDURE — 84443 ASSAY THYROID STIM HORMONE: CPT | Mod: ZL

## 2025-06-09 PROCEDURE — 83540 ASSAY OF IRON: CPT | Mod: ZL

## 2025-06-09 PROCEDURE — 82465 ASSAY BLD/SERUM CHOLESTEROL: CPT | Mod: ZL

## 2025-06-09 PROCEDURE — 36415 COLL VENOUS BLD VENIPUNCTURE: CPT | Mod: ZL

## 2025-06-09 PROCEDURE — 82607 VITAMIN B-12: CPT | Mod: ZL

## 2025-06-09 PROCEDURE — 83970 ASSAY OF PARATHORMONE: CPT | Mod: ZL

## 2025-06-09 PROCEDURE — 84146 ASSAY OF PROLACTIN: CPT | Mod: ZL

## 2025-06-10 LAB
PTH-INTACT SERPL-MCNC: 31 PG/ML (ref 15–65)
T3FREE SERPL-MCNC: 2.8 PG/ML (ref 2–4.4)
VIT D+METAB SERPL-MCNC: 52 NG/ML (ref 20–50)

## 2025-06-11 DIAGNOSIS — Z79.890 HORMONE REPLACEMENT THERAPY: Primary | ICD-10-CM

## 2025-06-11 NOTE — TELEPHONE ENCOUNTER
"Note from pharmacy  'patient requesting new rx for refills\"    Shirley Wren RN on 6/11/2025 at 10:05 AM    "

## 2025-06-12 RX ORDER — PROGESTERONE 100 MG/1
100 CAPSULE ORAL DAILY
Qty: 30 CAPSULE | Refills: 0 | Status: SHIPPED | OUTPATIENT
Start: 2025-06-12

## 2025-06-12 NOTE — TELEPHONE ENCOUNTER
[id number] -  sent Rx request for the following:      Requested Prescriptions   Pending Prescriptions Disp Refills    progesterone (PROMETRIUM) 100 MG capsule       Sig: Take 1 capsule (100 mg) by mouth daily.       Hormone Replacement Therapy Failed - 6/12/2025  8:37 AM        Failed - Medication is active on med list and the sig matches. RN to manually verify dose and sig if red X/fail.     If the protocol passes (green check), you do not need to verify med dose and sig.    A prescription matches if they are the same clinical intention.    For Example: once daily and every morning are the same.    The protocol can not identify upper and lower case letters as matching and will fail.     For Example: Take 1 tablet (50 mg) by mouth daily     TAKE 1 TABLET (50 MG) BY MOUTH DAILY    For all fails (red x), verify dose and sig.    If the refill does match what is on file, the RN can still proceed to approve the refill request.     If they do not match, route to the appropriate provider.        Failed - Recent (12 month) or future (90 days) visit with authorizing provider's specialty (provided they have been seen in the past 15 months)     The patient must have completed an in-person or virtual visit within the past 12 months or has a future visit scheduled within the next 90 days with the authorizing provider s specialty.  Urgent care and e-visits do not qualify as an office visit for this protocol.        Failed - Medication indicated for associated diagnosis     The medication is prescribed for one or more of the following conditions:    Menopause   Vulva/Vaginal atrophy   Low Estrogen   Gender Dysphoria   Male to Female transgender     Last Prescription Date:   04/08/2025  Last Fill Qty/Refills:         90, R-0  Last Office Visit:              03/14/2024   Future Office visit:           None    Pt needs a yearly appointment. Could be seen by one of the NP's.     Kajal Guevara RN on 6/12/2025 at 8:47  AM

## 2025-06-12 NOTE — TELEPHONE ENCOUNTER
Left message for patient to call back to schedule an appointment.  Adri Moore on 6/12/2025 at 11:05 AM

## 2025-07-10 ENCOUNTER — OFFICE VISIT (OUTPATIENT)
Dept: OBGYN | Facility: OTHER | Age: 50
End: 2025-07-10
Payer: MEDICARE

## 2025-07-10 VITALS
SYSTOLIC BLOOD PRESSURE: 124 MMHG | BODY MASS INDEX: 45.99 KG/M2 | OXYGEN SATURATION: 97 % | WEIGHT: 293 LBS | DIASTOLIC BLOOD PRESSURE: 80 MMHG | RESPIRATION RATE: 20 BRPM | HEIGHT: 67 IN | HEART RATE: 100 BPM

## 2025-07-10 DIAGNOSIS — N93.9 ABNORMAL UTERINE BLEEDING (AUB): ICD-10-CM

## 2025-07-10 DIAGNOSIS — Z79.890 HORMONE REPLACEMENT THERAPY: ICD-10-CM

## 2025-07-10 DIAGNOSIS — R45.86 MOOD SWINGS: Primary | ICD-10-CM

## 2025-07-10 DIAGNOSIS — R23.2 HOT FLASHES: ICD-10-CM

## 2025-07-10 LAB — FSH SERPL IRP2-ACNC: 7.7 MIU/ML

## 2025-07-10 PROCEDURE — G0463 HOSPITAL OUTPT CLINIC VISIT: HCPCS

## 2025-07-10 PROCEDURE — 83001 ASSAY OF GONADOTROPIN (FSH): CPT | Mod: ZL

## 2025-07-10 PROCEDURE — 36415 COLL VENOUS BLD VENIPUNCTURE: CPT | Mod: ZL

## 2025-07-10 RX ORDER — PROGESTERONE 100 MG/1
100 CAPSULE ORAL DAILY
Qty: 90 CAPSULE | Refills: 3 | Status: SHIPPED | OUTPATIENT
Start: 2025-07-10

## 2025-07-10 RX ORDER — ESTRADIOL 0.05 MG/D
1 PATCH, EXTENDED RELEASE TRANSDERMAL
Qty: 24 PATCH | Refills: 4 | Status: SHIPPED | OUTPATIENT
Start: 2025-07-10

## 2025-07-10 NOTE — NURSING NOTE
"Chief Complaint   Patient presents with    Medication Therapy Management     Renew meds        Initial /80   Pulse 100   Resp 20   Ht 1.702 m (5' 7\")   Wt 136.1 kg (300 lb)   SpO2 97%   BMI 46.99 kg/m   Estimated body mass index is 46.99 kg/m  as calculated from the following:    Height as of this encounter: 1.702 m (5' 7\").    Weight as of this encounter: 136.1 kg (300 lb).  Medication Reconciliation: complete    Rubia Welch LPN    "

## 2025-07-10 NOTE — PROGRESS NOTES
"Follow-Up Visit    S: Ms. Rosi Lamb is a 50 year old  here for replacement therapy along with progesterone refill.  Patient has been on this since 2024. She reports this has been greatly beneficial for her. She notes without this medication she has extreme hot flashes and mood issues. She has new diagnosis of atrial fibrillation as of 2024.  She is not currently anticoagulated.  She does take Cardizem for rate control.  She does have uterus in place and does take Prometrium as well as her Vivelle-Dot patch.  She is on 0.05 mg every 24 hours biweekly patch.  She notes no new abnormal symptoms.  She reports blood pressure has been stable.  No new hair skin I or other changes.    O:  /80   Pulse 100   Resp 20   Ht 1.702 m (5' 7\")   Wt 136.1 kg (300 lb)   SpO2 97%   BMI 46.99 kg/m    Gen: Well-appearing, NAD  Pulm: nonlabored      A/P:  Ms. Rosi Lamb is a 50 year old  here for follow-up of HRT.  Discussed with patient increased risk with A-fib and HRT.  She voiced that she understands these risks and wished to continue her current therapy.  She is wondering if we can do an FSH to assess whether she is close to menopause.  She does have a history of ablation so cycle utilization for menopause is not possible.  She continues to have severe symptoms without HRT.  She did try venlafaxine which was not helpful for her.  Patient is on multiple mental health medications.  She is already on gabapentin as well.  With risk benefits conversation with patient will fill HRT for her.  Will have her follow-up again in 1 year for refill of this.  Note faxed to [facility] to allow patient to have this prescription..    RENETTA Green-C  7/10/2025 2:30 PM    "

## 2025-07-12 DIAGNOSIS — K21.9 GASTROESOPHAGEAL REFLUX DISEASE WITHOUT ESOPHAGITIS: ICD-10-CM

## 2025-07-16 DIAGNOSIS — I48.91 ATRIAL FIBRILLATION WITH RAPID VENTRICULAR RESPONSE (H): ICD-10-CM

## 2025-07-17 RX ORDER — DILTIAZEM HYDROCHLORIDE 180 MG/1
180 CAPSULE, COATED, EXTENDED RELEASE ORAL 2 TIMES DAILY
Qty: 180 CAPSULE | Refills: 2 | Status: SHIPPED | OUTPATIENT
Start: 2025-07-17

## 2025-07-17 RX ORDER — FAMOTIDINE 20 MG/1
TABLET, FILM COATED ORAL
Qty: 180 TABLET | Refills: 0 | Status: SHIPPED | OUTPATIENT
Start: 2025-07-17

## 2025-07-17 NOTE — TELEPHONE ENCOUNTER
[facility] sent Rx request for the following:      Requested Prescriptions   Pending Prescriptions Disp Refills    famotidine (PEPCID) 20 MG tablet [Pharmacy Med Name: Famotidine 20 MG Oral Tablet] 180 tablet 0     Sig: TAKE 1 TABLET BY MOUTH TWICE DAILY FOR  HEARTBURN       H2 Blockers Protocol Failed - 7/17/2025  9:50 AM        Failed - Medication is active on med list and the sig matches. RN to manually verify dose and sig if red X/fail.     If the protocol passes (green check), you do not need to verify med dose and sig.    A prescription matches if they are the same clinical intention.    For Example: once daily and every morning are the same.    The protocol can not identify upper and lower case letters as matching and will fail.     For Example: Take 1 tablet (50 mg) by mouth daily     TAKE 1 TABLET (50 MG) BY MOUTH DAILY    For all fails (red x), verify dose and sig.    If the refill does match what is on file, the RN can still proceed to approve the refill request.    If they do not match, route to the appropriate provider.     Last Prescription Date:   4/28/25  Last Fill Qty/Refills:         60, R-1  Last Office Visit:              5/2/25   Future Office visit:           None    Kajal Guevara RN on 7/17/2025 at 9:53 AM

## 2025-07-17 NOTE — TELEPHONE ENCOUNTER
[id number] [place] sent Rx request for the following:      Requested Prescriptions   Pending Prescriptions Disp Refills    diltiazem ER COATED BEADS (CARDIZEM CD/CARTIA XT) 180 MG 24 hr capsule 60 capsule 1     Sig: Take 1 capsule (180 mg) by mouth 2 times daily.   Last Prescription Date:   4/28/25  Last Fill Qty/Refills:         60, R-1 (Ordered by [facility])      Last Office Visit:                5/2/25 4/28/25 (Physical)    Future Office visit:           None    Unable to complete prescription refill per RN Medication Refill Policy. Imelda Leyva, Refill RN .............. 7/17/2025  10:12 AM

## 2025-07-17 NOTE — TELEPHONE ENCOUNTER
Reason for call: Medication or medication refill    Have you contacted your pharmacy regarding this refill? Yes    If No, direct the patient to contact the Pharmacy and discontinue telephone note using Erroneous Encounter.  If Yes, continue.    Name of medication requested: Diltiazem ER coated beads 180MG    How many days of medication do you have left? 0    What pharmacy do you use? [facility]    Preferred method for responding to this message: Telephone Call    Phone number patient can be reached at: Cell number on file:    Telephone Information:   Mobile [phone]       If we cannot reach you directly, may we leave a detailed response at the number you provided? Yes    Aurora Purvis on 7/17/2025 at 9:40 AM

## 2025-07-25 DIAGNOSIS — D50.9 IRON DEFICIENCY ANEMIA, UNSPECIFIED IRON DEFICIENCY ANEMIA TYPE: ICD-10-CM

## 2025-07-30 RX ORDER — BACILLUS COAGULANS 1B CELL
CAPSULE ORAL
Qty: 60 TABLET | Refills: 0 | Status: SHIPPED | OUTPATIENT
Start: 2025-07-30

## 2025-07-30 NOTE — TELEPHONE ENCOUNTER
Vitron-C  MG Oral Tablet         Last Written Prescription Date:  5/6/25  Last Fill Quantity: 60,   # refills: 0  Last Office Visit: 5/2/25  Future Office visit:       Routing refill request to provider for review/approval because:  Drug not on the [facility] or [facility] refill protocol or controlled substance.  Unable to complete prescription refill per RNMedication Refill Policy.................... Geraldine Armendariz RN ....................  7/30/2025   6:39 AM

## 2025-08-12 ENCOUNTER — ALLIED HEALTH/NURSE VISIT (OUTPATIENT)
Dept: UROLOGY | Facility: OTHER | Age: 50
End: 2025-08-12
Payer: MEDICARE

## 2025-08-12 DIAGNOSIS — N32.81 OAB (OVERACTIVE BLADDER): Primary | ICD-10-CM

## 2025-08-14 ENCOUNTER — OFFICE VISIT (OUTPATIENT)
Dept: FAMILY MEDICINE | Facility: OTHER | Age: 50
End: 2025-08-14
Payer: MEDICARE

## 2025-08-14 ENCOUNTER — TRANSFERRED RECORDS (OUTPATIENT)
Dept: HEALTH INFORMATION MANAGEMENT | Facility: OTHER | Age: 50
End: 2025-08-14

## 2025-08-14 VITALS
HEART RATE: 82 BPM | WEIGHT: 293 LBS | OXYGEN SATURATION: 98 % | BODY MASS INDEX: 46.99 KG/M2 | TEMPERATURE: 97.6 F | SYSTOLIC BLOOD PRESSURE: 128 MMHG | DIASTOLIC BLOOD PRESSURE: 72 MMHG

## 2025-08-14 DIAGNOSIS — N32.81 OAB (OVERACTIVE BLADDER): ICD-10-CM

## 2025-08-14 DIAGNOSIS — R39.15 URGENCY OF URINATION: ICD-10-CM

## 2025-08-14 DIAGNOSIS — N76.0 BACTERIAL VAGINOSIS: Primary | ICD-10-CM

## 2025-08-14 DIAGNOSIS — R30.0 DYSURIA: ICD-10-CM

## 2025-08-14 DIAGNOSIS — B96.89 BACTERIAL VAGINOSIS: Primary | ICD-10-CM

## 2025-08-14 LAB
ALBUMIN UR-MCNC: NEGATIVE MG/DL
APPEARANCE UR: CLEAR
BACTERIAL VAGINOSIS VAG-IMP: POSITIVE
BILIRUB UR QL STRIP: NEGATIVE
CANDIDA DNA VAG QL NAA+PROBE: NOT DETECTED
CANDIDA GLABRATA / CANDIDA KRUSEI DNA: NOT DETECTED
COLOR UR AUTO: YELLOW
GLUCOSE UR STRIP-MCNC: NEGATIVE MG/DL
HGB UR QL STRIP: NEGATIVE
KETONES UR STRIP-MCNC: NEGATIVE MG/DL
LEUKOCYTE ESTERASE UR QL STRIP: NEGATIVE
NITRATE UR QL: NEGATIVE
PH UR STRIP: 6 [PH] (ref 5–9)
SP GR UR STRIP: 1.03 (ref 1–1.03)
T VAGINALIS DNA VAG QL NAA+PROBE: NOT DETECTED
UROBILINOGEN UR STRIP-MCNC: NORMAL MG/DL

## 2025-08-14 PROCEDURE — 3078F DIAST BP <80 MM HG: CPT

## 2025-08-14 PROCEDURE — 81515 NFCT DS BV&VAGINITIS DNA ALG: CPT | Mod: ZL

## 2025-08-14 PROCEDURE — G0463 HOSPITAL OUTPT CLINIC VISIT: HCPCS

## 2025-08-14 PROCEDURE — 99214 OFFICE O/P EST MOD 30 MIN: CPT

## 2025-08-14 PROCEDURE — 3074F SYST BP LT 130 MM HG: CPT

## 2025-08-14 PROCEDURE — 81003 URINALYSIS AUTO W/O SCOPE: CPT | Mod: ZL

## 2025-08-14 RX ORDER — METRONIDAZOLE 500 MG/1
500 TABLET ORAL 2 TIMES DAILY
Qty: 14 TABLET | Refills: 0 | Status: SHIPPED | OUTPATIENT
Start: 2025-08-14 | End: 2025-08-21

## 2025-08-14 RX ORDER — ATOMOXETINE 40 MG/1
40 CAPSULE ORAL DAILY
COMMUNITY
Start: 2025-07-30

## 2025-08-26 DIAGNOSIS — N39.41 URGE INCONTINENCE: ICD-10-CM

## 2025-08-26 DIAGNOSIS — N32.81 OAB (OVERACTIVE BLADDER): Primary | ICD-10-CM

## 2025-08-28 DIAGNOSIS — J45.40 MODERATE PERSISTENT ASTHMA WITHOUT COMPLICATION: Chronic | ICD-10-CM

## 2025-09-04 RX ORDER — MONTELUKAST SODIUM 10 MG/1
10 TABLET ORAL AT BEDTIME
Qty: 90 TABLET | Refills: 2 | Status: SHIPPED | OUTPATIENT
Start: 2025-09-04

## (undated) DEVICE — ESU ENDO FORCEP BX HOT FD-210U

## (undated) DEVICE — COVER LIGHT HANDLE LT-F02

## (undated) DEVICE — DRSG KERLIX 4 1/2"X4YDS ROLL 6715

## (undated) DEVICE — PREP CHLORAPREP 26ML TINTED ORANGE  260815

## (undated) DEVICE — Device

## (undated) DEVICE — DRAPE EXTREMITY W/ARMBOARD 29405

## (undated) DEVICE — DRAPE C-ARM PACK 9"

## (undated) DEVICE — CAST PADDING 4" WEBRIL STERILE

## (undated) DEVICE — ENDO KIT COMPLIANCE DYKENDOCMPLY

## (undated) DEVICE — SU PROLENE 3-0 FS-1 18" 8684G

## (undated) DEVICE — GLOVE PROTEXIS PI ORTHO PF 7.5 2D73HT75

## (undated) DEVICE — LO-PRO SCRW,TI,3.5MMX 22MM
Type: IMPLANTABLE DEVICE | Site: FOOT | Status: NON-FUNCTIONAL
Brand: ARTHREX®
Removed: 2022-04-21

## (undated) DEVICE — DRAPE C-ARMOR 5 SIDED 5523

## (undated) DEVICE — SOL WATER 1500ML

## (undated) DEVICE — NDL 22GA 1.5"

## (undated) DEVICE — SU VICRYL 2-0 CT-2 27" UND J269H

## (undated) DEVICE — GLOVE BIOGEL PI INDICATOR 8.0 LF 41680

## (undated) DEVICE — DRSG JUMPSTART ANTIMICROBIAL 1.5X8" ABS-4005

## (undated) DEVICE — PAD ABD 5X9" STERILE 9190A

## (undated) DEVICE — DRSG GAUZE 4X4" TRAY 6939

## (undated) DEVICE — BNDG ELASTIC 4"X5YDS UNSTERILE 6611-40

## (undated) DEVICE — DRILL BIT ARTHREX 2.5MM AR-8943-42

## (undated) DEVICE — GUIDEWIRE THREADED TROCAR TIP 1.35MM AR-8737-02

## (undated) DEVICE — SUCTION MANIFOLD NEPTUNE 2 SYS 4 PORT 0702-020-000

## (undated) DEVICE — TOURNIQUET SGL BLADDER 18"X4" RED 5921-218-135

## (undated) DEVICE — DRILL BIT ARTHREX CAN 2.5MM AR-8737-09

## (undated) DEVICE — PACK MAJOR LAPAROTOMY LF SBA15MLFCA

## (undated) DEVICE — GLOVE PROTEXIS PI ORTHO PF 8.5 2D73HT76

## (undated) DEVICE — BLADE KNIFE SURG 15 371115

## (undated) DEVICE — DRAPE STERI TOWEL LG 1010

## (undated) DEVICE — ESU GROUND PAD ADULT W/CORD E7507

## (undated) DEVICE — ENDO BRUSH CHANNEL MASTER CLEANING 2-4.2MM BW-412T

## (undated) DEVICE — GEL ULTRASOUND AQUASONIC 20GM 01-01

## (undated) DEVICE — TUBING SUCTION 10'X3/16" N510

## (undated) DEVICE — BLADE SAW SAGITTAL STRK MICRO 9.0X25X0.38MM 2296-003-511

## (undated) DEVICE — GLOVE BIOGEL INDICATOR 7.5 LF 41675

## (undated) DEVICE — SPONGE RAY-TEC 4X4" 7317

## (undated) RX ORDER — DILTIAZEM HYDROCHLORIDE 5 MG/ML
INJECTION INTRAVENOUS
Status: DISPENSED
Start: 2024-08-22

## (undated) RX ORDER — GABAPENTIN 300 MG/1
CAPSULE ORAL
Status: DISPENSED
Start: 2025-05-31

## (undated) RX ORDER — DEXAMETHASONE SODIUM PHOSPHATE 4 MG/ML
INJECTION, SOLUTION INTRA-ARTICULAR; INTRALESIONAL; INTRAMUSCULAR; INTRAVENOUS; SOFT TISSUE
Status: DISPENSED
Start: 2022-04-21

## (undated) RX ORDER — CEFTRIAXONE 2 G/1
INJECTION, POWDER, FOR SOLUTION INTRAMUSCULAR; INTRAVENOUS
Status: DISPENSED
Start: 2023-11-04

## (undated) RX ORDER — CEFTRIAXONE SODIUM 1 G
VIAL (EA) INJECTION
Status: DISPENSED
Start: 2023-11-04

## (undated) RX ORDER — HYDROXYZINE PAMOATE 25 MG/1
CAPSULE ORAL
Status: DISPENSED
Start: 2022-10-29

## (undated) RX ORDER — TRIAMCINOLONE ACETONIDE 40 MG/ML
INJECTION, SUSPENSION INTRA-ARTICULAR; INTRAMUSCULAR
Status: DISPENSED
Start: 2024-04-03

## (undated) RX ORDER — PROPOFOL 10 MG/ML
INJECTION, EMULSION INTRAVENOUS
Status: DISPENSED
Start: 2022-04-21

## (undated) RX ORDER — FENTANYL CITRATE 50 UG/ML
INJECTION, SOLUTION INTRAMUSCULAR; INTRAVENOUS
Status: DISPENSED
Start: 2022-04-21

## (undated) RX ORDER — CEFAZOLIN SODIUM IN 0.9 % NACL 3 G/100 ML
INTRAVENOUS SOLUTION, PIGGYBACK (ML) INTRAVENOUS
Status: DISPENSED
Start: 2022-04-21

## (undated) RX ORDER — HYDROXYZINE PAMOATE 25 MG/1
CAPSULE ORAL
Status: DISPENSED
Start: 2025-05-31

## (undated) RX ORDER — DILTIAZEM HYDROCHLORIDE 120 MG/1
CAPSULE, COATED, EXTENDED RELEASE ORAL
Status: DISPENSED
Start: 2024-08-22

## (undated) RX ORDER — ONDANSETRON 2 MG/ML
INJECTION INTRAMUSCULAR; INTRAVENOUS
Status: DISPENSED
Start: 2022-04-21

## (undated) RX ORDER — BUPIVACAINE HYDROCHLORIDE 5 MG/ML
INJECTION, SOLUTION PERINEURAL
Status: DISPENSED
Start: 2022-04-21

## (undated) RX ORDER — IBUPROFEN 200 MG
TABLET ORAL
Status: DISPENSED
Start: 2025-05-31

## (undated) RX ORDER — LIDOCAINE HYDROCHLORIDE 20 MG/ML
INJECTION, SOLUTION EPIDURAL; INFILTRATION; INTRACAUDAL; PERINEURAL
Status: DISPENSED
Start: 2022-07-25

## (undated) RX ORDER — FAMOTIDINE 20 MG/1
TABLET, FILM COATED ORAL
Status: DISPENSED
Start: 2025-05-31

## (undated) RX ORDER — GLYCOPYRROLATE 0.2 MG/ML
INJECTION, SOLUTION INTRAMUSCULAR; INTRAVENOUS
Status: DISPENSED
Start: 2022-04-21

## (undated) RX ORDER — CYANOCOBALAMIN 1000 UG/ML
INJECTION, SOLUTION INTRAMUSCULAR; SUBCUTANEOUS
Status: DISPENSED
Start: 2019-01-04

## (undated) RX ORDER — PROPOFOL 10 MG/ML
INJECTION, EMULSION INTRAVENOUS
Status: DISPENSED
Start: 2022-07-25

## (undated) RX ORDER — DEXMEDETOMIDINE HYDROCHLORIDE 4 UG/ML
INJECTION, SOLUTION INTRAVENOUS
Status: DISPENSED
Start: 2022-04-21

## (undated) RX ORDER — KETOROLAC TROMETHAMINE 30 MG/ML
INJECTION, SOLUTION INTRAMUSCULAR; INTRAVENOUS
Status: DISPENSED
Start: 2018-06-26

## (undated) RX ORDER — LIDOCAINE HYDROCHLORIDE 10 MG/ML
INJECTION, SOLUTION INFILTRATION; PERINEURAL
Status: DISPENSED
Start: 2024-04-03

## (undated) RX ORDER — CYANOCOBALAMIN 1000 UG/ML
INJECTION, SOLUTION INTRAMUSCULAR; SUBCUTANEOUS
Status: DISPENSED
Start: 2019-04-03

## (undated) RX ORDER — KETOROLAC TROMETHAMINE 15 MG/ML
INJECTION, SOLUTION INTRAMUSCULAR; INTRAVENOUS
Status: DISPENSED
Start: 2023-11-04

## (undated) RX ORDER — LIDOCAINE HYDROCHLORIDE 20 MG/ML
INJECTION, SOLUTION EPIDURAL; INFILTRATION; INTRACAUDAL; PERINEURAL
Status: DISPENSED
Start: 2022-04-21

## (undated) RX ORDER — PIPERACILLIN SODIUM, TAZOBACTAM SODIUM 4; .5 G/20ML; G/20ML
INJECTION, POWDER, LYOPHILIZED, FOR SOLUTION INTRAVENOUS
Status: DISPENSED
Start: 2024-04-25

## (undated) RX ORDER — HYDROXYZINE PAMOATE 25 MG/1
CAPSULE ORAL
Status: DISPENSED
Start: 2025-04-22

## (undated) RX ORDER — IBUPROFEN 400 MG/1
TABLET, FILM COATED ORAL
Status: DISPENSED
Start: 2025-05-31

## (undated) RX ORDER — OXYCODONE HYDROCHLORIDE 5 MG/1
TABLET ORAL
Status: DISPENSED
Start: 2022-04-21

## (undated) RX ORDER — ACETAMINOPHEN 500 MG
TABLET ORAL
Status: DISPENSED
Start: 2024-10-14

## (undated) RX ORDER — HYDROXYZINE PAMOATE 25 MG/1
CAPSULE ORAL
Status: DISPENSED
Start: 2025-05-30